# Patient Record
Sex: FEMALE | Race: BLACK OR AFRICAN AMERICAN | Employment: PART TIME | URBAN - METROPOLITAN AREA
[De-identification: names, ages, dates, MRNs, and addresses within clinical notes are randomized per-mention and may not be internally consistent; named-entity substitution may affect disease eponyms.]

---

## 2017-01-11 ENCOUNTER — TELEPHONE (OUTPATIENT)
Dept: FAMILY MEDICINE | Facility: CLINIC | Age: 57
End: 2017-01-11

## 2017-01-11 DIAGNOSIS — F41.9 ANXIETY: Primary | ICD-10-CM

## 2017-01-12 NOTE — TELEPHONE ENCOUNTER
HYDROXYZINE HCL 25MG TAB-TAKE ONE TAB BY MOUTH 3 X DAILY -D/C according to our records on 2/6/16   Last Written Prescription Date:  9/17/16 ACCORDING TO PHARMACY  Last Fill Quantity: 90,   # refills: 3  Last Office Visit with [facility], [facility] or [facility] prescribing provider: 12/14/16  Future Office visit:       Routing refill request to provider for review/approval because:  Medication is reported/historical

## 2017-01-13 RX ORDER — HYDROXYZINE HYDROCHLORIDE 25 MG/1
25 TABLET, FILM COATED ORAL 3 TIMES DAILY PRN
Qty: 120 TABLET | Refills: 11 | Status: SHIPPED | OUTPATIENT
Start: 2017-01-13 | End: 2017-11-14

## 2017-01-17 ENCOUNTER — TELEPHONE (OUTPATIENT)
Dept: FAMILY MEDICINE | Facility: CLINIC | Age: 57
End: 2017-01-17

## 2017-01-17 NOTE — TELEPHONE ENCOUNTER
Return call to patient. She has been weaning off of her Effexor XR under the advice of her psychiatrist. She is currently taking 75mg tablets. She reports the following symptoms:     -flu like symptoms  -headache  -fever yesterday with chills, unsure of temperature  -tingling sensation in arms  -irritability    Patient is currently in [place], triage RN advised patient to seek medical care today regarding her current symptoms. Patient reports that she has a call into her psychiatry office and will go to ED/UC in [place] today. RN asked patient to call back with any further questions or concerns.     Shelby Arreola RN  [facility]

## 2017-01-17 NOTE — TELEPHONE ENCOUNTER
Pt states she feels she is experiencing withdrawal symptoms from venlafaxine (EFFEXOR-ER) 225 MG TB24    Pt is in [place] and will be back in [place] tomorrow but no one will accept her insurance where she is. She would like to get some advice    Pt can be reached @ [phone] betsy

## 2017-01-18 ENCOUNTER — TELEPHONE (OUTPATIENT)
Dept: FAMILY MEDICINE | Facility: CLINIC | Age: 57
End: 2017-01-18

## 2017-01-18 NOTE — Clinical Note
[address] [address]  [phone]          January 18, 2017    Lesley Stafford  [facility] [address]    To whom it may concern,    RE: Lesley Wheat is currently a patient under my medical care.  Please excuse her work absence 1/18/2017 due to illness.  Please contact the clinic for any questions.      Sincerely,        Rafael Orosco MD

## 2017-01-18 NOTE — TELEPHONE ENCOUNTER
Reason for call: Other   Patient called regarding (reason for call): Letter excusing her from work   Additional comments: Pt states that she is experiencing some withdrawal symptoms from effexor, and does not think she will be able to make it to work today when she is back from [place]. She was requesting a letter stating that she is medically unable to work. She will be flying in from [place], so a message may need to be left as she might be on the plane when the nurse calls her back.     Phone Number Pt can be reached at: Home number on file [phone] (home)  Best Time: Any  Can we leave a detailed message on this number? YES

## 2017-01-18 NOTE — TELEPHONE ENCOUNTER
Writer reviewed    -last office visit 12/14/2016   -medication is reported as historical   -no documentation RE: patient stopping medication    Called patient and left message advising   -letter without office visit to assess symptoms is inappropriate   -should be seen if having increase symptoms of withdrawal   -please contact prescribing provider to discuss symptoms and letter request      Thank you,  Demetrio Edwards RN

## 2017-01-20 NOTE — TELEPHONE ENCOUNTER
Closing encounter; no further action needed.      Diane Parker, BENJAN, RN  [facility] [place]

## 2017-01-27 ENCOUNTER — OFFICE VISIT (OUTPATIENT)
Dept: OBGYN | Facility: CLINIC | Age: 57
End: 2017-01-27
Payer: COMMERCIAL

## 2017-01-27 VITALS
DIASTOLIC BLOOD PRESSURE: 73 MMHG | BODY MASS INDEX: 29.89 KG/M2 | TEMPERATURE: 97.4 F | HEART RATE: 96 BPM | HEIGHT: 66 IN | SYSTOLIC BLOOD PRESSURE: 116 MMHG | WEIGHT: 186 LBS

## 2017-01-27 DIAGNOSIS — N95.0 POSTMENOPAUSAL BLEEDING: Primary | ICD-10-CM

## 2017-01-27 PROCEDURE — 99214 OFFICE O/P EST MOD 30 MIN: CPT | Performed by: OBSTETRICS & GYNECOLOGY

## 2017-01-27 NOTE — PROGRESS NOTES
SUBJECTIVE:                                                    Lesley Stafford is a 56 year old female who presents to clinic today for the following health issues:      Acute Illness   Acute illness concerns: Sinus infection  Onset: 1 month ago    Fever: no     Chills/Sweats: no     Headache (location?): YES    Sinus Pressure:YES    Conjunctivitis:  Yes, very irritated    Ear Pain: YES- feel clogged but no pain    Rhinorrhea: YES- clogged and sore    Congestion: YES    Sore Throat: YES     Cough: YES     Wheeze: no     Decreased Appetite: YES    Nausea: YES    Vomiting: no     Diarrhea:  No, but stool has been different    Dysuria/Freq.: no     Fatigue/Achiness: YES    Sick/Strep Exposure: YES- workplace      Therapies Tried and outcome: None    Patient has off and on numbness in her hands for months, it is worse in her right hand. Has recently woken her out of her sleep. Has a history of carpal tunnel syndrome and a history of neuropathy being treated with gabapentin. Denies weakness.       Problem list and histories reviewed & adjusted, as indicated.  Additional history: as documented    Patient Active Problem List   Diagnosis     CARDIOVASCULAR SCREENING; LDL GOAL LESS THAN 130     Lupus (H)     Abnormal perimenopausal bleeding     Obesity, Class I, BMI 30-34.9     Other symptoms involving nervous and musculoskeletal systems(781.99)     History of claustrophobia     Cervicalgia     Pain in joint of left shoulder     Anemia due to blood loss, acute     Anemia     Depressive disorder     Anxiety     Allergy     ACP (advance care planning)     Tired     Insomnia, unspecified type     Hemoglobin C trait (H)     Liver hemangioma     Bilateral carpal tunnel syndrome     Past Surgical History   Procedure Laterality Date     Gyn surgery       Colonoscopy  01/2010     repeat 10 yrs     Esophagoscopy, gastroscopy, duodenoscopy (egd), combined Left 2/5/2016     Procedure: COMBINED ESOPHAGOSCOPY, GASTROSCOPY,  DUODENOSCOPY (EGD), BIOPSY SINGLE OR MULTIPLE;  Surgeon: Pierre Fernandez MD;  Location:  GI       Social History   Substance Use Topics     Smoking status: Former Smoker     Smokeless tobacco: Never Used     Alcohol Use: No     Family History   Problem Relation Age of Onset     Lupus Mother      DIABETES Father      Brain Tumor Father      begnign on pituitary     Depression Sister      Anemia Sister          Current Outpatient Prescriptions   Medication Sig Dispense Refill     hydrOXYzine (ATARAX) 25 MG tablet Take 1 tablet (25 mg) by mouth 3 times daily as needed for itching 120 tablet 11     traMADol (ULTRAM) 50 MG tablet Take 1-2 tablets ( mg) by mouth every 6 hours as needed 60 tablet 1     cyclobenzaprine (FLEXERIL) 10 MG tablet Take 0.5-1 tablets (5-10 mg) by mouth At Bedtime 30 tablet 1     gabapentin (NEURONTIN) 300 MG capsule Take 1 capsule (300 mg) by mouth 2 times daily 180 capsule 3     amphetamine-dextroamphetamine (ADDERALL) 10 MG tablet Take 10 mg by mouth 2 times daily       predniSONE (DELTASONE) 1 MG tablet Take 4 tablets (4 mg) by mouth daily       acyclovir (ZOVIRAX) 200 MG capsule Take 1 capsule (200 mg) by mouth 5 times daily 22 capsule 0     ferrous sulfate (IRON) 325 (65 FE) MG tablet Take 1 tablet (325 mg) by mouth daily (with breakfast) 30 tablet 5     fluticasone (FLONASE) 50 MCG/ACT nasal spray Spray 1-2 sprays into both nostrils daily 16 g 11     cetirizine (ZYRTEC) 10 MG tablet Take 1 tablet (10 mg) by mouth every evening 30 tablet 1     Calcium-Magnesium-Vitamin D (CALCIUM 500 PO)        multivitamin, therapeutic with minerals (MULTI-VITAMIN) TABS Take 1 tablet by mouth daily       venlafaxine (EFFEXOR-ER) 225 MG TB24 Take 75 mg by mouth every evening        hydroxychloroquine (PLAQUENIL) 200 MG tablet Take 200 mg by mouth daily.       Allergies   Allergen Reactions     Morphine Sulfate Itching     Sulfa Drugs Hives       ROS:  Constitutional, HEENT,  cardiovascular, pulmonary, GI, , musculoskeletal, neuro, skin, endocrine and psych systems are negative, except as otherwise noted.    OBJECTIVE:                                                    /77 mmHg  Pulse 100  Temp(Src) 97.1  F (36.2  C) (Oral)  Wt 186 lb 3.2 oz (84.46 kg)  SpO2 97%  Body mass index is 30.5 kg/(m^2).  GENERAL: healthy, alert and no distress  EYES: Eyes grossly normal to inspection, PERRL and conjunctivae and sclerae normal  HENT: normal cephalic/atraumatic, ear canals and TM's normal, nasal mucosa edematous , rhinorrhea yellow, oropharynx clear and oral mucous membranes moist  NECK: no adenopathy, no asymmetry, masses, or scars and thyroid normal to palpation  RESP: lungs clear to auscultation - no rales, rhonchi or wheezes  CV: regular rate and rhythm, normal S1 S2, no S3 or S4, no murmur, click or rub, no peripheral edema and peripheral pulses strong  MS: no gross musculoskeletal defects noted, no edema. Strength intact.   NEURO: CN2-12 intact. Moves all extremeties. Speech intact.     Diagnostic Test Results:  none      ASSESSMENT/PLAN:                                                    ##. Acute sinusitis with symptoms > 10 days  Push fluids and rest. Honey for cough, humidified air at night.   - amoxicillin (AMOXIL) 875 MG tablet; Take 1 tablet (875 mg) by mouth 2 times daily  Dispense: 20 tablet; Refill: 0  - fluticasone (FLONASE) 50 MCG/ACT spray; Spray 1-2 sprays into both nostrils daily  Dispense: 1 Bottle; Refill: 1    ##. Peripheral polyneuropathy (H)  Patient has a history of neuropathy, increasing her gabapentin from TWO TIMES PER DAY to THREE TIMES PER DAY.   - gabapentin (NEURONTIN) 300 MG capsule; Take 1 capsule (300 mg) by mouth 3 times daily  Dispense: 90 capsule; Refill: 1  I have discussed the patient's diagnosis, and my plan of treatment with the patient and/or family. Patient is aware to come back in with worsening symptoms or if no relief despite treatment  plan.  Patient voiced understanding and had no further questions.     Rani Alvarado PA-C  [facility] [place]

## 2017-01-27 NOTE — MR AVS SNAPSHOT
"              After Visit Summary   1/27/2017    Lesley Stafford    MRN: [id number]           Patient Information     Date Of Birth          1960        Visit Information        Provider Department      1/27/2017 1:45 PM Erin Gutierrez MD [facility]        Today's Diagnoses     Postmenopausal bleeding    -  1        Follow-ups after your visit        Your next 10 appointments already scheduled     Jan 30, 2017  9:40 AM   SHORT with Rani Alvarado PA-C   [facility] ([facility])    [address] [place] [id number] [phone]              Who to contact     If you have questions or need follow up information about today's clinic visit or your schedule please contact [facility] directly at [phone].  Normal or non-critical lab and imaging results will be communicated to you by MyChart, letter or phone within 4 business days after the clinic has received the results. If you do not hear from us within 7 days, please contact the clinic through MyChart or phone. If you have a critical or abnormal lab result, we will notify you by phone as soon as possible.  Submit refill requests through [employer] or call your pharmacy and they will forward the refill request to us. Please allow 3 business days for your refill to be completed.          Additional Information About Your Visit        MyChart Information     [employer] lets you send messages to your doctor, view your test results, renew your prescriptions, schedule appointments and more. To sign up, go to [email or web address] . Click on \"Log in\" on the left side of the screen, which will take you to the Welcome page. Then click on \"Sign up Now\" on the right side of the page.     You will be asked to enter the access code listed below, as well as some personal information. Please follow the directions to create your username and password.     Your access code is: " "[email or web address]  Expires: 3/28/2017  1:54 PM     Your access code will  in 90 days. If you need help or a new code, please call your [facility] or [phone].        Care EveryWhere ID     This is your Care EveryWhere ID. This could be used by other organizations to access your [facility] medical records  [id number]        Your Vitals Were     Pulse Temperature Height BMI (Body Mass Index)          96 97.4  F (36.3  C) (Oral) 5' 5.5\" (1.664 m) 30.47 kg/m2         Blood Pressure from Last 3 Encounters:   17 116/73   16 104/69   16 108/71    Weight from Last 3 Encounters:   17 186 lb (84.369 kg)   16 188 lb (85.276 kg)   16 188 lb 6.4 oz (85.458 kg)              Today, you had the following     No orders found for display       Primary Care Provider Office Phone # Fax #    Rafael Orosco [phone]       [facility] [address] [address]        Thank you!     Thank you for choosing [facility]  for your care. Our goal is always to provide you with excellent care. Hearing back from our patients is one way we can continue to improve our services. Please take a few minutes to complete the written survey that you may receive in the mail after your visit with us. Thank you!             Your Updated Medication List - Protect others around you: Learn how to safely use, store and throw away your medicines at www.disposemymeds.org.          This list is accurate as of: 17  5:15 PM.  Always use your most recent med list.                   Brand Name Dispense Instructions for use    acyclovir 200 MG capsule    ZOVIRAX    22 capsule    Take 1 capsule (200 mg) by mouth 5 times daily       amphetamine-dextroamphetamine 10 MG per tablet    ADDERALL     Take 10 mg by mouth 2 times daily       CALCIUM 500 PO          cetirizine 10 MG tablet    zyrTEC    30 tablet    Take 1 tablet (10 mg) by mouth every evening "       cyclobenzaprine 10 MG tablet    FLEXERIL    30 tablet    Take 0.5-1 tablets (5-10 mg) by mouth At Bedtime       ferrous sulfate 325 (65 FE) MG tablet    IRON    30 tablet    Take 1 tablet (325 mg) by mouth daily (with breakfast)       fluticasone 50 MCG/ACT spray    FLONASE    16 g    Spray 1-2 sprays into both nostrils daily       gabapentin 300 MG capsule    NEURONTIN    180 capsule    Take 1 capsule (300 mg) by mouth 2 times daily       hydroxychloroquine 200 MG tablet    PLAQUENIL     Take 200 mg by mouth daily.       hydrOXYzine 25 MG tablet    ATARAX    120 tablet    Take 1 tablet (25 mg) by mouth 3 times daily as needed for itching       Multi-vitamin Tabs tablet      Take 1 tablet by mouth daily       predniSONE 1 MG tablet    DELTASONE     Take 4 tablets (4 mg) by mouth daily       traMADol 50 MG tablet    ULTRAM    60 tablet    Take 1-2 tablets ( mg) by mouth every 6 hours as needed       venlafaxine 225 MG Tb24 24 hr tablet    EFFEXOR-ER     Take 75 mg by mouth every evening

## 2017-01-27 NOTE — PROGRESS NOTES
"Lesley Stafford is a 56 year old woman who presents for endometrial biopsy.  See prior GYN notes.   Ultrasound has showed an anteverted retroflexed uterus, 11 mm endometrium.      OBJECTIVE: /73 mmHg  Pulse 96  Temp(Src) 97.4  F (36.3  C) (Oral)  Ht 5' 5.5\" (1.664 m)  Wt 186 lb (84.369 kg)  BMI 30.47 kg/m2     Informed consent was obtained for endometrial biopsy.  Using standard technique and hibiclens prep of the cervix and vagina, pipelle aspiration of the endometrium was attempted.  The cervix was grasped anteriorly with a tenaculum.  The uterus sounded to the internal os, but could not be sounded further.  The procedure caused her discomfort, and we stopped at that point by her request.      ASSESSMENT: Postmenopausal bleeding.  Failed endometrial biopsy, stenotic cervix.     PLAN:  We discussed postmenopausal bleeding.  We discussed estrogen-depletion atrophy, breakthrough bleeding.  We discussed cervical problems.  We discussed endometrial hyperplasia, and endometrial cancer.  We discussed treatment of the prior problems.  We discussed further evaluation if endometrial biopsy cannot be done.  We discussed D&C, hysteroscopy as further options.  Operative risks and benefits were discussed with the patient and questions were answered.  These risks include but are not limited to anesthesia, injury to internal organs, uterine perforation, bleeding, infection, and need for further surgery.  She wishes to proceed with hysteroscopy D&C, we will schedule.  Will plan ultrasound guidance in view of uterine anatomy.          In addition to the procedure, I spent 25 minutes with the patient, with more than 50% spent in counseling/discussing the diagnosis and treatment options.      "

## 2017-01-27 NOTE — NURSING NOTE
"Chief Complaint   Patient presents with     Procedure     EMB       Initial /73 mmHg  Pulse 96  Temp(Src) 97.4  F (36.3  C) (Oral)  Ht 5' 5.5\" (1.664 m)  Wt 186 lb (84.369 kg)  BMI 30.47 kg/m2 Estimated body mass index is 30.47 kg/(m^2) as calculated from the following:    Height as of this encounter: 5' 5.5\" (1.664 m).    Weight as of this encounter: 186 lb (84.369 kg).  BP completed using cuff size: regular    No obstetric history on file.    The following HM Due: NONE      The following patient reported/Care Every where data was sent to:  P ABSTRACT QUALITY INITIATIVES [81752]       N/a      Rosi Sharp MA                 "

## 2017-01-27 NOTE — Clinical Note
Surgeon: Erin Gutierrez Assistant: yes, resident OK.  will want Radiology ultrasound present Procedure: hysteroscopy D&C, Myosure available Diagnosis: postmenopausal bleeding, failed office endometrial biopsy Length of surgery: one hour Morning admit: No Day/Time Preference:  Anesthesia: Local and IV sedation Pre-op: With Primary Latex Allergy: No Want pre op labs done: Yes Hgb

## 2017-01-30 ENCOUNTER — OFFICE VISIT (OUTPATIENT)
Dept: FAMILY MEDICINE | Facility: CLINIC | Age: 57
End: 2017-01-30
Payer: COMMERCIAL

## 2017-01-30 VITALS
SYSTOLIC BLOOD PRESSURE: 133 MMHG | WEIGHT: 186.2 LBS | HEART RATE: 100 BPM | BODY MASS INDEX: 30.5 KG/M2 | OXYGEN SATURATION: 97 % | TEMPERATURE: 97.1 F | DIASTOLIC BLOOD PRESSURE: 77 MMHG

## 2017-01-30 DIAGNOSIS — G62.9 PERIPHERAL POLYNEUROPATHY: ICD-10-CM

## 2017-01-30 DIAGNOSIS — J01.90 ACUTE SINUSITIS WITH SYMPTOMS > 10 DAYS: ICD-10-CM

## 2017-01-30 DIAGNOSIS — J01.01 ACUTE RECURRENT MAXILLARY SINUSITIS: Primary | ICD-10-CM

## 2017-01-30 PROCEDURE — 99214 OFFICE O/P EST MOD 30 MIN: CPT | Performed by: PHYSICIAN ASSISTANT

## 2017-01-30 RX ORDER — AMOXICILLIN 875 MG
875 TABLET ORAL 2 TIMES DAILY
Qty: 20 TABLET | Refills: 0 | Status: SHIPPED | OUTPATIENT
Start: 2017-01-30 | End: 2017-02-13

## 2017-01-30 RX ORDER — FLUTICASONE PROPIONATE 50 MCG
1-2 SPRAY, SUSPENSION (ML) NASAL DAILY
Qty: 1 BOTTLE | Refills: 1 | Status: SHIPPED | OUTPATIENT
Start: 2017-01-30 | End: 2017-02-13

## 2017-01-30 RX ORDER — GABAPENTIN 300 MG/1
300 CAPSULE ORAL 3 TIMES DAILY
Qty: 90 CAPSULE | Refills: 1 | Status: SHIPPED | OUTPATIENT
Start: 2017-01-30 | End: 2017-05-16

## 2017-01-30 NOTE — NURSING NOTE
"Chief Complaint   Patient presents with     Sinus Problem       Initial /77 mmHg  Pulse 100  Temp(Src) 97.1  F (36.2  C) (Oral)  Wt 186 lb 3.2 oz (84.46 kg)  SpO2 97% Estimated body mass index is 30.5 kg/(m^2) as calculated from the following:    Height as of 1/27/17: 5' 5.5\" (1.664 m).    Weight as of this encounter: 186 lb 3.2 oz (84.46 kg).  BP completed using cuff size: regular      Vu Herrera MA      "

## 2017-01-30 NOTE — MR AVS SNAPSHOT
"              After Visit Summary   2017    Lesley Stafford    MRN: [id number]           Patient Information     Date Of Birth          1960        Visit Information        Provider Department      2017 1:00 PM Rani Alvarado PA-C [facility]        Today's Diagnoses     Acute recurrent maxillary sinusitis    -  1     Acute sinusitis with symptoms > 10 days         Peripheral polyneuropathy (H)            Follow-ups after your visit        Who to contact     If you have questions or need follow up information about today's clinic visit or your schedule please contact [facility] directly at [phone].  Normal or non-critical lab and imaging results will be communicated to you by Staff Rankerhart, letter or phone within 4 business days after the clinic has received the results. If you do not hear from us within 7 days, please contact the clinic through Staff Rankerhart or phone. If you have a critical or abnormal lab result, we will notify you by phone as soon as possible.  Submit refill requests through Peppercorn or call your pharmacy and they will forward the refill request to us. Please allow 3 business days for your refill to be completed.          Additional Information About Your Visit        MyChart Information     Peppercorn lets you send messages to your doctor, view your test results, renew your prescriptions, schedule appointments and more. To sign up, go to [email or web address] . Click on \"Log in\" on the left side of the screen, which will take you to the Welcome page. Then click on \"Sign up Now\" on the right side of the page.     You will be asked to enter the access code listed below, as well as some personal information. Please follow the directions to create your username and password.     Your access code is: [email or web address]  Expires: 3/28/2017  1:54 PM     Your access code will  in 90 days. If you need help or a new code, please call your [place] clinic " or [phone].        Care EveryWhere ID     This is your Care EveryWhere ID. This could be used by other organizations to access your [place] medical records  [id number]        Your Vitals Were     Pulse Temperature Pulse Oximetry             100 97.1  F (36.2  C) (Oral) 97%          Blood Pressure from Last 3 Encounters:   01/30/17 133/77   01/27/17 116/73   12/28/16 104/69    Weight from Last 3 Encounters:   01/30/17 186 lb 3.2 oz (84.46 kg)   01/27/17 186 lb (84.369 kg)   12/28/16 188 lb (85.276 kg)              Today, you had the following     No orders found for display         Today's Medication Changes          These changes are accurate as of: 1/30/17  1:28 PM.  If you have any questions, ask your nurse or doctor.               Start taking these medicines.        Dose/Directions    amoxicillin 875 MG tablet   Commonly known as:  AMOXIL   Used for:  Acute sinusitis with symptoms > 10 days   Started by:  Rani Alvarado PA-C        Dose:  875 mg   Take 1 tablet (875 mg) by mouth 2 times daily   Quantity:  20 tablet   Refills:  0         These medicines have changed or have updated prescriptions.        Dose/Directions    * fluticasone 50 MCG/ACT spray   Commonly known as:  FLONASE   This may have changed:  Another medication with the same name was added. Make sure you understand how and when to take each.   Used for:  Seasonal allergic rhinitis   Changed by:  Jessica Gutierres PA-C        Dose:  1-2 spray   Spray 1-2 sprays into both nostrils daily   Quantity:  16 g   Refills:  11       * fluticasone 50 MCG/ACT spray   Commonly known as:  FLONASE   This may have changed:  You were already taking a medication with the same name, and this prescription was added. Make sure you understand how and when to take each.   Used for:  Acute sinusitis with symptoms > 10 days   Changed by:  Rani Alvarado PA-C        Dose:  1-2 spray   Spray 1-2 sprays into both nostrils daily   Quantity:   1 Bottle   Refills:  1       * gabapentin 300 MG capsule   Commonly known as:  NEURONTIN   This may have changed:  Another medication with the same name was added. Make sure you understand how and when to take each.   Used for:  Cervicalgia   Changed by:  Rafael Orosco MD        Dose:  300 mg   Take 1 capsule (300 mg) by mouth 2 times daily   Quantity:  180 capsule   Refills:  3       * gabapentin 300 MG capsule   Commonly known as:  NEURONTIN   This may have changed:  You were already taking a medication with the same name, and this prescription was added. Make sure you understand how and when to take each.   Used for:  Peripheral polyneuropathy (H)   Changed by:  Rani Alvarado PA-C        Dose:  300 mg   Take 1 capsule (300 mg) by mouth 3 times daily   Quantity:  90 capsule   Refills:  1       * Notice:  This list has 4 medication(s) that are the same as other medications prescribed for you. Read the directions carefully, and ask your doctor or other care provider to review them with you.         Where to get your medicines      These medications were sent to [facility] [address] [facility] - [place], [place] - [address]  [address] [address]     Phone:  [phone]    - amoxicillin 875 MG tablet  - gabapentin 300 MG capsule      Some of these will need a paper prescription and others can be bought over the counter.  Ask your nurse if you have questions.     Bring a paper prescription for each of these medications    - fluticasone 50 MCG/ACT spray             Primary Care Provider Office Phone # Fax #    Rafael Orosco [phone]       [facility] [address] [facility] [address]  [facility] [address]        Thank you!     Thank you for choosing [facility]  for your care. Our goal is always to provide you with excellent care. Hearing back from our patients is one way we can continue to improve our services. Please take a few  minutes to complete the written survey that you may receive in the mail after your visit with us. Thank you!             Your Updated Medication List - Protect others around you: Learn how to safely use, store and throw away your medicines at www.disposemymeds.org.          This list is accurate as of: 1/30/17  1:28 PM.  Always use your most recent med list.                   Brand Name Dispense Instructions for use    acyclovir 200 MG capsule    ZOVIRAX    22 capsule    Take 1 capsule (200 mg) by mouth 5 times daily       amoxicillin 875 MG tablet    AMOXIL    20 tablet    Take 1 tablet (875 mg) by mouth 2 times daily       amphetamine-dextroamphetamine 10 MG per tablet    ADDERALL     Take 10 mg by mouth 2 times daily       CALCIUM 500 PO          cetirizine 10 MG tablet    zyrTEC    30 tablet    Take 1 tablet (10 mg) by mouth every evening       cyclobenzaprine 10 MG tablet    FLEXERIL    30 tablet    Take 0.5-1 tablets (5-10 mg) by mouth At Bedtime       ferrous sulfate 325 (65 FE) MG tablet    IRON    30 tablet    Take 1 tablet (325 mg) by mouth daily (with breakfast)       * fluticasone 50 MCG/ACT spray    FLONASE    16 g    Spray 1-2 sprays into both nostrils daily       * fluticasone 50 MCG/ACT spray    FLONASE    1 Bottle    Spray 1-2 sprays into both nostrils daily       * gabapentin 300 MG capsule    NEURONTIN    180 capsule    Take 1 capsule (300 mg) by mouth 2 times daily       * gabapentin 300 MG capsule    NEURONTIN    90 capsule    Take 1 capsule (300 mg) by mouth 3 times daily       hydroxychloroquine 200 MG tablet    PLAQUENIL     Take 200 mg by mouth daily.       hydrOXYzine 25 MG tablet    ATARAX    120 tablet    Take 1 tablet (25 mg) by mouth 3 times daily as needed for itching       Multi-vitamin Tabs tablet      Take 1 tablet by mouth daily       predniSONE 1 MG tablet    DELTASONE     Take 4 tablets (4 mg) by mouth daily       traMADol 50 MG tablet    ULTRAM    60 tablet    Take 1-2 tablets  ( mg) by mouth every 6 hours as needed       venlafaxine 225 MG Tb24 24 hr tablet    EFFEXOR-ER     Take 75 mg by mouth every evening       * Notice:  This list has 4 medication(s) that are the same as other medications prescribed for you. Read the directions carefully, and ask your doctor or other care provider to review them with you.

## 2017-01-31 DIAGNOSIS — N92.4 ABNORMAL PERIMENOPAUSAL BLEEDING: Primary | ICD-10-CM

## 2017-01-31 RX ORDER — PHENAZOPYRIDINE HYDROCHLORIDE 200 MG/1
200 TABLET, FILM COATED ORAL ONCE
Status: CANCELLED | OUTPATIENT
Start: 2017-02-15

## 2017-02-08 DIAGNOSIS — Z01.818 PREOPERATIVE EXAMINATION: Primary | ICD-10-CM

## 2017-02-11 ENCOUNTER — ANESTHESIA EVENT (OUTPATIENT)
Dept: SURGERY | Facility: CLINIC | Age: 57
End: 2017-02-11
Payer: COMMERCIAL

## 2017-02-13 ENCOUNTER — OFFICE VISIT (OUTPATIENT)
Dept: FAMILY MEDICINE | Facility: CLINIC | Age: 57
End: 2017-02-13
Payer: COMMERCIAL

## 2017-02-13 ENCOUNTER — HOSPITAL ENCOUNTER (OUTPATIENT)
Facility: CLINIC | Age: 57
Setting detail: SPECIMEN
Discharge: HOME | End: 2017-02-13
Admitting: OBSTETRICS & GYNECOLOGY
Payer: COMMERCIAL

## 2017-02-13 VITALS
BODY MASS INDEX: 31.05 KG/M2 | WEIGHT: 189.5 LBS | TEMPERATURE: 97.8 F | SYSTOLIC BLOOD PRESSURE: 102 MMHG | DIASTOLIC BLOOD PRESSURE: 72 MMHG | HEART RATE: 97 BPM

## 2017-02-13 DIAGNOSIS — J31.0 CHRONIC RHINITIS: ICD-10-CM

## 2017-02-13 DIAGNOSIS — N95.0 POST-MENOPAUSAL BLEEDING: ICD-10-CM

## 2017-02-13 DIAGNOSIS — M79.10 MYALGIA: ICD-10-CM

## 2017-02-13 DIAGNOSIS — Z01.818 PREOP GENERAL PHYSICAL EXAM: Primary | ICD-10-CM

## 2017-02-13 LAB
ABO + RH BLD: NORMAL
ABO + RH BLD: NORMAL
ERYTHROCYTE [DISTWIDTH] IN BLOOD BY AUTOMATED COUNT: 13.6 % (ref 10–15)
HCT VFR BLD AUTO: 36.7 % (ref 35–47)
HGB BLD-MCNC: 13.6 G/DL (ref 11.7–15.7)
MCH RBC QN AUTO: 29.4 PG (ref 26.5–33)
MCHC RBC AUTO-ENTMCNC: 37.1 G/DL (ref 31.5–36.5)
MCV RBC AUTO: 79 FL (ref 78–100)
PLATELET # BLD AUTO: 130 10E9/L (ref 150–450)
RBC # BLD AUTO: 4.63 10E12/L (ref 3.8–5.2)
SPECIMEN EXP DATE BLD: NORMAL
WBC # BLD AUTO: 5 10E9/L (ref 4–11)

## 2017-02-13 PROCEDURE — 86850 RBC ANTIBODY SCREEN: CPT | Performed by: OBSTETRICS & GYNECOLOGY

## 2017-02-13 PROCEDURE — 86901 BLOOD TYPING SEROLOGIC RH(D): CPT | Performed by: OBSTETRICS & GYNECOLOGY

## 2017-02-13 PROCEDURE — 36415 COLL VENOUS BLD VENIPUNCTURE: CPT | Performed by: NURSE PRACTITIONER

## 2017-02-13 PROCEDURE — 99214 OFFICE O/P EST MOD 30 MIN: CPT | Performed by: NURSE PRACTITIONER

## 2017-02-13 PROCEDURE — 86900 BLOOD TYPING SEROLOGIC ABO: CPT | Performed by: OBSTETRICS & GYNECOLOGY

## 2017-02-13 PROCEDURE — 86900 BLOOD TYPING SEROLOGIC ABO: CPT | Performed by: NURSE PRACTITIONER

## 2017-02-13 PROCEDURE — 86901 BLOOD TYPING SEROLOGIC RH(D): CPT | Performed by: NURSE PRACTITIONER

## 2017-02-13 PROCEDURE — 85027 COMPLETE CBC AUTOMATED: CPT | Performed by: NURSE PRACTITIONER

## 2017-02-13 NOTE — MR AVS SNAPSHOT
After Visit Summary   2/13/2017    Lesley Stafford    MRN: [id number]           Patient Information     Date Of Birth          1960        Visit Information        Provider Department      2/13/2017 9:30 AM Rosa Weinstein APRN [facility]        Today's Diagnoses     Preop general physical exam    -  1    Chronic rhinitis        Myalgia          Care Instructions      Before Your Surgery      Call your surgeon if there is any change in your health. This includes signs of a cold or flu (such as a sore throat, runny nose, cough, rash or fever).    Do not smoke, drink alcohol or take over the counter medicine (unless your surgeon or primary care doctor tells you to) for the 24 hours before and after surgery.    If you take prescribed drugs: Follow your doctor s orders about which medicines to take and which to stop until after surgery.    Eating and drinking prior to surgery: follow the instructions from your surgeon    Take a shower or bath the night before surgery. Use the soap your surgeon gave you to gently clean your skin. If you do not have soap from your surgeon, use your regular soap. Do not shave or scrub the surgery site.  Wear clean pajamas and have clean sheets on your bed.         Follow-ups after your visit        Additional Services     OTOLARYNGOLOGY REFERRAL       Your provider has referred you to: N: [facility] [phone] [email or web address]    Please be aware that coverage of these services is subject to the terms and limitations of your health insurance plan.  Call member services at your health plan with any benefit or coverage questions.      Please bring the following with you to your appointment:    (1) Any X-Rays, CTs or MRIs which have been performed.  Contact the facility where they were done to arrange for  prior to your scheduled appointment.   (2) List of current medications  (3) This referral  "request   (4) Any documents/labs given to you for this referral                  Your next 10 appointments already scheduled     Feb 15, 2017   Procedure with Erin Gutierrez MD   [facility] (--)    [address] [place] [address] [phone]            Feb 15, 2017  7:30 AM CST   US INTRAOPERATIVE with URUS1   [facility], Ultrasound ([facility], [facility])    [facility] [facility] [address] [phone]           Please bring a list of your medicines (including vitamins, minerals and over-the-counter drugs). Also, tell your doctor about any allergies you may have. Wear comfortable clothes and leave your valuables at home.  You do not need to do anything special to prepare for your exam.  Please call the Imaging Department at your exam site with any questions.              Who to contact     If you have questions or need follow up information about today's clinic visit or your schedule please contact [facility] directly at [phone].  Normal or non-critical lab and imaging results will be communicated to you by Vanuhart, letter or phone within 4 business days after the clinic has received the results. If you do not hear from us within 7 days, please contact the clinic through Vanuhart or phone. If you have a critical or abnormal lab result, we will notify you by phone as soon as possible.  Submit refill requests through BioPharmX or call your pharmacy and they will forward the refill request to us. Please allow 3 business days for your refill to be completed.          Additional Information About Your Visit        BioPharmX Information     BioPharmX lets you send messages to your doctor, view your test results, renew your prescriptions, schedule appointments and more. To sign up, go to [email or web address] . Click on \"Log in\" on the left side of the screen, which will take you to the Welcome page. Then click on " "\"Sign up Now\" on the right side of the page.     You will be asked to enter the access code listed below, as well as some personal information. Please follow the directions to create your username and password.     Your access code is: [email or web address]  Expires: 3/28/2017  1:54 PM     Your access code will  in 90 days. If you need help or a new code, please call your [place] clinic or [phone].        Care EveryWhere ID     This is your Care EveryWhere ID. This could be used by other organizations to access your [place] medical records  [id number]        Your Vitals Were     Pulse Temperature BMI (Body Mass Index)             97 97.8  F (36.6  C) (Oral) 31.05 kg/m2          Blood Pressure from Last 3 Encounters:   17 102/72   17 133/77   17 116/73    Weight from Last 3 Encounters:   17 189 lb 8 oz (86 kg)   17 186 lb 3.2 oz (84.5 kg)   17 186 lb (84.4 kg)              We Performed the Following     ABO and Rh     CBC with platelets     OTOLARYNGOLOGY REFERRAL          Today's Medication Changes          These changes are accurate as of: 17 10:22 AM.  If you have any questions, ask your nurse or doctor.               Start taking these medicines.        Dose/Directions    tiZANidine 4 MG tablet   Commonly known as:  ZANAFLEX   Used for:  Myalgia   Started by:  Rosa Weinstein APRN CNP        Dose:  4-8 mg   Take 1-2 tablets (4-8 mg) by mouth 3 times daily as needed for muscle spasms   Quantity:  30 tablet   Refills:  1         These medicines have changed or have updated prescriptions.        Dose/Directions    fluticasone 50 MCG/ACT spray   Commonly known as:  FLONASE   This may have changed:  Another medication with the same name was removed. Continue taking this medication, and follow the directions you see here.   Used for:  Seasonal allergic rhinitis   Changed by:  Jessica Gutierres PA-C        Dose:  1-2 spray   Spray 1-2 sprays into both " nostrils daily   Quantity:  16 g   Refills:  11         Stop taking these medicines if you haven't already. Please contact your care team if you have questions.     amoxicillin 875 MG tablet   Commonly known as:  AMOXIL   Stopped by:  Rosa Weinstein APRN CNP           cyclobenzaprine 10 MG tablet   Commonly known as:  FLEXERIL   Stopped by:  Rosa Weinstein APRN CNP                Where to get your medicines      These medications were sent to [facility] [address] [facility] - [place], [place] - [address]  [address] [address]     Phone:  [phone]     tiZANidine 4 MG tablet                Primary Care Provider Office Phone # Fax #    Rafael Orosco [phone]       [facility] [address]  [facility] [address]        Thank you!     Thank you for choosing [facility]  for your care. Our goal is always to provide you with excellent care. Hearing back from our patients is one way we can continue to improve our services. Please take a few minutes to complete the written survey that you may receive in the mail after your visit with us. Thank you!             Your Updated Medication List - Protect others around you: Learn how to safely use, store and throw away your medicines at www.disposemymeds.org.          This list is accurate as of: 2/13/17 10:22 AM.  Always use your most recent med list.                   Brand Name Dispense Instructions for use    acyclovir 200 MG capsule    ZOVIRAX    22 capsule    Take 1 capsule (200 mg) by mouth 5 times daily       amphetamine-dextroamphetamine 10 MG per tablet    ADDERALL     Take 10 mg by mouth 2 times daily       CALCIUM 500 PO          cetirizine 10 MG tablet    zyrTEC    30 tablet    Take 1 tablet (10 mg) by mouth every evening       ferrous sulfate 325 (65 FE) MG tablet    IRON    30 tablet    Take 1 tablet (325 mg) by mouth daily (with breakfast)       fluticasone 50  MCG/ACT spray    FLONASE    16 g    Spray 1-2 sprays into both nostrils daily       * gabapentin 300 MG capsule    NEURONTIN    180 capsule    Take 1 capsule (300 mg) by mouth 2 times daily       * gabapentin 300 MG capsule    NEURONTIN    90 capsule    Take 1 capsule (300 mg) by mouth 3 times daily       hydroxychloroquine 200 MG tablet    PLAQUENIL     Take 200 mg by mouth daily.       hydrOXYzine 25 MG tablet    ATARAX    120 tablet    Take 1 tablet (25 mg) by mouth 3 times daily as needed for itching       Multi-vitamin Tabs tablet      Take 1 tablet by mouth daily       predniSONE 1 MG tablet    DELTASONE     Take 4 mg by mouth daily Taking 1 tab every other day       tiZANidine 4 MG tablet    ZANAFLEX    30 tablet    Take 1-2 tablets (4-8 mg) by mouth 3 times daily as needed for muscle spasms       traMADol 50 MG tablet    ULTRAM    60 tablet    Take 1-2 tablets ( mg) by mouth every 6 hours as needed       venlafaxine 225 MG Tb24 24 hr tablet    EFFEXOR-ER     Take 75 mg by mouth every evening       * Notice:  This list has 2 medication(s) that are the same as other medications prescribed for you. Read the directions carefully, and ask your doctor or other care provider to review them with you.

## 2017-02-13 NOTE — NURSING NOTE
"Chief Complaint   Patient presents with     Pre-Op Exam       Initial /72 (BP Location: Right arm, Cuff Size: Adult Large)  Pulse 97  Temp 97.8  F (36.6  C) (Oral)  Wt 189 lb 8 oz (86 kg)  BMI 31.05 kg/m2 Estimated body mass index is 31.05 kg/(m^2) as calculated from the following:    Height as of 1/27/17: 5' 5.5\" (1.664 m).    Weight as of this encounter: 189 lb 8 oz (86 kg).  Medication Reconciliation: complete     Ila Craig CMA    "

## 2017-02-13 NOTE — PROGRESS NOTES
[address] [address]  [phone]  Dept: [phone]    PRE-OP EVALUATION:  Today's date: 2017    Lesley Stafford (: 1960) presents for pre-operative evaluation assessment as requested by Dr. Gutierrez.  She requires evaluation and anesthesia risk assessment prior to undergoing surgery/procedure for treatment of abnormal vaginal bleeding.  Proposed procedure: Hysteroscopy, dilation and curettage with myosure     Date of Surgery/ Procedure: 2/15/17  Time of Surgery/ Procedure: 7:30 am  [facility]   Fax number for surgical facility: n/a  Primary Physician: Rafael Orosco  Type of Anesthesia Anticipated: to be determined    Patient has a Health Care Directive or Living Will:  YES     1. NO - Do you have a history of heart attack, stroke, stent, bypass or surgery on an artery in the head, neck, heart or legs?  2. NO - Do you ever have any pain or discomfort in your chest?  3. NO - Do you have a history of  Heart Failure?  4. YES - ARE YOUR TROUBLED BY SHORTNESS OF BREATH WHEN WALKING ON THE LEVEL, UP A SLIGHT HILL OR AT NIGHT?   5. NO - Do you currently have a cold, bronchitis or other respiratory infection?  6. NO - Do you have a cough, shortness of breath or wheezing?  7. YES - DO YOU SOMETIMES GET PAINS IN THE CALVES OF YOUR LEGS WHEN YOU WALK? -lupus  8. NO - Do you or anyone in your family have previous history of blood clots?  9. NO - Do you or does anyone in your family have a serious bleeding problem such as prolonged bleeding following surgeries or cuts?  10. YES - HAVE YOU EVER HAD PROBLEMS WITH ANEMIA OR BEEN TOLD TO TAKE IRON PILLS?   11. YES - HAVE YOU HAD ANY ABNORMAL BLOOD LOSS SUCH AS BLACK, TARRY OR BLOODY STOOLS, OR ABNORMAL VAGINAL BLEEDING?   12. YES - HAVE YOU EVER HAD A BLOOD TRANSFUSION?   13. NO - Have you or any of your relatives ever had problems with anesthesia?  14. YES - DO  YOU HAVE SLEEP APNEA, EXCESSIVE SNORING OR DAYTIME DROWSINESS? Daytime drowsiness  15. NO - Do you have any prosthetic heart valves?  16. NO - Do you have prosthetic joints?  17. NO - Is there any chance that you may be pregnant?      HPI:                                                      Brief HPI related to upcoming procedure: Had not had a period x 1 year, and then had a 10 day period in late fall. Pelvic ultrasound revealed a thickened uterine lining.     See problem list for active medical problems.  Problems all longstanding and stable, except as noted/documented.  See ROS for pertinent symptoms related to these conditions.    Ongoing sinus infection, recently treated with amoxicillin and flonase.                                                                                                .  ANEMIA - Patient has a recent history of moderate severity anemia, which has not been symptomatic. 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                                                                                                                 .    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 Provider to review and confirm.       History of claustrophobia 08/03/2015     Priority: Medium      Obesity, Class I, BMI 30-34.9 10/31/2014     Priority: Medium     Liver hemangioma 12/14/2016     4 on MRI '09       ACP (advance care planning) 03/10/2016     Advance Care Planning 3/10/2016: Receipt of ACP document:  Received: Health Care Directive which was witnessed or notarized on 2-5-16.  Document previously scanned on 2-8-16.  Validation form completed and sent to be scanned.  Code Status reflects choices in most recent ACP document.  Confirmed/documented designated decision maker(s).  Added by Kathleen Espitia RN Advance Care Planning Liaison with [employer]           Other symptoms involving nervous and musculoskeletal systems(781.99) 04/08/2015     Abnormal perimenopausal bleeding 08/24/2013     CARDIOVASCULAR SCREENING; LDL GOAL LESS THAN 130 09/24/2012     Lupus (H) 09/24/2012      Past Medical History   Diagnosis Date     Allergy      Anemia      Anxiety      Depressive disorder      Hemoglobin C trait (H) 8/29/2016     Lupus (H)      Substance abuse      Has not used for 19 years.     Past Surgical History   Procedure Laterality Date     Gyn surgery       Colonoscopy  01/2010     repeat 10 yrs     Esophagoscopy, gastroscopy, duodenoscopy (egd), combined Left 2/5/2016     Procedure: COMBINED ESOPHAGOSCOPY, GASTROSCOPY, DUODENOSCOPY (EGD), BIOPSY SINGLE OR MULTIPLE;  Surgeon: Pierre Fernandez MD;  Location:  GI     Current Outpatient Prescriptions   Medication Sig Dispense Refill     tiZANidine (ZANAFLEX) 4 MG tablet Take 1-2 tablets (4-8 mg) by mouth 3 times daily as needed for muscle spasms 30 tablet 1     gabapentin (NEURONTIN) 300 MG capsule Take 1 capsule (300 mg) by mouth 3 times daily 90 capsule 1     hydrOXYzine (ATARAX) 25 MG tablet Take 1 tablet (25 mg) by mouth 3 times daily as needed for itching 120 tablet 11     amphetamine-dextroamphetamine (ADDERALL) 10 MG tablet Take 10 mg by mouth 2 times daily       predniSONE (DELTASONE) 1 MG tablet Take 4 mg by mouth daily Taking 1  tab every other day       ferrous sulfate (IRON) 325 (65 FE) MG tablet Take 1 tablet (325 mg) by mouth daily (with breakfast) 30 tablet 5     fluticasone (FLONASE) 50 MCG/ACT nasal spray Spray 1-2 sprays into both nostrils daily 16 g 11     cetirizine (ZYRTEC) 10 MG tablet Take 1 tablet (10 mg) by mouth every evening 30 tablet 1     Calcium-Magnesium-Vitamin D (CALCIUM 500 PO)        multivitamin, therapeutic with minerals (MULTI-VITAMIN) TABS Take 1 tablet by mouth daily       venlafaxine (EFFEXOR-ER) 225 MG TB24 Take 75 mg by mouth every evening        hydroxychloroquine (PLAQUENIL) 200 MG tablet Take 200 mg by mouth daily.       traMADol (ULTRAM) 50 MG tablet Take 1-2 tablets ( mg) by mouth every 6 hours as needed (Patient not taking: Reported on 2/13/2017) 60 tablet 1     gabapentin (NEURONTIN) 300 MG capsule Take 1 capsule (300 mg) by mouth 2 times daily 180 capsule 3     acyclovir (ZOVIRAX) 200 MG capsule Take 1 capsule (200 mg) by mouth 5 times daily (Patient not taking: Reported on 2/13/2017) 22 capsule 0     OTC products: Naproxen    Allergies   Allergen Reactions     Morphine Sulfate Itching     Sulfa Drugs Hives      Latex Allergy: NO    Social History   Substance Use Topics     Smoking status: Former Smoker     Smokeless tobacco: Never Used     Alcohol use No     History   Drug Use No       REVIEW OF SYSTEMS:                                                    C: NEGATIVE for fever, chills, change in weight  I: NEGATIVE for worrisome rashes, moles or lesions  E: NEGATIVE for vision changes or irritation  E/M: NEGATIVE for ear, mouth and throat problems  R: NEGATIVE for significant cough or SOB  B: NEGATIVE for masses, tenderness or discharge  CV: NEGATIVE for chest pain, palpitations or peripheral edema  GI: NEGATIVE for nausea, abdominal pain, heartburn, or change in bowel habits  : NEGATIVE for frequency, dysuria, or hematuria  MUSCULOSKELETAL:arthralgias related to Lupus  N: NEGATIVE for  weakness, dizziness or paresthesias  E: NEGATIVE for temperature intolerance, skin/hair changes  H: NEGATIVE for bleeding problems  P: NEGATIVE for changes in mood or affect    EXAM:                                                    /72 (BP Location: Right arm, Cuff Size: Adult Large)  Pulse 97  Temp 97.8  F (36.6  C) (Oral)  Wt 189 lb 8 oz (86 kg)  BMI 31.05 kg/m2    GENERAL APPEARANCE: healthy, alert and no distress     EYES: EOMI,- PERRL     HENT: ear canals and TM's normal and nasal mucosa edematous and erythematous     NECK: no adenopathy, no asymmetry, masses, or scars and thyroid normal to palpation     RESP: lungs clear to auscultation - no rales, rhonchi or wheezes     CV: regular rates and rhythm, normal S1 S2, no S3 or S4 and no murmur, click or rub -     ABDOMEN:  soft, nontender, no HSM or masses and bowel sounds normal     MS: extremities normal- no gross deformities noted, no evidence of inflammation in joints, FROM in all extremities.     SKIN: no suspicious lesions or rashes     NEURO: Normal strength and tone, sensory exam grossly normal, mentation intact and speech normal     PSYCH: mentation appears normal. and affect normal/bright    DIAGNOSTICS:                                                      Labs Resulted Today:   Results for orders placed or performed in visit on 02/13/17   CBC with platelets   Result Value Ref Range    WBC 5.0 4.0 - 11.0 10e9/L    RBC Count 4.63 3.8 - 5.2 10e12/L    Hemoglobin 13.6 11.7 - 15.7 g/dL    Hematocrit 36.7 35.0 - 47.0 %    MCV 79 78 - 100 fl    MCH 29.4 26.5 - 33.0 pg    MCHC 37.1 (H) 31.5 - 36.5 g/dL    RDW 13.6 10.0 - 15.0 %    Platelet Count 130 (L) 150 - 450 10e9/L   ABO and Rh   Result Value Ref Range    ABO Pending     RH(D) Pending     Specimen Expires Pending        Recent Labs   Lab Test  11/02/16   1151  08/29/16   1421   02/06/16   1101  02/06/16   0748   02/05/16   0720   10/05/15   1548   HGB  13.5  13.0   < >   --   7.3*   < >  7.4*   < >    --    PLT  154  149*   < >   --   182   --   183   < >   --    INR   --    --    --   0.97   --    --   1.02   < >   --    NA  139   --    --    --   142   --   143   < >  139   POTASSIUM  3.4   --    --    --   4.0   --   3.7   < >  3.9   CR  0.80   --    --    --   0.61   --   0.70   < >  0.63   A1C  5.7   --    --    --    --    --    --    --   5.5    < > = values in this interval not displayed.        IMPRESSION:                                                    Reason for surgery/procedure: Hysteroscopy  Diagnosis/reason for consult: Post-menopausal bleeding    The proposed surgical procedure is considered LOW risk.    REVISED CARDIAC RISK INDEX  The patient has the following serious cardiovascular risks for perioperative complications such as (MI, PE, VFib and 3  AV Block):  No serious cardiac risks  INTERPRETATION: 0 risks: Class I (very low risk - 0.4% complication rate)    The patient has the following additional risks for perioperative complications:  No identified additional risks      ICD-10-CM    1. Preop general physical exam Z01.818 CBC with platelets     ABO and Rh   2. Chronic rhinitis J31.0 OTOLARYNGOLOGY REFERRAL   3. Myalgia M79.1 tiZANidine (ZANAFLEX) 4 MG tablet   4. Post-menopausal bleeding N95.0        RECOMMENDATIONS:                                                          --Patient is to take all scheduled medications on the day of surgery except Naproxan, which she will discontinue today    APPROVAL GIVEN to proceed with proposed procedure, without further diagnostic evaluation       Signed Electronically by: THOMAS Preston CNP    Copy of this evaluation report is provided to requesting physician.    [facility] Preop Guidelines

## 2017-02-13 NOTE — LETTER
[address] [address] [phone]      February 15, 2017      Lesley Stafford  [address]  [facility] [address]        Dear Ms. Link Stafford,    Your labwork looked great. Your platelets have been slightly low, but this is an ongoing issue for you and I don't think it represents anything serious. Good luck with your procedure!     Sincerely,        Rosa Weinstein CNP        Results for orders placed or performed in visit on 02/13/17   CBC with platelets   Result Value Ref Range    WBC 5.0 4.0 - 11.0 10e9/L    RBC Count 4.63 3.8 - 5.2 10e12/L    Hemoglobin 13.6 11.7 - 15.7 g/dL    Hematocrit 36.7 35.0 - 47.0 %    MCV 79 78 - 100 fl    MCH 29.4 26.5 - 33.0 pg    MCHC 37.1 (H) 31.5 - 36.5 g/dL    RDW 13.6 10.0 - 15.0 %    Platelet Count 130 (L) 150 - 450 10e9/L   ABO and Rh   Result Value Ref Range    ABO A     RH(D)  Pos     Specimen Expires 02/16/2017

## 2017-02-14 DIAGNOSIS — M25.551 CHRONIC ARTHRALGIAS OF KNEES AND HIPS: ICD-10-CM

## 2017-02-14 DIAGNOSIS — M25.562 CHRONIC ARTHRALGIAS OF KNEES AND HIPS: ICD-10-CM

## 2017-02-14 DIAGNOSIS — M25.552 CHRONIC ARTHRALGIAS OF KNEES AND HIPS: ICD-10-CM

## 2017-02-14 DIAGNOSIS — M25.561 CHRONIC ARTHRALGIAS OF KNEES AND HIPS: ICD-10-CM

## 2017-02-14 DIAGNOSIS — G89.29 CHRONIC ARTHRALGIAS OF KNEES AND HIPS: ICD-10-CM

## 2017-02-14 NOTE — TELEPHONE ENCOUNTER
Tramadol HCL 50MG Tablet      Last Written Prescription Date:  12/23/16  Last Fill Quantity: 60,   # refills: 1  Last Office Visit with [facility], [facility] or  Health prescribing provider: 2/14/17  Future Office visit:       Routing refill request to provider for review/approval because:  Drug not on the [facility], [facility] or  Health refill protocol or controlled substance

## 2017-02-14 NOTE — TELEPHONE ENCOUNTER
Routing refill request to provider for review/approval because:  Drug not on the [facility] refill protocol     Last dispensed per  on 12/23/16    Please note, per , pt. Is receiving detroamp-a,phetamin 10mg tab by Dr. Gorge [place], [place]  [facility]

## 2017-02-15 ENCOUNTER — ANESTHESIA (OUTPATIENT)
Dept: SURGERY | Facility: CLINIC | Age: 57
End: 2017-02-15
Payer: COMMERCIAL

## 2017-02-15 ENCOUNTER — HOSPITAL ENCOUNTER (OUTPATIENT)
Dept: ULTRASOUND IMAGING | Facility: CLINIC | Age: 57
End: 2017-02-15
Attending: OBSTETRICS & GYNECOLOGY | Admitting: OBSTETRICS & GYNECOLOGY
Payer: COMMERCIAL

## 2017-02-15 ENCOUNTER — SURGERY (OUTPATIENT)
Age: 57
End: 2017-02-15

## 2017-02-15 ENCOUNTER — HOSPITAL ENCOUNTER (OUTPATIENT)
Facility: CLINIC | Age: 57
Discharge: HOME | End: 2017-02-15
Attending: OBSTETRICS & GYNECOLOGY | Admitting: OBSTETRICS & GYNECOLOGY
Payer: COMMERCIAL

## 2017-02-15 VITALS
DIASTOLIC BLOOD PRESSURE: 80 MMHG | WEIGHT: 190.48 LBS | TEMPERATURE: 98.1 F | BODY MASS INDEX: 31.74 KG/M2 | HEIGHT: 65 IN | RESPIRATION RATE: 12 BRPM | OXYGEN SATURATION: 100 % | SYSTOLIC BLOOD PRESSURE: 113 MMHG

## 2017-02-15 DIAGNOSIS — G89.18 POST-OP PAIN: Primary | ICD-10-CM

## 2017-02-15 DIAGNOSIS — N92.4 ABNORMAL PERIMENOPAUSAL BLEEDING: ICD-10-CM

## 2017-02-15 LAB
ABO + RH BLD: NORMAL
ABO + RH BLD: NORMAL
BLD GP AB SCN SERPL QL: NORMAL
BLOOD BANK CMNT PATIENT-IMP: NORMAL
HCG UR QL: NEGATIVE
SPECIMEN EXP DATE BLD: NORMAL

## 2017-02-15 PROCEDURE — 88305 TISSUE EXAM BY PATHOLOGIST: CPT | Performed by: OBSTETRICS & GYNECOLOGY

## 2017-02-15 PROCEDURE — 25000128 H RX IP 250 OP 636: Performed by: NURSE ANESTHETIST, CERTIFIED REGISTERED

## 2017-02-15 PROCEDURE — 88305 TISSUE EXAM BY PATHOLOGIST: CPT | Mod: 26 | Performed by: OBSTETRICS & GYNECOLOGY

## 2017-02-15 PROCEDURE — 37000009 ZZH ANESTHESIA TECHNICAL FEE, EACH ADDTL 15 MIN: Performed by: OBSTETRICS & GYNECOLOGY

## 2017-02-15 PROCEDURE — 71000027 ZZH RECOVERY PHASE 2 EACH 15 MINS: Performed by: OBSTETRICS & GYNECOLOGY

## 2017-02-15 PROCEDURE — 27210794 ZZH OR GENERAL SUPPLY STERILE: Performed by: OBSTETRICS & GYNECOLOGY

## 2017-02-15 PROCEDURE — 36000057 ZZH SURGERY LEVEL 3 1ST 30 MIN - UMMC: Performed by: OBSTETRICS & GYNECOLOGY

## 2017-02-15 PROCEDURE — 25000125 ZZHC RX 250: Performed by: NURSE ANESTHETIST, CERTIFIED REGISTERED

## 2017-02-15 PROCEDURE — 25000125 ZZHC RX 250: Performed by: OBSTETRICS & GYNECOLOGY

## 2017-02-15 PROCEDURE — 36000059 ZZH SURGERY LEVEL 3 EA 15 ADDTL MIN UMMC: Performed by: OBSTETRICS & GYNECOLOGY

## 2017-02-15 PROCEDURE — 40000170 ZZH STATISTIC PRE-PROCEDURE ASSESSMENT II: Performed by: OBSTETRICS & GYNECOLOGY

## 2017-02-15 PROCEDURE — 58558 HYSTEROSCOPY BIOPSY: CPT | Mod: GC | Performed by: OBSTETRICS & GYNECOLOGY

## 2017-02-15 PROCEDURE — 81025 URINE PREGNANCY TEST: CPT | Performed by: ANESTHESIOLOGY

## 2017-02-15 PROCEDURE — 40000864 US INTRAOPERATIVE: Mod: TC

## 2017-02-15 PROCEDURE — 25800025 ZZH RX 258: Performed by: ANESTHESIOLOGY

## 2017-02-15 PROCEDURE — 76998 US GUIDE INTRAOP: CPT | Mod: 26 | Performed by: OBSTETRICS & GYNECOLOGY

## 2017-02-15 PROCEDURE — 37000008 ZZH ANESTHESIA TECHNICAL FEE, 1ST 30 MIN: Performed by: OBSTETRICS & GYNECOLOGY

## 2017-02-15 RX ORDER — PROPOFOL 10 MG/ML
INJECTION, EMULSION INTRAVENOUS CONTINUOUS PRN
Status: DISCONTINUED | OUTPATIENT
Start: 2017-02-15 | End: 2017-02-15

## 2017-02-15 RX ORDER — ONDANSETRON 2 MG/ML
4 INJECTION INTRAMUSCULAR; INTRAVENOUS EVERY 30 MIN PRN
Status: DISCONTINUED | OUTPATIENT
Start: 2017-02-15 | End: 2017-02-15 | Stop reason: HOSPADM

## 2017-02-15 RX ORDER — DEXAMETHASONE SODIUM PHOSPHATE 4 MG/ML
4 INJECTION, SOLUTION INTRA-ARTICULAR; INTRALESIONAL; INTRAMUSCULAR; INTRAVENOUS; SOFT TISSUE
Status: DISCONTINUED | OUTPATIENT
Start: 2017-02-15 | End: 2017-02-15 | Stop reason: HOSPADM

## 2017-02-15 RX ORDER — KETOROLAC TROMETHAMINE 30 MG/ML
INJECTION, SOLUTION INTRAMUSCULAR; INTRAVENOUS PRN
Status: DISCONTINUED | OUTPATIENT
Start: 2017-02-15 | End: 2017-02-15

## 2017-02-15 RX ORDER — FENTANYL CITRATE 50 UG/ML
INJECTION, SOLUTION INTRAMUSCULAR; INTRAVENOUS PRN
Status: DISCONTINUED | OUTPATIENT
Start: 2017-02-15 | End: 2017-02-15

## 2017-02-15 RX ORDER — DEXAMETHASONE SODIUM PHOSPHATE 4 MG/ML
INJECTION, SOLUTION INTRA-ARTICULAR; INTRALESIONAL; INTRAMUSCULAR; INTRAVENOUS; SOFT TISSUE PRN
Status: DISCONTINUED | OUTPATIENT
Start: 2017-02-15 | End: 2017-02-15

## 2017-02-15 RX ORDER — NALOXONE HYDROCHLORIDE 0.4 MG/ML
.1-.4 INJECTION, SOLUTION INTRAMUSCULAR; INTRAVENOUS; SUBCUTANEOUS
Status: DISCONTINUED | OUTPATIENT
Start: 2017-02-15 | End: 2017-02-15 | Stop reason: HOSPADM

## 2017-02-15 RX ORDER — TRAMADOL HYDROCHLORIDE 50 MG/1
50-100 TABLET ORAL EVERY 6 HOURS PRN
Qty: 60 TABLET | Refills: 1 | Status: SHIPPED | OUTPATIENT
Start: 2017-02-15 | End: 2017-04-17

## 2017-02-15 RX ORDER — LIDOCAINE 40 MG/G
CREAM TOPICAL
Status: DISCONTINUED | OUTPATIENT
Start: 2017-02-15 | End: 2017-02-15 | Stop reason: HOSPADM

## 2017-02-15 RX ORDER — ACETAMINOPHEN 325 MG/1
650 TABLET ORAL
Status: DISCONTINUED | OUTPATIENT
Start: 2017-02-15 | End: 2017-02-15 | Stop reason: HOSPADM

## 2017-02-15 RX ORDER — SODIUM CHLORIDE, SODIUM LACTATE, POTASSIUM CHLORIDE, CALCIUM CHLORIDE 600; 310; 30; 20 MG/100ML; MG/100ML; MG/100ML; MG/100ML
INJECTION, SOLUTION INTRAVENOUS CONTINUOUS
Status: DISCONTINUED | OUTPATIENT
Start: 2017-02-15 | End: 2017-02-15 | Stop reason: HOSPADM

## 2017-02-15 RX ORDER — PROPOFOL 10 MG/ML
INJECTION, EMULSION INTRAVENOUS PRN
Status: DISCONTINUED | OUTPATIENT
Start: 2017-02-15 | End: 2017-02-15

## 2017-02-15 RX ORDER — MEPERIDINE HYDROCHLORIDE 25 MG/ML
12.5 INJECTION INTRAMUSCULAR; INTRAVENOUS; SUBCUTANEOUS EVERY 5 MIN PRN
Status: DISCONTINUED | OUTPATIENT
Start: 2017-02-15 | End: 2017-02-15 | Stop reason: HOSPADM

## 2017-02-15 RX ORDER — LIDOCAINE HYDROCHLORIDE 20 MG/ML
INJECTION, SOLUTION INFILTRATION; PERINEURAL PRN
Status: DISCONTINUED | OUTPATIENT
Start: 2017-02-15 | End: 2017-02-15

## 2017-02-15 RX ORDER — ONDANSETRON 2 MG/ML
INJECTION INTRAMUSCULAR; INTRAVENOUS PRN
Status: DISCONTINUED | OUTPATIENT
Start: 2017-02-15 | End: 2017-02-15

## 2017-02-15 RX ORDER — IBUPROFEN 600 MG/1
600 TABLET, FILM COATED ORAL EVERY 6 HOURS PRN
Qty: 20 TABLET | Refills: 0 | Status: ON HOLD | OUTPATIENT
Start: 2017-02-15 | End: 2017-05-08

## 2017-02-15 RX ORDER — FENTANYL CITRATE 50 UG/ML
25-50 INJECTION, SOLUTION INTRAMUSCULAR; INTRAVENOUS
Status: DISCONTINUED | OUTPATIENT
Start: 2017-02-15 | End: 2017-02-15 | Stop reason: HOSPADM

## 2017-02-15 RX ORDER — HYDROMORPHONE HYDROCHLORIDE 1 MG/ML
.3-.5 INJECTION, SOLUTION INTRAMUSCULAR; INTRAVENOUS; SUBCUTANEOUS EVERY 5 MIN PRN
Status: DISCONTINUED | OUTPATIENT
Start: 2017-02-15 | End: 2017-02-15 | Stop reason: HOSPADM

## 2017-02-15 RX ORDER — ONDANSETRON 4 MG/1
4 TABLET, ORALLY DISINTEGRATING ORAL EVERY 30 MIN PRN
Status: DISCONTINUED | OUTPATIENT
Start: 2017-02-15 | End: 2017-02-15 | Stop reason: HOSPADM

## 2017-02-15 RX ADMIN — MIDAZOLAM HYDROCHLORIDE 1 MG: 1 INJECTION, SOLUTION INTRAMUSCULAR; INTRAVENOUS at 07:15

## 2017-02-15 RX ADMIN — ONDANSETRON 4 MG: 2 INJECTION INTRAMUSCULAR; INTRAVENOUS at 07:55

## 2017-02-15 RX ADMIN — FENTANYL CITRATE 25 MCG: 50 INJECTION, SOLUTION INTRAMUSCULAR; INTRAVENOUS at 07:25

## 2017-02-15 RX ADMIN — LIDOCAINE HYDROCHLORIDE 20 ML: 10 INJECTION, SOLUTION EPIDURAL; INFILTRATION; INTRACAUDAL; PERINEURAL at 07:45

## 2017-02-15 RX ADMIN — SODIUM CHLORIDE, POTASSIUM CHLORIDE, SODIUM LACTATE AND CALCIUM CHLORIDE: 600; 310; 30; 20 INJECTION, SOLUTION INTRAVENOUS at 07:15

## 2017-02-15 RX ADMIN — FENTANYL CITRATE 25 MCG: 50 INJECTION, SOLUTION INTRAMUSCULAR; INTRAVENOUS at 07:41

## 2017-02-15 RX ADMIN — MIDAZOLAM HYDROCHLORIDE 1 MG: 1 INJECTION, SOLUTION INTRAMUSCULAR; INTRAVENOUS at 07:19

## 2017-02-15 RX ADMIN — KETOROLAC TROMETHAMINE 30 MG: 30 INJECTION, SOLUTION INTRAMUSCULAR at 08:01

## 2017-02-15 RX ADMIN — PROPOFOL 30 MG: 10 INJECTION, EMULSION INTRAVENOUS at 07:41

## 2017-02-15 RX ADMIN — DEXAMETHASONE SODIUM PHOSPHATE 4 MG: 4 INJECTION, SOLUTION INTRAMUSCULAR; INTRAVENOUS at 07:45

## 2017-02-15 RX ADMIN — PROPOFOL 30 MG: 10 INJECTION, EMULSION INTRAVENOUS at 07:25

## 2017-02-15 RX ADMIN — PROPOFOL 150 MCG/KG/MIN: 10 INJECTION, EMULSION INTRAVENOUS at 07:25

## 2017-02-15 RX ADMIN — LIDOCAINE HYDROCHLORIDE 30 MG: 20 INJECTION, SOLUTION INFILTRATION; PERINEURAL at 07:25

## 2017-02-15 NOTE — ANESTHESIA PREPROCEDURE EVALUATION
Anesthesia Evaluation     . Pt has had prior anesthetic. Type: General    No history of anesthetic complications     ROS/MED HX    ENT/Pulmonary:  - neg pulmonary ROS     Neurologic:  - neg neurologic ROS     Cardiovascular:  - neg cardiovascular ROS       METS/Exercise Tolerance:     Hematologic:     (+) Anemia, -      Musculoskeletal:         GI/Hepatic:  - neg GI/hepatic ROS       Renal/Genitourinary:  - ROS Renal section negative       Endo:     (+) Obesity, .      Psychiatric:     (+) psychiatric history depression      Infectious Disease:  - neg infectious disease ROS       Malignancy:      - no malignancy   Other:               Physical Exam  Normal systems: cardiovascular, pulmonary and dental    Airway   Mallampati: II  TM distance: >3 FB  Neck ROM: full    Dental     Cardiovascular       Pulmonary                     Anesthesia Plan      History & Physical Review  History and physical reviewed and following examination; no interval change.    ASA Status:  2 .        Plan for MAC with Propofol induction. Maintenance will be TIVA.  Reason for MAC:  Deep or markedly invasive procedure (G8)         Postoperative Care  Postoperative pain management:  IV analgesics and Oral pain medications.      Consents  Anesthetic plan, risks, benefits and alternatives discussed with:  Patient..                          .

## 2017-02-15 NOTE — DISCHARGE INSTRUCTIONS
Same-Day Surgery   Adult Discharge Orders & Instructions     For 24 hours after surgery:  1. Get plenty of rest.  A responsible adult must stay with you for at least 24 hours after you leave the hospital.   2. Pain medication can slow your reflexes. Do not drive or use heavy equipment.  If you have weakness or tingling, don't drive or use heavy equipment until this feeling goes away.  3. Mixing alcohol and pain medication can cause dizziness and slow your breathing. It can even be fatal. Do not drink alcohol while taking pain medication.  4. Avoid strenuous or risky activities.  Ask for help when climbing stairs.   5. You may feel lightheaded.  If so, sit for a few minutes before standing.  Have someone help you get up.   6. If you have nausea (feel sick to your stomach), drink only clear liquids such as apple juice, ginger ale, broth or 7-Up.  Rest may also help.  Be sure to drink enough fluids.  Move to a regular diet as you feel able. Take pain medications with a small amount of solid food, such as toast or crackers, to avoid nausea.   7. A slight fever is normal. Call the doctor if your fever is over 100 F (37.7 C) (taken under the tongue) or lasts longer than 24 hours.  8. You may have a dry mouth, muscle aches, trouble sleeping or a sore throat.  These symptoms should go away after 24 hours.  9. Do not make important or legal decisions.   Pain Management:      1. Take pain medication (if prescribed) for pain as directed by your physician.        2. WARNING: If the pain medication you have been prescribed contains Tylenol  (acetaminophen), DO NOT take additional doses of Tylenol (acetaminophen).     Call your doctor for any of the followin.  Signs of infection (fever, growing tenderness at the surgery site, severe pain, a large amount of drainage or bleeding, foul-smelling drainage, redness, swelling).    2.  It has been over 8 to 10 hours since surgery and you are still not able to urinate (pee).    3.   Headache for over 24 hours.    4.  Numbness, tingling or weakness the day after surgery (if you had spinal anesthesia).  To contact a doctor, call _Dr. Gutierrez_______ or:      [phone] and ask for the Resident On Call for:          _OB/GYN_ (answered 24 hours a day)      Emergency Department:  [place] [facility]: [phone]  [place] [facility]: [phone]  [facility] Department: [phone]             Rev. 10/2014     Discharge Instructions: Following a Dilation   and Curettage/Dilation and Evacuation    What to expect:    Expect small to moderate amount of vaginal bleeding which should taper off in 4-5 days. It should not be heavier than your regular menstrual flow.    Do not douche, and use a pad rather than tampons.     No intercourse until bleeding has ceased.    Activity:    Rest the day of surgery. You may resume normal activity the next day.    You may bathe or shower.    Avoid heavy lifting (10-15 lbs) for one week.    Comfort:    The amount of discomfort you can expect is very unpredictable. If you have pain that cannot be controlled with non-aspirin pain relievers or with the prescription you may have received, you should notify your doctor.    Abdominal cramping (like menstrual cramps) or low back ache are common and should not be a cause for concern. You will be drowsy and weak the day of surgery and possibly the following day.    Diet:    You have no restrictions on your diet. Following surgery, drink plenty of fluids and eat a light meal.    Nausea:    The anesthesia medications you received during your surgical procedure may produce some nausea.    If you feel nauseated, stay in bed, keep your head down and try drinking fluids such as Seven-Up, tea or soup.    Notify Physician at once if you experience:    A fever over 100 degrees (a low grade fever under 100 degrees is usual after surgery).    Heavy flow and/or passing large clots.  Saturating more than 1 pad per hour for 2 or more hours.     Severe pain or cramps.  Rev. 5/12

## 2017-02-15 NOTE — OP NOTE
Operative Note   Name: Lesley Stafford  MRN:[id number]  : 1960  Date of Surgery: 02/15/2017    Pre-operative Diagnosis: 57 yo woman; post menopausal bleeding, failed endometrial biopsy   Post-operative Diagnosis: Same  Procedure(s): Hysteroscopy, dilation and curettage, morcellator / polypectomy, ultrasound guidance    Surgeon: Erin Gutierrez MD   Assistants: Toni Sandhu MD, Lucas Espitia MS4    Anesthesia: MAC with local  EBL: 2 mL   Urine Output: not recorded  Fluids: 200 crystalloid  Fluid Deficit: 200 mL, NS    Specimens: endometrial curettings with polyp  Complications: None apparent.    Findings: EUA revealed anteverted uterus.  Speculum exam revealed normal external genitalia, vaginal mucosa, and cervix.  Hysteroscopic view showed atrophic endometrium in otherwise normal cavity; small polypoid lesions in R posterior internal os region.  Ostia were normal bilaterally.  Indications: Lesley Stafford was admitted as a 57 yo woman with postmenopausal bleeding and ES 11mm, who failed clinic EMBx attempt due to cervical stenosis.  Therefore hysteroscopy was recommended.  Risks and benefits of procedure were reviewed with patient, questions were answered appropriately, and consent form was signed with patient.    Procedure: After informed consent was obtained as above, patient was taken to the OR where MAC anesthesia was administered and found to be adequate.  The patient was prepped and draped in sterile fashion in the dorsal lithotomy position, and ultrasound guidance was used throughout. Exam under anesthesia was performed with findings as above.  A medium graves speculum was placed in the vagina and the cervix was noted to be not dilated.  2 cc of 1% lidocaine anesthetic was injected at 12 O'clock and additional 18cc of the same was used at 4 and 8 O'clock to achieve paracervical block.  Anterior lip of the cervix was grasped with the single tooth tenaculum. The cervix was then dilated to 6.5  using hegar dilator.  The hysteroscope was then placed under direct visualization.  Finding were noted as described above.   Morcellator was advanced into the hysteroscope device and polyps were removed, as well circumferential sampling of the cavity under direct visualization.   Hysteroscope was removed and sharp curettage was performed.  All of this material was sent to pathology as above.      No other abnormalities were noted. All instruments were removed. Sponge and needle counts were correct x2. The patient tolerated the procedure well and awoke easily in the operating room and was taken to the recovery in stable condition.     Dr. Gutierrez was scrubbed and present for the entire procedure.    Toni Sandhu MD  02/15/2017, 8:00 AM

## 2017-02-15 NOTE — ANESTHESIA CARE TRANSFER NOTE
"Patient: Lesley Stafford    Procedure(s):  Hysteroscopy, Dilation and Curettage with Myosure, Ultrasound Guidance - Wound Class: II-Clean Contaminated    Diagnosis: Post Menopausal Bleeding, Failed Office Endometrial Biopsy   Diagnosis Additional Information: No value filed.    Anesthesia Type:   MAC     Note:  Airway :Face Mask  Patient transferred to:Phase II  Comments: VSS, pt alert and oriented, denies pain. Report given to RN all questions answered.    /80  Temp 36.5  C (97.7  F) (Oral)  Resp 18  Ht 1.651 m (5' 5\")  Wt 86.4 kg (190 lb 7.6 oz)  SpO2 100%  BMI 31.7 kg/m2        Vitals: (Last set prior to Anesthesia Care Transfer)    CRNA VITALS  2/15/2017 0735 - 2/15/2017 0812      2/15/2017             Pulse: 81    SpO2: 100 %    Resp Rate (set): 10                Electronically Signed By: THOMAS Andrew CRNA  February 15, 2017  8:12 AM  "

## 2017-02-15 NOTE — IP AVS SNAPSHOT
[facility] PREOP/PHASE II    [address] [place] [address]    Phone:  [phone]                                       After Visit Summary   2/15/2017    Lesley Stafford    MRN: [id number]           After Visit Summary Signature Page     I have received my discharge instructions, and my questions have been answered. I have discussed any challenges I see with this plan with the nurse or doctor.    ..........................................................................................................................................  Patient/Patient Representative Signature      ..........................................................................................................................................  Patient Representative Print Name and Relationship to Patient    ..................................................               ................................................  Date                                            Time    ..........................................................................................................................................  Reviewed by Signature/Title    ...................................................              ..............................................  Date                                                            Time

## 2017-02-15 NOTE — ANESTHESIA POSTPROCEDURE EVALUATION
Patient: Lesley Stafford    Procedure(s):  Hysteroscopy, Dilation and Curettage with Myosure, Ultrasound Guidance - Wound Class: II-Clean Contaminated    Diagnosis:Post Menopausal Bleeding, Failed Office Endometrial Biopsy   Diagnosis Additional Information: No value filed.    Anesthesia Type:  MAC    Note:  Anesthesia Post Evaluation    Patient location during evaluation: PACU  Patient participation: Able to fully participate in evaluation  Level of consciousness: awake and alert  Pain management: adequate  Airway patency: patent  Cardiovascular status: acceptable  Respiratory status: acceptable  Hydration status: acceptable  PONV: none     Anesthetic complications: None          Last vitals:  Vitals:    02/15/17 0538   BP: 103/80   Resp: 18   Temp: 36.5  C (97.7  F)   SpO2: 100%         Electronically Signed By: Rei Lora MD, MD  February 15, 2017  8:14 AM

## 2017-02-15 NOTE — IP AVS SNAPSHOT
MRN:[id number]                      After Visit Summary   2/15/2017    Lesley Stafford    MRN: [id number]           Thank you!     Thank you for choosing [facility] for your care. Our goal is always to provide you with excellent care. Hearing back from our patients is one way we can continue to improve our services. Please take a few minutes to complete the written survey that you may receive in the mail after you visit with us. Thank you!        Patient Information     Date Of Birth          1960        About your hospital stay     You were admitted on:  February 15, 2017 You last received care in the:  UR PREOP/PHASE II    You were discharged on:  February 15, 2017       Who to Call     For medical emergencies, please call 911.  For non-urgent questions about your medical care, please call your primary care provider or clinic, [phone]  For questions related to your surgery, please call your surgery clinic        Attending Provider     Provider Specialty    Erin Gutierrez MD OB/Gyn       Primary Care Provider Office Phone # Fax #    Rafael Orosco [phone]       [facility] [address] [place] [id number]        After Care Instructions     Discharge Instructions       Resume pre procedure diet            Discharge Instructions       Pelvic Rest. No tampons, douching or intercourse for  2  weeks.            Discharge Instructions       We will call you with pathology results within 2 weeks, and will discuss need for follow up at that time            Ice to affected area       PRN as tolerated                  Further instructions from your care team       Same-Day Surgery   Adult Discharge Orders & Instructions     For 24 hours after surgery:  1. Get plenty of rest.  A responsible adult must stay with you for at least 24 hours after you leave the hospital.   2. Pain medication can slow your reflexes. Do not drive or use heavy  equipment.  If you have weakness or tingling, don't drive or use heavy equipment until this feeling goes away.  3. Mixing alcohol and pain medication can cause dizziness and slow your breathing. It can even be fatal. Do not drink alcohol while taking pain medication.  4. Avoid strenuous or risky activities.  Ask for help when climbing stairs.   5. You may feel lightheaded.  If so, sit for a few minutes before standing.  Have someone help you get up.   6. If you have nausea (feel sick to your stomach), drink only clear liquids such as apple juice, ginger ale, broth or 7-Up.  Rest may also help.  Be sure to drink enough fluids.  Move to a regular diet as you feel able. Take pain medications with a small amount of solid food, such as toast or crackers, to avoid nausea.   7. A slight fever is normal. Call the doctor if your fever is over 100 F (37.7 C) (taken under the tongue) or lasts longer than 24 hours.  8. You may have a dry mouth, muscle aches, trouble sleeping or a sore throat.  These symptoms should go away after 24 hours.  9. Do not make important or legal decisions.   Pain Management:      1. Take pain medication (if prescribed) for pain as directed by your physician.        2. WARNING: If the pain medication you have been prescribed contains Tylenol  (acetaminophen), DO NOT take additional doses of Tylenol (acetaminophen).     Call your doctor for any of the followin.  Signs of infection (fever, growing tenderness at the surgery site, severe pain, a large amount of drainage or bleeding, foul-smelling drainage, redness, swelling).    2.  It has been over 8 to 10 hours since surgery and you are still not able to urinate (pee).    3.  Headache for over 24 hours.    4.  Numbness, tingling or weakness the day after surgery (if you had spinal anesthesia).  To contact a doctor, call _Dr. Gutierrez_______ or:      [phone] and ask for the Resident On Call for:          _OB/GYN_ (answered 24 hours a day)       Emergency Department:  [facility]: [phone]  [place] [facility] [facility]: [phone]  [facility] Emergency Department: [phone]             Rev. 10/2014     Discharge Instructions: Following a Dilation   and Curettage/Dilation and Evacuation    What to expect:    Expect small to moderate amount of vaginal bleeding which should taper off in 4-5 days. It should not be heavier than your regular menstrual flow.    Do not douche, and use a pad rather than tampons.     No intercourse until bleeding has ceased.    Activity:    Rest the day of surgery. You may resume normal activity the next day.    You may bathe or shower.    Avoid heavy lifting (10-15 lbs) for one week.    Comfort:    The amount of discomfort you can expect is very unpredictable. If you have pain that cannot be controlled with non-aspirin pain relievers or with the prescription you may have received, you should notify your doctor.    Abdominal cramping (like menstrual cramps) or low back ache are common and should not be a cause for concern. You will be drowsy and weak the day of surgery and possibly the following day.    Diet:    You have no restrictions on your diet. Following surgery, drink plenty of fluids and eat a light meal.    Nausea:    The anesthesia medications you received during your surgical procedure may produce some nausea.    If you feel nauseated, stay in bed, keep your head down and try drinking fluids such as Seven-Up, tea or soup.    Notify Physician at once if you experience:    A fever over 100 degrees (a low grade fever under 100 degrees is usual after surgery).    Heavy flow and/or passing large clots. Saturating more than 1 pad per hour for 2 or more hours.     Severe pain or cramps.  Rev. 5/12              Pending Results     No orders found from 2/13/2017 to 2/16/2017.            Admission Information     Date & Time Provider Department Dept. Phone    2/15/2017 Matt  "Erin MILLER MD UR PREOP/PHASE -[phone]      Your Vitals Were     Blood Pressure Temperature Respirations Height Weight Pulse Oximetry    115/77 97.7  F (36.5  C) (Axillary) 18 1.651 m (5' 5\") 86.4 kg (190 lb 7.6 oz) 100%    BMI (Body Mass Index)                   31.7 kg/m2           [employer] Information     [facility] lets you send messages to your doctor, view your test results, renew your prescriptions, schedule appointments and more. To sign up, go to [email or web address] . Click on \"Log in\" on the left side of the screen, which will take you to the Welcome page. Then click on \"Sign up Now\" on the right side of the page.     You will be asked to enter the access code listed below, as well as some personal information. Please follow the directions to create your username and password.     Your access code is: [email or web address]  Expires: 3/28/2017  1:54 PM     Your access code will  in 90 days. If you need help or a new code, please call your [facility] clinic or [phone].        Care EveryWhere ID     This is your Care EveryWhere ID. This could be used by other organizations to access your [facility] medical records  [id number]           Review of your medicines      START taking        Dose / Directions    ibuprofen 600 MG tablet   Commonly known as:  ADVIL/MOTRIN   Used for:  Post-op pain        Dose:  600 mg   Take 1 tablet (600 mg) by mouth every 6 hours as needed for pain (mild)   Quantity:  20 tablet   Refills:  0         CONTINUE these medicines which have NOT CHANGED        Dose / Directions    acyclovir 200 MG capsule   Commonly known as:  ZOVIRAX   Indication:  Shingles   Used for:  HSV (herpes simplex virus) infection        Dose:  200 mg   Take 1 capsule (200 mg) by mouth 5 times daily   Quantity:  22 capsule   Refills:  0       amphetamine-dextroamphetamine 10 MG per tablet   Commonly known as:  ADDERALL        Dose:  10 mg   Take 10 mg by mouth 2 times daily   Refills:  0       CALCIUM 500 PO "   Indication:  unure of dose        Refills:  0       cetirizine 10 MG tablet   Commonly known as:  zyrTEC   Used for:  Seasonal allergic rhinitis        Dose:  10 mg   Take 1 tablet (10 mg) by mouth every evening   Quantity:  30 tablet   Refills:  1       ferrous sulfate 325 (65 FE) MG tablet   Commonly known as:  IRON   Used for:  Other iron deficiency anemia        Dose:  325 mg   Take 1 tablet (325 mg) by mouth daily (with breakfast)   Quantity:  30 tablet   Refills:  5       fluticasone 50 MCG/ACT spray   Commonly known as:  FLONASE   Used for:  Seasonal allergic rhinitis        Dose:  1-2 spray   Spray 1-2 sprays into both nostrils daily   Quantity:  16 g   Refills:  11       * gabapentin 300 MG capsule   Commonly known as:  NEURONTIN   Used for:  Cervicalgia        Dose:  300 mg   Take 1 capsule (300 mg) by mouth 2 times daily   Quantity:  180 capsule   Refills:  3       * gabapentin 300 MG capsule   Commonly known as:  NEURONTIN   Used for:  Peripheral polyneuropathy (H)        Dose:  300 mg   Take 1 capsule (300 mg) by mouth 3 times daily   Quantity:  90 capsule   Refills:  1       hydroxychloroquine 200 MG tablet   Commonly known as:  PLAQUENIL        Dose:  200 mg   Take 200 mg by mouth daily.   Refills:  0       hydrOXYzine 25 MG tablet   Commonly known as:  ATARAX   Used for:  Anxiety        Dose:  25 mg   Take 1 tablet (25 mg) by mouth 3 times daily as needed for itching   Quantity:  120 tablet   Refills:  11       Multi-vitamin Tabs tablet        Dose:  1 tablet   Take 1 tablet by mouth daily   Refills:  0       predniSONE 1 MG tablet   Commonly known as:  DELTASONE        Dose:  5 mg   Take 5 mg by mouth Taking 1 tab every other day   Refills:  0       tiZANidine 4 MG tablet   Commonly known as:  ZANAFLEX   Used for:  Myalgia        Dose:  4-8 mg   Take 1-2 tablets (4-8 mg) by mouth 3 times daily as needed for muscle spasms   Quantity:  30 tablet   Refills:  1       traMADol 50 MG tablet   Commonly  known as:  ULTRAM   Used for:  Lupus (H), Chronic arthralgias of knees and hips        Dose:   mg   Take 1-2 tablets ( mg) by mouth every 6 hours as needed   Quantity:  60 tablet   Refills:  1       venlafaxine 225 MG Tb24 24 hr tablet   Commonly known as:  EFFEXOR-ER        Dose:  75 mg   Take 75 mg by mouth every evening   Refills:  0       * Notice:  This list has 2 medication(s) that are the same as other medications prescribed for you. Read the directions carefully, and ask your doctor or other care provider to review them with you.         Where to get your medicines      These medications were sent to [facility] [place], [place] - [address]  [address] [place] [address], [facility] [address]     Phone:  [phone]     ibuprofen 600 MG tablet                Protect others around you: Learn how to safely use, store and throw away your medicines at www.disposemymeds.org.             Medication List: This is a list of all your medications and when to take them. Check marks below indicate your daily home schedule. Keep this list as a reference.      Medications           Morning Afternoon Evening Bedtime As Needed    acyclovir 200 MG capsule   Commonly known as:  ZOVIRAX   Take 1 capsule (200 mg) by mouth 5 times daily                                amphetamine-dextroamphetamine 10 MG per tablet   Commonly known as:  ADDERALL   Take 10 mg by mouth 2 times daily                                CALCIUM 500 PO                                cetirizine 10 MG tablet   Commonly known as:  zyrTEC   Take 1 tablet (10 mg) by mouth every evening                                ferrous sulfate 325 (65 FE) MG tablet   Commonly known as:  IRON   Take 1 tablet (325 mg) by mouth daily (with breakfast)                                fluticasone 50 MCG/ACT spray   Commonly known as:  FLONASE   Spray 1-2 sprays into both nostrils daily                                * gabapentin 300 MG capsule    Commonly known as:  NEURONTIN   Take 1 capsule (300 mg) by mouth 2 times daily                                * gabapentin 300 MG capsule   Commonly known as:  NEURONTIN   Take 1 capsule (300 mg) by mouth 3 times daily                                hydroxychloroquine 200 MG tablet   Commonly known as:  PLAQUENIL   Take 200 mg by mouth daily.                                hydrOXYzine 25 MG tablet   Commonly known as:  ATARAX   Take 1 tablet (25 mg) by mouth 3 times daily as needed for itching                                ibuprofen 600 MG tablet   Commonly known as:  ADVIL/MOTRIN   Take 1 tablet (600 mg) by mouth every 6 hours as needed for pain (mild)                                Multi-vitamin Tabs tablet   Take 1 tablet by mouth daily                                predniSONE 1 MG tablet   Commonly known as:  DELTASONE   Take 5 mg by mouth Taking 1 tab every other day                                tiZANidine 4 MG tablet   Commonly known as:  ZANAFLEX   Take 1-2 tablets (4-8 mg) by mouth 3 times daily as needed for muscle spasms                                traMADol 50 MG tablet   Commonly known as:  ULTRAM   Take 1-2 tablets ( mg) by mouth every 6 hours as needed                                venlafaxine 225 MG Tb24 24 hr tablet   Commonly known as:  EFFEXOR-ER   Take 75 mg by mouth every evening                                * Notice:  This list has 2 medication(s) that are the same as other medications prescribed for you. Read the directions carefully, and ask your doctor or other care provider to review them with you.

## 2017-02-16 LAB — COPATH REPORT: NORMAL

## 2017-03-15 NOTE — PATIENT INSTRUCTIONS
Continue with being active  and modify activity that causes more pain   Ice then heat 20 min twice a day  Medrol dose pack with food   Hold prednione 5 mg till done with dose magda   Followed by ibuprofen 600 mg with food three times a day 5 days   Tylenol as needed  Over the counter pain creams to area  Use the flexeril ( 5 mg three times a day for 2 weeks )  , gabapentin, already prescribed previously   Can make you tired, do not drive or operate machinery on this medication   Therapy and ortho consult for further evaluation and treatment  Follow up with primary dr Rafael Orosco if symptoms persist, get your tramadol refilled by primary, i dont prescribe chronic narcotics, it needs to come from same provider everytime as it is a controlled substance  If get worse such that loose control of bladder , bowels or difficulty walking go to Urgent care/ ER   X-ray today   Continue care with your rheumatologist, hematologist, and psychiatrist And primary Dr Orosco  Consider Tdap   Mammogram recommended get the diagnostic mammogram ordered already   Depression action plan   Consider discussing cymbalta with psychiatrst   pain referral  To further evaluate and treat   Stop tizanidine

## 2017-03-16 ENCOUNTER — RADIANT APPOINTMENT (OUTPATIENT)
Dept: GENERAL RADIOLOGY | Facility: CLINIC | Age: 57
End: 2017-03-16
Attending: FAMILY MEDICINE
Payer: COMMERCIAL

## 2017-03-16 ENCOUNTER — OFFICE VISIT (OUTPATIENT)
Dept: FAMILY MEDICINE | Facility: CLINIC | Age: 57
End: 2017-03-16
Payer: COMMERCIAL

## 2017-03-16 ENCOUNTER — TELEPHONE (OUTPATIENT)
Dept: FAMILY MEDICINE | Facility: CLINIC | Age: 57
End: 2017-03-16

## 2017-03-16 VITALS
BODY MASS INDEX: 31.32 KG/M2 | SYSTOLIC BLOOD PRESSURE: 122 MMHG | HEART RATE: 89 BPM | DIASTOLIC BLOOD PRESSURE: 70 MMHG | WEIGHT: 188 LBS | TEMPERATURE: 98.3 F | HEIGHT: 65 IN | OXYGEN SATURATION: 93 %

## 2017-03-16 DIAGNOSIS — E66.811 OBESITY, CLASS I, BMI 30-34.9: ICD-10-CM

## 2017-03-16 DIAGNOSIS — M70.62 TROCHANTERIC BURSITIS OF BOTH HIPS: ICD-10-CM

## 2017-03-16 DIAGNOSIS — M25.551 HIP PAIN, BILATERAL: ICD-10-CM

## 2017-03-16 DIAGNOSIS — M54.9 BACK PAIN, UNSPECIFIED BACK LOCATION, UNSPECIFIED BACK PAIN LATERALITY, UNSPECIFIED CHRONICITY: Primary | ICD-10-CM

## 2017-03-16 DIAGNOSIS — M25.552 HIP PAIN, BILATERAL: ICD-10-CM

## 2017-03-16 DIAGNOSIS — M54.9 BACK PAIN, UNSPECIFIED BACK LOCATION, UNSPECIFIED BACK PAIN LATERALITY, UNSPECIFIED CHRONICITY: ICD-10-CM

## 2017-03-16 DIAGNOSIS — M70.61 TROCHANTERIC BURSITIS OF BOTH HIPS: ICD-10-CM

## 2017-03-16 PROCEDURE — 99214 OFFICE O/P EST MOD 30 MIN: CPT | Performed by: FAMILY MEDICINE

## 2017-03-16 PROCEDURE — 73522 X-RAY EXAM HIPS BI 3-4 VIEWS: CPT

## 2017-03-16 PROCEDURE — 72100 X-RAY EXAM L-S SPINE 2/3 VWS: CPT

## 2017-03-16 RX ORDER — METHYLPREDNISOLONE 4 MG
TABLET, DOSE PACK ORAL
Qty: 21 TABLET | Refills: 0 | Status: SHIPPED | OUTPATIENT
Start: 2017-03-16 | End: 2017-06-28

## 2017-03-16 RX ORDER — CYCLOBENZAPRINE HCL 10 MG
TABLET ORAL
Refills: 1 | COMMUNITY
Start: 2017-03-04 | End: 2017-03-16

## 2017-03-16 RX ORDER — CYCLOBENZAPRINE HCL 5 MG
5 TABLET ORAL 3 TIMES DAILY
Qty: 42 TABLET | Refills: 0 | Status: SHIPPED | OUTPATIENT
Start: 2017-03-16 | End: 2017-04-13

## 2017-03-16 NOTE — LETTER
My Depression Action Plan  Name: Lesley Stafford   Date of Birth 1960  Date: 3/16/2017    My doctor: Rafael Orosco   My clinic: [address] [address] [phone]          GREEN    ZONE   Good Control    What it looks like:     Things are going generally well. You have normal up s and down s. You may even feel depressed from time to time, but bad moods usually last less than a day.   What you need to do:  1. Continue to care for yourself (see self care plan)  2. Check your depression survival kit and update it as needed  3. Follow your physician s recommendations including any medication.  4. Do not stop taking medication unless you consult with your physician first.           YELLOW         ZONE Getting Worse    What it looks like:     Depression is starting to interfere with your life.     It may be hard to get out of bed; you may be starting to isolate yourself from others.    Symptoms of depression are starting to last most all day and this has happened for several days.     You may have suicidal thoughts but they are not constant.   What you need to do:     1. Call your care team, your response to treatment will improve if you keep your care team informed of your progress. Yellow periods are signs an adjustment may need to be made.     2. Continue your self-care, even if you have to fake it!    3. Talk to someone in your support network    4. Open up your depression survival kit           RED    ZONE Medical Alert - Get Help    What it looks like:     Depression is seriously interfering with your life.     You may experience these or other symptoms: You can t get out of bed most days, can t work or engage in other necessary activities, you have trouble taking care of basic hygiene, or basic responsibilities, thoughts of suicide or death that will not go away, self-injurious behavior.     What you need to do:  1. Call your  care team and request a same-day appointment. If they are not available (weekends or after hours) call your local crisis line, emergency room or 911.      Electronically signed by: Janna Awad, March 16, 2017    Depression Self Care Plan / Survival Kit    Self-Care for Depression  Here s the deal. Your body and mind are really not as separate as most people think.  What you do and think affects how you feel and how you feel influences what you do and think. This means if you do things that people who feel good do, it will help you feel better.  Sometimes this is all it takes.  There is also a place for medication and therapy depending on how severe your depression is, so be sure to consult with your medical provider and/ or Behavioral Health Consultant if your symptoms are worsening or not improving.     In order to better manage my stress, I will:    Exercise  Get some form of exercise, every day. This will help reduce pain and release endorphins, the  feel good  chemicals in your brain. This is almost as good as taking antidepressants!  This is not the same as joining a gym and then never going! (they count on that by the way ) It can be as simple as just going for a walk or doing some gardening, anything that will get you moving.      Hygiene   Maintain good hygiene (Get out of bed in the morning, Make your bed, Brush your teeth, Take a shower, and Get dressed like you were going to work, even if you are unemployed).  If your clothes don't fit try to get ones that do.    Diet  I will strive to eat foods that are good for me, drink plenty of water, and avoid excessive sugar, caffeine, alcohol, and other mood-altering substances.  Some foods that are helpful in depression are: complex carbohydrates, B vitamins, flaxseed, fish or fish oil, fresh fruits and vegetables.    Psychotherapy  I agree to participate in Individual Therapy (if recommended).    Medication  If prescribed medications, I agree to take them.   Missing doses can result in serious side effects.  I understand that drinking alcohol, or other illicit drug use, may cause potential side effects.  I will not stop my medication abruptly without first discussing it with my provider.    Staying Connected With Others  I will stay in touch with my friends, family members, and my primary care provider/team.    Use your imagination  Be creative.  We all have a creative side; it doesn t matter if it s oil painting, sand castles, or mud pies! This will also kick up the endorphins.    Witness Beauty  (AKA stop and smell the roses) Take a look outside, even in mid-winter. Notice colors, textures. Watch the squirrels and birds.     Service to others  Be of service to others.  There is always someone else in need.  By helping others we can  get out of ourselves  and remember the really important things.  This also provides opportunities for practicing all the other parts of the program.    Humor  Laugh and be silly!  Adjust your TV habits for less news and crime-drama and more comedy.    Control your stress  Try breathing deep, massage therapy, biofeedback, and meditation. Find time to relax each day.     My support system    Clinic Contact:  Phone number:    Contact 1:  Phone number:    Contact 2:  Phone number:    Jehovah's witness/:  Phone number:    Therapist:  Phone number:    Local crisis center:    Phone number:    Other community support:  Phone number:

## 2017-03-16 NOTE — NURSING NOTE
"Chief Complaint   Patient presents with     Back Pain     Initial /70  Pulse 89  Temp 98.3  F (36.8  C) (Oral)  Ht 5' 5\" (1.651 m)  Wt 188 lb (85.3 kg)  SpO2 93%  BMI 31.28 kg/m2 Estimated body mass index is 31.28 kg/(m^2) as calculated from the following:    Height as of this encounter: 5' 5\" (1.651 m).    Weight as of this encounter: 188 lb (85.3 kg)..  BP completed using cuff size: alvin Colon MA   "

## 2017-03-16 NOTE — MR AVS SNAPSHOT
After Visit Summary   3/16/2017    Lesley Stafford    MRN: [id number]           Patient Information     Date Of Birth          1960        Visit Information        Provider Department      3/16/2017 9:40 AM Janna Awad MD [facility] [place]        Today's Diagnoses     Back pain, unspecified back location, unspecified back pain laterality, unspecified chronicity    -  1    Obesity, Class I, BMI 30-34.9        Lupus (H)        Hip pain, bilateral        Trochanteric bursitis of both hips          Care Instructions      Continue with being active  and modify activity that causes more pain   Ice then heat 20 min twice a day  Medrol dose pack with food   Hold prednione 5 mg till done with dose magda   Followed by ibuprofen 600 mg with food three times a day 5 days   Tylenol as needed  Over the counter pain creams to area  Use the flexeril ( 5 mg three times a day for 2 weeks )  , gabapentin, already prescribed previously   Can make you tired, do not drive or operate machinery on this medication   Therapy and ortho consult for further evaluation and treatment  Follow up with primary dr Rafael Orosco if symptoms persist, get your tramadol refilled by primary, i dont prescribe chronic narcotics, it needs to come from same provider everytime as it is a controlled substance  If get worse such that loose control of bladder , bowels or difficulty walking go to Urgent care/ ER   X-ray today   Continue care with your rheumatologist, hematologist, and psychiatrist And primary Dr Orosco  Consider Tdap   Mammogram recommended get the diagnostic mammogram ordered already   Depression action plan   Consider discussing cymbalta with psychiatrst   pain referral  To further evaluate and treat   Stop tizanidine        Follow-ups after your visit        Additional Services     BO PT, HAND, AND CHIROPRACTIC REFERRAL       **This order will print in the BO Scheduling Office**    Physical Therapy, Hand Therapy  and Chiropractic Care are available through:    *[facility]  *[facility]  *[facility]    Call one number to schedule at any of the above locations: [phone].    Your provider has referred you to: Physical Therapy at [facility] or [facility]    Indication/Reason for Referral: Low Back Pain  Onset of Illness: ?  Therapy Orders: Evaluate and Treat  Special Programs: None  Special Request: None    Mode Coello      Additional Comments for the Therapist or Chiropractor:     Please be aware that coverage of these services is subject to the terms and limitations of your health insurance plan.  Call member services at your health plan with any benefit or coverage questions.      Please bring the following to your appointment:    *Your personal calendar for scheduling future appointments  *Comfortable clothing            ORTHO  REFERRAL       [facility] is referring you to the Orthopedic  Services at [facility].       The  Representative will assist you in the coordination of your Orthopedic and Musculoskeletal Care as prescribed by your physician.    The  Representative will call you within 1 business day to help schedule your appointment, or you may contact the  Representative at:    All areas ~ [phone]     Type of Referral : Spine: Lumbar  **Choose Medical Spine Specialist (unless patient was seen by a Medical Spine Specialist within the past 6 months).**  Surgical Evaluation is advised if the patient presents with one or more of the following red flags: Evidence of Spinal Tumor, Infection or Fracture, Cauda Equina Syndrome, Sudden or Progressive Weakness, Loss of Bowel or Bladder Control, or any other documented emergent neurological condition resulting from a Lumbar Spinal Condition. Medical Spine Specialist        Timeframe requested: 1 - 2 days    Coverage of these services is  subject to the terms and limitations of your health insurance plan.  Please call member services at your health plan with any benefit or coverage questions.      If X-rays, CT or MRI's have been performed, please contact the facility where they were done to arrange for , prior to your scheduled appointment.  Please bring this referral request to your appointment and present it to your specialist.            PAIN MANAGEMENT EXTERNAL REFERRAL       Your provider has referred you to: [facility]: [facility] ([facility]) - [facility] [phone]   [email or web address]  [facility] [phone]   [email or web address]---[facility] - [facility] [phone]   [email or web address]    Please be aware that coverage of these services is subject to the terms and limitations of your health insurance plan.  Call member services at your health plan with any benefit or coverage questions.      Please bring the following with you to your appointment:    (1) Any X-Rays, CTs or MRIs which have been performed.  Contact the facility where they were done to arrange for  prior to your scheduled appointment.  Any new CT, MRI or other procedures ordered by your specialist must be performed at a [facility] facility or coordinated by your clinic's referral office.    (2) List of current medications   (3) This referral request   (4) Any documents/labs given to you for this referral                  Future tests that were ordered for you today     Open Future Orders        Priority Expected Expires Ordered    XR Lumbar Spine 2/3 Views Routine 3/16/2017 3/15/2018 3/16/2017    XR Pelvis w Hip Bilateral G/E 2 Views Routine 3/16/2017 3/16/2018 3/16/2017           "  Who to contact     If you have questions or need follow up information about today's clinic visit or your schedule please contact [facility] directly at [phone].  Normal or non-critical lab and imaging results will be communicated to you by MyChart, letter or phone within 4 business days after the clinic has received the results. If you do not hear from us within 7 days, please contact the clinic through MyChart or phone. If you have a critical or abnormal lab result, we will notify you by phone as soon as possible.  Submit refill requests through [employer] or call your pharmacy and they will forward the refill request to us. Please allow 3 business days for your refill to be completed.          Additional Information About Your Visit        [facility] [employer] Information     [employer] lets you send messages to your doctor, view your test results, renew your prescriptions, schedule appointments and more. To sign up, go to [email or web address] . Click on \"Log in\" on the left side of the screen, which will take you to the Welcome page. Then click on \"Sign up Now\" on the right side of the page.     You will be asked to enter the access code listed below, as well as some personal information. Please follow the directions to create your username and password.     Your access code is: [email or web address]  Expires: 3/28/2017  2:54 PM     Your access code will  in 90 days. If you need help or a new code, please call your [facility] clinic or [phone].        Care EveryWhere ID     This is your Care EveryWhere ID. This could be used by other organizations to access your [facility] medical records  [id number]        Your Vitals Were     Pulse Temperature Height Pulse Oximetry BMI (Body Mass Index)       89 98.3  F (36.8  C) (Oral) 5' 5\" (1.651 m) 93% 31.28 kg/m2        Blood Pressure from Last 3 Encounters:   17 122/70   02/15/17 113/80   17 102/72    Weight from Last 3 Encounters:   17 188 lb " (85.3 kg)   02/15/17 190 lb 7.6 oz (86.4 kg)   02/13/17 189 lb 8 oz (86 kg)              We Performed the Following     BO PT, HAND, AND CHIROPRACTIC REFERRAL     ORTHO  REFERRAL     PAIN MANAGEMENT EXTERNAL REFERRAL          Today's Medication Changes          These changes are accurate as of: 3/16/17 10:28 AM.  If you have any questions, ask your nurse or doctor.               Start taking these medicines.        Dose/Directions    methylPREDNISolone 4 MG tablet   Commonly known as:  MEDROL DOSEPAK   Used for:  Back pain, unspecified back location, unspecified back pain laterality, unspecified chronicity, Obesity, Class I, BMI 30-34.9, Lupus (H)   Started by:  Janna Awad MD        Follow package instructions   Quantity:  21 tablet   Refills:  0         These medicines have changed or have updated prescriptions.        Dose/Directions    cyclobenzaprine 5 MG tablet   Commonly known as:  FLEXERIL   This may have changed:  See the new instructions.   Used for:  Back pain, unspecified back location, unspecified back pain laterality, unspecified chronicity   Changed by:  Janna Awad MD        Dose:  5 mg   Take 1 tablet (5 mg) by mouth 3 times daily   Quantity:  42 tablet   Refills:  0       gabapentin 300 MG capsule   Commonly known as:  NEURONTIN   This may have changed:  Another medication with the same name was removed. Continue taking this medication, and follow the directions you see here.   Used for:  Peripheral polyneuropathy (H)   Changed by:  Rani Alvarado PA-C        Dose:  300 mg   Take 1 capsule (300 mg) by mouth 3 times daily   Quantity:  90 capsule   Refills:  1       ibuprofen 600 MG tablet   Commonly known as:  ADVIL/MOTRIN   This may have changed:    - how much to take  - when to take this   Used for:  Post-op pain        Dose:  600 mg   Take 1 tablet (600 mg) by mouth every 6 hours as needed for pain (mild)   Quantity:  20 tablet   Refills:  0         Stop taking these  medicines if you haven't already. Please contact your care team if you have questions.     tiZANidine 4 MG tablet   Commonly known as:  ZANAFLEX   Stopped by:  Janna Awad MD                Where to get your medicines      These medications were sent to [facility] [id number] [facility] - [place], [place] - [address]  [address] [address]     Phone:  [phone]     cyclobenzaprine 5 MG tablet    methylPREDNISolone 4 MG tablet                Primary Care Provider Office Phone # Fax #    Rafael Orosco [phone]       [facility] [address]  [facility] [id number]        Thank you!     Thank you for choosing [facility]  for your care. Our goal is always to provide you with excellent care. Hearing back from our patients is one way we can continue to improve our services. Please take a few minutes to complete the written survey that you may receive in the mail after your visit with us. Thank you!             Your Updated Medication List - Protect others around you: Learn how to safely use, store and throw away your medicines at www.disposemymeds.org.          This list is accurate as of: 3/16/17 10:28 AM.  Always use your most recent med list.                   Brand Name Dispense Instructions for use    acyclovir 200 MG capsule    ZOVIRAX    22 capsule    Take 1 capsule (200 mg) by mouth 5 times daily       amphetamine-dextroamphetamine 10 MG per tablet    ADDERALL     Take 10 mg by mouth 2 times daily       CALCIUM 500 PO          cetirizine 10 MG tablet    zyrTEC    30 tablet    Take 1 tablet (10 mg) by mouth every evening       cyclobenzaprine 5 MG tablet    FLEXERIL    42 tablet    Take 1 tablet (5 mg) by mouth 3 times daily       ferrous sulfate 325 (65 FE) MG tablet    IRON    30 tablet    Take 1 tablet (325 mg) by mouth daily (with breakfast)       fluticasone 50 MCG/ACT spray    FLONASE    16 g    Spray 1-2 sprays into  both nostrils daily       gabapentin 300 MG capsule    NEURONTIN    90 capsule    Take 1 capsule (300 mg) by mouth 3 times daily       hydroxychloroquine 200 MG tablet    PLAQUENIL     Take 200 mg by mouth daily.       hydrOXYzine 25 MG tablet    ATARAX    120 tablet    Take 1 tablet (25 mg) by mouth 3 times daily as needed for itching       ibuprofen 600 MG tablet    ADVIL/MOTRIN    20 tablet    Take 1 tablet (600 mg) by mouth every 6 hours as needed for pain (mild)       methylPREDNISolone 4 MG tablet    MEDROL DOSEPAK    21 tablet    Follow package instructions       Multi-vitamin Tabs tablet      Take 1 tablet by mouth daily       predniSONE 1 MG tablet    DELTASONE     Take 5 mg by mouth Taking 1 tab every other day       traMADol 50 MG tablet    ULTRAM    60 tablet    Take 1-2 tablets ( mg) by mouth every 6 hours as needed       venlafaxine 225 MG Tb24 24 hr tablet    EFFEXOR-ER     Take 75 mg by mouth every evening Every other day tapering off

## 2017-03-16 NOTE — LETTER
[facility]   [address] [place]   [address]  [phone]    March 20, 2017      Lesley Stafford  [address] [facility] [address]              Dear Ms. Link Stafford,    Results within acceptable limits. Normal xray back.   Results for orders placed or performed in visit on 03/16/17   XR Lumbar Spine 2/3 Views    Narrative    XR LUMBAR SPINE 2-3 VIEWS 3/16/2017 10:53 AM    COMPARISON: None.    HISTORY: Dorsalgia      Impression    IMPRESSION: Vertebral heights and disc spaces are preserved without  evidence of fracture. No significant listhesis at any level.    TUAN GAFFNEY              Sincerely,    Janna Awad MD

## 2017-03-16 NOTE — TELEPHONE ENCOUNTER
Writer asked by Dr. Awad to contact patient regarding today's appt:  1. Dr. Awad is able to evaluate back pain   A. Will likely refer to orthopedics and physical therapy   B. Since patient gets Tramadol scripts from PCP, Dr. Awad at most would give at most 2 days worth of medication        Left message to call back and ask to speak with an available triage nurse.  BENJA HsiehN, RN

## 2017-03-16 NOTE — LETTER
[facility]   [address] [place]   [address]  [phone]    March 16, 2017      Lesley Maza Nydia  [address]  [place] [facility] [address]              Dear Ms. Link Stafford,    Results within acceptable limits. mild degeneration both hips    Results for orders placed or performed in visit on 03/16/17   XR Pelvis w 1 View Each Hip    Narrative    PELVIS AND BILATERAL HIPS ONE VIEW   3/16/2017 10:53 AM    HISTORY: Bilateral hip pain.    COMPARISON: None.    FINDINGS: No fracture or osseous lesion is seen. There is mild joint  space loss in the superior aspect of both hips with small marginal  osteophyte formation. No other abnormality is seen.      Impression    IMPRESSION: Mild degenerative changes of both hips.     HUSSAIN MOLINA MD           Sincerely,    Janna Awad MD/wesley

## 2017-03-16 NOTE — PROGRESS NOTES
SUBJECTIVE:                                                    Lesley Stafford is a 56 year old female who presents to clinic today for the following health issues:    Back Pain      Duration: Comes and goes ; recent flare up has been for 45 days    Lower back and hips not connecting and feel like she might fall.     No fall    No swelling, redness or warmth, feels more prominent, feels bigger to her like     uncomfortable even to touch points to trochanteric bursitis, and no rash         Specific cause: Patient has chronic pain     Description:  Chiropractor gives her temp relief .   Location of pain: low back both  Character of pain: dull ache and stays ; doesn't feel it as much at night ; wears her down  Pain radiation: radiates to the hip ( more stabbing like )  ; sore to touch   Usually stays upper thigh ,   Had to pop, no incontinence of bladder or bowel  prior 3 c section  New numbness or weakness in legs, not attributed to pain:  YES- constant with Lupus     Intensity: Currently 6.5/10; patient put her heated seat on in the car. It is helpful and helpful for about 1 hour after     History:   Pain interferes with job: YES; walking up and down stairs and walking around her work facility  History of back problems: degenerative disc disease   Any previous MRI or X-rays: Yes-- patient has had these done in the past  Sees a specialist for back pain:  No; but patient is up to seeing a back pain specialist   Therapies tried without relief: chiropractor    Alleviating factors:   Improved by: chiropractor  ( doesn't help and its expensive for each visit)     Precipitating factors:  Worsened by: up and down leg movement ( stairs) ; every once in awhile will have pain     Functional and Psychosocial Screen (Mode STarT Back):      Not performed today       Accompanying Signs & Symptoms:  Risk of Fracture:  Corticosteroid use  Risk of Cauda Equina:  None  Risk of Infection:  None  Risk of Cancer:  None  Risk of  Ankylosing Spondylitis:  Onset at age <35, male, AND morning back stiffness. no     Told rheumatologist about hips and joint pain and reports trying to get off prednisone    Did PT before     Taking flexeril at bedtime , helps her sleep  Also on tizanidine ( 2 pills at a day )     On gabapentin for tingling numbness hands and feet    tramadol on hand but not helping.     Sees psychiatrist and weaning off venlafaxine slowly. Taking at bedtime.     On prednisone long time but tapering down to 5 mg a day .     Sober 20 yrs from cocaine, THC, crack    Feels left side of whole body different from right     Was to get breast checked. Has diagnostic ammo and ultrasound ordered in computer to schedule yet.                      Problem list and histories reviewed & adjusted, as indicated.  Additional history: as documented    Patient Active Problem List   Diagnosis     CARDIOVASCULAR SCREENING; LDL GOAL LESS THAN 130     Lupus (H)     Abnormal perimenopausal bleeding     Obesity, Class I, BMI 30-34.9     Other symptoms involving nervous and musculoskeletal systems(781.99)     History of claustrophobia     Cervicalgia     Pain in joint of left shoulder     Anemia due to blood loss, acute     Anemia     Depressive disorder     Anxiety     Allergy     ACP (advance care planning)     Tired     Insomnia, unspecified type     Hemoglobin C trait (H)     Liver hemangioma     Bilateral carpal tunnel syndrome     Past Surgical History   Procedure Laterality Date     Gyn surgery       Colonoscopy  01/2010     repeat 10 yrs     Esophagoscopy, gastroscopy, duodenoscopy (egd), combined Left 2/5/2016     Procedure: COMBINED ESOPHAGOSCOPY, GASTROSCOPY, DUODENOSCOPY (EGD), BIOPSY SINGLE OR MULTIPLE;  Surgeon: Pierre Fernandez MD;  [facility]     Dilation and curettage, operative hysteroscopy with morcellator, combined N/A 2/15/2017     Procedure: COMBINED DILATION AND CURETTAGE, OPERATIVE HYSTEROSCOPY WITH MORCELLATOR;   Surgeon: Erin Gutierrez MD;  Location: [facility]       Social History   Substance Use Topics     Smoking status: Former Smoker     Smokeless tobacco: Never Used     Alcohol use No     Family History   Problem Relation Age of Onset     Lupus Mother      DIABETES Father      Brain Tumor Father      begnign on pituitary     Depression Sister      Anemia Sister          Current Outpatient Prescriptions   Medication Sig Dispense Refill     cyclobenzaprine (FLEXERIL) 10  MG tablet Take 1 tablet bedtime 42 tablet 0     traMADol (ULTRAM) 50 MG tablet Take 1-2 tablets ( mg) by mouth every 6 hours as needed 60 tablet 1     ibuprofen (ADVIL/MOTRIN) 600 MG tablet Take 1 tablet (600 mg) by mouth every 6 hours as needed for pain (mild) (Patient taking differently: Take 800 mg by mouth daily ) 20 tablet 0     gabapentin (NEURONTIN) 300 MG capsule Take 1 capsule (300 mg) by mouth 3 times daily 90 capsule 1     hydrOXYzine (ATARAX) 25 MG tablet Take 1 tablet (25 mg) by mouth 3 times daily as needed for itching 120 tablet 11     amphetamine-dextroamphetamine (ADDERALL) 10 MG tablet Take 10 mg by mouth 2 times daily       acyclovir (ZOVIRAX) 200 MG capsule Take 1 capsule (200 mg) by mouth 5 times daily 22 capsule 0     ferrous sulfate (IRON) 325 (65 FE) MG tablet Take 1 tablet (325 mg) by mouth daily (with breakfast) 30 tablet 5     fluticasone (FLONASE) 50 MCG/ACT nasal spray Spray 1-2 sprays into both nostrils daily 16 g 11     cetirizine (ZYRTEC) 10 MG tablet Take 1 tablet (10 mg) by mouth every evening 30 tablet 1     Calcium-Magnesium-Vitamin D (CALCIUM 500 PO)        multivitamin, therapeutic with minerals (MULTI-VITAMIN) TABS Take 1 tablet by mouth daily       venlafaxine (EFFEXOR-ER) 225 MG TB24 Take 75 mg by mouth every evening Every other day tapering off       hydroxychloroquine (PLAQUENIL) 200 MG tablet Take 200 mg by mouth daily.       [DISCONTINUED] gabapentin (NEURONTIN) 300 MG capsule Take 1 capsule (300 mg) by mouth  "2 times daily (Patient not taking: Reported on 3/16/2017) 180 capsule 3     predniSONE (DELTASONE) 1 MG tablet Take 5 mg by mouth Taking 1 tab every other day  but taking 5 mg daily       Allergies   Allergen Reactions     Morphine Sulfate Itching     Sulfa Drugs Hives     Recent Labs   Lab Test  11/02/16   1151  02/06/16   0748  02/05/16   0720   02/04/16   1016   10/05/15   1548  08/17/10   A1C  5.7   --    --    --    --    --   5.5   --    --    LDL  151*   --    --    --    --    --    --    --   153   HDL  83   --    --    --    --    --    --    --   88   TRIG  147   --    --    --    --    --    --    --   76   ALT  43  54*  60*   < >   --    < >   --    < >   --    CR  0.80  0.61  0.70   < >  0.59   < >  0.63   < >  0.7   GFRESTIMATED  74  >90  Non  GFR Calc    86   < >  >90  Non  GFR Calc     < >  >90  Non  GFR Calc     < >   --    GFRESTBLACK  89  >90   GFR Calc    >90   GFR Calc     < >  >90   GFR Calc     < >  >90   GFR Calc     < >   --    POTASSIUM  3.4  4.0  3.7   < >  4.1   < >  3.9   < >   --    TSH  0.85   --    --    --   1.55   < >   --    --   1.33    < > = values in this interval not displayed.      BP Readings from Last 3 Encounters:   03/16/17 122/70   02/15/17 113/80   02/13/17 102/72    Wt Readings from Last 3 Encounters:   03/16/17 188 lb (85.3 kg)   02/15/17 190 lb 7.6 oz (86.4 kg)   02/13/17 189 lb 8 oz (86 kg)                  Labs reviewed in EPIC    Reviewed and updated as needed this visit by clinical staff       Reviewed and updated as needed this visit by Provider         ROS:  Constitutional, HEENT, cardiovascular, pulmonary, GI, , musculoskeletal, neuro, skin, endocrine and psych systems are negative, except as otherwise noted.    OBJECTIVE:                                                    /70  Pulse 89  Temp 98.3  F (36.8  C) (Oral)  Ht 5' 5\" (1.651 m)  Wt " 188 lb (85.3 kg)  SpO2 93%  BMI 31.28 kg/m2  Body mass index is 31.28 kg/(m^2).  GENERAL: healthy, alert and no distress  EYES: Eyes grossly normal to inspection, PERRL and conjunctivae and sclerae normal  RESP: lungs clear to auscultation - no rales, rhonchi or wheezes  CV: regular rate and rhythm, normal S1 S2, no S3 or S4, no murmur, click or rub, no peripheral edema and peripheral pulses strong  ABDOMEN: soft, nontender, no hepatosplenomegaly, no masses and bowel sounds normal  MS: no gross musculoskeletal defects noted, no edema, stuff gait , no point tenderness, SLR questionable , not positive, tenderness over b/l greater trochanter  SKIN: no suspicious lesions or rashes  NEURO: Normal strength and tone, mentation intact and speech normal  PSYCH: mentation appears normal, affect normal/bright    Diagnostic Test Results:  No results found for this or any previous visit (from the past 24 hour(s)).     ASSESSMENT/PLAN:                                                      1. Back pain, unspecified back location, unspecified back pain laterality, unspecified chronicity  Former smoker, obesity, mild RACHNA,  shift worker, hx of lupus on prednisone 5 mg every other day and plaquenil managed by rheumatology, Hb C trait, iron deficiency anemia s/p transfusion in past, negative GI workup with egd & colonoscopy negative in p2016, perimenopausal bleeding s/p D & C , insomnia, fatigue, allergies, claustrophobia, anxiety, depression, on atarax & adderall under care of psyche, liver hemangioma, b/l CTS, left shoulder pain, cervicalgia, peripheral neuropathy, hx of remote substance abuse in remission, on tizanidine, ibuprofen and tramadol managed by Primary Dr Rafael Orosco. No show to pat 3/13. Here today for back pain. [place] prescription monitoring shows received tramadol by dr Orosco # 60 on 3/23/16, 5/27, 7/18, 8/10, 10/7, 11/13, 12/23, & 2/15, adderall by dr beatty # 30 on 4/25/16, 6/1, 7/9, # 12 on 1/24/17 & 3 30 on 2/10/17,  lorazepam 0.5  mg # 1 in past and gabapentin 300 mg # 60 on 9/17, 10/26, 11/25, 12/29 & 3/3.   No red flag signs, chronic cerrato. But given has lupus on chronic prednisone will do further evaluation.   Continue with being active  and modify activity that causes more pain. Ice then heat 20 min twice a day. Medrol dose pack with food . Hold prednione 5 mg till done with dose magda. Followed by ibuprofen 600 mg with food three times a day 5 days.  Tylenol as needed. Over the counter pain creams to area. Use the flexeril ( 5 mg three times a day for 2 weeks )  , gabapentin, already prescribed previously. Can make you tired, do not drive or operate machinery on this medication. Therapy and ortho consult for further evaluation and treatment. Follow up with primary dr Rafael Orosco if symptoms persist, get your tramadol refilled by primary, I don't prescribe chronic narcotics, it needs to come from same provider every time as it is a controlled substance. If get worse such that loose control of bladder , bowels or difficulty walking go to Urgent care/ ER. X-ray today. Continue care with your rheumatologist, hematologist, and psychiatrist And primary Dr Orosco. Consider Tdap. Mammogram recommended get the diagnostic mammogram ordered already. Depression action plan given. Consider discussing Cymbalta with psychiatrist. pain referral  To further evaluate and treat. Stop tizanidine  - XR Lumbar Spine 2/3 Views; Future  - methylPREDNISolone (MEDROL DOSEPAK) 4 MG tablet; Follow package instructions  Dispense: 21 tablet; Refill: 0  - BO PT, HAND, AND CHIROPRACTIC REFERRAL  - ORTHO  REFERRAL  - PAIN MANAGEMENT EXTERNAL REFERRAL  - cyclobenzaprine (FLEXERIL) 5 MG tablet; Take 1 tablet (5 mg) by mouth 3 times daily  Dispense: 42 tablet; Refill: 0    2. Obesity, Class I, BMI 30-34.9  Weight loss, stay active  - XR Lumbar Spine 2/3 Views; Future  - methylPREDNISolone (MEDROL DOSEPAK) 4 MG tablet; Follow package instructions   Dispense: 21 tablet; Refill: 0  - [facility] PT, HAND, AND CHIROPRACTIC REFERRAL  - ORTHO  REFERRAL  - PAIN MANAGEMENT EXTERNAL REFERRAL    3. Lupus (H)  Follow up rheumatology  - XR Lumbar Spine 2/3 Views; Future  - methylPREDNISolone (MEDROL DOSEPAK) 4 MG tablet; Follow package instructions  Dispense: 21 tablet; Refill: 0  - BO PT, HAND, AND CHIROPRACTIC REFERRAL  - ORTHO  REFERRAL  - PAIN MANAGEMENT EXTERNAL REFERRAL    4. Hip pain, bilateral  - XR Pelvis w Hip Bilateral G/E 2 Views; Future  - PAIN MANAGEMENT EXTERNAL REFERRAL    5. Trochanteric bursitis of both hips  May benefit form steroid shots refer to ortho   - XR Pelvis w Hip Bilateral G/E 2 Views; Future  - PAIN MANAGEMENT EXTERNAL REFERRAL    See Patient Instructions  Patient Instructions     Continue with being active  and modify activity that causes more pain   Ice then heat 20 min twice a day  Medrol dose pack with food   Hold prednione 5 mg till done with dose magda   Followed by ibuprofen 600 mg with food three times a day 5 days   Tylenol as needed  Over the counter pain creams to area  Use the flexeril ( 5 mg three times a day for 2 weeks )  , gabapentin, already prescribed previously   Can make you tired, do not drive or operate machinery on this medication   Therapy and ortho consult for further evaluation and treatment  Follow up with primary dr Rafael Orosco if symptoms persist, get your tramadol refilled by primary, i dont prescribe chronic narcotics, it needs to come from same provider everytime as it is a controlled substance  If get worse such that loose control of bladder , bowels or difficulty walking go to Urgent care/ ER   X-ray today   Continue care with your rheumatologist, hematologist, and psychiatrist And primary Dr Orosco  Consider Tdap   Mammogram recommended get the diagnostic mammogram ordered already   Depression action plan   Consider discussing cymbalta with psychiatrst   pain referral  To further evaluate and treat    Stop tizanidine      Janna Awad MD  [facility]

## 2017-03-21 ENCOUNTER — OFFICE VISIT (OUTPATIENT)
Dept: NEUROSURGERY | Facility: CLINIC | Age: 57
End: 2017-03-21
Payer: COMMERCIAL

## 2017-03-21 VITALS
WEIGHT: 189 LBS | OXYGEN SATURATION: 98 % | SYSTOLIC BLOOD PRESSURE: 107 MMHG | DIASTOLIC BLOOD PRESSURE: 71 MMHG | RESPIRATION RATE: 16 BRPM | BODY MASS INDEX: 31.49 KG/M2 | HEIGHT: 65 IN | HEART RATE: 94 BPM

## 2017-03-21 DIAGNOSIS — M51.369 LUMBAR DEGENERATIVE DISC DISEASE: Primary | ICD-10-CM

## 2017-03-21 PROCEDURE — 99214 OFFICE O/P EST MOD 30 MIN: CPT | Performed by: NURSE PRACTITIONER

## 2017-03-21 ASSESSMENT — PAIN SCALES - GENERAL: PAINLEVEL: MODERATE PAIN (5)
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
## 2017-03-21 NOTE — NURSING NOTE
"Lesley Stafford is a 56 year old female who presents for:  Chief Complaint   Patient presents with     Neurologic Problem     Bilateral hip pain, low back pain, ongoing X3 months, no known recent injury, xray 3/16/17, pain radiation and tingling bilateral legs, worse on left side        Initial Vitals:  /71 (BP Location: Right arm, Patient Position: Chair, Cuff Size: Adult Large)  Pulse 94  Resp 16  Ht 5' 5\" (1.651 m)  Wt 189 lb (85.7 kg)  SpO2 98%  BMI 31.45 kg/m2 Estimated body mass index is 31.45 kg/(m^2) as calculated from the following:    Height as of this encounter: 5' 5\" (1.651 m).    Weight as of this encounter: 189 lb (85.7 kg).. Body surface area is 1.98 meters squared. BP completed using cuff size: regular  Moderate Pain (5)    Do you feel safe in your environment?  Yes  Do you need any refills today? Yes    Nursing Comments: none        Mike Ceballos"

## 2017-03-21 NOTE — MR AVS SNAPSHOT
"              After Visit Summary   3/21/2017    Lesley Stafford    MRN: [id number]           Patient Information     Date Of Birth          1960        Visit Information        Provider Department      3/21/2017 9:40 AM Mariana Buck NP [facility]        Today's Diagnoses     Lumbar degenerative disc disease    -  1      Care Instructions    Schedule lumbar MRI   We will call with results.  Please contact the clinic if pain persists at [phone].          Follow-ups after your visit        Future tests that were ordered for you today     Open Future Orders        Priority Expected Expires Ordered    MR Lumbar Spine w/o Contrast Routine  3/21/2018 3/21/2017            Who to contact     If you have questions or need follow up information about today's clinic visit or your schedule please contact [facility] directly at [phone].  Normal or non-critical lab and imaging results will be communicated to you by Invaluablehart, letter or phone within 4 business days after the clinic has received the results. If you do not hear from us within 7 days, please contact the clinic through MyChart or phone. If you have a critical or abnormal lab result, we will notify you by phone as soon as possible.  Submit refill requests through Uskape or call your pharmacy and they will forward the refill request to us. Please allow 3 business days for your refill to be completed.          Additional Information About Your Visit        MyChart Information     Uskape lets you send messages to your doctor, view your test results, renew your prescriptions, schedule appointments and more. To sign up, go to [email or web address] . Click on \"Log in\" on the left side of the screen, which will take you to the Welcome page. Then click on \"Sign up Now\" on the right side of the page.     You will be asked to enter the access code listed below, as well as some personal information. Please follow the " "directions to create your username and password.     Your access code is: [email or web address]  Expires: 3/28/2017  2:54 PM     Your access code will  in 90 days. If you need help or a new code, please call your [facility] clinic or [phone].        Care EveryWhere ID     This is your Care EveryWhere ID. This could be used by other organizations to access your [facility] medical records  [id number]        Your Vitals Were     Pulse Respirations Height Pulse Oximetry BMI (Body Mass Index)       94 16 5' 5\" (1.651 m) 98% 31.45 kg/m2        Blood Pressure from Last 3 Encounters:   17 107/71   17 122/70   02/15/17 113/80    Weight from Last 3 Encounters:   17 189 lb (85.7 kg)   17 188 lb (85.3 kg)   02/15/17 190 lb 7.6 oz (86.4 kg)                 Today's Medication Changes          These changes are accurate as of: 3/21/17 10:10 AM.  If you have any questions, ask your nurse or doctor.               These medicines have changed or have updated prescriptions.        Dose/Directions    ibuprofen 600 MG tablet   Commonly known as:  ADVIL/MOTRIN   This may have changed:    - how much to take  - when to take this   Used for:  Post-op pain        Dose:  600 mg   Take 1 tablet (600 mg) by mouth every 6 hours as needed for pain (mild)   Quantity:  20 tablet   Refills:  0                Primary Care Provider Office Phone # Fax #    Rafael Orosco [phone]       [facility] [address] [address]        Thank you!     Thank you for choosing [facility]  for your care. Our goal is always to provide you with excellent care. Hearing back from our patients is one way we can continue to improve our services. Please take a few minutes to complete the written survey that you may receive in the mail after your visit with us. Thank you!             Your Updated Medication List - Protect others around you: Learn how to safely use, store and " throw away your medicines at www.disposemymeds.org.          This list is accurate as of: 3/21/17 10:10 AM.  Always use your most recent med list.                   Brand Name Dispense Instructions for use    acyclovir 200 MG capsule    ZOVIRAX    22 capsule    Take 1 capsule (200 mg) by mouth 5 times daily       amphetamine-dextroamphetamine 10 MG per tablet    ADDERALL     Take 10 mg by mouth 2 times daily       CALCIUM 500 PO          cetirizine 10 MG tablet    zyrTEC    30 tablet    Take 1 tablet (10 mg) by mouth every evening       cyclobenzaprine 5 MG tablet    FLEXERIL    42 tablet    Take 1 tablet (5 mg) by mouth 3 times daily       ferrous sulfate 325 (65 FE) MG tablet    IRON    30 tablet    Take 1 tablet (325 mg) by mouth daily (with breakfast)       fluticasone 50 MCG/ACT spray    FLONASE    16 g    Spray 1-2 sprays into both nostrils daily       gabapentin 300 MG capsule    NEURONTIN    90 capsule    Take 1 capsule (300 mg) by mouth 3 times daily       hydroxychloroquine 200 MG tablet    PLAQUENIL     Take 200 mg by mouth daily.       hydrOXYzine 25 MG tablet    ATARAX    120 tablet    Take 1 tablet (25 mg) by mouth 3 times daily as needed for itching       ibuprofen 600 MG tablet    ADVIL/MOTRIN    20 tablet    Take 1 tablet (600 mg) by mouth every 6 hours as needed for pain (mild)       methylPREDNISolone 4 MG tablet    MEDROL DOSEPAK    21 tablet    Follow package instructions       Multi-vitamin Tabs tablet      Take 1 tablet by mouth daily       predniSONE 1 MG tablet    DELTASONE     Take 5 mg by mouth Reported on 3/21/2017       traMADol 50 MG tablet    ULTRAM    60 tablet    Take 1-2 tablets ( mg) by mouth every 6 hours as needed       venlafaxine 225 MG Tb24 24 hr tablet    EFFEXOR-ER     Take 75 mg by mouth every evening Every other day tapering off

## 2017-03-21 NOTE — PATIENT INSTRUCTIONS
Schedule lumbar MRI   We will call with results.  Please contact the clinic if pain persists at [phone].

## 2017-03-23 LAB — PHQ9 SCORE: 13

## 2017-03-28 ENCOUNTER — TELEPHONE (OUTPATIENT)
Dept: FAMILY MEDICINE | Facility: CLINIC | Age: 57
End: 2017-03-28

## 2017-03-28 DIAGNOSIS — Z51.89 THERAPEUTIC PROCEDURE: ICD-10-CM

## 2017-03-28 DIAGNOSIS — F41.9 ANXIETY: Primary | ICD-10-CM

## 2017-03-28 RX ORDER — LORAZEPAM 1 MG/1
1 TABLET ORAL EVERY 8 HOURS PRN
Qty: 1 TABLET | Refills: 0 | Status: SHIPPED | OUTPATIENT
Start: 2017-03-28 | End: 2017-05-30

## 2017-03-28 NOTE — TELEPHONE ENCOUNTER
Pt has an appt for an MRI today and is calling to ask for a pill or two for Lorazepam to help deal with claustrophobia for the appt today at 5pm    Pt can be reached @ [phone] betsy

## 2017-03-28 NOTE — TELEPHONE ENCOUNTER
Pt states she was seen in October by Kriss Navarro and was told she needs to have some type of special mammogram/imaging done, and doesn't remember what it was for or where to make an appointment    Pt can be reached @ [phone] betsy

## 2017-03-28 NOTE — TELEPHONE ENCOUNTER
Telephone call to patient, she currently has an order in place for a mammogram, scheduling number provided to patient. She denies further questions or concerns at this time.     Shelby Arreola RN  [facility]

## 2017-03-28 NOTE — TELEPHONE ENCOUNTER
Route to provider pool    Pt has MRI today and is requesting medication appt at 5p  Medication cued    Please review and advise    Pt will p/u med at [facility] phone no. [phone], fax     Swati Ghotra RN

## 2017-04-04 ENCOUNTER — RADIANT APPOINTMENT (OUTPATIENT)
Dept: MAMMOGRAPHY | Facility: CLINIC | Age: 57
End: 2017-04-04
Attending: NURSE PRACTITIONER
Payer: COMMERCIAL

## 2017-04-04 ENCOUNTER — RADIANT APPOINTMENT (OUTPATIENT)
Dept: MRI IMAGING | Facility: CLINIC | Age: 57
End: 2017-04-04
Attending: NURSE PRACTITIONER
Payer: COMMERCIAL

## 2017-04-04 DIAGNOSIS — M51.369 LUMBAR DEGENERATIVE DISC DISEASE: ICD-10-CM

## 2017-04-04 DIAGNOSIS — N64.4 MASTODYNIA: ICD-10-CM

## 2017-04-04 PROCEDURE — G0204 DX MAMMO INCL CAD BI: HCPCS

## 2017-04-04 PROCEDURE — 72148 MRI LUMBAR SPINE W/O DYE: CPT | Performed by: RADIOLOGY

## 2017-04-05 ENCOUNTER — TELEPHONE (OUTPATIENT)
Dept: NEUROSURGERY | Facility: CLINIC | Age: 57
End: 2017-04-05

## 2017-04-05 DIAGNOSIS — M54.16 LUMBAR RADICULOPATHY: ICD-10-CM

## 2017-04-05 DIAGNOSIS — M51.369 LUMBAR DEGENERATIVE DISC DISEASE: Primary | ICD-10-CM

## 2017-04-05 NOTE — TELEPHONE ENCOUNTER
Patient returned phone call.  Reviewed MRI results.  Patient states low back is still most prominent pain, and she does have pain in the left hip region.  She states she has pain in the BLE, but she feels it is related to Lupus.  We reviewed the findings on MRI correlate with her leg pain, however, she states the leg pain is minimal compared to her low back.  She is interested in starting with PT and would like a referral.  She will contact us if she would like to proceed with injections, but is not interested at this time.

## 2017-04-05 NOTE — TELEPHONE ENCOUNTER
LM for patient to review MRI results.  If patient returns call consider PT and injections.  Patient's pain mostly low back and denies radicular pain at visit; pain pattern not consistent with L5-S1 findings on MRI.  Would consider LESI at L3-4 level.

## 2017-04-13 DIAGNOSIS — M70.62 TROCHANTERIC BURSITIS OF BOTH HIPS: ICD-10-CM

## 2017-04-13 DIAGNOSIS — M54.9 BACK PAIN, UNSPECIFIED BACK LOCATION, UNSPECIFIED BACK PAIN LATERALITY, UNSPECIFIED CHRONICITY: ICD-10-CM

## 2017-04-13 DIAGNOSIS — M70.61 TROCHANTERIC BURSITIS OF BOTH HIPS: ICD-10-CM

## 2017-04-13 DIAGNOSIS — M25.551 HIP PAIN, BILATERAL: ICD-10-CM

## 2017-04-13 DIAGNOSIS — M25.552 HIP PAIN, BILATERAL: ICD-10-CM

## 2017-04-13 RX ORDER — CYCLOBENZAPRINE HCL 5 MG
TABLET ORAL
Qty: 42 TABLET | Refills: 0 | Status: SHIPPED | OUTPATIENT
Start: 2017-04-13 | End: 2017-07-04

## 2017-04-13 NOTE — TELEPHONE ENCOUNTER
cyclobenzaprine (FLEXERIL) 5 MG tablet      Last Written Prescription Date:  3/16/17  Last Fill Quantity: 42,   # refills: 0  Last Office Visit with [facility] or [facility] prescribing provider: 3/16/17  Future Office visit:       Routing refill request to provider for review/approval because:  Drug not on the [facility] or [facility] refill protocol or controlled substance

## 2017-04-14 ENCOUNTER — THERAPY VISIT (OUTPATIENT)
Dept: PHYSICAL THERAPY | Facility: CLINIC | Age: 57
End: 2017-04-14
Payer: COMMERCIAL

## 2017-04-14 DIAGNOSIS — M54.50 CHRONIC BILATERAL LOW BACK PAIN WITHOUT SCIATICA: Primary | ICD-10-CM

## 2017-04-14 DIAGNOSIS — G89.29 CHRONIC BILATERAL LOW BACK PAIN WITHOUT SCIATICA: Primary | ICD-10-CM

## 2017-04-14 PROCEDURE — 97112 NEUROMUSCULAR REEDUCATION: CPT | Mod: GP | Performed by: PHYSICAL THERAPIST

## 2017-04-14 PROCEDURE — 97161 PT EVAL LOW COMPLEX 20 MIN: CPT | Mod: GP | Performed by: PHYSICAL THERAPIST

## 2017-04-14 PROCEDURE — 97110 THERAPEUTIC EXERCISES: CPT | Mod: GP | Performed by: PHYSICAL THERAPIST

## 2017-04-14 NOTE — LETTER
[facility]  [address]  [place] [address]  [phone]    2017    Re: Lesley Stafford   :   1960  MRN:  [id number]   REFERRING PHYSICIAN:    Mariana Buck     [facility]    Date of Initial Evaluation:  2017  Visits:  Rxs Used: 1  Reason for Referral:  Chronic bilateral low back pain without sciatica    EVALUATION SUMMARY    Subjective:    Patient is a 56 year old female presenting with rehab back hpi.   Lesley Stafford is a 56 year old female with a lumbar condition.  Condition occurred with:  Insidious onset.  Condition occurred: for unknown reasons.  This is a new condition  Pt presents to PT with complaints of bilateral low back pain.  The pain began approximately 5 months ago.  She does not recall a specific injury at that time.  She notes increased pain with sitting and prolonged standing.  She has been using tramadol and ibuprofen to manage pain.  .    Patient reports pain:  Lumbar spine right and lumbar spine left.  Radiates to:  Gluteals right and gluteals left.  Pain is described as aching and is constant and reported as 7/10.  Associated symptoms:  Loss of motion/stiffness and loss of strength. Pain is worse during the day.  Symptoms are exacerbated by bending, lifting, sitting and standing and relieved by NSAID's and analgesics.  Since onset symptoms are unchanged.        General health as reported by patient is fair.  Pertinent medical history includes:  Overweight, chemical dependency, depression, mental illness and other (Lupus).  Medical allergies: no (Sulfa, morphine).  Other surgeries include:  Other (3 C-sections).  Current medications:  Pain medication, anti-inflammatory, muscle relaxants and steroids.  Current occupation is Mental health therapist; .  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  Primary  job tasks include:  Prolonged sitting, prolonged standing, repetitive tasks and lifting.  Barriers include:  None as reported by the patient.  Red flags:  None as reported by the patient.                     Lumbar/SI Evaluation  ROM:    AROM Lumbar:   Flexion:          WNL  Ext:                    75% ERP   Side Bend:        Left:  WNL    Right:  WNL ERP left  Rotation:           Left:     Right:   Side Glide:        Left:     Right:   Lumbar Myotomes:  normal  Lumbar DTR's:  not assessed  Cord Signs:  not assessed  Lumbar Dermtomes:  normal  Spinal Segmental Conclusions: Pt demonstrated improved ROM and reported decreased pain with repeated side gliding and extension.    Assessment/Plan:    Patient is a 56 year old female with lumbar complaints.    Patient has the following significant findings with corresponding treatment plan.                Diagnosis 1:  Low back pain (R>R)  Pain -  manual therapy, self management, education, directional preference exercise and home program  Decreased ROM/flexibility - manual therapy and therapeutic exercise  Decreased joint mobility - manual therapy and therapeutic exercise  Decreased strength - therapeutic exercise and therapeutic activities  Decreased function - therapeutic activities  Therapy Evaluation Codes:   1) History comprised of:   Personal factors that impact the plan of care:      None.    Comorbidity factors that impact the plan of care are:      None.     Medications impacting care: None.  2) Examination of Body Systems comprised of:   Body structures and functions that impact the plan of care:      Lumbar spine.   Activity limitations that impact the plan of care are:      Bending, Lifting, Sitting and Standing.  3) Clinical presentation characteristics are:   Stable/Uncomplicated.  4) Decision-Making    Moderate complexity using standardized patient assessment instrument and/or measureable assessment of functional outcome.                Cumulative Therapy  Evaluation is: Low complexity.  Previous and current functional limitations:  (See Goal Flow Sheet for this information)    Short term and Long term goals: (See Goal Flow Sheet for this information)   Communication ability:  Patient appears to be able to clearly communicate and understand verbal and written communication and follow directions correctly.  Treatment Explanation - The following has been discussed with the patient:   RX ordered/plan of care  Anticipated outcomes  Possible risks and side effects  This patient would benefit from PT intervention to resume normal activities.   Rehab potential is good.    Frequency:  1 X week, once daily  Duration:  for 6 weeks  Discharge Plan:  Achieve all LTG.  Independent in home treatment program.  Reach maximal therapeutic benefit.              Thank you for your referral.    INQUIRIES  Therapist: Grzegorz Mendoza [facility]   [facility]  [address]  [place] [address]  Phone: [phone]  Fax: [phone]

## 2017-04-14 NOTE — MR AVS SNAPSHOT
"              After Visit Summary   4/14/2017    Lesley Stafford    MRN: [id number]           Patient Information     Date Of Birth          1960        Visit Information        Provider Department      4/14/2017 11:40 AM Grzegorz Mendoza PT [facility]        Today's Diagnoses     Chronic bilateral low back pain without sciatica    -  1       Follow-ups after your visit        Your next 10 appointments already scheduled     Apr 21, 2017 10:20 AM CDT   BO Spine with Grzegorz Mendoza PT   [facility] ([place]  )    [address]  [place] [id number] [phone]              Who to contact     If you have questions or need follow up information about today's clinic visit or your schedule please contact [facility] [place] directly at [phone].  Normal or non-critical lab and imaging results will be communicated to you by AirTight Networkshart, letter or phone within 4 business days after the clinic has received the results. If you do not hear from us within 7 days, please contact the clinic through AirTight Networkshart or phone. If you have a critical or abnormal lab result, we will notify you by phone as soon as possible.  Submit refill requests through Celsion or call your pharmacy and they will forward the refill request to us. Please allow 3 business days for your refill to be completed.          Additional Information About Your Visit        AirTight Networkshart Information     Celsion lets you send messages to your doctor, view your test results, renew your prescriptions, schedule appointments and more. To sign up, go to [email or web address] . Click on \"Log in\" on the left side of the screen, which will take you to the Welcome page. Then click on \"Sign up Now\" on the right side of the page.     You will be asked to enter the access code listed below, " as well as some personal information. Please follow the directions to create your username and password.     Your access code is: [email or web address]  Expires: 2017  3:10 PM     Your access code will  in 90 days. If you need help or a new code, please call your [facility] clinic or [phone].        Care EveryWhere ID     This is your Care EveryWhere ID. This could be used by other organizations to access your [facility] medical records  [id number]         Blood Pressure from Last 3 Encounters:   17 107/71   17 122/70   02/15/17 113/80    Weight from Last 3 Encounters:   17 85.7 kg (189 lb)   17 85.3 kg (188 lb)   02/15/17 86.4 kg (190 lb 7.6 oz)              We Performed the Following     HC PT EVAL, LOW COMPLEXITY     BO INITIAL EVAL REPORT     NEUROMUSCULAR RE-EDUCATION     THERAPEUTIC EXERCISES          Today's Medication Changes          These changes are accurate as of: 17  1:34 PM.  If you have any questions, ask your nurse or doctor.               These medicines have changed or have updated prescriptions.        Dose/Directions    ibuprofen 600 MG tablet   Commonly known as:  ADVIL/MOTRIN   This may have changed:    - how much to take  - when to take this   Used for:  Post-op pain        Dose:  600 mg   Take 1 tablet (600 mg) by mouth every 6 hours as needed for pain (mild)   Quantity:  20 tablet   Refills:  0                Primary Care Provider Office Phone # Fax #    Rafael Orosco [phone]       [facility] [address] [address]        Thank you!     Thank you for choosing [facility]  for your care. Our goal is always to provide you with excellent care. Hearing back from our patients is one way we can continue to improve our services. Please take a few minutes to complete the written survey that you may receive in the mail after your visit with us. Thank  you!             Your Updated Medication List - Protect others around you: Learn how to safely use, store and throw away your medicines at www.disposemymeds.org.          This list is accurate as of: 4/14/17  1:34 PM.  Always use your most recent med list.                   Brand Name Dispense Instructions for use    acyclovir 200 MG capsule    ZOVIRAX    22 capsule    Take 1 capsule (200 mg) by mouth 5 times daily       amphetamine-dextroamphetamine 10 MG per tablet    ADDERALL     Take 10 mg by mouth 2 times daily       CALCIUM 500 PO          cetirizine 10 MG tablet    zyrTEC    30 tablet    Take 1 tablet (10 mg) by mouth every evening       cyclobenzaprine 5 MG tablet    FLEXERIL    42 tablet    TAKE 1 TABLET (5 MG) BY MOUTH 3 TIMES DAILY       ferrous sulfate 325 (65 FE) MG tablet    IRON    30 tablet    Take 1 tablet (325 mg) by mouth daily (with breakfast)       fluticasone 50 MCG/ACT spray    FLONASE    16 g    Spray 1-2 sprays into both nostrils daily       gabapentin 300 MG capsule    NEURONTIN    90 capsule    Take 1 capsule (300 mg) by mouth 3 times daily       hydroxychloroquine 200 MG tablet    PLAQUENIL     Take 200 mg by mouth daily.       hydrOXYzine 25 MG tablet    ATARAX    120 tablet    Take 1 tablet (25 mg) by mouth 3 times daily as needed for itching       ibuprofen 600 MG tablet    ADVIL/MOTRIN    20 tablet    Take 1 tablet (600 mg) by mouth every 6 hours as needed for pain (mild)       LORazepam 1 MG tablet    ATIVAN    1 tablet    Take 1 tablet (1 mg) by mouth every 8 hours as needed for anxiety       methylPREDNISolone 4 MG tablet    MEDROL DOSEPAK    21 tablet    Follow package instructions       Multi-vitamin Tabs tablet      Take 1 tablet by mouth daily       predniSONE 1 MG tablet    DELTASONE     Take 5 mg by mouth Reported on 3/21/2017       traMADol 50 MG tablet    ULTRAM    60 tablet    Take 1-2 tablets ( mg) by mouth every 6 hours as needed       venlafaxine 225 MG Tb24 24 hr  tablet    EFFEXOR-ER     Take 75 mg by mouth every evening Every other day tapering off

## 2017-04-14 NOTE — PROGRESS NOTES
Subjective:    Patient is a 56 year old female presenting with rehab back hpi.   Lesley Stafford is a 56 year old female with a lumbar condition.  Condition occurred with:  Insidious onset.  Condition occurred: for unknown reasons.  This is a new condition  Pt presents to PT with complaints of bilateral low back pain.  The pain began approximately 5 months ago.  She does not recall a specific injury at that time.  She notes increased pain with sitting and prolonged standing.  She has been using tramadol and ibuprofen to manage pain.  .    Patient reports pain:  Lumbar spine right and lumbar spine left.  Radiates to:  Gluteals right and gluteals left.  Pain is described as aching and is constant and reported as 7/10.  Associated symptoms:  Loss of motion/stiffness and loss of strength. Pain is worse during the day.  Symptoms are exacerbated by bending, lifting, sitting and standing and relieved by NSAID's and analgesics.  Since onset symptoms are unchanged.        General health as reported by patient is fair.  Pertinent medical history includes:  Overweight, chemical dependency, depression, mental illness and other (Lupus).  Medical allergies: no (Sulfa, morphine).  Other surgeries include:  Other (3 C-sections).  Current medications:  Pain medication, anti-inflammatory, muscle relaxants and steroids.  Current occupation is Mental health therapist; .  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  Primary job tasks include:  Prolonged sitting, prolonged standing, repetitive tasks and lifting.    Barriers include:  None as reported by the patient.    Red flags:  None as reported by the patient.                        Objective:    System         Lumbar/SI Evaluation  ROM:    AROM Lumbar:   Flexion:          WNL  Ext:                    75% ERP   Side Bend:        Left:  WNL    Right:  WNL ERP left  Rotation:           Left:     Right:   Side Glide:        Left:     Right:           Lumbar  Myotomes:  normal            Lumbar DTR's:  not assessed      Cord Signs:  not assessed    Lumbar Dermtomes:  normal                        Spinal Segmental Conclusions: Pt demonstrated improved ROM and reported decreased pain with repeated side gliding and extension.                                                       General     ROS    Assessment/Plan:      Patient is a 56 year old female with lumbar complaints.    Patient has the following significant findings with corresponding treatment plan.                Diagnosis 1:  Low back pain (R>R)  Pain -  manual therapy, self management, education, directional preference exercise and home program  Decreased ROM/flexibility - manual therapy and therapeutic exercise  Decreased joint mobility - manual therapy and therapeutic exercise  Decreased strength - therapeutic exercise and therapeutic activities  Decreased function - therapeutic activities    Therapy Evaluation Codes:   1) History comprised of:   Personal factors that impact the plan of care:      None.    Comorbidity factors that impact the plan of care are:      None.     Medications impacting care: None.  2) Examination of Body Systems comprised of:   Body structures and functions that impact the plan of care:      Lumbar spine.   Activity limitations that impact the plan of care are:      Bending, Lifting, Sitting and Standing.  3) Clinical presentation characteristics are:   Stable/Uncomplicated.  4) Decision-Making    Moderate complexity using standardized patient assessment instrument and/or measureable assessment of functional outcome.  Cumulative Therapy Evaluation is: Low complexity.    Previous and current functional limitations:  (See Goal Flow Sheet for this information)    Short term and Long term goals: (See Goal Flow Sheet for this information)     Communication ability:  Patient appears to be able to clearly communicate and understand verbal and written communication and follow directions  correctly.  Treatment Explanation - The following has been discussed with the patient:   RX ordered/plan of care  Anticipated outcomes  Possible risks and side effects  This patient would benefit from PT intervention to resume normal activities.   Rehab potential is good.    Frequency:  1 X week, once daily  Duration:  for 6 weeks  Discharge Plan:  Achieve all LTG.  Independent in home treatment program.  Reach maximal therapeutic benefit.    Please refer to the daily flowsheet for treatment today, total treatment time and time spent performing 1:1 timed codes.

## 2017-04-17 ENCOUNTER — OFFICE VISIT (OUTPATIENT)
Dept: FAMILY MEDICINE | Facility: CLINIC | Age: 57
End: 2017-04-17
Payer: COMMERCIAL

## 2017-04-17 VITALS
DIASTOLIC BLOOD PRESSURE: 62 MMHG | WEIGHT: 188 LBS | TEMPERATURE: 97.9 F | BODY MASS INDEX: 31.28 KG/M2 | OXYGEN SATURATION: 99 % | HEART RATE: 98 BPM | SYSTOLIC BLOOD PRESSURE: 104 MMHG

## 2017-04-17 DIAGNOSIS — M25.561 CHRONIC ARTHRALGIAS OF KNEES AND HIPS: ICD-10-CM

## 2017-04-17 DIAGNOSIS — G89.29 CHRONIC ARTHRALGIAS OF KNEES AND HIPS: ICD-10-CM

## 2017-04-17 DIAGNOSIS — R05.9 COUGH: Primary | ICD-10-CM

## 2017-04-17 DIAGNOSIS — M25.551 CHRONIC ARTHRALGIAS OF KNEES AND HIPS: ICD-10-CM

## 2017-04-17 DIAGNOSIS — M25.562 CHRONIC ARTHRALGIAS OF KNEES AND HIPS: ICD-10-CM

## 2017-04-17 DIAGNOSIS — M25.552 CHRONIC ARTHRALGIAS OF KNEES AND HIPS: ICD-10-CM

## 2017-04-17 PROCEDURE — 99213 OFFICE O/P EST LOW 20 MIN: CPT | Performed by: FAMILY MEDICINE

## 2017-04-17 NOTE — NURSING NOTE
"Chief Complaint   Patient presents with     URI       Initial /62  Pulse 98  Temp 97.9  F (36.6  C) (Oral)  Wt 188 lb (85.3 kg)  SpO2 99%  BMI 31.28 kg/m2 Estimated body mass index is 31.28 kg/(m^2) as calculated from the following:    Height as of 3/21/17: 5' 5\" (1.651 m).    Weight as of this encounter: 188 lb (85.3 kg).  Medication Reconciliation: complete   Lizbeth Noble MA      "

## 2017-04-17 NOTE — PATIENT INSTRUCTIONS
"-Please let me know if you are not feeling any better in 6 weeks, and I would like to see you then if there are no improvements.  -Try to increase your rest and fluid intake until your energy starts to return to normal.  -Look up Rebecca Onofre's book on \"Living with Prednisone\" if you think that may be helpful.   "

## 2017-04-17 NOTE — TELEPHONE ENCOUNTER
Tramadol      Last Written Prescription Date:  2/15/17  Last Fill Quantity: 60,   # refills: 1  Last Office Visit with [facility] [place], IActiveP or LessThan3 prescribing provider: 4/17/17  Future Office visit:       Routing refill request to provider for review/approval because:  Drug not on the [facility], IActiveP or LessThan3 refill protocol or controlled substance        Ibuprofen      Last Written Prescription Date: 2/15/17  Last Quantity: 20, # refills: 0  Last Office Visit with [facility] [place], IActiveP or LessThan3 prescribing provider: 4/17/17       Creatinine   Date Value Ref Range Status   11/02/2016 0.80 0.52 - 1.04 mg/dL Final     Lab Results   Component Value Date    AST 34 11/02/2016     Lab Results   Component Value Date    ALT 43 11/02/2016     BP Readings from Last 3 Encounters:   04/17/17 104/62   03/21/17 107/71   03/16/17 122/70

## 2017-04-17 NOTE — MR AVS SNAPSHOT
"              After Visit Summary   4/17/2017    Lesley Stafford    MRN: [id number]           Patient Information     Date Of Birth          1960        Visit Information        Provider Department      4/17/2017 1:00 PM Rafael Orosco MD [facility]        Today's Diagnoses     Cough    -  1      Care Instructions    -Please let me know if you are not feeling any better in 6 weeks, and I would like to see you then if there are no improvements.  -Try to increase your rest and fluid intake until your energy starts to return to normal.  -Look up Rebecca Onofre's book on \"Living with Prednisone\" if you think that may be helpful.         Follow-ups after your visit        Your next 10 appointments already scheduled     Apr 21, 2017 10:20 AM CDT   BO Spine with Grzegorz Mendoza PT   [facility]    [address]  [place] [address] [phone]              Who to contact     If you have questions or need follow up information about today's clinic visit or your schedule please contact [facility] directly at [phone].  Normal or non-critical lab and imaging results will be communicated to you by MyChart, letter or phone within 4 business days after the clinic has received the results. If you do not hear from us within 7 days, please contact the clinic through MyChart or phone. If you have a critical or abnormal lab result, we will notify you by phone as soon as possible.  Submit refill requests through SirenServ or call your pharmacy and they will forward the refill request to us. Please allow 3 business days for your refill to be completed.          Additional Information About Your Visit        MyChart Information     SirenServ lets you send messages to your doctor, view your test results, renew your prescriptions, schedule appointments and more. To sign up, go to " "[email or web address] . Click on \"Log in\" on the left side of the screen, which will take you to the Welcome page. Then click on \"Sign up Now\" on the right side of the page.     You will be asked to enter the access code listed below, as well as some personal information. Please follow the directions to create your username and password.     Your access code is: [email or web address]  Expires: 2017  3:10 PM     Your access code will  in 90 days. If you need help or a new code, please call your [facility] or [phone].        Care EveryWhere ID     This is your Care EveryWhere ID. This could be used by other organizations to access your [place] medical records  [id number]        Your Vitals Were     Pulse Temperature Pulse Oximetry BMI (Body Mass Index)          98 97.9  F (36.6  C) (Oral) 99% 31.28 kg/m2         Blood Pressure from Last 3 Encounters:   17 104/62   17 107/71   17 122/70    Weight from Last 3 Encounters:   17 188 lb (85.3 kg)   17 189 lb (85.7 kg)   17 188 lb (85.3 kg)              Today, you had the following     No orders found for display         Today's Medication Changes          These changes are accurate as of: 17  1:50 PM.  If you have any questions, ask your nurse or doctor.               These medicines have changed or have updated prescriptions.        Dose/Directions    ibuprofen 600 MG tablet   Commonly known as:  ADVIL/MOTRIN   This may have changed:    - how much to take  - when to take this   Used for:  Post-op pain        Dose:  600 mg   Take 1 tablet (600 mg) by mouth every 6 hours as needed for pain (mild)   Quantity:  20 tablet   Refills:  0                Primary Care Provider Office Phone # Fax #    Rafael Orosco [phone]       [facility] [address]  [place] [address]        Thank you!     Thank you for choosing [facility] [place]  for your care. Our goal is " always to provide you with excellent care. Hearing back from our patients is one way we can continue to improve our services. Please take a few minutes to complete the written survey that you may receive in the mail after your visit with us. Thank you!             Your Updated Medication List - Protect others around you: Learn how to safely use, store and throw away your medicines at www.disposemymeds.org.          This list is accurate as of: 4/17/17  1:50 PM.  Always use your most recent med list.                   Brand Name Dispense Instructions for use    acyclovir 200 MG capsule    ZOVIRAX    22 capsule    Take 1 capsule (200 mg) by mouth 5 times daily       amphetamine-dextroamphetamine 10 MG per tablet    ADDERALL     Take 10 mg by mouth 2 times daily       CALCIUM 500 PO          cetirizine 10 MG tablet    zyrTEC    30 tablet    Take 1 tablet (10 mg) by mouth every evening       cyclobenzaprine 5 MG tablet    FLEXERIL    42 tablet    TAKE 1 TABLET (5 MG) BY MOUTH 3 TIMES DAILY       ferrous sulfate 325 (65 FE) MG tablet    IRON    30 tablet    Take 1 tablet (325 mg) by mouth daily (with breakfast)       fluticasone 50 MCG/ACT spray    FLONASE    16 g    Spray 1-2 sprays into both nostrils daily       gabapentin 300 MG capsule    NEURONTIN    90 capsule    Take 1 capsule (300 mg) by mouth 3 times daily       hydroxychloroquine 200 MG tablet    PLAQUENIL     Take 200 mg by mouth daily.       hydrOXYzine 25 MG tablet    ATARAX    120 tablet    Take 1 tablet (25 mg) by mouth 3 times daily as needed for itching       ibuprofen 600 MG tablet    ADVIL/MOTRIN    20 tablet    Take 1 tablet (600 mg) by mouth every 6 hours as needed for pain (mild)       LORazepam 1 MG tablet    ATIVAN    1 tablet    Take 1 tablet (1 mg) by mouth every 8 hours as needed for anxiety       methylPREDNISolone 4 MG tablet    MEDROL DOSEPAK    21 tablet    Follow package instructions       Multi-vitamin Tabs tablet      Take 1 tablet by  mouth daily       predniSONE 1 MG tablet    DELTASONE     Take 5 mg by mouth Reported on 3/21/2017       traMADol 50 MG tablet    ULTRAM    60 tablet    Take 1-2 tablets ( mg) by mouth every 6 hours as needed       venlafaxine 225 MG Tb24 24 hr tablet    EFFEXOR-ER     Take 75 mg by mouth every evening Every other day tapering off

## 2017-04-17 NOTE — PROGRESS NOTES
"  SUBJECTIVE:                                                    Lesley Stafford is a 56 year old female who presents to clinic today for the following health issues:    Acute Illness   Acute illness concerns: fatigue   Onset: 1 week    Fever: no    Chills/Sweats: YES and nightmares     Headache (location?): YES    Sinus Pressure:possible     Conjunctivitis:  no    Ear Pain: no    Rhinorrhea: YES    Congestion: YES    Sore Throat: no     Cough: YES    Wheeze: no     Decreased Appetite: no    Nausea: no    Vomiting: no    Diarrhea:  no    Dysuria/Freq.: no    Fatigue/Achiness: YES    Sick/Strep Exposure: no     Therapies Tried and outcome:     Patient says that she has been feeling ill for the past week and has been feeling fatigued. She says that she has had congestion, headaches and has had a phlegmy cough, which she says she feels is constant and ongoing, but worse than normal. She has been having night sweats and hot flashes, and she says that she has been having nightmares. History of lupus, and she is wondering if she may just be having a \"lupus flare up\", and she reports that she has been in the process of tapering down her prednisone. She has started taking 5 mg of prednisone every other day a few weeks ago, and she thinks that may be why her energy seems so low. Accompanying pain \"in her bones\" with aching feeling.    Problem list and histories reviewed & adjusted, as indicated.  Additional history: as documented    Patient Active Problem List   Diagnosis     CARDIOVASCULAR SCREENING; LDL GOAL LESS THAN 130     Lupus (H)     Abnormal perimenopausal bleeding     Obesity, Class I, BMI 30-34.9     Other symptoms involving nervous and musculoskeletal systems(781.99)     History of claustrophobia     Cervicalgia     Pain in joint of left shoulder     Anemia due to blood loss, acute     Anemia     Depressive disorder     Anxiety     Allergy     ACP (advance care planning)     Tired     Insomnia, unspecified " type     Hemoglobin C trait (H)     Liver hemangioma     Bilateral carpal tunnel syndrome     Chronic bilateral low back pain without sciatica     Past Surgical History:   Procedure Laterality Date     COLONOSCOPY  01/2010    repeat 10 yrs     DILATION AND CURETTAGE, OPERATIVE HYSTEROSCOPY WITH MORCELLATOR, COMBINED N/A 2/15/2017    Procedure: COMBINED DILATION AND CURETTAGE, OPERATIVE HYSTEROSCOPY WITH MORCELLATOR;  Surgeon: Erin Gutierrez MD;  Location: [facility]     ESOPHAGOSCOPY, GASTROSCOPY, DUODENOSCOPY (EGD), COMBINED Left 2/5/2016    Procedure: COMBINED ESOPHAGOSCOPY, GASTROSCOPY, DUODENOSCOPY (EGD), BIOPSY SINGLE OR MULTIPLE;  Surgeon: Pierre Fernandez MD;  Location: [facility]     GYN SURGERY         Social History   Substance Use Topics     Smoking status: Former Smoker     Smokeless tobacco: Never Used     Alcohol use No     Family History   Problem Relation Age of Onset     Lupus Mother      DIABETES Father      Brain Tumor Father      begnign on pituitary     Depression Sister      Anemia Sister          Current Outpatient Prescriptions   Medication Sig Dispense Refill     cyclobenzaprine (FLEXERIL) 5 MG tablet TAKE 1 TABLET (5 MG) BY MOUTH 3 TIMES DAILY 42 tablet 0     LORazepam (ATIVAN) 1 MG tablet Take 1 tablet (1 mg) by mouth every 8 hours as needed for anxiety 1 tablet 0     methylPREDNISolone (MEDROL DOSEPAK) 4 MG tablet Follow package instructions 21 tablet 0     traMADol (ULTRAM) 50 MG tablet Take 1-2 tablets ( mg) by mouth every 6 hours as needed 60 tablet 1     ibuprofen (ADVIL/MOTRIN) 600 MG tablet Take 1 tablet (600 mg) by mouth every 6 hours as needed for pain (mild) (Patient taking differently: Take 800 mg by mouth daily ) 20 tablet 0     gabapentin (NEURONTIN) 300 MG capsule Take 1 capsule (300 mg) by mouth 3 times daily 90 capsule 1     hydrOXYzine (ATARAX) 25 MG tablet Take 1 tablet (25 mg) by mouth 3 times daily as needed for itching 120 tablet 11      amphetamine-dextroamphetamine (ADDERALL) 10 MG tablet Take 10 mg by mouth 2 times daily       predniSONE (DELTASONE) 1 MG tablet Take 5 mg by mouth Reported on 3/21/2017       acyclovir (ZOVIRAX) 200 MG capsule Take 1 capsule (200 mg) by mouth 5 times daily 22 capsule 0     ferrous sulfate (IRON) 325 (65 FE) MG tablet Take 1 tablet (325 mg) by mouth daily (with breakfast) 30 tablet 5     fluticasone (FLONASE) 50 MCG/ACT nasal spray Spray 1-2 sprays into both nostrils daily 16 g 11     cetirizine (ZYRTEC) 10 MG tablet Take 1 tablet (10 mg) by mouth every evening 30 tablet 1     Calcium-Magnesium-Vitamin D (CALCIUM 500 PO)        multivitamin, therapeutic with minerals (MULTI-VITAMIN) TABS Take 1 tablet by mouth daily       venlafaxine (EFFEXOR-ER) 225 MG TB24 Take 75 mg by mouth every evening Every other day tapering off       hydroxychloroquine (PLAQUENIL) 200 MG tablet Take 200 mg by mouth daily.       Allergies   Allergen Reactions     Morphine Sulfate Itching     Sulfa Drugs Hives       Reviewed and updated as needed this visit by clinical staff  Tobacco  Allergies  Meds  Med Hx  Surg Hx  Fam Hx  Soc Hx      Reviewed and updated as needed this visit by Provider         ROS:  Positive for cough, headache, fatigue, aching and congestion.    Denies headache, insomnia, chest pain, shortness of breath heartburn, bowel issues, bladder issues, neck pain, back pain, hip pain, knee pain, ankle pain, or foot pain. Remainder of ROS is negative unless otherwise noted above or in HPI.    This document serves as a record of the services and decisions personally performed and made by Rafael Orosco MD. It was created on his behalf by Albino Manning, a trained medical scribe. The creation of this document is based the provider's statements to the medical scribe.  Albino Manning 1:37 PM April 17, 2017    OBJECTIVE:                                                    /62  Pulse 98  Temp 97.9  F (36.6  C)  "(Oral)  Wt 85.3 kg (188 lb)  SpO2 99%  BMI 31.28 kg/m2  Body mass index is 31.28 kg/(m^2).  GENERAL: healthy, alert and no distress  RESP: lungs clear to auscultation - no rales, rhonchi or wheezes  CV: regular rate and rhythm, normal S1 S2, no S3 or S4, no murmur, click or rub, no peripheral edema and peripheral pulses strong  MS: no gross musculoskeletal defects noted, no edema. Calves nontender.  NEURO: Normal strength and tone, mentation intact and speech normal  PSYCH: mentation appears normal, affect normal/bright    Diagnostic Test Results:  No results found for this or any previous visit (from the past 24 hour(s)).     ASSESSMENT/PLAN:                                                      (R05) Cough  (primary encounter diagnosis)  Comment: Suspect due to prednisone taper.  Plan: Will continue to monitor. Follow up in 6 weeks for recheck if no improvement.    Patient Instructions   -Please let me know if you are not feeling any better in 6 weeks, and I would like to see you then if there are no improvements.  -Try to increase your rest and fluid intake until your energy starts to return to normal.  -Look up Rebecca Onofre's book on \"Living with Prednisone\" if you think that may be helpful.     The information in this document, created by the medical scribe for me, accurately reflects the services I personally performed and the decisions made by me. I have reviewed and approved this document for accuracy prior to leaving the patient care area.   Rafael Orosco MD 1:38 PM April 17, 2017  [facility]    "

## 2017-04-18 NOTE — TELEPHONE ENCOUNTER
Dr. Orosco--    Patient requesting refills of the following medications:     traMADol (ULTRAM) 50 MG tablet    Last Rx: 02/15/2017, 60 tablets, 1 refill      ibuprofen (ADVIL/MOTRIN) 600 MG tablet    Last Rx: 02/15/2017, 20 tablets, 0 refills          Unable to refill per protocol as tramadol is not listed on protocol and ibuprofen has not been prescribed at [facility] previously.       Shelby Arreola RN  [facility]

## 2017-04-18 NOTE — TELEPHONE ENCOUNTER
Patient was in to see Dr. Orosco yesterday, called to confirm refill request for medications listed below.       Shelby Arreola RN  [facility]

## 2017-04-19 RX ORDER — IBUPROFEN 800 MG/1
TABLET, FILM COATED ORAL
Qty: 30 TABLET | Refills: 0 | Status: SHIPPED | OUTPATIENT
Start: 2017-04-19 | End: 2017-05-23

## 2017-04-19 RX ORDER — TRAMADOL HYDROCHLORIDE 50 MG/1
TABLET ORAL
Qty: 60 TABLET | Refills: 0 | Status: SHIPPED | OUTPATIENT
Start: 2017-04-19 | End: 2017-06-10

## 2017-04-19 NOTE — TELEPHONE ENCOUNTER
Spoke with patient, she is aware i have faxed script for tramadol to [facility] [address]

## 2017-04-21 ENCOUNTER — THERAPY VISIT (OUTPATIENT)
Dept: PHYSICAL THERAPY | Facility: CLINIC | Age: 57
End: 2017-04-21
Payer: COMMERCIAL

## 2017-04-21 DIAGNOSIS — G89.29 CHRONIC BILATERAL LOW BACK PAIN WITHOUT SCIATICA: ICD-10-CM

## 2017-04-21 DIAGNOSIS — M54.50 CHRONIC BILATERAL LOW BACK PAIN WITHOUT SCIATICA: ICD-10-CM

## 2017-04-21 PROCEDURE — 97110 THERAPEUTIC EXERCISES: CPT | Mod: GP | Performed by: PHYSICAL THERAPIST

## 2017-04-21 PROCEDURE — 97112 NEUROMUSCULAR REEDUCATION: CPT | Mod: GP | Performed by: PHYSICAL THERAPIST

## 2017-04-21 NOTE — MR AVS SNAPSHOT
After Visit Summary   4/21/2017    Lesley Stafford    MRN: [id number]           Patient Information     Date Of Birth          1960        Visit Information        Provider Department      4/21/2017 10:20 AM Grzegorz Mendoza, PT [facility]        Today's Diagnoses     Chronic bilateral low back pain without sciatica           Follow-ups after your visit        Your next 10 appointments already scheduled     Apr 28, 2017 11:40 AM CDT   BO Spine with Grzegorz Mendoza PT   [facility] [place] [facility] ([facility]  )    [address]  [place] [facility] [place] [address]   [phone]            May 05, 2017 11:00 AM CDT   BO Spine with Grzegorz Mendoza, PT   [facility] ([facility]  )    [address]  [place] [place] [facility] [place] [address]   [phone]              Who to contact     If you have questions or need follow up information about today's clinic visit or your schedule please contact [facility] directly at [phone].  Normal or non-critical lab and imaging results will be communicated to you by MyChart, letter or phone within 4 business days after the clinic has received the results. If you do not hear from us within 7 days, please contact the clinic through MyChart or phone. If you have a critical or abnormal lab result, we will notify you by phone as soon as possible.  Submit refill requests through [employer] or call your pharmacy and they will forward the refill request to us. Please allow 3 business days for your refill to be completed.          Additional Information About Your Visit        AdmitOne Securityhart Information     Zoom[employer]et lets you send messages to your doctor, view your test results, renew your prescriptions, schedule appointments and more. To sign up, go to  "[email or web address] . Click on \"Log in\" on the left side of the screen, which will take you to the Welcome page. Then click on \"Sign up Now\" on the right side of the page.     You will be asked to enter the access code listed below, as well as some personal information. Please follow the directions to create your username and password.     Your access code is: [email or web address]  Expires: 2017  3:10 PM     Your access code will  in 90 days. If you need help or a new code, please call your [place] clinic or [phone].        Care EveryWhere ID     This is your Care EveryWhere ID. This could be used by other organizations to access your [place] medical records  [id number]         Blood Pressure from Last 3 Encounters:   17 104/62   17 107/71   17 122/70    Weight from Last 3 Encounters:   17 85.3 kg (188 lb)   17 85.7 kg (189 lb)   17 85.3 kg (188 lb)              We Performed the Following     NEUROMUSCULAR RE-EDUCATION     THERAPEUTIC EXERCISES          Today's Medication Changes          These changes are accurate as of: 17 11:28 AM.  If you have any questions, ask your nurse or doctor.               These medicines have changed or have updated prescriptions.        Dose/Directions    * ibuprofen 600 MG tablet   Commonly known as:  ADVIL/MOTRIN   This may have changed:    - how much to take  - when to take this   Used for:  Post-op pain        Dose:  600 mg   Take 1 tablet (600 mg) by mouth every 6 hours as needed for pain (mild)   Quantity:  20 tablet   Refills:  0       * ibuprofen 800 MG tablet   Commonly known as:  ADVIL/MOTRIN   This may have changed:  Another medication with the same name was changed. Make sure you understand how and when to take each.   Used for:  Chronic arthralgias of knees and hips        TAKE ONE TABLET BY MOUTH DAILY AS NEEDED FOR MODERATE PAIN   Quantity:  30 tablet   Refills:  0       * Notice:  This list has 2 medication(s) " that are the same as other medications prescribed for you. Read the directions carefully, and ask your doctor or other care provider to review them with you.             Primary Care Provider Office Phone # Fax #    Rafael Orosco [phone]       [facility] [address] [facility] [address]  [place] [address]        Thank you!     Thank you for choosing [facility]  for your care. Our goal is always to provide you with excellent care. Hearing back from our patients is one way we can continue to improve our services. Please take a few minutes to complete the written survey that you may receive in the mail after your visit with us. Thank you!             Your Updated Medication List - Protect others around you: Learn how to safely use, store and throw away your medicines at www.disposemymeds.org.          This list is accurate as of: 4/21/17 11:28 AM.  Always use your most recent med list.                   Brand Name Dispense Instructions for use    acyclovir 200 MG capsule    ZOVIRAX    22 capsule    Take 1 capsule (200 mg) by mouth 5 times daily       amphetamine-dextroamphetamine 10 MG per tablet    ADDERALL     Take 10 mg by mouth 2 times daily       CALCIUM 500 PO          cetirizine 10 MG tablet    zyrTEC    30 tablet    Take 1 tablet (10 mg) by mouth every evening       cyclobenzaprine 5 MG tablet    FLEXERIL    42 tablet    TAKE 1 TABLET (5 MG) BY MOUTH 3 TIMES DAILY       ferrous sulfate 325 (65 FE) MG tablet    IRON    30 tablet    Take 1 tablet (325 mg) by mouth daily (with breakfast)       fluticasone 50 MCG/ACT spray    FLONASE    16 g    Spray 1-2 sprays into both nostrils daily       gabapentin 300 MG capsule    NEURONTIN    90 capsule    Take 1 capsule (300 mg) by mouth 3 times daily       hydroxychloroquine 200 MG tablet    PLAQUENIL     Take 200 mg by mouth daily.       hydrOXYzine 25 MG tablet    ATARAX    120  tablet    Take 1 tablet (25 mg) by mouth 3 times daily as needed for itching       * ibuprofen 600 MG tablet    ADVIL/MOTRIN    20 tablet    Take 1 tablet (600 mg) by mouth every 6 hours as needed for pain (mild)       * ibuprofen 800 MG tablet    ADVIL/MOTRIN    30 tablet    TAKE ONE TABLET BY MOUTH DAILY AS NEEDED FOR MODERATE PAIN       LORazepam 1 MG tablet    ATIVAN    1 tablet    Take 1 tablet (1 mg) by mouth every 8 hours as needed for anxiety       methylPREDNISolone 4 MG tablet    MEDROL DOSEPAK    21 tablet    Follow package instructions       Multi-vitamin Tabs tablet      Take 1 tablet by mouth daily       predniSONE 1 MG tablet    DELTASONE     Take 5 mg by mouth Reported on 3/21/2017       traMADol 50 MG tablet    ULTRAM    60 tablet    TAKE 1-2 TABLETS BY MOUTH EVERY 6 HOURS AS NEEDED       venlafaxine 225 MG Tb24 24 hr tablet    EFFEXOR-ER     Take 75 mg by mouth every evening Every other day tapering off       * Notice:  This list has 2 medication(s) that are the same as other medications prescribed for you. Read the directions carefully, and ask your doctor or other care provider to review them with you.

## 2017-04-21 NOTE — PROGRESS NOTES
Subjective:    HPI                    Objective:    System    Physical Exam    General     ROS    Assessment/Plan:      SUBJECTIVE  Subjective changes as noted by pt:  Pt reports that her low back pain is better than last week.  She notes that she as able to sit at her kitchen table for a longer period.  She reports that her pain is left more than right and her leg symptoms are better.     Current pain level:  Current Pain level: 4/10   Changes in function:  Yes (See Goal flowsheet attached for changes in current functional level)     Adverse reaction to treatment or activity:  None    OBJECTIVE  Changes in objective findings:  Lumbar AROM:  Flexion WNL pulling, Extension WNL 90% ERP, R side bending WNL pulling left, L side bending WNL (+R).  Pt demonstrated improved ROM and reported decreased pain following repeated extension in prone.  Instructed in standing extension as well.  Initiated core strengthening today.        ASSESSMENT  Lesley continues to require intervention to meet STG and LTG's: PT  Patient is progressing as expected.  Progress made towards STG/LTG?  Yes (See Goal flowsheet attached for updates on achievement of STG and LTG)    PLAN  Current treatment program is being advanced to more complex exercises.    PTA/ATC plan:  N/A    Please refer to the daily flowsheet for treatment today, total treatment time and time spent performing 1:1 timed codes.

## 2017-05-05 ENCOUNTER — THERAPY VISIT (OUTPATIENT)
Dept: PHYSICAL THERAPY | Facility: CLINIC | Age: 57
End: 2017-05-05
Payer: COMMERCIAL

## 2017-05-05 DIAGNOSIS — M54.50 CHRONIC BILATERAL LOW BACK PAIN WITHOUT SCIATICA: ICD-10-CM

## 2017-05-05 DIAGNOSIS — G89.29 CHRONIC BILATERAL LOW BACK PAIN WITHOUT SCIATICA: ICD-10-CM

## 2017-05-05 DIAGNOSIS — M79.10 MYALGIA: ICD-10-CM

## 2017-05-05 PROCEDURE — 97112 NEUROMUSCULAR REEDUCATION: CPT | Mod: GP | Performed by: PHYSICAL THERAPIST

## 2017-05-05 PROCEDURE — 97110 THERAPEUTIC EXERCISES: CPT | Mod: GP | Performed by: PHYSICAL THERAPIST

## 2017-05-05 NOTE — MR AVS SNAPSHOT
After Visit Summary   5/5/2017    Lesley Stafford    MRN: [id number]           Patient Information     Date Of Birth          1960        Visit Information        Provider Department      5/5/2017 11:00 AM Grzegorz Mendoza, PT [facility] [place] [facility]        Today's Diagnoses     Chronic bilateral low back pain without sciatica           Follow-ups after your visit        Your next 10 appointments already scheduled     May 19, 2017  9:00 AM CDT   [facility] Spine with Grzegorz Mendoza PT   [facility] ([facility]  )    [address]  [place] [address]   [phone]            Jun 02, 2017  9:00 AM CDT   [facility] Spine with Grzegorz Mendoza, PT   [facility] ([facility]  )    [address]  [place] [address]   [phone]              Who to contact     If you have questions or need follow up information about today's clinic visit or your schedule please contact [facility] [place] [facility]YS[facility] directly at [phone].  Normal or non-critical lab and imaging results will be communicated to you by MyChart, letter or phone within 4 business days after the clinic has received the results. If you do not hear from us within 7 days, please contact the clinic through MyChart or phone. If you have a critical or abnormal lab result, we will notify you by phone as soon as possible.  Submit refill requests through [employer] or call your pharmacy and they will forward the refill request to us. Please allow 3 business days for your refill to be completed.          Additional Information About Your Visit        [employer] Information     [employer] lets you send messages to your doctor, view your test results, renew your prescriptions, schedule appointments and more. To sign up, go to  [email or web address] . Click on \"Log in\" on the left side of the screen, which will take you to the Welcome page. Then click on \"Sign up Now\" on the right side of the page.     You will be asked to enter the access code listed below, as well as some personal information. Please follow the directions to create your username and password.     Your access code is: [email or web address]  Expires: 2017  3:10 PM     Your access code will  in 90 days. If you need help or a new code, please call your [facility] clinic or [phone].        Care EveryWhere ID     This is your Care EveryWhere ID. This could be used by other organizations to access your [facility] medical records  [id number]         Blood Pressure from Last 3 Encounters:   17 104/62   17 107/71   17 122/70    Weight from Last 3 Encounters:   17 85.3 kg (188 lb)   17 85.7 kg (189 lb)   17 85.3 kg (188 lb)              We Performed the Following     NEUROMUSCULAR RE-EDUCATION     THERAPEUTIC EXERCISES          Today's Medication Changes          These changes are accurate as of: 17  4:44 PM.  If you have any questions, ask your nurse or doctor.               Start taking these medicines.        Dose/Directions    tiZANidine 4 MG tablet   Commonly known as:  ZANAFLEX   Used for:  Myalgia   Started by:  Rafael Orosco MD        Dose:  4-8 mg   Take 1-2 tablets (4-8 mg) by mouth 3 times daily as needed for muscle spasms   Quantity:  30 tablet   Refills:  1         These medicines have changed or have updated prescriptions.        Dose/Directions    * ibuprofen 600 MG tablet   Commonly known as:  ADVIL/MOTRIN   This may have changed:    - how much to take  - when to take this   Used for:  Post-op pain        Dose:  600 mg   Take 1 tablet (600 mg) by mouth every 6 hours as needed for pain (mild)   Quantity:  20 tablet   Refills:  0       * ibuprofen 800 MG tablet   Commonly known as:  ADVIL/MOTRIN   This may have " changed:  Another medication with the same name was changed. Make sure you understand how and when to take each.   Used for:  Chronic arthralgias of knees and hips   Changed by:  Rafael Orosco MD        TAKE ONE TABLET BY MOUTH DAILY AS NEEDED FOR MODERATE PAIN   Quantity:  30 tablet   Refills:  0       * Notice:  This list has 2 medication(s) that are the same as other medications prescribed for you. Read the directions carefully, and ask your doctor or other care provider to review them with you.         Where to get your medicines      These medications were sent to [id number] - [place], [place] - [address]  [address] [address]     Phone:  [phone]     tiZANidine 4 MG tablet                Primary Care Provider Office Phone # Fax #    Rafael Orosco [phone]       [facility] [address]  [place] [facility] [address]        Thank you!     Thank you for choosing [facility]  for your care. Our goal is always to provide you with excellent care. Hearing back from our patients is one way we can continue to improve our services. Please take a few minutes to complete the written survey that you may receive in the mail after your visit with us. Thank you!             Your Updated Medication List - Protect others around you: Learn how to safely use, store and throw away your medicines at www.disposemymeds.org.          This list is accurate as of: 5/5/17  4:44 PM.  Always use your most recent med list.                   Brand Name Dispense Instructions for use    acyclovir 200 MG capsule    ZOVIRAX    22 capsule    Take 1 capsule (200 mg) by mouth 5 times daily       amphetamine-dextroamphetamine 10 MG per tablet    ADDERALL     Take 10 mg by mouth 2 times daily       CALCIUM 500 PO          cetirizine 10 MG tablet    zyrTEC    30 tablet    Take 1 tablet (10 mg) by  mouth every evening       cyclobenzaprine 5 MG tablet    FLEXERIL    42 tablet    TAKE 1 TABLET (5 MG) BY MOUTH 3 TIMES DAILY       ferrous sulfate 325 (65 FE) MG tablet    IRON    30 tablet    Take 1 tablet (325 mg) by mouth daily (with breakfast)       fluticasone 50 MCG/ACT spray    FLONASE    16 g    Spray 1-2 sprays into both nostrils daily       gabapentin 300 MG capsule    NEURONTIN    90 capsule    Take 1 capsule (300 mg) by mouth 3 times daily       hydroxychloroquine 200 MG tablet    PLAQUENIL     Take 200 mg by mouth daily.       hydrOXYzine 25 MG tablet    ATARAX    120 tablet    Take 1 tablet (25 mg) by mouth 3 times daily as needed for itching       * ibuprofen 600 MG tablet    ADVIL/MOTRIN    20 tablet    Take 1 tablet (600 mg) by mouth every 6 hours as needed for pain (mild)       * ibuprofen 800 MG tablet    ADVIL/MOTRIN    30 tablet    TAKE ONE TABLET BY MOUTH DAILY AS NEEDED FOR MODERATE PAIN       LORazepam 1 MG tablet    ATIVAN    1 tablet    Take 1 tablet (1 mg) by mouth every 8 hours as needed for anxiety       methylPREDNISolone 4 MG tablet    MEDROL DOSEPAK    21 tablet    Follow package instructions       Multi-vitamin Tabs tablet      Take 1 tablet by mouth daily       predniSONE 1 MG tablet    DELTASONE     Take 5 mg by mouth Reported on 3/21/2017       tiZANidine 4 MG tablet    ZANAFLEX    30 tablet    Take 1-2 tablets (4-8 mg) by mouth 3 times daily as needed for muscle spasms       traMADol 50 MG tablet    ULTRAM    60 tablet    TAKE 1-2 TABLETS BY MOUTH EVERY 6 HOURS AS NEEDED       venlafaxine 225 MG Tb24 24 hr tablet    EFFEXOR-ER     Take 75 mg by mouth every evening Every other day tapering off       * Notice:  This list has 2 medication(s) that are the same as other medications prescribed for you. Read the directions carefully, and ask your doctor or other care provider to review them with you.

## 2017-05-05 NOTE — PROGRESS NOTES
Subjective:    HPI                    Objective:    System    Physical Exam    General     ROS    Assessment/Plan:      SUBJECTIVE  Subjective changes as noted by pt:  Pt reports that her back pain has been increased recently.  She admits that she has been inconsistent with her HEP and attributes it to dealing with some home/work stress.  However she notes that overall she feels she has improved.     Current pain level:  Current Pain level: 5/10   Changes in function:  Yes (See Goal flowsheet attached for changes in current functional level)     Adverse reaction to treatment or activity:  None    OBJECTIVE  Changes in objective findings:  Lumbar AROM:  Flexion WNL, Extension WNL ERP, R sidebending WNL (+L), L side bending WNL.  Pt demonstrates good technique with core strengthening exercises.  Added lumbar stretches (all 4s)         ASSESSMENT  Lesley continues to require intervention to meet STG and LTG's: PT  Patient is progressing as expected.  Progress made towards STG/LTG?  Yes (See Goal flowsheet attached for updates on achievement of STG and LTG)    PLAN  Current treatment program is being advanced to more complex exercises.    PTA/ATC plan:  N/A    Please refer to the daily flowsheet for treatment today, total treatment time and time spent performing 1:1 timed codes.

## 2017-05-05 NOTE — TELEPHONE ENCOUNTER
Routing refill request to provider for review/approval because:  Drug not on the [facility] refill protocol     Refill request for zanaflex/tizanidine    Swati Ghotra RN

## 2017-05-07 ENCOUNTER — TELEPHONE (OUTPATIENT)
Dept: NURSING | Facility: CLINIC | Age: 57
End: 2017-05-07

## 2017-05-07 ENCOUNTER — APPOINTMENT (OUTPATIENT)
Dept: GENERAL RADIOLOGY | Facility: CLINIC | Age: 57
End: 2017-05-07
Attending: EMERGENCY MEDICINE
Payer: COMMERCIAL

## 2017-05-07 ENCOUNTER — HOSPITAL ENCOUNTER (OUTPATIENT)
Facility: CLINIC | Age: 57
Setting detail: OBSERVATION
Discharge: HOME | End: 2017-05-08
Attending: EMERGENCY MEDICINE | Admitting: EMERGENCY MEDICINE
Payer: COMMERCIAL

## 2017-05-07 DIAGNOSIS — E87.6 HYPOKALEMIA: ICD-10-CM

## 2017-05-07 DIAGNOSIS — M54.50 LOW BACK PAIN WITHOUT SCIATICA, UNSPECIFIED BACK PAIN LATERALITY, UNSPECIFIED CHRONICITY: ICD-10-CM

## 2017-05-07 DIAGNOSIS — R51.9 HEADACHE, UNSPECIFIED HEADACHE TYPE: ICD-10-CM

## 2017-05-07 DIAGNOSIS — M32.9 SYSTEMIC LUPUS ERYTHEMATOSUS (H): ICD-10-CM

## 2017-05-07 DIAGNOSIS — R07.9 CHEST PAIN: ICD-10-CM

## 2017-05-07 DIAGNOSIS — R07.9 CHEST PAIN, UNSPECIFIED TYPE: ICD-10-CM

## 2017-05-07 DIAGNOSIS — J32.9 SINUSITIS, UNSPECIFIED CHRONICITY, UNSPECIFIED LOCATION: ICD-10-CM

## 2017-05-07 DIAGNOSIS — G89.29 OTHER CHRONIC PAIN: ICD-10-CM

## 2017-05-07 DIAGNOSIS — D69.6 THROMBOCYTOPENIA, UNSPECIFIED (H): ICD-10-CM

## 2017-05-07 DIAGNOSIS — R13.10 DYSPHAGIA, UNSPECIFIED TYPE: ICD-10-CM

## 2017-05-07 DIAGNOSIS — G47.30 SLEEP APNEA, UNSPECIFIED TYPE: Primary | ICD-10-CM

## 2017-05-07 LAB
BASOPHILS # BLD AUTO: 0 10E9/L (ref 0–0.2)
BASOPHILS NFR BLD AUTO: 0.4 %
DIFFERENTIAL METHOD BLD: ABNORMAL
EOSINOPHIL # BLD AUTO: 0.1 10E9/L (ref 0–0.7)
EOSINOPHIL NFR BLD AUTO: 1.7 %
ERYTHROCYTE [DISTWIDTH] IN BLOOD BY AUTOMATED COUNT: 14.2 % (ref 10–15)
HCT VFR BLD AUTO: 37.6 % (ref 35–47)
HGB BLD-MCNC: 13.5 G/DL (ref 11.7–15.7)
IMM GRANULOCYTES # BLD: 0 10E9/L (ref 0–0.4)
IMM GRANULOCYTES NFR BLD: 0.2 %
LYMPHOCYTES # BLD AUTO: 1.6 10E9/L (ref 0.8–5.3)
LYMPHOCYTES NFR BLD AUTO: 33.3 %
MCH RBC QN AUTO: 28.9 PG (ref 26.5–33)
MCHC RBC AUTO-ENTMCNC: 35.9 G/DL (ref 31.5–36.5)
MCV RBC AUTO: 81 FL (ref 78–100)
MONOCYTES # BLD AUTO: 0.4 10E9/L (ref 0–1.3)
MONOCYTES NFR BLD AUTO: 7.9 %
NEUTROPHILS # BLD AUTO: 2.7 10E9/L (ref 1.6–8.3)
NEUTROPHILS NFR BLD AUTO: 56.5 %
NRBC # BLD AUTO: 0 10*3/UL
NRBC BLD AUTO-RTO: 0 /100
PLATELET # BLD AUTO: 133 10E9/L (ref 150–450)
RBC # BLD AUTO: 4.67 10E12/L (ref 3.8–5.2)
WBC # BLD AUTO: 4.8 10E9/L (ref 4–11)

## 2017-05-07 PROCEDURE — 96360 HYDRATION IV INFUSION INIT: CPT

## 2017-05-07 PROCEDURE — 80048 BASIC METABOLIC PNL TOTAL CA: CPT | Performed by: EMERGENCY MEDICINE

## 2017-05-07 PROCEDURE — 71020 XR CHEST 2 VW: CPT

## 2017-05-07 PROCEDURE — 96361 HYDRATE IV INFUSION ADD-ON: CPT

## 2017-05-07 PROCEDURE — 85379 FIBRIN DEGRADATION QUANT: CPT | Performed by: EMERGENCY MEDICINE

## 2017-05-07 PROCEDURE — 99285 EMERGENCY DEPT VISIT HI MDM: CPT | Mod: 25

## 2017-05-07 PROCEDURE — 99284 EMERGENCY DEPT VISIT MOD MDM: CPT | Mod: 25 | Performed by: EMERGENCY MEDICINE

## 2017-05-07 PROCEDURE — 93005 ELECTROCARDIOGRAM TRACING: CPT

## 2017-05-07 PROCEDURE — 93005 ELECTROCARDIOGRAM TRACING: CPT | Performed by: EMERGENCY MEDICINE

## 2017-05-07 PROCEDURE — 85025 COMPLETE CBC W/AUTO DIFF WBC: CPT | Performed by: EMERGENCY MEDICINE

## 2017-05-07 PROCEDURE — 25000128 H RX IP 250 OP 636: Performed by: EMERGENCY MEDICINE

## 2017-05-07 PROCEDURE — 96360 HYDRATION IV INFUSION INIT: CPT | Performed by: EMERGENCY MEDICINE

## 2017-05-07 PROCEDURE — 84484 ASSAY OF TROPONIN QUANT: CPT | Performed by: EMERGENCY MEDICINE

## 2017-05-07 PROCEDURE — 40000611 ZZHCL STATISTIC MORPHOLOGY W/INTERP HEMEPATH TC 85060: Performed by: NURSE PRACTITIONER

## 2017-05-07 PROCEDURE — 85045 AUTOMATED RETICULOCYTE COUNT: CPT | Performed by: EMERGENCY MEDICINE

## 2017-05-07 PROCEDURE — 93010 ELECTROCARDIOGRAM REPORT: CPT | Mod: Z6 | Performed by: EMERGENCY MEDICINE

## 2017-05-07 RX ORDER — SODIUM CHLORIDE 9 MG/ML
1000 INJECTION, SOLUTION INTRAVENOUS CONTINUOUS
Status: DISCONTINUED | OUTPATIENT
Start: 2017-05-07 | End: 2017-05-08

## 2017-05-07 RX ADMIN — SODIUM CHLORIDE 1000 ML: 9 INJECTION, SOLUTION INTRAVENOUS at 23:30

## 2017-05-07 NOTE — IP AVS SNAPSHOT
MRN:[id number]                      After Visit Summary   5/7/2017    Lesley Stafford    MRN: [id number]           Thank you!     Thank you for choosing [facility] for your care. Our goal is always to provide you with excellent care. Hearing back from our patients is one way we can continue to improve our services. Please take a few minutes to complete the written survey that you may receive in the mail after you visit with us. Thank you!        Patient Information     Date Of Birth          1960        Designated Caregiver       Most Recent Value    Caregiver    Will someone help with your care after discharge? no      About your hospital stay     You were admitted on:  May 8, 2017 You last received care in the:  Unit 6D Observation [facility] [place]    You were discharged on:  May 8, 2017        Reason for your hospital stay       Chest pain                  Who to Call     For medical emergencies, please call 911.  For non-urgent questions about your medical care, please call your primary care provider or clinic, [phone]          Attending Provider     Provider Specialty    Jerson Vásquez MD Emergency Medicine    [facility] [place]alvo [place] Emergency Medicine       Primary Care Provider Office Phone # Fax #    Rafael Orosco [phone]       [facility] [address] [id number]         When to contact your care team       Please go to your nearest emergency room If you were to have a change in the type of chest pain i.e more severe, lasting longer or radiating to your shoulder, arm, neck, jaw or back, shortness of breath or increased pain with breathing, coughing up blood, feel dizzy or lightheaded, or notice swelling in one leg.                  After Care Instructions     Activity       Your activity upon discharge: activity as tolerated            Diet       Follow this diet upon discharge: Regular diet            Discharge  Instructions       The chest pain you came into the emergency room for does not appear to be cardiac chest pain. Your heart enzymes were normal which tells us there was no damage to the heart muscle. You stress test was normal.     Your pain may be related to esophageal spasms. It is important to follow up with your primary care doctor early next week to discuss further management of your chest pain.  A swallow study was also ordered for you as you reported trouble swallowing.    Continue to drink plenty of fluids and increase your fiber in your diet as you may also be constipated.     You had reported some snoring and waking up from snoring. A sleep study referral has been ordered for you.     You also reported sinus tenderness and sinus headaches. You were started on Augmentin while you were here. Please continue this medication for the next 10 days.                  Follow-up Appointments     Adult [facility] Follow-up and recommended labs and tests       Follow up with your primary care doctor in 5-7 days for hospital follow up.     Appointments on [place] and/or [facility] (with [facility] or [facility] provider or service). Call [phone] if you haven't heard regarding these appointments within 7 days of discharge.                  Your next 10 appointments already scheduled     May 19, 2017  9:00 AM CDT   BO Spine with Grzegorz Mendoza PT   [place] [facility] ([facility]  )    [address]  [facility] [place] [address]   [phone]            Jun 02, 2017  9:00 AM CDT   BO Spine with Grzegorz Mendoza PT   [facility] ([facility]  )    [address]  [place] [address]   [phone]              Additional Services     Sleep Study Referral       Please be aware that coverage of these services is subject to the terms and limitations of your health insurance plan.   Call member services at your health plan with any benefit or coverage questions.      Please bring the following to your appointment:    >>   List of current medications   >>   This referral request   >>   Any documents/labs given to you for this referral    [facility] Sleep Clinic/Lab  Ph [phone] (Age 15 and up)            Speech Therapy Referral       *This order will print in the [facility] Central Scheduling Office*    [facility] provides Speech Therapy evaluation and treatment and many specialty services across the [facility] system.  If requesting a specialty program, please choose from the list below.    Call [phone] to schedule [facility] at all locations, with the exception of [facility], please call [phone].     Treatment: Evaluation & Treatment  Speech Treatment Diagnosis: Dysphasia  :   Special Programs: Clinical Swallow Study    Please be aware that coverage of these services is subject to the terms and limitations of your health insurance plan.  Call member services at your health plan with any benefit or coverage questions.      **Note to Provider** To refer patients to therapy outside of the location list, change the order class to External Referral in the order composer.                  Future tests that were ordered for you     XR Video Swallow w Esophagram - Order with Speech Therapy Referral                 Pending Results     Date and Time Order Name Status Description    5/8/2017 0716 EKG 12-lead, complete Preliminary     5/8/2017 0103 Blood Morphology Pathologist Review In process             Statement of Approval     Ordered          05/08/17 1106  I have reviewed and agree with all the recommendations and orders detailed in this document.  EFFECTIVE NOW     Approved and electronically signed by:  Ginny Petty APRN CNP             Admission Information     Date & Time Provider Department  "Dept. Phone    2017 Rafael Lo MD Unit 6D Observation [facility] [place] [phone]      Your Vitals Were     Blood Pressure Pulse Temperature Respirations Height Weight    96/71 90 97.8  F (36.6  C) (Oral) 18 1.651 m (5' 5\") 84.8 kg (187 lb)    Pulse Oximetry BMI (Body Mass Index)                98% 31.12 kg/m2          [facility] Information     [facility] lets you send messages to your doctor, view your test results, renew your prescriptions, schedule appointments and more. To sign up, go to [email or web address] . Click on \"Log in\" on the left side of the screen, which will take you to the Welcome page. Then click on \"Sign up Now\" on the right side of the page.     You will be asked to enter the access code listed below, as well as some personal information. Please follow the directions to create your username and password.     Your access code is: [email or web address]  Expires: 2017  3:10 PM     Your access code will  in 90 days. If you need help or a new code, please call your [facility] clinic or [phone].        Care EveryWhere ID     This is your Care EveryWhere ID. This could be used by other organizations to access your [facility] medical records  [id number]           Review of your medicines      START taking        Dose / Directions    amoxicillin-clavulanate 875-125 MG per tablet   Commonly known as:  AUGMENTIN   Indication:  Acute Sinusitis   Used for:  Sinusitis, unspecified chronicity, unspecified location        Dose:  1 tablet   Take 1 tablet by mouth 2 times daily for 10 days   Quantity:  20 tablet   Refills:  0       sodium chloride 0.65 % nasal spray   Commonly known as:  OCEAN NASAL SPRAY   Used for:  Sinusitis, unspecified chronicity, unspecified location        Dose:  1 spray   Spray 1 spray into both nostrils daily as needed for congestion   Quantity:  1 Bottle   Refills:  0         CONTINUE these medicines which may have CHANGED, or have new prescriptions. If we are uncertain " of the size of tablets/capsules you have at home, strength may be listed as something that might have changed.        Dose / Directions    ibuprofen 800 MG tablet   Commonly known as:  ADVIL/MOTRIN   This may have changed:  Another medication with the same name was removed. Continue taking this medication, and follow the directions you see here.   Used for:  Chronic arthralgias of knees and hips        TAKE ONE TABLET BY MOUTH DAILY AS NEEDED FOR MODERATE PAIN   Quantity:  30 tablet   Refills:  0         CONTINUE these medicines which have NOT CHANGED        Dose / Directions    acyclovir 200 MG capsule   Commonly known as:  ZOVIRAX   Indication:  Shingles   Used for:  HSV (herpes simplex virus) infection        Dose:  200 mg   Take 1 capsule (200 mg) by mouth 5 times daily   Quantity:  22 capsule   Refills:  0       amphetamine-dextroamphetamine 10 MG per tablet   Commonly known as:  ADDERALL        Dose:  10 mg   Take 10 mg by mouth 2 times daily   Refills:  0       CALCIUM 500 PO   Indication:  unure of dose        Refills:  0       cetirizine 10 MG tablet   Commonly known as:  zyrTEC   Used for:  Seasonal allergic rhinitis        Dose:  10 mg   Take 1 tablet (10 mg) by mouth every evening   Quantity:  30 tablet   Refills:  1       cyclobenzaprine 5 MG tablet   Commonly known as:  FLEXERIL   Used for:  Back pain, unspecified back location, unspecified back pain laterality, unspecified chronicity, Hip pain, bilateral, Trochanteric bursitis of both hips        TAKE 1 TABLET (5 MG) BY MOUTH 3 TIMES DAILY   Quantity:  42 tablet   Refills:  0       ferrous sulfate 325 (65 FE) MG tablet   Commonly known as:  IRON   Used for:  Other iron deficiency anemia        Dose:  325 mg   Take 1 tablet (325 mg) by mouth daily (with breakfast)   Quantity:  30 tablet   Refills:  5       fluticasone 50 MCG/ACT spray   Commonly known as:  FLONASE   Used for:  Seasonal allergic rhinitis        Dose:  1-2 spray   Spray 1-2 sprays into  both nostrils daily   Quantity:  16 g   Refills:  11       gabapentin 300 MG capsule   Commonly known as:  NEURONTIN   Used for:  Peripheral polyneuropathy (H)        Dose:  300 mg   Take 1 capsule (300 mg) by mouth 3 times daily   Quantity:  90 capsule   Refills:  1       hydroxychloroquine 200 MG tablet   Commonly known as:  PLAQUENIL        Dose:  200 mg   Take 200 mg by mouth daily.   Refills:  0       hydrOXYzine 25 MG tablet   Commonly known as:  ATARAX   Used for:  Anxiety        Dose:  25 mg   Take 1 tablet (25 mg) by mouth 3 times daily as needed for itching   Quantity:  120 tablet   Refills:  11       LORazepam 1 MG tablet   Commonly known as:  ATIVAN   Used for:  Therapeutic procedure        Dose:  1 mg   Take 1 tablet (1 mg) by mouth every 8 hours as needed for anxiety   Quantity:  1 tablet   Refills:  0       methylPREDNISolone 4 MG tablet   Commonly known as:  MEDROL DOSEPAK   Used for:  Back pain, unspecified back location, unspecified back pain laterality, unspecified chronicity, Obesity, Class I, BMI 30-34.9, Lupus (H), Hip pain, bilateral, Trochanteric bursitis of both hips        Follow package instructions   Quantity:  21 tablet   Refills:  0       Multi-vitamin Tabs tablet        Dose:  1 tablet   Take 1 tablet by mouth daily   Refills:  0       PREDNISONE PO   Indication:  Systemic Lupus Erythematosus        Dose:  5 mg   Take 5 mg by mouth daily   Refills:  0       tiZANidine 4 MG tablet   Commonly known as:  ZANAFLEX   Used for:  Myalgia        Dose:  4-8 mg   Take 1-2 tablets (4-8 mg) by mouth 3 times daily as needed for muscle spasms   Quantity:  30 tablet   Refills:  1       traMADol 50 MG tablet   Commonly known as:  ULTRAM   Used for:  Lupus (H), Chronic arthralgias of knees and hips        TAKE 1-2 TABLETS BY MOUTH EVERY 6 HOURS AS NEEDED   Quantity:  60 tablet   Refills:  0       venlafaxine 225 MG Tb24 24 hr tablet   Commonly known as:  EFFEXOR-ER        Dose:  75 mg   Take 75 mg by  mouth every evening Every other day tapering off   Refills:  0            Where to get your medicines      These medications were sent to [facility] [address] [facility] - [place], [place] - [address]  [address] [address]     Phone:  [phone]     amoxicillin-clavulanate 875-125 MG per tablet    sodium chloride 0.65 % nasal spray                Protect others around you: Learn how to safely use, store and throw away your medicines at www.disposemymeds.org.             Medication List: This is a list of all your medications and when to take them. Check marks below indicate your daily home schedule. Keep this list as a reference.      Medications           Morning Afternoon Evening Bedtime As Needed    acyclovir 200 MG capsule   Commonly known as:  ZOVIRAX   Take 1 capsule (200 mg) by mouth 5 times daily                                amoxicillin-clavulanate 875-125 MG per tablet   Commonly known as:  AUGMENTIN   Take 1 tablet by mouth 2 times daily for 10 days   Last time this was given:  1 tablet on 5/8/2017  9:29 AM                                amphetamine-dextroamphetamine 10 MG per tablet   Commonly known as:  ADDERALL   Take 10 mg by mouth 2 times daily                                CALCIUM 500 PO                                cetirizine 10 MG tablet   Commonly known as:  zyrTEC   Take 1 tablet (10 mg) by mouth every evening                                cyclobenzaprine 5 MG tablet   Commonly known as:  FLEXERIL   TAKE 1 TABLET (5 MG) BY MOUTH 3 TIMES DAILY                                ferrous sulfate 325 (65 FE) MG tablet   Commonly known as:  IRON   Take 1 tablet (325 mg) by mouth daily (with breakfast)                                fluticasone 50 MCG/ACT spray   Commonly known as:  FLONASE   Spray 1-2 sprays into both nostrils daily                                gabapentin 300 MG capsule   Commonly known as:  NEURONTIN   Take 1 capsule (300 mg) by mouth 3 times daily    Last time this was given:  300 mg on 5/8/2017  9:29 AM                                hydroxychloroquine 200 MG tablet   Commonly known as:  PLAQUENIL   Take 200 mg by mouth daily.   Last time this was given:  200 mg on 5/8/2017  3:29 AM                                hydrOXYzine 25 MG tablet   Commonly known as:  ATARAX   Take 1 tablet (25 mg) by mouth 3 times daily as needed for itching                                ibuprofen 800 MG tablet   Commonly known as:  ADVIL/MOTRIN   TAKE ONE TABLET BY MOUTH DAILY AS NEEDED FOR MODERATE PAIN   Last time this was given:  800 mg on 5/8/2017  3:03 AM                                LORazepam 1 MG tablet   Commonly known as:  ATIVAN   Take 1 tablet (1 mg) by mouth every 8 hours as needed for anxiety                                methylPREDNISolone 4 MG tablet   Commonly known as:  MEDROL DOSEPAK   Follow package instructions                                Multi-vitamin Tabs tablet   Take 1 tablet by mouth daily                                PREDNISONE PO   Take 5 mg by mouth daily                                sodium chloride 0.65 % nasal spray   Commonly known as:  OCEAN NASAL SPRAY   Sacul 1 spray into both nostrils daily as needed for congestion                                tiZANidine 4 MG tablet   Commonly known as:  ZANAFLEX   Take 1-2 tablets (4-8 mg) by mouth 3 times daily as needed for muscle spasms   Last time this was given:  4 mg on 5/8/2017  3:29 AM                                traMADol 50 MG tablet   Commonly known as:  ULTRAM   TAKE 1-2 TABLETS BY MOUTH EVERY 6 HOURS AS NEEDED                                venlafaxine 225 MG Tb24 24 hr tablet   Commonly known as:  EFFEXOR-ER   Take 75 mg by mouth every evening Every other day tapering off                                          More Information        *CHEST PAIN, NONCARDIAC    Based on your visit today, the exact cause of your chest pain is not certain. Your condition does not seem serious and your  pain does not appear to be coming from your heart. However, sometimes the signs of a serious problem take more time to appear. Therefore, please watch for the warning signs listed below.  HOME CARE:  1. Rest today and avoid strenuous activity.  2. Take any prescribed medicine as directed.  FOLLOW UP with your doctor in 1-3 days.   GET PROMPT MEDICAL ATTENTION if any of the following occur:    A change in the type of pain: if it feels different, becomes more severe, lasts longer, or begins to spread into your shoulder, arm, neck, jaw or back    Shortness of breath or increased pain with breathing    Cough with blood or dark colored sputum (phlegm)    Weakness, dizziness, or fainting    Fever over 101  F (38.3  C)    Swelling, pain or redness in one leg    9387-6038 [facility], [address] [place] [address], [place] [id number]. All rights reserved. This information is not intended as a substitute for professional medical care. Always follow your healthcare professional's instructions.

## 2017-05-07 NOTE — IP AVS SNAPSHOT
Unit 6D Observation [address] [address]    Phone:  [phone]    Fax:  [phone]                                       After Visit Summary   5/7/2017    Lesley Stafford    MRN: [id number]           After Visit Summary Signature Page     I have received my discharge instructions, and my questions have been answered. I have discussed any challenges I see with this plan with the nurse or doctor.    ..........................................................................................................................................  Patient/Patient Representative Signature      ..........................................................................................................................................  Patient Representative Print Name and Relationship to Patient    ..................................................               ................................................  Date                                            Time    ..........................................................................................................................................  Reviewed by Signature/Title    ...................................................              ..............................................  Date                                                            Time

## 2017-05-08 ENCOUNTER — APPOINTMENT (OUTPATIENT)
Dept: CARDIOLOGY | Facility: CLINIC | Age: 57
End: 2017-05-08
Attending: NURSE PRACTITIONER
Payer: COMMERCIAL

## 2017-05-08 VITALS
WEIGHT: 187 LBS | SYSTOLIC BLOOD PRESSURE: 96 MMHG | HEART RATE: 90 BPM | OXYGEN SATURATION: 98 % | TEMPERATURE: 97.8 F | HEIGHT: 65 IN | DIASTOLIC BLOOD PRESSURE: 71 MMHG | BODY MASS INDEX: 31.16 KG/M2 | RESPIRATION RATE: 18 BRPM

## 2017-05-08 LAB
ANION GAP SERPL CALCULATED.3IONS-SCNC: 7 MMOL/L (ref 3–14)
BUN SERPL-MCNC: 10 MG/DL (ref 7–30)
CALCIUM SERPL-MCNC: 9.2 MG/DL (ref 8.5–10.1)
CHLORIDE SERPL-SCNC: 106 MMOL/L (ref 94–109)
CO2 SERPL-SCNC: 27 MMOL/L (ref 20–32)
COPATH REPORT: NORMAL
CREAT SERPL-MCNC: 0.68 MG/DL (ref 0.52–1.04)
CRP SERPL-MCNC: <2.9 MG/L (ref 0–8)
D DIMER PPP FEU-MCNC: 0.5 UG/ML FEU (ref 0–0.5)
ERYTHROCYTE [SEDIMENTATION RATE] IN BLOOD BY WESTERGREN METHOD: 15 MM/H (ref 0–30)
GFR SERPL CREATININE-BSD FRML MDRD: 88 ML/MIN/1.7M2
GLUCOSE SERPL-MCNC: 90 MG/DL (ref 70–99)
INTERPRETATION ECG - MUSE: NORMAL
INTERPRETATION ECG - MUSE: NORMAL
MAGNESIUM SERPL-MCNC: 1.9 MG/DL (ref 1.6–2.3)
PHOSPHATE SERPL-MCNC: 3.1 MG/DL (ref 2.5–4.5)
POTASSIUM SERPL-SCNC: 3.3 MMOL/L (ref 3.4–5.3)
RETICS # AUTO: 71.3 10E9/L (ref 25–95)
RETICS/RBC NFR AUTO: 1.5 % (ref 0.5–2)
SODIUM SERPL-SCNC: 140 MMOL/L (ref 133–144)
TROPONIN I SERPL-MCNC: NORMAL UG/L (ref 0–0.04)
TROPONIN I SERPL-MCNC: NORMAL UG/L (ref 0–0.04)

## 2017-05-08 PROCEDURE — 86140 C-REACTIVE PROTEIN: CPT | Performed by: NURSE PRACTITIONER

## 2017-05-08 PROCEDURE — 36415 COLL VENOUS BLD VENIPUNCTURE: CPT | Performed by: NURSE PRACTITIONER

## 2017-05-08 PROCEDURE — 93321 DOPPLER ECHO F-UP/LMTD STD: CPT | Mod: 26 | Performed by: INTERNAL MEDICINE

## 2017-05-08 PROCEDURE — 93018 CV STRESS TEST I&R ONLY: CPT | Performed by: INTERNAL MEDICINE

## 2017-05-08 PROCEDURE — 25000125 ZZHC RX 250: Performed by: NURSE PRACTITIONER

## 2017-05-08 PROCEDURE — 83735 ASSAY OF MAGNESIUM: CPT | Performed by: NURSE PRACTITIONER

## 2017-05-08 PROCEDURE — 96374 THER/PROPH/DIAG INJ IV PUSH: CPT

## 2017-05-08 PROCEDURE — 93010 ELECTROCARDIOGRAM REPORT: CPT | Performed by: INTERNAL MEDICINE

## 2017-05-08 PROCEDURE — 84100 ASSAY OF PHOSPHORUS: CPT | Performed by: NURSE PRACTITIONER

## 2017-05-08 PROCEDURE — 25000128 H RX IP 250 OP 636: Performed by: INTERNAL MEDICINE

## 2017-05-08 PROCEDURE — 84484 ASSAY OF TROPONIN QUANT: CPT | Performed by: NURSE PRACTITIONER

## 2017-05-08 PROCEDURE — 25500064 ZZH RX 255 OP 636: Performed by: INTERNAL MEDICINE

## 2017-05-08 PROCEDURE — 25000125 ZZHC RX 250: Performed by: INTERNAL MEDICINE

## 2017-05-08 PROCEDURE — 99236 HOSP IP/OBS SAME DATE HI 85: CPT | Mod: Z6 | Performed by: NURSE PRACTITIONER

## 2017-05-08 PROCEDURE — G0378 HOSPITAL OBSERVATION PER HR: HCPCS

## 2017-05-08 PROCEDURE — 85652 RBC SED RATE AUTOMATED: CPT | Performed by: NURSE PRACTITIONER

## 2017-05-08 PROCEDURE — 40000264 ECHO DOBUTAMINE STRESS TEST WITH DEFINITY

## 2017-05-08 PROCEDURE — 25000128 H RX IP 250 OP 636: Performed by: NURSE PRACTITIONER

## 2017-05-08 PROCEDURE — 93005 ELECTROCARDIOGRAM TRACING: CPT

## 2017-05-08 PROCEDURE — 93016 CV STRESS TEST SUPVJ ONLY: CPT | Performed by: INTERNAL MEDICINE

## 2017-05-08 PROCEDURE — 93325 DOPPLER ECHO COLOR FLOW MAPG: CPT | Mod: 26 | Performed by: INTERNAL MEDICINE

## 2017-05-08 PROCEDURE — 96375 TX/PRO/DX INJ NEW DRUG ADDON: CPT

## 2017-05-08 PROCEDURE — 93350 STRESS TTE ONLY: CPT | Mod: 26 | Performed by: INTERNAL MEDICINE

## 2017-05-08 PROCEDURE — 25000132 ZZH RX MED GY IP 250 OP 250 PS 637: Performed by: NURSE PRACTITIONER

## 2017-05-08 RX ORDER — LIDOCAINE 40 MG/G
CREAM TOPICAL
Status: DISCONTINUED | OUTPATIENT
Start: 2017-05-08 | End: 2017-05-08 | Stop reason: HOSPADM

## 2017-05-08 RX ORDER — IBUPROFEN 400 MG/1
800 TABLET, FILM COATED ORAL EVERY 6 HOURS PRN
Status: DISCONTINUED | OUTPATIENT
Start: 2017-05-08 | End: 2017-05-08

## 2017-05-08 RX ORDER — HYDROXYCHLOROQUINE SULFATE 200 MG/1
200 TABLET, FILM COATED ORAL DAILY
Status: DISCONTINUED | OUTPATIENT
Start: 2017-05-08 | End: 2017-05-08 | Stop reason: HOSPADM

## 2017-05-08 RX ORDER — ECHINACEA PURPUREA EXTRACT 125 MG
1 TABLET ORAL DAILY PRN
Qty: 1 BOTTLE | Refills: 0 | Status: SHIPPED | OUTPATIENT
Start: 2017-05-08 | End: 2017-06-28

## 2017-05-08 RX ORDER — METOPROLOL TARTRATE 1 MG/ML
.5-15 INJECTION, SOLUTION INTRAVENOUS
Status: DISCONTINUED | OUTPATIENT
Start: 2017-05-08 | End: 2017-05-08 | Stop reason: HOSPADM

## 2017-05-08 RX ORDER — ASPIRIN 81 MG/1
81 TABLET ORAL DAILY
Status: DISCONTINUED | OUTPATIENT
Start: 2017-05-08 | End: 2017-05-08 | Stop reason: HOSPADM

## 2017-05-08 RX ORDER — GABAPENTIN 100 MG/1
300 CAPSULE ORAL 3 TIMES DAILY
Status: DISCONTINUED | OUTPATIENT
Start: 2017-05-08 | End: 2017-05-08 | Stop reason: HOSPADM

## 2017-05-08 RX ORDER — ALUMINA, MAGNESIA, AND SIMETHICONE 2400; 2400; 240 MG/30ML; MG/30ML; MG/30ML
15-30 SUSPENSION ORAL EVERY 4 HOURS PRN
Status: DISCONTINUED | OUTPATIENT
Start: 2017-05-08 | End: 2017-05-08 | Stop reason: HOSPADM

## 2017-05-08 RX ORDER — NALOXONE HYDROCHLORIDE 0.4 MG/ML
.1-.4 INJECTION, SOLUTION INTRAMUSCULAR; INTRAVENOUS; SUBCUTANEOUS
Status: DISCONTINUED | OUTPATIENT
Start: 2017-05-08 | End: 2017-05-08 | Stop reason: HOSPADM

## 2017-05-08 RX ORDER — ACETAMINOPHEN 650 MG/1
650 SUPPOSITORY RECTAL EVERY 4 HOURS PRN
Status: DISCONTINUED | OUTPATIENT
Start: 2017-05-08 | End: 2017-05-08 | Stop reason: HOSPADM

## 2017-05-08 RX ORDER — IBUPROFEN 400 MG/1
800 TABLET, FILM COATED ORAL EVERY 6 HOURS PRN
Status: DISCONTINUED | OUTPATIENT
Start: 2017-05-08 | End: 2017-05-08 | Stop reason: HOSPADM

## 2017-05-08 RX ORDER — SODIUM CHLORIDE 9 MG/ML
INJECTION, SOLUTION INTRAVENOUS CONTINUOUS
Status: DISCONTINUED | OUTPATIENT
Start: 2017-05-08 | End: 2017-05-08 | Stop reason: HOSPADM

## 2017-05-08 RX ORDER — ACETAMINOPHEN 325 MG/1
650 TABLET ORAL EVERY 4 HOURS PRN
Status: DISCONTINUED | OUTPATIENT
Start: 2017-05-08 | End: 2017-05-08 | Stop reason: HOSPADM

## 2017-05-08 RX ORDER — NITROGLYCERIN 0.4 MG/1
0.4 TABLET SUBLINGUAL EVERY 5 MIN PRN
Status: DISCONTINUED | OUTPATIENT
Start: 2017-05-08 | End: 2017-05-08 | Stop reason: HOSPADM

## 2017-05-08 RX ORDER — DOBUTAMINE HYDROCHLORIDE 200 MG/100ML
5-50 INJECTION INTRAVENOUS CONTINUOUS PRN
Status: COMPLETED | OUTPATIENT
Start: 2017-05-08 | End: 2017-05-08

## 2017-05-08 RX ORDER — PANTOPRAZOLE SODIUM 40 MG/1
40 TABLET, DELAYED RELEASE ORAL EVERY MORNING
Status: DISCONTINUED | OUTPATIENT
Start: 2017-05-09 | End: 2017-05-08 | Stop reason: HOSPADM

## 2017-05-08 RX ORDER — POTASSIUM CHLORIDE 750 MG/1
40 TABLET, EXTENDED RELEASE ORAL ONCE
Status: COMPLETED | OUTPATIENT
Start: 2017-05-08 | End: 2017-05-08

## 2017-05-08 RX ADMIN — DOBUTAMINE HYDROCHLORIDE 30 MCG/KG/MIN: 200 INJECTION INTRAVENOUS at 08:48

## 2017-05-08 RX ADMIN — SODIUM CHLORIDE: 9 INJECTION, SOLUTION INTRAVENOUS at 09:37

## 2017-05-08 RX ADMIN — PANTOPRAZOLE SODIUM 40 MG: 40 INJECTION, POWDER, FOR SOLUTION INTRAVENOUS at 09:28

## 2017-05-08 RX ADMIN — HYDROXYCHLOROQUINE SULFATE 200 MG: 200 TABLET, FILM COATED ENTERAL at 03:29

## 2017-05-08 RX ADMIN — GABAPENTIN 300 MG: 100 CAPSULE ORAL at 03:03

## 2017-05-08 RX ADMIN — AMOXICILLIN AND CLAVULANATE POTASSIUM 1 TABLET: 875; 125 TABLET, FILM COATED ORAL at 09:29

## 2017-05-08 RX ADMIN — NITROGLYCERIN 0.4 MG: 0.4 TABLET SUBLINGUAL at 07:20

## 2017-05-08 RX ADMIN — IBUPROFEN 800 MG: 400 TABLET ORAL at 03:03

## 2017-05-08 RX ADMIN — SODIUM CHLORIDE: 9 INJECTION, SOLUTION INTRAVENOUS at 03:03

## 2017-05-08 RX ADMIN — POTASSIUM CHLORIDE 40 MEQ: 750 TABLET, EXTENDED RELEASE ORAL at 03:03

## 2017-05-08 RX ADMIN — TIZANIDINE 4 MG: 4 TABLET ORAL at 03:29

## 2017-05-08 RX ADMIN — ASPIRIN 81 MG: 81 TABLET, COATED ORAL at 09:27

## 2017-05-08 RX ADMIN — PERFLUTREN 10 ML: 6.52 INJECTION, SUSPENSION INTRAVENOUS at 08:58

## 2017-05-08 RX ADMIN — ATROPINE SULFATE 0.2 MG: 0.4 INJECTION, SOLUTION INTRAMUSCULAR; INTRAVENOUS; SUBCUTANEOUS at 08:55

## 2017-05-08 RX ADMIN — METOROPROLOL TARTRATE 2 MG: 5 INJECTION, SOLUTION INTRAVENOUS at 08:56

## 2017-05-08 RX ADMIN — GABAPENTIN 300 MG: 100 CAPSULE ORAL at 09:29

## 2017-05-08 ASSESSMENT — ENCOUNTER SYMPTOMS
DIAPHORESIS: 0
HEARTBURN: 0
DYSURIA: 0
DIARRHEA: 0
EYE DISCHARGE: 0
ORTHOPNEA: 0
HEMATURIA: 0
HEADACHES: 1
SEIZURES: 0
BLOOD IN STOOL: 0
DIZZINESS: 1
FLANK PAIN: 0
VOMITING: 0
CHILLS: 0
FOCAL WEAKNESS: 0
TINGLING: 0
PND: 0
PALPITATIONS: 0
NAUSEA: 0
TREMORS: 0
EYE REDNESS: 0
EYE PAIN: 0
CLAUDICATION: 0
BLURRED VISION: 0
FEVER: 0
PHOTOPHOBIA: 0
ABDOMINAL PAIN: 0
MYALGIAS: 0
DOUBLE VISION: 0
LOSS OF CONSCIOUSNESS: 0
HEMOPTYSIS: 0
SENSORY CHANGE: 0
FREQUENCY: 0
CONSTIPATION: 0
WEAKNESS: 0
COUGH: 0
SPEECH CHANGE: 0

## 2017-05-08 ASSESSMENT — LIFESTYLE VARIABLES: SUBSTANCE_ABUSE: 0

## 2017-05-08 NOTE — PLAN OF CARE
Problem: Discharge Planning  Goal: Discharge Planning (Adult, OB, Behavioral, Peds)  Outpatient/Observation goals to be met before discharge home:     List all goals to be met before discharge home:   - Serial troponins and stress test complete. - Yes.   - Seen and cleared by consultant if applicable - Patient off floor to Stress test.  - Adequate pain control on oral analgesia - Yes.   - Vital signs normal or at patient baseline BP 95/68, HR 78. Will continue to monitor.  - Safe disposition plan has been identified - pending    - Nurse to notify provider when observation goals have been met and patient is ready for discharge.

## 2017-05-08 NOTE — PROGRESS NOTES
"S:patient admitted to obs for chest pain. Had episode last night relieved with nitro. Now only mild discomfort. Some pain is sharp. No leg pain or swelling. Also some recent heartburn sx noted but no gerd. Wonders if it is her lupus.   Labs reviewed with patient. To go to dobutamine echo this am.  O:BP 96/71  Pulse 90  Temp 97.8  F (36.6  C) (Oral)  Resp 18  Ht 1.651 m (5' 5\")  Wt 84.8 kg (187 lb)  SpO2 98%  BMI 31.12 kg/m2  Patient here with mother. No distress . Minimal sx. Lung cta cv rrr chest with some chest wall tenderness. Ext neg homans. Ab no tender  Results for orders placed or performed during the hospital encounter of 05/07/17   XR Chest 2 Views    Narrative    EXAM: XR CHEST 2 VW  5/7/2017 11:46 PM     HISTORY:  cp       COMPARISON:  1/29/2016    FINDINGS: PA and lateral radiographs of the chest. The mediastinal  border, cardiac silhouette, and pulmonary vasculature are within  normal limits. No focal airspace opacities. No pneumothorax. No  pleural effusions.      Impression    IMPRESSION: No acute cardiopulmonary abnormality.     I have personally reviewed the examination and initial interpretation  and I agree with the findings.    ALANIS SÁNCHEZ MD   CBC with platelets differential   Result Value Ref Range    WBC 4.8 4.0 - 11.0 10e9/L    RBC Count 4.67 3.8 - 5.2 10e12/L    Hemoglobin 13.5 11.7 - 15.7 g/dL    Hematocrit 37.6 35.0 - 47.0 %    MCV 81 78 - 100 fl    MCH 28.9 26.5 - 33.0 pg    MCHC 35.9 31.5 - 36.5 g/dL    RDW 14.2 10.0 - 15.0 %    Platelet Count 133 (L) 150 - 450 10e9/L    Diff Method Automated Method     % Neutrophils 56.5 %    % Lymphocytes 33.3 %    % Monocytes 7.9 %    % Eosinophils 1.7 %    % Basophils 0.4 %    % Immature Granulocytes 0.2 %    Nucleated RBCs 0 0 /100    Absolute Neutrophil 2.7 1.6 - 8.3 10e9/L    Absolute Lymphocytes 1.6 0.8 - 5.3 10e9/L    Absolute Monocytes 0.4 0.0 - 1.3 10e9/L    Absolute Eosinophils 0.1 0.0 - 0.7 10e9/L    Absolute Basophils 0.0 0.0 - 0.2 " 10e9/L    Abs Immature Granulocytes 0.0 0 - 0.4 10e9/L    Absolute Nucleated RBC 0.0    D dimer quantitative   Result Value Ref Range    D Dimer 0.5 0.0 - 0.50 ug/ml FEU   Basic metabolic panel   Result Value Ref Range    Sodium 140 133 - 144 mmol/L    Potassium 3.3 (L) 3.4 - 5.3 mmol/L    Chloride 106 94 - 109 mmol/L    Carbon Dioxide 27 20 - 32 mmol/L    Anion Gap 7 3 - 14 mmol/L    Glucose 90 70 - 99 mg/dL    Urea Nitrogen 10 7 - 30 mg/dL    Creatinine 0.68 0.52 - 1.04 mg/dL    GFR Estimate 88 >60 mL/min/1.7m2    GFR Estimate If Black >90   GFR Calc   >60 mL/min/1.7m2    Calcium 9.2 8.5 - 10.1 mg/dL   Troponin I   Result Value Ref Range    Troponin I ES  0.000 - 0.045 ug/L     <0.015  The 99th percentile for upper reference range is 0.045 ug/L.  Troponin values in   the range of 0.045 - 0.120 ug/L may be associated with risks of adverse   clinical events.     Blood Morphology Pathologist Review   Result Value Ref Range    Copath Report       Patient Name: CARA GIL  MR#: [id number]  Specimen #: [id number]  Collected: 5/7/2017  Received: 5/8/2017  Reported: 5/8/2017 13:49  Ordering Phy(s): TERESSA MCELROY    For improved result formatting, select 'View Enhanced Report Format'  under Linked Documents section.    TEST(S):  Blood Smear Morphology    FINAL DIAGNOSIS:  Peripheral blood smear:  -Slight thrombocytopenia with overall normal platelet morphology    I have personally reviewed all specimens and/or slides, including the  listed special stains, and used them with my medical judgment to  determine the final diagnosis.    Electronically signed out by:    Antoine Altman M.D.,[facility]    Technical testing/processing performed at [place], [place]    CLINICAL HISTORY:  From [facility] electronic medical record; 56-year-old female for evaluation  of thrombocytopenia    MICROSCOPIC DESCRIPTION:  PERIPHERAL BLOOD DATA (Date:  May 7, 2017)  Patient Value  (Reference Range >18 year old female)  4.8 WBC (4.0-11.0 x 10*9/L)  4.67 RBC (3.8-5.2 x 10*12/L)  13.5 HGB (11.7-15.7 g/dL)  81 MCV (78-100fL)  35.9 MCHC (31.5-36.5 g/dL)  14.2 RDW (10.0-15.0 %)  133 PLT (150-450 x 10*9/L)  71.3 RETIC (25-95 x 10*9/L)    PERIPHERAL BLOOD DIFFERENTIAL  (automated differential)  (Reference ranges >18 year old)    Percent  56.5 Neutrophils, segmented and bands  33.3 Lymphocytes  7.9 Monocytes  1.7 Eosinophils  0.4 Basophils  0.2 Immature granulocytes    Absolute  2.7 Neutrophils, segmented and bands (1.6 - 8.3 x 10*9/L)  1.6 Lymphocytes (0.8 - 5.3 x 10*9/L)  0.4 Monocytes (0 -1.3 x 10*9/L)  0.1 Eosinophils (0 - 0.7 x 10*9/L)  0.0 Basophils (0 - 0.2 x 10*9/L)  0.0 Immature granulocytes (0-0.4 x 10*9/L)    The red cells appear normochromic.  Poikilocytosis is minimal, but  numerous codocytes are present. Polychromasia is not increased. Rouleaux  formation is not increased.  The morphology of the platelets is normal.  Slight thrombocytopenia is present    CP T Codes:  A: 70176-BKENI    TESTING LAB LOCATION:  [facility], [address]   [id number] [phone]    COLLECTION SITE:  Client:  [facility]  Location:  [id number] (B)     Reticulocyte count   Result Value Ref Range    % Retic 1.5 0.5 - 2.0 %    Absolute Retic 71.3 25 - 95 10e9/L   Erythrocyte sedimentation rate auto   Result Value Ref Range    Sed Rate 15 0 - 30 mm/h   CRP inflammation   Result Value Ref Range    CRP Inflammation <2.9 0.0 - 8.0 mg/L   Magnesium   Result Value Ref Range    Magnesium 1.9 1.6 - 2.3 mg/dL   Phosphorus   Result Value Ref Range    Phosphorus 3.1 2.5 - 4.5 mg/dL   Troponin I   Result Value Ref Range    Troponin I ES  0.000 - 0.045 ug/L     <0.015  The 99th percentile for upper reference range is 0.045 ug/L.  Troponin values in   the range of 0.045 - 0.120 ug/L  may be associated with risks of adverse   clinical events.     EKG 12 lead   Result Value Ref Range    Interpretation ECG Click View Image link to view waveform and result    EKG 12-lead, complete   Result Value Ref Range    Interpretation ECG Click View Image link to view waveform and result    Echo  stress test with definity    Narrative    [id number]  [id number]  [id number]  [id number]^NAVI^TERESSA^MAGALY           [facility],[place]  Echocardiography Laboratory  [address] [place] [address]     Name: CARA GIL  MRN: [id number]  : 1960  Study Date: 2017 08:36 AM  Age: 56 yrs  Gender: Female  Patient Location: [facility]  Reason For Study: Chest Pain  History: Chest Pain  Ordering Physician: TERESSA MCELROY  Performed By: Ellie Franco RDCS     BSA: 1.9 m2  Height: 65 in  Weight: 180 lb  HR: 80  BP: 101/70 mmHg  _____________________________________________________________________________  __        Procedure  Stress Echo Dobutamine Adult. Contrast Definity.  _____________________________________________________________________________  __        Interpretation Summary  Normal dobutamine stress echocardiogram without evidence of inducible  ischemia. Target heart rate was achieved. Heart rate and blood pressure  response to dobutamine were normal. With stress, the left ventricular ejection  fraction increased from 55-60% to greater than 65% and the left ventricular  size decreased appropriately.  No subjective symptoms to suggest ischemia.  There was no ECG evidence of ischemia.  No significant valve disease on screening doppler evaluation. The aortic root  and visualized ascending aorta are normal.  _____________________________________________________________________________  __     Stress  Definity (NDC #76211-185-98) given intravenously.  Patient was given 10ml mixture of 1.5ml Definity and 8.5ml saline.  0 ml wasted.  Definity Expiration 3/1/18 .  Definity  Lot # [id number] .  This was a normal stress EKG with no evidence of stress-induced ischemia.  The maximum dose of dobutamine was 40mcg/kg/min.  The maximum dose of atropine was 0.2mg.  The maximum dose of metoprolol was 2mg.  The patient did not exhibit any symptoms during drug infusion.  Normal BP response to drug infusion.  The drug infusion was stopped due to target heart rate achieved.  Target Heart Rate was achieved.     Rest  The baseline ECG displays normal sinus rhythm.  Baseline ECG displays Q waves in the anterior leads.     Stress Results        Protocol:  . Stress Echo        Maximum Predicted HR:   162 bpm        Target HR: 138 bpm                 % Maximum Predicted HR: 86 %                             Stage  DurationHeart Rate  BP                                 (mm:ss)   (bpm)                         Baseline  0:00      80    101/70                           Peak    5:55     140    138/38                            Stress Duration:   5:55 mm:ss *                      Maximum Stress HR: 140 bpm *     Left Ventricle  Left ventricular systolic function is normal. Ejection Fraction = >55%.     Aortic Valve  The aortic valve is normal in structure and function.     Mitral Valve  The mitral valve is normal in structure and function.        Tricuspid Valve  The tricuspid valve is normal in structure and function.     Right Ventricle  The right ventricular systolic function is normal.     Pericardium  There is no pericardial effusion.  _____________________________________________________________________________  __                                Report approved by: Amanda Gomez 2017 09:48 AM                    _____________________________________________________________________________  __          A:Chest pain      Lupus      Gerd  P:Dobutamine echo noted.     Patient ok home with follow up MD if neg.

## 2017-05-08 NOTE — ED NOTES
Pt presents to ED with chest pain to left side. Pt states the pain started around 2200. Pt has hx of lupus. 2 nitro given en route by EMS and pt reports some relief. Pt also given 324 of asprin. EKG ordered

## 2017-05-08 NOTE — PROGRESS NOTES
Pt here for dobutamine stress test.  Test, meds and side effects reviewed with patient.  Achieved target HR at 40 mcg Dobutamine and a total of 0.2 mg IV atropine.  Gave a total of 2 mg IV Metoprolol to bring HR back to baseline.  Post monitoring complete and VSS.  Pt transferred back to  via transport.

## 2017-05-08 NOTE — PLAN OF CARE
"Problem: Discharge Planning  Goal: Discharge Planning (Adult, OB, Behavioral, Peds)  Outpatient/Observation goals to be met before discharge home:      List all goals to be met before discharge home:   - Serial troponins and stress test complete. - trop neg x2. Next to be drawn at 0700.   - Seen and cleared by consultant if applicable - pending  - Adequate pain control on oral analgesia - Denies pain at this time. Resting comfortably in between cares.   - Vital signs normal or at patient baseline /66  Pulse 90  Temp 98.2  F (36.8  C) (Axillary)  Resp 18  Ht 1.651 m (5' 5\")  Wt 84.8 kg (187 lb)  SpO2 96%  BMI 31.12 kg/m2  - Safe disposition plan has been identified - pending    - Nurse to notify provider when observation goals have been met and patient is ready for discharge.      Denies chest pain, no SOB. Will continue to monitor.          "

## 2017-05-08 NOTE — H&P
"Per ED Physician note, \" Lesley Stafford is a 56 year old female with a history of lupus, chronic pain and anxiety who presents to the emergency department today with complaints of chest pain. Patient reports developing midsternal chest pain that radiated to the left side of her chest/axilla associated with dizziness and some shortness of breath around 10 PM this evening. She reports a sharp pain initially that progressed to a cramping, pressure sensation. Patient contacted EMS and was given 324 mg Aspirin and Nitroglycerin x2 upon their arrival. She states after the nitro, the pain seemed to go deeper into her chest and then completely resolve. She denies ever having had symptoms like this in the past. Upon arrival to the ED, patient reports only mild pain currently. She otherwise reports feeling well recently. She does report some recent congestion for which she took a generic Sudafed last night. She denies recent cough or other cold symptoms. No fevers or chills. No pain or swelling in her legs. No recent surgeries or procedures. She denies a history of diabetes, hypertension or hyperlipidemia. She is a nonsmoker. She denies a known family history of heart disease. Patient is currently prescribed Plaquenil, though states she has not taken it in the past 3 days because she ran out. She is also on Tramadol for chronic pain and is currently weaning off of her Effexor.\"    In the ED, Vital Signs: Temperature 97.5; Heart rate 89; Respiratory rate 16; /82; Oxygen Saturation 96% Room Air.  Lab work: Troponin x 1 < 0.015; WBC 4.8; Hgb 13.5; Hct 37.6; Platelets 133; Sodium 140; Potassium 3.3; Creatinine 0.68; GFR >90; BUN 10; Glucose 90; D-dimer 0.5  EKG: Normal Sinus Rhythm, no ST-T wave changes.   Imaging: Chest x-ray: Clear chest.   Medications: NS IV x 1 liter; Second liter NS @ 125 cc/hour    ED Observation: Currently, intermittent chest pain rates pain 2.5 out of 10 \" deep, thud-like\"  Not on Estrogen, no " "long bone surgery requiring prolonged immobilization. No recent long car or airline travel. No hx: DVT or PE.  Sinusuitis x 4 months- abx 01-02/2017  Headache 4/10 x 2 weeks, saw Dr. Orosco recently- given Flonase, nasal saline. OTC Walphed-with relief. Denies fall or hitting head.  Intermittent dysphagia since February 2017. No choking episodes.  Former cigarette and substance abuse. Quit 20 years ago.  No personal or family history of cardiac disease, no hx: hypertension or diabetes. History elevated cholesterol not on anti lipid medication.    PCP: Dr. 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       Allergies:   Allergies   Allergen Reactions     Morphine Sulfate Itching     Sulfa Drugs Hives       Active Problems:    * No active hospital problems. *    Blood pressure 122/83, pulse 89, temperature 97.5  F (36.4  C), temperature source Oral, resp. rate 19, height 1.651 m (5' 5\"), weight 84.8 kg (187 lb), SpO2 96 %, not currently breastfeeding.    Review of Systems   Constitutional: Negative for chills, diaphoresis and fever.   HENT: Negative for ear discharge, ear pain and nosebleeds.         Frontal headache  Sinus pressure   Eyes: Negative for blurred vision, double vision, photophobia, pain, discharge and redness.   Respiratory: Negative for cough and hemoptysis.    Cardiovascular: Positive for chest pain. Negative for palpitations, orthopnea, claudication, leg swelling and PND.   Gastrointestinal: Negative for abdominal pain, blood in stool, constipation, diarrhea, heartburn, melena, nausea and vomiting.   Genitourinary: Negative for dysuria, flank pain, frequency, hematuria and urgency.   Musculoskeletal: Negative for myalgias.        Chronic low back and hip pain   Skin: Negative for itching and rash.   Neurological: " Positive for dizziness and headaches. Negative for tingling, tremors, sensory change, speech change, focal weakness, seizures, loss of consciousness and weakness.        Intermittent dysphagia   Psychiatric/Behavioral: Negative for substance abuse.       Physical Exam   Constitutional: She is oriented to person, place, and time. She appears well-developed and well-nourished. No distress.   HENT:   Head: Normocephalic and atraumatic.   Right Ear: External ear normal.   Left Ear: External ear normal.   Nose: Nose normal.   Mouth/Throat: Oropharynx is clear and moist. No oropharyngeal exudate.   Frontal and maxillary sinus tenderness   Eyes: Conjunctivae and EOM are normal. Pupils are equal, round, and reactive to light. Right eye exhibits no discharge. Left eye exhibits no discharge.   Neck: Normal range of motion. Neck supple. No JVD present. No tracheal deviation present. No thyromegaly present.   Cardiovascular: Normal rate, regular rhythm, normal heart sounds and intact distal pulses.    No murmur heard.  Pulmonary/Chest: Effort normal. No stridor. No respiratory distress. She has no wheezes. She has no rales. She exhibits no tenderness.   Lung sounds clear to auscultation   Abdominal: Soft. She exhibits no distension and no mass. There is no tenderness. There is no rebound and no guarding.   Bowel sounds active x 4 quads   Musculoskeletal: Normal range of motion. She exhibits no edema, tenderness or deformity.   Lymphadenopathy:     She has no cervical adenopathy.   Neurological: She is alert and oriented to person, place, and time. She has normal reflexes. She displays normal reflexes. No cranial nerve deficit. She exhibits normal muscle tone. Coordination normal.   No facial droop  No slurred speech  EOM intact without nystagmus  Negative truncal ataxia  Normal finger to nose to finger exam  Negative pronator drift  Negative babinski  5/5 RUE Motor Strength  5/5 LUE Motor Strength  5/5 RLE Motor Strength  5/5  LLE Motor Strength   Skin: Skin is warm and dry. No rash noted. She is not diaphoretic. No erythema. No pallor.   Psychiatric: She has a normal mood and affect. Her behavior is normal. Judgment and thought content normal.   Nursing note and vitals reviewed.      Assessment and Plan: 56 year old female with past medical history of Lupus, on Plaquenil and Prednisone, Hemoglobin C Trait, Chronic low back pain who presented to the emergency department with chest pain. Admitted to the observation unit in stable condition under ACS protocol.    ## 1. Chest Pain: Intermittent. Currently resolved.Troponin in ED < 0.015; D-dimer 0.5. Has been feeling more sob x 1 week. EKG normal sinus without ST-T wave changes. Per Dr. Lo low suspicion for PE/DVT. Wells Criteria score: 0;  PERC rule: 1. No recent long car or airline travel. Hold CT Chest PE study per Dr. Lo.  Last lipid panel 11/2016 borderline high.   Cardiac risk factors: Age, BMI, Hyperlipidemia.  - Chest pain relieved after Nitroglycerin: Need to rule out ACS  - Exercise Stress Echo in am- Patient wants to do exercise stress. Switch to Dobutamine Stress echo if needed.  - Serial Troponin every 4 hours x 2  - Magnesium, Phosphorus (add-on)  - Continuous cardiac and pulse oximetry monitoring  - EKG prn  - Nitroglycerin prn  - Lipid panel in am  - TSH in am    # 2. Thrombocytopenia: Platelets 133. Chronic ( baseline 130). Last peripheral blood smear done 02/04/2016.  - Peripheral Blood Smear (add-on)  - Recheck CBC in am  - Follow outpatient    ## 3. Lupus: Follows endocrinologist Dr. Moffett, [facility].  - PTA Plaquenil- not taken in 3 days-> has not picked up prescription from pharmacy, Gabapentin and Prednisone    ## 4. Hypokalemia: Potassium 3.3  - Kdur 40 mEq po x 1  - Recheck Potassium (BMP) in am    ## 5. Sinusitis, failed 1st antibiotic regimen: Amoxicillin 01/30/2017-02/13/2017. Frontal and Maxillary sinus tenderness. Afebrile.   OTC decongestant with  some relief.  - Hold decongestant in setting of sinusitis  - Continue saline nasal spray  - Augmentin 875 mg po BID  - ESR & CRP in am  - Follow outpatient    ## 6. Headache: Ongoing x 2 weeks. May be sinus related. Complains ongoing dysphagia x 3 months. No choking episodes. Denies extremity weakness. No focal neuro deficit. Discussed case with Dr. Lo- hold CT imaging given symptoms likely related to her sinuses.    ## 7. Chronic Low Back Pain: Stable  - Continue PTA Gabapentin, Tinatizidine, Ibuprofen    ## 8. ADHD: States Adderral dosing changed, unsure on new dose. Hold until verified- obtain IP medication pharmacy consult in am.    05/08/2017 @ 07:10: Notified patient complaining of 7/10 chest pain which woke her up from sleep. Nitroglycerin SL x 1 with resolution of chest pain. Soft BP 95/68, NS wide open, Nitroglycerin SL x 1 with resolution of chest pain.   Repeat EKG: NSR, Septal infarct, Vent rate 79, MT interval 146 ms, QTc 470. No ST-T wave changes. Improved ECG from ECG done 05/07/2017 @ 23:09  Discussed exercise stress vs Dobutamine stress echo; given ongoing chest pain, chronic low back and hip pain. Dobutamine stress test more feasible test: Patient agreed to Dobutamine stress test.      FEN: Clear liquid diet; NS  cc/hour  PIV: Peripheral IV access  DVT Prophylaxis: Encourage early ambulation, anticipate short stay  GI prophylaxis: Protonix  Code Status: Full Code  Disposition: Pending stress test, stable lab work and vital signs.    Discussed case with Dr. Teresita Segura, APRN, CNP  ED Observation Unit  [facility]  5/8/2017

## 2017-05-08 NOTE — ED PROVIDER NOTES
History     Chief Complaint   Patient presents with     Chest Pain     HPI  Lesley Stafford is a 56 year old female with a history of lupus, chronic pain and anxiety who presents to the emergency department today with complaints of chest pain. Patient reports developing midsternal chest pain that radiated to the left side of her chest/axilla associated with dizziness and some shortness of breath around 10 PM this evening. She reports a sharp pain initially that progressed to a cramping, pressure sensation. Patient contacted EMS and was given 324 mg aspirin and nitro x2 upon their arrival. She states after the nitro, the pain seemed to go deeper into her chest and then completely resolve. She denies ever having had symptoms like this in the past. Upon arrival to the ED, patient reports only mild pain currently. She otherwise reports feeling well recently. She does report some recent congestion for which she took a generic Sudafed last night. She denies recent cough or other cold symptoms. No fevers or chills. No pain or swelling in her legs. No recent surgeries or procedures. She denies a history of diabetes, hypertension or hyperlipidemia. She is a nonsmoker. She denies a known family history of heart disease. Patient is currently prescribed Plaquenil, though states she has not taken it in the past 3 days because she ran out. She is also on Tramadol for chronic pain and is currently weaning off of her Effexor.     I have reviewed the Medications, Allergies, Past Medical and Surgical History, and Social History in the [employer] system.    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       Past Surgical History:   Procedure Laterality Date     COLONOSCOPY  01/2010    repeat 10 yrs     DILATION AND CURETTAGE, OPERATIVE HYSTEROSCOPY WITH  MORCELLATOR, COMBINED N/A 2/15/2017    Procedure: COMBINED DILATION AND CURETTAGE, OPERATIVE HYSTEROSCOPY WITH MORCELLATOR;  Surgeon: Erin Gutierrez MD;  Location: [facility] OR     ESOPHAGOSCOPY, GASTROSCOPY, DUODENOSCOPY (EGD), COMBINED Left 2/5/2016    Procedure: COMBINED ESOPHAGOSCOPY, GASTROSCOPY, DUODENOSCOPY (EGD), BIOPSY SINGLE OR MULTIPLE;  Surgeon: Pierre Fernandez MD;  Location:  GI     GYN SURGERY         Family History   Problem Relation Age of Onset     Lupus Mother      DIABETES Father      Brain Tumor Father      begnign on pituitary     Depression Sister      Anemia Sister        Social History   Substance Use Topics     Smoking status: Former Smoker     Smokeless tobacco: Never Used     Alcohol use No     Current Facility-Administered Medications   Medication     0.9% sodium chloride BOLUS    Followed by     0.9% sodium chloride infusion     Current Outpatient Prescriptions   Medication     tiZANidine (ZANAFLEX) 4 MG tablet     ibuprofen (ADVIL/MOTRIN) 800 MG tablet     traMADol (ULTRAM) 50 MG tablet     cyclobenzaprine (FLEXERIL) 5 MG tablet     LORazepam (ATIVAN) 1 MG tablet     methylPREDNISolone (MEDROL DOSEPAK) 4 MG tablet     ibuprofen (ADVIL/MOTRIN) 600 MG tablet     gabapentin (NEURONTIN) 300 MG capsule     hydrOXYzine (ATARAX) 25 MG tablet     amphetamine-dextroamphetamine (ADDERALL) 10 MG tablet     predniSONE (DELTASONE) 1 MG tablet     acyclovir (ZOVIRAX) 200 MG capsule     ferrous sulfate (IRON) 325 (65 FE) MG tablet     fluticasone (FLONASE) 50 MCG/ACT nasal spray     cetirizine (ZYRTEC) 10 MG tablet     Calcium-Magnesium-Vitamin D (CALCIUM 500 PO)     multivitamin, therapeutic with minerals (MULTI-VITAMIN) TABS     venlafaxine (EFFEXOR-ER) 225 MG TB24     hydroxychloroquine (PLAQUENIL) 200 MG tablet        Allergies   Allergen Reactions     Morphine Sulfate Itching     Sulfa Drugs Hives       Review of Systems   All other systems negative except as noted in the  "HPI.      Physical Exam   BP: 103/82  Pulse: 89  Temp: 97.5  F (36.4  C)  Resp: 16  Height: 165.1 cm (5' 5\")  Weight: 84.8 kg (187 lb)  SpO2: 96 %  Physical Exam   Gen: NAD, sitting on stretcher, conversant and pleasant, non-toxic appearing  HEENT: NCAT, PERRL, EOMI, MMM  Neck: trachea midline, supple with FROM  Cardio: normal rate regular rhythm, no R/M/G, normally perfused and warm  Pulm: steady, non-labored respirations, normal WOB, CTAB, no w/r/r  Abd: soft nt/nd, no organomegaly, no CVA tenderness  Ext: normal peripheral pulses, no edema, neurovascularly intact distally.  Skin: no rashes or signs of trauma  Neuro: no focal deficits, 5/5 strength in all ext      ED Course     ED Course     Procedures   11:12 PM  The patient was seen and examined by Dr. Vásquez in Room ED07.              EKG Interpretation:      Interpreted by Jerson Vásquez  Symptoms at time of EKG: none   Rhythm: normal sinus   Rate: normal  Axis: normal  Ectopy: none  Conduction: normal  ST Segments/ T Waves: No ST-T wave changes  Q Waves: none  Comparison to prior: No old EKG available    Clinical Impression: normal EKG       Labs Ordered and Resulted from Time of ED Arrival Up to the Time of Departure from the ED   CBC WITH PLATELETS DIFFERENTIAL - Abnormal; Notable for the following:        Result Value    Platelet Count 133 (*)     All other components within normal limits   BASIC METABOLIC PANEL - Abnormal; Notable for the following:     Potassium 3.3 (*)     All other components within normal limits   D DIMER QUANTITATIVE   TROPONIN I   CARDIAC CONTINUOUS MONITORING            Assessments & Plan (with Medical Decision Making)   This is a 56-year-old female with a history of lupus who presented to the Emergency Department with chest pain. She notes that it started up being sharp but then became a  pressure-like squeezing sensation in her left side that radiated to her axilla. She notes that it had gone on for a while, and when medics had " arrived and she was given nitro the symptoms subsequently abated. She notes that she had some dizziness at that time and maybe some mild shortness of breath. She doesn t have any cardiac risk factors (no hypertension, diabetes, smoking, no family history of MIs) but given the description of her symptoms and the timing, which was only 2 hours ago, she will need serial trops and EKGs overnight to sufficiently rule out ACS. Also on the differential is PE given her history of lupus, and so we ll obtain a D-dimer to risk stratify. I have a low suspicion for pneumonia, pneumothorax, dissection, or esophageal pathology. After history and physical examination an EKG was obtained and is unremarkable without any evidence of ischemia or dysrhythmias. She had already been given aspirin by EMS, and she pain-free at this time after the nitro from EMS. Basics labs were obtained and were all unremarkable, including a normal CBC, chemistry, negative troponin and negative D-dimer. Her chest xray was clear. I reevaluated the patient, and she was still feeling okay, without chest pain, but given the description and timing of her symptoms, we ll admit her to observation for serial troponins and EKGs. The patient was admitted to Obs at this time in good condition.     I have reviewed the nursing notes.    I have reviewed the findings, diagnosis, plan and need for follow up with the patient.    New Prescriptions    No medications on file       Final diagnoses:   Chest pain   I, Sherice Claros, am serving as a trained medical scribe to document services personally performed by Jerson Vásquez MD, based on the provider's statements to me.      Jerson SHUKLA MD, was physically present and have reviewed and verified the accuracy of this note documented by Sherice Claros.       5/7/2017   [facility], EMERGENCY DEPARTMENT     Jerson Vásquez MD  05/08/17 0019

## 2017-05-08 NOTE — TELEPHONE ENCOUNTER
Call Type: Triage Call    Presenting Problem: Pt yesterday started to feel dizzy and then today  she developed left sided chest pains that are sharp and dull and she  states she has to work to breath.  She is speaking in sentences.  She states she is now having tingling in her leg.  Pt will not call  911 but will have her son drive her to ER.  Triage Note:  Guideline Title: Chest Pain  Recommended Disposition: Activate   Original Inclination: Would have called clinic  Override Disposition:  Intended Action: Go to Hospital / ED  Physician Contacted: No  Chest pain spreading to the shoulders, neck, jaw, in one or both arms, stomach or  back lasting 5 or more minutes now or within the last hour. Pain is NOT  associated with taking a deep breath or a productive cough, movement, or touch to  a localized area. ?  YES  Loss of consciousness for any period of time ? NO  New or worsening signs and symptoms that may indicate shock ? NO  Pressure, fullness, squeezing sensation or pain anywhere in the chest lasting 5 or  more minutes now or within the last hour. Pain is NOT associated with taking a  deep breath or a productive cough, movement, or touch to a localized area on the  chest. ? NO  Physician Instructions:  Care Advice: Write down provider's name. List or place the following in a  bag for transport with the patient: current prescription and/or  nonprescription medications  alternative treatments, therapies and medications  and street drugs.  Place person in a position of comfort and loosen tight clothing.  After calling , have the person chew one aspirin tablet (325 mg), or  4 baby aspirin (81mg) with a small amount of water now if conscious, not  allergic to aspirin, or if has not been told to avoid taking aspirin by  their provider.  It is important to use aspirin, not acetaminophen.  Take nitroglycerin as directed if prescribed by your provider. Men should  not take nitroglycerin within 36 hours of  taking erectile dysfunction drugs  unless directed to do so by their provider as it may cause a sudden drop in  blood pressure.  Tell providers if you are taking an erectile dysfunction drug such as  Viagra(R), Cialis(R), Levitra(R).  An adult should stay with the patient, preferably one trained in CPR. If  the person is not trained in CPR, then he or she should provide hands-only  (compression-only) CPR as recommended by the [employer].

## 2017-05-08 NOTE — PLAN OF CARE
"Problem: Discharge Planning  Goal: Discharge Planning (Adult, OB, Behavioral, Peds)  Outpatient/Observation goals to be met before discharge home:     List all goals to be met before discharge home:   - Serial troponins and stress test complete. - trop neg x1. Pending.   - Seen and cleared by consultant if applicable - pending  - Adequate pain control on oral analgesia - Denies pain at this time.  - Vital signs normal or at patient baseline /70  Pulse 84  Temp 97.8  F (36.6  C) (Oral)  Resp 18  Ht 1.651 m (5' 5\")  Wt 84.8 kg (187 lb)  SpO2 96%  BMI 31.12 kg/m2  - Safe disposition plan has been identified - pending    - Nurse to notify provider when observation goals have been met and patient is ready for discharge.     Admission checklist completed. Observation sheet given to patient. No further questions or concerns. Will continue to monitor and update MDs with changes/concerns.       "

## 2017-05-08 NOTE — PROGRESS NOTES
Discharge instructions reviewed, understood, and signed by patient. VSS, PIV removed, medications reviewed and understood, patient has all belongings. Patient stated she ordered a lunch tray, will eat lunch first then ambulate off the unit with family member.

## 2017-05-08 NOTE — DISCHARGE SUMMARY
Discharge Summary    Lesley Stafford MRN# [id number]   YOB: 1960 Age: 56 year old     Date of Admission:  5/7/2017  Date of Discharge:  5/8/2017  Admitting Physician:  Jerson Vásquez MD  Discharge Physician:  Rafael Lo MD   Discharging Service:  Emergency Department Observation Unit     Primary Provider: Rafael Orosco          Discharge Diagnosis:     * No active hospital problems. *    * No resolved hospital problems. *               Discharge Disposition:   Discharged to home           Condition on Discharge:   Discharge condition: Stable               Procedures:   Cardiology procedures perfromed:   Stress echo             Discharge Medications:     Current Discharge Medication List      START taking these medications    Details   amoxicillin-clavulanate (AUGMENTIN) 875-125 MG per tablet Take 1 tablet by mouth 2 times daily for 10 days  Qty: 20 tablet, Refills: 0    Associated Diagnoses: Sinusitis, unspecified chronicity, unspecified location      sodium chloride (OCEAN NASAL SPRAY) 0.65 % nasal spray Spray 1 spray into both nostrils daily as needed for congestion  Qty: 1 Bottle, Refills: 0    Associated Diagnoses: Sinusitis, unspecified chronicity, unspecified location         CONTINUE these medications which have NOT CHANGED    Details   PREDNISONE PO Take 5 mg by mouth daily      tiZANidine (ZANAFLEX) 4 MG tablet Take 1-2 tablets (4-8 mg) by mouth 3 times daily as needed for muscle spasms  Qty: 30 tablet, Refills: 1    Associated Diagnoses: Myalgia      ibuprofen (ADVIL/MOTRIN) 800 MG tablet TAKE ONE TABLET BY MOUTH DAILY AS NEEDED FOR MODERATE PAIN  Qty: 30 tablet, Refills: 0    Associated Diagnoses: Chronic arthralgias of knees and hips      traMADol (ULTRAM) 50 MG tablet TAKE 1-2 TABLETS BY MOUTH EVERY 6 HOURS AS NEEDED  Qty: 60 tablet, Refills: 0    Comments: Not to exceed 4 additional fills before 08/14/2017.  Associated Diagnoses: Lupus (H); Chronic arthralgias of  knees and hips      cyclobenzaprine (FLEXERIL) 5 MG tablet TAKE 1 TABLET (5 MG) BY MOUTH 3 TIMES DAILY  Qty: 42 tablet, Refills: 0    Associated Diagnoses: Back pain, unspecified back location, unspecified back pain laterality, unspecified chronicity; Hip pain, bilateral; Trochanteric bursitis of both hips      gabapentin (NEURONTIN) 300 MG capsule Take 1 capsule (300 mg) by mouth 3 times daily  Qty: 90 capsule, Refills: 1    Associated Diagnoses: Peripheral polyneuropathy (H)      hydrOXYzine (ATARAX) 25 MG tablet Take 1 tablet (25 mg) by mouth 3 times daily as needed for itching  Qty: 120 tablet, Refills: 11    Associated Diagnoses: Anxiety      amphetamine-dextroamphetamine (ADDERALL) 10 MG tablet Take 10 mg by mouth 2 times daily      ferrous sulfate (IRON) 325 (65 FE) MG tablet Take 1 tablet (325 mg) by mouth daily (with breakfast)  Qty: 30 tablet, Refills: 5    Associated Diagnoses: Other iron deficiency anemia      fluticasone (FLONASE) 50 MCG/ACT nasal spray Spray 1-2 sprays into both nostrils daily  Qty: 16 g, Refills: 11    Associated Diagnoses: Seasonal allergic rhinitis      Calcium-Magnesium-Vitamin D (CALCIUM 500 PO)       multivitamin, therapeutic with minerals (MULTI-VITAMIN) TABS Take 1 tablet by mouth daily      hydroxychloroquine (PLAQUENIL) 200 MG tablet Take 200 mg by mouth daily.      LORazepam (ATIVAN) 1 MG tablet Take 1 tablet (1 mg) by mouth every 8 hours as needed for anxiety  Qty: 1 tablet, Refills: 0    Associated Diagnoses: Therapeutic procedure      methylPREDNISolone (MEDROL DOSEPAK) 4 MG tablet Follow package instructions  Qty: 21 tablet, Refills: 0    Associated Diagnoses: Back pain, unspecified back location, unspecified back pain laterality, unspecified chronicity; Obesity, Class I, BMI 30-34.9; Lupus (H); Hip pain, bilateral; Trochanteric bursitis of both hips      acyclovir (ZOVIRAX) 200 MG capsule Take 1 capsule (200 mg) by mouth 5 times daily  Qty: 22 capsule, Refills: 0     Associated Diagnoses: HSV (herpes simplex virus) infection      cetirizine (ZYRTEC) 10 MG tablet Take 1 tablet (10 mg) by mouth every evening  Qty: 30 tablet, Refills: 1    Associated Diagnoses: Seasonal allergic rhinitis      venlafaxine (EFFEXOR-ER) 225 MG TB24 Take 75 mg by mouth every evening Every other day tapering off                   Consultations:   No consultations were requested during this admission             Brief History of Illness:   Lesley Stafford is a 56 year old female with a history of lupus, chronic pain and anxiety who presents to the emergency department today with complaints of chest pain.          Hospital Course:   56 year old female with past medical history of Lupus, on Plaquenil and Prednisone, Hemoglobin C Trait, Chronic low back pain who presented to the emergency department with chest pain.      1. Chest Pain: Intermittent. Troponin in ED < 0.015; troponin < 0.015.  D-dimer 0.5. Has been feeling more sob x 1 week. EKG normal sinus without ST-T wave changes. Cardiac risk factors: Age, BMI, Hyperlipidemia.   - Chest pain relieved after Nitroglycerin today. Dobutamine stress test negative.       2. Thrombocytopenia: Platelets 133. Chronic ( baseline 130). Last peripheral blood smear done 02/04/2016.  - Peripheral Blood Smear  Pending       3. Lupus: Follows endocrinologist Dr. Moffett, [facility].  - PTA Plaquenil- not taken in 3 days-> has not picked up prescription from pharmacy, Gabapentin and Prednisone     4. Hypokalemia: Potassium 3.3 Received Kdur 40 mEq po x 1      5. Sinusitis, failed 1st antibiotic regimen: Amoxicillin 01/30/2017-02/13/2017. Frontal and Maxillary sinus tenderness. Afebrile. Patient was initiated on Augmentin to complete a 10 day course and also saline spray. ESR 15.      6. Dysphagia :   Complains ongoing dysphagia x 3 months. No choking episodes. Denies extremity weakness. No focal neuro deficit. Patient will follow up with a swallow study  "outpatient.     7. Chronic Low Back Pain: Stable  - Continue PTA Gabapentin, Tinatizidine, Ibuprofen    8. Sleep apnea- Referral to outpatient sleep center.                  Final Day of Progress before Discharge:       Physical Exam:  Blood pressure 96/71, pulse 90, temperature 97.8  F (36.6  C), temperature source Oral, resp. rate 18, height 1.651 m (5' 5\"), weight 84.8 kg (187 lb), SpO2 98 %, not currently breastfeeding.    EXAM:  Constitutional: healthy, alert and no distress   Head: Normocephalic. No masses, lesions, tenderness or abnormalities   Neck: Neck supple. No adenopathy. Thyroid symmetric, normal size,, Carotids without bruits.   ENT: ENT exam normal, no neck nodes or sinus tenderness   Cardiovascular: RRR. No murmurs, clicks gallops or rub   Respiratory: . Good diaphragmatic excursion. Lungs clear   Gastrointestinal: Abdomen soft,. BS normal. No masses, organomegaly.   : Deferred   Musculoskeletal: extremities normal- no gross deformities noted, gait normal and normal muscle tone   Skin: no suspicious lesions or rashes   Neurologic: Gait normal. Reflexes normal and symmetric. Sensation grossly WNL.   Psychiatric: mentation appears normal and affect normal/bright   Hematologic/Lymphatic/Immunologic: normal ant/post cervical, axillary, supraclavicular and inguinal          Data:  All laboratory data reviewed             Significant Results:   None  Results for orders placed or performed during the hospital encounter of 05/07/17   XR Chest 2 Views    Narrative    EXAM: XR CHEST 2 VW  5/7/2017 11:46 PM     HISTORY:  cp       COMPARISON:  1/29/2016    FINDINGS: PA and lateral radiographs of the chest. The mediastinal  border, cardiac silhouette, and pulmonary vasculature are within  normal limits. No focal airspace opacities. No pneumothorax. No  pleural effusions.      Impression    IMPRESSION: No acute cardiopulmonary abnormality.     I have personally reviewed the examination and initial " interpretation  and I agree with the findings.    ALANIS SÁNCHEZ MD   CBC with platelets differential   Result Value Ref Range    WBC 4.8 4.0 - 11.0 10e9/L    RBC Count 4.67 3.8 - 5.2 10e12/L    Hemoglobin 13.5 11.7 - 15.7 g/dL    Hematocrit 37.6 35.0 - 47.0 %    MCV 81 78 - 100 fl    MCH 28.9 26.5 - 33.0 pg    MCHC 35.9 31.5 - 36.5 g/dL    RDW 14.2 10.0 - 15.0 %    Platelet Count 133 (L) 150 - 450 10e9/L    Diff Method Automated Method     % Neutrophils 56.5 %    % Lymphocytes 33.3 %    % Monocytes 7.9 %    % Eosinophils 1.7 %    % Basophils 0.4 %    % Immature Granulocytes 0.2 %    Nucleated RBCs 0 0 /100    Absolute Neutrophil 2.7 1.6 - 8.3 10e9/L    Absolute Lymphocytes 1.6 0.8 - 5.3 10e9/L    Absolute Monocytes 0.4 0.0 - 1.3 10e9/L    Absolute Eosinophils 0.1 0.0 - 0.7 10e9/L    Absolute Basophils 0.0 0.0 - 0.2 10e9/L    Abs Immature Granulocytes 0.0 0 - 0.4 10e9/L    Absolute Nucleated RBC 0.0    D dimer quantitative   Result Value Ref Range    D Dimer 0.5 0.0 - 0.50 ug/ml FEU   Basic metabolic panel   Result Value Ref Range    Sodium 140 133 - 144 mmol/L    Potassium 3.3 (L) 3.4 - 5.3 mmol/L    Chloride 106 94 - 109 mmol/L    Carbon Dioxide 27 20 - 32 mmol/L    Anion Gap 7 3 - 14 mmol/L    Glucose 90 70 - 99 mg/dL    Urea Nitrogen 10 7 - 30 mg/dL    Creatinine 0.68 0.52 - 1.04 mg/dL    GFR Estimate 88 >60 mL/min/1.7m2    GFR Estimate If Black >90   GFR Calc   >60 mL/min/1.7m2    Calcium 9.2 8.5 - 10.1 mg/dL   Troponin I   Result Value Ref Range    Troponin I ES  0.000 - 0.045 ug/L     <0.015  The 99th percentile for upper reference range is 0.045 ug/L.  Troponin values in   the range of 0.045 - 0.120 ug/L may be associated with risks of adverse   clinical events.     Reticulocyte count   Result Value Ref Range    % Retic 1.5 0.5 - 2.0 %    Absolute Retic 71.3 25 - 95 10e9/L   Erythrocyte sedimentation rate auto   Result Value Ref Range    Sed Rate 15 0 - 30 mm/h   CRP inflammation   Result  Value Ref Range    CRP Inflammation <2.9 0.0 - 8.0 mg/L   Magnesium   Result Value Ref Range    Magnesium 1.9 1.6 - 2.3 mg/dL   Phosphorus   Result Value Ref Range    Phosphorus 3.1 2.5 - 4.5 mg/dL   Troponin I   Result Value Ref Range    Troponin I ES  0.000 - 0.045 ug/L     <0.015  The 99th percentile for upper reference range is 0.045 ug/L.  Troponin values in   the range of 0.045 - 0.120 ug/L may be associated with risks of adverse   clinical events.     EKG 12 lead   Result Value Ref Range    Interpretation ECG Click View Image link to view waveform and result    EKG 12-lead, complete   Result Value Ref Range    Interpretation ECG Click View Image link to view waveform and result    Echo  stress test with definity    Narrative    [id number]  [id number]  [id number]  [id number]^NAVI^TERESSA^[facility],[place]  Echocardiography Laboratory  [address] [place] [id number]     Name: CARA GIL  MRN: [id number]  : 1960  Study Date: 2017 08:36 AM  Age: 56 yrs  Gender: Female  Patient Location: [facility]  Reason For Study: Chest Pain  History: Chest Pain  Ordering Physician: TERESSA MCELROY  Performed By: Ellie Franco RDCS     BSA: 1.9 m2  Height: 65 in  Weight: 180 lb  HR: 80  BP: 101/70 mmHg  _____________________________________________________________________________  __        Procedure  Stress Echo Dobutamine Adult. Contrast Definity.  _____________________________________________________________________________  __        Interpretation Summary  Normal dobutamine stress echocardiogram without evidence of inducible  ischemia. Target heart rate was achieved. Heart rate and blood pressure  response to dobutamine were normal. With stress, the left ventricular ejection  fraction increased from 55-60% to greater than 65% and the left ventricular  size decreased appropriately.  No subjective symptoms to suggest ischemia.  There was no ECG evidence of  ischemia.  No significant valve disease on screening doppler evaluation. The aortic root  and visualized ascending aorta are normal.  _____________________________________________________________________________  __     Stress  Definity (NDC #89401-151-53) given intravenously.  Patient was given 10ml mixture of 1.5ml Definity and 8.5ml saline.  0 ml wasted.  Definity Expiration 3/1/18 .  Definity Lot # [id number] .  This was a normal stress EKG with no evidence of stress-induced ischemia.  The maximum dose of dobutamine was 40mcg/kg/min.  The maximum dose of atropine was 0.2mg.  The maximum dose of metoprolol was 2mg.  The patient did not exhibit any symptoms during drug infusion.  Normal BP response to drug infusion.  The drug infusion was stopped due to target heart rate achieved.  Target Heart Rate was achieved.     Rest  The baseline ECG displays normal sinus rhythm.  Baseline ECG displays Q waves in the anterior leads.     Stress Results        Protocol:  . Stress Echo        Maximum Predicted HR:   162 bpm        Target HR: 138 bpm                 % Maximum Predicted HR: 86 %                             Stage  DurationHeart Rate  BP                                 (mm:ss)   (bpm)                         Baseline  0:00      80    101/70                           Peak    5:55     140    138/38                            Stress Duration:   5:55 mm:ss *                      Maximum Stress HR: 140 bpm *     Left Ventricle  Left ventricular systolic function is normal. Ejection Fraction = >55%.     Aortic Valve  The aortic valve is normal in structure and function.     Mitral Valve  The mitral valve is normal in structure and function.        Tricuspid Valve  The tricuspid valve is normal in structure and function.     Right Ventricle  The right ventricular systolic function is normal.     Pericardium  There is no pericardial  effusion.  _____________________________________________________________________________  __                                Report approved by: Amanda Gomez 05/08/2017 09:48 AM                    _____________________________________________________________________________  __         Recent Results (from the past 48 hour(s))   XR Chest 2 Views    Narrative    EXAM: XR CHEST 2 VW  5/7/2017 11:46 PM     HISTORY:  cp       COMPARISON:  1/29/2016    FINDINGS: PA and lateral radiographs of the chest. The mediastinal  border, cardiac silhouette, and pulmonary vasculature are within  normal limits. No focal airspace opacities. No pneumothorax. No  pleural effusions.      Impression    IMPRESSION: No acute cardiopulmonary abnormality.     I have personally reviewed the examination and initial interpretation  and I agree with the findings.    ALANIS SÁNCHEZ MD                Pending Results:   Unresulted Labs Ordered in the Past 30 Days of this Admission     Date and Time Order Name Status Description    5/8/2017 0103 Blood Morphology Pathologist Review In process                   Discharge Instructions and Follow-Up:     Discharge Procedure Orders  XR Video Swallow w Esophagram - Order with Speech Therapy Referral   Standing Status: Future  Standing Exp. Date: 05/08/18     Sleep Study Referral   Standing Status: Future  Standing Exp. Date: 05/08/18     Speech Therapy Referral   Referral Type: Therapeutic Services     Reason for your hospital stay   Order Comments: Chest pain     Adult [facility] Follow-up and recommended labs and tests   Order Comments: Follow up with your primary care doctor in 5-7 days for hospital follow up.     Appointments on [place] and/or [facility] (with [facility] provider or service). Call [phone] if you haven't heard regarding these appointments within 7 days of discharge.     Activity   Order Comments: Your activity upon discharge: activity as tolerated   Order Specific  Question Answer Comments   Is discharge order? Yes      When to contact your care team   Order Comments: Please go to your nearest emergency room If you were to have a change in the type of chest pain i.e more severe, lasting longer or radiating to your shoulder, arm, neck, jaw or back, shortness of breath or increased pain with breathing, coughing up blood, feel dizzy or lightheaded, or notice swelling in one leg.     Discharge Instructions   Order Comments: The chest pain you came into the emergency room for does not appear to be cardiac chest pain. Your heart enzymes were normal which tells us there was no damage to the heart muscle. You stress test was normal.     Your pain may be related to esophageal spasms. It is important to follow up with your primary care doctor early next week to discuss further management of your chest pain.  A swallow study was also ordered for you as you reported trouble swallowing.    Continue to drink plenty of fluids and increase your fiber in your diet as you may also be constipated.     You had reported some snoring and waking up from snoring. A sleep study referral has been ordered for you.     You also reported sinus tenderness and sinus headaches. You were started on Augmentin while you were here. Please continue this medication for the next 10 days.     Full Code     Diet   Order Comments: Follow this diet upon discharge: Regular diet   Order Specific Question Answer Comments   Is discharge order? Yes             Attestation:  Ginny Petty.

## 2017-05-09 ENCOUNTER — TELEPHONE (OUTPATIENT)
Dept: FAMILY MEDICINE | Facility: CLINIC | Age: 57
End: 2017-05-09

## 2017-05-19 ENCOUNTER — THERAPY VISIT (OUTPATIENT)
Dept: PHYSICAL THERAPY | Facility: CLINIC | Age: 57
End: 2017-05-19
Payer: COMMERCIAL

## 2017-05-19 DIAGNOSIS — M54.50 CHRONIC BILATERAL LOW BACK PAIN WITHOUT SCIATICA: ICD-10-CM

## 2017-05-19 DIAGNOSIS — G89.29 CHRONIC BILATERAL LOW BACK PAIN WITHOUT SCIATICA: ICD-10-CM

## 2017-05-19 PROCEDURE — 97112 NEUROMUSCULAR REEDUCATION: CPT | Mod: GP | Performed by: PHYSICAL THERAPIST

## 2017-05-19 PROCEDURE — 97110 THERAPEUTIC EXERCISES: CPT | Mod: GP | Performed by: PHYSICAL THERAPIST

## 2017-05-19 NOTE — MR AVS SNAPSHOT
After Visit Summary   5/19/2017    Lesley Stafford    MRN: [id number]           Patient Information     Date Of Birth          1960        Visit Information        Provider Department      5/19/2017 9:00 AM Grzegorz Mendoza, GIA [facility] Physical Therapy        Today's Diagnoses     Chronic bilateral low back pain without sciatica           Follow-ups after your visit        Your next 10 appointments already scheduled     May 24, 2017  2:30 PM CDT   [facility]    [address] [place] [address] [phone]           Bring a current list of meds and any records pertaining to this visit.  For Physicals, please bring immunization records and any forms needing to be filled out.  Please arrive 10 minutes early to complete paperwork.            May 30, 2017  4:00 PM CDT   Return Sleep Patient with THOMAS Medina [facility], Sleep Study ([facility])    [address] [place] [address] [phone]            Jun 02, 2017 11:00 AM CDT   BO Spine with Grzegorz Mendoza PT   [facility] Physical Therapy ([facility]  )    [address]  [facility] [address] [phone]              Who to contact     If you have questions or need follow up information about today's clinic visit or your schedule please contact [facility] PHYSICAL THERAPY directly at [phone].  Normal or non-critical lab and imaging results will be communicated to you by MyChart, letter or phone within 4 business days after the clinic has received the results. If you do not hear from us within 7 days, please contact the clinic through MyChart or phone. If you have a critical or abnormal lab result,  "we will notify you by phone as soon as possible.  Submit refill requests through [employer] or call your pharmacy and they will forward the refill request to us. Please allow 3 business days for your refill to be completed.          Additional Information About Your Visit        [employer] Information     [employer]earch lets you send messages to your doctor, view your test results, renew your prescriptions, schedule appointments and more. To sign up, go to [email or web address] . Click on \"Log in\" on the left side of the screen, which will take you to the Welcome page. Then click on \"Sign up Now\" on the right side of the page.     You will be asked to enter the access code listed below, as well as some personal information. Please follow the directions to create your username and password.     Your access code is: [email or web address]  Expires: 2017  3:10 PM     Your access code will  in 90 days. If you need help or a new code, please call your [place] clinic or [phone].        Care EveryWhere ID     This is your [facility] EveryWhere ID. This could be used by other organizations to access your [place] medical records  [id number]         Blood Pressure from Last 3 Encounters:   17 96/71   17 104/62   17 107/71    Weight from Last 3 Encounters:   17 84.8 kg (187 lb)   17 85.3 kg (188 lb)   17 85.7 kg (189 lb)              We Performed the Following     NEUROMUSCULAR RE-EDUCATION     THERAPEUTIC EXERCISES        Primary Care Provider Office Phone # Fax #    Rafael Orosco [phone]       [facility] [address] [address]        Thank you!     Thank you for choosing [facility] PHYSICAL THERAP[facility]  for your care. Our goal is always to provide you with excellent care. Hearing back from our patients is one way we can continue to improve our services. Please take a few minutes to complete the " written survey that you may receive in the mail after your visit with us. Thank you!             Your Updated Medication List - Protect others around you: Learn how to safely use, store and throw away your medicines at www.disposemymeds.org.          This list is accurate as of: 5/19/17  2:15 PM.  Always use your most recent med list.                   Brand Name Dispense Instructions for use    acyclovir 200 MG capsule    ZOVIRAX    22 capsule    Take 1 capsule (200 mg) by mouth 5 times daily       amphetamine-dextroamphetamine 10 MG per tablet    ADDERALL     Take 10 mg by mouth 2 times daily       CALCIUM 500 PO          cetirizine 10 MG tablet    zyrTEC    30 tablet    Take 1 tablet (10 mg) by mouth every evening       cyclobenzaprine 5 MG tablet    FLEXERIL    42 tablet    TAKE 1 TABLET (5 MG) BY MOUTH 3 TIMES DAILY       ferrous sulfate 325 (65 FE) MG tablet    IRON    30 tablet    Take 1 tablet (325 mg) by mouth daily (with breakfast)       fluticasone 50 MCG/ACT spray    FLONASE    16 g    Spray 1-2 sprays into both nostrils daily       gabapentin 300 MG capsule    NEURONTIN    90 capsule    TAKE 1 CAPSULE (300 MG) BY MOUTH 3 TIMES DAILY       hydroxychloroquine 200 MG tablet    PLAQUENIL     Take 200 mg by mouth daily.       hydrOXYzine 25 MG tablet    ATARAX    120 tablet    Take 1 tablet (25 mg) by mouth 3 times daily as needed for itching       ibuprofen 800 MG tablet    ADVIL/MOTRIN    30 tablet    TAKE ONE TABLET BY MOUTH DAILY AS NEEDED FOR MODERATE PAIN       LORazepam 1 MG tablet    ATIVAN    1 tablet    Take 1 tablet (1 mg) by mouth every 8 hours as needed for anxiety       methylPREDNISolone 4 MG tablet    MEDROL DOSEPAK    21 tablet    Follow package instructions       Multi-vitamin Tabs tablet      Take 1 tablet by mouth daily       PREDNISONE PO      Take 5 mg by mouth daily       sodium chloride 0.65 % nasal spray    OCEAN NASAL SPRAY    1 Bottle    Spray 1 spray into both nostrils daily as  needed for congestion       tiZANidine 4 MG tablet    ZANAFLEX    30 tablet    Take 1-2 tablets (4-8 mg) by mouth 3 times daily as needed for muscle spasms       traMADol 50 MG tablet    ULTRAM    60 tablet    TAKE 1-2 TABLETS BY MOUTH EVERY 6 HOURS AS NEEDED       venlafaxine 225 MG Tb24 24 hr tablet    EFFEXOR-ER     Take 75 mg by mouth every evening Every other day tapering off

## 2017-05-19 NOTE — PROGRESS NOTES
Subjective:    HPI                    Objective:    System    Physical Exam    General     ROS    Assessment/Plan:      SUBJECTIVE  Subjective changes as noted by pt:  Pt reports that she has been ill recently and had to miss an appt.  She has had some stomach issues and has not been doing her core strengthening exercises.  She has continued with her standing side glides and prone press ups.  She reports that her low back is improving overall.  He is anxious to resume her strengthening exercises as she feels better.     Current pain level:  Current Pain level: 4/10   Changes in function:  Yes (See Goal flowsheet attached for changes in current functional level)     Adverse reaction to treatment or activity:  None    OBJECTIVE  Changes in objective findings:  Lumbar AROM:  Flexion WNL, Extension WNL, R side bending WNL (+L), L side bending WNL.  Pt demonstrated improved ROM and reported decreased pain after repeated movement exercises.  Reviewed core strengthening. Pt demonstrated good technique with core strengthening but did experience some stomach upset during and was limited.        ASSESSMENT  Catrachitoni continues to require intervention to meet STG and LTG's: PT  Patient is progressing as expected.  Progress made towards STG/LTG?  Yes (See Goal flowsheet attached for updates on achievement of STG and LTG)    PLAN  Continue current treatment plan until patient demonstrates readiness to progress to higher level exercises.    PTA/ATC plan:  N/A    Please refer to the daily flowsheet for treatment today, total treatment time and time spent performing 1:1 timed codes.

## 2017-05-23 DIAGNOSIS — G89.29 CHRONIC BILATERAL LOW BACK PAIN WITHOUT SCIATICA: Primary | ICD-10-CM

## 2017-05-23 DIAGNOSIS — M25.562 CHRONIC ARTHRALGIAS OF KNEES AND HIPS: ICD-10-CM

## 2017-05-23 DIAGNOSIS — M25.551 CHRONIC ARTHRALGIAS OF KNEES AND HIPS: ICD-10-CM

## 2017-05-23 DIAGNOSIS — M25.561 CHRONIC ARTHRALGIAS OF KNEES AND HIPS: ICD-10-CM

## 2017-05-23 DIAGNOSIS — G89.29 CHRONIC ARTHRALGIAS OF KNEES AND HIPS: ICD-10-CM

## 2017-05-23 DIAGNOSIS — M54.50 CHRONIC BILATERAL LOW BACK PAIN WITHOUT SCIATICA: Primary | ICD-10-CM

## 2017-05-23 DIAGNOSIS — M25.552 CHRONIC ARTHRALGIAS OF KNEES AND HIPS: ICD-10-CM

## 2017-05-23 RX ORDER — IBUPROFEN 800 MG/1
TABLET, FILM COATED ORAL
Qty: 30 TABLET | Refills: 0 | Status: SHIPPED | OUTPATIENT
Start: 2017-05-23 | End: 2017-07-14

## 2017-05-23 NOTE — TELEPHONE ENCOUNTER
Ibuprofen 800Mg      Last Written Prescription Date: 4/19/17  Last Quantity: 30, # refills: 0  Last Office Visit with [facility] or [facility] prescribing provider: 5/15/17  Next 5 appointments (look out 90 days)     May 24, 2017  2:30 PM CDT   Office Visit with Rafael Orosco MD   [facility] ([facility])    [address] [place] [address] [phone]                   Creatinine   Date Value Ref Range Status   05/07/2017 0.68 0.52 - 1.04 mg/dL Final     Lab Results   Component Value Date    AST 34 11/02/2016     Lab Results   Component Value Date    ALT 43 11/02/2016     BP Readings from Last 3 Encounters:   05/08/17 96/71   04/17/17 104/62   03/21/17 107/71

## 2017-05-23 NOTE — TELEPHONE ENCOUNTER
Prescription approved per [facility] Refill Protocol.  Thanks! Eduarda Arellano RN

## 2017-05-30 ENCOUNTER — OFFICE VISIT (OUTPATIENT)
Dept: SLEEP MEDICINE | Facility: CLINIC | Age: 57
End: 2017-05-30
Attending: NURSE PRACTITIONER
Payer: COMMERCIAL

## 2017-05-30 VITALS
HEART RATE: 94 BPM | DIASTOLIC BLOOD PRESSURE: 76 MMHG | OXYGEN SATURATION: 99 % | RESPIRATION RATE: 20 BRPM | WEIGHT: 183 LBS | BODY MASS INDEX: 30.49 KG/M2 | HEIGHT: 65 IN | SYSTOLIC BLOOD PRESSURE: 113 MMHG

## 2017-05-30 DIAGNOSIS — G47.30 SLEEP APNEA, UNSPECIFIED TYPE: ICD-10-CM

## 2017-05-30 DIAGNOSIS — F45.8 TEETH GRINDING: ICD-10-CM

## 2017-05-30 DIAGNOSIS — G25.81 RESTLESS LEGS SYNDROME (RLS): Primary | ICD-10-CM

## 2017-05-30 DIAGNOSIS — G47.23 CIRCADIAN RHYTHM SLEEP DISORDER, IRREGULAR SLEEP WAKE TYPE: ICD-10-CM

## 2017-05-30 PROCEDURE — 40000809 ZZH STATISTIC NO DOCUMENTATION TO SUPPORT CHARGE

## 2017-05-30 PROCEDURE — 99211 OFF/OP EST MAY X REQ PHY/QHP: CPT | Mod: ZF

## 2017-05-30 NOTE — PROGRESS NOTES
1.  Obstructive sleep apnea, for which we have placed a consultation for a dental appliance.   2.  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   3.  Restless legs syndrome.  We are changing her dosing of her second dose of gabapentin.     4.  Sleep habits.  There have been some positive results with her change.  She continues to still be sleepy with an Cutler Sleepiness score of 10.  I will see her back in clinic in approximately 2 weeks with sleep diaries to determine if a sleep schedule is her major factor at this time.  We will wait to see if there is further improvement with working on obesity and correcting her mild obstructive sleep apnea.       Thank you for allowing me to participate in this kind woman's care.     Dental       Esophageal spasm  Sleep diaries  RLS: benadryl-   Plastic mouth: no grinding  Gabapentin to TID:

## 2017-05-30 NOTE — NURSING NOTE
"Chief Complaint   Patient presents with     Sleep Problem       Initial /76  Pulse 94  Resp 20  Ht 1.651 m (5' 5\")  Wt 83 kg (183 lb)  SpO2 99%  BMI 30.45 kg/m2 Estimated body mass index is 30.45 kg/(m^2) as calculated from the following:    Height as of this encounter: 1.651 m (5' 5\").    Weight as of this encounter: 83 kg (183 lb).  Medication Reconciliation: complete       Tre CMA      "

## 2017-05-30 NOTE — PATIENT INSTRUCTIONS
RLS: check ferritin:           Stop benadryl,             Call psychiatry-re: hydroxizine      Follow up in 2 months 1 month of sleep diaries        Your BMI is Body mass index is 30.45 kg/(m^2).  Weight management is a personal decision.  If you are interested in exploring weight loss strategies, the following discussion covers the approaches that may be successful. Body mass index (BMI) is one way to tell whether you are at a healthy weight, overweight, or obese. It measures your weight in relation to your height.  A BMI of 18.5 to 24.9 is in the healthy range. A person with a BMI of 25 to 29.9 is considered overweight, and someone with a BMI of 30 or greater is considered obese. More than two-thirds of American adults are considered overweight or obese.  Being overweight or obese increases the risk for further weight gain. Excess weight may lead to heart disease and diabetes.  Creating and following plans for healthy eating and physical activity may help you improve your health.  Weight control is part of healthy lifestyle and includes exercise, emotional health, and healthy eating habits. Careful eating habits lifelong are the mainstay of weight control. Though there are significant health benefits from weight loss, long-term weight loss with diet alone may be very difficult to achieve- studies show long-term success with dietary management in less than 10% of people. Attaining a healthy weight may be especially difficult to achieve in those with severe obesity. In some cases, medications, devices and surgical management might be considered.  What can you do?  If you are overweight or obese and are interested in methods for weight loss, you should discuss this with your provider.     Consider reducing daily calorie intake by 500 calories.     Keep a food journal.     Avoiding skipping meals, consider cutting portions instead.    Diet combined with exercise helps maintain muscle while optimizing fat loss.  Strength training is particularly important for building and maintaining muscle mass. Exercise helps reduce stress, increase energy, and improves fitness. Increasing exercise without diet control, however, may not burn enough calories to loose weight.       Start walking three days a week 10-20 minutes at a time    Work towards walking thirty minutes five days a week     Eventually, increase the speed of your walking for 1-2 minutes at time    In addition, we recommend that you review healthy lifestyles and methods for weight loss available through the National Institutes of Health patient information sites:  http://win.niddk.nih.gov/publications/index.htm    And look into health and wellness programs that may be available through your health insurance provider, employer, local community center, or [employer].    Weight management plan: Patient was referred to their PCP to discuss a diet and exercise plan.

## 2017-05-30 NOTE — MR AVS SNAPSHOT
After Visit Summary   5/30/2017    Lesley Stafford    MRN: [id number]           Patient Information     Date Of Birth          1960        Visit Information        Provider Department      5/30/2017 4:00 PM Tiffanie Banks APRN CNP [facility] [place], Sleep Study        Today's Diagnoses     Restless legs syndrome (RLS)    -  1    Sleep apnea, unspecified type          Care Instructions    RLS: check ferritin:           Stop benadryl,             Call psychiatry-re: hydroxizine      Follow up in 2 months 1 month of sleep diaries        Your BMI is Body mass index is 30.45 kg/(m^2).  Weight management is a personal decision.  If you are interested in exploring weight loss strategies, the following discussion covers the approaches that may be successful. Body mass index (BMI) is one way to tell whether you are at a healthy weight, overweight, or obese. It measures your weight in relation to your height.  A BMI of 18.5 to 24.9 is in the healthy range. A person with a BMI of 25 to 29.9 is considered overweight, and someone with a BMI of 30 or greater is considered obese. More than two-thirds of American adults are considered overweight or obese.  Being overweight or obese increases the risk for further weight gain. Excess weight may lead to heart disease and diabetes.  Creating and following plans for healthy eating and physical activity may help you improve your health.  Weight control is part of healthy lifestyle and includes exercise, emotional health, and healthy eating habits. Careful eating habits lifelong are the mainstay of weight control. Though there are significant health benefits from weight loss, long-term weight loss with diet alone may be very difficult to achieve- studies show long-term success with dietary management in less than 10% of people. Attaining a healthy weight may be especially difficult to achieve in those with severe obesity. In some cases, medications, devices  and surgical management might be considered.  What can you do?  If you are overweight or obese and are interested in methods for weight loss, you should discuss this with your provider.     Consider reducing daily calorie intake by 500 calories.     Keep a food journal.     Avoiding skipping meals, consider cutting portions instead.    Diet combined with exercise helps maintain muscle while optimizing fat loss. Strength training is particularly important for building and maintaining muscle mass. Exercise helps reduce stress, increase energy, and improves fitness. Increasing exercise without diet control, however, may not burn enough calories to loose weight.       Start walking three days a week 10-20 minutes at a time    Work towards walking thirty minutes five days a week     Eventually, increase the speed of your walking for 1-2 minutes at time    In addition, we recommend that you review healthy lifestyles and methods for weight loss available through the National Institutes of Health patient information sites:  http://win.niddk.nih.gov/publications/index.htm    And look into health and wellness programs that may be available through your health insurance provider, employer, local community center, or [employer].    Weight management plan: Patient was referred to their PCP to discuss a diet and exercise plan.              Follow-ups after your visit        Follow-up notes from your care team     Return in about 2 months (around 7/30/2017).      Your next 10 appointments already scheduled     Jun 02, 2017 11:00 AM CDT   [facility] Spine with Grzegorz Mendoza PT   [facility] ([facility] [place]  )    [id number]  [facility] [place] [address]   [phone]            Jul 20, 2017  9:30 AM CDT   Return Sleep Patient with THOMAS Medina [facility] [place], Sleep Study ([facility])    [address]  "[facility] [address] [phone]              Future tests that were ordered for you today     Open Future Orders        Priority Expected Expires Ordered    Ferritin Routine  2017            Who to contact     If you have questions or need follow up information about today's clinic visit or your schedule please contact [facility] directly at [phone].  Normal or non-critical lab and imaging results will be communicated to you by Flipterhart, letter or phone within 4 business days after the clinic has received the results. If you do not hear from us within 7 days, please contact the clinic through Flipterhart or phone. If you have a critical or abnormal lab result, we will notify you by phone as soon as possible.  Submit refill requests through Surround App or call your pharmacy and they will forward the refill request to us. Please allow 3 business days for your refill to be completed.          Additional Information About Your Visit        FlipterharAirwoot Information     Surround App lets you send messages to your doctor, view your test results, renew your prescriptions, schedule appointments and more. To sign up, go to [email or web address] . Click on \"Log in\" on the left side of the screen, which will take you to the Welcome page. Then click on \"Sign up Now\" on the right side of the page.     You will be asked to enter the access code listed below, as well as some personal information. Please follow the directions to create your username and password.     Your access code is: [email or web address]  Expires: 2017  3:10 PM     Your access code will  in 90 days. If you need help or a new code, please call your [place] clinic or [phone].        Care EveryWhere ID     This is your Care EveryWhere ID. This could be used by other organizations to access your [place] medical records  [id number]        Your Vitals Were     Pulse Respirations Height Pulse Oximetry BMI (Body Mass " "Index)       94 20 1.651 m (5' 5\") 99% 30.45 kg/m2        Blood Pressure from Last 3 Encounters:   05/30/17 113/76   05/08/17 96/71   04/17/17 104/62    Weight from Last 3 Encounters:   05/30/17 83 kg (183 lb)   05/07/17 84.8 kg (187 lb)   04/17/17 85.3 kg (188 lb)              We Performed the Following     Sleep Study Referral          Today's Medication Changes          These changes are accurate as of: 5/30/17  4:44 PM.  If you have any questions, ask your nurse or doctor.               Stop taking these medicines if you haven't already. Please contact your care team if you have questions.     LORazepam 1 MG tablet   Commonly known as:  ATIVAN           PREDNISONE PO           venlafaxine 225 MG Tb24 24 hr tablet   Commonly known as:  EFFEXOR-ER                    Primary Care Provider Office Phone # Fax #    Rafael Orosco [phone]       [place] [address] [address] [address]        Thank you!     Thank you for choosing [facility] [place], SLEEP STUDY  for your care. Our goal is always to provide you with excellent care. Hearing back from our patients is one way we can continue to improve our services. Please take a few minutes to complete the written survey that you may receive in the mail after your visit with us. Thank you!             Your Updated Medication List - Protect others around you: Learn how to safely use, store and throw away your medicines at www.disposemymeds.org.          This list is accurate as of: 5/30/17  4:44 PM.  Always use your most recent med list.                   Brand Name Dispense Instructions for use    acyclovir 200 MG capsule    ZOVIRAX    22 capsule    Take 1 capsule (200 mg) by mouth 5 times daily       amphetamine-dextroamphetamine 10 MG per tablet    ADDERALL     Take 10 mg by mouth 2 times daily       CALCIUM 500 PO          cetirizine 10 MG tablet    zyrTEC    30 tablet    Take 1 tablet (10 mg) by mouth every evening "       cyclobenzaprine 5 MG tablet    FLEXERIL    42 tablet    TAKE 1 TABLET (5 MG) BY MOUTH 3 TIMES DAILY       ferrous sulfate 325 (65 FE) MG tablet    IRON    30 tablet    Take 1 tablet (325 mg) by mouth daily (with breakfast)       fluticasone 50 MCG/ACT spray    FLONASE    16 g    Spray 1-2 sprays into both nostrils daily       gabapentin 300 MG capsule    NEURONTIN    90 capsule    TAKE 1 CAPSULE (300 MG) BY MOUTH 3 TIMES DAILY       hydroxychloroquine 200 MG tablet    PLAQUENIL     Take 200 mg by mouth daily.       hydrOXYzine 25 MG tablet    ATARAX    120 tablet    Take 1 tablet (25 mg) by mouth 3 times daily as needed for itching       ibuprofen 800 MG tablet    ADVIL/MOTRIN    30 tablet    TAKE ONE TABLET BY MOUTH DAILY AS NEEDED FOR MODERATE PAIN       methylPREDNISolone 4 MG tablet    MEDROL DOSEPAK    21 tablet    Follow package instructions       Multi-vitamin Tabs tablet      Take 1 tablet by mouth daily       sodium chloride 0.65 % nasal spray    OCEAN NASAL SPRAY    1 Bottle    Spray 1 spray into both nostrils daily as needed for congestion       tiZANidine 4 MG tablet    ZANAFLEX    30 tablet    Take 1-2 tablets (4-8 mg) by mouth 3 times daily as needed for muscle spasms       traMADol 50 MG tablet    ULTRAM    60 tablet    TAKE 1-2 TABLETS BY MOUTH EVERY 6 HOURS AS NEEDED

## 2017-05-31 ENCOUNTER — TELEPHONE (OUTPATIENT)
Dept: NURSING | Facility: CLINIC | Age: 57
End: 2017-05-31

## 2017-05-31 DIAGNOSIS — R13.10 DYSPHAGIA, UNSPECIFIED TYPE: Primary | ICD-10-CM

## 2017-05-31 NOTE — PROGRESS NOTES
Dental       Esophageal spasm-GI Upset with antibiotics  Sleep diaries  RLS: benadryl- for that deep itch  Plastic mouth: no grinding  Gabapentin changed to TID for

## 2017-05-31 NOTE — TELEPHONE ENCOUNTER
Pt was hospitalized from 5/7/17-5/8/17 for diagnoses of chest pain, thrombocytopenia, lupus, hypokalemia, sinusitis, dysphagia, chronic low back pain, and sleep apnea. A video swallow study was ordered, but order was not placed correctly by the IP provider and needs order corrected. Pended order. Can Dr. Orosco review and sign order please? Thanks!    Mala Dover RN  [facility]

## 2017-05-31 NOTE — PROGRESS NOTES
SLEEP MEDICINE CLINIC       DATE OF SLEEP CLINIC VISIT:  05/30/2017.       [facility]        CHIEF COMPLAINT:  Lesley Stafford returns to clinic today to evaluate difficulties with sleep schedule in the setting of occasional shift work, RLS, and further develop a plan of care to improve her daytime sleepiness and fatigue.      HISTORY OF PRESENT ILLNESS:  Ms. Link Stafford has a past medical history of a diagnosis of mild sleep apnea with an AHI of 6 without hypoxemia.  This study was done at [facility] 5 years ago.  She had an MSLT showing 5.9 minutes without sleep onset REM, or prestudy actigraphy.  She does a short day shift and then on occasion, works as a  until 11:00 p.m. and also is quite involved in the care of her children.  When last seen, I did provide her with an order to see Dr. Pennington to consider a dental appliance for mild noel and teeth grinding.  I do not think that she attended that appointment, but did check with her insurance at the time and felt costs were prohibative.  When I saw her last Benadryl had been stopped, she had reduced her caffeine intake and intermittent gabapentin was being added due to a neck injury.      She returns to clinic today without sleep diaries for further evaluation of the consistency of her sleep schedule and degree of shift work.  She recently had some difficulties with esophageal spasm taking medications that did not go well with her GI system.  Her restless legs are back.  She has added Benadryl for a deep itch bothering her, and has had some vaginal bleeding surrounding the time of her D&C earlier this year.  She is uncertain why she is itching.  With regard to a dental appliance, she had questions regarding coverage feeling that this would cost around $2000 out of pocket for her.  Her insurance has changed to [employer].  She did go to an over-the-counter dental appliance and has not had problems with grinding or clenching.   Gabapentin has been changed to t.i.d. for her neck discomfort and in general has an [place] Sleepiness Scale of 10 and complaints of fatigue.  Her largest complaint at this period of time appears to be related to her restless legs.      Allergies:    Allergies   Allergen Reactions     Morphine Sulfate Itching     Sulfa Drugs Hives       Medications:    Current Outpatient Prescriptions   Medication Sig Dispense Refill     ibuprofen (ADVIL/MOTRIN) 800 MG tablet TAKE ONE TABLET BY MOUTH DAILY AS NEEDED FOR MODERATE PAIN 30 tablet 0     gabapentin (NEURONTIN) 300 MG capsule TAKE 1 CAPSULE (300 MG) BY MOUTH 3 TIMES DAILY 90 capsule 1     sodium chloride (OCEAN NASAL SPRAY) 0.65 % nasal spray Spray 1 spray into both nostrils daily as needed for congestion 1 Bottle 0     tiZANidine (ZANAFLEX) 4 MG tablet Take 1-2 tablets (4-8 mg) by mouth 3 times daily as needed for muscle spasms 30 tablet 1     cyclobenzaprine (FLEXERIL) 5 MG tablet TAKE 1 TABLET (5 MG) BY MOUTH 3 TIMES DAILY 42 tablet 0     methylPREDNISolone (MEDROL DOSEPAK) 4 MG tablet Follow package instructions 21 tablet 0     hydrOXYzine (ATARAX) 25 MG tablet Take 1 tablet (25 mg) by mouth 3 times daily as needed for itching 120 tablet 11     amphetamine-dextroamphetamine (ADDERALL) 10 MG tablet Take 10 mg by mouth 2 times daily       acyclovir (ZOVIRAX) 200 MG capsule Take 1 capsule (200 mg) by mouth 5 times daily 22 capsule 0     ferrous sulfate (IRON) 325 (65 FE) MG tablet Take 1 tablet (325 mg) by mouth daily (with breakfast) 30 tablet 5     fluticasone (FLONASE) 50 MCG/ACT nasal spray Spray 1-2 sprays into both nostrils daily 16 g 11     Calcium-Magnesium-Vitamin D (CALCIUM 500 PO)        multivitamin, therapeutic with minerals (MULTI-VITAMIN) TABS Take 1 tablet by mouth daily       hydroxychloroquine (PLAQUENIL) 200 MG tablet Take 200 mg by mouth daily.       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 Provider to review and confirm.       History of claustrophobia 08/03/2015     Priority: Medium     Obesity, Class I, BMI 30-34.9 10/31/2014     Priority: Medium     Liver hemangioma 12/14/2016     4 on MRI '09       ACP (advance care planning) 03/10/2016     Advance Care Planning 3/10/2016: Receipt of ACP document:  Received: Health Care Directive which was witnessed or notarized on 2-5-16.  Document previously scanned on 2-8-16.  Validation form completed and sent to be scanned.  Code Status reflects choices in most recent ACP document.  Confirmed/documented designated decision maker(s).  Added by Kathleen Espitia RN Advance Care Planning Liaison with [employer]           Other symptoms involving nervous and musculoskeletal systems(497.11) 04/08/2015     Abnormal perimenopausal bleeding 08/24/2013     CARDIOVASCULAR SCREENING; LDL GOAL LESS THAN 130 09/24/2012     Lupus (H) 09/24/2012        Past Medical/Surgical History:  Past Medical History:   Diagnosis Date     Allergy      Anemia      Anxiety      Depressive disorder       "hands/feet      Hemoglobin C trait (H) 8/29/2016     Lupus (H)      Substance abuse     Has not used for 19 years.     Past Surgical History:   Procedure Laterality Date     COLONOSCOPY  01/2010    repeat 10 yrs     DILATION AND CURETTAGE, OPERATIVE HYSTEROSCOPY WITH MORCELLATOR, COMBINED N/A 2/15/2017    Procedure: COMBINED DILATION AND CURETTAGE, OPERATIVE HYSTEROSCOPY WITH MORCELLATOR;  Surgeon: Erin Gutierrez MD;  Location: [facility] OR     ESOPHAGOSCOPY, GASTROSCOPY, DUODENOSCOPY (EGD), COMBINED Left 2/5/2016    Procedure: COMBINED ESOPHAGOSCOPY, GASTROSCOPY, DUODENOSCOPY (EGD), BIOPSY SINGLE OR MULTIPLE;  Surgeon: Pierre Fernandez MD;  Location:  GI     GYN SURGERY             Physical Examination:  Vitals: /76  Pulse 94  Resp 20  Ht 1.651 m (5' 5\")  Wt 83 kg (183 lb)  SpO2 99%  BMI 30.45 kg/m2  BMI= Body mass index is 30.45 kg/(m^2).         [place] Total Score 5/30/2017   Total score - [place] 9       GENERAL APPEARANCE: healthy, alert and mild distress     ASSESSMENT AND PLAN:  It is my impression that multiple shifts with regard to her employment still persists.  She has had some relief of teeth grinding with her over-the-counter  dental appliance, but has no reports of snoring and has no known cause for this itch for which she is treating with Benadryl and also takes hydroxyzine for mental health issues.  We do have options with regard to the gabapentin, possibly adding more; however, I do have concerns that without knowing what her schedule is whether the sleepiness is being caused by gabapentin or related to an irregular sleep schedule and the following plan of care has been developed.   1.  History of irregular sleep-wake schedule.  Once again, I have asked for her sleep diary.  She has collected them at the  and will return to see me in 2 months.  If her sleep problems do resolve by that time I suggested that she cancel that appointment so that we can provide care for " someone else and she is agreeable to that.     2.  Restless legs syndrome, her largest concern for this visit.  I have asked that she contact her primary care regarding an alternative to sedating antihistamines for itching with her RLS.  I have also encouraged her to get a ferritin level and to stop taking the Benadryl.  She will need to do that with the assistance of her primary care.  I will get in to touch with her over the month with regard to the results of the ferritin level, make suggestions in that regard and see her back in clinic to make further assessment before we consider a need to go up on gabapentin at h.s.    3.  Teeth clenching and grinding versus a concern regarding untreated obstructive sleep apnea.  She will have a roommate in a short period of time who can tell her if she snores.  If she is getting adequate coverage with an over-the-counter device for teeth clenching and grinding, we can stop there, otherwise with her change in insurance, she would need to go to likely a different provider for a dental appliance, having MA.  At this point in time, this does not seem to be her major concern.      Thank you for allowing me to participate in this kind woman's care.  I will add her primary care to this note, Dr. Rafael Orosco with regard to my concerns regarding this itch which she is treating with Benadryl but likely this is the cause of her resurgence of her RLS.  Followup in two months unless her symptoms resolve.      Time spent with patient 30 minutes, of which greater than 50% was spent in counseling, education and coordination of care.         THOMAS OCHOA, CNP             D: 2017 06:07   T: 2017 08:18   MT: DA      Name:     CARA GIL   MRN:      [id number]        Account:      [id number]   :      1960           Visit Date:   2017      Document: [id number]

## 2017-06-06 ENCOUNTER — THERAPY VISIT (OUTPATIENT)
Dept: PHYSICAL THERAPY | Facility: CLINIC | Age: 57
End: 2017-06-06
Payer: COMMERCIAL

## 2017-06-06 DIAGNOSIS — G89.29 CHRONIC BILATERAL LOW BACK PAIN WITHOUT SCIATICA: ICD-10-CM

## 2017-06-06 DIAGNOSIS — M54.50 CHRONIC BILATERAL LOW BACK PAIN WITHOUT SCIATICA: ICD-10-CM

## 2017-06-06 PROCEDURE — 97110 THERAPEUTIC EXERCISES: CPT | Mod: GP | Performed by: PHYSICAL THERAPIST

## 2017-06-06 PROCEDURE — 97112 NEUROMUSCULAR REEDUCATION: CPT | Mod: GP | Performed by: PHYSICAL THERAPIST

## 2017-06-06 NOTE — MR AVS SNAPSHOT
After Visit Summary   6/6/2017    Lesley Stafford    MRN: [id number]           Patient Information     Date Of Birth          1960        Visit Information        Provider Department      6/6/2017 9:00 AM Grzegorz Mendoza PT [place] [facility]        Today's Diagnoses     Chronic bilateral low back pain without sciatica           Follow-ups after your visit        Your next 10 appointments already scheduled     Jun 27, 2017  2:30 PM CDT   XR VIDEO SPEECH EVALUATION WITH ESOPHAGRAM with SHXR5   [facility] ([facility])    [address] [address] [phone]           Please bring a list of your current medicines to your exam. (Include vitamins, minerals and over-the-counter medicines.) Leave your valuables at home.  Tell the doctor if there is a chance you could be pregnant.  Do not eat for 8 hours before the exam. Keep drinking clear liquids until 2 hours before the exam.  You may take pain medicine (with a sip of water) up to 4 hours before the exam.  Do not swallow any other medicines unless your doctor tells you to. Talk to your doctor to be sure it s safe to stop your medicines.  Please call the Imaging Department at your exam site with any questions.            Jun 27, 2017  2:30 PM CDT   Video Swallow with SH SLP OP VFSS   [facility] ([facility])    [address]  [facility] [address] [phone]            Jun 30, 2017  8:30 AM CDT   XR ESOPHAGRAM with SHXR5   [facility] ([facility])    [facility] [id number] [phone]           Please bring a list of your current medicines to your exam. (Include vitamins, minerals and over-the-counter medicines.) Leave your valuables at home.  Tell the doctor if there is a chance you could be pregnant.  Do not eat for 8 hours  "before the exam. Keep drinking clear liquids until 2 hours before the exam.  You may take pain medicine (with a sip of water) up to 4 hours before the exam.  Do not swallow any other medicines unless your doctor tells you to. Talk to your doctor to be sure it s safe to stop your medicines.  Please call the Imaging Department at your exam site with any questions.            Jul 20, 2017  9:30 AM CDT   Return Sleep Patient with THOMAS Medina CNP   [facility] [place] ([facility])    [address] [place] [address] [phone]              Who to contact     If you have questions or need follow up information about today's clinic visit or your schedule please contact [place] [facility] directly at [phone].  Normal or non-critical lab and imaging results will be communicated to you by MyChart, letter or phone within 4 business days after the clinic has received the results. If you do not hear from us within 7 days, please contact the clinic through Eye[facility] or phone. If you have a critical or abnormal lab result, we will notify you by phone as soon as possible.  Submit refill requests through [facility] or call your pharmacy and they will forward the refill request to us. Please allow 3 business days for your refill to be completed.          Additional Information About Your Visit        MyChart Information     [facility] lets you send messages to your doctor, view your test results, renew your prescriptions, schedule appointments and more. To sign up, go to [email or web address] . Click on \"Log in\" on the left side of the screen, which will take you to the Welcome page. Then click on \"Sign up Now\" on the right side of the page.     You will be asked to enter the access code listed below, as well as some personal information. Please follow the directions to create your username and password.   "   Your access code is: [email or web address]  Expires: 2017  3:10 PM     Your access code will  in 90 days. If you need help or a new code, please call your [facility] clinic or [phone].        Care EveryWhere ID     This is your Care EveryWhere ID. This could be used by other organizations to access your [facility] medical records  [id number]         Blood Pressure from Last 3 Encounters:   17 113/76   17 96/71   17 104/62    Weight from Last 3 Encounters:   17 83 kg (183 lb)   17 84.8 kg (187 lb)   17 85.3 kg (188 lb)              We Performed the Following     NEUROMUSCULAR RE-EDUCATION     THERAPEUTIC EXERCISES        Primary Care Provider Office Phone # Fax #    Rafael Orosco [phone]       [facility] [address] [address]        Thank you!     Thank you for [facility]  for your care. Our goal is always to provide you with excellent care. Hearing back from our patients is one way we can continue to improve our services. Please take a few minutes to complete the written survey that you may receive in the mail after your visit with us. Thank you!             Your Updated Medication List - Protect others around you: Learn how to safely use, store and throw away your medicines at www.disposemymeds.org.          This list is accurate as of: 17 11:59 PM.  Always use your most recent med list.                   Brand Name Dispense Instructions for use    acyclovir 200 MG capsule    ZOVIRAX    22 capsule    Take 1 capsule (200 mg) by mouth 5 times daily       amphetamine-dextroamphetamine 10 MG per tablet    ADDERALL     Take 10 mg by mouth 2 times daily       CALCIUM 500 PO          cetirizine 10 MG tablet    zyrTEC    30 tablet    Take 1 tablet (10 mg) by mouth every evening       cyclobenzaprine 5 MG tablet    FLEXERIL    42 tablet    TAKE 1  TABLET (5 MG) BY MOUTH 3 TIMES DAILY       ferrous sulfate 325 (65 FE) MG tablet    IRON    30 tablet    Take 1 tablet (325 mg) by mouth daily (with breakfast)       fluticasone 50 MCG/ACT spray    FLONASE    16 g    Spray 1-2 sprays into both nostrils daily       gabapentin 300 MG capsule    NEURONTIN    90 capsule    TAKE 1 CAPSULE (300 MG) BY MOUTH 3 TIMES DAILY       hydroxychloroquine 200 MG tablet    PLAQUENIL     Take 200 mg by mouth daily.       hydrOXYzine 25 MG tablet    ATARAX    120 tablet    Take 1 tablet (25 mg) by mouth 3 times daily as needed for itching       ibuprofen 800 MG tablet    ADVIL/MOTRIN    30 tablet    TAKE ONE TABLET BY MOUTH DAILY AS NEEDED FOR MODERATE PAIN       methylPREDNISolone 4 MG tablet    MEDROL DOSEPAK    21 tablet    Follow package instructions       Multi-vitamin Tabs tablet      Take 1 tablet by mouth daily       sodium chloride 0.65 % nasal spray    OCEAN NASAL SPRAY    1 Bottle    Spray 1 spray into both nostrils daily as needed for congestion       tiZANidine 4 MG tablet    ZANAFLEX    30 tablet    Take 1-2 tablets (4-8 mg) by mouth 3 times daily as needed for muscle spasms       traMADol 50 MG tablet    ULTRAM    60 tablet    TAKE 1-2 TABLETS BY MOUTH EVERY 6 HOURS AS NEEDED

## 2017-06-07 NOTE — PROGRESS NOTES
Subjective:    HPI                    Objective:    System    Physical Exam    General     ROS    Assessment/Plan:      PROGRESS  REPORT    Progress reporting period is from 4- to 6-7-2017.       SUBJECTIVE  Subjective changes noted by patient:  Pt reports that her low back pain is improved quite a bit.  She is able to sit longer and finds getting up from sitting is easier but she still can feel stiff if she sits too long.       Current pain level is  Current Pain level: 3/10.     Previous pain level was   Initial Pain level: 7/10.   Changes in function:  Yes (See Goal flowsheet attached for changes in current functional level)  Adverse reaction to treatment or activity: None    OBJECTIVE  Changes noted in objective findings:  Lumbar AROM:  Flexion WNL, Extension 90%, B side bending WNL.  Pt is advancing with core strengthening and tolerates this well.        ASSESSMENT/PLAN  Updated problem list and treatment plan: Diagnosis 1:  LBP    STG/LTGs have been met or progress has been made towards goals:  Yes (See Goal flow sheet completed today.)  Assessment of Progress: The patient's condition is improving.  Self Management Plans:  Patient has been instructed in a home treatment program.  Lesley continues to require the following intervention to meet STG and LTG's:  PT intervention is no longer required to meet STG/LTG.    Recommendations:  This patient is ready to be discharged from therapy and continue their home treatment program.    Please refer to the daily flowsheet for treatment today, total treatment time and time spent performing 1:1 timed codes.

## 2017-06-10 DIAGNOSIS — M25.561 CHRONIC ARTHRALGIAS OF KNEES AND HIPS: ICD-10-CM

## 2017-06-10 DIAGNOSIS — M25.562 CHRONIC ARTHRALGIAS OF KNEES AND HIPS: ICD-10-CM

## 2017-06-10 DIAGNOSIS — M25.552 CHRONIC ARTHRALGIAS OF KNEES AND HIPS: ICD-10-CM

## 2017-06-10 DIAGNOSIS — M25.551 CHRONIC ARTHRALGIAS OF KNEES AND HIPS: ICD-10-CM

## 2017-06-10 DIAGNOSIS — G89.29 CHRONIC ARTHRALGIAS OF KNEES AND HIPS: ICD-10-CM

## 2017-06-12 RX ORDER — TRAMADOL HYDROCHLORIDE 50 MG/1
TABLET ORAL
Qty: 60 TABLET | Refills: 0 | Status: SHIPPED | OUTPATIENT
Start: 2017-06-12 | End: 2018-06-15

## 2017-06-12 NOTE — TELEPHONE ENCOUNTER
Dispensed on 4/22/17 #60.    Reviewed  and no concerns.    Mala Dover RN  [facility]

## 2017-06-12 NOTE — TELEPHONE ENCOUNTER
Script was faxed to the [facility] in [place] off of [facility].

## 2017-06-27 ENCOUNTER — HOSPITAL ENCOUNTER (OUTPATIENT)
Dept: GENERAL RADIOLOGY | Facility: CLINIC | Age: 57
Discharge: HOME | End: 2017-06-27
Attending: FAMILY MEDICINE | Admitting: FAMILY MEDICINE
Payer: COMMERCIAL

## 2017-06-27 ENCOUNTER — HOSPITAL ENCOUNTER (OUTPATIENT)
Dept: SPEECH THERAPY | Facility: CLINIC | Age: 57
End: 2017-06-27
Attending: FAMILY MEDICINE
Payer: COMMERCIAL

## 2017-06-27 DIAGNOSIS — R13.10 DYSPHAGIA, UNSPECIFIED TYPE: ICD-10-CM

## 2017-06-27 PROCEDURE — 40000211 ZZHC STATISTIC SLP  DEPARTMENT VISIT: Performed by: SPEECH-LANGUAGE PATHOLOGIST

## 2017-06-27 PROCEDURE — 74230 X-RAY XM SWLNG FUNCJ C+: CPT

## 2017-06-27 PROCEDURE — 92611 MOTION FLUOROSCOPY/SWALLOW: CPT | Mod: GN | Performed by: SPEECH-LANGUAGE PATHOLOGIST

## 2017-06-27 NOTE — PROGRESS NOTES
06/27/17 1547   General Information   Type Of Visit Initial   Start Of Care Date 06/27/17   Referring Physician Ginny Petty CNP   Medical Diagnosis Dysphagia   Onset Of Illness/injury Or Date Of Surgery (Unknown; Issues reported in 5/2017.)   Patient/family Goals To decrease difficulty swallowing.  Patient agreed to increased use of precautions/strategies.   General Information Comments Patient reports difficulty swallowing approximately 3x/week.  She feels food get stuck.  She has difficulty with liquids at times.  She has a hx of GERD, osteophytes, lupus.  She was hospitalized in 5/2017 due to chest pain with no MI found.   FALL RISK SCREEN   Comments See radiology staff documentation.   VFSS Eval: Radiology   Radiologist Dr. Carranza   Views Taken left lateral   Physical Location of Procedure [facility]   VFSS Eval: Thin Liquid Texture Trial   Mode of Presentation, Thin Liquid cup   Order of Presentation 1, 2, 3, 5, 8   Preparatory Phase Poor bolus control   Oral Phase, Thin Liquid Premature pharyngeal entry   Pharyngeal Phase, Thin Liquid Delayed swallow reflex   Rosenbek's Penetration Aspiration Scale: Thin Liquid Trial Results 1 - no aspiration, contrast does not enter airway   Diagnostic Statement Mild-min decreased epiglottic inversion, min pharyngeal residue, osteophytes at level of UES with slightly wider space above UES, mild-min residue at UES with slight reflux back to pyriform sinsuses, delayed swallow reflex, bolus under the epiglottis, 2nd swallows help clear some residue   VFSS Eval: Nectar Thick Liquid Texture Trial   Order of Presentation Did not test given performance with other textures   VFSS Eval: Honey Thick Texture Trial   Order of Presentation Did not test given performance with other textures   VFSS Eval: Puree Solid Texture Trial   Mode of Presentation, Puree spoon   Order of Presentation 4   Preparatory Phase Poor bolus control   Oral Phase, Puree Premature pharyngeal entry   Pharyngeal Phase,  Puree (no delay)   Rosenbek's Penetration Aspiration Scale: Puree Food Trial Results 1 - no aspiration, contrast does not enter airway   Diagnostic Statement Mild-min decreased epiglottic inversion, min pharyngeal residue, osteophytes at level of UES with slightly wider space above UES, mild residue at UES with slight reflux back to pyriform sinsuses, 3 swallows did not clear residue at UES, alternating to thin helped to clear residue at UES   VFSS Eval: Semisolid Texture Trial   Mode of Presentation, Semisolid spoon   Order of Presentation 6   Preparatory Phase Poor bolus control   Oral Phase, Semisolid Premature pharyngeal entry   Pharyngeal Phase, Semisolid Delayed swallow reflex   Rosenbek's Penetration Aspiration Scale: Semisolid Food Trial Results 1 - no aspiration, contrast does not enter airway   Diagnostic Statement Mild-min decreased epiglottic inversion, min pharyngeal residue, osteophytes at level of UES with slightly wider space above UES, min residue at UES with slight reflux back to pyriform sinsuses   VFSS Eval: Solid Food Texture Trial   Mode of Presentation, Solid spoon   Order of Presentation 7   Preparatory Phase Poor bolus control   Oral Phase, Solid Premature pharyngeal entry   Pharyngeal Phase, Solid Delayed swallow reflex   Rosenbek's Penetration Aspiration Scale: Solid Food Trial Results 1 - no aspiration, contrast does not enter airway   Diagnostic Statement Mild-min decreased epiglottic inversion, min pharyngeal residue, osteophytes at level of UES with slightly wider space above UES, mild residue at UES with slight reflux back to pyriform sinsuses, mild oral residue, 2 swallows did not fully clear residue at UES, alternating to thin helped to clear residue at UES   Swallow Compensations   Swallow Compensations Effortful swallow;Reduce amounts;Multiple swallow;Alternate viscosity of consistencies   Results (No aspiration )   Educational Assessment   Barriers to Learning No barriers    Preferred Learning Style Listening;Pictures/video   Esophageal Phase of Swallow   Patient reports or presents with symptoms of esophageal dysphagia Yes   Esophageal sweep performed during today s vidofluoroscopic exam  No   Esophageal comments Esophagram scheduled for 6/30   Swallow Eval: Clinical Impressions   Skilled Criteria for Therapy Intervention No problems identified which require skilled intervention   Functional Assessment Scale (FAS) 5   Dysphagia Outcome Severity Scale (YESSI) Level 5 - YESSI   Treatment Diagnosis mild oral-pharyngeal dysphagia   Diet texture recommendations Regular diet;Thin liquids  (select softer moist foods, chew solids carefully, see list of precautions/strategies below)   Recommended Feeding/Eating Techniques (see below)   Rehab Potential good, to achieve stated therapy goals   Therapy Frequency (N/A)   Predicted Duration of Therapy Intervention (days/wks) (N/A)   Anticipated Discharge Disposition home   Risks and Benefits of Treatment have been explained. Yes   Patient, family and/or staff in agreement with Plan of Care Yes   Clinical Impression Comments Patient presents with mild oral-pharyngeal and UES deficits per study results.  Deficits include swallow delay, mild-min decreased epiglottic inversion, and osteophytes at level of UES with slightly wider space above UES.  Deficits resulted in bolus under the epiglottis with thin liquids, min pharyngeal residue, and mild-min residue at the UES with slight reflux back to pyriform sinsuses.  Multiple swallows and alternating textures helped to clear residue at/above the UES.  No penetration/aspiration was observed.  Recommend thin liquids and regular diet with soft moist diet textures and increased use of precautions/strategies including:  sit up at 90 degrees, small bites/sips, chew carefully, multiple swallows, alternate textures, slow rate.  Education was provided after the study, and patient verbalized understanding.  Recommend  proceeding with an esophagram as scheduled on 6/30 to fully assess esophageal function.  Recommend a repeat OP video swallow study if increased dysphagia symptoms are noted despite increased use of precautions.  Patient verbalized understanding.  No swallow Tx is indicated at this time.   Total Session Time   Total Session Time 2:40-3:10pm (30 minutes)

## 2017-06-28 ENCOUNTER — OFFICE VISIT (OUTPATIENT)
Dept: FAMILY MEDICINE | Facility: CLINIC | Age: 57
End: 2017-06-28
Payer: COMMERCIAL

## 2017-06-28 VITALS
HEIGHT: 65 IN | BODY MASS INDEX: 29.66 KG/M2 | HEART RATE: 91 BPM | DIASTOLIC BLOOD PRESSURE: 78 MMHG | WEIGHT: 178 LBS | OXYGEN SATURATION: 99 % | SYSTOLIC BLOOD PRESSURE: 118 MMHG | TEMPERATURE: 98.1 F

## 2017-06-28 DIAGNOSIS — M32.9 SYSTEMIC LUPUS ERYTHEMATOSUS, UNSPECIFIED SLE TYPE, UNSPECIFIED ORGAN INVOLVEMENT STATUS (H): ICD-10-CM

## 2017-06-28 DIAGNOSIS — F41.9 ANXIETY: ICD-10-CM

## 2017-06-28 DIAGNOSIS — F32.A DEPRESSIVE DISORDER: ICD-10-CM

## 2017-06-28 DIAGNOSIS — G89.29 CHRONIC BILATERAL LOW BACK PAIN WITHOUT SCIATICA: Primary | ICD-10-CM

## 2017-06-28 DIAGNOSIS — M54.50 CHRONIC BILATERAL LOW BACK PAIN WITHOUT SCIATICA: Primary | ICD-10-CM

## 2017-06-28 PROCEDURE — 99214 OFFICE O/P EST MOD 30 MIN: CPT | Performed by: FAMILY MEDICINE

## 2017-06-28 NOTE — PROGRESS NOTES
SUBJECTIVE:                                                    Lesley Stafford is a 56 year old female who presents to clinic today for the following health issues:    Disability Forms:  Patient says that she continues to have problems with back pain, with 2 bulging discs. She says that she has been going to physical therapy which was helping, but she hasn't been in the last 2 weeks and has started to develop worse pain again. She has forms to be completed for disability that she would like to fill out today. Patient has a history of lupus, carpal tunnel, and pain in her shoulder in addition to back pain, and says that work can be very difficult for her to complete. She has continued to struggle with depression and anxiety, feelings of tiredness, and dyslexia, which also contribute to making tasks difficult. She wonders if it might be a good idea to have a full assessment completed of her cognitive functioning.    Dysphagia:  Patient says that she has been having problems with swallowing, and has recently had an Xray esophagram to determine what might be causing her problems. She says that a diverticulum was found along with minimal stasis of the upper cervical esophagus which is possibly related to an anterior osteophyte.    Here today to get forms completed for disability .     Problem list and histories reviewed & adjusted, as indicated.  Additional history: as documented    Patient Active Problem List   Diagnosis     CARDIOVASCULAR SCREENING; LDL GOAL LESS THAN 130     Lupus (H)     Abnormal perimenopausal bleeding     Obesity, Class I, BMI 30-34.9     Nerv/musculskel sym NEC     History of claustrophobia     Cervicalgia     Pain in joint of left shoulder     Anemia due to blood loss, acute     Anemia     Depressive disorder     Anxiety     Allergy     ACP (advance care planning)     Tired     Insomnia, unspecified type     Hemoglobin C trait (H)     Liver hemangioma     Bilateral carpal tunnel syndrome      Chronic bilateral low back pain without sciatica     Past Surgical History:   Procedure Laterality Date     COLONOSCOPY  01/2010    repeat 10 yrs     DILATION AND CURETTAGE, OPERATIVE HYSTEROSCOPY WITH MORCELLATOR, COMBINED N/A 2/15/2017    Procedure: COMBINED DILATION AND CURETTAGE, OPERATIVE HYSTEROSCOPY WITH MORCELLATOR;  Surgeon: Erin Gutierrez MD;  Location: [facility]     ESOPHAGOSCOPY, GASTROSCOPY, DUODENOSCOPY (EGD), COMBINED Left 2/5/2016    Procedure: COMBINED ESOPHAGOSCOPY, GASTROSCOPY, DUODENOSCOPY (EGD), BIOPSY SINGLE OR MULTIPLE;  Surgeon: Pierre Fernandez MD;  Location:  GI     GYN SURGERY         Social History   Substance Use Topics     Smoking status: Former Smoker     Smokeless tobacco: Never Used     Alcohol use No     Family History   Problem Relation Age of Onset     Lupus Mother      DIABETES Father      Brain Tumor Father      begnign on pituitary     Depression Sister      Anemia Sister          Current Outpatient Prescriptions   Medication Sig Dispense Refill     traMADol (ULTRAM) 50 MG tablet TAKE 1-2 TABLETS BY MOUTH EVERY 6 HOURS AS NEEDED 60 tablet 0     ibuprofen (ADVIL/MOTRIN) 800 MG tablet TAKE ONE TABLET BY MOUTH DAILY AS NEEDED FOR MODERATE PAIN 30 tablet 0     gabapentin (NEURONTIN) 300 MG capsule TAKE 1 CAPSULE (300 MG) BY MOUTH 3 TIMES DAILY 90 capsule 1     sodium chloride (OCEAN NASAL SPRAY) 0.65 % nasal spray Spray 1 spray into both nostrils daily as needed for congestion 1 Bottle 0     tiZANidine (ZANAFLEX) 4 MG tablet Take 1-2 tablets (4-8 mg) by mouth 3 times daily as needed for muscle spasms 30 tablet 1     cyclobenzaprine (FLEXERIL) 5 MG tablet TAKE 1 TABLET (5 MG) BY MOUTH 3 TIMES DAILY 42 tablet 0     hydrOXYzine (ATARAX) 25 MG tablet Take 1 tablet (25 mg) by mouth 3 times daily as needed for itching 120 tablet 11     amphetamine-dextroamphetamine (ADDERALL) 10 MG tablet Take 10 mg by mouth 2 times daily       acyclovir (ZOVIRAX) 200 MG capsule Take 1  "capsule (200 mg) by mouth 5 times daily 22 capsule 0     ferrous sulfate (IRON) 325 (65 FE) MG tablet Take 1 tablet (325 mg) by mouth daily (with breakfast) 30 tablet 5     Calcium-Magnesium-Vitamin D (CALCIUM 500 PO)        multivitamin, therapeutic with minerals (MULTI-VITAMIN) TABS Take 1 tablet by mouth daily       hydroxychloroquine (PLAQUENIL) 200 MG tablet Take 200 mg by mouth daily.       cetirizine (ZYRTEC) 10 MG tablet Take 1 tablet (10 mg) by mouth every evening (Patient not taking: Reported on 6/28/2017) 30 tablet 1     Allergies   Allergen Reactions     Morphine Sulfate Itching     Sulfa Drugs Hives       Reviewed and updated as needed this visit by clinical staff       Reviewed and updated as needed this visit by Provider         ROS:  Positive for back and joint pain. Positive for dysphagia.    Denies headache, insomnia, chest pain, shortness of breath, cough, heartburn, bowel issues, bladder issues, neck pain, knee pain, ankle pain, or foot pain. Remainder of ROS is negative unless otherwise noted above or in HPI.    This document serves as a record of the services and decisions personally performed and made by Rafael Orosco MD. It was created on his behalf by Albino Manning, a trained medical scribe. The creation of this document is based the provider's statements to the medical scribe.  Albino Manning 3:25 PM June 28, 2017    OBJECTIVE:     /78  Pulse 91  Temp 98.1  F (36.7  C) (Oral)  Ht 1.651 m (5' 5\")  Wt 80.7 kg (178 lb)  SpO2 99%  BMI 29.62 kg/m2  Body mass index is 29.62 kg/(m^2).  GENERAL: healthy, alert and no distress  NEURO: Normal strength and tone, mentation intact and speech normal  PSYCH: mentation appears normal, affect normal/bright    Diagnostic Test Results:  No results found for this or any previous visit (from the past 24 hour(s)).    ASSESSMENT/PLAN:       ICD-10-CM    1. Chronic bilateral low back pain without sciatica M54.5     G89.29    2. Systemic " lupus erythematosus, unspecified SLE type, unspecified organ involvement status (H) M32.9    3. Depressive disorder F32.9    4. Anxiety F41.9        Followup as needed if problems worse.    The information in this document, created by the medical scribe for me, accurately reflects the services I personally performed and the decisions made by me. I have reviewed and approved this document for accuracy prior to leaving the patient care area.   Rafael Orosco MD 3:25 PM June 28, 2017  [facility]

## 2017-06-28 NOTE — MR AVS SNAPSHOT
After Visit Summary   6/28/2017    Lesley Stafford    MRN: [id number]           Patient Information     Date Of Birth          1960        Visit Information        Provider Department      6/28/2017 3:00 PM Rafael Orosco MD [facility]        Today's Diagnoses     Chronic bilateral low back pain without sciatica    -  1    Systemic lupus erythematosus, unspecified SLE type, unspecified organ involvement status (H)        Depressive disorder        Anxiety           Follow-ups after your visit        Your next 10 appointments already scheduled     Jun 30, 2017  8:30 AM CDT   XR ESOPHAGRAM with SHXR5   [facility] ([facility])    [address] [facility] [id number] [phone]           Please bring a list of your current medicines to your exam. (Include vitamins, minerals and over-the-counter medicines.) Leave your valuables at home.  Tell the doctor if there is a chance you could be pregnant.  Do not eat for 8 hours before the exam. Keep drinking clear liquids until 2 hours before the exam.  You may take pain medicine (with a sip of water) up to 4 hours before the exam.  Do not swallow any other medicines unless your doctor tells you to. Talk to your doctor to be sure it s safe to stop your medicines.  Please call the Imaging Department at your exam site with any questions.            Jul 20, 2017  9:30 AM CDT   Return Sleep Patient with THOMAS Medina CNP   [facility], [place], Sleep Study ([facility])    [address] [place] [id number] [phone]              Who to contact     If you have questions or need follow up information about today's clinic visit or your schedule please contact [facility] directly at [phone].  Normal or non-critical lab and imaging results will be communicated to you by MyChart, letter or phone within 4  "business days after the clinic has received the results. If you do not hear from us within 7 days, please contact the clinic through [employer] or phone. If you have a critical or abnormal lab result, we will notify you by phone as soon as possible.  Submit refill requests through [employer] or call your pharmacy and they will forward the refill request to us. Please allow 3 business days for your refill to be completed.          Additional Information About Your Visit        [employer] Information     [employer] lets you send messages to your doctor, view your test results, renew your prescriptions, schedule appointments and more. To sign up, go to [email or web address] . Click on \"Log in\" on the left side of the screen, which will take you to the Welcome page. Then click on \"Sign up Now\" on the right side of the page.     You will be asked to enter the access code listed below, as well as some personal information. Please follow the directions to create your username and password.     Your access code is: [email or web address]  Expires: 2017 11:19 PM     Your access code will  in 90 days. If you need help or a new code, please call your [facility] clinic or [phone].        Care EveryWhere ID     This is your [facility] EveryWhere ID. This could be used by other organizations to access your [facility] medical records  [id number]        Your Vitals Were     Pulse Temperature Height Pulse Oximetry BMI (Body Mass Index)       91 98.1  F (36.7  C) (Oral) 5' 5\" (1.651 m) 99% 29.62 kg/m2        Blood Pressure from Last 3 Encounters:   17 118/78   17 113/76   17 96/71    Weight from Last 3 Encounters:   17 178 lb (80.7 kg)   17 183 lb (83 kg)   17 187 lb (84.8 kg)              Today, you had the following     No orders found for display       Primary Care Provider Office Phone # Fax #    Rafael Orosco [phone]       [place] [address] [address]  [facility] [address]        Equal Access to Services     SHANI SUAREZ : Hadii aad ku hadcarrollamarilis Vences, wadeboda alcira, qaybta ferchomamohamud jamil, devi amosjaneycasie levin. So [facility] [phone].    ATENCIÓN: Si habla español, tiene a levine disposición servicios gratuitos de asistencia lingüística. Brockame al [phone].    We comply with applicable federal civil rights laws and [place] laws. We do not discriminate on the basis of race, color, national origin, age, disability sex, sexual orientation or gender identity.            Thank you!     Thank you for choosing [facility]  for your care. Our goal is always to provide you with excellent care. Hearing back from our patients is one way we can continue to improve our services. Please take a few minutes to complete the written survey that you may receive in the mail after your visit with us. Thank you!             Your Updated Medication List - Protect others around you: Learn how to safely use, store and throw away your medicines at www.disposemymeds.org.          This list is accurate as of: 6/28/17 11:59 PM.  Always use your most recent med list.                   Brand Name Dispense Instructions for use Diagnosis    acyclovir 200 MG capsule    ZOVIRAX    22 capsule    Take 1 capsule (200 mg) by mouth 5 times daily    HSV (herpes simplex virus) infection       amphetamine-dextroamphetamine 10 MG per tablet    ADDERALL     Take 10 mg by mouth 2 times daily        CALCIUM 500 PO           cetirizine 10 MG tablet    zyrTEC    30 tablet    Take 1 tablet (10 mg) by mouth every evening    Seasonal allergic rhinitis       cyclobenzaprine 5 MG tablet    FLEXERIL    42 tablet    TAKE 1 TABLET (5 MG) BY MOUTH 3 TIMES DAILY    Back pain, unspecified back location, unspecified back pain laterality, unspecified chronicity, Hip pain, bilateral, Trochanteric bursitis of both hips       ferrous sulfate 325 (65 FE) MG tablet    IRON    30  tablet    Take 1 tablet (325 mg) by mouth daily (with breakfast)    Other iron deficiency anemia       gabapentin 300 MG capsule    NEURONTIN    90 capsule    TAKE 1 CAPSULE (300 MG) BY MOUTH 3 TIMES DAILY    Peripheral polyneuropathy (H)       hydroxychloroquine 200 MG tablet    PLAQUENIL     Take 200 mg by mouth daily.        hydrOXYzine 25 MG tablet    ATARAX    120 tablet    Take 1 tablet (25 mg) by mouth 3 times daily as needed for itching    Anxiety       ibuprofen 800 MG tablet    ADVIL/MOTRIN    30 tablet    TAKE ONE TABLET BY MOUTH DAILY AS NEEDED FOR MODERATE PAIN    Chronic arthralgias of knees and hips       Multi-vitamin Tabs tablet      Take 1 tablet by mouth daily        sodium chloride 0.65 % nasal spray    OCEAN NASAL SPRAY    1 Bottle    Spray 1 spray into both nostrils daily as needed for congestion    Sinusitis, unspecified chronicity, unspecified location       tiZANidine 4 MG tablet    ZANAFLEX    30 tablet    Take 1-2 tablets (4-8 mg) by mouth 3 times daily as needed for muscle spasms    Myalgia       traMADol 50 MG tablet    ULTRAM    60 tablet    TAKE 1-2 TABLETS BY MOUTH EVERY 6 HOURS AS NEEDED    Lupus (H), Chronic arthralgias of knees and hips

## 2017-06-28 NOTE — PROGRESS NOTES
"Chief Complaint   Patient presents with     Forms       Initial /78  Pulse 91  Temp 98.1  F (36.7  C) (Oral)  Ht 5' 5\" (1.651 m)  Wt 178 lb (80.7 kg)  SpO2 99%  BMI 29.62 kg/m2 Estimated body mass index is 29.62 kg/(m^2) as calculated from the following:    Height as of this encounter: 5' 5\" (1.651 m).    Weight as of this encounter: 178 lb (80.7 kg).  Medication Reconciliation: complete     Sabi Katz MA      "

## 2017-06-29 ASSESSMENT — PATIENT HEALTH QUESTIONNAIRE - PHQ9: SUM OF ALL RESPONSES TO PHQ QUESTIONS 1-9: 14

## 2017-06-30 ENCOUNTER — HOSPITAL ENCOUNTER (OUTPATIENT)
Dept: GENERAL RADIOLOGY | Facility: CLINIC | Age: 57
Discharge: HOME | End: 2017-06-30
Attending: FAMILY MEDICINE | Admitting: FAMILY MEDICINE
Payer: COMMERCIAL

## 2017-06-30 DIAGNOSIS — R13.10 DYSPHAGIA, UNSPECIFIED TYPE: ICD-10-CM

## 2017-06-30 LAB — PHQ9 SCORE: 19

## 2017-06-30 PROCEDURE — 74220 X-RAY XM ESOPHAGUS 1CNTRST: CPT

## 2017-07-20 ENCOUNTER — OFFICE VISIT (OUTPATIENT)
Dept: SLEEP MEDICINE | Facility: CLINIC | Age: 57
End: 2017-07-20
Attending: NURSE PRACTITIONER
Payer: COMMERCIAL

## 2017-07-20 VITALS
OXYGEN SATURATION: 98 % | SYSTOLIC BLOOD PRESSURE: 120 MMHG | WEIGHT: 178 LBS | HEIGHT: 65 IN | RESPIRATION RATE: 18 BRPM | HEART RATE: 95 BPM | DIASTOLIC BLOOD PRESSURE: 75 MMHG | BODY MASS INDEX: 29.66 KG/M2

## 2017-07-20 DIAGNOSIS — G47.8 UNHEALTHY SLEEP HABIT: ICD-10-CM

## 2017-07-20 DIAGNOSIS — G47.21 CIRCADIAN RHYTHM SLEEP DISORDER, DELAYED SLEEP PHASE TYPE: ICD-10-CM

## 2017-07-20 DIAGNOSIS — G47.33 OSA (OBSTRUCTIVE SLEEP APNEA): Primary | ICD-10-CM

## 2017-07-20 DIAGNOSIS — G25.81 RESTLESS LEGS SYNDROME (RLS): ICD-10-CM

## 2017-07-20 PROCEDURE — 99211 OFF/OP EST MAY X REQ PHY/QHP: CPT | Mod: ZF

## 2017-07-20 NOTE — NURSING NOTE
"Chief Complaint   Patient presents with     RECHECK     Follow up to discuss sleep diaries       Initial /75  Pulse 95  Resp 18  Ht 1.651 m (5' 5\")  Wt 80.7 kg (178 lb)  SpO2 98%  BMI 29.62 kg/m2 Estimated body mass index is 29.62 kg/(m^2) as calculated from the following:    Height as of this encounter: 1.651 m (5' 5\").    Weight as of this encounter: 80.7 kg (178 lb).  Medication Reconciliation: complete     ROSSANA Beltran        "

## 2017-07-20 NOTE — PATIENT INSTRUCTIONS
For benadryl: zyrtec prescription, claritin  Move gabapentin: 1.5 hrs before bedtime    Referral for MAD    Same wakeup time    Follow up as needed.    Ferritin level  Your BMI is Body mass index is 29.62 kg/(m^2).  Weight management is a personal decision.  If you are interested in exploring weight loss strategies, the following discussion covers the approaches that may be successful. Body mass index (BMI) is one way to tell whether you are at a healthy weight, overweight, or obese. It measures your weight in relation to your height.  A BMI of 18.5 to 24.9 is in the healthy range. A person with a BMI of 25 to 29.9 is considered overweight, and someone with a BMI of 30 or greater is considered obese. More than two-thirds of American adults are considered overweight or obese.  Being overweight or obese increases the risk for further weight gain. Excess weight may lead to heart disease and diabetes.  Creating and following plans for healthy eating and physical activity may help you improve your health.  Weight control is part of healthy lifestyle and includes exercise, emotional health, and healthy eating habits. Careful eating habits lifelong are the mainstay of weight control. Though there are significant health benefits from weight loss, long-term weight loss with diet alone may be very difficult to achieve- studies show long-term success with dietary management in less than 10% of people. Attaining a healthy weight may be especially difficult to achieve in those with severe obesity. In some cases, medications, devices and surgical management might be considered.  What can you do?  If you are overweight or obese and are interested in methods for weight loss, you should discuss this with your provider.     Consider reducing daily calorie intake by 500 calories.     Keep a food journal.     Avoiding skipping meals, consider cutting portions instead.    Diet combined with exercise helps maintain muscle while  optimizing fat loss. Strength training is particularly important for building and maintaining muscle mass. Exercise helps reduce stress, increase energy, and improves fitness. Increasing exercise without diet control, however, may not burn enough calories to loose weight.       Start walking three days a week 10-20 minutes at a time    Work towards walking thirty minutes five days a week     Eventually, increase the speed of your walking for 1-2 minutes at time    In addition, we recommend that you review healthy lifestyles and methods for weight loss available through the National Institutes of Health patient information sites:  http://win.niddk.nih.gov/publications/index.htm    And look into health and wellness programs that may be available through your health insurance provider, employer, local community center, or [employer].    Weight management plan: Patient was referred to their PCP to discuss a diet and exercise plan.

## 2017-07-20 NOTE — MR AVS SNAPSHOT
After Visit Summary   7/20/2017    Lesley Stafford    MRN: [id number]           Patient Information     Date Of Birth          1960        Visit Information        Provider Department      7/20/2017 9:30 AM Tiffanie Banks APRN [facility], [place], Sleep Study        Today's Diagnoses     RACHNA (obstructive sleep apnea)    -  1    Restless legs syndrome (RLS)          Care Instructions    For benadryl: zyrtec prescription, claritin  Move gabapentin: 1.5 hrs before bedtime    Referral for MAD    Same wakeup time    Follow up as needed.    Ferritin level  Your BMI is Body mass index is 29.62 kg/(m^2).  Weight management is a personal decision.  If you are interested in exploring weight loss strategies, the following discussion covers the approaches that may be successful. Body mass index (BMI) is one way to tell whether you are at a healthy weight, overweight, or obese. It measures your weight in relation to your height.  A BMI of 18.5 to 24.9 is in the healthy range. A person with a BMI of 25 to 29.9 is considered overweight, and someone with a BMI of 30 or greater is considered obese. More than two-thirds of American adults are considered overweight or obese.  Being overweight or obese increases the risk for further weight gain. Excess weight may lead to heart disease and diabetes.  Creating and following plans for healthy eating and physical activity may help you improve your health.  Weight control is part of healthy lifestyle and includes exercise, emotional health, and healthy eating habits. Careful eating habits lifelong are the mainstay of weight control. Though there are significant health benefits from weight loss, long-term weight loss with diet alone may be very difficult to achieve- studies show long-term success with dietary management in less than 10% of people. Attaining a healthy weight may be especially difficult to achieve in those with severe obesity. In some cases,  medications, devices and surgical management might be considered.  What can you do?  If you are overweight or obese and are interested in methods for weight loss, you should discuss this with your provider.     Consider reducing daily calorie intake by 500 calories.     Keep a food journal.     Avoiding skipping meals, consider cutting portions instead.    Diet combined with exercise helps maintain muscle while optimizing fat loss. Strength training is particularly important for building and maintaining muscle mass. Exercise helps reduce stress, increase energy, and improves fitness. Increasing exercise without diet control, however, may not burn enough calories to loose weight.       Start walking three days a week 10-20 minutes at a time    Work towards walking thirty minutes five days a week     Eventually, increase the speed of your walking for 1-2 minutes at time    In addition, we recommend that you review healthy lifestyles and methods for weight loss available through the National Institutes of Health patient information sites:  http://win.niddk.nih.gov/publications/index.htm    And look into health and wellness programs that may be available through your health insurance provider, employer, local community center, or [employer].    Weight management plan: Patient was referred to their PCP to discuss a diet and exercise plan.              Follow-ups after your visit        Additional Services     SLEEP DENTAL REFERRAL       Dental appliance resources recommended by [facility]     Below is a list of Medicare-approved dental appliance resources recommended by [facility].  If you wish to choose your own dental sleep dentist, you may identify a provider close to you: [email or web address]    [facility]   Office 1   Madan Camacho DDS - [DME Medicare]  [address], [address]   [place] [address]  Appointments: [phone]  Fax: [phone]  Website: [employer]    [facility]   Office 2  Madan CamachoKISHANS - [DME Medicare]  [address], [address]  [place] [address]  Appointments: [phone]  Fax: [phone]  Website: [employer]    [facility]   [place] Office   Yurynaren Mcdowell DDS, MS - [DME Medicare]  [facility]   [address], [address]   [place] [address]   Appointments: [phone]   Fax: [phone]   Website: [employer]     Deonna Your Smiwolf Delarosa, DMD, MSD - [DME Medicare]  [address], [address]  [place] [address]  Appointments [phone]  Fax: [phone]  Website: [employer]    If you wish to choose your own dental sleep dentist, you may identify a provider close to you: [email or web address]      AHI: 6.0 PSG DATE: 8/8/2005 MSI RDI 12.0    Return to clinic in  As needed for Home Sleep Test to confirm adequacy of Treatment.    Other information regarding this referral: Mandibular repositioning appliance for RACHNA. If your insurance asks for a CPT code, it is .    Please be aware that coverage of these services is subject to the terms and limitations of your health insurance plan.  Call member services at your health plan with any benefit or coverage questions.      Please bring the following to your appointment:    >>   List of current medications   >>   This referral request   >>   Any documents/labs given to you for this referral                  Follow-up notes from your care team     Return if symptoms worsen or fail to improve.      Future tests that were ordered for you today     Open Future Orders        Priority Expected Expires Ordered    Ferritin Routine  9/18/2017 7/20/2017            Who to contact     If you have questions or need follow up information about today's clinic visit or your schedule please contact [facility] directly at [phone].  Normal  "or non-critical lab and imaging results will be communicated to you by MyChart, letter or phone within 4 business days after the clinic has received the results. If you do not hear from us within 7 days, please contact the clinic through [employer] or phone. If you have a critical or abnormal lab result, we will notify you by phone as soon as possible.  Submit refill requests through [employer] or call your pharmacy and they will forward the refill request to us. Please allow 3 business days for your refill to be completed.          Additional Information About Your Visit        [employer] Information     [employer] lets you send messages to your doctor, view your test results, renew your prescriptions, schedule appointments and more. To sign up, go to [email or web address] . Click on \"Log in\" on the left side of the screen, which will take you to the Welcome page. Then click on \"Sign up Now\" on the right side of the page.     You will be asked to enter the access code listed below, as well as some personal information. Please follow the directions to create your username and password.     Your access code is: [email or web address]  Expires: 2017 11:19 PM     Your access code will  in 90 days. If you need help or a new code, please call your [facility] clinic or [phone].        Care EveryWhere ID     This is your Care EveryWhere ID. This could be used by other organizations to access your [facility] medical records  [id number]        Your Vitals Were     Pulse Respirations Height Pulse Oximetry BMI (Body Mass Index)       95 18 1.651 m (5' 5\") 98% 29.62 kg/m2        Blood Pressure from Last 3 Encounters:   17 120/75   17 118/78   17 113/76    Weight from Last 3 Encounters:   17 80.7 kg (178 lb)   17 80.7 kg (178 lb)   17 83 kg (183 lb)              We Performed the Following     SLEEP DENTAL REFERRAL        Primary Care Provider Office Phone # Fax #    Rafael Orosco MD " [phone]        [facility] [place] [address]  [facility] [address]        Equal Access to Services     SHANI SUAREZ : Hadii aad ku hadcarrollamarilis Vences, wadeboda danteadaha, qamaureenta kaalmada omero, waxyanni jesusin hayaaangelia snowrula bettejaneycasie levin. So [facility] [phone].    ATENCIÓN: Si habla español, tiene a levine disposición servicios gratuitos de asistencia lingüística. Llame al [phone].    We comply with applicable federal civil rights laws and [place] laws. We do not discriminate on the basis of race, color, national origin, age, disability sex, sexual orientation or gender identity.            Thank you!     Thank you for choosing [facility], [place], SLEEP STUDY  for your care. Our goal is always to provide you with excellent care. Hearing back from our patients is one way we can continue to improve our services. Please take a few minutes to complete the written survey that you may receive in the mail after your visit with us. Thank you!             Your Updated Medication List - Protect others around you: Learn how to safely use, store and throw away your medicines at www.disposemymeds.org.          This list is accurate as of: 7/20/17 10:51 AM.  Always use your most recent med list.                   Brand Name Dispense Instructions for use Diagnosis    acyclovir 200 MG capsule    ZOVIRAX    22 capsule    Take 1 capsule (200 mg) by mouth 5 times daily    HSV (herpes simplex virus) infection       amphetamine-dextroamphetamine 10 MG per tablet    ADDERALL     Take 10 mg by mouth 2 times daily        CALCIUM 500 PO           cetirizine 10 MG tablet    zyrTEC    30 tablet    Take 1 tablet (10 mg) by mouth every evening    Seasonal allergic rhinitis       cyclobenzaprine 5 MG tablet    FLEXERIL    42 tablet    TAKE 1 TABLET BY MOUTH 3 TIMES DAILY    Back pain, unspecified back location, unspecified back pain laterality, unspecified chronicity, Hip pain, bilateral, Trochanteric bursitis of  both hips       DEEP SEA NASAL SPRAY 0.65 % nasal spray   Generic drug:  sodium chloride     1 Bottle    SPRAY 1 SPRAY INTO BOTH NOSTRILS DAILY AS NEEDED FOR CONGESTION    Sinusitis, unspecified chronicity, unspecified location       ferrous sulfate 325 (65 FE) MG tablet    IRON    30 tablet    Take 1 tablet (325 mg) by mouth daily (with breakfast)    Other iron deficiency anemia       gabapentin 300 MG capsule    NEURONTIN    90 capsule    TAKE 1 CAPSULE (300 MG) BY MOUTH 3 TIMES DAILY    Peripheral polyneuropathy (H)       hydroxychloroquine 200 MG tablet    PLAQUENIL     Take 200 mg by mouth daily.        hydrOXYzine 25 MG tablet    ATARAX    120 tablet    Take 1 tablet (25 mg) by mouth 3 times daily as needed for itching    Anxiety       ibuprofen 800 MG tablet    ADVIL/MOTRIN    30 tablet    TAKE 1 TABLET BY MOUTH EVERY DAY AS NEEDED FOR MODERATE PAIN    Chronic arthralgias of knees and hips       Multi-vitamin Tabs tablet      Take 1 tablet by mouth daily        tiZANidine 4 MG tablet    ZANAFLEX    30 tablet    Take 1-2 tablets (4-8 mg) by mouth 3 times daily as needed for muscle spasms    Myalgia       traMADol 50 MG tablet    ULTRAM    60 tablet    TAKE 1-2 TABLETS BY MOUTH EVERY 6 HOURS AS NEEDED    Lupus (H), Chronic arthralgias of knees and hips

## 2017-07-20 NOTE — PROGRESS NOTES
Lesley Stafford returns to clinic today to evaluate difficulties with sleep schedule in the setting of occasional shift work, RLS, and further develop a plan of care to improve her daytime sleepiness and fatigue.       HISTORY OF PRESENT ILLNESS:  Ms. Link Stafford has a past medical history of a diagnosis of mild sleep apnea with an AHI of 6 without hypoxemia.  This study was done at [facility] 5 years ago.  She had an MSLT showing 5.9 minutes without sleep onset REM, or prestudy actigraphy.  She does a short day shift and then on occasion, works as a  until 11:00 p.m. and also is quite involved in the care of her children.  When last seen, I did provide her with an order to see Dr. Pennington to consider a dental appliance for mild noel and teeth grinding.  I do not think that she attended that appointment, but did check with her insurance at the time and felt costs were prohibative.  When I saw her last Benadryl had been stopped, she had reduced her caffeine intake and intermittent gabapentin was being added due to a neck injury.       She returns to clinic today without sleep diaries for further evaluation of the consistency of her sleep schedule and degree of shift work.       It is my impression that multiple shifts with regard to her employment still persists.  She has had some relief of teeth grinding with her over-the-counter  dental appliance, but has no reports of snoring and has no known cause for this itch for which she is treating with Benadryl and also takes hydroxyzine for mental health issues.  We do have options with regard to the gabapentin, possibly adding more; however, I do have concerns that without knowing what her schedule is whether the sleepiness is being caused by gabapentin or related to an irregular sleep schedule and the following plan of care has been developed.   1.  History of irregular sleep-wake schedule.  Once again, I have asked for her sleep diary.  She has collected them at  the  and will return to see me in 2 months.  If her sleep problems do resolve by that time I suggested that she cancel that appointment so that we can provide care for someone else and she is agreeable to that.     2.  Restless legs syndrome, her largest concern for this visit.  I have asked that she contact her primary care regarding an alternative to sedating antihistamines for itching with her RLS.  I have also encouraged her to get a ferritin level and to stop taking the Benadryl.  She will need to do that with the assistance of her primary care.  I will get in to touch with her over the month with regard to the results of the ferritin level, make suggestions in that regard and see her back in clinic to make further assessment before we consider a need to go up on gabapentin at h.s.    3.  Teeth clenching and grinding versus a concern regarding untreated obstructive sleep apnea.  She will have a roommate in a short period of time who can tell her if she snores.  If she is getting adequate coverage with an over-the-counter device for teeth clenching and grinding, we can stop there, otherwise with her change in insurance, she would need to go to likely a different provider for a dental appliance, having MA.  At this point in time, this does not seem to be her major concern.   Chief complaint: Patient returns to clinic today to discuss multiple factors that are impacting her quantity, quality of sleep    History of present illness Ms. Link Stafford is a 56-year-old female with a past medical history of a previous study done in 2008 that showed mild obstructive sleep apnea with an AHI of 6.0. It is my understanding that she did not obtain treatment for this mild obstructive sleep apnea. She does do shift work as a , and works in multiple locations to try to make ends meet. In the past we have discussed the impact of shift work and an irregular sleep-wake schedule on daytime sleepiness as well as  fatigue. She is also comorbid with: Lupus, depression, anxiety and is treated with Adderall for difficulties with concentration. She carries a diagnosis on her problem list of insomnia, however she is a bit more of a night I'll then sometimes her schedule allows her to be.    Returns to clinic today with sleep diaries for evaluation sleep offset varies from as early as 7:30 in the morning to as late as 11:30 in the morning ×1 sleep onset appears to occur after about a half an hour of being in bed, with an onset at as early as 10:30 PM to as late as 3:30 AM. We did discuss the nonrestorative nature of of sleep when sleeping in an irregular sleep-wake pattern. Please see below the number of shifts and times of shifts that she will be working to maintain and it seems as if she has chosen a regular start time somewhere between 9:30 and 10 in the morning throughout the entire week. We did discuss the restorative natural nature of a quick nap should she have insufficient sleep the night before.    SHUBHAM reports sensations of RLS that started up somewhere around 1 or 2 in the morning a burning of feet requiring movement of her feet which likely does impact her quality of sleep. Prior to initiating sleep she will intentionally rub her sleep and cannot imagine not doing that at bedtime. She is not doing nonpharmacologic measures for treating RLS, she has decreased her use of Benadryl however one of her first complaints today lives with burning itching eyes and congested nose. She has been taking Benadryl intermittently in the last 2 weeks, and regular administration in the weeks preceding.    Timing of medications was discussed with Ms. Link Stafford, encouraging Adderall to be in the early second dose to be in the early part of the afternoon, as well as taking hydroxyzine at bedtime along with Benadryl likely leading to some onset of RLS perhaps related to sedating antihistamines in the middle of the night. With regard to the  use of a dental appliance, she did develop an over-the-counter device for her teeth grinding. She realizes that this does not help with snoring or apneas. We did review her sleep study and with her family insurance she does have list of providers that she could see to consider a dental appliance her weight is slightly down from her previous her only study in 2008    Assessment plan is my impression that Ms. Link Stafford has a delayed circadian rhythm, however is noting an improved early morning wakefulness with trying to keep her per wakeup time at a similar time of day. 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 With symptoms of RLS timing of medications as well as circadian rhythm issues, and a wish to move forward with regard to a mandibular advancement device, the following plan of care has been developed A. fib #1 snoring: Mild obstructive sleep apnea a number of years ago without hypoxemia. In the setting of TM DD secondary to likely to likely grinding or clenching and we will facilitate her obtaining a device with this referral to a group of providers that do see patients with Medicare or Medicaid. I remember to RLS once again I've reinforced the fact that sedating antihistamines can further trigger restless legs and also discussed timing of medications in particular gabapentin should be about an hour and a half before her regular bedtime this may help her initiate sleep and a quicker fashion and eliminate her symptoms at do, at about 1-2 hours after sleep onset.  Item #3 sleep habits: We did discuss the timing of some of her medications that she does take throughout the day 1 which includes her Adderall which she was taking somewhere around 7 or 80 at night and this possibly is affecting her ability to fall, or CNS sleep, or quality of sleep item #4 delayed sleep phase disorder: She is trying to arrange to regular positions, with a filling job that  "would fit this preferred sleep schedule for her and I have plotted her with her efforts  Thank you for allowing me to participate in this kind woman's care time spent with patient 40 minutes of which greater than 50% was spent in counseling and education and coordination care she will return in follow-up as needed in the future and the dictation      Rhinitis: eyes red itchy, and nasal congestion    Life: irregular    Work jobs: 10A  clients, 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                                                3-8P training for [employer] job on call 3-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  Zanaflex: daytime may make pt: fatigued    Dentist: overcounter decrease in TMD.  Out  \"too tired\"  otc MAD across room in container.  "

## 2017-07-21 ENCOUNTER — DOCUMENTATION ONLY (OUTPATIENT)
Dept: SLEEP MEDICINE | Facility: CLINIC | Age: 57
End: 2017-07-21

## 2017-07-21 NOTE — PROGRESS NOTES
Dental referral sent on Friday July 21, 2017 to [facility] ([place] [address]) to have Dental  to review and start process of referral then contact pt with scheduling.  Alisa De La Cruz,

## 2017-07-28 ENCOUNTER — OFFICE VISIT (OUTPATIENT)
Dept: FAMILY MEDICINE | Facility: CLINIC | Age: 57
End: 2017-07-28
Payer: COMMERCIAL

## 2017-07-28 VITALS
SYSTOLIC BLOOD PRESSURE: 100 MMHG | TEMPERATURE: 97 F | BODY MASS INDEX: 29.44 KG/M2 | HEART RATE: 43 BPM | DIASTOLIC BLOOD PRESSURE: 52 MMHG | WEIGHT: 176.9 LBS

## 2017-07-28 DIAGNOSIS — R53.83 TIRED: ICD-10-CM

## 2017-07-28 DIAGNOSIS — R30.0 DYSURIA: Primary | ICD-10-CM

## 2017-07-28 DIAGNOSIS — F41.9 ANXIETY: ICD-10-CM

## 2017-07-28 DIAGNOSIS — Z92.29 HISTORY OF RECEIPT OF HEPATITIS B IMMUNE GLOBULIN: ICD-10-CM

## 2017-07-28 LAB
ALBUMIN UR-MCNC: NEGATIVE MG/DL
APPEARANCE UR: CLEAR
BASOPHILS # BLD AUTO: 0 10E9/L (ref 0–0.2)
BASOPHILS NFR BLD AUTO: 0.2 %
BILIRUB UR QL STRIP: NEGATIVE
COLOR UR AUTO: YELLOW
DIFFERENTIAL METHOD BLD: ABNORMAL
EOSINOPHIL # BLD AUTO: 0.1 10E9/L (ref 0–0.7)
EOSINOPHIL NFR BLD AUTO: 2.7 %
ERYTHROCYTE [DISTWIDTH] IN BLOOD BY AUTOMATED COUNT: 13.9 % (ref 10–15)
FERRITIN SERPL-MCNC: 98 NG/ML (ref 8–252)
GLUCOSE UR STRIP-MCNC: NEGATIVE MG/DL
HCT VFR BLD AUTO: 36.4 % (ref 35–47)
HGB BLD-MCNC: 13.4 G/DL (ref 11.7–15.7)
HGB UR QL STRIP: NEGATIVE
KETONES UR STRIP-MCNC: NEGATIVE MG/DL
LEUKOCYTE ESTERASE UR QL STRIP: NEGATIVE
LYMPHOCYTES # BLD AUTO: 1.6 10E9/L (ref 0.8–5.3)
LYMPHOCYTES NFR BLD AUTO: 38.3 %
MCH RBC QN AUTO: 28.3 PG (ref 26.5–33)
MCHC RBC AUTO-ENTMCNC: 36.8 G/DL (ref 31.5–36.5)
MCV RBC AUTO: 77 FL (ref 78–100)
MONOCYTES # BLD AUTO: 0.3 10E9/L (ref 0–1.3)
MONOCYTES NFR BLD AUTO: 7.7 %
NEUTROPHILS # BLD AUTO: 2.1 10E9/L (ref 1.6–8.3)
NEUTROPHILS NFR BLD AUTO: 51.1 %
NITRATE UR QL: NEGATIVE
PH UR STRIP: 5.5 PH (ref 5–7)
PLATELET # BLD AUTO: 102 10E9/L (ref 150–450)
RBC # BLD AUTO: 4.74 10E12/L (ref 3.8–5.2)
SP GR UR STRIP: 1.01 (ref 1–1.03)
URN SPEC COLLECT METH UR: NORMAL
UROBILINOGEN UR STRIP-ACNC: 0.2 EU/DL (ref 0.2–1)
WBC # BLD AUTO: 4.1 10E9/L (ref 4–11)

## 2017-07-28 PROCEDURE — 86706 HEP B SURFACE ANTIBODY: CPT | Performed by: FAMILY MEDICINE

## 2017-07-28 PROCEDURE — 85025 COMPLETE CBC W/AUTO DIFF WBC: CPT | Performed by: FAMILY MEDICINE

## 2017-07-28 PROCEDURE — 99214 OFFICE O/P EST MOD 30 MIN: CPT | Performed by: FAMILY MEDICINE

## 2017-07-28 PROCEDURE — 36415 COLL VENOUS BLD VENIPUNCTURE: CPT | Performed by: FAMILY MEDICINE

## 2017-07-28 PROCEDURE — 81003 URINALYSIS AUTO W/O SCOPE: CPT | Performed by: FAMILY MEDICINE

## 2017-07-28 PROCEDURE — 82728 ASSAY OF FERRITIN: CPT | Performed by: FAMILY MEDICINE

## 2017-07-28 RX ORDER — FLUOXETINE 10 MG/1
10 CAPSULE ORAL DAILY
Qty: 30 CAPSULE | Refills: 1 | Status: SHIPPED | OUTPATIENT
Start: 2017-07-28 | End: 2017-09-20 | Stop reason: ALTCHOICE

## 2017-07-28 NOTE — PROGRESS NOTES
SUBJECTIVE:                                                    Lesley Stafford is a 56 year old female who presents to clinic today for the following health issues:    URINARY TRACT SYMPTOMS  Onset:  7/7/17    Description:   Painful urination (Dysuria): YES  Blood in urine (Hematuria): no   Delay in urine (Hesitency): YES    Intensity: moderate    Progression of Symptoms:  improving and constant    Accompanying Signs & Symptoms:  Fever/chills: no   Flank pain no   Nausea and vomiting: no   Any vaginal symptoms: pressure, itchy   Abdominal/Pelvic Pain: YES    History:   History of frequent UTI's: no   History of kidney stones: no   Sexually Active: no   Possibility of pregnancy: No    Precipitating factors:       Therapies Tried and outcome: Increase fluid intake and OTC advil or tylenol, help with other pain in body     Patient says that she has been having problems with dysuria and hesitancy with her urine since 7/7/17. She says that she feels a near constant pressure on her bladder, but that it has been improving. She has tried increasing her fluid intake and has been taking advil or tylenol to some relief. No history of kidney infections. Patient says that her bowel movements have been abnormal and sticky and leaky.    Fatigue:  Patient has been feeling increasingly fatigued since she started working at her new job. She says that she has been waking up earlier than normal, and has been low of energy. History of lupus. She requests labs to check her iron and hemoglobin, and other labs for her energy. Patient would also like to be checked for hepatitis B for her work.    Emotional:  Patient says that she has been feeling more emotional than normal, and would like to discuss possible medications. She says that her mood is not as stable as she would like and she wants to feel more steady. It has been increasingly easy to make her cry. Patient has been on effexor in the past, and did not like the side effects of  the medication.    Problem list and histories reviewed & adjusted, as indicated.  Additional history: as documented    Patient Active Problem List   Diagnosis     CARDIOVASCULAR SCREENING; LDL GOAL LESS THAN 130     Lupus (H)     Abnormal perimenopausal bleeding     Obesity, Class I, BMI 30-34.9     Nerv/musculskel sym NEC     History of claustrophobia     Cervicalgia     Pain in joint of left shoulder     Depressive disorder     Anxiety     Allergy     ACP (advance care planning)     Tired     Insomnia, unspecified type     Hemoglobin C trait (H)     Liver hemangioma     Bilateral carpal tunnel syndrome     Chronic bilateral low back pain without sciatica     Past Surgical History:   Procedure Laterality Date     COLONOSCOPY  01/2010    repeat 10 yrs     DILATION AND CURETTAGE, OPERATIVE HYSTEROSCOPY WITH MORCELLATOR, COMBINED N/A 2/15/2017    Procedure: COMBINED DILATION AND CURETTAGE, OPERATIVE HYSTEROSCOPY WITH MORCELLATOR;  Surgeon: Erin Gutierrez MD;  Location: [facility]     ESOPHAGOSCOPY, GASTROSCOPY, DUODENOSCOPY (EGD), COMBINED Left 2/5/2016    Procedure: COMBINED ESOPHAGOSCOPY, GASTROSCOPY, DUODENOSCOPY (EGD), BIOPSY SINGLE OR MULTIPLE;  Surgeon: Pierre Fernandez MD;  Location: [facility]     GYN SURGERY         Social History   Substance Use Topics     Smoking status: Former Smoker     Smokeless tobacco: Never Used     Alcohol use No     Family History   Problem Relation Age of Onset     Lupus Mother      DIABETES Father      Brain Tumor Father      begnign on pituitary     Depression Sister      Anemia Sister          Current Outpatient Prescriptions   Medication Sig Dispense Refill     gabapentin (NEURONTIN) 300 MG capsule TAKE 1 CAPSULE (300 MG) BY MOUTH 3 TIMES DAILY 90 capsule 1     ibuprofen (ADVIL/MOTRIN) 800 MG tablet TAKE 1 TABLET BY MOUTH EVERY DAY AS NEEDED FOR MODERATE PAIN 30 tablet 0     cyclobenzaprine (FLEXERIL) 5 MG tablet TAKE 1 TABLET BY MOUTH 3 TIMES DAILY 42 tablet 3     DEEP SEA  NASAL SPRAY 0.65 % nasal spray SPRAY 1 SPRAY INTO BOTH NOSTRILS DAILY AS NEEDED FOR CONGESTION 1 Bottle 2     traMADol (ULTRAM) 50 MG tablet TAKE 1-2 TABLETS BY MOUTH EVERY 6 HOURS AS NEEDED 60 tablet 0     tiZANidine (ZANAFLEX) 4 MG tablet Take 1-2 tablets (4-8 mg) by mouth 3 times daily as needed for muscle spasms 30 tablet 1     hydrOXYzine (ATARAX) 25 MG tablet Take 1 tablet (25 mg) by mouth 3 times daily as needed for itching 120 tablet 11     amphetamine-dextroamphetamine (ADDERALL) 10 MG tablet Take 10 mg by mouth 2 times daily       acyclovir (ZOVIRAX) 200 MG capsule Take 1 capsule (200 mg) by mouth 5 times daily 22 capsule 0     ferrous sulfate (IRON) 325 (65 FE) MG tablet Take 1 tablet (325 mg) by mouth daily (with breakfast) 30 tablet 5     cetirizine (ZYRTEC) 10 MG tablet Take 1 tablet (10 mg) by mouth every evening 30 tablet 1     Calcium-Magnesium-Vitamin D (CALCIUM 500 PO)        multivitamin, therapeutic with minerals (MULTI-VITAMIN) TABS Take 1 tablet by mouth daily       hydroxychloroquine (PLAQUENIL) 200 MG tablet Take 200 mg by mouth daily.       Allergies   Allergen Reactions     Morphine Sulfate Itching     Sulfa Drugs Hives         Reviewed and updated as needed this visit by clinical staff     Reviewed and updated as needed this visit by Provider         ROS:  Positive for dysuria and hesitancy and abnormal bowel movements. Positive for fatigue and emotionality.    Denies headache, insomnia, chest pain, shortness of breath, cough, heartburn,neck pain, back pain, hip pain, knee pain, ankle pain, or foot pain. Remainder of ROS is negative unless otherwise noted above or in HPI.    This document serves as a record of the services and decisions personally performed and made by Rafael Orosco MD. It was created on his behalf by Albino Manning, a trained medical scribe. The creation of this document is based the provider's statements to the medical scribe.  Albino Manning 4:09 PM July 28,  2017    OBJECTIVE:     /52  Pulse (!) 43  Temp 97  F (36.1  C) (Oral)  Wt 80.2 kg (176 lb 14.4 oz)  BMI 29.44 kg/m2  Body mass index is 29.44 kg/(m^2).  GENERAL: healthy, alert and no distress  MS: no CVA tenderness  SKIN: no suspicious lesions or rashes  NEURO: Normal strength and tone, mentation intact and speech normal  PSYCH: mentation appears normal, affect normal/bright    Diagnostic Test Results:  Results for orders placed or performed in visit on 07/28/17 (from the past 24 hour(s))   UA reflex to Microscopic and Culture   Result Value Ref Range    Color Urine Yellow     Appearance Urine Clear     Glucose Urine Negative NEG mg/dL    Bilirubin Urine Negative NEG    Ketones Urine Negative NEG mg/dL    Specific Gravity Urine 1.015 1.003 - 1.035    Blood Urine Negative NEG    pH Urine 5.5 5.0 - 7.0 pH    Protein Albumin Urine Negative NEG mg/dL    Urobilinogen Urine 0.2 0.2 - 1.0 EU/dL    Nitrite Urine Negative NEG    Leukocyte Esterase Urine Negative NEG    Source Midstream Urine        ASSESSMENT/PLAN:     (R30.0) Dysuria  (primary encounter diagnosis)  Comment: UA completed today, which was normal. Encouraged increasing water and cranberry juice intake and decreasing coffee intake.  Plan: UA reflex to Microscopic and Culture        As below in patient instructions.    (F41.9) Anxiety  Comment: Prescription given for fluoxetine.  Plan: FLUoxetine (PROZAC) 10 MG capsule        Start on fluoxetine once daily. Follow up in 1 month for recheck.    (Z92.29) History of receipt of hepatitis B immune globulin  Comment: Labs completed today.  Plan: Hepatitis B Surface Antibody        Follow up with results from lab.    (R53.83) Tired  Comment: Labs completed today.  Plan: Ferritin, CBC with platelets differential        Follow up with results from lab.    Patient Instructions   -Let me know in 1 month how you are doing on the prozac.  -If you get a headache, it likely means the medication is working. Let me  know if the headache is too severe, gives you abdominal pain, or feel as though you are speeding on the medication. You may also experience a decreased libido.  -Try cutting down on your coffee intake, and increase the amount of water and cranberry juice you are drinking.    The information in this document, created by the medical scribe for me, accurately reflects the services I personally performed and the decisions made by me. I have reviewed and approved this document for accuracy prior to leaving the patient care area.   Rafael Orosco MD 4:09 PM July 28, 2017  [facility]

## 2017-07-28 NOTE — PATIENT INSTRUCTIONS
-Let me know in 1 month how you are doing on the prozac.  -If you get a headache, it likely means the medication is working. Let me know if the headache is too severe, gives you abdominal pain, or feel as though you are speeding on the medication. You may also experience a decreased libido.  -Try cutting down on your coffee intake, and increase the amount of water and cranberry juice you are drinking.

## 2017-07-28 NOTE — MR AVS SNAPSHOT
"              After Visit Summary   7/28/2017    Lseley Stafford    MRN: [id number]           Patient Information     Date Of Birth          1960        Visit Information        Provider Department      7/28/2017 3:30 PM Rafael Orosco MD [facility] [place] [place]        Today's Diagnoses     Dysuria    -  1    Anxiety          Care Instructions    -Let me know in 1 month how you are doing on the prozac.  -If you get a headache, it likely means the medication is working. Let me know if the headache is too severe, gives you abdominal pain, or feel as though you are speeding on the medication. You may also experience a decreased libido.  -Try cutting down on your coffee intake, and increase the amount of water and cranberry juice you are drinking.          Follow-ups after your visit        Who to contact     If you have questions or need follow up information about today's clinic visit or your schedule please contact [facility] directly at [phone].  Normal or non-critical lab and imaging results will be communicated to you by meevlhart, letter or phone within 4 business days after the clinic has received the results. If you do not hear from us within 7 days, please contact the clinic through meevlhart or phone. If you have a critical or abnormal lab result, we will notify you by phone as soon as possible.  Submit refill requests through Almondy or call your pharmacy and they will forward the refill request to us. Please allow 3 business days for your refill to be completed.          Additional Information About Your Visit        meevlhart Information     Almondy lets you send messages to your doctor, view your test results, renew your prescriptions, schedule appointments and more. To sign up, go to [email or web address] . Click on \"Log in\" on the left side of the screen, which will take you to the Welcome page. Then click on \"Sign up Now\" on the right side of the page.     You will " be asked to enter the access code listed below, as well as some personal information. Please follow the directions to create your username and password.     Your access code is: [email or web address]  Expires: 2017 11:19 PM     Your access code will  in 90 days. If you need help or a new code, please call your [facility] or [phone].        Care EveryWhere ID     This is your Care EveryWhere ID. This could be used by other organizations to access your [facility] medical records  [id number]        Your Vitals Were     Pulse Temperature BMI (Body Mass Index)             43 97  F (36.1  C) (Oral) 29.44 kg/m2          Blood Pressure from Last 3 Encounters:   17 100/52   17 120/75   17 118/78    Weight from Last 3 Encounters:   17 176 lb 14.4 oz (80.2 kg)   17 178 lb (80.7 kg)   17 178 lb (80.7 kg)              We Performed the Following     UA reflex to Microscopic and Culture          Today's Medication Changes          These changes are accurate as of: 17  4:28 PM.  If you have any questions, ask your nurse or doctor.               Start taking these medicines.        Dose/Directions    FLUoxetine 10 MG capsule   Commonly known as:  PROzac   Used for:  Anxiety        Dose:  10 mg   Take 1 capsule (10 mg) by mouth daily   Quantity:  30 capsule   Refills:  1            Where to get your medicines      These medications were sent to [place] [id number] [place] [facility] [place], [place] - [address]  [address] [address]     Phone:  [phone]     FLUoxetine 10 MG capsule                Primary Care Provider Office Phone # Fax #    Rafael Orosco [phone]       [facility] [address] [facility] [address]  [facility] [address]        Equal Access to Services     SHANI SUAREZ AH: Walter Vences, waaxda luqadaha, qaybta kaalmada omero, devi levin. So [facility]  [phone].    ATENCIÓN: Si peter pritchard, tiene a levine disposición servicios gratuitos de asistencia lingüística. [place] sierra [phone].    We comply with applicable federal civil rights laws and [place] laws. We do not discriminate on the basis of race, color, national origin, age, disability sex, sexual orientation or gender identity.            Thank you!     Thank you for choosing [facility]  for your care. Our goal is always to provide you with excellent care. Hearing back from our patients is one way we can continue to improve our services. Please take a few minutes to complete the written survey that you may receive in the mail after your visit with us. Thank you!             Your Updated Medication List - Protect others around you: Learn how to safely use, store and throw away your medicines at www.disposemymeds.org.          This list is accurate as of: 7/28/17  4:28 PM.  Always use your most recent med list.                   Brand Name Dispense Instructions for use Diagnosis    acyclovir 200 MG capsule    ZOVIRAX    22 capsule    Take 1 capsule (200 mg) by mouth 5 times daily    HSV (herpes simplex virus) infection       amphetamine-dextroamphetamine 10 MG per tablet    ADDERALL     Take 10 mg by mouth 2 times daily        CALCIUM 500 PO           cetirizine 10 MG tablet    zyrTEC    30 tablet    Take 1 tablet (10 mg) by mouth every evening    Seasonal allergic rhinitis       cyclobenzaprine 5 MG tablet    FLEXERIL    42 tablet    TAKE 1 TABLET BY MOUTH 3 TIMES DAILY    Back pain, unspecified back location, unspecified back pain laterality, unspecified chronicity, Hip pain, bilateral, Trochanteric bursitis of both hips       DEEP SEA NASAL SPRAY 0.65 % nasal spray   Generic drug:  sodium chloride     1 Bottle    SPRAY 1 SPRAY INTO BOTH NOSTRILS DAILY AS NEEDED FOR CONGESTION    Sinusitis, unspecified chronicity, unspecified location       ferrous sulfate 325 (65 FE) MG tablet    IRON     30 tablet    Take 1 tablet (325 mg) by mouth daily (with breakfast)    Other iron deficiency anemia       FLUoxetine 10 MG capsule    PROzac    30 capsule    Take 1 capsule (10 mg) by mouth daily    Anxiety       gabapentin 300 MG capsule    NEURONTIN    90 capsule    TAKE 1 CAPSULE (300 MG) BY MOUTH 3 TIMES DAILY    Peripheral polyneuropathy (H)       hydroxychloroquine 200 MG tablet    PLAQUENIL     Take 200 mg by mouth daily.        hydrOXYzine 25 MG tablet    ATARAX    120 tablet    Take 1 tablet (25 mg) by mouth 3 times daily as needed for itching    Anxiety       ibuprofen 800 MG tablet    ADVIL/MOTRIN    30 tablet    TAKE 1 TABLET BY MOUTH EVERY DAY AS NEEDED FOR MODERATE PAIN    Chronic arthralgias of knees and hips       Multi-vitamin Tabs tablet      Take 1 tablet by mouth daily        tiZANidine 4 MG tablet    ZANAFLEX    30 tablet    Take 1-2 tablets (4-8 mg) by mouth 3 times daily as needed for muscle spasms    Myalgia       traMADol 50 MG tablet    ULTRAM    60 tablet    TAKE 1-2 TABLETS BY MOUTH EVERY 6 HOURS AS NEEDED    Lupus (H), Chronic arthralgias of knees and hips

## 2017-07-31 LAB — HBV SURFACE AB SERPL IA-ACNC: 6.61 M[IU]/ML

## 2017-08-01 NOTE — PROGRESS NOTES
Please notify patient: labs show iron studies mostly normal; only red blood cell size borderline small. Recommend high iron diet and one a day iron supplement. Contact if questions.    Thanks Rafael Orosco MD

## 2017-08-11 ENCOUNTER — TRANSFERRED RECORDS (OUTPATIENT)
Dept: HEALTH INFORMATION MANAGEMENT | Facility: CLINIC | Age: 57
End: 2017-08-11

## 2017-08-11 LAB
AST SERPL-CCNC: 29 U/L (ref 5–34)
CREATININE (EXTERNAL): 0.59 MG/DL (ref 0.5–1.3)
GFR ESTIMATED (EXTERNAL): 111.5 ML/MIN/1.73M2
HEP C HIM: NORMAL
TSH SERPL-ACNC: 1.27 MIU/L (ref 0.4–4.5)

## 2017-08-28 DIAGNOSIS — G89.29 CHRONIC ARTHRALGIAS OF KNEES AND HIPS: ICD-10-CM

## 2017-08-28 DIAGNOSIS — M25.562 CHRONIC ARTHRALGIAS OF KNEES AND HIPS: ICD-10-CM

## 2017-08-28 DIAGNOSIS — M25.561 CHRONIC ARTHRALGIAS OF KNEES AND HIPS: ICD-10-CM

## 2017-08-28 DIAGNOSIS — M25.552 CHRONIC ARTHRALGIAS OF KNEES AND HIPS: ICD-10-CM

## 2017-08-28 DIAGNOSIS — M25.551 CHRONIC ARTHRALGIAS OF KNEES AND HIPS: ICD-10-CM

## 2017-08-28 RX ORDER — IBUPROFEN 800 MG/1
TABLET, FILM COATED ORAL
Qty: 30 TABLET | Refills: 3 | Status: SHIPPED | OUTPATIENT
Start: 2017-08-28 | End: 2018-09-25

## 2017-08-28 NOTE — TELEPHONE ENCOUNTER
Dr. Orosco -- please review pt request for high-dose ibuprofen.    Thank you  BENJA MontejoN, RN  [facility]       Creatinine   Date Value Ref Range Status   05/07/2017 0.68 0.52 - 1.04 mg/dL Final   ]  BP Readings from Last 3 Encounters:   07/28/17 100/52   07/20/17 120/75   06/28/17 118/78

## 2017-09-17 ENCOUNTER — NURSE TRIAGE (OUTPATIENT)
Dept: NURSING | Facility: CLINIC | Age: 57
End: 2017-09-17

## 2017-09-17 NOTE — TELEPHONE ENCOUNTER
Clinic Action Needed:No  Reason for Call: Jose called today to say she was having a flare up her Lupus  And wondering if she could take some of her prednisone on hand.  Advised that she should only take medications as prescribed, warm transferred her to page  for her [facility], where she is followed by Dr. Meléndez.  Advised to speak to on call provider about current flare up.    Routed to: Not routed.    Tiffanie Hancock RN  [facility]

## 2017-09-20 ENCOUNTER — OFFICE VISIT (OUTPATIENT)
Dept: FAMILY MEDICINE | Facility: CLINIC | Age: 57
End: 2017-09-20
Payer: COMMERCIAL

## 2017-09-20 VITALS
SYSTOLIC BLOOD PRESSURE: 101 MMHG | TEMPERATURE: 97.7 F | HEART RATE: 96 BPM | DIASTOLIC BLOOD PRESSURE: 65 MMHG | WEIGHT: 177 LBS | BODY MASS INDEX: 29.45 KG/M2

## 2017-09-20 DIAGNOSIS — H10.13 ACUTE ALLERGIC CONJUNCTIVITIS OF BOTH EYES: ICD-10-CM

## 2017-09-20 DIAGNOSIS — M25.552 HIP PAIN, LEFT: Primary | ICD-10-CM

## 2017-09-20 PROCEDURE — 99214 OFFICE O/P EST MOD 30 MIN: CPT | Performed by: FAMILY MEDICINE

## 2017-09-20 RX ORDER — DULOXETIN HYDROCHLORIDE 20 MG/1
20 CAPSULE, DELAYED RELEASE ORAL 2 TIMES DAILY
Qty: 60 CAPSULE | Refills: 3 | Status: SHIPPED | OUTPATIENT
Start: 2017-09-20 | End: 2018-01-25

## 2017-09-20 ASSESSMENT — ANXIETY QUESTIONNAIRES
IF YOU CHECKED OFF ANY PROBLEMS ON THIS QUESTIONNAIRE, HOW DIFFICULT HAVE THESE PROBLEMS MADE IT FOR YOU TO DO YOUR WORK, TAKE CARE OF THINGS AT HOME, OR GET ALONG WITH OTHER PEOPLE: VERY DIFFICULT
3. WORRYING TOO MUCH ABOUT DIFFERENT THINGS: MORE THAN HALF THE DAYS
6. BECOMING EASILY ANNOYED OR IRRITABLE: SEVERAL DAYS
7. FEELING AFRAID AS IF SOMETHING AWFUL MIGHT HAPPEN: SEVERAL DAYS
2. NOT BEING ABLE TO STOP OR CONTROL WORRYING: MORE THAN HALF THE DAYS
1. FEELING NERVOUS, ANXIOUS, OR ON EDGE: MORE THAN HALF THE DAYS
GAD7 TOTAL SCORE: 13
5. BEING SO RESTLESS THAT IT IS HARD TO SIT STILL: MORE THAN HALF THE DAYS

## 2017-09-20 ASSESSMENT — PATIENT HEALTH QUESTIONNAIRE - PHQ9: 5. POOR APPETITE OR OVEREATING: NEARLY EVERY DAY

## 2017-09-20 NOTE — PROGRESS NOTES
SUBJECTIVE:   Lesley Stafford is a 56 year old female who presents to clinic today for the following health issues:    Concern - Balance  Onset: progressively gotten worst since April     Description:   Unbalanced, pain down her back and hips    Intensity: moderate    Progression of Symptoms:  worsening and constant    Accompanying Signs & Symptoms:  Back pain    Previous history of similar problem:       Precipitating factors:   Worsened by: by sitting down too long, and when gets up she gets pain.    Alleviating factors:  Improved by: standing helps out alittle    Therapies Tried and outcome: PT, exercise     Patient has been having problems with worsening balance for the last 5 months. She has a history of back and hip pain that is constant and worsening. Her pain is worsened when she sits for too long and when she stands up. Standing and some activity provide some relief. She has been doing physical therapy and has recently gotten a referral for water therapy, which she thinks will be good. Patient requests a walking cane, which she thinks will help with her balance and provide some relief on her hips and back. Personal history of lupus and fibromyalgia, which causes the joint pains. She has been seeing a pain doctor, and she says that her pain doctor has offered to handle her medications.    Allergies:  Patient has been having problems with her allergies lately, and says that she has been having irritated and red eyes. She has been using artificial tears to help with the dryness of her eyes.    Patient reports that her pain doctor has recommended that she start on cymbalta in place of fluoxetine, to have relief for her pain in addition to her depression and anxiety.    Problem list and histories reviewed & adjusted, as indicated.  Additional history: as documented    Patient Active Problem List   Diagnosis     CARDIOVASCULAR SCREENING; LDL GOAL LESS THAN 130     Lupus (H)     Abnormal perimenopausal  bleeding     Obesity, Class I, BMI 30-34.9     Nerv/musculskel sym NEC     History of claustrophobia     Cervicalgia     Pain in joint of left shoulder     Depressive disorder     Anxiety     Allergy     ACP (advance care planning)     Tired     Insomnia, unspecified type     Hemoglobin C trait (H)     Liver hemangioma     Bilateral carpal tunnel syndrome     Chronic bilateral low back pain without sciatica     Past Surgical History:   Procedure Laterality Date     COLONOSCOPY  01/2010    repeat 10 yrs     DILATION AND CURETTAGE, OPERATIVE HYSTEROSCOPY WITH MORCELLATOR, COMBINED N/A 2/15/2017    Procedure: COMBINED DILATION AND CURETTAGE, OPERATIVE HYSTEROSCOPY WITH MORCELLATOR;  Surgeon: Erin Gutierrez MD;  Location: [facility]     ESOPHAGOSCOPY, GASTROSCOPY, DUODENOSCOPY (EGD), COMBINED Left 2/5/2016    Procedure: COMBINED ESOPHAGOSCOPY, GASTROSCOPY, DUODENOSCOPY (EGD), BIOPSY SINGLE OR MULTIPLE;  Surgeon: Pierre Fernandez MD;  Location: [facility]     GYN SURGERY         Social History   Substance Use Topics     Smoking status: Former Smoker     Smokeless tobacco: Never Used     Alcohol use No     Family History   Problem Relation Age of Onset     Lupus Mother      DIABETES Father      Brain Tumor Father      begnign on pituitary     Depression Sister      Anemia Sister          Current Outpatient Prescriptions   Medication Sig Dispense Refill     tiZANidine (ZANAFLEX) 4 MG tablet TAKE 1-2 TABLETS (4-8 MG) BY MOUTH 3 TIMES DAILY AS NEEDED FOR MUSCLE SPASMS 90 tablet 2     ibuprofen (ADVIL/MOTRIN) 800 MG tablet TAKE 1 TABLET BY MOUTH EVERY DAY AS NEEDED FOR MODERATE PAIN 30 tablet 3     FLUoxetine (PROZAC) 10 MG capsule Take 1 capsule (10 mg) by mouth daily 30 capsule 1     gabapentin (NEURONTIN) 300 MG capsule TAKE 1 CAPSULE (300 MG) BY MOUTH 3 TIMES DAILY 90 capsule 1     cyclobenzaprine (FLEXERIL) 5 MG tablet TAKE 1 TABLET BY MOUTH 3 TIMES DAILY 42 tablet 3     DEEP SEA NASAL SPRAY 0.65 % nasal spray SPRAY 1  SPRAY INTO BOTH NOSTRILS DAILY AS NEEDED FOR CONGESTION 1 Bottle 2     traMADol (ULTRAM) 50 MG tablet TAKE 1-2 TABLETS BY MOUTH EVERY 6 HOURS AS NEEDED 60 tablet 0     hydrOXYzine (ATARAX) 25 MG tablet Take 1 tablet (25 mg) by mouth 3 times daily as needed for itching 120 tablet 11     amphetamine-dextroamphetamine (ADDERALL) 10 MG tablet Take 10 mg by mouth 2 times daily       acyclovir (ZOVIRAX) 200 MG capsule Take 1 capsule (200 mg) by mouth 5 times daily 22 capsule 0     ferrous sulfate (IRON) 325 (65 FE) MG tablet Take 1 tablet (325 mg) by mouth daily (with breakfast) 30 tablet 5     cetirizine (ZYRTEC) 10 MG tablet Take 1 tablet (10 mg) by mouth every evening 30 tablet 1     Calcium-Magnesium-Vitamin D (CALCIUM 500 PO)        multivitamin, therapeutic with minerals (MULTI-VITAMIN) TABS Take 1 tablet by mouth daily       hydroxychloroquine (PLAQUENIL) 200 MG tablet Take 200 mg by mouth daily.       Allergies   Allergen Reactions     Morphine Sulfate Itching     Sulfa Drugs Hives         Reviewed and updated as needed this visit by clinical staff     Reviewed and updated as needed this visit by Provider         ROS:  Positive for decreased balance, back and hip pain, and allergies with irritated eyes.    Denies headache, insomnia, chest pain, shortness of breath, cough, heartburn, bowel issues, bladder issues, neck pain, knee pain, ankle pain, or foot pain. Remainder of ROS is negative unless otherwise noted above or in HPI.    This document serves as a record of the services and decisions personally performed and made by Rafael Orosco MD. It was created on his behalf by Albino Manning, a trained medical scribe. The creation of this document is based the provider's statements to the medical scribe.  Albino Manning 3:27 PM September 20, 2017    OBJECTIVE:     /65  Pulse 96  Temp 97.7  F (36.5  C) (Oral)  Wt 80.3 kg (177 lb)  BMI 29.45 kg/m2  Body mass index is 29.45 kg/(m^2).  GENERAL:  healthy, alert and no distress  EYES: Eyes irritated and red, PERRL and conjunctivae and sclerae normal. EOMI.  HENT: ear canals and TM's normal  RESP: lungs clear to auscultation - no rales, rhonchi or wheezes  CV: regular rate and rhythm, normal S1 S2, no S3 or S4, no murmur, click or rub, no peripheral edema and peripheral pulses strong  NEURO: Normal strength and tone, mentation intact and speech normal. Knee reflexes normal. Good strength at dorsiflexion. Romberg normal. Heel-toe unsteady.  PSYCH: mentation appears normal, affect normal/bright    Diagnostic Test Results:  No results found for this or any previous visit (from the past 24 hour(s)).    ASSESSMENT/PLAN:     Encounter Diagnoses   Name Primary?     Hip pain, left Yes     Acute allergic conjunctivitis of both eyes        (M25.552) Hip pain, left  (primary encounter diagnosis)  Comment: Worsening. Prescription given for duloxetine, and fluoxetine was discontinued. Order placed for walking cane.  Plan: order for DME, DULoxetine (CYMBALTA) 20 MG EC         capsule        Taper off fluoxetine and start duloxetine as below in patient instructions. Follow up as needed.    Patient Instructions   -Try using an over the counter allegra or claritin to help with your allergies.  -Taper off of your prozac, by skipping the medication tomorrow then taking it on Friday, then skipping the medication over the weekend and taking it Monday, then stopping the prozac and starting on Cymbalta the next Friday.     30 minutes were spent with the patient with more than 50% of time spent in counseling and coordination of care regarding above assessment and plan.   The information in this document, created by the medical scribe for me, accurately reflects the services I personally performed and the decisions made by me. I have reviewed and approved this document for accuracy prior to leaving the patient care area.   Rafael Orosco MD 3:27 PM September 20, 2017  [facility]  [place]

## 2017-09-20 NOTE — MR AVS SNAPSHOT
"              After Visit Summary   9/20/2017    Lesley Stafford    MRN: [id number]           Patient Information     Date Of Birth          1960        Visit Information        Provider Department      9/20/2017 3:00 PM Rafael Orosco MD [facility]        Today's Diagnoses     Hip pain, left    -  1      Care Instructions    -Try using an over the counter allegra or claritin to help with your allergies.  -Taper off of your prozac, by skipping the medication tomorrow then taking it on Friday, then skipping the medication over the weekend and taking it Monday, then stopping the prozac and starting on Cymbalta the next Friday.          Follow-ups after your visit        Who to contact     If you have questions or need follow up information about today's clinic visit or your schedule please contact [facility] [place] at [phone].  Normal or non-critical lab and imaging results will be communicated to you by MyChart, letter or phone within 4 business days after the clinic has received the results. If you do not hear from us within 7 days, please contact the clinic through [employer] or phone. If you have a critical or abnormal lab result, we will notify you by phone as soon as possible.  Submit refill requests through [employer] or call your pharmacy and they will forward the refill request to us. Please allow 3 business days for your refill to be completed.          Additional Information About Your Visit        MyChart Information     [employer] lets you send messages to your doctor, view your test results, renew your prescriptions, schedule appointments and more. To sign up, go to [email or web address] . Click on \"Log in\" on the left side of the screen, which will take you to the Welcome page. Then click on \"Sign up Now\" on the right side of the page.     You will be asked to enter the access code listed below, as well as some personal information. Please follow the " directions to create your username and password.     Your access code is: [email or web address]  Expires: 2017 11:19 PM     Your access code will  in 90 days. If you need help or a new code, please call your [facility] or [phone].        Care EveryWhere ID     This is your Care EveryWhere ID. This could be used by other organizations to access your [place] medical records  [id number]        Your Vitals Were     Pulse Temperature BMI (Body Mass Index)             96 97.7  F (36.5  C) (Oral) 29.45 kg/m2          Blood Pressure from Last 3 Encounters:   17 101/65   17 100/52   17 120/75    Weight from Last 3 Encounters:   17 177 lb (80.3 kg)   17 176 lb 14.4 oz (80.2 kg)   17 178 lb (80.7 kg)              Today, you had the following     No orders found for display         Today's Medication Changes          These changes are accurate as of: 17  3:46 PM.  If you have any questions, ask your nurse or doctor.               Start taking these medicines.        Dose/Directions    DULoxetine 20 MG EC capsule   Commonly known as:  CYMBALTA   Used for:  Hip pain, left   Started by:  Rafael Orosco MD        Dose:  20 mg   Take 1 capsule (20 mg) by mouth 2 times daily   Quantity:  60 capsule   Refills:  3       order for DME   Used for:  Hip pain, left   Started by:  Rafael Orosco MD        Cane,   Quantity:  1 Device   Refills:  0         Stop taking these medicines if you haven't already. Please contact your care team if you have questions.     FLUoxetine 10 MG capsule   Commonly known as:  PROzac   Stopped by:  Rafael Orosco MD                Where to get your medicines      These medications were sent to [facility] [id number] [place] [facility] - [place], [place] - [address]  [address] [address]     Phone:  [phone]     DULoxetine 20 MG EC capsule         Some of these will need a paper prescription and others can be bought  over the counter.  Ask your nurse if you have questions.     Bring a paper prescription for each of these medications     order for DME                Primary Care Provider Office Phone # Fax #    Rafael Orosco [phone]       [address] [address]        Equal Access to Services     SHANI SUAREZ : Hadii aad ku hadasho Soomaali, waaxda luqadaha, qaybta kaalmada adeegyada, devi og haymaxn aderula amosjaneycasie ellington . So [facility] [phone].    ATENCIÓN: Si habla español, tiene a levine disposición servicios gratuitos de asistencia lingüística. [place] al [phone].    We comply with applicable federal civil rights laws and [place] laws. We do not discriminate on the basis of race, color, national origin, age, disability sex, sexual orientation or gender identity.            Thank you!     Thank you for choosing [facility]  for your care. Our goal is always to provide you with excellent care. Hearing back from our patients is one way we can continue to improve our services. Please take a few minutes to complete the written survey that you may receive in the mail after your visit with us. Thank you!             Your Updated Medication List - Protect others around you: Learn how to safely use, store and throw away your medicines at www.disposemymeds.org.          This list is accurate as of: 9/20/17  3:46 PM.  Always use your most recent med list.                   Brand Name Dispense Instructions for use Diagnosis    acyclovir 200 MG capsule    ZOVIRAX    22 capsule    Take 1 capsule (200 mg) by mouth 5 times daily    HSV (herpes simplex virus) infection       amphetamine-dextroamphetamine 10 MG per tablet    ADDERALL     Take 10 mg by mouth 2 times daily        CALCIUM 500 PO           cetirizine 10 MG tablet    zyrTEC    30 tablet    Take 1 tablet (10 mg) by mouth every evening    Seasonal allergic rhinitis       cyclobenzaprine 5 MG tablet    FLEXERIL     42 tablet    TAKE 1 TABLET BY MOUTH 3 TIMES DAILY    Back pain, unspecified back location, unspecified back pain laterality, unspecified chronicity, Hip pain, bilateral, Trochanteric bursitis of both hips       DEEP SEA NASAL SPRAY 0.65 % nasal spray   Generic drug:  sodium chloride     1 Bottle    SPRAY 1 SPRAY INTO BOTH NOSTRILS DAILY AS NEEDED FOR CONGESTION    Sinusitis, unspecified chronicity, unspecified location       DULoxetine 20 MG EC capsule    CYMBALTA    60 capsule    Take 1 capsule (20 mg) by mouth 2 times daily    Hip pain, left       ferrous sulfate 325 (65 FE) MG tablet    IRON    30 tablet    Take 1 tablet (325 mg) by mouth daily (with breakfast)    Other iron deficiency anemia       gabapentin 300 MG capsule    NEURONTIN    90 capsule    TAKE 1 CAPSULE (300 MG) BY MOUTH 3 TIMES DAILY    Peripheral polyneuropathy (H)       hydroxychloroquine 200 MG tablet    PLAQUENIL     Take 200 mg by mouth daily.        hydrOXYzine 25 MG tablet    ATARAX    120 tablet    Take 1 tablet (25 mg) by mouth 3 times daily as needed for itching    Anxiety       ibuprofen 800 MG tablet    ADVIL/MOTRIN    30 tablet    TAKE 1 TABLET BY MOUTH EVERY DAY AS NEEDED FOR MODERATE PAIN    Chronic arthralgias of knees and hips       Multi-vitamin Tabs tablet      Take 1 tablet by mouth daily        order for DME     1 Device    Cane,    Hip pain, left       tiZANidine 4 MG tablet    ZANAFLEX    90 tablet    TAKE 1-2 TABLETS (4-8 MG) BY MOUTH 3 TIMES DAILY AS NEEDED FOR MUSCLE SPASMS    Myalgia       traMADol 50 MG tablet    ULTRAM    60 tablet    TAKE 1-2 TABLETS BY MOUTH EVERY 6 HOURS AS NEEDED    Lupus (H), Chronic arthralgias of knees and hips

## 2017-09-20 NOTE — NURSING NOTE
"Chief Complaint   Patient presents with     Balance/ Vestibular       Initial /65  Pulse 96  Temp 97.7  F (36.5  C) (Oral)  Wt 177 lb (80.3 kg)  BMI 29.45 kg/m2 Estimated body mass index is 29.45 kg/(m^2) as calculated from the following:    Height as of 7/20/17: 5' 5\" (1.651 m).    Weight as of this encounter: 177 lb (80.3 kg).  Medication Reconciliation: complete     Sterling Wilkinson MA        "

## 2017-09-20 NOTE — PATIENT INSTRUCTIONS
-Try using an over the counter allegra or claritin to help with your allergies.  -Taper off of your prozac, by skipping the medication tomorrow then taking it on Friday, then skipping the medication over the weekend and taking it Monday, then stopping the prozac and starting on Cymbalta the next Friday.

## 2017-09-21 ASSESSMENT — ANXIETY QUESTIONNAIRES: GAD7 TOTAL SCORE: 13

## 2017-09-25 PROBLEM — H10.13 ACUTE ALLERGIC CONJUNCTIVITIS OF BOTH EYES: Status: ACTIVE | Noted: 2017-09-25

## 2017-09-29 ENCOUNTER — OFFICE VISIT (OUTPATIENT)
Dept: URGENT CARE | Facility: URGENT CARE | Age: 57
End: 2017-09-29
Payer: COMMERCIAL

## 2017-09-29 ENCOUNTER — TELEPHONE (OUTPATIENT)
Dept: FAMILY MEDICINE | Facility: CLINIC | Age: 57
End: 2017-09-29

## 2017-09-29 VITALS
WEIGHT: 177 LBS | DIASTOLIC BLOOD PRESSURE: 79 MMHG | TEMPERATURE: 97.7 F | HEART RATE: 86 BPM | OXYGEN SATURATION: 96 % | SYSTOLIC BLOOD PRESSURE: 109 MMHG | BODY MASS INDEX: 29.45 KG/M2

## 2017-09-29 DIAGNOSIS — Z79.899 MEDICATION MANAGEMENT: Primary | ICD-10-CM

## 2017-09-29 DIAGNOSIS — G56.03 BILATERAL CARPAL TUNNEL SYNDROME: Primary | ICD-10-CM

## 2017-09-29 PROCEDURE — 99213 OFFICE O/P EST LOW 20 MIN: CPT | Performed by: FAMILY MEDICINE

## 2017-09-29 NOTE — TELEPHONE ENCOUNTER
Tingling numb fingers both right and left has been going on for 5 days  Thinks something is pinched  Reason for call:  Symptom   Symptom or request: Tingling numb fingers both right and left     Duration (how long have symptoms been present): 5 days  Have you been treated for this before? Yes    Additional comments: Thinks a nerve is pinched can not massage it out    Phone number to reach patient:  Home number on file [phone] (home)    Best Time:  n/a    Can we leave a detailed message on this number?  YES

## 2017-09-29 NOTE — TELEPHONE ENCOUNTER
Return call to patient, she reports that she has had numbness and tingling in her hands, bilaterally for the past fives days. She denies dizziness, lightheadedness, chest pain. She reports that she is scheduled to see spine doctor on 10-6-17. She reports that she is going to go to urgent care today.     Patient reports questions related to the best time of day to take medications (gabapentin, cymbalta) She is interested in a MTM referral. Order placed per protocol.     Shelby Arreola RN  [facility]

## 2017-09-29 NOTE — NURSING NOTE
"Chief Complaint   Patient presents with     Numbness     bilateral hands       Initial /79 (BP Location: Left arm, Patient Position: Chair, Cuff Size: Adult Regular)  Pulse 86  Temp 97.7  F (36.5  C) (Oral)  Wt 177 lb (80.3 kg)  SpO2 96%  BMI 29.45 kg/m2 Estimated body mass index is 29.45 kg/(m^2) as calculated from the following:    Height as of 7/20/17: 5' 5\" (1.651 m).    Weight as of this encounter: 177 lb (80.3 kg).  Medication Reconciliation: complete     Angelic Levine CMA      "

## 2017-09-29 NOTE — MR AVS SNAPSHOT
"              After Visit Summary   2017    Lesley Stafford    MRN: [id number]           Patient Information     Date Of Birth          1960        Visit Information        Provider Department      2017 6:25 PM Josh Rosas MD [facility] [place]         Follow-ups after your visit        Who to contact     If you have questions or need follow up information about today's clinic visit or your schedule please contact [facility] directly at [phone].  Normal or non-critical lab and imaging results will be communicated to you by MyChart, letter or phone within 4 business days after the clinic has received the results. If you do not hear from us within 7 days, please contact the clinic through [employer] or phone. If you have a critical or abnormal lab result, we will notify you by phone as soon as possible.  Submit refill requests through [employer] or call your pharmacy and they will forward the refill request to us. Please allow 3 business days for your refill to be completed.          Additional Information About Your Visit        MyChart Information     [employer] lets you send messages to your doctor, view your test results, renew your prescriptions, schedule appointments and more. To sign up, go to [email or web address] . Click on \"Log in\" on the left side of the screen, which will take you to the Welcome page. Then click on \"Sign up Now\" on the right side of the page.     You will be asked to enter the access code listed below, as well as some personal information. Please follow the directions to create your username and password.     Your access code is: [email or web address]  Expires: 2017  7:08 PM     Your access code will  in 90 days. If you need help or a new code, please call your [facility] or [phone].        Care EveryWhere ID     This is your Care EveryWhere ID. This could be used by other organizations to access your [place] " medical records  [id number]        Your Vitals Were     Pulse Temperature Pulse Oximetry BMI (Body Mass Index)          86 97.7  F (36.5  C) (Oral) 96% 29.45 kg/m2         Blood Pressure from Last 3 Encounters:   09/29/17 109/79   09/20/17 101/65   07/28/17 100/52    Weight from Last 3 Encounters:   09/29/17 177 lb (80.3 kg)   09/20/17 177 lb (80.3 kg)   07/28/17 176 lb 14.4 oz (80.2 kg)              Today, you had the following     No orders found for display       Primary Care Provider Office Phone # Fax #    Rafael Orosco [phone]       [address] [address]        Equal Access to Services     [facility]: Hadii soco mayorga hadasho Soomaali, waaxda luqadaha, qaybta kaalmada adeegyada, devi ellington . So [facility] [phone].    ATENCIÓN: Si habla español, tiene a levine disposición servicios gratuitos de asistencia lingüística. [place] al [phone].    We comply with applicable federal civil rights laws and [place] laws. We do not discriminate on the basis of race, color, national origin, age, disability, sex, sexual orientation, or gender identity.            Thank you!     Thank you for choosing [facility]  for your care. Our goal is always to provide you with excellent care. Hearing back from our patients is one way we can continue to improve our services. Please take a few minutes to complete the written survey that you may receive in the mail after your visit with us. Thank you!             Your Updated Medication List - Protect others around you: Learn how to safely use, store and throw away your medicines at www.disposemymeds.org.          This list is accurate as of: 9/29/17  7:08 PM.  Always use your most recent med list.                   Brand Name Dispense Instructions for use Diagnosis    acyclovir 200 MG capsule    ZOVIRAX    22 capsule    Take 1 capsule (200 mg) by mouth 5 times daily    HSV (herpes  simplex virus) infection       amphetamine-dextroamphetamine 10 MG per tablet    ADDERALL     Take 10 mg by mouth 2 times daily        CALCIUM 500 PO           cetirizine 10 MG tablet    zyrTEC    30 tablet    Take 1 tablet (10 mg) by mouth every evening    Seasonal allergic rhinitis       cyclobenzaprine 5 MG tablet    FLEXERIL    42 tablet    TAKE 1 TABLET BY MOUTH 3 TIMES DAILY    Back pain, unspecified back location, unspecified back pain laterality, unspecified chronicity, Hip pain, bilateral, Trochanteric bursitis of both hips       DEEP SEA NASAL SPRAY 0.65 % nasal spray   Generic drug:  sodium chloride     1 Bottle    SPRAY 1 SPRAY INTO BOTH NOSTRILS DAILY AS NEEDED FOR CONGESTION    Sinusitis, unspecified chronicity, unspecified location       DULoxetine 20 MG EC capsule    CYMBALTA    60 capsule    Take 1 capsule (20 mg) by mouth 2 times daily    Hip pain, left       ferrous sulfate 325 (65 FE) MG tablet    IRON    30 tablet    Take 1 tablet (325 mg) by mouth daily (with breakfast)    Other iron deficiency anemia       gabapentin 300 MG capsule    NEURONTIN    90 capsule    TAKE 1 CAPSULE (300 MG) BY MOUTH 3 TIMES DAILY    Peripheral polyneuropathy (H)       hydroxychloroquine 200 MG tablet    PLAQUENIL     Take 200 mg by mouth daily.        hydrOXYzine 25 MG tablet    ATARAX    120 tablet    Take 1 tablet (25 mg) by mouth 3 times daily as needed for itching    Anxiety       ibuprofen 800 MG tablet    ADVIL/MOTRIN    30 tablet    TAKE 1 TABLET BY MOUTH EVERY DAY AS NEEDED FOR MODERATE PAIN    Chronic arthralgias of knees and hips       Multi-vitamin Tabs tablet      Take 1 tablet by mouth daily        order for DME     1 Device    Cane,    Hip pain, left       tiZANidine 4 MG tablet    ZANAFLEX    90 tablet    TAKE 1-2 TABLETS (4-8 MG) BY MOUTH 3 TIMES DAILY AS NEEDED FOR MUSCLE SPASMS    Myalgia       traMADol 50 MG tablet    ULTRAM    60 tablet    TAKE 1-2 TABLETS BY MOUTH EVERY 6 HOURS AS NEEDED     Lupus (H), Chronic arthralgias of knees and hips

## 2017-09-29 NOTE — PROGRESS NOTES
"    Some of this note was populated by a medical assistant.     SUBJECTIVE:   Lesley Stafford is a 56 year old female who presents to clinic today for the following health issues:    Numbness in bilateral hands      Duration: 4-5 days of \"numbness in the hands\"      Noted hx of carpal tunnel syndrome.     Description (location/character/radiation): hands    Intensity:  6.5/10    Accompanying signs and symptoms: lightheaded    History (similar episodes/previous evaluation): None    Precipitating or alleviating factors: None    Therapies tried and outcome: sleep was working, now wakes up. Stretches and shaking- no relief- heat helps alittle.       Social History   Substance Use Topics     Smoking status: Former Smoker     Smokeless tobacco: Never Used     Alcohol use No        Hx of low back and hip pain and seeing a \"spine doctor\" a couple weeks ago. Cymbalta prescribed and 3 doses as she weaned off fluoxitine over the past couple of weeks.   Repeat appt next Friday.       Problem list and histories reviewed & adjusted, as indicated.  Additional history: as documented    Patient Active Problem List   Diagnosis     CARDIOVASCULAR SCREENING; LDL GOAL LESS THAN 130     Lupus (H)     Abnormal perimenopausal bleeding     Obesity, Class I, BMI 30-34.9     Nerv/musculskel sym NEC     History of claustrophobia     Cervicalgia     Pain in joint of left shoulder     Depressive disorder     Anxiety     Allergic state     ACP (advance care planning)     Tired     Insomnia, unspecified type     Hemoglobin C trait (H)     Liver hemangioma     Bilateral carpal tunnel syndrome     Chronic bilateral low back pain without sciatica     Acute allergic conjunctivitis of both eyes     Past Surgical History:   Procedure Laterality Date     COLONOSCOPY  01/2010    repeat 10 yrs     DILATION AND CURETTAGE, OPERATIVE HYSTEROSCOPY WITH MORCELLATOR, COMBINED N/A 2/15/2017    Procedure: COMBINED DILATION AND CURETTAGE, OPERATIVE HYSTEROSCOPY " WITH MORCELLATOR;  Surgeon: Erin Gutierrez MD;  Location: [facility]     ESOPHAGOSCOPY, GASTROSCOPY, DUODENOSCOPY (EGD), COMBINED Left 2/5/2016    Procedure: COMBINED ESOPHAGOSCOPY, GASTROSCOPY, DUODENOSCOPY (EGD), BIOPSY SINGLE OR MULTIPLE;  Surgeon: Pierre Fernandez MD;  Location:  GI     GYN SURGERY         Social History   Substance Use Topics     Smoking status: Former Smoker     Smokeless tobacco: Never Used     Alcohol use No     Family History   Problem Relation Age of Onset     Lupus Mother      DIABETES Father      Brain Tumor Father      begnign on pituitary     Depression Sister      Anemia Sister          Current Outpatient Prescriptions   Medication Sig Dispense Refill     order for DME Cane, 1 Device 0     DULoxetine (CYMBALTA) 20 MG EC capsule Take 1 capsule (20 mg) by mouth 2 times daily 60 capsule 3     tiZANidine (ZANAFLEX) 4 MG tablet TAKE 1-2 TABLETS (4-8 MG) BY MOUTH 3 TIMES DAILY AS NEEDED FOR MUSCLE SPASMS 90 tablet 2     ibuprofen (ADVIL/MOTRIN) 800 MG tablet TAKE 1 TABLET BY MOUTH EVERY DAY AS NEEDED FOR MODERATE PAIN 30 tablet 3     gabapentin (NEURONTIN) 300 MG capsule TAKE 1 CAPSULE (300 MG) BY MOUTH 3 TIMES DAILY 90 capsule 1     cyclobenzaprine (FLEXERIL) 5 MG tablet TAKE 1 TABLET BY MOUTH 3 TIMES DAILY 42 tablet 3     DEEP SEA NASAL SPRAY 0.65 % nasal spray SPRAY 1 SPRAY INTO BOTH NOSTRILS DAILY AS NEEDED FOR CONGESTION 1 Bottle 2     traMADol (ULTRAM) 50 MG tablet TAKE 1-2 TABLETS BY MOUTH EVERY 6 HOURS AS NEEDED 60 tablet 0     hydrOXYzine (ATARAX) 25 MG tablet Take 1 tablet (25 mg) by mouth 3 times daily as needed for itching 120 tablet 11     amphetamine-dextroamphetamine (ADDERALL) 10 MG tablet Take 10 mg by mouth 2 times daily       acyclovir (ZOVIRAX) 200 MG capsule Take 1 capsule (200 mg) by mouth 5 times daily 22 capsule 0     ferrous sulfate (IRON) 325 (65 FE) MG tablet Take 1 tablet (325 mg) by mouth daily (with breakfast) 30 tablet 5     cetirizine (ZYRTEC) 10 MG  tablet Take 1 tablet (10 mg) by mouth every evening 30 tablet 1     Calcium-Magnesium-Vitamin D (CALCIUM 500 PO)        multivitamin, therapeutic with minerals (MULTI-VITAMIN) TABS Take 1 tablet by mouth daily       hydroxychloroquine (PLAQUENIL) 200 MG tablet Take 200 mg by mouth daily.       Allergies   Allergen Reactions     Morphine Sulfate Itching     Sulfa Drugs Hives         Reviewed and updated as needed this visit by clinical staff       Reviewed and updated as needed this visit by Provider         ROS:  Constitutional, HEENT, cardiovascular, pulmonary, gi and gu systems are negative, except as otherwise noted.      OBJECTIVE:   /79 (BP Location: Left arm, Patient Position: Chair, Cuff Size: Adult Regular)  Pulse 86  Temp 97.7  F (36.5  C) (Oral)  Wt 177 lb (80.3 kg)  SpO2 96%  BMI 29.45 kg/m2  Body mass index is 29.45 kg/(m^2).  GENERAL: healthy, alert and no distress  NECK: no adenopathy, no asymmetry, masses, or scars and thyroid normal to palpation  RESP: lungs clear to auscultation - no rales, rhonchi or wheezes  CV: regular rate and rhythm, normal S1 S2, no S3 or S4, no murmur, click or rub, no peripheral edema and peripheral pulses strong  ABDOMEN: soft, nontender, no hepatosplenomegaly, no masses and bowel sounds normal  MS: no gross musculoskeletal defects noted, no edema  phalens and tinels test seems to elicit carpal tunnel area symptoms although mildly so.       Diagnostic Test Results:  none     ASSESSMENT/PLAN:       ICD-10-CM    1. Bilateral carpal tunnel syndrome G56.03       PLAN  Consider a radicular component from the cervical spine.   Bracing at night and activity modification explained.   Patient educational/instructional material provided including reasons for follow-up   The patient indicates understanding of these issues and agrees with the plan.  Josh Rosas MD      [facility]

## 2017-10-07 ENCOUNTER — NURSE TRIAGE (OUTPATIENT)
Dept: NURSING | Facility: CLINIC | Age: 57
End: 2017-10-07

## 2017-10-07 NOTE — TELEPHONE ENCOUNTER
Patient calling to report bee or wasp sting 90 minutes ago, still having pain at site.  Additional Information    Negative: [1] Life-threatening reaction (anaphylaxis) in the past to sting AND [2] < 2 hours since sting    Negative: Attacked by swarm of bees now    Negative: Passed out (i.e., lost consciousness, collapsed and was not responding)    Negative: Wheezing, stridor, or difficulty breathing    Negative: [1] Hoarseness or cough AND [2] sudden onset following sting    Negative: [1] Tightness in the throat or chest AND [2] sudden onset following sting    Negative: [1] Swollen tongue or difficulty swallowing AND [2] sudden onset following sting    Negative: Sounds like a life-threatening emergency to the triager    Negative: Not a bee, wasp, hornet, or yellow jacket sting    Negative: [1] Widespread hives, itching or facial swelling AND [2] started within 2 hours of sting (Exception: only at site of sting)    Negative: [1] Vomiting or abdominal cramps AND [2] started within 2 hours of sting    Negative: [1] Gave epinephrine shot AND [2] no symptoms now    Negative: Sting inside the mouth    Negative: Sting on eyeball (e.g., cornea)    Negative: Patient sounds very sick or weak to the triager    Negative: More than 50 stings    Negative: [1] Fever AND [2] red area    Negative: [1] Fever AND [2] area is very tender to touch    Negative: [1] Red streak or red line AND [2] length > 2 inches (5 cm)    Negative: [1] Red or very tender (to touch) area AND [2] started over 24 hours after the sting    Negative: [1] Red or very tender (to touch) area AND [2] getting larger over 48 hours after the sting    Negative: Swelling is huge (e.g., > 4 inches or 10 cm, spreads beyond wrist or ankle)    Negative: [1] Widespread hives, itching or facial swelling AND [2] started over 2 hours after sting  (Exception: only at site of sting)    Negative: [1] Scab is present AND [2] it drains pus or increases in size AND [3] not improved  after applying  antibiotic ointment for 2 days    Normal local reaction to bee, wasp, or yellow jacket sting (all triage questions negative)    Protocols used: BEE OR YELLOW JACKET STING-ADULT-AH

## 2017-10-12 ENCOUNTER — MEDICAL CORRESPONDENCE (OUTPATIENT)
Dept: HEALTH INFORMATION MANAGEMENT | Facility: CLINIC | Age: 57
End: 2017-10-12

## 2017-10-25 ENCOUNTER — OFFICE VISIT (OUTPATIENT)
Dept: PHARMACY | Facility: CLINIC | Age: 57
End: 2017-10-25
Payer: COMMERCIAL

## 2017-10-25 DIAGNOSIS — M54.50 CHRONIC BILATERAL LOW BACK PAIN WITHOUT SCIATICA: Primary | ICD-10-CM

## 2017-10-25 DIAGNOSIS — F32.A DEPRESSIVE DISORDER: ICD-10-CM

## 2017-10-25 DIAGNOSIS — H10.13 ACUTE ALLERGIC CONJUNCTIVITIS OF BOTH EYES: ICD-10-CM

## 2017-10-25 DIAGNOSIS — M32.9 SYSTEMIC LUPUS ERYTHEMATOSUS, UNSPECIFIED SLE TYPE, UNSPECIFIED ORGAN INVOLVEMENT STATUS (H): ICD-10-CM

## 2017-10-25 DIAGNOSIS — B00.9 HSV (HERPES SIMPLEX VIRUS) INFECTION: ICD-10-CM

## 2017-10-25 DIAGNOSIS — G89.29 CHRONIC BILATERAL LOW BACK PAIN WITHOUT SCIATICA: Primary | ICD-10-CM

## 2017-10-25 DIAGNOSIS — F98.8 ATTENTION DEFICIT DISORDER, UNSPECIFIED HYPERACTIVITY PRESENCE: ICD-10-CM

## 2017-10-25 DIAGNOSIS — F41.9 ANXIETY: ICD-10-CM

## 2017-10-25 DIAGNOSIS — E63.9 NUTRITIONAL DEFICIENCY: ICD-10-CM

## 2017-10-25 PROCEDURE — 99607 MTMS BY PHARM ADDL 15 MIN: CPT | Performed by: PHARMACIST

## 2017-10-25 PROCEDURE — 99605 MTMS BY PHARM NP 15 MIN: CPT | Performed by: PHARMACIST

## 2017-10-25 NOTE — PATIENT INSTRUCTIONS
Recommendations from today's MTM visit:                                                    MTM (medication therapy management) is a service provided by a clinical pharmacist designed to help you get the most of out of your medicines.     1. Talk to your pain doctor about increasing your dose of gabapentin. Also talk to them about starting Celebrex rather than ibuprofen or naproxen.     2. Start your iron up again.     3. You can take an extra hydroxyzine when you are feeling a bit anxious.     4. I will have Claudia call you to talk about money stuff.     Next MTM visit: If you need it :)    To schedule another MTM appointment, please call the clinic directly or you may call the MTM scheduling line at [phone] or toll-free at [phone].     My Clinical Pharmacist's contact information:                                                      It was a pleasure seeing you today!  Please feel free to contact me with any questions or concerns you have.      Quynh Art, PharmD  Medication Therapy Management Pharmacist  Pager: [phone]    You may receive a survey about the MTM services you received.  I would appreciate your feedback to help me serve you better in the future. Please fill it out and return it when you can. Your comments will be anonymous.

## 2017-10-25 NOTE — MR AVS SNAPSHOT
After Visit Summary   10/25/2017    Lesley Stafford    MRN: [id number]           Patient Information     Date Of Birth          1960        Visit Information        Provider Department      10/25/2017 10:30 AM Quynh Art [facility] Primary Care MTM        Care Instructions    Recommendations from today's MTM visit:                                                    MTM (medication therapy management) is a service provided by a clinical pharmacist designed to help you get the most of out of your medicines.     1. Talk to your pain doctor about increasing your dose of gabapentin. Also talk to them about starting Celebrex rather than ibuprofen or naproxen.     2. Start your iron up again.     3. You can take an extra hydroxyzine when you are feeling a bit anxious.     4. I will have Claudia call you to talk about money stuff.     Next MTM visit: If you need it :)    To schedule another MTM appointment, please call the clinic directly or you may call the MTM scheduling line at [phone] or toll-free at [phone].     My Clinical Pharmacist's contact information:                                                      It was a pleasure seeing you today!  Please feel free to contact me with any questions or concerns you have.      Quynh Art, PharmD  Medication Therapy Management Pharmacist  Pager: [phone]    You may receive a survey about the MTM services you received.  I would appreciate your feedback to help me serve you better in the future. Please fill it out and return it when you can. Your comments will be anonymous.                    Follow-ups after your visit        Who to contact     If you have questions or need follow up information about today's clinic visit or your schedule please contact [facility] PRIMARY CARE MTM directly at [phone].  Normal or non-critical lab and imaging results will be communicated to you by  "MyChart, letter or phone within 4 business days after the clinic has received the results. If you do not hear from us within 7 days, please contact the clinic through [employer] or phone. If you have a critical or abnormal lab result, we will notify you by phone as soon as possible.  Submit refill requests through [employer] or call your pharmacy and they will forward the refill request to us. Please allow 3 business days for your refill to be completed.          Additional Information About Your Visit        [employer] Information     [employer] lets you send messages to your doctor, view your test results, renew your prescriptions, schedule appointments and more. To sign up, go to [email or web address] . Click on \"Log in\" on the left side of the screen, which will take you to the Welcome page. Then click on \"Sign up Now\" on the right side of the page.     You will be asked to enter the access code listed below, as well as some personal information. Please follow the directions to create your username and password.     Your access code is: [email or web address]  Expires: 2017  7:08 PM     Your access code will  in 90 days. If you need help or a new code, please call your [facility] clinic or [phone].        Care EveryWhere ID     This is your Care EveryWhere ID. This could be used by other organizations to access your [facility] medical records  [id number]         Blood Pressure from Last 3 Encounters:   17 109/79   17 101/65   17 100/52    Weight from Last 3 Encounters:   17 177 lb (80.3 kg)   17 177 lb (80.3 kg)   17 176 lb 14.4 oz (80.2 kg)              Today, you had the following     No orders found for display       Primary Care Provider Office Phone # Fax #    Rafael Orosco [phone]       [address] [address]        Equal Access to Services     SHANI SUAREZ AH: Hadii soco Vences, nuria eli, zheng astorga " devi jamilrula watsonaan ah. So [facility] [phone].    ATENCIÓN: Si dannyla francheska, tiene a levine disposición servicios gratuitos de asistencia lingüística. [place] al [phone].    We comply with applicable federal civil rights laws and [place] laws. We do not discriminate on the basis of race, color, national origin, age, disability, sex, sexual orientation, or gender identity.            Thank you!     Thank you for choosing [facility] PRIMARY CARE MTM  for your care. Our goal is always to provide you with excellent care. Hearing back from our patients is one way we can continue to improve our services. Please take a few minutes to complete the written survey that you may receive in the mail after your visit with us. Thank you!             Your Updated Medication List - Protect others around you: Learn how to safely use, store and throw away your medicines at www.disposemymeds.org.          This list is accurate as of: 10/25/17 11:42 AM.  Always use your most recent med list.                   Brand Name Dispense Instructions for use Diagnosis    acyclovir 200 MG capsule    ZOVIRAX    22 capsule    Take 1 capsule (200 mg) by mouth 5 times daily    HSV (herpes simplex virus) infection       amphetamine-dextroamphetamine 10 MG per tablet    ADDERALL     Take 10 mg by mouth 2 times daily        CALCIUM 500 PO           cetirizine 10 MG tablet    zyrTEC    30 tablet    Take 1 tablet (10 mg) by mouth every evening    Seasonal allergic rhinitis       cyclobenzaprine 5 MG tablet    FLEXERIL    42 tablet    TAKE 1 TABLET BY MOUTH 3 TIMES DAILY    Back pain, unspecified back location, unspecified back pain laterality, unspecified chronicity, Hip pain, bilateral, Trochanteric bursitis of both hips       DEEP SEA NASAL SPRAY 0.65 % nasal spray   Generic drug:  sodium chloride     1 Bottle    SPRAY 1 SPRAY INTO BOTH NOSTRILS DAILY AS NEEDED FOR CONGESTION    Sinusitis, unspecified  chronicity, unspecified location       DULoxetine 20 MG EC capsule    CYMBALTA    60 capsule    Take 1 capsule (20 mg) by mouth 2 times daily    Hip pain, left       ferrous sulfate 325 (65 FE) MG tablet    IRON    30 tablet    Take 1 tablet (325 mg) by mouth daily (with breakfast)    Other iron deficiency anemia       gabapentin 300 MG capsule    NEURONTIN    90 capsule    TAKE 1 CAPSULE (300 MG) BY MOUTH 3 TIMES DAILY    Peripheral polyneuropathy       hydroxychloroquine 200 MG tablet    PLAQUENIL     Take 200 mg by mouth daily.        hydrOXYzine 25 MG tablet    ATARAX    120 tablet    Take 1 tablet (25 mg) by mouth 3 times daily as needed for itching    Anxiety       ibuprofen 800 MG tablet    ADVIL/MOTRIN    30 tablet    TAKE 1 TABLET BY MOUTH EVERY DAY AS NEEDED FOR MODERATE PAIN    Chronic arthralgias of knees and hips       Multi-vitamin Tabs tablet      Take 1 tablet by mouth daily        order for DME     1 Device    Cane,    Hip pain, left       tiZANidine 4 MG tablet    ZANAFLEX    90 tablet    TAKE 1-2 TABLETS (4-8 MG) BY MOUTH 3 TIMES DAILY AS NEEDED FOR MUSCLE SPASMS    Myalgia       traMADol 50 MG tablet    ULTRAM    60 tablet    TAKE 1-2 TABLETS BY MOUTH EVERY 6 HOURS AS NEEDED    Lupus, Chronic arthralgias of knees and hips

## 2017-10-25 NOTE — PROGRESS NOTES
SUBJECTIVE/OBJECTIVE:                           Lesley Stafford is a 56 year old female coming in for an initial visit for Medication Therapy Management.  She was referred to me from PCP.     Chief Complaint: Wondering about med timing.    Allergies/ADRs: Reviewed in Epic  Tobacco: History of tobacco dependence - quit 25 years  Alcohol: history of alcohol dependence  Caffeine: 4 cups/day of coffee  Activity: None  PMH: Reviewed in Epic    Medication Adherence: no issues reported, wants to know if she should be taking her medications at a different time. Takes meds in the morning. Also having money concerns - working 2 jobs and it is taking a toll on her.     Pain: Currently taking Cymbalta 20mg BID, ibuprofen 400mg about 3 times in a 7 day period, tizanidine 4mg - 1-2.5 tablets throughout the day, gabapentin 300mg TID. Lower back pain. Also has lupus, hips have degeneration. Neck pain, headache pain. 2 bulging discs in her back. Has talked about injections with her pain management. Has pool therapy set up [facility]. MRI tonight to look why there's numbness in her hands. Fear of getting steroid shots, would like to wait with these. Also seeing Jyoti in [place]. Has been taking Cymbalta for about a month. Pain lately a 5-6, which is an improvement over the pain levels prior to starting Cymbalta. Has one Flexeril left. Does feel that she has spasms and pain that the tizanidine and Flexeril work. She isn't sure which of them work better. Hasn't had tramadol in quite a while. She is very concerned about her kidneys and the possibility that the NSAIDs could cause some renal damage.    Depression/Anxiety:  Current medications include: Duloxetine 20mg twice daily. Pt reports that depression symptoms are improved. Currently taking hydroxyzine 25mg BID-TID for both anxiety and itching. She thinks that both of these are working well, however not optimally. She still has times of increased anxiety. She denies side  effects.   PHQ-9 SCORE 11/2/2016 12/14/2016 6/28/2017   Total Score - - -   Total Score MyChart - - -   Total Score 19 12 14     HSV: Takes acyclovir when she has a breakout. Hasn't had a breakout lately. Usually is along the left side of her back, so she thinks it might be shingles. Either way, the acyclovir works well when needed, without side effects.     ADD: Currently taking Adderall 10mg BID. Thinks this is working well, without side effects.     Supplements: Currently taking calcium/Mg/vitamin D every other day, a daily MVI. Has stopped her iron. She has had some issues with her legs lately - feels like RLS per pt.     Lupus: Currently taking hydroxychloroquine 200mg daily. She is followed by rheumatology. Denies side effects.     Allergic rhinitis: Current medications include cetirizine 10mg PRN and saline nasal spray PRN. Primary symptoms are nasal congestion and post-nasal drip. Pt feels that current therapy is effective.     Current labs include:BP Readings from Last 3 Encounters:   09/29/17 109/79   09/20/17 101/65   07/28/17 100/52     Today's Vitals: There were no vitals taken for this visit.  Lab Results   Component Value Date    A1C 5.7 11/02/2016   .  Lab Results   Component Value Date    CHOL 263 11/02/2016     Lab Results   Component Value Date    TRIG 147 11/02/2016     Lab Results   Component Value Date    HDL 83 11/02/2016     Lab Results   Component Value Date     11/02/2016       Liver Function Studies -   Recent Labs   Lab Test  11/02/16   1151   PROTTOTAL  7.6   ALBUMIN  3.5   BILITOTAL  0.5   ALKPHOS  64   AST  34   ALT  43       No results found for: UCRR, MICROL, UMALCR    Last Basic Metabolic Panel:  Lab Results   Component Value Date     05/07/2017      Lab Results   Component Value Date    POTASSIUM 3.3 05/07/2017     Lab Results   Component Value Date    CHLORIDE 106 05/07/2017     Lab Results   Component Value Date    BUN 10 05/07/2017     Lab Results   Component Value  Date    CR 0.68 05/07/2017     GFR Estimate   Date Value Ref Range Status   05/07/2017 88 >60 mL/min/1.7m2 Final     Comment:     Non  GFR Calc   11/02/2016 74 >60 mL/min/1.7m2 Final     Comment:     Non  GFR Calc   02/06/2016 >90  Non  GFR Calc   >60 mL/min/1.7m2 Final     GFR Estimate If Black   Date Value Ref Range Status   05/07/2017 >90   GFR Calc   >60 mL/min/1.7m2 Final   11/02/2016 89 >60 mL/min/1.7m2 Final     Comment:      GFR Calc   02/06/2016 >90   GFR Calc   >60 mL/min/1.7m2 Final     TSH   Date Value Ref Range Status   11/02/2016 0.85 0.40 - 4.00 mU/L Final       ASSESSMENT:                             Current medications were reviewed today.     Medication Adherence: no issues identified. Will have SW call to talk to pt about potentially helping with monetary issues.     Pain: Needs improvement. Encouraged pt to continue with nonpharmacologic therapies. Discussed also increasing the dose of both her gabapentin (as renal function is at goal and she has some pain relief with gabapentin), and also increasing Cymbalta. Also discussed that infrequent use of NSAIDs is not going to significantly impact her kidneys, however she could discuss switching to Celebrex with pain management for a more specific NSAID option.     Depression/Anxiety:  Needs improvement. Should discuss increasing duloxetine with her psych/pain team, as the current dose (40mg TDD) helped with mood but not optimally. This will likely help with pain, depression and anxiety. Can also for the short term take increased dose of hydroxyzine at times when anxiety is bad.     HSV: Stable.    ADD: Stable.    Supplements: Increased Mg and iron may help with RLS symptoms. Should restart these with daily use.     Lupus: Stable.    Allergic rhinitis: Stable.    PLAN:                            1. Pt to talk to pain management about increasing dose of  gabapentin and Cymbalta, and switching from ibuprofen/naproxen to Celebrex.  2. Restart iron and take Ca/Mg/vitamin D daily.  3. Pt to take an extra hydroxyzine if she is feeling anxious - max dose of 100mg four times daily, however she seems to only need an extra 25-50mg daily.   4. CC referral placed.    I spent 60 minutes with this patient today. All changes were made via collaborative practice agreement with Rafael Orosco. A copy of the visit note was provided to the patient's primary care provider.    Will follow up if needed.    The patient was given a summary of these recommendations as an after visit summary.     Quynh Art, PharmD  Medication Therapy Management Pharmacist  Pager: [phone]

## 2017-11-02 ENCOUNTER — CARE COORDINATION (OUTPATIENT)
Dept: CARE COORDINATION | Facility: CLINIC | Age: 57
End: 2017-11-02

## 2017-11-02 NOTE — PROGRESS NOTES
Clinic Care Coordination Contact  [facility]/Voicemail    Referral Source: [facility]  Clinical Data: Care Coordinator Outreach  Outreach attempted x 1.  Left message on voicemail with call back information and requested return call.  Plan: Care Coordinator will try to reach patient again in 3-5 business days.  LUIS Jesus    Care Coordinator  [facility] Primary Care, and Women's   [phone]

## 2017-11-14 DIAGNOSIS — F41.9 ANXIETY: ICD-10-CM

## 2017-11-14 RX ORDER — HYDROXYZINE HYDROCHLORIDE 25 MG/1
25 TABLET, FILM COATED ORAL 3 TIMES DAILY PRN
Qty: 30 TABLET | Refills: 0 | Status: SHIPPED | OUTPATIENT
Start: 2017-11-14 | End: 2018-04-02

## 2017-11-14 NOTE — TELEPHONE ENCOUNTER
Medication is being filled for 1 time refill only due to:  patient unable to  hydroxyzine refill at [facility] because computer system is down. Refill sent to [facility].    BENJA ChN RN  [facility]

## 2017-11-20 ENCOUNTER — CARE COORDINATION (OUTPATIENT)
Dept: CARE COORDINATION | Facility: CLINIC | Age: 57
End: 2017-11-20

## 2017-11-20 NOTE — PROGRESS NOTES
Clinic Care Coordination Contact  [facility]/Voicemail    Referral Source: [facility]  Clinical Data: Care Coordinator Outreach  Outreach attempted x 1.  Left message on voicemail with call back information and requested return call.  Plan:  Care Coordinator will continue to follow.  LUIS Jesus    Care Coordinator  [facility], [facility], and Women's   [phone]

## 2017-12-23 DIAGNOSIS — M79.10 MYALGIA: ICD-10-CM

## 2017-12-26 NOTE — TELEPHONE ENCOUNTER
Routing to provider pool -- please review and sign/advise. Not on protocol.    Thank you  BENJA MontejoN, RN  [facility]

## 2017-12-26 NOTE — TELEPHONE ENCOUNTER
This is not a medications that we recommend that patient take on an ongoing basis    Will have her PCP review

## 2017-12-29 PROBLEM — H10.13 ACUTE ALLERGIC CONJUNCTIVITIS OF BOTH EYES: Status: RESOLVED | Noted: 2017-09-25 | Resolved: 2017-12-29

## 2017-12-29 NOTE — TELEPHONE ENCOUNTER
Huddle with Dr. Orosco    Verbal Ok for medication 90/0refill    Swati Ghotra, RN   [facility]

## 2018-01-12 PROBLEM — M54.50 CHRONIC BILATERAL LOW BACK PAIN WITHOUT SCIATICA: Status: RESOLVED | Noted: 2017-04-14 | Resolved: 2018-01-12

## 2018-01-12 PROBLEM — G89.29 CHRONIC BILATERAL LOW BACK PAIN WITHOUT SCIATICA: Status: RESOLVED | Noted: 2017-04-14 | Resolved: 2018-01-12

## 2018-01-25 DIAGNOSIS — M25.552 HIP PAIN, LEFT: ICD-10-CM

## 2018-01-25 NOTE — TELEPHONE ENCOUNTER
"Requested Prescriptions   Pending Prescriptions Disp Refills     DULoxetine (CYMBALTA) 20 MG EC capsule [Pharmacy Med Name: DULOXETINE HCL DR 20 MG CAP] 60 capsule 3    Last Written Prescription Date:  9/20/17  Last Fill Quantity: 60,  # refills: 3   Last Office Visit with [facility], P or [facility] prescribing provider:  9/20/17   Future Office Visit:      Sig: TAKE 1 CAPSULE (20 MG) BY MOUTH 2 TIMES DAILY    Serotonin-Norepinephrine Reuptake Inhibitors  Failed    1/25/2018  1:44 AM       Failed - PHQ-9 score of less than 5 in past 6 months    The PHQ-9 criteria is meant to fail. It requires a PHQ-9 score review         Passed - Blood pressure under 140/90    BP Readings from Last 3 Encounters:   09/29/17 109/79   09/20/17 101/65   07/28/17 100/52                Passed - Recent or future visit with authorizing provider's specialty    Patient had office visit in the last year or has a visit in the next 30 days with authorizing provider.  See \"Patient Info\" tab in inbasket, or \"Choose Columns\" in Meds & Orders section of the refill encounter.            Passed - Patient is age 18 or older       Passed - No active pregnancy on record       Passed - No positive pregnancy test in past 12 months       Passed - Recent (6 mo) or future visit with authorizing provider's specialty    Patient had office visit in the last 6 months or has a visit in the next 30 days with authorizing provider.  See \"Patient Info\" tab in inbasket, or \"Choose Columns\" in Meds & Orders section of the refill encounter.              "

## 2018-01-26 RX ORDER — DULOXETIN HYDROCHLORIDE 20 MG/1
CAPSULE, DELAYED RELEASE ORAL
Qty: 60 CAPSULE | Refills: 3 | Status: SHIPPED | OUTPATIENT
Start: 2018-01-26 | End: 2018-03-05

## 2018-01-26 NOTE — TELEPHONE ENCOUNTER
Prescription approved per [facility] Refill Protocol.    Shelby Arreola, BSN RN  [facility]

## 2018-01-31 ENCOUNTER — CARE COORDINATION (OUTPATIENT)
Dept: CARE COORDINATION | Facility: CLINIC | Age: 58
End: 2018-01-31

## 2018-01-31 NOTE — PROGRESS NOTES
Clinic Care Coordination Contact  [facility]/Voicemail    Referral Source: [facility]  Clinical Data: Care Coordinator Outreach  Outreach attempted x 1.  Left message on voicemail with call back information and requested return call.  Plan:  Care Coordinator will continue to follow.  LUIS Jesus    Care Coordinator  [facility] ,[facility], and Women's   [phone]

## 2018-03-01 ENCOUNTER — CARE COORDINATION (OUTPATIENT)
Dept: CARE COORDINATION | Facility: CLINIC | Age: 58
End: 2018-03-01

## 2018-03-01 NOTE — PROGRESS NOTES
Clinic Care Coordination Contact  [facility]/Voicemail    Referral Source: [facility]  Clinical Data: Care Coordinator Outreach  Outreach attempted x 1.  Left message on voicemail with call back information and requested return call.  Plan: . Care Coordinator will continue to follow.  LUIS Jesus    Care Coordinator  [facility] ,[facility] Care, and Women's   [phone]

## 2018-03-05 ENCOUNTER — OFFICE VISIT (OUTPATIENT)
Dept: FAMILY MEDICINE | Facility: CLINIC | Age: 58
End: 2018-03-05
Payer: COMMERCIAL

## 2018-03-05 VITALS
BODY MASS INDEX: 28.69 KG/M2 | OXYGEN SATURATION: 98 % | HEART RATE: 91 BPM | TEMPERATURE: 97.9 F | DIASTOLIC BLOOD PRESSURE: 66 MMHG | SYSTOLIC BLOOD PRESSURE: 105 MMHG | WEIGHT: 172.4 LBS

## 2018-03-05 DIAGNOSIS — Z00.01 ENCOUNTER FOR ROUTINE ADULT PHYSICAL EXAM WITH ABNORMAL FINDINGS: Primary | ICD-10-CM

## 2018-03-05 DIAGNOSIS — G47.10 EXCESSIVE SLEEPINESS: ICD-10-CM

## 2018-03-05 DIAGNOSIS — Z13.220 SCREENING CHOLESTEROL LEVEL: ICD-10-CM

## 2018-03-05 DIAGNOSIS — R09.81 NASAL CONGESTION: ICD-10-CM

## 2018-03-05 DIAGNOSIS — F33.1 MODERATE EPISODE OF RECURRENT MAJOR DEPRESSIVE DISORDER (H): ICD-10-CM

## 2018-03-05 DIAGNOSIS — M25.552 HIP PAIN, LEFT: ICD-10-CM

## 2018-03-05 DIAGNOSIS — L93.0 LUPUS ERYTHEMATOSUS, UNSPECIFIED FORM: ICD-10-CM

## 2018-03-05 DIAGNOSIS — R79.89 ABNORMAL CBC: ICD-10-CM

## 2018-03-05 DIAGNOSIS — Z13.1 SCREENING FOR DIABETES MELLITUS: ICD-10-CM

## 2018-03-05 DIAGNOSIS — Z11.3 ROUTINE SCREENING FOR STI (SEXUALLY TRANSMITTED INFECTION): ICD-10-CM

## 2018-03-05 LAB
ALBUMIN SERPL-MCNC: 3.9 G/DL (ref 3.4–5)
ALP SERPL-CCNC: 66 U/L (ref 40–150)
ALT SERPL W P-5'-P-CCNC: 28 U/L (ref 0–50)
ANION GAP SERPL CALCULATED.3IONS-SCNC: 3 MMOL/L (ref 3–14)
AST SERPL W P-5'-P-CCNC: 26 U/L (ref 0–45)
BILIRUB SERPL-MCNC: 0.6 MG/DL (ref 0.2–1.3)
BUN SERPL-MCNC: 8 MG/DL (ref 7–30)
CALCIUM SERPL-MCNC: 9.9 MG/DL (ref 8.5–10.1)
CHLORIDE SERPL-SCNC: 107 MMOL/L (ref 94–109)
CO2 SERPL-SCNC: 28 MMOL/L (ref 20–32)
CREAT SERPL-MCNC: 0.59 MG/DL (ref 0.52–1.04)
ERYTHROCYTE [DISTWIDTH] IN BLOOD BY AUTOMATED COUNT: 15.1 % (ref 10–15)
GFR SERPL CREATININE-BSD FRML MDRD: >90 ML/MIN/1.7M2
GLUCOSE SERPL-MCNC: 80 MG/DL (ref 70–99)
HCT VFR BLD AUTO: 37 % (ref 35–47)
HGB BLD-MCNC: 13.5 G/DL (ref 11.7–15.7)
HIV 1+2 AB+HIV1 P24 AG SERPL QL IA: NONREACTIVE
MCH RBC QN AUTO: 28.4 PG (ref 26.5–33)
MCHC RBC AUTO-ENTMCNC: 36.5 G/DL (ref 31.5–36.5)
MCV RBC AUTO: 78 FL (ref 78–100)
PLATELET # BLD AUTO: 111 10E9/L (ref 150–450)
POTASSIUM SERPL-SCNC: 4.2 MMOL/L (ref 3.4–5.3)
PROT SERPL-MCNC: 8.4 G/DL (ref 6.8–8.8)
RBC # BLD AUTO: 4.76 10E12/L (ref 3.8–5.2)
SODIUM SERPL-SCNC: 138 MMOL/L (ref 133–144)
WBC # BLD AUTO: 3.9 10E9/L (ref 4–11)

## 2018-03-05 PROCEDURE — 36415 COLL VENOUS BLD VENIPUNCTURE: CPT | Performed by: NURSE PRACTITIONER

## 2018-03-05 PROCEDURE — 87389 HIV-1 AG W/HIV-1&-2 AB AG IA: CPT | Performed by: NURSE PRACTITIONER

## 2018-03-05 PROCEDURE — 80053 COMPREHEN METABOLIC PANEL: CPT | Performed by: NURSE PRACTITIONER

## 2018-03-05 PROCEDURE — 85027 COMPLETE CBC AUTOMATED: CPT | Performed by: NURSE PRACTITIONER

## 2018-03-05 PROCEDURE — 99396 PREV VISIT EST AGE 40-64: CPT | Performed by: NURSE PRACTITIONER

## 2018-03-05 PROCEDURE — 99215 OFFICE O/P EST HI 40 MIN: CPT | Mod: 25 | Performed by: NURSE PRACTITIONER

## 2018-03-05 RX ORDER — CALCIUM CARBONATE/VITAMIN D3 600 MG-10
TABLET ORAL
COMMUNITY

## 2018-03-05 RX ORDER — DULOXETIN HYDROCHLORIDE 30 MG/1
30 CAPSULE, DELAYED RELEASE ORAL 2 TIMES DAILY
Qty: 180 CAPSULE | Refills: 1 | Status: SHIPPED | OUTPATIENT
Start: 2018-03-05 | End: 2018-12-21

## 2018-03-05 NOTE — LETTER
March 6, 2018      Lesley Stafford  [address] [place] [address]        Dear Lesley,     Thanks again for coming in, and I hope you are starting to feel better about your health! You are negative for HIV, and there were no significant changes with your kidney or liver function or with your blood count.     Take care,   Please let me know if there is anything I can do to help.     -Rosa Weinstein CNP     Resulted Orders   Comprehensive metabolic panel   Result Value Ref Range    Sodium 138 133 - 144 mmol/L    Potassium 4.2 3.4 - 5.3 mmol/L    Chloride 107 94 - 109 mmol/L    Carbon Dioxide 28 20 - 32 mmol/L    Anion Gap 3 3 - 14 mmol/L    Glucose 80 70 - 99 mg/dL    Urea Nitrogen 8 7 - 30 mg/dL    Creatinine 0.59 0.52 - 1.04 mg/dL    GFR Estimate >90 >60 mL/min/1.7m2      Comment:      Non  GFR Calc    GFR Estimate If Black >90 >60 mL/min/1.7m2      Comment:       GFR Calc    Calcium 9.9 8.5 - 10.1 mg/dL    Bilirubin Total 0.6 0.2 - 1.3 mg/dL    Albumin 3.9 3.4 - 5.0 g/dL    Protein Total 8.4 6.8 - 8.8 g/dL    Alkaline Phosphatase 66 40 - 150 U/L    ALT 28 0 - 50 U/L    AST 26 0 - 45 U/L   CBC with platelets   Result Value Ref Range    WBC 3.9 (L) 4.0 - 11.0 10e9/L    RBC Count 4.76 3.8 - 5.2 10e12/L    Hemoglobin 13.5 11.7 - 15.7 g/dL    Hematocrit 37.0 35.0 - 47.0 %    MCV 78 78 - 100 fl    MCH 28.4 26.5 - 33.0 pg    MCHC 36.5 31.5 - 36.5 g/dL    RDW 15.1 (H) 10.0 - 15.0 %    Platelet Count 111 (L) 150 - 450 10e9/L   HIV Antigen Antibody Combo   Result Value Ref Range    HIV Antigen Antibody Combo Nonreactive NR^Nonreactive          Comment:      HIV-1 p24 Ag & HIV-1/HIV-2 Ab Not Detected

## 2018-03-05 NOTE — NURSING NOTE
"Chief Complaint   Patient presents with     Physical       Initial /66 (BP Location: Left arm, Patient Position: Chair, Cuff Size: Adult Regular)  Pulse 91  Temp 97.9  F (36.6  C) (Oral)  Wt 172 lb 6.4 oz (78.2 kg)  SpO2 98%  BMI 28.69 kg/m2 Estimated body mass index is 28.69 kg/(m^2) as calculated from the following:    Height as of 7/20/17: 5' 5\" (1.651 m).    Weight as of this encounter: 172 lb 6.4 oz (78.2 kg).  Medication Reconciliation: complete     Ila Craig CMA    "

## 2018-03-05 NOTE — MR AVS SNAPSHOT
After Visit Summary   3/5/2018    Lesley Stafford    MRN: [id number]           Patient Information     Date Of Birth          1960        Visit Information        Provider Department      3/5/2018 9:30 AM Rosa Weinstein APRN [facility]        Today's Diagnoses     Excessive sleepiness    -  1    Screening cholesterol level        Nasal congestion        Hip pain, left        Screening for diabetes mellitus        Routine screening for STI (sexually transmitted infection)        Abnormal CBC          Care Instructions      Preventive Health Recommendations  Female Ages 50 - 64    Yearly exam: See your health care provider every year in order to  o Review health changes.   o Discuss preventive care.    o Review your medicines if your doctor has prescribed any.      Get a Pap test every three years (unless you have an abnormal result and your provider advises testing more often).    If you get Pap tests with HPV test, you only need to test every 5 years, unless you have an abnormal result.     You do not need a Pap test if your uterus was removed (hysterectomy) and you have not had cancer.    You should be tested each year for STDs (sexually transmitted diseases) if you're at risk.     Have a mammogram every 1 to 2 years.    Have a colonoscopy at age 50, or have a yearly FIT test (stool test). These exams screen for colon cancer.      Have a cholesterol test every 5 years, or more often if advised.    Have a diabetes test (fasting glucose) every three years. If you are at risk for diabetes, you should have this test more often.     If you are at risk for osteoporosis (brittle bone disease), think about having a bone density scan (DEXA).    Shots: Get a flu shot each year. Get a tetanus shot every 10 years.    Nutrition:     Eat at least 5 servings of fruits and vegetables each day.    Eat whole-grain bread, whole-wheat pasta and brown rice instead of white grains and  rice.    Talk to your provider about Calcium and Vitamin D.     Lifestyle    Exercise at least 150 minutes a week (30 minutes a day, 5 days a week). This will help you control your weight and prevent disease.    Limit alcohol to one drink per day.    No smoking.     Wear sunscreen to prevent skin cancer.     See your dentist every six months for an exam and cleaning.    See your eye doctor every 1 to 2 years.            Follow-ups after your visit        Additional Services     OTOLARYNGOLOGY REFERRAL       Your provider has referred you to: [facility]: [facility] [phone] [email or web address]    Please be aware that coverage of these services is subject to the terms and limitations of your health insurance plan.  Call member services at your health plan with any benefit or coverage questions.      Please bring the following with you to your appointment:    (1) Any X-Rays, CTs or MRIs which have been performed.  Contact the facility where they were done to arrange for  prior to your scheduled appointment.   (2) List of current medications  (3) This referral request   (4) Any documents/labs given to you for this referral            SLEEP EVALUATION & MANAGEMENT REFERRAL - [facility]  [phone] (Age 2 and up)       Please be aware that coverage of these services is subject to the terms and limitations of your health insurance plan.  Call member services at your health plan with any benefit or coverage questions.      Please bring the following to your appointment:    >>   List of current medications   >>   This referral request   >>   Any documents/labs given to you for this referral                      Future tests that were ordered for you today     Open Future Orders        Priority Expected Expires Ordered    SLEEP EVALUATION & MANAGEMENT REFERRAL - [facility] / [facility]   "[phone] (Age 2 and up) Routine  3/5/2019 3/5/2018            Who to contact     If you have questions or need follow up information about today's clinic visit or your schedule please contact [facility] directly at [phone].  Normal or non-critical lab and imaging results will be communicated to you by MyChart, letter or phone within 4 business days after the clinic has received the results. If you do not hear from us within 7 days, please contact the clinic through MyChart or phone. If you have a critical or abnormal lab result, we will notify you by phone as soon as possible.  Submit refill requests through [email or web address] or call your pharmacy and they will forward the refill request to us. Please allow 3 business days for your refill to be completed.          Additional Information About Your Visit        MyChart Information     [email or web address] lets you send messages to your doctor, view your test results, renew your prescriptions, schedule appointments and more. To sign up, go to [email or web address] . Click on \"Log in\" on the left side of the screen, which will take you to the Welcome page. Then click on \"Sign up Now\" on the right side of the page.     You will be asked to enter the access code listed below, as well as some personal information. Please follow the directions to create your username and password.     Your access code is: [email or web address]  Expires: 6/3/2018 10:20 AM     Your access code will  in 90 days. If you need help or a new code, please call your [facility] clinic or [phone].        Care EveryWhere ID     This is your Care EveryWhere ID. This could be used by other organizations to access your [facility] medical records  [id number]        Your Vitals Were     Pulse Temperature Pulse Oximetry BMI (Body Mass Index)          91 97.9  F (36.6  C) (Oral) 98% 28.69 kg/m2         Blood Pressure from Last 3 Encounters:   18 105/66   17 109/79   17 101/65    Weight " from Last 3 Encounters:   03/05/18 172 lb 6.4 oz (78.2 kg)   09/29/17 177 lb (80.3 kg)   09/20/17 177 lb (80.3 kg)              We Performed the Following     CBC with platelets     Comprehensive metabolic panel     HIV Antigen Antibody Combo     Lipid Profile     OTOLARYNGOLOGY REFERRAL          Today's Medication Changes          These changes are accurate as of 3/5/18 10:20 AM.  If you have any questions, ask your nurse or doctor.               These medicines have changed or have updated prescriptions.        Dose/Directions    DULoxetine 30 MG EC capsule   Commonly known as:  CYMBALTA   This may have changed:  See the new instructions.   Used for:  Hip pain, left   Changed by:  Rosa Weinstein APRN CNP        Dose:  30 mg   Take 1 capsule (30 mg) by mouth 2 times daily   Quantity:  180 capsule   Refills:  1       gabapentin 300 MG capsule   Commonly known as:  NEURONTIN   This may have changed:  See the new instructions.   Used for:  Peripheral polyneuropathy        TAKE 1 CAPSULE (300 MG) BY MOUTH 3 TIMES DAILY   Quantity:  90 capsule   Refills:  1            Where to get your medicines      These medications were sent to [facility] [address] [place] [facility] [place], [place] - [address]  [address] [address]     Phone:  [phone]     DULoxetine 30 MG EC capsule                Primary Care Provider Office Phone # Fax #    Rafael Orosco [phone]       [address]  [facility] [address]        Equal Access to Services     MANUEL [facility] Hospital[facility]NA : Hadii soco mayorga hadcarrollo Soadelita, waaxda luqadaha, qaybta kaalmada adeegyada, devi levin. So [facility] [phone].    ATENCIÓN: Si habla español, tiene a levine disposición servicios gratuitos de asistencia lingüística. Llame al [phone].    We comply with applicable federal civil rights laws and [place] laws. We do not discriminate on the basis of race, color, national origin, age,  disability, sex, sexual orientation, or gender identity.            Thank you!     Thank you for choosing [facility]  for your care. Our goal is always to provide you with excellent care. Hearing back from our patients is one way we can continue to improve our services. Please take a few minutes to complete the written survey that you may receive in the mail after your visit with us. Thank you!             Your Updated Medication List - Protect others around you: Learn how to safely use, store and throw away your medicines at www.disposemymeds.org.          This list is accurate as of 3/5/18 10:20 AM.  Always use your most recent med list.                   Brand Name Dispense Instructions for use Diagnosis    acyclovir 200 MG capsule    ZOVIRAX    22 capsule    Take 1 capsule (200 mg) by mouth 5 times daily    HSV (herpes simplex virus) infection       amphetamine-dextroamphetamine 10 MG per tablet    ADDERALL     Take 5 mg by mouth 2 times daily        CALCIUM 500 PO           cetirizine 10 MG tablet    zyrTEC    30 tablet    Take 1 tablet (10 mg) by mouth every evening    Seasonal allergic rhinitis       cyclobenzaprine 5 MG tablet    FLEXERIL    42 tablet    TAKE 1 TABLET BY MOUTH 3 TIMES DAILY    Back pain, unspecified back location, unspecified back pain laterality, unspecified chronicity, Hip pain, bilateral, Trochanteric bursitis of both hips       DEEP SEA NASAL SPRAY 0.65 % nasal spray   Generic drug:  sodium chloride     1 Bottle    SPRAY 1 SPRAY INTO BOTH NOSTRILS DAILY AS NEEDED FOR CONGESTION    Sinusitis, unspecified chronicity, unspecified location       DULoxetine 30 MG EC capsule    CYMBALTA    180 capsule    Take 1 capsule (30 mg) by mouth 2 times daily    Hip pain, left       ferrous sulfate 325 (65 FE) MG tablet    IRON    30 tablet    Take 1 tablet (325 mg) by mouth daily (with breakfast)    Other iron deficiency anemia       gabapentin 300 MG capsule    NEURONTIN    90 capsule     TAKE 1 CAPSULE (300 MG) BY MOUTH 3 TIMES DAILY    Peripheral polyneuropathy       hydroxychloroquine 200 MG tablet    PLAQUENIL    90 tablet    TAKE 1 TABLET BY MOUTH DAILY.    Systemic lupus erythematosus, unspecified SLE type, unspecified organ involvement status (H)       hydrOXYzine 25 MG tablet    ATARAX    30 tablet    Take 1 tablet (25 mg) by mouth 3 times daily as needed for itching    Anxiety       ibuprofen 800 MG tablet    ADVIL/MOTRIN    30 tablet    TAKE 1 TABLET BY MOUTH EVERY DAY AS NEEDED FOR MODERATE PAIN    Chronic arthralgias of knees and hips       melatonin 5 MG tablet      Take 5 mg by mouth nightly as needed for sleep        Multi-vitamin Tabs tablet      Take 1 tablet by mouth daily        omega 3 1200 MG Caps           order for DME     1 Device    Cane,    Hip pain, left       tiZANidine 4 MG tablet    ZANAFLEX    90 tablet    TAKE 1-2 TABLETS (4-8 MG) BY MOUTH 3 TIMES DAILY AS NEEDED FOR MUSCLE SPASMS    Myalgia       traMADol 50 MG tablet    ULTRAM    60 tablet    TAKE 1-2 TABLETS BY MOUTH EVERY 6 HOURS AS NEEDED    Lupus, Chronic arthralgias of knees and hips

## 2018-03-05 NOTE — PROGRESS NOTES
SUBJECTIVE:   CC: Lesley Stafford is an 57 year old woman who presents for preventive health visit.     Healthy Habits:    Do you get at least three servings of calcium containing foods daily (dairy, green leafy vegetables, etc.)? no, taking calcium and/or vitamin D supplement: yes - 1x per week     Amount of exercise or daily activities, outside of work: 0 day(s) per week    Problems taking medications regularly No- sometimes misses evening dose     Medication side effects: No    Have you had an eye exam in the past two years? no    Do you see a dentist twice per year? yes    Do you have sleep apnea, excessive snoring or daytime drowsiness?yes all- would like another sleep study       Ongoing nasal congestion- takes flonase and a daily allergy medication but always feels congested. Saw ENT once, but did not have any evaluation of sinuses performed. No sinus pain, no fever or chills.  Ongoing severe pain all over body. Has been declined by disability for the third time and has been frustrated and sad. Recently got her insurance back after a 3 month laps, so she is glad to be re-establishing with her orthopedic, psychiatrist, counselor and rheumatologist. Feels aching all over, makes her irritable and get tired easily. 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 No new accidents or injuries, just ongoing chronic pain in several joints.     Today's PHQ-2 Score:   PHQ-2 ( 1999 Pfizer) 3/5/2018 3/5/2018   Q1: Little interest or pleasure in doing things 2 2   Q2: Feeling down, depressed or hopeless 2 2   PHQ-2 Score 4 4       Abuse: Current or Past(Physical, Sexual or Emotional)- No  Do you feel safe in your environment - Yes    Social History   Substance Use Topics     Smoking status: Former Smoker     Smokeless tobacco: Never Used     Alcohol use No     If you drink alcohol do you typically have >3 drinks per day or >7 drinks per week? No                     Reviewed  orders with patient.  Reviewed health maintenance and updated orders accordingly - Yes  Labs reviewed in [facility]    Patient over age 50, mutual decision to screen reflected in health maintenance.    Pertinent mammograms are reviewed under the imaging tab.  History of abnormal Pap smear: NO - age 30-65 PAP every 5 years with negative HPV co-testing recommended    Reviewed and updated as needed this visit by clinical staff  Tobacco  Meds  Med Hx  Surg Hx  Fam Hx  Soc Hx        Reviewed and updated as needed this visit by Provider            ROS:  C: NEGATIVE for fever, chills, change in weight  I: NEGATIVE for worrisome rashes, moles or lesions  E: NEGATIVE for vision changes or irritation  R: NEGATIVE for significant cough or SOB  B: NEGATIVE for masses, tenderness or discharge  CV: NEGATIVE for chest pain, palpitations or peripheral edema  GI: NEGATIVE for nausea, abdominal pain, heartburn, or change in bowel habits  : NEGATIVE for unusual urinary or vaginal symptoms. No vaginal bleeding.  N: NEGATIVE for weakness, dizziness or paresthesias  P: NEGATIVE for changes in mood or affect     OBJECTIVE:   /66 (BP Location: Left arm, Patient Position: Chair, Cuff Size: Adult Regular)  Pulse 91  Temp 97.9  F (36.6  C) (Oral)  Wt 172 lb 6.4 oz (78.2 kg)  SpO2 98%  BMI 28.69 kg/m2  EXAM:  GENERAL: healthy, alert and no distress  HENT: normal cephalic/atraumatic, ear canals and TM's normal, nasal mucosa edematous , oropharynx clear and oral mucous membranes moist  NECK: no adenopathy, no asymmetry, masses, or scars and thyroid normal to palpation  RESP: lungs clear to auscultation - no rales, rhonchi or wheezes  BREAST: normal without masses, tenderness or nipple discharge and no palpable axillary masses or adenopathy  CV: regular rate and rhythm, normal S1 S2, no S3 or S4, no murmur, click or rub, no peripheral edema and peripheral pulses strong  ABDOMEN: soft, nontender, no hepatosplenomegaly, no masses and  "bowel sounds normal  MS: no gross musculoskeletal defects noted, no edema  SKIN: no suspicious lesions or rashes  NEURO: Normal strength and tone, mentation intact and speech normal  PSYCH: mentation appears normal, affect normal/bright    ASSESSMENT/PLAN:       ICD-10-CM    1. Encounter for routine adult physical exam with abnormal findings Z00.01    2. Excessive sleepiness G47.10 SLEEP EVALUATION & MANAGEMENT REFERRAL - ADULT -[facility] / [facility]  [phone] (Age 2 and up)     Comprehensive metabolic panel   3. Screening cholesterol level Z13.220 CANCELED: Lipid Profile   4. Nasal congestion R09.81 OTOLARYNGOLOGY REFERRAL   5. Hip pain, left M25.552 DULoxetine (CYMBALTA) 30 MG EC capsule   6. Screening for diabetes mellitus Z13.1    7. Routine screening for STI (sexually transmitted infection) Z11.3 HIV Antigen Antibody Combo   8. Abnormal CBC R79.89 CBC with platelets   9. Lupus erythematosus, unspecified form L93.0    10. Moderate episode of recurrent major depressive disorder (H) F33.1      -Try higher dose of cymbalta for chronic pain related to lupus; advised follow-up with specialists. Consider increasing gabapentin or switching to lyrica in the future.  - refer to ENT for chronic congestion issues  - screening labwork for diabetes, STI, and kidney and liver function    COUNSELING:   Reviewed preventive health counseling, as reflected in patient instructions       Regular exercise       Healthy diet/nutrition         reports that she has quit smoking. She has never used smokeless tobacco.    Estimated body mass index is 28.69 kg/(m^2) as calculated from the following:    Height as of 7/20/17: 5' 5\" (1.651 m).    Weight as of this encounter: 172 lb 6.4 oz (78.2 kg).   Weight management plan: Discussed healthy diet and exercise guidelines and patient will follow up in 3 months in clinic to re-evaluate.    Counseling Resources:  ATP IV Guidelines  Pooled Cohorts " Equation Calculator  Breast Cancer Risk Calculator  FRAX Risk Assessment  ICSI Preventive Guidelines  Dietary Guidelines for Americans, 2010  USDA's MyPlate  ASA Prophylaxis  Lung CA Screening    THOMAS Preston CNP  [facility]

## 2018-03-06 ASSESSMENT — PATIENT HEALTH QUESTIONNAIRE - PHQ9: SUM OF ALL RESPONSES TO PHQ QUESTIONS 1-9: 18

## 2018-03-30 ENCOUNTER — OFFICE VISIT (OUTPATIENT)
Dept: SLEEP MEDICINE | Facility: CLINIC | Age: 58
End: 2018-03-30
Payer: COMMERCIAL

## 2018-03-30 VITALS
SYSTOLIC BLOOD PRESSURE: 102 MMHG | RESPIRATION RATE: 16 BRPM | DIASTOLIC BLOOD PRESSURE: 67 MMHG | OXYGEN SATURATION: 100 % | WEIGHT: 172 LBS | HEIGHT: 65 IN | BODY MASS INDEX: 28.66 KG/M2 | HEART RATE: 89 BPM

## 2018-03-30 DIAGNOSIS — G47.33 OSA (OBSTRUCTIVE SLEEP APNEA): Primary | ICD-10-CM

## 2018-03-30 DIAGNOSIS — G47.10 EXCESSIVE SLEEPINESS: ICD-10-CM

## 2018-03-30 PROCEDURE — 99213 OFFICE O/P EST LOW 20 MIN: CPT | Performed by: INTERNAL MEDICINE

## 2018-03-30 NOTE — NURSING NOTE
"Chief Complaint   Patient presents with     Follow Up For     Per patient would like a new order for a PSG       Initial /67  Pulse 89  Resp 16  Ht 1.651 m (5' 5\")  Wt 78 kg (172 lb)  SpO2 100%  BMI 28.62 kg/m2 Estimated body mass index is 28.62 kg/(m^2) as calculated from the following:    Height as of this encounter: 1.651 m (5' 5\").    Weight as of this encounter: 78 kg (172 lb).  Medication Reconciliation: complete   Eduarda Rogers [facility] ~[place]      "

## 2018-03-30 NOTE — NURSING NOTE
Patient requested to call back on Monday and schedule as she could not wait. Scheduling number an sleep study packet given to the patient.     Eduarda Rogers [facility] ~[place]

## 2018-03-30 NOTE — PROGRESS NOTES
SLEEP MEDICINE CLINIC NOTE   [facility] SLEEP DISORDER CENTER  Lesley Stafford 57 year old female  : 1960  MRN: [id number]      PRIMARY CARE PROVIDER: Rafael Orosco    REFERRING PROVIDER: THOMAS Preston CNP  [address]  [place] [address]     DATE OF SERVICE:  2018      REASON FOR VISIT:  Follow up of excessive daytime sleepiness, obstructive sleep apnea and restless leg syndrome.      HISTORY OF PRESENT ILLNESS:  The patient is a very pleasant 57-year-old female with a complicated past medical history including obesity, SLE hemoglobin C trait, depression and anxiety who comes in today for a followup.  Her last clinic visit was in 2017 with Tiffanie Richards.  At that time, she was being treated for restless leg syndrome as well as for irregular sleep-wake schedule resulting in excessive daytime sleepiness.  She also has known mild obstructive sleep apnea diagnosed in  at [facility].  She was recommended to use a mandibular advancement device, but she was not able to afford it.      She reports her current routine as going to bed around 10:30 p.m.  It takes her anywhere from 15-30 minutes to fall asleep.  Occasionally, it takes her up to 45 minutes.  She does not think that falling asleep is a burden to her.  She reports waking up twice through the night to use the bathroom and it is easy for her to come back to fall asleep again.  She finally wakes up somewhere between 9:00 and 10:00 a.m.  She sets up an alarm for 6:45 a.m. and hits snooze multiple times.  She feels nonrefreshed upon awakening.  She has significant sleep inertia for which she takes gabapentin 3 times a day.  She also takes melatonin at night.  She takes Adderall twice a day for anxiety and attention deficit disorder.  She reports being fatigued through the day.  Her [place] Sleepiness Score is 13/24.  The patient works every day of the week including the weekends.  On  the weekdays, she goes to work between 10 a.m. and 3:00 p.m. while on the weekends she works from 9:00 a.m. to 3:00 p.m.  She works as a mental health therapist.  The patient reports significant pain in all her major joints.      The patient describes typical features of restless leg syndrome including motor restlessness, particularly lower extremities at the time of sleep onset, which happens 3-4 times per week, thus restlessness improves with movement.  It does interfere with her ability to fall asleep.      ASSESSMENT AND PLAN:  The patient has a complicated past medical history.  Her major sleep concerns include excessive daytime sleepiness despite getting adequate sleep at night, frequent snoring and past reports of witnessed apneas as well as untreated restless leg syndrome.   1.  Sleep-disordered breathing.  The patient has known mild obstructive sleep apnea diagnosed 13 years ago.  Given her symptoms of snoring and snort arousals as well as witnessed apneas, it is likely that her sleep apnea has increased in severity.  We suggested reevaluation of obstructive sleep apnea severity at this time with a repeat polysomnogram.  The patient is agreeable to that.  We will order an overnight polysomnography with a split-night protocol for evaluation and treatment considerations for obstructive sleep apnea.   2.  Restless leg syndrome.  The patient has typical features of restless leg syndrome, which in the past have been well treated.  More recently, the symptoms have worsened.  The patient is currently taking 300 mg of gabapentin at bedtime.  She was advised to take 300-600 mg of gabapentin approximately 2 hours before initiating sleep.  I anticipate improvement in her restless leg syndrome features with this.   3.  Excessive daytime sleepiness.  This is likely multifactorial with possibly untreated sleep apnea as well as restless leg syndrome; however, the patient also appears to be a prolonged sleeper.  The  patient is already on stimulant medications.  I do not plan on performing any other evaluation for excessive daytime sleepiness except for as described above.  I anticipate improvement in excessive daytime sleepiness with improved treatment of restless leg syndrome as well as treatment for obstructive sleep apnea, if found.      The patient was advised not to drive or operate heavy machinery if drowsy or sleepy.         I spent a total of 25 minutes face to face with Lesley Colorado during today's office visit. Over 50% of this time was spent counseling the patient and/or coordinating care regarding their sleep disorder.     Noel Marx MD   of Medicine  Pulmonary, Critical Care and Sleep Medicine  [facility]  Pager: [phone]          D: 2018   T: 2018   MT: WANDA      Name:     ADRIAN COLORADO KAROLINAPANCHO   MRN:      [id number]        Account:      [id number]   :      1960           Visit Date:   2018      Document: [id number]

## 2018-03-30 NOTE — PATIENT INSTRUCTIONS
Take gabapentin at 7 PM at night.    We will do a sleep study to look for sleep apnea.      Your BMI is Body mass index is 28.62 kg/(m^2).  Weight management is a personal decision.  If you are interested in exploring weight loss strategies, the following discussion covers the approaches that may be successful. Body mass index (BMI) is one way to tell whether you are at a healthy weight, overweight, or obese. It measures your weight in relation to your height.  A BMI of 18.5 to 24.9 is in the healthy range. A person with a BMI of 25 to 29.9 is considered overweight, and someone with a BMI of 30 or greater is considered obese. More than two-thirds of American adults are considered overweight or obese.  Being overweight or obese increases the risk for further weight gain. Excess weight may lead to heart disease and diabetes.  Creating and following plans for healthy eating and physical activity may help you improve your health.  Weight control is part of healthy lifestyle and includes exercise, emotional health, and healthy eating habits. Careful eating habits lifelong are the mainstay of weight control. Though there are significant health benefits from weight loss, long-term weight loss with diet alone may be very difficult to achieve- studies show long-term success with dietary management in less than 10% of people. Attaining a healthy weight may be especially difficult to achieve in those with severe obesity. In some cases, medications, devices and surgical management might be considered.  What can you do?  If you are overweight or obese and are interested in methods for weight loss, you should discuss this with your provider.     Consider reducing daily calorie intake by 500 calories.     Keep a food journal.     Avoiding skipping meals, consider cutting portions instead.    Diet combined with exercise helps maintain muscle while optimizing fat loss. Strength training is particularly important for building and  maintaining muscle mass. Exercise helps reduce stress, increase energy, and improves fitness. Increasing exercise without diet control, however, may not burn enough calories to loose weight.       Start walking three days a week 10-20 minutes at a time    Work towards walking thirty minutes five days a week     Eventually, increase the speed of your walking for 1-2 minutes at time    In addition, we recommend that you review healthy lifestyles and methods for weight loss available through the National Institutes of Health patient information sites:  http://win.niddk.nih.gov/publications/index.htm    And look into health and wellness programs that may be available through your health insurance provider, employer, local community center, or [employer].    Weight management plan: Patient was referred to their PCP to discuss a diet and exercise plan.

## 2018-03-30 NOTE — MR AVS SNAPSHOT
After Visit Summary   3/30/2018    Lesley Stafford    MRN: [id number]           Patient Information     Date Of Birth          1960        Visit Information        Provider Department      3/30/2018 3:00 PM Noel Marx MD [facility], [place], Sleep Study        Today's Diagnoses     RACHNA (obstructive sleep apnea)    -  1    Excessive sleepiness          Care Instructions    Take gabapentin at 7 PM at night.    We will do a sleep study to look for sleep apnea.      Your BMI is Body mass index is 28.62 kg/(m^2).  Weight management is a personal decision.  If you are interested in exploring weight loss strategies, the following discussion covers the approaches that may be successful. Body mass index (BMI) is one way to tell whether you are at a healthy weight, overweight, or obese. It measures your weight in relation to your height.  A BMI of 18.5 to 24.9 is in the healthy range. A person with a BMI of 25 to 29.9 is considered overweight, and someone with a BMI of 30 or greater is considered obese. More than two-thirds of American adults are considered overweight or obese.  Being overweight or obese increases the risk for further weight gain. Excess weight may lead to heart disease and diabetes.  Creating and following plans for healthy eating and physical activity may help you improve your health.  Weight control is part of healthy lifestyle and includes exercise, emotional health, and healthy eating habits. Careful eating habits lifelong are the mainstay of weight control. Though there are significant health benefits from weight loss, long-term weight loss with diet alone may be very difficult to achieve- studies show long-term success with dietary management in less than 10% of people. Attaining a healthy weight may be especially difficult to achieve in those with severe obesity. In some cases, medications, devices and surgical management might be considered.  What can you do?  If you are  overweight or obese and are interested in methods for weight loss, you should discuss this with your provider.     Consider reducing daily calorie intake by 500 calories.     Keep a food journal.     Avoiding skipping meals, consider cutting portions instead.    Diet combined with exercise helps maintain muscle while optimizing fat loss. Strength training is particularly important for building and maintaining muscle mass. Exercise helps reduce stress, increase energy, and improves fitness. Increasing exercise without diet control, however, may not burn enough calories to loose weight.       Start walking three days a week 10-20 minutes at a time    Work towards walking thirty minutes five days a week     Eventually, increase the speed of your walking for 1-2 minutes at time    In addition, we recommend that you review healthy lifestyles and methods for weight loss available through the National Institutes of Health patient information sites:  http://win.niddk.nih.gov/publications/index.htm    And look into health and wellness programs that may be available through your health insurance provider, employer, local community center, or [employer].    Weight management plan: Patient was referred to their PCP to discuss a diet and exercise plan.              Follow-ups after your visit        Follow-up notes from your care team     Return in about 4 weeks (around 4/27/2018).      Future tests that were ordered for you today     Open Future Orders        Priority Expected Expires Ordered    Comprehensive Sleep Study Routine  9/26/2018 3/30/2018            Who to contact     If you have questions or need follow up information about today's clinic visit or your schedule please contact [facility] directly at [phone].  Normal or non-critical lab and imaging results will be communicated to you by MyChart, letter or phone within 4 business days after the clinic has received the results. If you do not hear  "from us within 7 days, please contact the clinic through [employer] or phone. If you have a critical or abnormal lab result, we will notify you by phone as soon as possible.  Submit refill requests through [employer] or call your pharmacy and they will forward the refill request to us. Please allow 3 business days for your refill to be completed.          Additional Information About Your Visit        [employer] Information     [employer] lets you send messages to your doctor, view your test results, renew your prescriptions, schedule appointments and more. To sign up, go to [email or web address] . Click on \"Log in\" on the left side of the screen, which will take you to the Welcome page. Then click on \"Sign up Now\" on the right side of the page.     You will be asked to enter the access code listed below, as well as some personal information. Please follow the directions to create your username and password.     Your access code is: [email or web address]  Expires: 6/3/2018 11:20 AM     Your access code will  in 90 days. If you need help or a new code, please call your [facility] clinic or [phone].        Care EveryWhere ID     This is your Care EveryWhere ID. This could be used by other organizations to access your [facility] medical records  [id number]        Your Vitals Were     Pulse Respirations Height Pulse Oximetry BMI (Body Mass Index)       89 16 1.651 m (5' 5\") 100% 28.62 kg/m2        Blood Pressure from Last 3 Encounters:   18 102/67   18 105/66   17 109/79    Weight from Last 3 Encounters:   18 78 kg (172 lb)   18 78.2 kg (172 lb 6.4 oz)   17 80.3 kg (177 lb)              We Performed the Following     SLEEP EVALUATION & MANAGEMENT REFERRAL - ADULT -[facility] [place] / [facility]  [phone] (Age 2 and up)          Today's Medication Changes          These changes are accurate as of 3/30/18  4:47 PM.  If you have any questions, ask your nurse or " doctor.               These medicines have changed or have updated prescriptions.        Dose/Directions    gabapentin 300 MG capsule   Commonly known as:  NEURONTIN   This may have changed:  See the new instructions.   Used for:  Peripheral polyneuropathy        TAKE 1 CAPSULE (300 MG) BY MOUTH 3 TIMES DAILY   Quantity:  90 capsule   Refills:  1                Primary Care Provider Office Phone # Fax #    Rafael Orosco [phone]       [address] [facility] [address]  [facility] [address]        Equal Access to Services     SHANI SUAREZ : Hadii aad ku hadasho Soomaali, waaxda luqadaha, qaybta kaalmada adeegyada, waxay idiin hayaan adeeg kharacasie ellington . So [facility] [phone].    ATENCIÓN: Si habla español, tiene a levine disposición servicios gratuitos de asistencia lingüística. [facility] [phone].    We comply with applicable federal civil rights laws and [place] laws. We do not discriminate on the basis of race, color, national origin, age, disability, sex, sexual orientation, or gender identity.            Thank you!     Thank you for choosing [facility] [place], SLEEP STUDY  for your care. Our goal is always to provide you with excellent care. Hearing back from our patients is one way we can continue to improve our services. Please take a few minutes to complete the written survey that you may receive in the mail after your visit with us. Thank you!             Your Updated Medication List - Protect others around you: Learn how to safely use, store and throw away your medicines at www.disposemymeds.org.          This list is accurate as of 3/30/18  4:47 PM.  Always use your most recent med list.                   Brand Name Dispense Instructions for use Diagnosis    acyclovir 200 MG capsule    ZOVIRAX    22 capsule    Take 1 capsule (200 mg) by mouth 5 times daily    HSV (herpes simplex virus) infection       amphetamine-dextroamphetamine 10 MG per tablet    ADDERALL     Take 5 mg by mouth 2  times daily        CALCIUM 500 PO           cetirizine 10 MG tablet    zyrTEC    30 tablet    Take 1 tablet (10 mg) by mouth every evening    Seasonal allergic rhinitis       cyclobenzaprine 5 MG tablet    FLEXERIL    42 tablet    TAKE 1 TABLET BY MOUTH 3 TIMES DAILY    Back pain, unspecified back location, unspecified back pain laterality, unspecified chronicity, Hip pain, bilateral, Trochanteric bursitis of both hips       DEEP SEA NASAL SPRAY 0.65 % nasal spray   Generic drug:  sodium chloride     1 Bottle    SPRAY 1 SPRAY INTO BOTH NOSTRILS DAILY AS NEEDED FOR CONGESTION    Sinusitis, unspecified chronicity, unspecified location       DULoxetine 30 MG EC capsule    CYMBALTA    180 capsule    Take 1 capsule (30 mg) by mouth 2 times daily    Hip pain, left       ferrous sulfate 325 (65 FE) MG tablet    IRON    30 tablet    Take 1 tablet (325 mg) by mouth daily (with breakfast)    Other iron deficiency anemia       gabapentin 300 MG capsule    NEURONTIN    90 capsule    TAKE 1 CAPSULE (300 MG) BY MOUTH 3 TIMES DAILY    Peripheral polyneuropathy       hydroxychloroquine 200 MG tablet    PLAQUENIL    90 tablet    TAKE 1 TABLET BY MOUTH DAILY.    Systemic lupus erythematosus, unspecified SLE type, unspecified organ involvement status (H)       hydrOXYzine 25 MG tablet    ATARAX    30 tablet    Take 1 tablet (25 mg) by mouth 3 times daily as needed for itching    Anxiety       ibuprofen 800 MG tablet    ADVIL/MOTRIN    30 tablet    TAKE 1 TABLET BY MOUTH EVERY DAY AS NEEDED FOR MODERATE PAIN    Chronic arthralgias of knees and hips       melatonin 5 MG tablet      Take 5 mg by mouth nightly as needed for sleep        Multi-vitamin Tabs tablet      Take 1 tablet by mouth daily        omega 3 1200 MG Caps           order for DME     1 Device    Cane,    Hip pain, left       tiZANidine 4 MG tablet    ZANAFLEX    90 tablet    TAKE 1-2 TABLETS (4-8 MG) BY MOUTH 3 TIMES DAILY AS NEEDED FOR MUSCLE SPASMS    Myalgia        traMADol 50 MG tablet    ULTRAM    60 tablet    TAKE 1-2 TABLETS BY MOUTH EVERY 6 HOURS AS NEEDED    Lupus, Chronic arthralgias of knees and hips

## 2018-04-02 DIAGNOSIS — F41.9 ANXIETY: ICD-10-CM

## 2018-04-02 RX ORDER — HYDROXYZINE HYDROCHLORIDE 25 MG/1
TABLET, FILM COATED ORAL
Qty: 90 TABLET | Refills: 0 | Status: SHIPPED | OUTPATIENT
Start: 2018-04-02 | End: 2018-04-30

## 2018-04-02 NOTE — TELEPHONE ENCOUNTER
Prescription approved per [facility] Refill Protocol.    Shebly Arreola, BSN RN  [facility]

## 2018-04-25 ENCOUNTER — TRANSFERRED RECORDS (OUTPATIENT)
Dept: HEALTH INFORMATION MANAGEMENT | Facility: CLINIC | Age: 58
End: 2018-04-25

## 2018-04-30 DIAGNOSIS — F41.9 ANXIETY: ICD-10-CM

## 2018-04-30 NOTE — TELEPHONE ENCOUNTER
"Requested Prescriptions   Pending Prescriptions Disp Refills     hydrOXYzine (ATARAX) 25 MG tablet [Pharmacy Med Name: HYDROXYZINE HCL 25 MG TABLET]    Last Written Prescription Date:  4-2-18  Last Fill Quantity: 90,  # refills: 0   Last office visit: 3/5/2018 with prescribing provider:  3-5-18   Future Office Visit:     90 tablet 0     Sig: TAKE 1 TABLET (25 MG) BY MOUTH 3 TIMES PER DAY    Antihistamines Protocol Passed    4/30/2018 11:58 AM       Passed - Recent (12 mo) or future (30 days) visit within the authorizing provider's specialty    Patient had office visit in the last 12 months or has a visit in the next 30 days with authorizing provider or within the authorizing provider's specialty.  See \"Patient Info\" tab in inbasket, or \"Choose Columns\" in Meds & Orders section of the refill encounter.           Passed - Patient is age 3 or older    Apply age and/or weight-based dosing for peds patients age 3 and older.    Forward request to provider for patients under the age of 3.          "

## 2018-05-01 ENCOUNTER — THERAPY VISIT (OUTPATIENT)
Dept: SLEEP MEDICINE | Facility: CLINIC | Age: 58
End: 2018-05-01
Payer: COMMERCIAL

## 2018-05-01 ENCOUNTER — MEDICAL CORRESPONDENCE (OUTPATIENT)
Dept: HEALTH INFORMATION MANAGEMENT | Facility: CLINIC | Age: 58
End: 2018-05-01

## 2018-05-01 DIAGNOSIS — G47.33 OSA (OBSTRUCTIVE SLEEP APNEA): ICD-10-CM

## 2018-05-01 DIAGNOSIS — G47.10 EXCESSIVE SLEEPINESS: ICD-10-CM

## 2018-05-01 PROCEDURE — 95810 POLYSOM 6/> YRS 4/> PARAM: CPT | Performed by: INTERNAL MEDICINE

## 2018-05-01 RX ORDER — HYDROXYZINE HYDROCHLORIDE 25 MG/1
TABLET, FILM COATED ORAL
Qty: 90 TABLET | Refills: 0 | Status: SHIPPED | OUTPATIENT
Start: 2018-05-01 | End: 2018-06-26

## 2018-05-01 NOTE — TELEPHONE ENCOUNTER
Dr. Orosco,    Patient is requesting a refill for hydroxyzine (Atarax) 25 mg tablet.    Routing to provider because patient is taking for anxiety, not as antihistamine for allergies.    Please review/sign or advise.    Desire Rodas RN  [facility]

## 2018-05-01 NOTE — MR AVS SNAPSHOT
After Visit Summary   5/1/2018    Lesley Stafford    MRN: [id number]           Patient Information     Date Of Birth          1960        Visit Information        Provider Department      5/1/2018 8:00 PM [facility]EP [facility]UDY [facility]        Today's Diagnoses     Excessive sleepiness        RACHNA (obstructive sleep apnea)          Care Instructions    [place] [facility]    1. Your sleep study will be reviewed by a sleep physician within the next few days.     2. Please follow up in the sleep clinic as scheduled, or, make an appointment with your sleep provider to be seen within two weeks to discuss the results of the sleep study.    3. If you have any questions or problems with your treatment plan, please contact your sleep clinic provider at [phone] to further manage your condition.    4. Please review your attached medication list, and, at your follow-up appointment advise your sleep clinic provider about any changes.    5. Go to [email or web address] for more information about your sleep problems.    IVONNE Escobedo  May 2, 2018              Follow-ups after your visit        Your next 10 appointments already scheduled     May 18, 2018  9:00 AM CDT   Return Sleep Patient with Isamar Mixon   [facility], Sleep Study ([facility])    [address] [place] [id number] [phone]              Who to contact     If you have questions or need follow up information about today's clinic visit or your schedule please contact [facility], [facility], SLEEP STUDY directly at [phone].  Normal or non-critical lab and imaging results will be communicated to you by MyChart, letter or phone within 4 business days after the clinic has received the results. If you do not hear from us within 7 days, please contact the clinic through MyChart or phone. If you have a critical or abnormal lab result, we  "will notify you by phone as soon as possible.  Submit refill requests through [employer] or call your pharmacy and they will forward the refill request to us. Please allow 3 business days for your refill to be completed.          Additional Information About Your Visit        [employer] Information     [employer] lets you send messages to your doctor, view your test results, renew your prescriptions, schedule appointments and more. To sign up, go to [email or web address] . Click on \"Log in\" on the left side of the screen, which will take you to the Welcome page. Then click on \"Sign up Now\" on the right side of the page.     You will be asked to enter the access code listed below, as well as some personal information. Please follow the directions to create your username and password.     Your access code is: [email or web address]  Expires: 6/3/2018 11:20 AM     Your access code will  in 90 days. If you need help or a new code, please call your [place] clinic or [phone].        Care EveryWhere ID     This is your Care EveryWhere ID. This could be used by other organizations to access your [place] medical records  [id number]         Blood Pressure from Last 3 Encounters:   18 102/67   18 105/66   17 109/79    Weight from Last 3 Encounters:   18 78 kg (172 lb)   18 78.2 kg (172 lb 6.4 oz)   17 80.3 kg (177 lb)              We Performed the Following     Comprehensive Sleep Study          Today's Medication Changes          These changes are accurate as of 18 11:59 PM.  If you have any questions, ask your nurse or doctor.               These medicines have changed or have updated prescriptions.        Dose/Directions    gabapentin 300 MG capsule   Commonly known as:  NEURONTIN   This may have changed:  See the new instructions.   Used for:  Peripheral polyneuropathy        TAKE 1 CAPSULE (300 MG) BY MOUTH 3 TIMES DAILY   Quantity:  90 capsule   Refills:  1                Primary " Care Provider Office Phone # Fax #    Rafael Orosco [phone]       [address]  [facility] [address]        Equal Access to Services     SHANI SUAREZ : Hadrosmery soco mayorga rumao Soalliali, waaxda luqadaha, qaybta kaalmada adeegyada, devi carrillo laConstancecheco levin. So [facility] [phone].    ATENCIÓN: Si habla español, tiene a levine disposición servicios gratuitos de asistencia lingüística. Brockame al [phone].    We comply with applicable federal civil rights laws and [place] laws. We do not discriminate on the basis of race, color, national origin, age, disability, sex, sexual orientation, or gender identity.            Thank you!     Thank you for choosing [facility], [place], SLEEP STUDY  for your care. Our goal is always to provide you with excellent care. Hearing back from our patients is one way we can continue to improve our services. Please take a few minutes to complete the written survey that you may receive in the mail after your visit with us. Thank you!             Your Updated Medication List - Protect others around you: Learn how to safely use, store and throw away your medicines at www.disposemymeds.org.          This list is accurate as of 5/1/18 11:59 PM.  Always use your most recent med list.                   Brand Name Dispense Instructions for use Diagnosis    acyclovir 200 MG capsule    ZOVIRAX    22 capsule    Take 1 capsule (200 mg) by mouth 5 times daily    HSV (herpes simplex virus) infection       amphetamine-dextroamphetamine 10 MG per tablet    ADDERALL     Take 5 mg by mouth 2 times daily        CALCIUM 500 PO           cetirizine 10 MG tablet    zyrTEC    30 tablet    Take 1 tablet (10 mg) by mouth every evening    Seasonal allergic rhinitis       cyclobenzaprine 5 MG tablet    FLEXERIL    42 tablet    TAKE 1 TABLET BY MOUTH 3 TIMES DAILY    Back pain, unspecified back location, unspecified back pain laterality, unspecified chronicity, Hip  pain, bilateral, Trochanteric bursitis of both hips       DEEP SEA NASAL SPRAY 0.65 % nasal spray   Generic drug:  sodium chloride     1 Bottle    SPRAY 1 SPRAY INTO BOTH NOSTRILS DAILY AS NEEDED FOR CONGESTION    Sinusitis, unspecified chronicity, unspecified location       DULoxetine 30 MG EC capsule    CYMBALTA    180 capsule    Take 1 capsule (30 mg) by mouth 2 times daily    Hip pain, left       ferrous sulfate 325 (65 Fe) MG tablet    IRON    30 tablet    Take 1 tablet (325 mg) by mouth daily (with breakfast)    Other iron deficiency anemia       gabapentin 300 MG capsule    NEURONTIN    90 capsule    TAKE 1 CAPSULE (300 MG) BY MOUTH 3 TIMES DAILY    Peripheral polyneuropathy       hydroxychloroquine 200 MG tablet    PLAQUENIL    90 tablet    TAKE 1 TABLET BY MOUTH DAILY.    Systemic lupus erythematosus, unspecified SLE type, unspecified organ involvement status (H)       hydrOXYzine 25 MG tablet    ATARAX    90 tablet    TAKE 1 TABLET (25 MG) BY MOUTH 3 TIMES PER DAY    Anxiety       ibuprofen 800 MG tablet    ADVIL/MOTRIN    30 tablet    TAKE 1 TABLET BY MOUTH EVERY DAY AS NEEDED FOR MODERATE PAIN    Chronic arthralgias of knees and hips       melatonin 5 MG tablet      Take 5 mg by mouth nightly as needed for sleep        Multi-vitamin Tabs tablet      Take 1 tablet by mouth daily        omega 3 1200 MG Caps           order for DME     1 Device    Cane,    Hip pain, left       tiZANidine 4 MG tablet    ZANAFLEX    90 tablet    TAKE 1-2 TABLETS (4-8 MG) BY MOUTH 3 TIMES DAILY AS NEEDED FOR MUSCLE SPASMS    Myalgia       traMADol 50 MG tablet    ULTRAM    60 tablet    TAKE 1-2 TABLETS BY MOUTH EVERY 6 HOURS AS NEEDED    Lupus, Chronic arthralgias of knees and hips

## 2018-05-02 NOTE — PATIENT INSTRUCTIONS
[place] [facility]    1. Your sleep study will be reviewed by a sleep physician within the next few days.     2. Please follow up in the sleep clinic as scheduled, or, make an appointment with your sleep provider to be seen within two weeks to discuss the results of the sleep study.    3. If you have any questions or problems with your treatment plan, please contact your sleep clinic provider at [phone] to further manage your condition.    4. Please review your attached medication list, and, at your follow-up appointment advise your sleep clinic provider about any changes.    5. Go to [email or web address] for more information about your sleep problems.    IVONNE Escobedo  May 2, 2018

## 2018-05-03 LAB — SLPCOMP: NORMAL

## 2018-05-03 NOTE — PROCEDURES
" SLEEP STUDY INTERPRETATION  DIAGNOSTIC POLYSOMNOGRAPHY REPORT      Patient: CARA GIL  YOB: 1960  Study Date: 5/1/2018  MRN: [id number]  Referring Provider: THOMAS Ho- CNP  Ordering Provider: Noel Marx MD    Indications for Polysomnography: The patient is a 57 y old Female who is 5' 5\" and weighs 172.0 lbs. Her BMI is 28.7, Olivia sleepiness scale 13.0 and neck circumference is 38.0 cm. Relevant medical history includes SLE, depression. A diagnostic polysomnogram was performed to evaluate for sleep apnea.    Polysomnogram Data: A full night polysomnogram recorded the standard physiologic parameters including EEG, EOG, EMG, ECG, nasal and oral airflow. Respiratory parameters of chest and abdominal movements were recorded with respiratory inductance plethysmography. Oxygen saturation was recorded by pulse oximetry. Hypopnea scoring rule used: 1B 4%.    Sleep Architecture: Normal sleep architecture with all sleep stages noted.   The total recording time of the polysomnogram was 486.4 minutes. The total sleep time was 462.0 minutes. Sleep latency was decreased at 0.5 minutes with the use of a sleep aid (Melatonin 5 mg at bedtime). REM latency was 157.5 minutes. Arousal index was normal at 10.9 arousals per hour. Sleep efficiency was increased at 95.0%. Wake after sleep onset was 23.5 minutes. The patient spent 8.4% of total sleep time in Stage N1, 57.6% in Stage N2, 19.7% in Stage N3, and 14.3% in REM. Time in REM supine was 17.5 minutes.    Respiration: No sleep related breathing disorders were noted.     Events ? The polysomnogram revealed a presence of 3 obstructive, 4 central, and 0 mixed apneas resulting in an apnea index of 0.9 events per hour. There were 23 obstructive hypopneas and 0 central hypopneas resulting in an obstructive hypopnea index of 3.0 and central hypopnea index of 0 events per hour. The combined apnea/hypopnea index was 3.9 events per hour (central " apnea/hypopnea index was 0.5 events per hour). The REM AHI was 5.5 events per hour. The supine AHI was 4.6 events per hour. The RERA index was 0.4 events per hour.  The RDI was 4.3 events per hour.    Snoring - was reported as mild and intermittent.    Respiratory rate and pattern - was notable for normal respiratory rate and pattern.    Sustained Sleep Associated Hypoventilation - Transcutaneous carbon dioxide monitoring was not used, however significant hypoventilation was not suggested by oximetry.    Sleep Associated Hypoxemia - (Greater than 5 minutes O2 sat at or below 88%) was not present. Baseline oxygen saturation was 94.8%. Lowest oxygen saturation was 80.7%. Time spent less than or equal to 88% was 0.5 minutes. Time spent less than or equal to 89% was 1.1 minutes.    Movement Activity: No movement abnormalities noted.     Periodic Limb Activity - There were 17 PLMs during the entire study. The PLM index was 2.2 movements per hour. The PLM Arousal Index was 0.1 per hour.    REM EMG Activity - Excessive transient/sustained muscle activity was not present.    Nocturnal Behavior - Abnormal sleep related behaviors were not noted during/arising out of NREM / REM sleep.    Bruxism - None apparent.    Cardiac Summary: No cardiac abnormalities noted.   The average pulse rate was 80.8 bpm. The minimum pulse rate was 68.9 bpm while the maximum pulse rate was 97.5 bpm.  Arrhythmias were not noted.    Assessment:     No obstructive sleep apnea noted.    Sleep architecture was normal with all sleep stages seen. Sleep efficiency was increased.     No movement or cardiac abnormalities were noted.       Recommendations:    Advice regarding the risks of drowsy driving.    Suggest optimizing sleep schedule and avoiding sleep deprivation.    Pharmacologic therapy should be used for management of restless legs syndrome only if present and clinically indicated and not based on the presence of periodic limb movements  alone.    Diagnostic Codes:   Unspecified Sleep Disturbance G47.9    Electronically Signed By: Noel Marx MD (5/2/18)           Range(%) Time in range (min)   0.0 - 89.0 1.1   0.0 - 88.0 0.5         Stage Min(mm Hg) Max(mm Hg)   Wake - -   NREM(1+2+3) - -   REM - -       Range(mmHg) Time in range (min)   55.0 - 100.0 -   Excluded data <20.0 & >65.0 487.0

## 2018-05-21 ENCOUNTER — TELEPHONE (OUTPATIENT)
Dept: FAMILY MEDICINE | Facility: CLINIC | Age: 58
End: 2018-05-21

## 2018-05-21 ENCOUNTER — OFFICE VISIT (OUTPATIENT)
Dept: FAMILY MEDICINE | Facility: CLINIC | Age: 58
End: 2018-05-21
Payer: COMMERCIAL

## 2018-05-21 VITALS
BODY MASS INDEX: 29.45 KG/M2 | TEMPERATURE: 98 F | HEART RATE: 92 BPM | SYSTOLIC BLOOD PRESSURE: 110 MMHG | WEIGHT: 177 LBS | RESPIRATION RATE: 18 BRPM | OXYGEN SATURATION: 98 % | DIASTOLIC BLOOD PRESSURE: 66 MMHG

## 2018-05-21 DIAGNOSIS — T78.40XD ALLERGIC STATE, SUBSEQUENT ENCOUNTER: Primary | ICD-10-CM

## 2018-05-21 DIAGNOSIS — J00 ACUTE NASOPHARYNGITIS: ICD-10-CM

## 2018-05-21 DIAGNOSIS — R42 DIZZINESS: ICD-10-CM

## 2018-05-21 DIAGNOSIS — J30.1 CHRONIC ALLERGIC RHINITIS DUE TO POLLEN, UNSPECIFIED SEASONALITY: Primary | ICD-10-CM

## 2018-05-21 PROCEDURE — 99214 OFFICE O/P EST MOD 30 MIN: CPT | Performed by: NURSE PRACTITIONER

## 2018-05-21 RX ORDER — MOMETASONE FUROATE MONOHYDRATE 50 UG/1
2 SPRAY, METERED NASAL DAILY
Qty: 1 BOX | Refills: 11 | Status: SHIPPED | OUTPATIENT
Start: 2018-05-21 | End: 2020-01-02

## 2018-05-21 RX ORDER — FEXOFENADINE HCL 180 MG/1
180 TABLET ORAL DAILY
Qty: 30 TABLET | Refills: 1 | Status: SHIPPED | OUTPATIENT
Start: 2018-05-21 | End: 2020-01-02

## 2018-05-21 NOTE — MR AVS SNAPSHOT
After Visit Summary   5/21/2018    Lesley Stafford    MRN: [id number]           Patient Information     Date Of Birth          1960        Visit Information        Provider Department      5/21/2018 2:00 PM Mary Schmidt APRN [facility]        Today's Diagnoses     Chronic allergic rhinitis due to pollen, unspecified seasonality    -  1    Dizziness        Acute nasopharyngitis          Care Instructions    We will have you try a different antihistamine, Allegra     Please start your expectorant Guiatuss syrup    Please start nasonex 1 spray 2x daily, followed by saline nasal spray at least after your nasonex     Practice insufflation technique     Try your pressure points we talked about      Allergic Rhinitis  Allergic rhinitis is an allergic reaction that affects the nose, and often the eyes. It s often known as nasal allergies. Nasal allergies are often due to things in the environment that are breathed in. Depending what you are sensitive to, nasal allergies may occur only during certain seasons. Or they may occur year round. Common indoor allergens include house dust mites, mold, cockroaches, and pet dander. Outdoor allergens include pollen from trees, grasses, and weeds.   Symptoms include a drippy, stuffy, and itchy nose. They also include sneezing and red and itchy eyes. You may feel tired more often. Severe allergies may also affect your breathing and trigger a condition called asthma.   Tests can be done to see what allergens are affecting you. You may be referred to an allergy specialist for testing and further evaluation.  Home care  Your healthcare provider may prescribe medicines to help relieve allergy symptoms. These may include oral medicines, nasal sprays, or eye drops.  Ask your provider for advice on how to avoid substances that you are allergic to. Below are a few tips for each type of allergen.  Pet dander:    Do not have  pets with fur and feathers.    If you can't avoid having a pet, keep it out of your bedroom and off upholstered furniture.  Pollen:    When pollen counts are high, keep windows of your car and home closed. If possible, use an air conditioner instead.    Wear a filter mask when mowing or doing yard work.  House dust mites:    Wash bedding every week in warm water and detergent and dry on a hot setting.    Cover the mattress, box spring, and pillows with allergy covers.     If possible, sleep in a room with no carpet, curtains, or upholstered furniture.  Cockroaches:    Store food in sealed containers.    Remove garbage from the home promptly.    Fix water leaks  Mold:    Keep humidity low by using a dehumidifier or air conditioner. Keep the dehumidifier and air conditioner clean and free of mold.    Clean moldy areas with bleach and water.  In general:    Vacuum once or twice a week. If possible, use a vacuum with a high-efficiency particulate air (HEPA) filter.    Do not smoke. Avoid cigarette smoke. Cigarette smoke is an irritant that can make symptoms worse.  Follow-up care  Follow up as advised by the healthcare provider or our staff. If you were referred to an allergy specialist, make this appointment promptly.  When to seek medical advice  Call your healthcare provider right away if the following occur:    Coughing or wheezing    Fever of 100.4 F (38 C) or higher, or as directed by your healthcare provider    Raised red bumps (hives)    Continuing symptoms, new symptoms, or worsening symptoms  Call 911 if you have:    Trouble breathing    Severe swelling of the face or severe itching of the eyes or mouth  Date Last Reviewed: 3/1/2017    [id number] [employer]. [address] [place] [address], [place] [address]. All rights reserved. This information is not intended as a substitute for professional medical care. Always follow your healthcare professional's instructions.        Understanding Nasal Anatomy:  "Inside View  A lot happens under the surface of the nose. The bone and cartilage under the skin give the nose most of its size and shape. Other structures inside and behind the nose help you breathe. Learning the anatomy of the nose can help you better understand how the nose works.           Bone supports the upper third (bridge) of the nose. The upper cartilage supports the side of the nose. The lower cartilage adds support, width, and height. It helps shape the nostrils and the tip of the nose. Skin also helps shape the nose.     The nasal cavity is a hollow space behind the nose that air flows through. The septum is a thin \"wall\" made of cartilage and bone. It divides the inside of the nose into two chambers. The mucous membrane is thin tissue that lines the nose, sinuses, and throat. It warms and moistens the air you breathe in. It also makes the sticky mucus that helps clean the air of dust and other small particles. The turbinates on each side of the nose are curved, bony ridges lined with mucous membrane. They warm and moisten the air you breathe in. The sinuses are hollow, air-filled chambers in the bone around your nose. Mucus from the sinuses drains into the nasal cavity.  Date Last Reviewed: 11/1/2016 2000-2017 [employer]. [address] [place] [address]. All rights reserved. This information is not intended as a substitute for professional medical care. Always follow your healthcare professional's instructions.                Follow-ups after your visit        Your next 10 appointments already scheduled     May 23, 2018  9:30 AM CDT   Office Visit with Rafael Orosco MD   [facility] ([facility])    [address] [place] [id number] [phone]           Bring a current list of meds and any records pertaining to this visit. For Physicals, please bring immunization records and any forms needing to be filled out. " "Please arrive 10 minutes early to complete paperwork.            Constantine 15, 2018  3:30 PM CDT   Return Sleep Patient with Isamar Mixon   [facility] [place], Sleep Study ([facility])    [address] [place] [id number] [phone]              Who to contact     If you have questions or need follow up information about today's clinic visit or your schedule please contact [facility] directly at [phone].  Normal or non-critical lab and imaging results will be communicated to you by SED Webhart, letter or phone within 4 business days after the clinic has received the results. If you do not hear from us within 7 days, please contact the clinic through SED Webhart or phone. If you have a critical or abnormal lab result, we will notify you by phone as soon as possible.  Submit refill requests through [facility] [employer] [facility] or call your pharmacy and they will forward the refill request to us. Please allow 3 business days for your refill to be completed.          Additional Information About Your Visit        [facility] [employer] [facility] Information     [facility] [employer] [facility] lets you send messages to your doctor, view your test results, renew your prescriptions, schedule appointments and more. To sign up, go to [email or web address] . Click on \"Log in\" on the left side of the screen, which will take you to the Welcome page. Then click on \"Sign up Now\" on the right side of the page.     You will be asked to enter the access code listed below, as well as some personal information. Please follow the directions to create your username and password.     Your access code is: [email or web address]  Expires: 6/3/2018 11:20 AM     Your access code will  in 90 days. If you need help or a new code, please call your [place] clinic or [phone].        Care EveryWhere ID     This is your Care EveryWhere ID. This could be used by other organizations to access your [place] medical records  [id number]      "   Your Vitals Were     Pulse Temperature Respirations Pulse Oximetry BMI (Body Mass Index)       92 98  F (36.7  C) (Oral) 18 98% 29.45 kg/m2        Blood Pressure from Last 3 Encounters:   05/21/18 110/66   03/30/18 102/67   03/05/18 105/66    Weight from Last 3 Encounters:   05/21/18 177 lb (80.3 kg)   03/30/18 172 lb (78 kg)   03/05/18 172 lb 6.4 oz (78.2 kg)              Today, you had the following     No orders found for display         Today's Medication Changes          These changes are accurate as of 5/21/18  3:23 PM.  If you have any questions, ask your nurse or doctor.               Start taking these medicines.        Dose/Directions    fexofenadine 180 MG tablet   Commonly known as:  ALLEGRA   Used for:  Chronic allergic rhinitis due to pollen, unspecified seasonality   Started by:  Mary Schmidt APRN CNP        Dose:  180 mg   Take 1 tablet (180 mg) by mouth daily   Quantity:  30 tablet   Refills:  1       guaiFENesin 100 MG/5ML Syrp   Commonly known as:  GUIATUSS   Used for:  Acute nasopharyngitis   Started by:  Mary Schmidt APRN CNP        Dose:  10-20 mL   Take 10-20 mLs by mouth every 4 hours as needed for cough   Quantity:  560 mL   Refills:  1       mometasone 50 MCG/ACT spray   Commonly known as:  NASONEX   Used for:  Chronic allergic rhinitis due to pollen, unspecified seasonality   Started by:  Mary Schmidt APRN CNP        Dose:  2 spray   Spray 2 sprays into both nostrils daily   Quantity:  1 Box   Refills:  11         These medicines have changed or have updated prescriptions.        Dose/Directions    gabapentin 300 MG capsule   Commonly known as:  NEURONTIN   This may have changed:  See the new instructions.   Used for:  Peripheral polyneuropathy        TAKE 1 CAPSULE (300 MG) BY MOUTH 3 TIMES DAILY   Quantity:  90 capsule   Refills:  1            Where to get your medicines      These medications were sent to [place] [address] [place] [facility]  [place] - [address] [facility]  [address] [facility] [address]     Phone:  [phone]     fexofenadine 180 MG tablet    guaiFENesin 100 MG/5ML Syrp    mometasone 50 MCG/ACT spray                Primary Care Provider Office Phone # Fax #    Rafael Orosco [phone]       [address]  [facility] [address]        Equal Access to Services     SHANI SUAREZ : Hadii aad ku hadasho Soomaali, waaxda luqadaha, qaybta kaalmada adeegyada, waxay idiin hayaan adeeg kharash la'maxn . So [facility] [phone].    ATENCIÓN: Si habla español, tiene a levine disposición servicios gratuitos de asistencia lingüística. [facility] [phone].    We comply with applicable federal civil rights laws and [place] laws. We do not discriminate on the basis of race, color, national origin, age, disability, sex, sexual orientation, or gender identity.            Thank you!     Thank you for choosing [facility]  for your care. Our goal is always to provide you with excellent care. Hearing back from our patients is one way we can continue to improve our services. Please take a few minutes to complete the written survey that you may receive in the mail after your visit with us. Thank you!             Your Updated Medication List - Protect others around you: Learn how to safely use, store and throw away your medicines at www.disposemymeds.org.          This list is accurate as of 5/21/18  3:23 PM.  Always use your most recent med list.                   Brand Name Dispense Instructions for use Diagnosis    acyclovir 200 MG capsule    ZOVIRAX    22 capsule    Take 1 capsule (200 mg) by mouth 5 times daily    HSV (herpes simplex virus) infection       amphetamine-dextroamphetamine 10 MG per tablet    ADDERALL     Take 5 mg by mouth 2 times daily        CALCIUM 500 PO           cetirizine 10 MG tablet    zyrTEC    30 tablet    Take 1 tablet (10 mg) by mouth every evening     Seasonal allergic rhinitis       cyclobenzaprine 5 MG tablet    FLEXERIL    42 tablet    TAKE 1 TABLET BY MOUTH 3 TIMES DAILY    Back pain, unspecified back location, unspecified back pain laterality, unspecified chronicity, Hip pain, bilateral, Trochanteric bursitis of both hips       DEEP SEA NASAL SPRAY 0.65 % nasal spray   Generic drug:  sodium chloride     1 Bottle    SPRAY 1 SPRAY INTO BOTH NOSTRILS DAILY AS NEEDED FOR CONGESTION    Sinusitis, unspecified chronicity, unspecified location       DULoxetine 30 MG EC capsule    CYMBALTA    180 capsule    Take 1 capsule (30 mg) by mouth 2 times daily    Hip pain, left       ferrous sulfate 325 (65 Fe) MG tablet    IRON    30 tablet    Take 1 tablet (325 mg) by mouth daily (with breakfast)    Other iron deficiency anemia       fexofenadine 180 MG tablet    ALLEGRA    30 tablet    Take 1 tablet (180 mg) by mouth daily    Chronic allergic rhinitis due to pollen, unspecified seasonality       gabapentin 300 MG capsule    NEURONTIN    90 capsule    TAKE 1 CAPSULE (300 MG) BY MOUTH 3 TIMES DAILY    Peripheral polyneuropathy       guaiFENesin 100 MG/5ML Syrp    GUIATUSS    560 mL    Take 10-20 mLs by mouth every 4 hours as needed for cough    Acute nasopharyngitis       hydroxychloroquine 200 MG tablet    PLAQUENIL    90 tablet    TAKE 1 TABLET BY MOUTH DAILY.    Systemic lupus erythematosus, unspecified SLE type, unspecified organ involvement status (H)       hydrOXYzine 25 MG tablet    ATARAX    90 tablet    TAKE 1 TABLET (25 MG) BY MOUTH 3 TIMES PER DAY    Anxiety       ibuprofen 800 MG tablet    ADVIL/MOTRIN    30 tablet    TAKE 1 TABLET BY MOUTH EVERY DAY AS NEEDED FOR MODERATE PAIN    Chronic arthralgias of knees and hips       melatonin 5 MG tablet      Take 5 mg by mouth nightly as needed for sleep        mometasone 50 MCG/ACT spray    NASONEX    1 Box    Spray 2 sprays into both nostrils daily    Chronic allergic rhinitis due to pollen, unspecified seasonality        Multi-vitamin Tabs tablet      Take 1 tablet by mouth daily        omega 3 1200 MG Caps           order for DME     1 Device    Cane,    Hip pain, left       tiZANidine 4 MG tablet    ZANAFLEX    90 tablet    TAKE 1-2 TABLETS (4-8 MG) BY MOUTH 3 TIMES DAILY AS NEEDED FOR MUSCLE SPASMS    Myalgia       traMADol 50 MG tablet    ULTRAM    60 tablet    TAKE 1-2 TABLETS BY MOUTH EVERY 6 HOURS AS NEEDED    Lupus, Chronic arthralgias of knees and hips

## 2018-05-21 NOTE — TELEPHONE ENCOUNTER
Mometasone and Fexofenadine are not covered under patient's insurance.     Covered alternatives are: Fluticasone and Cetirizine (Per OV note, patient has tried these both in the past without relief).    Routing to PA team. Please initiate PA.    Thank you,   Shelby Arreola RN  [facility]

## 2018-05-21 NOTE — PROGRESS NOTES
SUBJECTIVE:   Lesley Stafford is a 57 year old female who presents to clinic today for the following health issues:    Pt is here as she believes she is having a lupus flare up. It was suggested by her Rheumatoid MD that she be seen today for her sinus and ear problems    RESPIRATORY SYMPTOMS      Duration: on and off    Description  ear pain bilateral, fullness, decreased hearing     Severity: moderate    Accompanying signs and symptoms: dizziness    History (predisposing factors):  Increase cerumen    Precipitating or alleviating factors: mold in her house    Therapies tried and outcome:  rest and fluids antihistamine nasal spray/wash - with some relief, flonase in the past with some relief     ALLERGIES      Duration: chronic, not seasonal     Description:   Nasal congestion: YES, HA's  Sneezing: YES  Red, itchy eyes: YES  Cough- nonproductive     Accompanying signs and symptoms: a lot of stress,     History (similar episodes/allergy testing): chronic     Precipitating or alleviating factors: None    Therapies tried and outcome: Tried zyrtec without much relief, did a saline wash with good relief, started loratadine about 12 days ago with no relief, flonase in the past, saw ENT about one month ago and it was recommended she schedule get a CT scan but hasn't had time to schedule     Dizziness      Duration: 14 days now, started with ear fullness and allergy sx     Description   Feeling faint:  no   Feeling like the surroundings are moving: YES, occ  Loss of consciousness or falls: no     Intensity:  mild    Accompanying signs and symptoms:   Nausea/vomitting: no   Palpitations: no   Weakness in arms or legs: YES- with Lupus  Vision or speech changes: YES- vision, floating things, chronic can't get words out when really dizzy  Ringing in ears (Tinnitus): no   Hearing loss related to dizziness: YES- chronic but worse now   Other (fevers/chills/sweating/dyspnea): no     History (similar episodes/head  trauma/previous evaluation/recent bleeding): Increased stress, father in the hospital and going to TCU     Precipitating or alleviating factors (new meds/chemicals): None  Worse with activity/head movement: no     Therapies tried and outcome: None      Problem list and histories reviewed & adjusted, as indicated.  Additional history: as documented    Current Outpatient Prescriptions   Medication Sig Dispense Refill     acyclovir (ZOVIRAX) 200 MG capsule Take 1 capsule (200 mg) by mouth 5 times daily 22 capsule 0     amphetamine-dextroamphetamine (ADDERALL) 10 MG tablet Take 5 mg by mouth 2 times daily        Calcium-Magnesium-Vitamin D (CALCIUM 500 PO)        cetirizine (ZYRTEC) 10 MG tablet Take 1 tablet (10 mg) by mouth every evening 30 tablet 1     cyclobenzaprine (FLEXERIL) 5 MG tablet TAKE 1 TABLET BY MOUTH 3 TIMES DAILY 42 tablet 3     DEEP SEA NASAL SPRAY 0.65 % nasal spray SPRAY 1 SPRAY INTO BOTH NOSTRILS DAILY AS NEEDED FOR CONGESTION 1 Bottle 2     DULoxetine (CYMBALTA) 30 MG EC capsule Take 1 capsule (30 mg) by mouth 2 times daily 180 capsule 1     ferrous sulfate (IRON) 325 (65 FE) MG tablet Take 1 tablet (325 mg) by mouth daily (with breakfast) 30 tablet 5     gabapentin (NEURONTIN) 300 MG capsule TAKE 1 CAPSULE (300 MG) BY MOUTH 3 TIMES DAILY (Patient taking differently: TAKE 2 CAPSULE (300 MG) BY MOUTH 3 TIMES DAILY) 90 capsule 1     hydroxychloroquine (PLAQUENIL) 200 MG tablet TAKE 1 TABLET BY MOUTH DAILY. 90 tablet 2     hydrOXYzine (ATARAX) 25 MG tablet TAKE 1 TABLET (25 MG) BY MOUTH 3 TIMES PER DAY 90 tablet 0     ibuprofen (ADVIL/MOTRIN) 800 MG tablet TAKE 1 TABLET BY MOUTH EVERY DAY AS NEEDED FOR MODERATE PAIN 30 tablet 3     melatonin 5 MG tablet Take 5 mg by mouth nightly as needed for sleep       multivitamin, therapeutic with minerals (MULTI-VITAMIN) TABS Take 1 tablet by mouth daily       omega 3 1200 MG CAPS        order for DME Cane, 1 Device 0     tiZANidine (ZANAFLEX) 4 MG tablet TAKE  1-2 TABLETS (4-8 MG) BY MOUTH 3 TIMES DAILY AS NEEDED FOR MUSCLE SPASMS 90 tablet 0     traMADol (ULTRAM) 50 MG tablet TAKE 1-2 TABLETS BY MOUTH EVERY 6 HOURS AS NEEDED 60 tablet 0     BP Readings from Last 3 Encounters:   05/21/18 110/66   03/30/18 102/67   03/05/18 105/66    Wt Readings from Last 3 Encounters:   05/21/18 177 lb (80.3 kg)   03/30/18 172 lb (78 kg)   03/05/18 172 lb 6.4 oz (78.2 kg)           Labs reviewed in EPIC    Reviewed and updated as needed this visit by clinical staff       Reviewed and updated as needed this visit by Provider         ROS:  Constitutional, HEENT, cardiovascular, pulmonary, gi and gu systems are negative, except as otherwise noted.    OBJECTIVE:     /66  Pulse 92  Temp 98  F (36.7  C) (Oral)  Resp 18  Wt 177 lb (80.3 kg)  SpO2 98%  BMI 29.45 kg/m2  Body mass index is 29.45 kg/(m^2).  GENERAL: healthy, alert and mild distress  EYES: Eyes grossly normal to inspection, PERRL and conjunctivae and sclerae normal  HENT: normal cephalic/atraumatic, ear canals and TM's normal- slight retraction,  nose and mouth without ulcers or lesions, nasal mucosa edematous and erythremic almost occluding nasal passages, oropharynx clear, oral mucous membranes moist  NECK: no adenopathy, no asymmetry, masses, or scars and thyroid normal to palpation  RESP: lungs clear to auscultation - no rales, rhonchi or wheezes  CV: regular rate and rhythm, normal S1 S2, no S3 or S4, no murmur, click or rub, no peripheral edema  MS: no gross musculoskeletal defects noted, no edema, abnormal stiff gait walks with a cane, moves slowly  PSYCH: mentation appears normal, affect normal/bright    Diagnostic Test Results:  Results for orders placed or performed in visit on 05/01/18   Comprehensive Sleep Study   Result Value Ref Range    [facility] Comprehensive Sleep      Narrative    Noel Marx MD     5/3/2018 10:58 AM   SLEEP STUDY INTERPRETATION  DIAGNOSTIC POLYSOMNOGRAPHY REPORT      Patient: ADRIAN COLORADO  "CARA  YOB: 1960  Study Date: 5/1/2018  MRN: [id number]  Referring Provider: THOMAS Ho- CNP  Ordering Provider: Noel Marx MD    Indications for Polysomnography: The patient is a 57 y old Female   who is 5' 5\" and weighs 172.0 lbs. Her BMI is 28.7, [place]   sleepiness scale 13.0 and neck circumference is 38.0 cm. Relevant   medical history includes SLE, depression. A diagnostic   polysomnogram was performed to evaluate for sleep apnea.    Polysomnogram Data: A full night polysomnogram recorded the   standard physiologic parameters including EEG, EOG, EMG, ECG,   nasal and oral airflow. Respiratory parameters of chest and   abdominal movements were recorded with respiratory inductance   plethysmography. Oxygen saturation was recorded by pulse   oximetry. Hypopnea scoring rule used: 1B 4%.    Sleep Architecture: Normal sleep architecture with all sleep   stages noted.   The total recording time of the polysomnogram was 486.4 minutes.   The total sleep time was 462.0 minutes. Sleep latency was   decreased at 0.5 minutes with the use of a sleep aid (Melatonin 5   mg at bedtime). REM latency was 157.5 minutes. Arousal index was   normal at 10.9 arousals per hour. Sleep efficiency was increased   at 95.0%. Wake after sleep onset was 23.5 minutes. The patient   spent 8.4% of total sleep time in Stage N1, 57.6% in Stage N2,   19.7% in Stage N3, and 14.3% in REM. Time in REM supine was 17.5   minutes.    Respiration: No sleep related breathing disorders were noted.   ? Events ? The polysomnogram revealed a presence of 3   obstructive, 4 central, and 0 mixed apneas resulting in an apnea   index of 0.9 events per hour. There were 23 obstructive hypopneas   and 0 central hypopneas resulting in an obstructive hypopnea   index of 3.0 and central hypopnea index of 0 events per hour. The   combined apnea/hypopnea index was 3.9 events per hour (central   apnea/hypopnea index was 0.5 events per hour). The " REM AHI was   5.5 events per hour. The supine AHI was 4.6 events per hour. The   RERA index was 0.4 events per hour.  The RDI was 4.3 events per   hour.  ? Snoring - was reported as mild and intermittent.  ? Respiratory rate and pattern - was notable for normal   respiratory rate and pattern.  ? Sustained Sleep Associated Hypoventilation - Transcutaneous   carbon dioxide monitoring was not used, however significant   hypoventilation was not suggested by oximetry.  ? Sleep Associated Hypoxemia - (Greater than 5 minutes O2 sat at   or below 88%) was not present. Baseline oxygen saturation was   94.8%. Lowest oxygen saturation was 80.7%. Time spent less than   or equal to 88% was 0.5 minutes. Time spent less than or equal to   89% was 1.1 minutes.    Movement Activity: No movement abnormalities noted.   ? Periodic Limb Activity - There were 17 PLMs during the entire   study. The PLM index was 2.2 movements per hour. The PLM Arousal   Index was 0.1 per hour.  ? REM EMG Activity - Excessive transient/sustained muscle   activity was not present.  ? Nocturnal Behavior - Abnormal sleep related behaviors were not   noted during/arising out of NREM / REM sleep.  ? Bruxism - None apparent.    Cardiac Summary: No cardiac abnormalities noted.   The average pulse rate was 80.8 bpm. The minimum pulse rate was   68.9 bpm while the maximum pulse rate was 97.5 bpm.  Arrhythmias   were not noted.    Assessment:   ? No obstructive sleep apnea noted.  ? Sleep architecture was normal with all sleep stages seen. Sleep   efficiency was increased.   ? No movement or cardiac abnormalities were noted.       Recommendations:  ? Advice regarding the risks of drowsy driving.  ? Suggest optimizing sleep schedule and avoiding sleep   deprivation.  ? Pharmacologic therapy should be used for management of restless   legs syndrome only if present and clinically indicated and not   based on the presence of periodic limb movements alone.    Diagnostic  Codes:   Unspecified Sleep Disturbance G47.9    Electronically Signed By: Noel Marx MD (5/2/18)           Range(%) Time in range (min)   0.0 - 89.0 1.1   0.0 - 88.0 0.5         Stage Min(mm Hg) Max(mm Hg)   Wake - -   NREM(1+2+3) - -   REM - -       Range(mmHg) Time in range (min)   55.0 - 100.0 -   Excluded data <20.0 & >65.0 487.0            ASSESSMENT/PLAN:     (J30.1) Chronic allergic rhinitis due to pollen, unspecified seasonality  (primary encounter diagnosis)  Comment: swollen turbinates  Plan: mometasone (NASONEX) 50 MCG/ACT spray,         fexofenadine (ALLEGRA) 180 MG tablet        Will try a different steroid spray and antihistamine, discussed using her saline nasal spray     (R42) Dizziness  Comment: started with allergy sx. Serous otitis?    Plan: sx relief discussed, insufflation technique taught     (J00) Acute nasopharyngitis  Comment: chronic but increased  Plan: guaiFENesin (GUIATUSS) 100 MG/5ML SYRP        Sx relief discussed will start an expectorant 400mg 3x daily, along with a antihistamine and steroid nasal spray,  follow up if no improvement       Patient Instructions     We will have you try a different antihistamine, Allegra     Please start your expectorant Guiatuss syrup    Please start nasonex 1 spray 2x daily, followed by saline nasal spray at least after your nasonex     Practice insufflation technique     Try your pressure points we talked about     Please see an allergist     You could try some natural products containing Quecertin and stinging isma     Allergic Rhinitis  Allergic rhinitis is an allergic reaction that affects the nose, and often the eyes. It s often known as nasal allergies. Nasal allergies are often due to things in the environment that are breathed in. Depending what you are sensitive to, nasal allergies may occur only during certain seasons. Or they may occur year round. Common indoor allergens include house dust mites, mold, cockroaches, and pet dander.  Outdoor allergens include pollen from trees, grasses, and weeds.   Symptoms include a drippy, stuffy, and itchy nose. They also include sneezing and red and itchy eyes. You may feel tired more often. Severe allergies may also affect your breathing and trigger a condition called asthma.   Tests can be done to see what allergens are affecting you. You may be referred to an allergy specialist for testing and further evaluation.  Home care  Your healthcare provider may prescribe medicines to help relieve allergy symptoms. These may include oral medicines, nasal sprays, or eye drops.  Ask your provider for advice on how to avoid substances that you are allergic to. Below are a few tips for each type of allergen.  Pet dander:    Do not have pets with fur and feathers.    If you can't avoid having a pet, keep it out of your bedroom and off upholstered furniture.  Pollen:    When pollen counts are high, keep windows of your car and home closed. If possible, use an air conditioner instead.    Wear a filter mask when mowing or doing yard work.  House dust mites:    Wash bedding every week in warm water and detergent and dry on a hot setting.    Cover the mattress, box spring, and pillows with allergy covers.     If possible, sleep in a room with no carpet, curtains, or upholstered furniture.  Cockroaches:    Store food in sealed containers.    Remove garbage from the home promptly.    Fix water leaks  Mold:    Keep humidity low by using a dehumidifier or air conditioner. Keep the dehumidifier and air conditioner clean and free of mold.    Clean moldy areas with bleach and water.  In general:    Vacuum once or twice a week. If possible, use a vacuum with a high-efficiency particulate air (HEPA) filter.    Do not smoke. Avoid cigarette smoke. Cigarette smoke is an irritant that can make symptoms worse.  Follow-up care  Follow up as advised by the healthcare provider or our staff. If you were referred to an allergy specialist,  "make this appointment promptly.  When to seek medical advice  Call your healthcare provider right away if the following occur:    Coughing or wheezing    Fever of 100.4 F (38 C) or higher, or as directed by your healthcare provider    Raised red bumps (hives)    Continuing symptoms, new symptoms, or worsening symptoms  Call 911 if you have:    Trouble breathing    Severe swelling of the face or severe itching of the eyes or mouth  Date Last Reviewed: 3/1/2017    7601-3924 [employer]. [address] [place] [address]. All rights reserved. This information is not intended as a substitute for professional medical care. Always follow your healthcare professional's instructions.        Understanding Nasal Anatomy: Inside View  A lot happens under the surface of the nose. The bone and cartilage under the skin give the nose most of its size and shape. Other structures inside and behind the nose help you breathe. Learning the anatomy of the nose can help you better understand how the nose works.           Bone supports the upper third (bridge) of the nose. The upper cartilage supports the side of the nose. The lower cartilage adds support, width, and height. It helps shape the nostrils and the tip of the nose. Skin also helps shape the nose.     The nasal cavity is a hollow space behind the nose that air flows through. The septum is a thin \"wall\" made of cartilage and bone. It divides the inside of the nose into two chambers. The mucous membrane is thin tissue that lines the nose, sinuses, and throat. It warms and moistens the air you breathe in. It also makes the sticky mucus that helps clean the air of dust and other small particles. The turbinates on each side of the nose are curved, bony ridges lined with mucous membrane. They warm and moisten the air you breathe in. The sinuses are hollow, air-filled chambers in the bone around your nose. Mucus from the sinuses drains into the nasal " cavity.  Date Last Reviewed: 11/1/2016 2000-2017 The [employer]. [address] [place] [address], [place] [address]. All rights reserved. This information is not intended as a substitute for professional medical care. Always follow your healthcare professional's instructions.            Mary Schmidt NP, APRN [facility] PRIMARY [facility]

## 2018-05-21 NOTE — TELEPHONE ENCOUNTER
Received fax from [facility]:    Fexofenadine tablets and Mometasone spray are not covered under patient's formulary.        Requesting PA or alternative.  No suggestions given on fax.    Pharmacy phone number is: [phone]

## 2018-05-21 NOTE — PATIENT INSTRUCTIONS
We will have you try a different antihistamine, Allegra     Please start your expectorant Guiatuss syrup    Please start nasonex 1 spray 2x daily, followed by saline nasal spray at least after your nasonex     Practice insufflation technique     Try your pressure points we talked about     Please see an allergist     You could try some natural products containing Quecertin and stinging isma     Allergic Rhinitis  Allergic rhinitis is an allergic reaction that affects the nose, and often the eyes. It s often known as nasal allergies. Nasal allergies are often due to things in the environment that are breathed in. Depending what you are sensitive to, nasal allergies may occur only during certain seasons. Or they may occur year round. Common indoor allergens include house dust mites, mold, cockroaches, and pet dander. Outdoor allergens include pollen from trees, grasses, and weeds.   Symptoms include a drippy, stuffy, and itchy nose. They also include sneezing and red and itchy eyes. You may feel tired more often. Severe allergies may also affect your breathing and trigger a condition called asthma.   Tests can be done to see what allergens are affecting you. You may be referred to an allergy specialist for testing and further evaluation.  Home care  Your healthcare provider may prescribe medicines to help relieve allergy symptoms. These may include oral medicines, nasal sprays, or eye drops.  Ask your provider for advice on how to avoid substances that you are allergic to. Below are a few tips for each type of allergen.  Pet dander:    Do not have pets with fur and feathers.    If you can't avoid having a pet, keep it out of your bedroom and off upholstered furniture.  Pollen:    When pollen counts are high, keep windows of your car and home closed. If possible, use an air conditioner instead.    Wear a filter mask when mowing or doing yard work.  House dust mites:    Wash bedding every week in warm water and  detergent and dry on a hot setting.    Cover the mattress, box spring, and pillows with allergy covers.     If possible, sleep in a room with no carpet, curtains, or upholstered furniture.  Cockroaches:    Store food in sealed containers.    Remove garbage from the home promptly.    Fix water leaks  Mold:    Keep humidity low by using a dehumidifier or air conditioner. Keep the dehumidifier and air conditioner clean and free of mold.    Clean moldy areas with bleach and water.  In general:    Vacuum once or twice a week. If possible, use a vacuum with a high-efficiency particulate air (HEPA) filter.    Do not smoke. Avoid cigarette smoke. Cigarette smoke is an irritant that can make symptoms worse.  Follow-up care  Follow up as advised by the healthcare provider or our staff. If you were referred to an allergy specialist, make this appointment promptly.  When to seek medical advice  Call your healthcare provider right away if the following occur:    Coughing or wheezing    Fever of 100.4 F (38 C) or higher, or as directed by your healthcare provider    Raised red bumps (hives)    Continuing symptoms, new symptoms, or worsening symptoms  Call 911 if you have:    Trouble breathing    Severe swelling of the face or severe itching of the eyes or mouth  Date Last Reviewed: 3/1/2017    0102-8332 [employer]. [address] [place], [place] [address]. All rights reserved. This information is not intended as a substitute for professional medical care. Always follow your healthcare professional's instructions.        Understanding Nasal Anatomy: Inside View  A lot happens under the surface of the nose. The bone and cartilage under the skin give the nose most of its size and shape. Other structures inside and behind the nose help you breathe. Learning the anatomy of the nose can help you better understand how the nose works.           Bone supports the upper third (bridge) of the nose. The upper cartilage  "supports the side of the nose. The lower cartilage adds support, width, and height. It helps shape the nostrils and the tip of the nose. Skin also helps shape the nose.     The nasal cavity is a hollow space behind the nose that air flows through. The septum is a thin \"wall\" made of cartilage and bone. It divides the inside of the nose into two chambers. The mucous membrane is thin tissue that lines the nose, sinuses, and throat. It warms and moistens the air you breathe in. It also makes the sticky mucus that helps clean the air of dust and other small particles. The turbinates on each side of the nose are curved, bony ridges lined with mucous membrane. They warm and moisten the air you breathe in. The sinuses are hollow, air-filled chambers in the bone around your nose. Mucus from the sinuses drains into the nasal cavity.  Date Last Reviewed: 11/1/2016 2000-2017 [employer]. [address] [place] [address], [place] [address]. All rights reserved. This information is not intended as a substitute for professional medical care. Always follow your healthcare professional's instructions.        "

## 2018-05-22 ENCOUNTER — TELEPHONE (OUTPATIENT)
Dept: FAMILY MEDICINE | Facility: CLINIC | Age: 58
End: 2018-05-22

## 2018-05-22 NOTE — TELEPHONE ENCOUNTER
Received additional questions from  "[employer]", answered, manually faxed back to [phone]

## 2018-05-22 NOTE — TELEPHONE ENCOUNTER
Mometasone and Fexofenadine are not covered under patient's insurance.      Covered alternatives are: Fluticasone and Cetirizine (Per OV note, patient has tried these both in the past without relief).     Routing to PA team. Please initiate PA.     Thank you,   Shelby Arreola RN  [facility]

## 2018-05-22 NOTE — TELEPHONE ENCOUNTER
Central Prior Authorization Team   Phone: [phone]      PA Initiation    Medication: Mometasone spray-Initiated  Insurance Company: [employer] - Phone [phone] Fax [phone]  Pharmacy Filling the Rx: [id number] - [place], [place] - [address] [facility]  Filling Pharmacy Phone: [phone]  Filling Pharmacy Fax:    Start Date: 5/22/2018

## 2018-05-22 NOTE — TELEPHONE ENCOUNTER
"Received fax: PA DENIED    Denied because \"[facility] formulary covers only a limited number of generic OTC products.  The drug requested is not included in the member's benefit.  A specific list of generic OTC drugs covered under the benefit is available on our website or by requesting a copy from [facility] customer service.\"    To appeal request should include:  -name  -  -Address  -member number ([id number])  -phone number  -reasons for appeal  -any info you want [facility] to review, like medical records or a provider's letter    Fax appeal to: [phone] or mail to: [facility] Attn: Member appeals and grievances [address] [facility] [address] or call: [phone] or [phone]  "

## 2018-05-22 NOTE — TELEPHONE ENCOUNTER
Central Prior Authorization Team   Phone: [phone]      PA Initiation    Medication: ALLEGRA 180 MG tablet-Initiated  Insurance Company: [employer] - Phone [phone] Fax [phone]  Pharmacy Filling the Rx: [id number] - [place], [place] - [address]  Filling Pharmacy Phone: [phone]  Filling Pharmacy Fax:    Start Date: 5/22/2018

## 2018-05-23 RX ORDER — TRIAMCINOLONE ACETONIDE 55 UG/1
2 SPRAY, METERED NASAL DAILY
Qty: 1 BOTTLE | Refills: 3 | Status: SHIPPED | OUTPATIENT
Start: 2018-05-23 | End: 2020-01-02

## 2018-05-23 NOTE — TELEPHONE ENCOUNTER
Spoke with patient discussed options of buying OTC and paying out-of-pocket or that an alternative medication listed in notes in loratadine (Claritin) if she wanted to. Patient stated that she will purchase OTC, and notify us if in the future she wants to try to re-appeal.    Desire Rodas RN  [facility]

## 2018-05-23 NOTE — TELEPHONE ENCOUNTER
Dr. Ana Luisa Valdes is not covered, PA denied    Pt needs alternative med, she has had flonase in the past, this did not work for her. It didn't give her any relief, she felt no change at all using it    Alternatives are Nasacort or flunisolide    Pt says it is ok to try either one    Pharm cued    Swati Ghotra RN   [facility]

## 2018-05-23 NOTE — TELEPHONE ENCOUNTER
Will await fax    Swati Ghotra RN   [facility]

## 2018-05-25 DIAGNOSIS — T78.40XD ALLERGIC STATE, SUBSEQUENT ENCOUNTER: ICD-10-CM

## 2018-05-25 RX ORDER — TRIAMCINOLONE ACETONIDE 55 UG/1
2 SPRAY, METERED NASAL DAILY
Qty: 1 BOTTLE | Refills: 3 | Status: CANCELLED | OUTPATIENT
Start: 2018-05-25

## 2018-05-25 NOTE — TELEPHONE ENCOUNTER
Requested Prescriptions   Pending Prescriptions Disp Refills     triamcinolone (NASACORT) 55 MCG/ACT Inhaler      Last Written Prescription Date:  05/23/2018  Last Fill Quantity: 1,  # refills: 3   Last office visit: 5/21/2018 with prescribing provider:  09/20/17   Future Office Visit:   1 Bottle 3     Sig: Spray 2 sprays into both nostrils daily    There is no refill protocol information for this order

## 2018-05-25 NOTE — TELEPHONE ENCOUNTER
Per pharmacy nasocort not covered,  the flunisolide is covered, verbal from Dr. Orosco with read back for med given with 3 refills    Swati Ghotra RN   [facility]

## 2018-06-07 ENCOUNTER — TRANSFERRED RECORDS (OUTPATIENT)
Dept: HEALTH INFORMATION MANAGEMENT | Facility: CLINIC | Age: 58
End: 2018-06-07

## 2018-06-15 ENCOUNTER — OFFICE VISIT (OUTPATIENT)
Dept: SLEEP MEDICINE | Facility: CLINIC | Age: 58
End: 2018-06-15
Payer: COMMERCIAL

## 2018-06-15 VITALS
SYSTOLIC BLOOD PRESSURE: 112 MMHG | DIASTOLIC BLOOD PRESSURE: 70 MMHG | WEIGHT: 173 LBS | BODY MASS INDEX: 28.82 KG/M2 | OXYGEN SATURATION: 98 % | HEIGHT: 65 IN | HEART RATE: 99 BPM | RESPIRATION RATE: 18 BRPM

## 2018-06-15 DIAGNOSIS — G47.33 OSA (OBSTRUCTIVE SLEEP APNEA): Primary | ICD-10-CM

## 2018-06-15 PROCEDURE — 99213 OFFICE O/P EST LOW 20 MIN: CPT

## 2018-06-15 NOTE — PROGRESS NOTES
Sleep Follow-Up Visit:    Date on this visit: 6/15/2018    Lesley Stafford is a 57 yr old female with a PMH of complicated past medical history including obesity, SLE hemoglobin C trait, depression and anxiety  comes in today for follow-up.     PSG was performed on 5/1/18 with an AHI of 3.9/hr. The REM AHI was 5.5 events per hour. The supine AHI was 4.6 events per hour. The RERA index was 0.4 events per hour.  The RDI was 4.3 events per hour.    Excessive daytime sleepiness:  ESS: 11. Excessively sleepy at nights at around 8-10 pm and falling asleep while wanting to finish job related or house hold duties while seated. Just finished ramadan, improving fatigue during the day since not fasting anymore. More recently she does not complain of any trouble going to sleep at 10 pm or 11 pm. She admits it is actually a struggle to stay up until that time as she is significantly sleepy from 8:30 pm-9 pm. She believes she is asleep till her wake up time of 7 am, getting a consolidated sleep of around 8 hours per night for most nights of the week. There are a few nights she woken up due to her chronic pain in hips/back as well as RLS symptoms at around 1 or 2 am. Takes her adderall in the morning at 8:30 am as well as 4 pm in the evening which she is prescribed for her ADHD. She believes it helps her with her concentration/ focus but not much with her sleepiness. She does get sleepy at around 2 pm but is usually at her desk. Drinks around 8 oz of coffee per day, last at 2 pm. 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     Medications affecting daytime functioning: other than the adderall for ADHD, she takes flexeril 2-3 times a day, for the past 2 months for chronic pain, gabapentin initially prescribed for RLS but now taking it for chronic pain 2-3 times a day. cymbalta in the morning.     She is very surprised that she  "is does not have any RACHNA which she was hoping to treat for improvement in her sleepiness.     Past medical/surgical history, family history, social history, medications and allergies were reviewed.      Problem List:  Patient Active Problem List    Diagnosis Date Noted     Liver hemangioma 12/14/2016     Priority: Medium     4 on MRI '09       Bilateral carpal tunnel syndrome 12/14/2016     Priority: Medium     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     Priority: Medium     Insomnia, unspecified type 08/19/2016     Priority: Medium     Tired 04/25/2016     Priority: Medium     ACP (advance care planning) 03/10/2016     Priority: Medium     Advance Care Planning 3/10/2016: Receipt of ACP document:  Received: Health Care Directive which was witnessed or notarized on 2-5-16.  Document previously scanned on 2-8-16.  Validation form completed and sent to be scanned.  Code Status reflects choices in most recent ACP document.  Confirmed/documented designated decision maker(s).  Added by Kathleen Espitia RN Advance Care Planning Liaison with [facility]           Depressive disorder 02/19/2016     Priority: Medium     Anxiety 02/19/2016     Priority: Medium     Allergic state 02/19/2016     Priority: Medium     History of claustrophobia 08/03/2015     Priority: Medium     Obesity, Class I, BMI 30-34.9 10/31/2014     Priority: Medium     Abnormal perimenopausal bleeding 08/24/2013     Priority: Medium     CARDIOVASCULAR SCREENING; LDL GOAL LESS THAN 130 09/24/2012     Priority: Medium     Lupus 09/24/2012     Priority: Medium        Physical Exam:  /70  Pulse 99  Resp 18  Ht 1.651 m (5' 5\")  Wt 78.5 kg (173 lb)  SpO2 98%  BMI 28.79 kg/m2    General: No apparent distress, appropriately groomed  Head: Normocephalic, atraumatic  Eyes: no icterus  Mouth: op pink and moist  Neck: Supple  Cardiac: Regular rate and rhythm  Chest: Symmetric air movement, lungs clear to auscultation bilaterally  Musculoskeletal: no edema " noted  Skin: Warm, dry, intact  Psych: Mood pleasant, affect congruent    Impression/Plan:    Excessive daytime sleepiness  Restless Leg Syndrome  Chronic pain  ADHD    Pt does not have any sleep disordered breathing. Her excessive daytime sleepiness seems likely due to multiple medications which could be contributing to her symptoms. Encouraged pt to discuss with her psychiatrist as well as pain clinic to help decrease the dosing of the flexeril and gabapentin or modify the dosing.   Advised her to get 1-2 hours of extra sleep if possible as she feels better on weekends when she sleeps in longer.     If late evening sleepiness continues to be a problem she could consider using bright light therapy at around 5 or 6 pm, in order to delay her sleep onset by 1-2 hrs. Pt does not want to try this as she feels like her depression/anxiety get worse when sleeps for briefer periods of time. She prefers to sleep earlier and try getting 1-2 hrs of extra sleep instead.     Patient was strongly advised to avoid driving, operating any heavy machinery or other hazardous situations while drowsy or sleepy.  Patient was counseled on the importance of driving while alert, to pull over if drowsy, or nap before getting into the vehicle if sleepy.      Follow up as needed  ?  Seen and examined with Dr. Marx  ?  Isamar Mixon  Sleep Medicine Fellow      As the attending physician I was present with Dr Mixon during this clinic visit. I personally reviewed the key aspects of the history and physical exam, reviewed the pathophysiology, diagnosis and treatment options with the patient and I agree with the above documentation. This note reflects our mutual assessment and plan.     Noel Marx MD   of Medicine  Pulmonary, Critical Care and Sleep Medicine  [facility]  Pager: [phone]

## 2018-06-15 NOTE — MR AVS SNAPSHOT
"              After Visit Summary   6/15/2018    Lesley Stafford    MRN: [id number]           Patient Information     Date Of Birth          1960        Visit Information        Provider Department      6/15/2018 3:30 PM Is[facility]ixon [facility]         Follow-ups after your visit        Who to contact     If you have questions or need follow up information about today's clinic visit or your schedule please [facility] directly at [phone].  Normal or non-critical lab and imaging results will be communicated to you by Minushart, letter or phone within 4 business days after the clinic has received the results. If you do not hear from us within 7 days, please contact the clinic through Minushart or phone. If you have a critical or abnormal lab result, we will notify you by phone as soon as possible.  Submit refill requests through [employer] or call your pharmacy and they will forward the refill request to us. Please allow 3 business days for your refill to be completed.          Additional Information About Your Visit        Minushart Information     [employer] lets you send messages to your doctor, view your test results, renew your prescriptions, schedule appointments and more. To sign up, go to [email or web address] . Click on \"Log in\" on the left side of the screen, which will take you to the Welcome page. Then click on \"Sign up Now\" on the right side of the page.     You will be asked to enter the access code listed below, as well as some personal information. Please follow the directions to create your username and password.     Your access code is: [email or web address]  Expires: 2018  3:01 PM     Your access code will  in 90 days. If you need help or a new code, please call your [place] clinic or [phone].        Care EveryWhere ID     This is your Care EveryWhere ID. This could be used by other organizations to access your [place] medical " "records  [id number]        Your Vitals Were     Pulse Respirations Height Pulse Oximetry BMI (Body Mass Index)       99 18 1.651 m (5' 5\") 98% 28.79 kg/m2        Blood Pressure from Last 3 Encounters:   06/15/18 112/70   05/21/18 110/66   03/30/18 102/67    Weight from Last 3 Encounters:   06/15/18 78.5 kg (173 lb)   05/21/18 80.3 kg (177 lb)   03/30/18 78 kg (172 lb)              Today, you had the following     No orders found for display         Today's Medication Changes          These changes are accurate as of 6/15/18  4:20 PM.  If you have any questions, ask your nurse or doctor.               These medicines have changed or have updated prescriptions.        Dose/Directions    gabapentin 300 MG capsule   Commonly known as:  NEURONTIN   This may have changed:  See the new instructions.   Used for:  Peripheral polyneuropathy        TAKE 1 CAPSULE (300 MG) BY MOUTH 3 TIMES DAILY   Quantity:  90 capsule   Refills:  1                Primary Care Provider Office Phone # Fax #    Rafael Orosco [phone]       [address]  [facility] [address]        Equal Access to Services     SHANI SUAREZ AH: Hadii soco hednrixo Soadelita, waaxda luqadaha, qaybta kaalmada adeegyada, devi levin. So [facility] [phone].    ATENCIÓN: Si habla español, tiene a levine disposición servicios gratuitos de asistencia lingüística. [place] al [phone].    We comply with applicable federal civil rights laws and [place] laws. We do not discriminate on the basis of race, color, national origin, age, disability, sex, sexual orientation, or gender identity.            Thank you!     Thank you for choosing [facility]  for your care. Our goal is always to provide you with excellent care. Hearing back from our patients is one way we can continue to improve our services. Please take a few minutes to complete the written survey that you may receive in the mail " after your visit with us. Thank you!             Your Updated Medication List - Protect others around you: Learn how to safely use, store and throw away your medicines at www.disposemymeds.org.          This list is accurate as of 6/15/18  4:20 PM.  Always use your most recent med list.                   Brand Name Dispense Instructions for use Diagnosis    acyclovir 200 MG capsule    ZOVIRAX    22 capsule    Take 1 capsule (200 mg) by mouth 5 times daily    HSV (herpes simplex virus) infection       amphetamine-dextroamphetamine 10 MG per tablet    ADDERALL     Take 5 mg by mouth 2 times daily        CALCIUM 500 PO           cyclobenzaprine 5 MG tablet    FLEXERIL    42 tablet    TAKE 1 TABLET BY MOUTH 3 TIMES DAILY    Back pain, unspecified back location, unspecified back pain laterality, unspecified chronicity, Hip pain, bilateral, Trochanteric bursitis of both hips       DEEP SEA NASAL SPRAY 0.65 % nasal spray   Generic drug:  sodium chloride     1 Bottle    SPRAY 1 SPRAY INTO BOTH NOSTRILS DAILY AS NEEDED FOR CONGESTION    Sinusitis, unspecified chronicity, unspecified location       DULoxetine 30 MG EC capsule    CYMBALTA    180 capsule    Take 1 capsule (30 mg) by mouth 2 times daily    Hip pain, left       ferrous sulfate 325 (65 Fe) MG tablet    IRON    30 tablet    Take 1 tablet (325 mg) by mouth daily (with breakfast)    Other iron deficiency anemia       fexofenadine 180 MG tablet    ALLEGRA    30 tablet    Take 1 tablet (180 mg) by mouth daily    Chronic allergic rhinitis due to pollen, unspecified seasonality       gabapentin 300 MG capsule    NEURONTIN    90 capsule    TAKE 1 CAPSULE (300 MG) BY MOUTH 3 TIMES DAILY    Peripheral polyneuropathy       guaiFENesin 100 MG/5ML Syrp    GUIATUSS    560 mL    Take 10-20 mLs by mouth every 4 hours as needed for cough    Acute nasopharyngitis       hydroxychloroquine 200 MG tablet    PLAQUENIL    90 tablet    TAKE 1 TABLET BY MOUTH DAILY.    Systemic lupus  erythematosus, unspecified SLE type, unspecified organ involvement status (H)       hydrOXYzine 25 MG tablet    ATARAX    90 tablet    TAKE 1 TABLET (25 MG) BY MOUTH 3 TIMES PER DAY    Anxiety       ibuprofen 800 MG tablet    ADVIL/MOTRIN    30 tablet    TAKE 1 TABLET BY MOUTH EVERY DAY AS NEEDED FOR MODERATE PAIN    Chronic arthralgias of knees and hips       melatonin 5 MG tablet      Take 5 mg by mouth nightly as needed for sleep        mometasone 50 MCG/ACT spray    NASONEX    1 Box    Spray 2 sprays into both nostrils daily    Chronic allergic rhinitis due to pollen, unspecified seasonality       Multi-vitamin Tabs tablet      Take 1 tablet by mouth daily        omega 3 1200 MG Caps           order for DME     1 Device    Cane,    Hip pain, left       triamcinolone 55 MCG/ACT Inhaler    NASACORT    1 Bottle    Spray 2 sprays into both nostrils daily    Allergic state, subsequent encounter

## 2018-06-18 ENCOUNTER — PATIENT OUTREACH (OUTPATIENT)
Dept: CARE COORDINATION | Facility: CLINIC | Age: 58
End: 2018-06-18

## 2018-06-18 NOTE — PROGRESS NOTES
Clinic Care Coordination Contact  RUST/Voicemail    Referral Source: PCP  Clinical Data: Care Coordinator Outreach  Outreach attempted x 1.  Left message on voicemail with call back information and requested return call.    Care Coordinator will try to reach patient again in 3-5 business days.  LUIS Jesus    Care Coordinator  [facility]   [phone]

## 2018-06-28 ENCOUNTER — TRANSFERRED RECORDS (OUTPATIENT)
Dept: HEALTH INFORMATION MANAGEMENT | Facility: CLINIC | Age: 58
End: 2018-06-28

## 2018-06-28 LAB
AST SERPL-CCNC: 33 U/L (ref 5–34)
CREATININE (EXTERNAL): 0.54 MG/DL (ref 0.5–1.3)
GFR ESTIMATED (EXTERNAL): 121.8 ML/MIN/1.73M2

## 2018-06-29 ENCOUNTER — OFFICE VISIT (OUTPATIENT)
Dept: FAMILY MEDICINE | Facility: CLINIC | Age: 58
End: 2018-06-29
Payer: COMMERCIAL

## 2018-06-29 VITALS — WEIGHT: 172 LBS | TEMPERATURE: 98.2 F | BODY MASS INDEX: 28.62 KG/M2 | HEART RATE: 95 BPM

## 2018-06-29 DIAGNOSIS — M32.9 SYSTEMIC LUPUS ERYTHEMATOSUS, UNSPECIFIED SLE TYPE, UNSPECIFIED ORGAN INVOLVEMENT STATUS (H): ICD-10-CM

## 2018-06-29 DIAGNOSIS — D69.6 THROMBOCYTOPENIA (H): ICD-10-CM

## 2018-06-29 DIAGNOSIS — F31.64 BIPOLAR DISORDER, CURRENT EPISODE MIXED, SEVERE, WITH PSYCHOTIC FEATURES (H): ICD-10-CM

## 2018-06-29 DIAGNOSIS — F41.1 GAD (GENERALIZED ANXIETY DISORDER): Primary | ICD-10-CM

## 2018-06-29 PROCEDURE — 99214 OFFICE O/P EST MOD 30 MIN: CPT | Performed by: NURSE PRACTITIONER

## 2018-06-29 ASSESSMENT — ANXIETY QUESTIONNAIRES
1. FEELING NERVOUS, ANXIOUS, OR ON EDGE: MORE THAN HALF THE DAYS
7. FEELING AFRAID AS IF SOMETHING AWFUL MIGHT HAPPEN: MORE THAN HALF THE DAYS
5. BEING SO RESTLESS THAT IT IS HARD TO SIT STILL: MORE THAN HALF THE DAYS
6. BECOMING EASILY ANNOYED OR IRRITABLE: NEARLY EVERY DAY
3. WORRYING TOO MUCH ABOUT DIFFERENT THINGS: NEARLY EVERY DAY
2. NOT BEING ABLE TO STOP OR CONTROL WORRYING: NEARLY EVERY DAY
GAD7 TOTAL SCORE: 17
IF YOU CHECKED OFF ANY PROBLEMS ON THIS QUESTIONNAIRE, HOW DIFFICULT HAVE THESE PROBLEMS MADE IT FOR YOU TO DO YOUR WORK, TAKE CARE OF THINGS AT HOME, OR GET ALONG WITH OTHER PEOPLE: VERY DIFFICULT

## 2018-06-29 ASSESSMENT — PATIENT HEALTH QUESTIONNAIRE - PHQ9: 5. POOR APPETITE OR OVEREATING: MORE THAN HALF THE DAYS

## 2018-06-29 NOTE — PATIENT INSTRUCTIONS
We will plan on checking [facility] on Tuesday to see which Bipolar medications and anxiety medication work best for you. We can send the results to DR Gorge Dan. [phone]    Please check with Dr. Meléndez about your blood counts and all your symptoms you've been having, and thrombocytopenia vs pancytopenia. We are requesting the records and lab results as well.

## 2018-06-29 NOTE — MR AVS SNAPSHOT
After Visit Summary   6/29/2018    Lesley Stafford    MRN: [id number]           Patient Information     Date Of Birth          1960        Visit Information        Provider Department      6/29/2018 2:40 PM Eveline Espitia APRN CNP [facility]        Today's Diagnoses     ASHLEY (generalized anxiety disorder)    -  1    Bipolar disorder, current episode mixed, severe, with psychotic features (H)        Systemic lupus erythematosus, unspecified SLE type, unspecified organ involvement status (H)        Thrombocytopenia (H)          Care Instructions    We will plan on checking [facility] on Tuesday to see which Bipolar medications and anxiety medication work best for you. We can send the results to DR Gorge Dan. [phone]    Please check with Dr. Meléndez about your blood counts and all your symptoms you've been having, and thrombocytopenia vs pancytopenia. We are requesting the records and lab results as well.                   Follow-ups after your visit        Who to contact     If you have questions or need follow up information about today's clinic visit or your schedule please contact [facility] directly at [phone].  Normal or non-critical lab and imaging results will be communicated to you by AKThart, letter or phone within 4 business days after the clinic has received the results. If you do not hear from us within 7 days, please contact the clinic through AKThart or phone. If you have a critical or abnormal lab result, we will notify you by phone as soon as possible.  Submit refill requests through [employer] or call your pharmacy and they will forward the refill request to us. Please allow 3 business days for your refill to be completed.          Additional Information About Your Visit        MyChart Information     [employer] lets you send messages to your doctor, view your test results, renew your prescriptions, schedule appointments and more. To  "sign up, go to [email or web address] . Click on \"Log in\" on the left side of the screen, which will take you to the Welcome page. Then click on \"Sign up Now\" on the right side of the page.     You will be asked to enter the access code listed below, as well as some personal information. Please follow the directions to create your username and password.     Your access code is: [email or web address]  Expires: 2018  3:01 PM     Your access code will  in 90 days. If you need help or a new code, please call your [place] clinic or [phone].        Care EveryWhere ID     This is your Care EveryWhere ID. This could be used by other organizations to access your [place] medical records  [id number]        Your Vitals Were     Pulse Temperature BMI (Body Mass Index)             95 98.2  F (36.8  C) (Oral) 28.62 kg/m2          Blood Pressure from Last 3 Encounters:   06/15/18 112/70   18 110/66   18 102/67    Weight from Last 3 Encounters:   18 172 lb (78 kg)   06/15/18 173 lb (78.5 kg)   18 177 lb (80.3 kg)              Today, you had the following     No orders found for display         Today's Medication Changes          These changes are accurate as of 18  3:55 PM.  If you have any questions, ask your nurse or doctor.               These medicines have changed or have updated prescriptions.        Dose/Directions    gabapentin 300 MG capsule   Commonly known as:  NEURONTIN   This may have changed:  See the new instructions.   Used for:  Peripheral polyneuropathy        TAKE 1 CAPSULE (300 MG) BY MOUTH 3 TIMES DAILY   Quantity:  90 capsule   Refills:  1                Primary Care Provider Office Phone # Fax #    Rafael Orosco [phone]       [address] [facility] [address]  [facility] [address]        Equal Access to Services     SHANI SUAREZ : Walter Vences, wakiersten eli, zheng jamil, devi levin. " So [facility] [phone].    ATENCIÓN: Si peter pritchard, tiene a levine disposición servicios gratuitos de asistencia lingüística. [place] [phone].    We comply with applicable federal civil rights laws and [place] laws. We do not discriminate on the basis of race, color, national origin, age, disability, sex, sexual orientation, or gender identity.            Thank you!     Thank you for choosing [facility]  for your care. Our goal is always to provide you with excellent care. Hearing back from our patients is one way we can continue to improve our services. Please take a few minutes to complete the written survey that you may receive in the mail after your visit with us. Thank you!             Your Updated Medication List - Protect others around you: Learn how to safely use, store and throw away your medicines at www.disposemymeds.org.          This list is accurate as of 6/29/18  3:55 PM.  Always use your most recent med list.                   Brand Name Dispense Instructions for use Diagnosis    acyclovir 200 MG capsule    ZOVIRAX    22 capsule    Take 1 capsule (200 mg) by mouth 5 times daily    HSV (herpes simplex virus) infection       amphetamine-dextroamphetamine 10 MG per tablet    ADDERALL     Take 10 mg by mouth 2 times daily        CALCIUM 500 PO           cyclobenzaprine 5 MG tablet    FLEXERIL    42 tablet    TAKE 1 TABLET BY MOUTH 3 TIMES DAILY    Back pain, unspecified back location, unspecified back pain laterality, unspecified chronicity, Hip pain, bilateral, Trochanteric bursitis of both hips       DEEP SEA NASAL SPRAY 0.65 % nasal spray   Generic drug:  sodium chloride     1 Bottle    SPRAY 1 SPRAY INTO BOTH NOSTRILS DAILY AS NEEDED FOR CONGESTION    Sinusitis, unspecified chronicity, unspecified location       DULoxetine 30 MG EC capsule    CYMBALTA    180 capsule    Take 1 capsule (30 mg) by mouth 2 times daily    Hip pain, left       ferrous sulfate 325 (65 Fe) MG tablet     IRON    30 tablet    Take 1 tablet (325 mg) by mouth daily (with breakfast)    Other iron deficiency anemia       fexofenadine 180 MG tablet    ALLEGRA    30 tablet    Take 1 tablet (180 mg) by mouth daily    Chronic allergic rhinitis due to pollen, unspecified seasonality       gabapentin 300 MG capsule    NEURONTIN    90 capsule    TAKE 1 CAPSULE (300 MG) BY MOUTH 3 TIMES DAILY    Peripheral polyneuropathy       guaiFENesin 100 MG/5ML Syrp    GUIATUSS    560 mL    Take 10-20 mLs by mouth every 4 hours as needed for cough    Acute nasopharyngitis       hydroxychloroquine 200 MG tablet    PLAQUENIL    90 tablet    TAKE 1 TABLET BY MOUTH DAILY.    Systemic lupus erythematosus, unspecified SLE type, unspecified organ involvement status (H)       hydrOXYzine 25 MG tablet    ATARAX    90 tablet    TAKE 1 TABLET (25 MG) BY MOUTH 3 TIMES PER DAY    Anxiety       ibuprofen 800 MG tablet    ADVIL/MOTRIN    30 tablet    TAKE 1 TABLET BY MOUTH EVERY DAY AS NEEDED FOR MODERATE PAIN    Chronic arthralgias of knees and hips       melatonin 5 MG tablet      Take 5 mg by mouth nightly as needed for sleep        mometasone 50 MCG/ACT spray    NASONEX    1 Box    Spray 2 sprays into both nostrils daily    Chronic allergic rhinitis due to pollen, unspecified seasonality       Multi-vitamin Tabs tablet      Take 1 tablet by mouth daily        omega 3 1200 MG Caps           order for DME     1 Device    Cane,    Hip pain, left       triamcinolone 55 MCG/ACT Inhaler    NASACORT    1 Bottle    Spray 2 sprays into both nostrils daily    Allergic state, subsequent encounter

## 2018-06-29 NOTE — PROGRESS NOTES
"  SUBJECTIVE:   Lesley Stafford is a 57 year old female who presents to clinic today for the following health issues:    Depression and Anxiety Follow-Up    Status since last visit: Worsened, \"because they think I have Bipolar and that is what I have been experiencing\"     Other associated symptoms:\"I Felt suicidal 3 weeks ago\"     Complicating factors:     Significant life event: Yes- Lost two loved in last 7-8 days      Current substance abuse: None    PHQ-9 SCORE 6/29/2018   Total Score -   Total Score MyChart -   Total Score 20     ASHLEY-7 SCORE 8/29/2016 9/20/2017 6/29/2018   Total Score - - -   Total Score 15 13 17       PHQ-9  English  PHQ-9   Any Language  ASHLEY-7  Suicide Assessment Five-step Evaluation and Treatment (SAFE-T)    Amount of exercise or physical activity: None    Problems taking medications regularly: No    Medication side effects: Not sure questioning the Cymbalta side effect      Diet: vegetarian/vegan, and eats fish    Bipolar traits, this year more up and down  Manic last about 3 months ago bought bunch of stuff, impulsive shopping when depressed too  Therapist sees regularly at [facility] in [place] [phone]   every 3 weeks but she was on vacation when pt was suicidal, has an appointment coming up soon  reports yesterday was also a hard day for her but no plan , mild SI more like fantasizing now at times      Dr Gorge Mack manages her Psych meds and 2 weeks ago saw him. He said, \"You are on enough meds!\" and didn't make any changes. [facility] wants to have done but doesn't know where to go  Does hear and see things that are not there, sometimes can be scary looking or sounding   Doesn't want to switch to [facility] or new Psych provider even though Dr. Dan is old she is not sure she is getting the best care     Also fatigue, lupus, on chart review noted thrombocytopenia vs pancytopenia mild, she says nobody has ever discussed the findings of blood work with her. She had a " "smear and other blood work done, was off prednisone since October but has been on plaquenil for long time-maybe years. endocrinologist Dr. Moffett, [facility] used to manage her but she felt he wasn't very good and insurance changed  Mary Meléndez in [place] Yesterday and was told \"the lupus was doing good\" no discussion regarding blood work and she does not have any records for me to view--she is not sure if they have our resutls done in [place]    Reviewed records and results we do have, no new symptoms         Problem list and histories reviewed & adjusted, as indicated.  Additional history: as documented    Patient Active Problem List   Diagnosis     CARDIOVASCULAR SCREENING; LDL GOAL LESS THAN 130     Lupus     Abnormal perimenopausal bleeding     Obesity, Class I, BMI 30-34.9     History of claustrophobia     Depressive disorder     Anxiety     Allergic state     ACP (advance care planning)     Tired     Insomnia, unspecified type     Hemoglobin C trait (H)     Liver hemangioma     Bilateral carpal tunnel syndrome     Past Surgical History:   Procedure Laterality Date     COLONOSCOPY  01/2010    repeat 10 yrs     DILATION AND CURETTAGE, OPERATIVE HYSTEROSCOPY WITH MORCELLATOR, COMBINED N/A 2/15/2017    Procedure: COMBINED DILATION AND CURETTAGE, OPERATIVE HYSTEROSCOPY WITH MORCELLATOR;  Surgeon: Erin Gutierrez MD;  Location: [facility]     ESOPHAGOSCOPY, GASTROSCOPY, DUODENOSCOPY (EGD), COMBINED Left 2/5/2016    Procedure: COMBINED ESOPHAGOSCOPY, GASTROSCOPY, DUODENOSCOPY (EGD), BIOPSY SINGLE OR MULTIPLE;  Surgeon: Pierre Fernandez MD;  Location:  GI     GYN SURGERY         Social History   Substance Use Topics     Smoking status: Former Smoker     Smokeless tobacco: Never Used     Alcohol use No     Family History   Problem Relation Age of Onset     Lupus Mother      Asthma Mother      Diabetes Father      Brain Tumor Father      begnign on pituitary     Depression Sister      Anemia Sister      "     Current Outpatient Prescriptions   Medication Sig Dispense Refill     acyclovir (ZOVIRAX) 200 MG capsule Take 1 capsule (200 mg) by mouth 5 times daily 22 capsule 0     amphetamine-dextroamphetamine (ADDERALL) 10 MG tablet Take 10 mg by mouth 2 times daily        Calcium-Magnesium-Vitamin D (CALCIUM 500 PO)        cyclobenzaprine (FLEXERIL) 5 MG tablet TAKE 1 TABLET BY MOUTH 3 TIMES DAILY 42 tablet 3     DEEP SEA NASAL SPRAY 0.65 % nasal spray SPRAY 1 SPRAY INTO BOTH NOSTRILS DAILY AS NEEDED FOR CONGESTION 1 Bottle 2     DULoxetine (CYMBALTA) 30 MG EC capsule Take 1 capsule (30 mg) by mouth 2 times daily (Patient taking differently: Take 60 mg by mouth daily ) 180 capsule 1     ferrous sulfate (IRON) 325 (65 FE) MG tablet Take 1 tablet (325 mg) by mouth daily (with breakfast) (Patient taking differently: Take 325 mg by mouth once a week ) 30 tablet 5     fexofenadine (ALLEGRA) 180 MG tablet Take 1 tablet (180 mg) by mouth daily 30 tablet 1     gabapentin (NEURONTIN) 300 MG capsule TAKE 1 CAPSULE (300 MG) BY MOUTH 3 TIMES DAILY (Patient taking differently: TAKE 2 CAPSULE (300 MG) BY MOUTH 3 TIMES DAILY) 90 capsule 1     guaiFENesin (GUIATUSS) 100 MG/5ML SYRP Take 10-20 mLs by mouth every 4 hours as needed for cough 560 mL 1     hydrOXYzine (ATARAX) 25 MG tablet TAKE 1 TABLET (25 MG) BY MOUTH 3 TIMES PER DAY 90 tablet 2     ibuprofen (ADVIL/MOTRIN) 800 MG tablet TAKE 1 TABLET BY MOUTH EVERY DAY AS NEEDED FOR MODERATE PAIN 30 tablet 3     mometasone (NASONEX) 50 MCG/ACT spray Spray 2 sprays into both nostrils daily 1 Box 11     multivitamin, therapeutic with minerals (MULTI-VITAMIN) TABS Take 1 tablet by mouth daily       omega 3 1200 MG CAPS        order for DME Cane, 1 Device 0     triamcinolone (NASACORT) 55 MCG/ACT Inhaler Spray 2 sprays into both nostrils daily 1 Bottle 3     Dextromethorphan-Guaifenesin  MG TB12 Take 1 tablet by mouth 2 times daily 28 tablet 0     hydroxychloroquine (PLAQUENIL) 200 MG  tablet TAKE 1 TABLET BY MOUTH DAILY 90 tablet 2     Allergies   Allergen Reactions     Morphine Sulfate Itching     Sulfa Drugs Hives     Recent Labs   Lab Test  03/05/18   1030  05/07/17   2305  11/02/16   1151  02/06/16   0748   02/04/16   1016   10/05/15   1548  08/17/10   A1C   --    --   5.7   --    --    --    --   5.5   --    --    LDL   --    --   151*   --    --    --    --    --    --   153   HDL   --    --   83   --    --    --    --    --    --   88   TRIG   --    --   147   --    --    --    --    --    --   76   ALT  28   --   43  54*   < >   --    < >   --    < >   --    CR  0.59  0.68  0.80  0.61   < >  0.59   < >  0.63   < >  0.7   GFRESTIMATED  >90  88  74  >90  Non  GFR Calc     < >  >90  Non  GFR Calc     < >  >90  Non  GFR Calc     < >   --    GFRESTBLACK  >90  >90  African American GFR Calc    89  >90   GFR Calc     < >  >90   GFR Calc     < >  >90   GFR Calc     < >   --    POTASSIUM  4.2  3.3*  3.4  4.0   < >  4.1   < >  3.9   < >   --    TSH   --    --   0.85   --    --   1.55   < >   --    --   1.33    < > = values in this interval not displayed.      BP Readings from Last 3 Encounters:   07/16/18 118/76   07/03/18 100/70   06/15/18 112/70    Wt Readings from Last 3 Encounters:   07/16/18 173 lb 11.2 oz (78.8 kg)   07/03/18 171 lb (77.6 kg)   06/29/18 172 lb (78 kg)                  Labs reviewed in [facility]    Reviewed and updated as needed this visit by clinical staff       Reviewed and updated as needed this visit by Provider         ROS:  Constitutional, HEENT, cardiovascular, pulmonary, GI, , musculoskeletal, neuro, skin, endocrine and psych systems are negative, except as otherwise noted.    OBJECTIVE:     Pulse 95  Temp 98.2  F (36.8  C) (Oral)  Wt 172 lb (78 kg)  BMI 28.62 kg/m2  Body mass index is 28.62 kg/(m^2).  GENERAL: healthy, alert and no distress  EYES: Eyes grossly normal to  inspection, PERRL and conjunctivae and sclerae normal  RESP: lungs clear to auscultation - no rales, rhonchi or wheezes  CV: regular rate and rhythm, normal S1 S2, no S3 or S4, no murmur, click or rub, no peripheral edema and peripheral pulses strong  ABDOMEN: soft, nontender, no hepatosplenomegaly, no masses and bowel sounds normal  MS: no gross musculoskeletal defects noted, no edema  SKIN: no suspicious lesions or rashes  NEURO: Normal strength and tone, mentation intact and speech normal  PSYCH: mentation appears normal, tangential at times, affect normal/bright, judgement and insight intact and appearance well groomed    Diagnostic Test Results:  Results for orders placed or performed in visit on 06/07/18   PHQ-9 DEPRESSION SCREENING ORDER   Result Value Ref Range    PHQ9 SCORE 19     Narrative    [facility]  PSYCHOLOGY CLINIC NOTE(S) - 6/7/18   PHQ-9 DEPRESSION SCREENING ORDER   Result Value Ref Range    PHQ9 SCORE 13     Narrative    [facility] PSYCHOLOGY CLINIC NOTE(S) - 6/7/18       ASSESSMENT/PLAN:         ICD-10-CM    1. ASHLEY (generalized anxiety disorder) F41.1    2. Bipolar disorder, current episode mixed, severe, with psychotic features (H) F31.64    3. Systemic lupus erythematosus, unspecified SLE type, unspecified organ involvement status (H) M32.9    4. Thrombocytopenia (H) D69.6    today is my first time meeting pt, complex psych history she is followed by Psych and just wanted to check in, does want to stay with psych but is looking for further testing for meds, will look into our capability to see if she can have this done here or in [facility] I know they can do it for sure, she wishes to pursue.   Continue other meds per Pscy, follow up and counseling   Also see below-reviewed blood work and recommend she follow up with specialist as appears might be some Heme side effecs perhaps, pt verbalized understanding and will be sure records are sent to us from that recent visit with lab  work.     Patient Instructions   We will plan on checking [facility] on Tuesday to see which Bipolar medications and anxiety medication work best for you. We can send the results to DR Gorge Dan. [phone]    Please check with Dr. Meléndez about your blood counts and all your symptoms you've been having, and thrombocytopenia vs pancytopenia. We are requesting the records and lab results as well.               THOMAS Juarez [facility]

## 2018-06-30 ASSESSMENT — ANXIETY QUESTIONNAIRES: GAD7 TOTAL SCORE: 17

## 2018-06-30 ASSESSMENT — PATIENT HEALTH QUESTIONNAIRE - PHQ9: SUM OF ALL RESPONSES TO PHQ QUESTIONS 1-9: 20

## 2018-07-01 DIAGNOSIS — M32.9 SYSTEMIC LUPUS ERYTHEMATOSUS, UNSPECIFIED SLE TYPE, UNSPECIFIED ORGAN INVOLVEMENT STATUS (H): ICD-10-CM

## 2018-07-02 NOTE — TELEPHONE ENCOUNTER
hydroxychloroquine (PLAQUENIL) 200 MG tablet      Last Written Prescription Date:  12/29/2017  Last Fill Quantity: 90,   # refills: 2  Last Office Visit: 06/27/2018  Future Office visit:    Next 5 appointments (look out 90 days)     Jul 03, 2018  3:40 PM CDT   Office Visit with THOMAS Juarez CNP   [facility] ([facility])    [address] [place] [id number] [phone]                   Routing refill request to provider for review/approval because:  Drug not on the [facility], [facility] or [facility] refill protocol or controlled substance

## 2018-07-03 ENCOUNTER — OFFICE VISIT (OUTPATIENT)
Dept: FAMILY MEDICINE | Facility: CLINIC | Age: 58
End: 2018-07-03
Payer: COMMERCIAL

## 2018-07-03 VITALS
OXYGEN SATURATION: 97 % | DIASTOLIC BLOOD PRESSURE: 70 MMHG | TEMPERATURE: 98.2 F | RESPIRATION RATE: 18 BRPM | SYSTOLIC BLOOD PRESSURE: 100 MMHG | WEIGHT: 171 LBS | HEART RATE: 90 BPM | BODY MASS INDEX: 28.46 KG/M2

## 2018-07-03 DIAGNOSIS — F31.63 BIPOLAR DISORDER, CURRENT EPISODE MIXED, SEVERE, WITHOUT PSYCHOTIC FEATURES (H): Primary | ICD-10-CM

## 2018-07-03 PROCEDURE — 99213 OFFICE O/P EST LOW 20 MIN: CPT | Performed by: NURSE PRACTITIONER

## 2018-07-03 RX ORDER — HYDROXYCHLOROQUINE SULFATE 200 MG/1
TABLET, FILM COATED ORAL
Qty: 90 TABLET | Refills: 2 | Status: SHIPPED | OUTPATIENT
Start: 2018-07-03 | End: 2021-07-29

## 2018-07-03 NOTE — TELEPHONE ENCOUNTER
Dr. Orosco,    Please review/sign or advise for refill of hydroxychloroquine (Plaquenil) 200 mg tablet.     LOV 06/29/18    Desire Rodas RN  [facility]

## 2018-07-03 NOTE — PROGRESS NOTES
"  SUBJECTIVE:   Lesley Stafford is a 57 year old female who presents to clinic today for the following health issues:      Anxiety Follow-Up    Status since last visit: Same     Other associated symptoms:None    Complicating factors:   Significant life event: Yes- Death of a relative and friend    Current substance abuse: None  Depression symptoms: Yes      Amount of exercise or physical activity: \"I need to start exercise at least walking I think it will help with my mood\"    Problems taking medications regularly: No    Medication side effects: \"I think the Cymbalta helps with my pain but I am not sure about how it is helping with mood\"    Diet: regular (no restrictions)    Follow up from last visit, wants [facility] testing done   History of bipolar followed by psych     Problem list and histories reviewed & adjusted, as indicated.  Additional history: as documented    Patient Active Problem List   Diagnosis     CARDIOVASCULAR SCREENING; LDL GOAL LESS THAN 130     Lupus     Abnormal perimenopausal bleeding     Obesity, Class I, BMI 30-34.9     History of claustrophobia     Depressive disorder     Anxiety     Allergic state     ACP (advance care planning)     Tired     Insomnia, unspecified type     Hemoglobin C trait (H)     Liver hemangioma     Bilateral carpal tunnel syndrome     Past Surgical History:   Procedure Laterality Date     COLONOSCOPY  01/2010    repeat 10 yrs     DILATION AND CURETTAGE, OPERATIVE HYSTEROSCOPY WITH MORCELLATOR, COMBINED N/A 2/15/2017    Procedure: COMBINED DILATION AND CURETTAGE, OPERATIVE HYSTEROSCOPY WITH MORCELLATOR;  Surgeon: Erin Gutierrez MD;  Location: [facility]     ESOPHAGOSCOPY, GASTROSCOPY, DUODENOSCOPY (EGD), COMBINED Left 2/5/2016    Procedure: COMBINED ESOPHAGOSCOPY, GASTROSCOPY, DUODENOSCOPY (EGD), BIOPSY SINGLE OR MULTIPLE;  Surgeon: Pierre Fernandez MD;  Location:  GI     GYN SURGERY         Social History   Substance Use Topics     Smoking status: Former " Smoker     Smokeless tobacco: Never Used     Alcohol use No     Family History   Problem Relation Age of Onset     Lupus Mother      Asthma Mother      Diabetes Father      Brain Tumor Father      begnign on pituitary     Depression Sister      Anemia Sister          Current Outpatient Prescriptions   Medication Sig Dispense Refill     acyclovir (ZOVIRAX) 200 MG capsule Take 1 capsule (200 mg) by mouth 5 times daily 22 capsule 0     amphetamine-dextroamphetamine (ADDERALL) 10 MG tablet Take 10 mg by mouth 2 times daily        Calcium-Magnesium-Vitamin D (CALCIUM 500 PO)        cyclobenzaprine (FLEXERIL) 5 MG tablet TAKE 1 TABLET BY MOUTH 3 TIMES DAILY 42 tablet 3     DEEP SEA NASAL SPRAY 0.65 % nasal spray SPRAY 1 SPRAY INTO BOTH NOSTRILS DAILY AS NEEDED FOR CONGESTION 1 Bottle 2     Dextromethorphan-Guaifenesin  MG TB12 Take 1 tablet by mouth 2 times daily 28 tablet 0     DULoxetine (CYMBALTA) 30 MG EC capsule Take 1 capsule (30 mg) by mouth 2 times daily (Patient taking differently: Take 60 mg by mouth daily ) 180 capsule 1     ferrous sulfate (IRON) 325 (65 FE) MG tablet Take 1 tablet (325 mg) by mouth daily (with breakfast) (Patient taking differently: Take 325 mg by mouth once a week ) 30 tablet 5     fexofenadine (ALLEGRA) 180 MG tablet Take 1 tablet (180 mg) by mouth daily 30 tablet 1     gabapentin (NEURONTIN) 300 MG capsule TAKE 1 CAPSULE (300 MG) BY MOUTH 3 TIMES DAILY (Patient taking differently: TAKE 2 CAPSULE (300 MG) BY MOUTH 3 TIMES DAILY) 90 capsule 1     guaiFENesin (GUIATUSS) 100 MG/5ML SYRP Take 10-20 mLs by mouth every 4 hours as needed for cough 560 mL 1     hydroxychloroquine (PLAQUENIL) 200 MG tablet TAKE 1 TABLET BY MOUTH DAILY 90 tablet 2     hydrOXYzine (ATARAX) 25 MG tablet TAKE 1 TABLET (25 MG) BY MOUTH 3 TIMES PER DAY 90 tablet 2     ibuprofen (ADVIL/MOTRIN) 800 MG tablet TAKE 1 TABLET BY MOUTH EVERY DAY AS NEEDED FOR MODERATE PAIN 30 tablet 3     mometasone (NASONEX) 50 MCG/ACT  spray Spray 2 sprays into both nostrils daily 1 Box 11     multivitamin, therapeutic with minerals (MULTI-VITAMIN) TABS Take 1 tablet by mouth daily       omega 3 1200 MG CAPS        order for DME Cane, 1 Device 0     triamcinolone (NASACORT) 55 MCG/ACT Inhaler Spray 2 sprays into both nostrils daily 1 Bottle 3     Allergies   Allergen Reactions     Morphine Sulfate Itching     Sulfa Drugs Hives       Reviewed and updated as needed this visit by clinical staff       Reviewed and updated as needed this visit by Provider         ROS:  Constitutional, HEENT, cardiovascular, pulmonary, GI, , musculoskeletal, neuro, skin, endocrine and psych systems are negative, except as otherwise noted.    OBJECTIVE:     /70  Pulse 90  Temp 98.2  F (36.8  C) (Oral)  Resp 18  Wt 171 lb (77.6 kg)  SpO2 97%  BMI 28.46 kg/m2  Body mass index is 28.46 kg/(m^2).  GENERAL: healthy, alert and no distress  EYES: Eyes grossly normal to inspection, PERRL and conjunctivae and sclerae normal  RESP: Respiratory rate regular and breathing easily   CV: No abnormal color and extremities warm   ABDOMEN: soft, nontender, no hepatosplenomegaly, no masses and bowel sounds normal  NEURO: Normal strength and tone, mentation intact and speech normal  PSYCH: mentation appears normal, affect normal/bright    Diagnostic Test Results:  Future orders by [facility] done     ASSESSMENT/PLAN:         ICD-10-CM    1. Bipolar disorder, current episode mixed, severe, without psychotic features (H) F31.63    reviewed [facility] testing and purpose, how results can be used. Discussed with Snehal JENKINS who will place the order for [facility] for pt today since we are not established to do this yet here, discussed with her and pt sent down to [facility] to collect the specimen with RN there. Will send results to her Psych provider at [facility]-  DR Gorge Dan. [phone]  Continue current meds until then, follow up with psych or Primary Care Provider regarding  results    After visit, see RN note-pt wants to check with her insurance first, will have this done with [facility] RN if covered, she will call us   Follow up for annual or earlier as needed    THOMAS Juarez CNP  [facility]

## 2018-07-03 NOTE — MR AVS SNAPSHOT
"              After Visit Summary   7/3/2018    Lesley Stafford    MRN: [id number]           Patient Information     Date Of Birth          1960        Visit Information        Provider Department      7/3/2018 3:40 PM Eveline Espitia APRN CNP [facility]        Today's Diagnoses     Bipolar disorder, current episode mixed, severe, without psychotic features (H)    -  1       Follow-ups after your visit        Who to contact     If you have questions or need follow up information about today's clinic visit or your schedule please contact [facility] directly at [phone].  Normal or non-critical lab and imaging results will be communicated to you by ColoWraphart, letter or phone within 4 business days after the clinic has received the results. If you do not hear from us within 7 days, please contact the clinic through ColoWraphart or phone. If you have a critical or abnormal lab result, we will notify you by phone as soon as possible.  Submit refill requests through BlueWare or call your pharmacy and they will forward the refill request to us. Please allow 3 business days for your refill to be completed.          Additional Information About Your Visit        MyChart Information     BlueWare lets you send messages to your doctor, view your test results, renew your prescriptions, schedule appointments and more. To sign up, go to [email or web address] . Click on \"Log in\" on the left side of the screen, which will take you to the Welcome page. Then click on \"Sign up Now\" on the right side of the page.     You will be asked to enter the access code listed below, as well as some personal information. Please follow the directions to create your username and password.     Your access code is: [email or web address]  Expires: 2018  3:01 PM     Your access code will  in 90 days. If you need help or a new code, please call your [facility] or [phone].        Care EveryWhere ID  "    This is your Care EveryWhere ID. This could be used by other organizations to access your [facility] medical records  [id number]        Your Vitals Were     Pulse Temperature Respirations Pulse Oximetry BMI (Body Mass Index)       90 98.2  F (36.8  C) (Oral) 18 97% 28.46 kg/m2        Blood Pressure from Last 3 Encounters:   07/16/18 118/76   07/03/18 100/70   06/15/18 112/70    Weight from Last 3 Encounters:   07/16/18 173 lb 11.2 oz (78.8 kg)   07/03/18 171 lb (77.6 kg)   06/29/18 172 lb (78 kg)              Today, you had the following     No orders found for display         Today's Medication Changes          These changes are accurate as of 7/3/18 11:59 PM.  If you have any questions, ask your nurse or doctor.               These medicines have changed or have updated prescriptions.        Dose/Directions    gabapentin 300 MG capsule   Commonly known as:  NEURONTIN   This may have changed:  See the new instructions.   Used for:  Peripheral polyneuropathy        TAKE 1 CAPSULE (300 MG) BY MOUTH 3 TIMES DAILY   Quantity:  90 capsule   Refills:  1                Primary Care Provider Office Phone # Fax #    Rafael Orosco [phone]       [address] [address]        Equal Access to Services     SHANI SUAREZ AH: Hadii soco hendrixo Soadelita, waaxda luqadaha, qaybta kaalmada adeegjovani, devi levin. So [facility] [phone].    ATENCIÓN: Si habla español, tiene a levine disposición servicios gratuitos de asistencia lingüística. Ll[facility] [phone].    We comply with applicable federal civil rights laws and [place] laws. We do not discriminate on the basis of race, color, national origin, age, disability, sex, sexual orientation, or gender identity.            Thank you!     Thank you for choosing [facility]  for your care. Our goal is always to provide you with excellent care. Hearing back from our patients is one way we  can continue to improve our services. Please take a few minutes to complete the written survey that you may receive in the mail after your visit with us. Thank you!             Your Updated Medication List - Protect others around you: Learn how to safely use, store and throw away your medicines at www.disposemymeds.org.          This list is accurate as of 7/3/18 11:59 PM.  Always use your most recent med list.                   Brand Name Dispense Instructions for use Diagnosis    acyclovir 200 MG capsule    ZOVIRAX    22 capsule    Take 1 capsule (200 mg) by mouth 5 times daily    HSV (herpes simplex virus) infection       amphetamine-dextroamphetamine 10 MG per tablet    ADDERALL     Take 10 mg by mouth 2 times daily        CALCIUM 500 PO           cyclobenzaprine 5 MG tablet    FLEXERIL    42 tablet    TAKE 1 TABLET BY MOUTH 3 TIMES DAILY    Back pain, unspecified back location, unspecified back pain laterality, unspecified chronicity, Hip pain, bilateral, Trochanteric bursitis of both hips       DEEP SEA NASAL SPRAY 0.65 % nasal spray   Generic drug:  sodium chloride     1 Bottle    SPRAY 1 SPRAY INTO BOTH NOSTRILS DAILY AS NEEDED FOR CONGESTION    Sinusitis, unspecified chronicity, unspecified location       DULoxetine 30 MG EC capsule    CYMBALTA    180 capsule    Take 1 capsule (30 mg) by mouth 2 times daily    Hip pain, left       ferrous sulfate 325 (65 Fe) MG tablet    IRON    30 tablet    Take 1 tablet (325 mg) by mouth daily (with breakfast)    Other iron deficiency anemia       fexofenadine 180 MG tablet    ALLEGRA    30 tablet    Take 1 tablet (180 mg) by mouth daily    Chronic allergic rhinitis due to pollen, unspecified seasonality       gabapentin 300 MG capsule    NEURONTIN    90 capsule    TAKE 1 CAPSULE (300 MG) BY MOUTH 3 TIMES DAILY    Peripheral polyneuropathy       guaiFENesin 100 MG/5ML Syrp    GUIATUSS    560 mL    Take 10-20 mLs by mouth every 4 hours as needed for cough    Acute  nasopharyngitis       hydroxychloroquine 200 MG tablet    PLAQUENIL    90 tablet    TAKE 1 TABLET BY MOUTH DAILY    Systemic lupus erythematosus, unspecified SLE type, unspecified organ involvement status (H)       hydrOXYzine 25 MG tablet    ATARAX    90 tablet    TAKE 1 TABLET (25 MG) BY MOUTH 3 TIMES PER DAY    Anxiety       ibuprofen 800 MG tablet    ADVIL/MOTRIN    30 tablet    TAKE 1 TABLET BY MOUTH EVERY DAY AS NEEDED FOR MODERATE PAIN    Chronic arthralgias of knees and hips       mometasone 50 MCG/ACT spray    NASONEX    1 Box    Spray 2 sprays into both nostrils daily    Chronic allergic rhinitis due to pollen, unspecified seasonality       Multi-vitamin Tabs tablet      Take 1 tablet by mouth daily        omega 3 1200 MG Caps           order for DME     1 Device    Cane,    Hip pain, left       triamcinolone 55 MCG/ACT Inhaler    NASACORT    1 Bottle    Spray 2 sprays into both nostrils daily    Allergic state, subsequent encounter

## 2018-07-05 ENCOUNTER — PATIENT OUTREACH (OUTPATIENT)
Dept: CARE COORDINATION | Facility: CLINIC | Age: 58
End: 2018-07-05

## 2018-07-05 NOTE — PROGRESS NOTES
Clinic Care Coordination Contact  [facility]/Voicemail       Clinical Data: Care Coordinator Outreach  Outreach attempted x 1.  Left message on voicemail with call back information and requested return call.  Plan: Care Coordinator will continue to follow.  LUIS Jesus    Care Coordinator  [facility] ,[facility]   [phone]

## 2018-07-16 ENCOUNTER — OFFICE VISIT (OUTPATIENT)
Dept: FAMILY MEDICINE | Facility: CLINIC | Age: 58
End: 2018-07-16
Payer: COMMERCIAL

## 2018-07-16 VITALS
OXYGEN SATURATION: 99 % | TEMPERATURE: 98.3 F | SYSTOLIC BLOOD PRESSURE: 118 MMHG | BODY MASS INDEX: 28.94 KG/M2 | HEART RATE: 91 BPM | HEIGHT: 65 IN | RESPIRATION RATE: 18 BRPM | WEIGHT: 173.7 LBS | DIASTOLIC BLOOD PRESSURE: 76 MMHG

## 2018-07-16 DIAGNOSIS — N89.8 VAGINAL ODOR: ICD-10-CM

## 2018-07-16 DIAGNOSIS — J01.90 ACUTE SINUSITIS WITH SYMPTOMS > 10 DAYS: Primary | ICD-10-CM

## 2018-07-16 DIAGNOSIS — Z23 NEED FOR PROPHYLACTIC VACCINATION WITH TETANUS-DIPHTHERIA (TD): ICD-10-CM

## 2018-07-16 DIAGNOSIS — R30.0 DYSURIA: ICD-10-CM

## 2018-07-16 LAB
ALBUMIN UR-MCNC: NEGATIVE MG/DL
APPEARANCE UR: CLEAR
BILIRUB UR QL STRIP: NEGATIVE
COLOR UR AUTO: YELLOW
GLUCOSE UR STRIP-MCNC: NEGATIVE MG/DL
HGB UR QL STRIP: NEGATIVE
KETONES UR STRIP-MCNC: NEGATIVE MG/DL
LEUKOCYTE ESTERASE UR QL STRIP: NEGATIVE
NITRATE UR QL: NEGATIVE
PH UR STRIP: 6.5 PH (ref 5–7)
SOURCE: NORMAL
SP GR UR STRIP: 1.02 (ref 1–1.03)
SPECIMEN SOURCE: NORMAL
UROBILINOGEN UR STRIP-ACNC: 1 EU/DL (ref 0.2–1)
WET PREP SPEC: NORMAL

## 2018-07-16 PROCEDURE — 81003 URINALYSIS AUTO W/O SCOPE: CPT | Performed by: PHYSICIAN ASSISTANT

## 2018-07-16 PROCEDURE — 87086 URINE CULTURE/COLONY COUNT: CPT | Performed by: PHYSICIAN ASSISTANT

## 2018-07-16 PROCEDURE — 87210 SMEAR WET MOUNT SALINE/INK: CPT | Performed by: PHYSICIAN ASSISTANT

## 2018-07-16 PROCEDURE — 99214 OFFICE O/P EST MOD 30 MIN: CPT | Performed by: PHYSICIAN ASSISTANT

## 2018-07-16 RX ORDER — GUAIFENESIN AND DEXTROMETHORPHAN HYDROBROMIDE 1200; 60 MG/1; MG/1
1 TABLET, EXTENDED RELEASE ORAL 2 TIMES DAILY
Qty: 28 TABLET | Refills: 0 | Status: SHIPPED | OUTPATIENT
Start: 2018-07-16 | End: 2018-09-25

## 2018-07-16 ASSESSMENT — PAIN SCALES - GENERAL: PAINLEVEL: MODERATE PAIN (5)

## 2018-07-16 NOTE — LETTER
July 18, 2018      Lesley Stafford  [address] [place] [address]        Dear Lesley Stafford    Urine culture was negative-no bladder infection.      Enclosed is a copy of the results.  It was a pleasure to see you at your last appointment.    If you have any questions or concerns, please call myself or my nurse at [phone].      Sincerely,      ANNETTE Marcus/SANDIE Washington MA      Results for orders placed or performed in visit on 07/16/18   *UA reflex to Microscopic and Culture ([facility] (except [place] and [place])   Result Value Ref Range    Color Urine Yellow     Appearance Urine Clear     Glucose Urine Negative NEG^Negative mg/dL    Bilirubin Urine Negative NEG^Negative    Ketones Urine Negative NEG^Negative mg/dL    Specific Gravity Urine 1.020 1.003 - 1.035    Blood Urine Negative NEG^Negative    pH Urine 6.5 5.0 - 7.0 pH    Protein Albumin Urine Negative NEG^Negative mg/dL    Urobilinogen Urine 1.0 0.2 - 1.0 EU/dL    Nitrite Urine Negative NEG^Negative    Leukocyte Esterase Urine Negative NEG^Negative    Source Midstream Urine    Wet prep   Result Value Ref Range    Specimen Description Vagina     Wet Prep No Trichomonas seen     Wet Prep No clue cells seen     Wet Prep No yeast seen    Urine Culture Aerobic Bacterial   Result Value Ref Range    Specimen Description Midstream Urine     Culture Micro       <10,000 colonies/mL  mixed urogenital livan  Susceptibility testing not routinely done

## 2018-07-16 NOTE — PROGRESS NOTES
SUBJECTIVE:   Lesley Stafford is a 57 year old female who presents to clinic today for the following health issues:    URINARY TRACT SYMPTOMS  Onset: 4-5 day ago had one bout of tingling with urination, resolved now.     Description:   Painful urination (Dysuria): YES  Blood in urine (Hematuria): no   Delay in urine (Hesitency): no     Intensity: moderate    Progression of Symptoms:  constant    Accompanying Signs & Symptoms:  Fever/chills: no   Flank pain YES  Nausea and vomiting: YES- Nausea   Any vaginal symptoms: vaginal odor and vaginal itching  Abdominal/Pelvic Pain: YES    History:   History of frequent UTI's: YES- used to but not anymore  History of kidney stones: no   Sexually Active: no   Possibility of pregnancy: No    Precipitating factors:   Poop isn't normal and lupus, possible mold in bath room     Therapies Tried and outcome: cranberry     Concern - ear pain  Onset: 1 week     Description:   Left ear, sharp, itching, feels like something is in there. Sinus pain, and postnasal drainage on the left     Intensity: mild    Progression of Symptoms:  same    Therapies Tried and outcome: None         Problem list and histories reviewed & adjusted, as indicated.  Additional history: as documented    Patient Active Problem List   Diagnosis     CARDIOVASCULAR SCREENING; LDL GOAL LESS THAN 130     Lupus     Abnormal perimenopausal bleeding     Obesity, Class I, BMI 30-34.9     History of claustrophobia     Depressive disorder     Anxiety     Allergic state     ACP (advance care planning)     Tired     Insomnia, unspecified type     Hemoglobin C trait (H)     Liver hemangioma     Bilateral carpal tunnel syndrome     Past Surgical History:   Procedure Laterality Date     COLONOSCOPY  01/2010    repeat 10 yrs     DILATION AND CURETTAGE, OPERATIVE HYSTEROSCOPY WITH MORCELLATOR, COMBINED N/A 2/15/2017    Procedure: COMBINED DILATION AND CURETTAGE, OPERATIVE HYSTEROSCOPY WITH MORCELLATOR;  Surgeon: Erin Gutierrez  MD ANGELA;  Location: [facility]     ESOPHAGOSCOPY, GASTROSCOPY, DUODENOSCOPY (EGD), COMBINED Left 2/5/2016    Procedure: COMBINED ESOPHAGOSCOPY, GASTROSCOPY, DUODENOSCOPY (EGD), BIOPSY SINGLE OR MULTIPLE;  Surgeon: Pierre Fernandez MD;  Location:  GI     GYN SURGERY         Social History   Substance Use Topics     Smoking status: Former Smoker     Smokeless tobacco: Never Used     Alcohol use No     Family History   Problem Relation Age of Onset     Lupus Mother      Asthma Mother      Diabetes Father      Brain Tumor Father      begnign on pituitary     Depression Sister      Anemia Sister          Current Outpatient Prescriptions   Medication Sig Dispense Refill     acyclovir (ZOVIRAX) 200 MG capsule Take 1 capsule (200 mg) by mouth 5 times daily 22 capsule 0     amoxicillin-clavulanate (AUGMENTIN) 875-125 MG per tablet Take 1 tablet by mouth 2 times daily for 10 days 20 tablet 0     amphetamine-dextroamphetamine (ADDERALL) 10 MG tablet Take 10 mg by mouth 2 times daily        Calcium-Magnesium-Vitamin D (CALCIUM 500 PO)        cyclobenzaprine (FLEXERIL) 5 MG tablet TAKE 1 TABLET BY MOUTH 3 TIMES DAILY 42 tablet 3     DEEP SEA NASAL SPRAY 0.65 % nasal spray SPRAY 1 SPRAY INTO BOTH NOSTRILS DAILY AS NEEDED FOR CONGESTION 1 Bottle 2     Dextromethorphan-Guaifenesin  MG TB12 Take 1 tablet by mouth 2 times daily 28 tablet 0     DULoxetine (CYMBALTA) 30 MG EC capsule Take 1 capsule (30 mg) by mouth 2 times daily (Patient taking differently: Take 60 mg by mouth daily ) 180 capsule 1     ferrous sulfate (IRON) 325 (65 FE) MG tablet Take 1 tablet (325 mg) by mouth daily (with breakfast) (Patient taking differently: Take 325 mg by mouth once a week ) 30 tablet 5     fexofenadine (ALLEGRA) 180 MG tablet Take 1 tablet (180 mg) by mouth daily 30 tablet 1     gabapentin (NEURONTIN) 300 MG capsule TAKE 1 CAPSULE (300 MG) BY MOUTH 3 TIMES DAILY (Patient taking differently: TAKE 2 CAPSULE (300 MG) BY MOUTH 3 TIMES  "DAILY) 90 capsule 1     guaiFENesin (GUIATUSS) 100 MG/5ML SYRP Take 10-20 mLs by mouth every 4 hours as needed for cough 560 mL 1     hydroxychloroquine (PLAQUENIL) 200 MG tablet TAKE 1 TABLET BY MOUTH DAILY 90 tablet 2     hydrOXYzine (ATARAX) 25 MG tablet TAKE 1 TABLET (25 MG) BY MOUTH 3 TIMES PER DAY 90 tablet 2     ibuprofen (ADVIL/MOTRIN) 800 MG tablet TAKE 1 TABLET BY MOUTH EVERY DAY AS NEEDED FOR MODERATE PAIN 30 tablet 3     mometasone (NASONEX) 50 MCG/ACT spray Spray 2 sprays into both nostrils daily 1 Box 11     multivitamin, therapeutic with minerals (MULTI-VITAMIN) TABS Take 1 tablet by mouth daily       omega 3 1200 MG CAPS        order for DME Cane, 1 Device 0     triamcinolone (NASACORT) 55 MCG/ACT Inhaler Spray 2 sprays into both nostrils daily 1 Bottle 3     Allergies   Allergen Reactions     Morphine Sulfate Itching     Sulfa Drugs Hives       Reviewed and updated as needed this visit by clinical staff  Tobacco  Allergies  Meds  Med Hx  Surg Hx  Fam Hx  Soc Hx      Reviewed and updated as needed this visit by Provider         ROS:  Constitutional, HEENT, cardiovascular, pulmonary, GI, , musculoskeletal, neuro, skin, endocrine and psych systems are negative, except as otherwise noted.    OBJECTIVE:     /76 (BP Location: Right arm, Patient Position: Sitting, Cuff Size: Adult Regular)  Pulse 91  Temp 98.3  F (36.8  C) (Oral)  Resp 18  Ht 5' 5\" (1.651 m)  Wt 173 lb 11.2 oz (78.8 kg)  SpO2 99%  BMI 28.91 kg/m2  Body mass index is 28.91 kg/(m^2).  GENERAL: healthy, alert and no distress  EYES: Eyes grossly normal to inspection, PERRL and conjunctivae and sclerae normal  HENT: normal cephalic/atraumatic, ear canals and TM's normal, nose and mouth without ulcers or lesions, nasal mucosa edematous , oropharynx clear, oral mucous membranes moist and sinuses: maxillary tenderness on left  NECK: no adenopathy, no asymmetry, masses, or scars and thyroid normal to palpation  RESP: lungs " clear to auscultation - no rales, rhonchi or wheezes  CV: regular rate and rhythm, normal S1 S2, no S3 or S4, no murmur, click or rub, no peripheral edema and peripheral pulses strong  ABDOMEN: soft, nontender, no hepatosplenomegaly, no masses and bowel sounds normal  MS: no gross musculoskeletal defects noted, no edema    Diagnostic Test Results:  Results for orders placed or performed in visit on 07/16/18 (from the past 24 hour(s))   *UA reflex to Microscopic and Culture ([facility] (except [place] and [place])   Result Value Ref Range    Color Urine Yellow     Appearance Urine Clear     Glucose Urine Negative NEG^Negative mg/dL    Bilirubin Urine Negative NEG^Negative    Ketones Urine Negative NEG^Negative mg/dL    Specific Gravity Urine 1.020 1.003 - 1.035    Blood Urine Negative NEG^Negative    pH Urine 6.5 5.0 - 7.0 pH    Protein Albumin Urine Negative NEG^Negative mg/dL    Urobilinogen Urine 1.0 0.2 - 1.0 EU/dL    Nitrite Urine Negative NEG^Negative    Leukocyte Esterase Urine Negative NEG^Negative    Source Midstream Urine    Wet prep   Result Value Ref Range    Specimen Description Vagina     Wet Prep No Trichomonas seen     Wet Prep No clue cells seen     Wet Prep No yeast seen        ASSESSMENT/PLAN:       ICD-10-CM    1. Acute sinusitis with symptoms > 10 days J01.90 amoxicillin-clavulanate (AUGMENTIN) 875-125 MG per tablet     Dextromethorphan-Guaifenesin  MG TB12   2. Dysuria R30.0 *UA reflex to Microscopic and Culture ([facility] (except [place] and [place])     Urine Culture Aerobic Bacterial   3. Vaginal odor N89.8 Wet prep   4. Need for prophylactic vaccination with tetanus-diphtheria (TD) Z23      1.Augmentin 875 mg, 1 tablet twice a day for 10 days  Increase fluids  Nasal wash  Mucinex DM 1 tablet twice a day for 10-14 days  Follow up if not better after the antibiotic course.   2. Normal urinalysis   3. Wet prep negative.   4. Patient will get Tdap  when she is healthy.     Lesley Jean PA-C  [facility]

## 2018-07-16 NOTE — MR AVS SNAPSHOT
After Visit Summary   7/16/2018    Lesley Stafford    MRN: [id number]           Patient Information     Date Of Birth          1960        Visit Information        Provider Department      7/16/2018 11:00 AM Lesley Jean PA-C [facility]        Today's Diagnoses     Dysuria    -  1    Vaginal odor        Need for prophylactic vaccination with tetanus-diphtheria (TD)        Acute sinusitis with symptoms > 10 days          Care Instructions    Augmentin 875 mg, 1 tablet twice a day for 10 days  Increase fluids  Nasal wash  Mucinex DM 1 tablet twice a day for 10-14 days  Follow up if not better after the antibiotic course.     Understanding Sinus Problems    You don t often think about your sinuses until there s a problem. One day you realize you can t smell dinner cooking. Or you find you often have headaches or problems breathing through your nose.  Symptoms of sinus problems  Sinus problems can cause uncomfortable symptoms. Your nose may run constantly. You might have trouble sleeping at night. You may even lose your sense of smell. Other symptoms can include:    Nasal congestion    Fullness in ears    Green, yellow, or bloody drainage from the nose    Trouble tasting food    Frequent headaches    Facial pain    Cough  When sinuses are blocked  If something blocks the passages in the nose or sinuses, mucus can t drain. Mucus-filled sinuses often become infected.    Colds cause the lining of the nose and sinuses to swell and make extra mucus. A buildup of mucus can lead to a more serious infection.    Allergies irritate turbinates and other tissues. This causes swelling, which can cause a blockage. Over time, this irritation can also lead to sacs of swollen tissue (polyps).    Polyps may form in both the sinuses and nose. Polyps can grow large enough to clog nasal passages and block drainage.    A crooked (deviated) septum may block nasal passages. This is  "often the result of an injury.  Date Last Reviewed: 2016-2017 [employer]. [address] [place] [address], [place] [address]. All rights reserved. This information is not intended as a substitute for professional medical care. Always follow your healthcare professional's instructions.                Follow-ups after your visit        Who to contact     If you have questions or need follow up information about today's clinic visit or your schedule please contact [facility] directly at [phone].  Normal or non-critical lab and imaging results will be communicated to you by [employer], letter or phone within 4 business days after the clinic has received the results. If you do not hear from us within 7 days, please contact the clinic through [employer] or phone. If you have a critical or abnormal lab result, we will notify you by phone as soon as possible.  Submit refill requests through [employer] or call your pharmacy and they will forward the refill request to us. Please allow 3 business days for your refill to be completed.          Additional Information About Your Visit        V[employer]idharAnago Information     [employer] lets you send messages to your doctor, view your test results, renew your prescriptions, schedule appointments and more. To sign up, go to [email or web address] . Click on \"Log in\" on the left side of the screen, which will take you to the Welcome page. Then click on \"Sign up Now\" on the right side of the page.     You will be asked to enter the access code listed below, as well as some personal information. Please follow the directions to create your username and password.     Your access code is: [email or web address]  Expires: 2018  3:01 PM     Your access code will  in 90 days. If you need help or a new code, please call your [facility] or [phone].        Care EveryWhere ID     This is your Care EveryWhere ID. This could be used by other organizations to " "access your [facility] medical records  [id number]        Your Vitals Were     Pulse Temperature Respirations Height Pulse Oximetry BMI (Body Mass Index)    91 98.3  F (36.8  C) (Oral) 18 5' 5\" (1.651 m) 99% 28.91 kg/m2       Blood Pressure from Last 3 Encounters:   07/16/18 118/76   07/03/18 100/70   06/15/18 112/70    Weight from Last 3 Encounters:   07/16/18 173 lb 11.2 oz (78.8 kg)   07/03/18 171 lb (77.6 kg)   06/29/18 172 lb (78 kg)              We Performed the Following     *UA reflex to Microscopic and Culture ([place] [facility] (except [place] and [place])     Urine Culture Aerobic Bacterial     Wet prep          Today's Medication Changes          These changes are accurate as of 7/16/18 12:07 PM.  If you have any questions, ask your nurse or doctor.               Start taking these medicines.        Dose/Directions    amoxicillin-clavulanate 875-125 MG per tablet   Commonly known as:  AUGMENTIN   Used for:  Acute sinusitis with symptoms > 10 days   Started by:  Lesley Jean PA-C        Dose:  1 tablet   Take 1 tablet by mouth 2 times daily for 10 days   Quantity:  20 tablet   Refills:  0       Dextromethorphan-Guaifenesin  MG Tb12   Used for:  Acute sinusitis with symptoms > 10 days   Started by:  Lesley Jean PA-C        Dose:  1 tablet   Take 1 tablet by mouth 2 times daily   Quantity:  28 tablet   Refills:  0         These medicines have changed or have updated prescriptions.        Dose/Directions    DULoxetine 30 MG EC capsule   Commonly known as:  CYMBALTA   This may have changed:    - how much to take  - when to take this   Used for:  Hip pain, left        Dose:  30 mg   Take 1 capsule (30 mg) by mouth 2 times daily   Quantity:  180 capsule   Refills:  1       ferrous sulfate 325 (65 Fe) MG tablet   Commonly known as:  IRON   This may have changed:  when to take this   Used for:  Other iron deficiency anemia        Dose:  325 mg   Take 1 " tablet (325 mg) by mouth daily (with breakfast)   Quantity:  30 tablet   Refills:  5       gabapentin 300 MG capsule   Commonly known as:  NEURONTIN   This may have changed:  See the new instructions.   Used for:  Peripheral polyneuropathy        TAKE 1 CAPSULE (300 MG) BY MOUTH 3 TIMES DAILY   Quantity:  90 capsule   Refills:  1            Where to get your medicines      These medications were sent to [facility] [id number] [facility] - [place], [place] - [address]  [address] [address]     Phone:  [phone]     amoxicillin-clavulanate 875-125 MG per tablet    Dextromethorphan-Guaifenesin  MG Tb12                Primary Care Provider Office Phone # Fax #    Rafael Orosco [phone]       [address]  [facility] [address]        Equal Access to Services     SHANI SUAREZ : Hadii soco mayorga hadasho Soomaali, waaxda luqadaha, qaybta kaalmada aderulayada, devi ellington . [facility] [phone].    ATENCIÓN: Si habla español, tiene a levine disposición servicios gratuitos de asistencia lingüística. [place] al [phone].    We comply with applicable federal civil rights laws and [place] laws. We do not discriminate on the basis of race, color, national origin, age, disability, sex, sexual orientation, or gender identity.            Thank you!     Thank you for choosing [facility]  for your care. Our goal is always to provide you with excellent care. Hearing back from our patients is one way we can continue to improve our services. Please take a few minutes to complete the written survey that you may receive in the mail after your visit with us. Thank you!             Your Updated Medication List - Protect others around you: Learn how to safely use, store and throw away your medicines at www.disposemymeds.org.          This list is accurate as of 7/16/18 12:07 PM.  Always use your most recent med list.                    Brand Name Dispense Instructions for use Diagnosis    acyclovir 200 MG capsule    ZOVIRAX    22 capsule    Take 1 capsule (200 mg) by mouth 5 times daily    HSV (herpes simplex virus) infection       amoxicillin-clavulanate 875-125 MG per tablet    AUGMENTIN    20 tablet    Take 1 tablet by mouth 2 times daily for 10 days    Acute sinusitis with symptoms > 10 days       amphetamine-dextroamphetamine 10 MG per tablet    ADDERALL     Take 10 mg by mouth 2 times daily        CALCIUM 500 PO           cyclobenzaprine 5 MG tablet    FLEXERIL    42 tablet    TAKE 1 TABLET BY MOUTH 3 TIMES DAILY    Back pain, unspecified back location, unspecified back pain laterality, unspecified chronicity, Hip pain, bilateral, Trochanteric bursitis of both hips       DEEP SEA NASAL SPRAY 0.65 % nasal spray   Generic drug:  sodium chloride     1 Bottle    SPRAY 1 SPRAY INTO BOTH NOSTRILS DAILY AS NEEDED FOR CONGESTION    Sinusitis, unspecified chronicity, unspecified location       Dextromethorphan-Guaifenesin  MG Tb12     28 tablet    Take 1 tablet by mouth 2 times daily    Acute sinusitis with symptoms > 10 days       DULoxetine 30 MG EC capsule    CYMBALTA    180 capsule    Take 1 capsule (30 mg) by mouth 2 times daily    Hip pain, left       ferrous sulfate 325 (65 Fe) MG tablet    IRON    30 tablet    Take 1 tablet (325 mg) by mouth daily (with breakfast)    Other iron deficiency anemia       fexofenadine 180 MG tablet    ALLEGRA    30 tablet    Take 1 tablet (180 mg) by mouth daily    Chronic allergic rhinitis due to pollen, unspecified seasonality       gabapentin 300 MG capsule    NEURONTIN    90 capsule    TAKE 1 CAPSULE (300 MG) BY MOUTH 3 TIMES DAILY    Peripheral polyneuropathy       guaiFENesin 100 MG/5ML Syrp    GUIATUSS    560 mL    Take 10-20 mLs by mouth every 4 hours as needed for cough    Acute nasopharyngitis       hydroxychloroquine 200 MG tablet    PLAQUENIL    90 tablet    TAKE 1 TABLET BY MOUTH  DAILY    Systemic lupus erythematosus, unspecified SLE type, unspecified organ involvement status (H)       hydrOXYzine 25 MG tablet    ATARAX    90 tablet    TAKE 1 TABLET (25 MG) BY MOUTH 3 TIMES PER DAY    Anxiety       ibuprofen 800 MG tablet    ADVIL/MOTRIN    30 tablet    TAKE 1 TABLET BY MOUTH EVERY DAY AS NEEDED FOR MODERATE PAIN    Chronic arthralgias of knees and hips       mometasone 50 MCG/ACT spray    NASONEX    1 Box    Spray 2 sprays into both nostrils daily    Chronic allergic rhinitis due to pollen, unspecified seasonality       Multi-vitamin Tabs tablet      Take 1 tablet by mouth daily        omega 3 1200 MG Caps           order for DME     1 Device    Cane,    Hip pain, left       triamcinolone 55 MCG/ACT Inhaler    NASACORT    1 Bottle    Spray 2 sprays into both nostrils daily    Allergic state, subsequent encounter

## 2018-07-16 NOTE — PATIENT INSTRUCTIONS
Augmentin 875 mg, 1 tablet twice a day for 10 days  Increase fluids  Nasal wash  Mucinex DM 1 tablet twice a day for 10-14 days  Follow up if not better after the antibiotic course.     Understanding Sinus Problems    You don t often think about your sinuses until there s a problem. One day you realize you can t smell dinner cooking. Or you find you often have headaches or problems breathing through your nose.  Symptoms of sinus problems  Sinus problems can cause uncomfortable symptoms. Your nose may run constantly. You might have trouble sleeping at night. You may even lose your sense of smell. Other symptoms can include:    Nasal congestion    Fullness in ears    Green, yellow, or bloody drainage from the nose    Trouble tasting food    Frequent headaches    Facial pain    Cough  When sinuses are blocked  If something blocks the passages in the nose or sinuses, mucus can t drain. Mucus-filled sinuses often become infected.    Colds cause the lining of the nose and sinuses to swell and make extra mucus. A buildup of mucus can lead to a more serious infection.    Allergies irritate turbinates and other tissues. This causes swelling, which can cause a blockage. Over time, this irritation can also lead to sacs of swollen tissue (polyps).    Polyps may form in both the sinuses and nose. Polyps can grow large enough to clog nasal passages and block drainage.    A crooked (deviated) septum may block nasal passages. This is often the result of an injury.  Date Last Reviewed: 11/1/2016 2000-2017 [employer]. [address] [place] [address], [place] [address]. All rights reserved. This information is not intended as a substitute for professional medical care. Always follow your healthcare professional's instructions.

## 2018-07-17 LAB
BACTERIA SPEC CULT: NORMAL
SPECIMEN SOURCE: NORMAL

## 2018-08-10 ENCOUNTER — PATIENT OUTREACH (OUTPATIENT)
Dept: CARE COORDINATION | Facility: CLINIC | Age: 58
End: 2018-08-10

## 2018-08-10 NOTE — PROGRESS NOTES
Clinic Care Coordination Contact  [facility]/Voicemail       Clinical Data: Care Coordinator Outreach  Outreach attempted x 1.  Left message on voicemail with call back information and requested return call.  Plan: Care Coordinator will do no further outreaches at this time.  LUIS Jesus    Care Coordinator  [facility] ,[facility]   [phone]

## 2018-09-24 ENCOUNTER — TELEPHONE (OUTPATIENT)
Dept: FAMILY MEDICINE | Facility: CLINIC | Age: 58
End: 2018-09-24

## 2018-09-24 NOTE — TELEPHONE ENCOUNTER
Dr. Awad asked writer to contact patient regarding today's appt.    Per Dr. Awad:  1. She does not prescribe chronic pain medication/controlled substances but is happy to see patient for the acute issues    Writer called patient and reviewed message per Dr. Awad.    Patient verbalized understanding and in agreement with plan.    Patient stated she sees specialist for her pain medication.  Writer encouraged patient to follow up with specialist.    Patient stated she will be in for this morning's appt with Dr. Awad.    BENJA HortonN, RN

## 2018-09-25 ENCOUNTER — OFFICE VISIT (OUTPATIENT)
Dept: FAMILY MEDICINE | Facility: CLINIC | Age: 58
End: 2018-09-25
Payer: COMMERCIAL

## 2018-09-25 VITALS
WEIGHT: 174.75 LBS | TEMPERATURE: 95.9 F | BODY MASS INDEX: 29.08 KG/M2 | RESPIRATION RATE: 16 BRPM | HEART RATE: 94 BPM | OXYGEN SATURATION: 98 % | SYSTOLIC BLOOD PRESSURE: 114 MMHG | DIASTOLIC BLOOD PRESSURE: 73 MMHG

## 2018-09-25 DIAGNOSIS — G56.03 BILATERAL CARPAL TUNNEL SYNDROME: ICD-10-CM

## 2018-09-25 DIAGNOSIS — Z11.3 SCREEN FOR STD (SEXUALLY TRANSMITTED DISEASE): ICD-10-CM

## 2018-09-25 DIAGNOSIS — N89.8 VAGINAL ITCHING: Primary | ICD-10-CM

## 2018-09-25 DIAGNOSIS — R20.0 NUMBNESS AND TINGLING OF HAND: ICD-10-CM

## 2018-09-25 DIAGNOSIS — R20.2 NUMBNESS AND TINGLING OF HAND: ICD-10-CM

## 2018-09-25 DIAGNOSIS — R41.3 MEMORY DIFFICULTIES: ICD-10-CM

## 2018-09-25 LAB
ALBUMIN UR-MCNC: NEGATIVE MG/DL
APPEARANCE UR: CLEAR
BILIRUB UR QL STRIP: NEGATIVE
COLOR UR AUTO: YELLOW
GLUCOSE UR STRIP-MCNC: NEGATIVE MG/DL
HGB UR QL STRIP: NEGATIVE
KETONES UR STRIP-MCNC: NEGATIVE MG/DL
LEUKOCYTE ESTERASE UR QL STRIP: NEGATIVE
NITRATE UR QL: NEGATIVE
PH UR STRIP: 6.5 PH (ref 5–7)
SOURCE: NORMAL
SP GR UR STRIP: 1.01 (ref 1–1.03)
SPECIMEN SOURCE: NORMAL
UROBILINOGEN UR STRIP-ACNC: 0.2 EU/DL (ref 0.2–1)
WET PREP SPEC: NORMAL

## 2018-09-25 PROCEDURE — 99214 OFFICE O/P EST MOD 30 MIN: CPT | Performed by: FAMILY MEDICINE

## 2018-09-25 PROCEDURE — 87491 CHLMYD TRACH DNA AMP PROBE: CPT | Performed by: FAMILY MEDICINE

## 2018-09-25 PROCEDURE — 87591 N.GONORRHOEAE DNA AMP PROB: CPT | Performed by: FAMILY MEDICINE

## 2018-09-25 PROCEDURE — 86780 TREPONEMA PALLIDUM: CPT | Performed by: FAMILY MEDICINE

## 2018-09-25 PROCEDURE — 81003 URINALYSIS AUTO W/O SCOPE: CPT | Performed by: FAMILY MEDICINE

## 2018-09-25 PROCEDURE — 86803 HEPATITIS C AB TEST: CPT | Performed by: FAMILY MEDICINE

## 2018-09-25 PROCEDURE — 86706 HEP B SURFACE ANTIBODY: CPT | Performed by: FAMILY MEDICINE

## 2018-09-25 PROCEDURE — 36415 COLL VENOUS BLD VENIPUNCTURE: CPT | Performed by: FAMILY MEDICINE

## 2018-09-25 PROCEDURE — 87340 HEPATITIS B SURFACE AG IA: CPT | Performed by: FAMILY MEDICINE

## 2018-09-25 PROCEDURE — 87389 HIV-1 AG W/HIV-1&-2 AB AG IA: CPT | Performed by: FAMILY MEDICINE

## 2018-09-25 PROCEDURE — 87210 SMEAR WET MOUNT SALINE/INK: CPT | Performed by: FAMILY MEDICINE

## 2018-09-25 NOTE — PROGRESS NOTES
SUBJECTIVE:   Lesley Stafford is a 57 year old female who presents to clinic today for the following health issues:      Vaginal Symptoms  Onset: 6 days    Description:  Vaginal Discharge: clear   Itching (Pruritis): YES  Burning sensation:  YES  Odor: YES    Accompanying Signs & Symptoms:  Pain with Urination: YES- once or twice 2/10  Abdominal Pain: no   Fever: no     History:   Sexually active: YES  New Partner: YES  Possibility of Pregnancy:  No    Precipitating factors:   Recent Antibiotic Use: no     Alleviating factors:  Therapies Tried and outcome: aleve for pain  Sharp pains in pelvic area    Also complains about fatigue     Would also like to talk about tingling sensation down both arms, more on left arm. Numbness. Tingling that radiates to finger tips. Off and on past 3yrs without improvements.Thinks from her necks, had whole bunch of tests 1 year ago, happening again, numbness and tingling down arms MRI in 2015 of cervical spine showed Multilevel degenerative changes Developmentally small central spinal canal. Moderate-sized central disc herniation/extrusion at C4-C5 is leading to moderate central spinal stenosis and moderate flattening of the cervical cord. Foraminal stenosis is seen at multiple levels due to loss of disc space height and uncinate spurs. Mike has many mri's since then. Is under care of PCP dr suarez, Rheumatologist and a pain doctor at a spine clinic who prescribe her chronic pain meds,but didn't make apt's yet to see them about these concerns. She also sees a psychiatrist for her mental health.    - Declines the FLU VACCINE     Hot flashes, chills, sees [facility] for rheumatology, some headache and dizziness, no double or blurry vision, no facial pain, ears feels plugged, yawns to open ears, uses nasal steroid spray, unknown which kind has two different brands prescribed within days of each other by two different providers in may this year and bottle she brought in looks very  old and the label is not readable. told had a deviated septum, hx of chronic sore throat from post nasal drainage, no runny nose, hx of nasal congestion & chronic trouble smelling, affects taste,  No trouble hearing or swallowing, mild dry cough, no chest pain, trouble breathing or palpitations, 2 days ago had right chest pain, gas, didn't last long, went away on its own, does not recall occurring after lifting. No abdominal pain, heart burn, reflux, nausea or vomiting or diarrhea or constipation, feels though cant get all her poop out, that it is sticky, no blood in stools or black stools, no weight loss or night sweats. No hematuria, frequency, urgency, hesitancy, incontinence. No leg swelling or joint pain. No rash.    Sees pain doctor Is pine    Hx of blood transfusion three years on iron no loner anemic.    Lost cane, plans to ask PCP for new one.    Forgetful reports memory sometimes an issue , worried about dementia.     Former smoker, hx of obesity, hx of lupus, Hb C trait, allergies, anxiety, depression, insomnia, claustrophobia, chronic fatigue, on adderall, atarax, liver hemangioma, prior abnormal perimenopausal bleeding & D & C, hx of substance abuse, b/L carpal tunnel syndrome, chronic pain on flexeril, Cymbalta, gabapentin, motrin, nasonex, prior EGD, Colonoscopy, allergic to sulfa and morphine. [place]  shows receiving gabapentin 300 mg # 180 last given 9/4 by Ella MURILLO, and adderall 10 mg # 60 last given 9/11 by Gorge beatty, not seen PCP dr Orosco in a while. No showed and cancelled and rescheduled several times. New to me.     Problem list and histories reviewed & adjusted, as indicated.  Additional history: as documented    Patient Active Problem List   Diagnosis     Lupus     Abnormal perimenopausal bleeding     Obesity, Class I, BMI 30-34.9     History of claustrophobia     Depressive disorder     Anxiety     Allergic state     ACP (advance care planning)     Tired     Insomnia, unspecified type      Hemoglobin C trait (H)     Liver hemangioma     Bilateral carpal tunnel syndrome     Past Surgical History:   Procedure Laterality Date     COLONOSCOPY  01/2010    repeat 10 yrs     DILATION AND CURETTAGE, OPERATIVE HYSTEROSCOPY WITH MORCELLATOR, COMBINED N/A 2/15/2017    Procedure: COMBINED DILATION AND CURETTAGE, OPERATIVE HYSTEROSCOPY WITH MORCELLATOR;  Surgeon: Erin Gutierrez MD;  Location: [facility]     ESOPHAGOSCOPY, GASTROSCOPY, DUODENOSCOPY (EGD), COMBINED Left 2/5/2016    Procedure: COMBINED ESOPHAGOSCOPY, GASTROSCOPY, DUODENOSCOPY (EGD), BIOPSY SINGLE OR MULTIPLE;  Surgeon: Pierre Fernandez MD;  Location:  GI     GYN SURGERY         Social History   Substance Use Topics     Smoking status: Former Smoker     Smokeless tobacco: Never Used     Alcohol use No     Family History   Problem Relation Age of Onset     Lupus Mother      Asthma Mother      Diabetes Father      Brain Tumor Father      begnign on pituitary     Depression Sister      Anemia Sister          Current Outpatient Prescriptions   Medication Sig Dispense Refill     amphetamine-dextroamphetamine (ADDERALL) 10 MG tablet Take 10 mg by mouth 2 times daily        Calcium-Magnesium-Vitamin D (CALCIUM 500 PO)        cyclobenzaprine (FLEXERIL) 5 MG tablet TAKE 1 TABLET BY MOUTH 3 TIMES DAILY 42 tablet 3     DEEP SEA NASAL SPRAY 0.65 % nasal spray SPRAY 1 SPRAY INTO BOTH NOSTRILS DAILY AS NEEDED FOR CONGESTION 1 Bottle 2     DULoxetine (CYMBALTA) 30 MG EC capsule Take 1 capsule (30 mg) by mouth 2 times daily (Patient taking differently: Take 60 mg by mouth daily ) 180 capsule 1     ferrous sulfate (IRON) 325 (65 FE) MG tablet Take 1 tablet (325 mg) by mouth daily (with breakfast) (Patient taking differently: Take 325 mg by mouth once a week ) 30 tablet 5     fexofenadine (ALLEGRA) 180 MG tablet Take 1 tablet (180 mg) by mouth daily 30 tablet 1     gabapentin (NEURONTIN) 300 MG capsule TAKE 1 CAPSULE (300 MG) BY MOUTH 3 TIMES DAILY  (Patient taking differently: TAKE 2 CAPSULE (300 MG) BY MOUTH 3 TIMES DAILY) 90 capsule 1     hydroxychloroquine (PLAQUENIL) 200 MG tablet TAKE 1 TABLET BY MOUTH DAILY 90 tablet 2     hydrOXYzine (ATARAX) 25 MG tablet TAKE 1 TABLET (25 MG) BY MOUTH 3 TIMES PER DAY 90 tablet 2     mometasone (NASONEX) 50 MCG/ACT spray Spray 2 sprays into both nostrils daily 1 Box 11     multivitamin, therapeutic with minerals (MULTI-VITAMIN) TABS Take 1 tablet by mouth daily       omega 3 1200 MG CAPS        order for DME Cane, 1 Device 0     triamcinolone (NASACORT) 55 MCG/ACT Inhaler Spray 2 sprays into both nostrils daily 1 Bottle 3     Allergies   Allergen Reactions     Morphine Sulfate Itching     Sulfa Drugs Hives     Recent Labs   Lab Test  03/05/18   1030  05/07/17   2305  11/02/16   1151  02/06/16   0748   02/04/16   1016   10/05/15   1548  08/17/10   A1C   --    --   5.7   --    --    --    --   5.5   --    --    LDL   --    --   151*   --    --    --    --    --    --   153   HDL   --    --   83   --    --    --    --    --    --   88   TRIG   --    --   147   --    --    --    --    --    --   76   ALT  28   --   43  54*   < >   --    < >   --    < >   --    CR  0.59  0.68  0.80  0.61   < >  0.59   < >  0.63   < >  0.7   GFRESTIMATED  >90  88  74  >90  Non  GFR Calc     < >  >90  Non  GFR Calc     < >  >90  Non  GFR Calc     < >   --    GFRESTBLACK  >90  >90  African American GFR Calc    89  >90   GFR Calc     < >  >90   GFR Calc     < >  >90   GFR Calc     < >   --    POTASSIUM  4.2  3.3*  3.4  4.0   < >  4.1   < >  3.9   < >   --    TSH   --    --   0.85   --    --   1.55   < >   --    --   1.33    < > = values in this interval not displayed.      BP Readings from Last 3 Encounters:   09/25/18 114/73   07/16/18 118/76   07/03/18 100/70    Wt Readings from Last 3 Encounters:   09/25/18 174 lb 12 oz (79.3 kg)   07/16/18 173 lb  11.2 oz (78.8 kg)   07/03/18 171 lb (77.6 kg)                  Labs reviewed in EPIC    Reviewed and updated as needed this visit by clinical staff  Tobacco  Allergies  Med Hx  Surg Hx  Fam Hx  Soc Hx      Reviewed and updated as needed this visit by Provider         ROS:  Constitutional, HEENT, cardiovascular, pulmonary, GI, , musculoskeletal, neuro, skin, endocrine and psych systems are negative, except as otherwise noted.    OBJECTIVE:     /73 (BP Location: Left arm, Patient Position: Chair, Cuff Size: Adult Regular)  Pulse 94  Temp 95.9  F (35.5  C) (Tympanic)  Resp 16  Wt 174 lb 12 oz (79.3 kg)  SpO2 98%  BMI 29.08 kg/m2  Body mass index is 29.08 kg/(m^2).  GENERAL: healthy, alert and no distress  EYES: Eyes grossly normal to inspection, PERRL and conjunctivae and sclerae normal  HENT: ear canals and TM's normal, nose and mouth without ulcers or lesions  NECK: no adenopathy, no asymmetry, masses, or scars and thyroid normal to palpation  RESP: lungs clear to auscultation - no rales, rhonchi or wheezes  CV: regular rate and rhythm, normal S1 S2, no S3 or S4, no murmur, click or rub, no peripheral edema and peripheral pulses strong  ABDOMEN: soft, non tender, no hepatosplenomegaly, no masses and bowel sounds normal  MS: no deformity or ed reagan noted. Moves stiffly and needs assistance to get up on bed and off it.   SKIN: no suspicious lesions or rashes  NEURO: Normal strength and tone, mentation intact and speech normal  PSYCH: mentation appears normal, affect normal/bright    Diagnostic Test Results:  No results found for this or any previous visit (from the past 24 hour(s)).    ASSESSMENT/PLAN:     1. Vaginal itching  Wet prep and UA negative. GCn egative. Rest of Std testing pending. Consider flu or Tdap given has lupus and on plaquenil. Limited time so unable to address every thing in detail.. Advised to return to PCP to see Dr Orosco about memory concerns. See ipsine about neck and back pain  and tingling. Referral to physical therapy made. Continue care with pain and rheumatology. Mentioned had one episode of chest pain 2 days prior lasted few minutes with no associated symptoms on the right. Currently non . Advised to go to the ER if recurs.  - NEISSERIA GONORRHOEA PCR  - CHLAMYDIA TRACHOMATIS PCR  - Wet prep  - UA reflex to Microscopic and Culture  - [facility] PT, HAND, AND CHIROPRACTIC REFERRAL; Future    2. Numbness and tingling of hand  Chronic, on Cymbalta, gabapentin has a pain, spine and rheumatology doctor she will get back to . Has hx of carpal tunnel too. Referred to PT in the meanwhile.  - [facility] PT, HAND, AND CHIROPRACTIC REFERRAL; Future    3. Bilateral carpal tunnel syndrome  - [facility] PT, HAND, AND CHIROPRACTIC REFERRAL; Future    4. Screen for STD (sexually transmitted disease)  - Hepatitis B Surface Antibody  - Hepatitis B surface antigen  - Hepatitis C Screen Reflex to HCV RNA Quant and Genotype  - HIV Antigen Antibody Combo  - Treponema Abs w Reflex to RPR and Titer    5. Memory difficulties  Communicated well. No obvious deficit noted at visit but she is new to me and time ws limited and couldn't address his in detail. Advised she return to her primary to do a work up if indicated    See Patient Instructions  Patient Instructions   Labs so far no infection  Std testing today  Consider flu or tdap   See dr suarez about memory concern  See judson about neck and back pain and tingling  Referral to physical therapy made  Continue care with pain and rheumatology         Janna Awad MD  [facility]

## 2018-09-25 NOTE — MR AVS SNAPSHOT
After Visit Summary   9/25/2018    Lesley Stafford    MRN: [id number]           Patient Information     Date Of Birth          1960        Visit Information        Provider Department      9/25/2018 2:20 PM Janna Awad MD [facility]        Today's Diagnoses     Vaginal itching    -  1    Numbness and tingling of hand        Bilateral carpal tunnel syndrome        Screen for STD (sexually transmitted disease)        Memory difficulties          Care Instructions    Labs so far no infection  Std testing today  Consider flu or tdap   See dr suarez about memory concern  See judson about neck and back pain and tingling  Referral to physical therapy made  Continue care with pain and rheumatology             Follow-ups after your visit        Additional Services     [facility] PT, HAND, AND CHIROPRACTIC REFERRAL       Physical Therapy, Hand Therapy and Chiropractic Care are available through:  *[facility]  *Hand Therapy (Occupational Therapy or Physical Therapy)  *[facility]    Call one number to schedule at any of the above locations: [phone].    Physical therapy, Hand therapy and/or Chiropractic care has been recommended by your physician as an excellent treatment option to reduce pain and help people return to normal activities, including sports.  Therapy and/or chiropractic care services are a great complement or alternative to expensive and invasive surgery, injections, or long-term use of prescription medications. The primary goal is to identify the underlying problem and provide you the tools to manage your condition on your own.     Please be aware that coverage of these services is subject to the terms and limitations of your health insurance plan.  Call member services at your health plan with any benefit or coverage questions.      Please bring the following to your appointment:  *Your personal calendar for scheduling future  appointments  *Comfortable clothing                  Follow-up notes from your care team     Return in about 2 weeks (around 10/9/2018) for as below.      Your next 10 appointments already scheduled     Sep 27, 2018  3:00 PM CDT   Office Visit with THOMAS Preston CNP   [facility] ([facility])    [address] [place] [id number] [phone]           Bring a current list of meds and any records pertaining to this visit. For Physicals, please bring immunization records and any forms needing to be filled out. Please arrive 10 minutes early to complete paperwork.              Future tests that were ordered for you today     Open Future Orders        Priority Expected Expires Ordered    [facility] PT, HAND, AND CHIROPRACTIC REFERRAL Routine  9/25/2019 9/25/2018            Who to contact     If you have questions or need follow up information about today's clinic visit or your schedule please contact [facility] directly at [phone].  Normal or non-critical lab and imaging results will be communicated to you by MyChart, letter or phone within 4 business days after the clinic has received the results. If you do not hear from us within 7 days, please contact the clinic through [employer] or phone. If you have a critical or abnormal lab result, we will notify you by phone as soon as possible.  Submit refill requests through [employer] or call your pharmacy and they will forward the refill request to us. Please allow 3 business days for your refill to be completed.          Additional Information About Your Visit        Care EveryWhere ID     This is your Care EveryWhere ID. This could be used by other organizations to access your [place] medical records  [id number]        Your Vitals Were     Pulse Temperature Respirations Pulse Oximetry BMI (Body Mass Index)       94 95.9  F (35.5  C) (Tympanic) 16 98% 29.08 kg/m2        Blood Pressure from Last  3 Encounters:   09/25/18 114/73   07/16/18 118/76   07/03/18 100/70    Weight from Last 3 Encounters:   09/25/18 174 lb 12 oz (79.3 kg)   07/16/18 173 lb 11.2 oz (78.8 kg)   07/03/18 171 lb (77.6 kg)              We Performed the Following     CHLAMYDIA TRACHOMATIS PCR     Hepatitis B Surface Antibody     Hepatitis B surface antigen     Hepatitis C Screen Reflex to HCV RNA Quant and Genotype     HIV Antigen Antibody Combo     NEISSERIA GONORRHOEA PCR     Treponema Abs w Reflex to RPR and Titer     UA reflex to Microscopic and Culture     Wet prep          Today's Medication Changes          These changes are accurate as of 9/25/18  3:28 PM.  If you have any questions, ask your nurse or doctor.               These medicines have changed or have updated prescriptions.        Dose/Directions    DULoxetine 30 MG EC capsule   Commonly known as:  CYMBALTA   This may have changed:    - how much to take  - when to take this   Used for:  Hip pain, left        Dose:  30 mg   Take 1 capsule (30 mg) by mouth 2 times daily   Quantity:  180 capsule   Refills:  1       ferrous sulfate 325 (65 Fe) MG tablet   Commonly known as:  IRON   This may have changed:  when to take this   Used for:  Other iron deficiency anemia        Dose:  325 mg   Take 1 tablet (325 mg) by mouth daily (with breakfast)   Quantity:  30 tablet   Refills:  5       gabapentin 300 MG capsule   Commonly known as:  NEURONTIN   This may have changed:  See the new instructions.   Used for:  Peripheral polyneuropathy        TAKE 1 CAPSULE (300 MG) BY MOUTH 3 TIMES DAILY   Quantity:  90 capsule   Refills:  1                Primary Care Provider Office Phone # Fax #    Rafael Orosco [phone]       [address] [place] [id number]        Equal Access to Services     SHANI SUAREZ : nuria Cortez qaybta kaalmada adeegyada, waxay idiin hayaan adeeg kharash la'aan ah. So [facility]  [phone].    ATENCIÓN: Si peter pritchard, tiene a levine disposición servicios gratuitos de asistencia lingüística. [place] sierra [phone].    We comply with applicable federal civil rights laws and [place] laws. We do not discriminate on the basis of race, color, national origin, age, disability, sex, sexual orientation, or gender identity.            Thank you!     Thank you for choosing [facility]  for your care. Our goal is always to provide you with excellent care. Hearing back from our patients is one way we can continue to improve our services. Please take a few minutes to complete the written survey that you may receive in the mail after your visit with us. Thank you!             Your Updated Medication List - Protect others around you: Learn how to safely use, store and throw away your medicines at www.disposemymeds.org.          This list is accurate as of 9/25/18  3:28 PM.  Always use your most recent med list.                   Brand Name Dispense Instructions for use Diagnosis    amphetamine-dextroamphetamine 10 MG per tablet    ADDERALL     Take 10 mg by mouth 2 times daily        CALCIUM 500 PO           cyclobenzaprine 5 MG tablet    FLEXERIL    42 tablet    TAKE 1 TABLET BY MOUTH 3 TIMES DAILY    Back pain, unspecified back location, unspecified back pain laterality, unspecified chronicity, Hip pain, bilateral, Trochanteric bursitis of both hips       DEEP SEA NASAL SPRAY 0.65 % nasal spray   Generic drug:  sodium chloride     1 Bottle    SPRAY 1 SPRAY INTO BOTH NOSTRILS DAILY AS NEEDED FOR CONGESTION    Sinusitis, unspecified chronicity, unspecified location       DULoxetine 30 MG EC capsule    CYMBALTA    180 capsule    Take 1 capsule (30 mg) by mouth 2 times daily    Hip pain, left       ferrous sulfate 325 (65 Fe) MG tablet    IRON    30 tablet    Take 1 tablet (325 mg) by mouth daily (with breakfast)    Other iron deficiency anemia       fexofenadine 180 MG tablet    ALLEGRA     30 tablet    Take 1 tablet (180 mg) by mouth daily    Chronic allergic rhinitis due to pollen, unspecified seasonality       gabapentin 300 MG capsule    NEURONTIN    90 capsule    TAKE 1 CAPSULE (300 MG) BY MOUTH 3 TIMES DAILY    Peripheral polyneuropathy       hydroxychloroquine 200 MG tablet    PLAQUENIL    90 tablet    TAKE 1 TABLET BY MOUTH DAILY    Systemic lupus erythematosus, unspecified SLE type, unspecified organ involvement status (H)       hydrOXYzine 25 MG tablet    ATARAX    90 tablet    TAKE 1 TABLET (25 MG) BY MOUTH 3 TIMES PER DAY    Anxiety       mometasone 50 MCG/ACT spray    NASONEX    1 Box    Spray 2 sprays into both nostrils daily    Chronic allergic rhinitis due to pollen, unspecified seasonality       Multi-vitamin Tabs tablet      Take 1 tablet by mouth daily        omega 3 1200 MG Caps           order for DME     1 Device    Cane,    Hip pain, left       triamcinolone 55 MCG/ACT inhaler    NASACORT    1 Bottle    Spray 2 sprays into both nostrils daily    Allergic state, subsequent encounter

## 2018-09-25 NOTE — LETTER
September 27, 2018      Lesley Stafford  [address]  [place] [address]        Dear Ms.Omar Stafford,    We are writing to inform you of your test results.    Results within acceptable limits.    - Hepatitis C antibody screen test shows no signs of a previous hepatitis C infection.  - Chlamydia and gonnohrea tests are normal.  - Negative for HIV   - Negative to Hepatitis B infection  - not immune to Hepatitis B so consider vaccination against hepatitis B with your primary.    Resulted Orders   NEISSERIA GONORRHOEA PCR   Result Value Ref Range    Specimen Descrip Vagina     N Gonorrhea PCR Negative NEG^Negative      Comment:      Negative for N. gonorrhoeae rRNA by transcription mediated amplification.  A negative result by transcription mediated amplification does not preclude   the presence of N. gonorrhoeae infection because results are dependent on   proper and adequate collection, absence of inhibitors, and sufficient rRNA to   be detected.     CHLAMYDIA TRACHOMATIS PCR   Result Value Ref Range    Specimen Description Vagina     Chlamydia Trachomatis PCR Negative NEG^Negative      Comment:      Negative for C. trachomatis rRNA by transcription mediated amplification.  A negative result by transcription mediated amplification does not preclude   the presence of C. trachomatis infection because results are dependent on   proper and adequate collection, absence of inhibitors, and sufficient rRNA to   be detected.     Wet prep   Result Value Ref Range    Specimen Description Vagina     Wet Prep No Trichomonas seen     Wet Prep No clue cells seen     Wet Prep No yeast seen    UA reflex to Microscopic and Culture   Result Value Ref Range    Color Urine Yellow     Appearance Urine Clear     Glucose Urine Negative NEG^Negative mg/dL    Bilirubin Urine Negative NEG^Negative    Ketones Urine Negative NEG^Negative mg/dL    Specific Gravity Urine 1.015 1.003 - 1.035    Blood Urine Negative NEG^Negative     pH Urine 6.5 5.0 - 7.0 pH    Protein Albumin Urine Negative NEG^Negative mg/dL    Urobilinogen Urine 0.2 0.2 - 1.0 EU/dL    Nitrite Urine Negative NEG^Negative    Leukocyte Esterase Urine Negative NEG^Negative    Source Midstream Urine    Hepatitis B Surface Antibody   Result Value Ref Range    Hepatitis B Surface Antibody 4.82 <8.00 m[IU]/mL      Comment:      Nonreactive, No antibody detected when the value is less than 8.00 m[IU]/mL.   Hepatitis B surface antigen   Result Value Ref Range    Hep B Surface Agn Nonreactive NR^Nonreactive   Hepatitis C Screen Reflex to HCV RNA Quant and Genotype   Result Value Ref Range    Hepatitis C Antibody Nonreactive NR^Nonreactive      Comment:      Assay performance characteristics have not been established for newborns,   infants, and children     HIV Antigen Antibody Combo   Result Value Ref Range    HIV Antigen Antibody Combo Nonreactive NR^Nonreactive          Comment:      HIV-1 p24 Ag & HIV-1/HIV-2 Ab Not Detected     If you have any questions or concerns, please call the clinic at the number listed above.     Sincerely,    Janna Awad MD/nr

## 2018-09-26 LAB
C TRACH DNA SPEC QL NAA+PROBE: NEGATIVE
HBV SURFACE AB SERPL IA-ACNC: 4.82 M[IU]/ML
HBV SURFACE AG SERPL QL IA: NONREACTIVE
HCV AB SERPL QL IA: NONREACTIVE
HIV 1+2 AB+HIV1 P24 AG SERPL QL IA: NONREACTIVE
N GONORRHOEA DNA SPEC QL NAA+PROBE: NEGATIVE
SPECIMEN SOURCE: NORMAL
SPECIMEN SOURCE: NORMAL

## 2018-09-26 ASSESSMENT — PATIENT HEALTH QUESTIONNAIRE - PHQ9: SUM OF ALL RESPONSES TO PHQ QUESTIONS 1-9: 12

## 2018-09-26 NOTE — PROGRESS NOTES
Results within acceptable limits.    - Hepatitis C antibody screen test shows no signs of a previous hepatitis C infection.  - Chlamydia and gonnohrea tests are normal.  - Negative for HIV   - Negative to Hepatitis B infection  - not immune to Hepatitis B so consider vaccination against hepatitis B with your primary.

## 2018-09-28 LAB — T PALLIDUM AB SER QL: NONREACTIVE

## 2018-10-07 DIAGNOSIS — J30.1 SEASONAL ALLERGIC RHINITIS DUE TO POLLEN: Primary | ICD-10-CM

## 2018-10-08 ENCOUNTER — OFFICE VISIT (OUTPATIENT)
Dept: FAMILY MEDICINE | Facility: CLINIC | Age: 58
End: 2018-10-08
Payer: COMMERCIAL

## 2018-10-08 ENCOUNTER — TELEPHONE (OUTPATIENT)
Dept: FAMILY MEDICINE | Facility: CLINIC | Age: 58
End: 2018-10-08

## 2018-10-08 VITALS
DIASTOLIC BLOOD PRESSURE: 67 MMHG | WEIGHT: 175.2 LBS | BODY MASS INDEX: 29.15 KG/M2 | HEART RATE: 89 BPM | SYSTOLIC BLOOD PRESSURE: 106 MMHG | OXYGEN SATURATION: 97 % | TEMPERATURE: 98.6 F

## 2018-10-08 DIAGNOSIS — R41.3 MEMORY CHANGES: ICD-10-CM

## 2018-10-08 DIAGNOSIS — F40.240 CLAUSTROPHOBIA: Primary | ICD-10-CM

## 2018-10-08 DIAGNOSIS — Z23 NEED FOR PROPHYLACTIC VACCINATION WITH TETANUS-DIPHTHERIA (TD): Primary | ICD-10-CM

## 2018-10-08 DIAGNOSIS — R51.9 NONINTRACTABLE HEADACHE, UNSPECIFIED CHRONICITY PATTERN, UNSPECIFIED HEADACHE TYPE: ICD-10-CM

## 2018-10-08 DIAGNOSIS — F32.1 MODERATE MAJOR DEPRESSION (H): ICD-10-CM

## 2018-10-08 LAB
ALBUMIN SERPL-MCNC: 3.9 G/DL (ref 3.4–5)
ALP SERPL-CCNC: 59 U/L (ref 40–150)
ALT SERPL W P-5'-P-CCNC: 44 U/L (ref 0–50)
ANION GAP SERPL CALCULATED.3IONS-SCNC: 7 MMOL/L (ref 3–14)
AST SERPL W P-5'-P-CCNC: 40 U/L (ref 0–45)
BILIRUB SERPL-MCNC: 0.6 MG/DL (ref 0.2–1.3)
BUN SERPL-MCNC: 10 MG/DL (ref 7–30)
CALCIUM SERPL-MCNC: 9.2 MG/DL (ref 8.5–10.1)
CHLORIDE SERPL-SCNC: 107 MMOL/L (ref 94–109)
CO2 SERPL-SCNC: 26 MMOL/L (ref 20–32)
CREAT SERPL-MCNC: 0.57 MG/DL (ref 0.52–1.04)
GFR SERPL CREATININE-BSD FRML MDRD: >90 ML/MIN/1.7M2
GLUCOSE SERPL-MCNC: 88 MG/DL (ref 70–99)
IRON SATN MFR SERPL: 26 % (ref 15–46)
IRON SERPL-MCNC: 79 UG/DL (ref 35–180)
POTASSIUM SERPL-SCNC: 4.2 MMOL/L (ref 3.4–5.3)
PROT SERPL-MCNC: 8.1 G/DL (ref 6.8–8.8)
SODIUM SERPL-SCNC: 140 MMOL/L (ref 133–144)
TIBC SERPL-MCNC: 308 UG/DL (ref 240–430)
TSH SERPL DL<=0.005 MIU/L-ACNC: 0.97 MU/L (ref 0.4–4)
VIT B12 SERPL-MCNC: 736 PG/ML (ref 193–986)

## 2018-10-08 PROCEDURE — 83540 ASSAY OF IRON: CPT | Performed by: NURSE PRACTITIONER

## 2018-10-08 PROCEDURE — 80053 COMPREHEN METABOLIC PANEL: CPT | Performed by: NURSE PRACTITIONER

## 2018-10-08 PROCEDURE — 99214 OFFICE O/P EST MOD 30 MIN: CPT | Performed by: NURSE PRACTITIONER

## 2018-10-08 PROCEDURE — 84443 ASSAY THYROID STIM HORMONE: CPT | Performed by: NURSE PRACTITIONER

## 2018-10-08 PROCEDURE — 82607 VITAMIN B-12: CPT | Performed by: NURSE PRACTITIONER

## 2018-10-08 PROCEDURE — 83550 IRON BINDING TEST: CPT | Performed by: NURSE PRACTITIONER

## 2018-10-08 PROCEDURE — 36415 COLL VENOUS BLD VENIPUNCTURE: CPT | Performed by: NURSE PRACTITIONER

## 2018-10-08 PROCEDURE — 85027 COMPLETE CBC AUTOMATED: CPT | Performed by: NURSE PRACTITIONER

## 2018-10-08 NOTE — PROGRESS NOTES
SUBJECTIVE:   Lesley Stafford is a 57 year old female who presents to clinic today for the following health issues:    Concern - Memory Issues   Onset: 3 months, has been noticeable    Description:   Forgetting things, trouble with sense of direction, headaches, hard time remembering what day it is     Intensity: moderate    Progression of Symptoms:  Unknown     Accompanying Signs & Symptoms:  Vision issues- but eye exam was normal    Previous history of similar problem:   Always had trouble with sense of direction    Has a hard time remembering appointments, even when she writes them down in multiple places. Has missed several appointments with her specialists, and here, and with her clients, which has led to some issues at work. Recently forgot the directions to her specialist's appointment, a place she has been several times. She has been trying to leave early to go places she needs to go because she gets turned around so easily. She has map quest, but occasionally still gets lost and has trouble following the directions. She recently had her eyes examined and was told her vision was normal, but that she wasn't correctly interpreting the eye test. She has been experiencing a headache in both of her temples, a burning pain that nothing seems to make better or worse.  Hasn't noticed any weakness. Has noticed some occasional mild  dizziness.   No recent changes in diet- she is a vegetarian.  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 She has a family history of dementia in her father and mother's side.      Problem list and histories reviewed & adjusted, as indicated.  Additional history: as documented    Patient Active Problem List   Diagnosis     Lupus     Abnormal perimenopausal bleeding     Obesity, Class I, BMI 30-34.9     History of claustrophobia     Depressive disorder     Anxiety     Allergic state     ACP (advance care planning)     Tired     Insomnia,  unspecified type     Hemoglobin C trait (H)     Liver hemangioma     Bilateral carpal tunnel syndrome     Moderate major depression (H)     Past Surgical History:   Procedure Laterality Date     COLONOSCOPY  01/2010    repeat 10 yrs     DILATION AND CURETTAGE, OPERATIVE HYSTEROSCOPY WITH MORCELLATOR, COMBINED N/A 2/15/2017    Procedure: COMBINED DILATION AND CURETTAGE, OPERATIVE HYSTEROSCOPY WITH MORCELLATOR;  Surgeon: Erin Gutierrez MD;  Location: [facility]     ESOPHAGOSCOPY, GASTROSCOPY, DUODENOSCOPY (EGD), COMBINED Left 2/5/2016    Procedure: COMBINED ESOPHAGOSCOPY, GASTROSCOPY, DUODENOSCOPY (EGD), BIOPSY SINGLE OR MULTIPLE;  Surgeon: Pierre Fernandez MD;  Location: [facility]     GYN SURGERY         Social History   Substance Use Topics     Smoking status: Former Smoker     Smokeless tobacco: Never Used     Alcohol use No     Family History   Problem Relation Age of Onset     Lupus Mother      Asthma Mother      Diabetes Father      Brain Tumor Father      begnign on pituitary     Depression Sister      Anemia Sister            Reviewed and updated as needed this visit by clinical staff       Reviewed and updated as needed this visit by Provider         ROS:  Constitutional, HEENT, cardiovascular, pulmonary, gi and gu systems are negative, except as otherwise noted.    OBJECTIVE:     /67 (BP Location: Left arm, Patient Position: Sitting, Cuff Size: Adult Regular)  Pulse 89  Temp 98.6  F (37  C) (Oral)  Wt 175 lb 3.2 oz (79.5 kg)  SpO2 97%  BMI 29.15 kg/m2  Body mass index is 29.15 kg/(m^2).   GENERAL: healthy, alert and no distress  EYES: Eyes grossly normal to inspection, PERRL and conjunctivae and sclerae normal  HENT: ear canals and TM's normal, nose and mouth without ulcers or lesions  NECK: no adenopathy, no asymmetry, masses, or scars and thyroid normal to palpation  RESP: lungs clear to auscultation - no rales, rhonchi or wheezes  CV: regular rate and rhythm, normal S1 S2, no S3 or S4, no  murmur, click or rub, no peripheral edema and peripheral pulses strong  ABDOMEN: soft, nontender, no hepatosplenomegaly, no masses and bowel sounds normal  MS: no gross musculoskeletal defects noted, no edema  NEURO: Normal strength and tone, sensory exam grossly normal, mentation intact and cranial nerves 2-12 intact  PSYCH: mentation appears normal, affect normal/bright    Diagnostic Test Results:  Results for orders placed or performed in visit on 10/08/18 (from the past 24 hour(s))   CBC with platelets   Result Value Ref Range    WBC 4.3 4.0 - 11.0 10e9/L    RBC Count 4.39 3.8 - 5.2 10e12/L    Hemoglobin 12.5 11.7 - 15.7 g/dL    Hematocrit 34.1 (L) 35.0 - 47.0 %    MCV 78 78 - 100 fl    MCH 28.5 26.5 - 33.0 pg    MCHC PENDING 31.5 - 36.5 g/dL    RDW 14.5 10.0 - 15.0 %    Platelet Count 115 (L) 150 - 450 10e9/L       ASSESSMENT/PLAN:     Problem List Items Addressed This Visit     Moderate major depression (H)    Relevant Orders    NEUROPSYCHOLOGY REFERRAL      Other Visit Diagnoses     Need for prophylactic vaccination with tetanus-diphtheria (Td)    -  Primary    Memory changes        Relevant Orders    Vitamin B12 (Completed)    Iron and iron binding capacity (Completed)    CBC with platelets (Completed)    Comprehensive metabolic panel (Completed)    NEUROPSYCHOLOGY REFERRAL    TSH with free T4 reflex (Completed)    Nonintractable headache, unspecified chronicity pattern, unspecified headache type        Relevant Orders    MR Brain w/o & w Contrast    NEUROPSYCHOLOGY REFERRAL         THOMAS Preston [facility]  Please note greater than 50% of this 30 minute appointment were spent in face to face counseling with the patient of the issues described above in the history of present illness and in the plan, including placing referrals and lab orders

## 2018-10-08 NOTE — MR AVS SNAPSHOT
After Visit Summary   10/8/2018    Lesley Stafford    MRN: [id number]           Patient Information     Date Of Birth          1960        Visit Information        Provider Department      10/8/2018 9:00 AM Rosa Weinstein APRN [facility]        Today's Diagnoses     Need for prophylactic vaccination with tetanus-diphtheria (Td)    -  1    Memory changes        Moderate major depression (H)        Nonintractable headache, unspecified chronicity pattern, unspecified headache type           Follow-ups after your visit        Additional Services     NEUROPSYCHOLOGY REFERRAL       Your provider has referred you to:    [facility]: Adult Neuropsychology Clinic [facility] [phone] Preferred Provider: No preferred provider   [email or web address]  [facility]: Neurology Clinic [facility] [phone]   [email or web address]  Neurocognitive testing  [facility]: [facility] [place] [facility] [place] [phone]   [email or web address]    All scheduling is subject to the client's specific insurance plan & benefits, provider/location availability, and provider clinical specialities.  Please arrive 15 minutes early for your first appointment and bring your completed paperwork.    Please be aware that coverage of these services is subject to the terms and limitations of your health insurance plan.  Call member services at your health plan with any benefit or coverage questions.    Please bring the following to your appointment:  >>   Any x-rays, CTs or MRIs which have been performed.  Contact the facility where they were done to arrange for  prior to your scheduled appointment.  Any new CT, MRI or other procedures ordered by your specialist must be performed at a [facility] or coordinated by your clinic's referral office.    >>   List of current medications   >>   This referral  request   >>   Any documents/labs given to you for this referral                  Future tests that were ordered for you today     Open Future Orders        Priority Expected Expires Ordered    MR Brain w/o & w Contrast STAT  10/8/2019 10/8/2018            Who to contact     If you have questions or need follow up information about today's clinic visit or your schedule please contact [facility] directly at [phone].  Normal or non-critical lab and imaging results will be communicated to you by MyChart, letter or phone within 4 business days after the clinic has received the results. If you do not hear from us within 7 days, please contact the clinic through [employer] or phone. If you have a critical or abnormal lab result, we will notify you by phone as soon as possible.  Submit refill requests through [employer] or call your pharmacy and they will forward the refill request to us. Please allow 3 business days for your refill to be completed.          Additional Information About Your Visit        Care EveryWhere ID     This is your Care EveryWhere ID. This could be used by other organizations to access your [place] medical records  [id number]        Your Vitals Were     Pulse Temperature Pulse Oximetry BMI (Body Mass Index)          89 98.6  F (37  C) (Oral) 97% 29.15 kg/m2         Blood Pressure from Last 3 Encounters:   10/08/18 106/67   09/25/18 114/73   07/16/18 118/76    Weight from Last 3 Encounters:   10/08/18 175 lb 3.2 oz (79.5 kg)   09/25/18 174 lb 12 oz (79.3 kg)   07/16/18 173 lb 11.2 oz (78.8 kg)              We Performed the Following     CBC with platelets     Comprehensive metabolic panel     Iron and iron binding capacity     NEUROPSYCHOLOGY REFERRAL     TSH with free T4 reflex     Vitamin B12          Today's Medication Changes          These changes are accurate as of 10/8/18  9:55 AM.  If you have any questions, ask your nurse or doctor.               These medicines have  changed or have updated prescriptions.        Dose/Directions    DULoxetine 30 MG EC capsule   Commonly known as:  CYMBALTA   This may have changed:    - how much to take  - when to take this   Used for:  Hip pain, left        Dose:  30 mg   Take 1 capsule (30 mg) by mouth 2 times daily   Quantity:  180 capsule   Refills:  1       ferrous sulfate 325 (65 Fe) MG tablet   Commonly known as:  IRON   This may have changed:  when to take this   Used for:  Other iron deficiency anemia        Dose:  325 mg   Take 1 tablet (325 mg) by mouth daily (with breakfast)   Quantity:  30 tablet   Refills:  5       gabapentin 300 MG capsule   Commonly known as:  NEURONTIN   This may have changed:  See the new instructions.   Used for:  Peripheral polyneuropathy        TAKE 1 CAPSULE (300 MG) BY MOUTH 3 TIMES DAILY   Quantity:  90 capsule   Refills:  1                Primary Care Provider Office Phone # Fax #    Rafael Orosco [phone]       [address] [address]        Equal Access to Services     [facility] : Hadii soco mayorga hadasho Soomaali, waaxda luqadaha, qaybta kaalmada adeegyamohamud, devi ellington . So [facility] [phone].    ATENCIÓN: Si habla español, tiene a levine disposición servicios gratuitos de asistencia lingüística. Llame al [phone].    We comply with applicable federal civil rights laws and [place] laws. We do not discriminate on the basis of race, color, national origin, age, disability, sex, sexual orientation, or gender identity.            Thank you!     Thank you for choosing [facility]  for your care. Our goal is always to provide you with excellent care. Hearing back from our patients is one way we can continue to improve our services. Please take a few minutes to complete the written survey that you may receive in the mail after your visit with us. Thank you!             Your Updated Medication List - Protect others  around you: Learn how to safely use, store and throw away your medicines at www.disposemymeds.org.          This list is accurate as of 10/8/18  9:55 AM.  Always use your most recent med list.                   Brand Name Dispense Instructions for use Diagnosis    amphetamine-dextroamphetamine 10 MG per tablet    ADDERALL     Take 10 mg by mouth 2 times daily        CALCIUM 500 PO           cyclobenzaprine 5 MG tablet    FLEXERIL    42 tablet    TAKE 1 TABLET BY MOUTH 3 TIMES DAILY    Back pain, unspecified back location, unspecified back pain laterality, unspecified chronicity, Hip pain, bilateral, Trochanteric bursitis of both hips       DEEP SEA NASAL SPRAY 0.65 % nasal spray   Generic drug:  sodium chloride     1 Bottle    SPRAY 1 SPRAY INTO BOTH NOSTRILS DAILY AS NEEDED FOR CONGESTION    Sinusitis, unspecified chronicity, unspecified location       DULoxetine 30 MG EC capsule    CYMBALTA    180 capsule    Take 1 capsule (30 mg) by mouth 2 times daily    Hip pain, left       ferrous sulfate 325 (65 Fe) MG tablet    IRON    30 tablet    Take 1 tablet (325 mg) by mouth daily (with breakfast)    Other iron deficiency anemia       fexofenadine 180 MG tablet    ALLEGRA    30 tablet    Take 1 tablet (180 mg) by mouth daily    Chronic allergic rhinitis due to pollen, unspecified seasonality       gabapentin 300 MG capsule    NEURONTIN    90 capsule    TAKE 1 CAPSULE (300 MG) BY MOUTH 3 TIMES DAILY    Peripheral polyneuropathy       hydroxychloroquine 200 MG tablet    PLAQUENIL    90 tablet    TAKE 1 TABLET BY MOUTH DAILY    Systemic lupus erythematosus, unspecified SLE type, unspecified organ involvement status (H)       hydrOXYzine 25 MG tablet    ATARAX    90 tablet    TAKE 1 TABLET (25 MG) BY MOUTH 3 TIMES PER DAY    Anxiety       mometasone 50 MCG/ACT spray    NASONEX    1 Box    Spray 2 sprays into both nostrils daily    Chronic allergic rhinitis due to pollen, unspecified seasonality       Multi-vitamin Tabs  tablet      Take 1 tablet by mouth daily        omega 3 1200 MG Caps           order for DME     1 Device    Cane,    Hip pain, left       triamcinolone 55 MCG/ACT inhaler    NASACORT    1 Bottle    Spray 2 sprays into both nostrils daily    Allergic state, subsequent encounter

## 2018-10-08 NOTE — TELEPHONE ENCOUNTER
FLUNISOLIDE 0.025% SPRAY        Last Written Prescription Date:    Last Fill Quantity: ,   # refills:   Last Office Visit: 10/08/2018  Future Office visit:       Routing refill request to provider for review/approval because:  Drug not on the [facility], [facility] or [facility] refill protocol or controlled substance

## 2018-10-08 NOTE — TELEPHONE ENCOUNTER
Reason for call:  Other   Patient called regarding (reason for call): call back  Additional comments: The patient is having an MRI on 10/17 and since it will be a closed MRI she is wondering if Rosa Pine would get her something to help with her anxiety and claustrophobia that she can take before the procedure. She would like a call back to discuss this.    Phone number to reach patient:  Cell number on file:    Telephone Information:   Mobile [phone]       Best Time: Any    Can we leave a detailed message on this number?  YES

## 2018-10-09 LAB
ERYTHROCYTE [DISTWIDTH] IN BLOOD BY AUTOMATED COUNT: 14.5 % (ref 10–15)
HCT VFR BLD AUTO: 34.1 % (ref 35–47)
HGB BLD-MCNC: 12.5 G/DL (ref 11.7–15.7)
MCH RBC QN AUTO: 28.5 PG (ref 26.5–33)
MCHC RBC AUTO-ENTMCNC: 37 G/DL (ref 31.5–36.5)
MCV RBC AUTO: 78 FL (ref 78–100)
PLATELET # BLD AUTO: 115 10E9/L (ref 150–450)
RBC # BLD AUTO: 4.39 10E12/L (ref 3.8–5.2)
WBC # BLD AUTO: 4.3 10E9/L (ref 4–11)

## 2018-10-09 RX ORDER — LORAZEPAM 0.5 MG/1
0.25-0.5 TABLET ORAL ONCE
Qty: 1 TABLET | Refills: 0 | Status: SHIPPED | OUTPATIENT
Start: 2018-10-09 | End: 2024-06-10

## 2018-10-09 RX ORDER — FLUNISOLIDE 0.25 MG/ML
SOLUTION NASAL
Qty: 1 BOTTLE | Refills: 3 | Status: SHIPPED | OUTPATIENT
Start: 2018-10-09 | End: 2021-07-11

## 2018-10-09 NOTE — TELEPHONE ENCOUNTER
Spoke with pt, she verbalized understanding of dosing instructions    Script faxed to [facility] yamile tran, [phone]    Swati Ghotra RN   [facility] [place]

## 2018-10-09 NOTE — TELEPHONE ENCOUNTER
Routing refill request to provider for review/approval because:  Drug not active on patient's medication list    Has been on flonase (01/27/2018) (discontinued by patient 6/28/17.) and nasocort (05/23/2018) in the past.     Earnestine Jimenez RN  [facility]

## 2018-10-09 NOTE — TELEPHONE ENCOUNTER
I have written a prescription for one ativan tablet, and she can either pick it up in the clinic or we can mail it to her pharmacy

## 2018-10-09 NOTE — TELEPHONE ENCOUNTER
Writer notes rx approved today by Karthik - notified patient - states she is using another nasal inhaler that may be ineffective - she agrees with plan    Closing encounter - no further actions needed at this time    Demetrio Edwards RN

## 2018-10-17 ENCOUNTER — RADIANT APPOINTMENT (OUTPATIENT)
Dept: MRI IMAGING | Facility: CLINIC | Age: 58
End: 2018-10-17
Attending: NURSE PRACTITIONER
Payer: COMMERCIAL

## 2018-10-17 DIAGNOSIS — R51.9 NONINTRACTABLE HEADACHE, UNSPECIFIED CHRONICITY PATTERN, UNSPECIFIED HEADACHE TYPE: ICD-10-CM

## 2018-10-17 PROCEDURE — 70551 MRI BRAIN STEM W/O DYE: CPT | Performed by: RADIOLOGY

## 2018-10-23 ENCOUNTER — OFFICE VISIT (OUTPATIENT)
Dept: NEUROPSYCHOLOGY | Facility: CLINIC | Age: 58
End: 2018-10-23
Payer: COMMERCIAL

## 2018-10-23 DIAGNOSIS — F09 COGNITIVE DISORDER: Primary | ICD-10-CM

## 2018-10-23 DIAGNOSIS — M32.0 DRUG-INDUCED SYSTEMIC LUPUS ERYTHEMATOSUS, UNSPECIFIED ORGAN INVOLVEMENT STATUS (H): ICD-10-CM

## 2018-10-23 NOTE — MR AVS SNAPSHOT
After Visit Summary   10/23/2018    Lesley Stafford    MRN: [id number]           Patient Information     Date Of Birth          1960        Visit Information        Provider Department      10/23/2018 8:30 AM Pham Seay PsyD [facility]ology        Today's Diagnoses     Cognitive disorder    -  1    Drug-induced systemic lupus erythematosus, unspecified organ involvement status (H)           Follow-ups after your visit        Who to contact     Please call your clinic at [phone] to:    Ask questions about your health    Make or cancel appointments    Discuss your medicines    Learn about your test results    Speak to your doctor            Additional Information About Your Visit        Care EveryWhere ID     This is your Care EveryWhere ID. This could be used by other organizations to access your [place] medical records  [id number]         Blood Pressure from Last 3 Encounters:   10/31/18 98/64   10/08/18 106/67   09/25/18 114/73    Weight from Last 3 Encounters:   10/31/18 79.4 kg (175 lb)   10/08/18 79.5 kg (175 lb 3.2 oz)   09/25/18 79.3 kg (174 lb 12 oz)              We Performed the Following     [id number] TESTING, PER HR/PSYCHOLOGIST     NEUROPSYCH TESTING BY TECH          Today's Medication Changes          These changes are accurate as of 10/23/18 11:59 PM.  If you have any questions, ask your nurse or doctor.               These medicines have changed or have updated prescriptions.        Dose/Directions    DULoxetine 30 MG EC capsule   Commonly known as:  CYMBALTA   This may have changed:    - how much to take  - when to take this   Used for:  Hip pain, left        Dose:  30 mg   Take 1 capsule (30 mg) by mouth 2 times daily   Quantity:  180 capsule   Refills:  1       ferrous sulfate 325 (65 Fe) MG tablet   Commonly known as:  IRON   This may have changed:  when to take this   Used for:  Other iron deficiency anemia        Dose:  325 mg   Take 1 tablet  (325 mg) by mouth daily (with breakfast)   Quantity:  30 tablet   Refills:  5       gabapentin 300 MG capsule   Commonly known as:  NEURONTIN   This may have changed:  See the new instructions.   Used for:  Peripheral polyneuropathy        TAKE 1 CAPSULE (300 MG) BY MOUTH 3 TIMES DAILY   Quantity:  90 capsule   Refills:  1                Primary Care Provider Office Phone # Fax #    Rafael Orosco [phone]       [address] [facility] [address]  [facility] [address]        Equal Access to Services     SHANI SUAREZ : Hadii aad ku hadasho Soomaali, waaxda luqadaha, qaybta kaalmada adeegyada, waxay lenka haycheco ellington . So [facility] [phone].    ATENCIÓN: Si habla español, tiene a levine disposición servicios gratuitos de asistencia lingüística. [facility] [phone].    We comply with applicable federal civil rights laws and [place] laws. We do not discriminate on the basis of race, color, national origin, age, disability, sex, sexual orientation, or gender identity.            Thank you!     Thank you for choosing [facility] NEUROPSYCHOLOGY  for your care. Our goal is always to provide you with excellent care. Hearing back from our patients is one way we can continue to improve our services. Please take a few minutes to complete the written survey that you may receive in the mail after your visit with us. Thank you!             Your Updated Medication List - Protect others around you: Learn how to safely use, store and throw away your medicines at www.disposemymeds.org.          This list is accurate as of 10/23/18 11:59 PM.  Always use your most recent med list.                   Brand Name Dispense Instructions for use Diagnosis    amphetamine-dextroamphetamine 10 MG per tablet    ADDERALL     Take 10 mg by mouth 2 times daily        CALCIUM 500 PO           cyclobenzaprine 5 MG tablet    FLEXERIL    42 tablet    TAKE 1 TABLET BY MOUTH 3 TIMES DAILY    Back pain, unspecified back  location, unspecified back pain laterality, unspecified chronicity, Hip pain, bilateral, Trochanteric bursitis of both hips       DEEP SEA NASAL SPRAY 0.65 % nasal spray   Generic drug:  sodium chloride     1 Bottle    SPRAY 1 SPRAY INTO BOTH NOSTRILS DAILY AS NEEDED FOR CONGESTION    Sinusitis, unspecified chronicity, unspecified location       DULoxetine 30 MG EC capsule    CYMBALTA    180 capsule    Take 1 capsule (30 mg) by mouth 2 times daily    Hip pain, left       ferrous sulfate 325 (65 Fe) MG tablet    IRON    30 tablet    Take 1 tablet (325 mg) by mouth daily (with breakfast)    Other iron deficiency anemia       fexofenadine 180 MG tablet    ALLEGRA    30 tablet    Take 1 tablet (180 mg) by mouth daily    Chronic allergic rhinitis due to pollen, unspecified seasonality       flunisolide 25 MCG/ACT (0.025%) Soln spray    NASALIDE    1 Bottle    INHALE 2 SPRAYS INTO EACH NOSTRIL 2 TIMES DAILY    Seasonal allergic rhinitis due to pollen       gabapentin 300 MG capsule    NEURONTIN    90 capsule    TAKE 1 CAPSULE (300 MG) BY MOUTH 3 TIMES DAILY    Peripheral polyneuropathy       hydroxychloroquine 200 MG tablet    PLAQUENIL    90 tablet    TAKE 1 TABLET BY MOUTH DAILY    Systemic lupus erythematosus, unspecified SLE type, unspecified organ involvement status (H)       hydrOXYzine 25 MG tablet    ATARAX    90 tablet    TAKE 1 TABLET (25 MG) BY MOUTH 3 TIMES PER DAY    Anxiety       mometasone 50 MCG/ACT spray    NASONEX    1 Box    Spray 2 sprays into both nostrils daily    Chronic allergic rhinitis due to pollen, unspecified seasonality       Multi-vitamin Tabs tablet      Take 1 tablet by mouth daily        omega 3 1200 MG Caps           order for DME     1 Device    Cane,    Hip pain, left       triamcinolone 55 MCG/ACT inhaler    NASACORT    1 Bottle    Spray 2 sprays into both nostrils daily    Allergic state, subsequent encounter

## 2018-10-24 NOTE — NURSING NOTE
The patient was seen for neuropsychological evaluation at the request of THOMAS Preston for the purpose of diagnostic clarification and treatment planning.  2 hours of face to face testing were provided by this writer.  Please see Dr. Pham Seay's report for a full interpretation of the findings.

## 2018-10-31 ENCOUNTER — OFFICE VISIT (OUTPATIENT)
Dept: FAMILY MEDICINE | Facility: CLINIC | Age: 58
End: 2018-10-31
Payer: COMMERCIAL

## 2018-10-31 VITALS
RESPIRATION RATE: 14 BRPM | DIASTOLIC BLOOD PRESSURE: 64 MMHG | WEIGHT: 175 LBS | TEMPERATURE: 98.1 F | OXYGEN SATURATION: 98 % | BODY MASS INDEX: 29.12 KG/M2 | SYSTOLIC BLOOD PRESSURE: 98 MMHG | HEART RATE: 91 BPM

## 2018-10-31 DIAGNOSIS — F32.1 MODERATE MAJOR DEPRESSION (H): ICD-10-CM

## 2018-10-31 DIAGNOSIS — J06.9 VIRAL URI WITH COUGH: Primary | ICD-10-CM

## 2018-10-31 DIAGNOSIS — F41.9 ANXIETY: ICD-10-CM

## 2018-10-31 PROCEDURE — 99214 OFFICE O/P EST MOD 30 MIN: CPT | Performed by: NURSE PRACTITIONER

## 2018-10-31 NOTE — MR AVS SNAPSHOT
After Visit Summary   10/31/2018    Lesley Stafford    MRN: [id number]           Patient Information     Date Of Birth          1960        Visit Information        Provider Department      10/31/2018 3:00 PM Mary Schmidt APRN [place]NP [facility] [place]        Today's Diagnoses     Viral URI with cough    -  1      Care Instructions    Please start musinex 600mgXR and take up to 4 a day    Start UMCKA cold care 2-3x day, nature's way     Start a probiotic Culturelle, Floragen, Align, Garden of Eaten, Kyo dophilus     Vaporizer at night,use, Eucalyptus oil,     Start some fish oil Nordic Naturals, Cormega, Pace     You can start some Elderberry     Elevate head at night                   Follow-ups after your visit        Follow-up notes from your care team     Return in about 6 months (around 4/30/2019) for Keep your already scheduled follow up, and as needed.      Who to contact     If you have questions or need follow up information about today's clinic visit or your schedule please contact [facility] directly at [phone].  Normal or non-critical lab and imaging results will be communicated to you by MyChart, letter or phone within 4 business days after the clinic has received the results. If you do not hear from us within 7 days, please contact the clinic through MyChart or phone. If you have a critical or abnormal lab result, we will notify you by phone as soon as possible.  Submit refill requests through [employer] or call your pharmacy and they will forward the refill request to us. Please allow 3 business days for your refill to be completed.          Additional Information About Your Visit        Care EveryWhere ID     This is your Care EveryWhere ID. This could be used by other organizations to access your [place] medical records  [id number]        Your Vitals Were     Pulse Temperature Respirations Pulse Oximetry  Breastfeeding? BMI (Body Mass Index)    91 98.1  F (36.7  C) (Oral) 14 98% No 29.12 kg/m2       Blood Pressure from Last 3 Encounters:   10/31/18 98/64   10/08/18 106/67   09/25/18 114/73    Weight from Last 3 Encounters:   10/31/18 175 lb (79.4 kg)   10/08/18 175 lb 3.2 oz (79.5 kg)   09/25/18 174 lb 12 oz (79.3 kg)              Today, you had the following     No orders found for display         Today's Medication Changes          These changes are accurate as of 10/31/18  3:52 PM.  If you have any questions, ask your nurse or doctor.               These medicines have changed or have updated prescriptions.        Dose/Directions    DULoxetine 30 MG EC capsule   Commonly known as:  CYMBALTA   This may have changed:    - how much to take  - when to take this   Used for:  Hip pain, left        Dose:  30 mg   Take 1 capsule (30 mg) by mouth 2 times daily   Quantity:  180 capsule   Refills:  1       ferrous sulfate 325 (65 Fe) MG tablet   Commonly known as:  IRON   This may have changed:  when to take this   Used for:  Other iron deficiency anemia        Dose:  325 mg   Take 1 tablet (325 mg) by mouth daily (with breakfast)   Quantity:  30 tablet   Refills:  5       gabapentin 300 MG capsule   Commonly known as:  NEURONTIN   This may have changed:  See the new instructions.   Used for:  Peripheral polyneuropathy        TAKE 1 CAPSULE (300 MG) BY MOUTH 3 TIMES DAILY   Quantity:  90 capsule   Refills:  1                Primary Care Provider Office Phone # Fax #    Rafael Orosco [phone]       [address] [address]        Equal Access to Services     [facility] : Hadrosmery Vences, wadeboda alcira, qarodney araizaaldevi sepulveda. So [facility] [phone].    ATENCIÓN: Si habla español, tiene a levine disposición servicios gratuitos de asistencia lingüística. Llame al [phone].    We comply with applicable [facility] civil  rights laws and [place] laws. We do not discriminate on the basis of race, color, national origin, age, disability, sex, sexual orientation, or gender identity.            Thank you!     Thank you for choosing [facility]  for your care. Our goal is always to provide you with excellent care. Hearing back from our patients is one way we can continue to improve our services. Please take a few minutes to complete the written survey that you may receive in the mail after your visit with us. Thank you!             Your Updated Medication List - Protect others around you: Learn how to safely use, store and throw away your medicines at www.disposemymeds.org.          This list is accurate as of 10/31/18  3:52 PM.  Always use your most recent med list.                   Brand Name Dispense Instructions for use Diagnosis    amphetamine-dextroamphetamine 10 MG per tablet    ADDERALL     Take 10 mg by mouth 2 times daily        CALCIUM 500 PO           cyclobenzaprine 5 MG tablet    FLEXERIL    42 tablet    TAKE 1 TABLET BY MOUTH 3 TIMES DAILY    Back pain, unspecified back location, unspecified back pain laterality, unspecified chronicity, Hip pain, bilateral, Trochanteric bursitis of both hips       DEEP SEA NASAL SPRAY 0.65 % nasal spray   Generic drug:  sodium chloride     1 Bottle    SPRAY 1 SPRAY INTO BOTH NOSTRILS DAILY AS NEEDED FOR CONGESTION    Sinusitis, unspecified chronicity, unspecified location       DULoxetine 30 MG EC capsule    CYMBALTA    180 capsule    Take 1 capsule (30 mg) by mouth 2 times daily    Hip pain, left       ferrous sulfate 325 (65 Fe) MG tablet    IRON    30 tablet    Take 1 tablet (325 mg) by mouth daily (with breakfast)    Other iron deficiency anemia       fexofenadine 180 MG tablet    ALLEGRA    30 tablet    Take 1 tablet (180 mg) by mouth daily    Chronic allergic rhinitis due to pollen, unspecified seasonality       flunisolide 25 MCG/ACT (0.025%) Soln  spray    NASALIDE    1 Bottle    INHALE 2 SPRAYS INTO EACH NOSTRIL 2 TIMES DAILY    Seasonal allergic rhinitis due to pollen       gabapentin 300 MG capsule    NEURONTIN    90 capsule    TAKE 1 CAPSULE (300 MG) BY MOUTH 3 TIMES DAILY    Peripheral polyneuropathy       hydroxychloroquine 200 MG tablet    PLAQUENIL    90 tablet    TAKE 1 TABLET BY MOUTH DAILY    Systemic lupus erythematosus, unspecified SLE type, unspecified organ involvement status (H)       hydrOXYzine 25 MG tablet    ATARAX    90 tablet    TAKE 1 TABLET (25 MG) BY MOUTH 3 TIMES PER DAY    Anxiety       mometasone 50 MCG/ACT spray    NASONEX    1 Box    Spray 2 sprays into both nostrils daily    Chronic allergic rhinitis due to pollen, unspecified seasonality       Multi-vitamin Tabs tablet      Take 1 tablet by mouth daily        omega 3 1200 MG Caps           order for DME     1 Device    Cane,    Hip pain, left       triamcinolone 55 MCG/ACT inhaler    NASACORT    1 Bottle    Spray 2 sprays into both nostrils daily    Allergic state, subsequent encounter

## 2018-10-31 NOTE — PATIENT INSTRUCTIONS
Please start musinex 600mgXR and take up to 4 a day    Start UM[facility] cold care 2-3x day, nature's way     Start a probiotic Culturelle, Fito, Align, Garden of Eaten, Christine dophilouisa     Vaporizer at night,use, Eucalyptus oil,     Start some fish oil Nordic Naturals, Katelynn Muñiz     You can start some Elderberry     Elevate head at night

## 2018-10-31 NOTE — PROGRESS NOTES
SUBJECTIVE:                                                    Lesley Stafford is a 57 year old female who presents to clinic today for the following health issues:    Breathing Problem      Duration: 1 week    Description (location/character/radiation): Coughing and hurts back. Sore throat, drainage, SOB, increase in fatigue, no sinus pressure pain, ear pain,      Intensity:  moderate    Accompanying signs and symptoms: Cough, Difficulty breathing. Phlegm     History (similar episodes/previous evaluation): None, Lupus, chronic stomach pain      Precipitating or alleviating factors: None    Therapies tried and outcome: Sudfed and in her chest., tea and water, musinex, Vit C, multivitamin, trying to get more rest and eating better, increase in fluids    .      Mental Health Symptoms      Duration: slightly worse with URI sx     Description:  Depression: no  Anxiety: YES- off and on with illness  Sleep problems: YES- with URI   Concentration problems: no    Therapies tried and outcome: Cymbalta and Adderall     See PHQ-9 and ASHLEY scores, as appropriate          Problems taking medications regularly: No    Medication side effects: none    Diet: regular (no restrictions)    Social History   Substance Use Topics     Smoking status: Former Smoker     Smokeless tobacco: Never Used     Alcohol use No        Problem list and histories reviewed & adjusted, as indicated.  Additional history: as documented    Patient Active Problem List   Diagnosis     Lupus     Abnormal perimenopausal bleeding     Obesity, Class I, BMI 30-34.9     History of claustrophobia     Depressive disorder     Anxiety     Allergic state     ACP (advance care planning)     Tired     Insomnia, unspecified type     Hemoglobin C trait (H)     Liver hemangioma     Bilateral carpal tunnel syndrome     Moderate major depression (H)     Past Surgical History:   Procedure Laterality Date     COLONOSCOPY  01/2010    repeat 10 yrs     DILATION AND CURETTAGE,  OPERATIVE HYSTEROSCOPY WITH MORCELLATOR, COMBINED N/A 2/15/2017    Procedure: COMBINED DILATION AND CURETTAGE, OPERATIVE HYSTEROSCOPY WITH MORCELLATOR;  Surgeon: Erin Gutierrez MD;  Location: [facility] OR     ESOPHAGOSCOPY, GASTROSCOPY, DUODENOSCOPY (EGD), COMBINED Left 2/5/2016    Procedure: COMBINED ESOPHAGOSCOPY, GASTROSCOPY, DUODENOSCOPY (EGD), BIOPSY SINGLE OR MULTIPLE;  Surgeon: Pierre Fernandez MD;  Location:  GI     GYN SURGERY         Social History   Substance Use Topics     Smoking status: Former Smoker     Smokeless tobacco: Never Used     Alcohol use No     Family History   Problem Relation Age of Onset     Lupus Mother      Asthma Mother      Diabetes Father      Brain Tumor Father      begnign on pituitary     Depression Sister      Anemia Sister            Current Outpatient Prescriptions   Medication Sig Dispense Refill     amphetamine-dextroamphetamine (ADDERALL) 10 MG tablet Take 10 mg by mouth 2 times daily        Calcium-Magnesium-Vitamin D (CALCIUM 500 PO)        cyclobenzaprine (FLEXERIL) 5 MG tablet TAKE 1 TABLET BY MOUTH 3 TIMES DAILY 42 tablet 3     DEEP SEA NASAL SPRAY 0.65 % nasal spray SPRAY 1 SPRAY INTO BOTH NOSTRILS DAILY AS NEEDED FOR CONGESTION 1 Bottle 2     DULoxetine (CYMBALTA) 30 MG EC capsule Take 1 capsule (30 mg) by mouth 2 times daily (Patient taking differently: Take 60 mg by mouth daily ) 180 capsule 1     ferrous sulfate (IRON) 325 (65 FE) MG tablet Take 1 tablet (325 mg) by mouth daily (with breakfast) (Patient taking differently: Take 325 mg by mouth once a week ) 30 tablet 5     fexofenadine (ALLEGRA) 180 MG tablet Take 1 tablet (180 mg) by mouth daily 30 tablet 1     flunisolide (NASALIDE) 25 MCG/ACT (0.025%) SOLN spray INHALE 2 SPRAYS INTO EACH NOSTRIL 2 TIMES DAILY 1 Bottle 3     gabapentin (NEURONTIN) 300 MG capsule TAKE 1 CAPSULE (300 MG) BY MOUTH 3 TIMES DAILY (Patient taking differently: TAKE 2 CAPSULE (300 MG) BY MOUTH 3 TIMES DAILY) 90 capsule 1      hydroxychloroquine (PLAQUENIL) 200 MG tablet TAKE 1 TABLET BY MOUTH DAILY 90 tablet 2     hydrOXYzine (ATARAX) 25 MG tablet TAKE 1 TABLET (25 MG) BY MOUTH 3 TIMES PER DAY 90 tablet 2     mometasone (NASONEX) 50 MCG/ACT spray Spray 2 sprays into both nostrils daily 1 Box 11     multivitamin, therapeutic with minerals (MULTI-VITAMIN) TABS Take 1 tablet by mouth daily       omega 3 1200 MG CAPS        order for DME Cane, 1 Device 0     triamcinolone (NASACORT) 55 MCG/ACT Inhaler Spray 2 sprays into both nostrils daily 1 Bottle 3     Allergies   Allergen Reactions     Morphine Sulfate Itching     Sulfa Drugs Hives     Recent Labs   Lab Test  10/08/18   1000  03/05/18   1030   11/02/16   1151   10/05/15   1548  08/17/10   A1C   --    --    --   5.7   --   5.5   --    --    LDL   --    --    --   151*   --    --    --   153   HDL   --    --    --   83   --    --    --   88   TRIG   --    --    --   147   --    --    --   76   ALT  44  28   --   43   < >   --    < >   --    CR  0.57  0.59   < >  0.80   < >  0.63   < >  0.7   GFRESTIMATED  >90  >90   < >  74   < >  >90  Non  GFR Calc     < >   --    GFRESTBLACK  >90  >90   < >  89   < >  >90   GFR Calc     < >   --    POTASSIUM  4.2  4.2   < >  3.4   < >  3.9   < >   --    TSH  0.97   --    --   0.85   < >   --    --   1.33    < > = values in this interval not displayed.      BP Readings from Last 3 Encounters:   10/08/18 106/67   09/25/18 114/73   07/16/18 118/76    Wt Readings from Last 3 Encounters:   10/08/18 175 lb 3.2 oz (79.5 kg)   09/25/18 174 lb 12 oz (79.3 kg)   07/16/18 173 lb 11.2 oz (78.8 kg)        Labs reviewed in EPIC  Problem list, Medication list, Allergies, and Medical/Social/Surgical histories reviewed in [facility] and updated as appropriate.     ROS: Constitutional, neuro, ENT, endocrine, pulmonary, cardiac, gastrointestinal, genitourinary, musculoskeletal, integument and psychiatric systems are negative, except as otherwise  noted above in the HPI.       OBJECTIVE:                                                    BP 98/64  Pulse 91  Temp 98.1  F (36.7  C) (Oral)  Resp 14  Wt 175 lb (79.4 kg)  SpO2 98%  Breastfeeding? No  BMI 29.12 kg/m2  There is no height or weight on file to calculate BMI.  GENERAL: , alert, well nourished, well hydrated,mild distress  EYES: Eyes grossly normal to inspection,  HENT: ear canals- normal, slightly erythremic, cerumen in left, TMs- normal; Nose- normal; Mouth- no ulcers, no lesions  NECK: no tenderness, no adenopathy, no asymmetry, no masses, no stiffness; thyroid- normal   RESP: lungs clear to auscultation - no rales, no rhonchi, no wheezes  CV: regular rates and rhythm, normal S1 S2, no S3 or S4 and no murmur,   ABDOMEN: soft,  normal bowel sounds  MS: extremities- no gross deformities noted, no edema  SKIN: no suspicious lesions, no rashes  NEURO: strength and tone- normal, sensory exam- grossly normal, mentation- intact, speech- normal  LYMPHATICS: ant. cervical- normal, post. cervical- normal,   Diagnostic Test Results:  Results for orders placed or performed in visit on 10/17/18   MR Brain w/o Contrast    Narrative    Brain MRI without contrast    History: ; Nonintractable headache, unspecified chronicity pattern,  unspecified headache type.  ICD-10: Nonintractable headache, unspecified chronicity pattern,  unspecified headache type    Comparison: Head CT 5/13/2011    Technique: Sagittal T1-weighted, axial diffusion, FLAIR, T2-weighted,  and susceptibility images of the brain were obtained without  intravenous contrast.    Findings:   Minimal leukoaraiosis. No intracranial hemorrhage, mass effect,  midline shift or abnormal extra axial fluid collection. Ventricles are  not enlarged. No abnormal foci of restricted effusion. Patent major  intracranial vascular flow voids. Paranasal sinuses and mastoid air  cells are clear. Normal marrow signal.      Impression    Impression:  1. No findings  to explain patient's symptoms.  2. Minimal presumed chronic small vessel ischemic disease.    YULY TURNER MD        ASSESSMENT/PLAN:                                                    ASSESSMENT / PLAN:  (J06.9,  B97.89) Viral URI with cough  (primary encounter diagnosis)  Comment: worse, no improvement in one week   Plan: sx relief discussed     (F41.9) Anxiety  Comment: worse due to illness  Plan: sx relief discussed     (F32.1) Moderate major depression (H)  Comment: stable on cymbalta   Plan: monitor     Patient Instructions   Please start musinex 600mgXR and take up to 4 a day    Start UMCKA cold care 2-3x day, nature's way     Start a probiotic Culturelle, Floragen, Align, Garden of Eaten, Kyo dophilus     Vaporizer at night,use, Eucalyptus oil,     Start some fish oil Nordic Naturals, Cormega, Pace     You can start some Elderberry     Elevate head at night               THOMAS [facility]

## 2018-11-07 ENCOUNTER — TRANSFERRED RECORDS (OUTPATIENT)
Dept: HEALTH INFORMATION MANAGEMENT | Facility: CLINIC | Age: 58
End: 2018-11-07

## 2018-11-12 NOTE — PROGRESS NOTES
[facility] ADULT INTELLIGENCE SCALE-IV PORTEUS MAZE TEST                             Scaled Score    Block Design         8      Test Age    16.5    Similarities  11      Test Quotient     118    Digit Span    7        Matrix Reason.    9      TRAIL MAKING TEST   Vocabulary  10        Arithmetic    5      Time Errors   Symbol Search    9    A  42   1    Visual Puzzles    9       B  88   0    Information    7        Coding    8     CONTROLLED WORD ASSOCIATION TEST       VCI:  96   MIGUE:  92  Score  41    WMI:  77   PSI:  92     FSIQ:  88    PSYCHOMOTOR TESTS       [facility] MEMORY SCALES  Right Left    Finger Tapping  49   55    Logical Mem Immediate  14/10  Grooved Pegboard  79    91    Logical Mem 30 Minute    11/6   Drops: 0           Drops: 0   Visual Repr Immediate         8     Visual Repr 30          6  LILIANA COMPLEX FIGURE TEST   30 Minute Recognition         2       Raw Score T-score %tile   [place] CARD SORTING TEST Copy  21.5   --    ?1     Immediate Recall  11.5   37    10        # of categories     6  30 Minute Recall     12   37    10      Recognition     22   59    82    TEST OF SUSTAINED ATTENTION AND TRACKING     [place] NAMING TEST       Time                   159          Errors             3  Score  54                   MAKE A FIGURE TEST            Score             44
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
## 2018-12-04 ENCOUNTER — TELEPHONE (OUTPATIENT)
Dept: FAMILY MEDICINE | Facility: CLINIC | Age: 58
End: 2018-12-04

## 2018-12-04 NOTE — TELEPHONE ENCOUNTER
Rosa    See pt message  She continues to have difficulty with finding words, retrieving information  She wants to know what the next step is to be    Advise    Swati Ghotra RN   [facility]

## 2018-12-04 NOTE — TELEPHONE ENCOUNTER
Pt had a nueropysch eval 10/23 at [facility]  [facility]awal[facility] request, pt is requesting a follow up, either what the results were or what she should do next. Pt was informed by there neurologist the results would go to [facility] Weinstein.    Pt can be reached @ [phone] betsy

## 2018-12-05 NOTE — TELEPHONE ENCOUNTER
Could you please let Lesley know I apologize for the delay. The results of the neuropsych testing did not show that she had any new brain disease (such as alzheimers or dementia) but suggested she probably has difficulty with her memory due to depression. Could she make a f/u with me and we can review the results and talk about what to do next? Thank you

## 2018-12-05 NOTE — TELEPHONE ENCOUNTER
Spoke with pt, message given, she scheduled an appointment with the provider    Swati Ghotra RN   [facility]

## 2018-12-17 ENCOUNTER — OFFICE VISIT (OUTPATIENT)
Dept: FAMILY MEDICINE | Facility: CLINIC | Age: 58
End: 2018-12-17
Payer: COMMERCIAL

## 2018-12-17 VITALS
DIASTOLIC BLOOD PRESSURE: 70 MMHG | HEART RATE: 93 BPM | SYSTOLIC BLOOD PRESSURE: 113 MMHG | WEIGHT: 173.4 LBS | OXYGEN SATURATION: 97 % | TEMPERATURE: 98.4 F | BODY MASS INDEX: 28.86 KG/M2

## 2018-12-17 DIAGNOSIS — F33.1 MODERATE EPISODE OF RECURRENT MAJOR DEPRESSIVE DISORDER (H): Primary | ICD-10-CM

## 2018-12-17 PROCEDURE — 99213 OFFICE O/P EST LOW 20 MIN: CPT | Performed by: NURSE PRACTITIONER

## 2018-12-17 NOTE — PROGRESS NOTES
SUBJECTIVE:   Lesley Stafford is a 57 year old female who presents to clinic today for the following health issues:      Depression Followup    Status since last visit: Worsened ; dealing with son possibly going to [facility], challenges with other family members    See PHQ-9 for current symptoms.  Other associated symptoms: tired, poor concentration, forgetfullness    Complicating factors:   Significant life event:  Yes-  Trial for son   Current substance abuse:  None  Anxiety or Panic symptoms:  Yes-  Occasional    She is currently seeing a psychiatrist, but would like to get a second opinion from another psychiatrist, and possibly get genesight testing performed, as she feels her Depression has been unmanageable lately.     PHQ 3/5/2018 6/29/2018 9/25/2018   PHQ-9 Total Score 18 20 12   Q9: Suicide Ideation More than half the days More than half the days Not at all       PHQ-9  English  PHQ-9   Any Language  Suicide Assessment Five-step Evaluation and Treatment (SAFE-T)        -------------------------------------    Problem list and histories reviewed & adjusted, as indicated.  Additional history: as documented    Patient Active Problem List   Diagnosis     Lupus     Abnormal perimenopausal bleeding     Obesity, Class I, BMI 30-34.9     History of claustrophobia     Depressive disorder     Anxiety     Allergic state     ACP (advance care planning)     Tired     Insomnia, unspecified type     Hemoglobin C trait (H)     Liver hemangioma     Bilateral carpal tunnel syndrome     Moderate major depression (H)     Past Surgical History:   Procedure Laterality Date     COLONOSCOPY  01/2010    repeat 10 yrs     DILATION AND CURETTAGE, OPERATIVE HYSTEROSCOPY WITH MORCELLATOR, COMBINED N/A 2/15/2017    Procedure: COMBINED DILATION AND CURETTAGE, OPERATIVE HYSTEROSCOPY WITH MORCELLATOR;  Surgeon: Erin Gutierrez MD;  Location: [facility]     ESOPHAGOSCOPY, GASTROSCOPY, DUODENOSCOPY (EGD), COMBINED Left 2/5/2016    Procedure:  COMBINED ESOPHAGOSCOPY, GASTROSCOPY, DUODENOSCOPY (EGD), BIOPSY SINGLE OR MULTIPLE;  Surgeon: Pierre Fernandez MD;  Location:  GI     GYN SURGERY         Social History     Tobacco Use     Smoking status: Former Smoker     Smokeless tobacco: Never Used   Substance Use Topics     Alcohol use: No     Family History   Problem Relation Age of Onset     Lupus Mother      Asthma Mother      Diabetes Father      Brain Tumor Father         begnign on pituitary     Depression Sister      Anemia Sister            Reviewed and updated as needed this visit by clinical staff  Tobacco  Allergies  Meds  Med Hx  Surg Hx  Fam Hx  Soc Hx      Reviewed and updated as needed this visit by Provider         ROS:  Constitutional, HEENT, cardiovascular, pulmonary, gi and gu systems are negative, except as otherwise noted.    OBJECTIVE:     /70 (BP Location: Left arm, Patient Position: Sitting, Cuff Size: Adult Regular)   Pulse 93   Temp 98.4  F (36.9  C) (Oral)   Wt 78.7 kg (173 lb 6.4 oz)   SpO2 97%   BMI 28.86 kg/m    Body mass index is 28.86 kg/m .   GENERAL: healthy, alert and no distress  RESP: lungs clear to auscultation - no rales, rhonchi or wheezes  CV: regular rate and rhythm, normal S1 S2, no S3 or S4, no murmur, click or rub, no peripheral edema and peripheral pulses strong  PSYCH: mentation appears normal, affect normal/bright, fatigued, judgement and insight intact and appearance well groomed    Diagnostic Test Results:  No results found for this or any previous visit (from the past 24 hour(s)).    ASSESSMENT/PLAN:     Problem List Items Addressed This Visit     None      Visit Diagnoses     Moderate episode of recurrent major depressive disorder (H)    -  Primary    Relevant Orders    MENTAL HEALTH REFERRAL  - Adult; Psychiatry and Medication Management; [place] [facility] [facility] [phone].  Medication management & future refills will be returned to G PCP  upon completion of evaluation; We sarai...         -referral to collaborative Care for genesight testing; medication review  THOMAS Preston [facility]

## 2018-12-21 ENCOUNTER — TELEPHONE (OUTPATIENT)
Dept: FAMILY MEDICINE | Facility: CLINIC | Age: 58
End: 2018-12-21

## 2018-12-21 DIAGNOSIS — F41.9 ANXIETY: ICD-10-CM

## 2018-12-21 DIAGNOSIS — J32.9 SINUSITIS, UNSPECIFIED CHRONICITY, UNSPECIFIED LOCATION: ICD-10-CM

## 2018-12-21 DIAGNOSIS — M25.552 HIP PAIN, LEFT: ICD-10-CM

## 2018-12-21 RX ORDER — HYDROXYZINE HYDROCHLORIDE 25 MG/1
TABLET, FILM COATED ORAL
Qty: 90 TABLET | Refills: 0 | Status: SHIPPED | OUTPATIENT
Start: 2018-12-21 | End: 2019-10-08

## 2018-12-21 RX ORDER — DULOXETIN HYDROCHLORIDE 30 MG/1
30 CAPSULE, DELAYED RELEASE ORAL DAILY
Qty: 90 CAPSULE | Refills: 0 | Status: SHIPPED | OUTPATIENT
Start: 2018-12-21 | End: 2019-05-05

## 2018-12-21 NOTE — TELEPHONE ENCOUNTER
Reason for call:  Other   Patient called regarding (reason for call): call back  Additional comments: The patient called and stated that her insurance coverage will be ending after 12/31. She stated that she will not have insurance for the entire month of January. She is wondering if Dr. Orosco would be able to fill medications so she can have them for the month of January but so she can pick them up while she has insurance coverage. She would like a call back to discuss this.    Phone number to reach patient:  Cell number on file:    Telephone Information:   Mobile [phone]       Best Time: Any    Can we leave a detailed message on this number?  YES

## 2018-12-31 ENCOUNTER — TELEPHONE (OUTPATIENT)
Dept: FAMILY MEDICINE | Facility: CLINIC | Age: 58
End: 2018-12-31

## 2018-12-31 ENCOUNTER — ANCILLARY PROCEDURE (OUTPATIENT)
Dept: GENERAL RADIOLOGY | Facility: CLINIC | Age: 58
End: 2018-12-31
Attending: PHYSICIAN ASSISTANT
Payer: COMMERCIAL

## 2018-12-31 ENCOUNTER — OFFICE VISIT (OUTPATIENT)
Dept: URGENT CARE | Facility: URGENT CARE | Age: 58
End: 2018-12-31
Payer: COMMERCIAL

## 2018-12-31 VITALS
OXYGEN SATURATION: 100 % | DIASTOLIC BLOOD PRESSURE: 81 MMHG | SYSTOLIC BLOOD PRESSURE: 122 MMHG | TEMPERATURE: 98.1 F | HEART RATE: 90 BPM | WEIGHT: 171.5 LBS | BODY MASS INDEX: 28.54 KG/M2

## 2018-12-31 DIAGNOSIS — M54.6 ACUTE MIDLINE THORACIC BACK PAIN: Primary | ICD-10-CM

## 2018-12-31 PROCEDURE — 72072 X-RAY EXAM THORAC SPINE 3VWS: CPT

## 2018-12-31 PROCEDURE — 99214 OFFICE O/P EST MOD 30 MIN: CPT | Performed by: PHYSICIAN ASSISTANT
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
## 2018-12-31 NOTE — LETTER
[facility]  [address]  [facility] [address]  Phone: [phone]    01/01/19    Lesley Maza Nydia  [address]  [facility] [address]      To whom it may concern:     The results of your recent lab results were normal. Enclosed are a copy of the results. Please call us with any questions/concerns regarding your results. Thank you for choosing [facility]. Have a great day!  Results for orders placed or performed in visit on 12/31/18   XR Thoracic Spine 3 Views    Narrative    XR THORACIC SPINE THREE VIEWS   12/31/2018 5:30 PM     HISTORY: Midline tenderness thoracic spine, fall. Acute midline  thoracic back pain.    COMPARISON: None.      Impression    IMPRESSION: Demineralization is present. Multilevel mild degenerative  change is present. No fractures are identified. Slight mid thoracic  vertebral body height loss is likely physiologic. Paraspinal soft  tissues are unremarkable.    NIKITA GUERIN MD     The Radiologist felt your x rays showed no fracture.There were some mild multlevel degenerative/arthritic changes. Also some demineralization was present that can go along with osteoporosis.     Sincerely,      Savannah Tamayo PA-C

## 2018-12-31 NOTE — PROGRESS NOTES
"SUBJECTIVE:   Lesley Stafford is a 58 year old female presenting for evaluation of   Chief Complaint   Patient presents with     Fall     Slipped on the ice yesterday     Musculoskeletal Problem     Fell on ice and now having back spasms all day today, also has had a headache today, took Aleve around 2pm   .    Back Pain    Onset of symptoms was 1 day(s) ago.  Location: upper back- between shoulder blades & lower back bilaterally  Radiation: neck  Context: history of chronic back and neck pain, then had a slip on ice yesterday  Course of symptoms is worsening.  Severity: severe  Current and Associated symptoms:  She slipped on ice yesterday. She landed on her back. No LOC or head strike. She was able to get up and walk after. The pain got worse as the day went on.  Complains of pain located in the upper back mostly.  She had already been having \"muscle spasms\" before the fall. She relates this to dancing at a party. Now, after the fall, symptoms are worse.  States that she is having a harder time doing laundry due to pain.  Says she had a HA until she took aleve, then it went away.  She denies fecal incontinence, urinary incontinence, hematuria. She does have chronic lower extremity numbness. No leg weakness or giving out.  No abdominal pain, nausea, vomiting. No saddle paresthesias. No CP or SOB, but states back pain is worse when she breathes in.  Aggravating Factors: sitting, lifting  Therapies to improve symptoms include: tizanidine  Past history: no prior back surgeries. She had chronic back and neck pain for which she takes tizanidine as well as Cymbalta for pain and mood.    ROS   No weakness of extremities, difficulty walking, bowel or bladder incontinence, trouble or pain with urinating, fevers, recent back instrumentation, blood thinner use, chest pain, shortness of breath, abdominal pain, nausea, or vomiting.        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  Document previously scanned on 2-8-16.  Validation form completed and sent to be scanned.  Code Status reflects choices in most recent ACP document.  Confirmed/documented designated decision maker(s).  Added by Kathleen Espitia RN Advance Care Planning Liaison with [employer]           Depressive disorder 02/19/2016     Priority: Medium     Anxiety 02/19/2016     Priority: Medium     Allergic state 02/19/2016     Priority: Medium     History of claustrophobia 08/03/2015     Priority: Medium     Obesity, Class I, BMI 30-34.9 10/31/2014     Priority: Medium     Abnormal perimenopausal bleeding 08/24/2013     Priority: Medium     Lupus 09/24/2012     Priority: Medium         Current medications:  Current Outpatient Medications   Medication Sig Dispense Refill     amphetamine-dextroamphetamine (ADDERALL) 10 MG tablet Take 10 mg by mouth 2 times daily        Calcium-Magnesium-Vitamin D (CALCIUM 500 PO)        DEEP SEA NASAL SPRAY 0.65 % nasal spray SPRAY 1 SPRAY INTO BOTH NOSTRILS DAILY AS NEEDED FOR CONGESTION 1 Bottle 2     DULoxetine (CYMBALTA) 30 MG capsule Take 1 capsule (30 mg) by mouth daily 90 capsule 0     ferrous sulfate (IRON) 325 (65 FE) MG tablet  Take 1 tablet (325 mg) by mouth daily (with breakfast) (Patient taking differently: Take 325 mg by mouth once a week ) 30 tablet 5     flunisolide (NASALIDE) 25 MCG/ACT (0.025%) SOLN spray INHALE 2 SPRAYS INTO EACH NOSTRIL 2 TIMES DAILY 1 Bottle 3     gabapentin (NEURONTIN) 300 MG capsule TAKE 1 CAPSULE (300 MG) BY MOUTH 3 TIMES DAILY (Patient taking differently: TAKE 2 CAPSULE (300 MG) BY MOUTH 3 TIMES DAILY) 90 capsule 1     hydroxychloroquine (PLAQUENIL) 200 MG tablet TAKE 1 TABLET BY MOUTH DAILY 90 tablet 2     hydrOXYzine (ATARAX) 25 MG tablet TAKE 1 TABLET (25 MG) BY MOUTH 3 TIMES PER DAY 90 tablet 0     mometasone (NASONEX) 50 MCG/ACT spray Spray 2 sprays into both nostrils daily 1 Box 11     multivitamin, therapeutic with minerals (MULTI-VITAMIN) TABS Take 1 tablet by mouth daily       omega 3 1200 MG CAPS        order for DME Cane, 1 Device 0     tiZANidine (ZANAFLEX) 4 MG tablet TAKE 1-2 TABLETS BY MOUTH AS NEEDED THREE TIMES A DAY  3     cyclobenzaprine (FLEXERIL) 5 MG tablet TAKE 1 TABLET BY MOUTH 3 TIMES DAILY (Patient not taking: Reported on 12/17/2018) 42 tablet 3     fexofenadine (ALLEGRA) 180 MG tablet Take 1 tablet (180 mg) by mouth daily (Patient not taking: Reported on 12/31/2018) 30 tablet 1     triamcinolone (NASACORT) 55 MCG/ACT Inhaler Spray 2 sprays into both nostrils daily (Patient not taking: Reported on 12/31/2018) 1 Bottle 3       Surgical History:  Past Surgical History:   Procedure Laterality Date     COLONOSCOPY  01/2010    repeat 10 yrs     DILATION AND CURETTAGE, OPERATIVE HYSTEROSCOPY WITH MORCELLATOR, COMBINED N/A 2/15/2017    Procedure: COMBINED DILATION AND CURETTAGE, OPERATIVE HYSTEROSCOPY WITH MORCELLATOR;  Surgeon: Erin Gutierrez MD;  Location:  OR     ESOPHAGOSCOPY, GASTROSCOPY, DUODENOSCOPY (EGD), COMBINED Left 2/5/2016    Procedure: COMBINED ESOPHAGOSCOPY, GASTROSCOPY, DUODENOSCOPY (EGD), BIOPSY SINGLE OR MULTIPLE;  Surgeon: Pierre Fernandez MD;  Location:   GI     GYN SURGERY         Family history:  Family History   Problem Relation Age of Onset     Lupus Mother      Asthma Mother      Diabetes Father      Brain Tumor Father         begnign on pituitary     Depression Sister      Anemia Sister          Social History:  Social History     Tobacco Use     Smoking status: Former Smoker     Smokeless tobacco: Never Used   Substance Use Topics     Alcohol use: No           OBJECTIVE  /81 (BP Location: Left arm, Patient Position: Chair, Cuff Size: Adult Regular)   Pulse 90   Temp 98.1  F (36.7  C) (Oral)   Wt 77.8 kg (171 lb 8 oz)   LMP 08/12/2013   SpO2 100%   Breastfeeding? No   BMI 28.54 kg/m      Physical Exam    General Appearance:  Alert, cooperative, no distress, appears stated age. Afebrile. Appears comfortable sitting in chair. Rises from seated position without difficulty. Very talkative.  Integument: Warm, dry, no rashes or lesions. No ecchymosis of back.  HEENT: Atraumatic, normocephalic. Face nontraumatic. Conjunctiva clear, Lids normal.  Respiratory: No distress. Lungs clear to ausculation bilaterally. No crackles, wheezes, rhonchi or stridor.  Cardiovascular: Regular rate and rhythm, no murmur, rub or gallop. No obvious chest wall deformities.  Abdomen: soft, nontender, non-distended. No masses.  Musculoskeletal: Back: there is mild tenderness around C7, but no crepitus. There is more significant tenderness of midline mid-thoracic spinal region. No lumbar spinal tenderness. There is muscular spasm of bilateral lumbar paraspinal musculature, L>R. Strength testing: hip flexion, extension 5/5 bilaterally, knee flexion/extension 5/5 bilaterally, ankle plantar/dorsiflexion 5/5 bilaterally.  Straight leg raise positive bilaterally- patient reports increased back pain with elevation of either leg above 45 degrees. Gait: observed. Walks with cane. Mildly antalgic and hunched over.  Neurologic: Alert and orientated appropriately. No focal deficits.  Sensation intact in distal LEs. DTRs: patellar 2+ bilaterally  Psych: Normal mood and affect.        Labs:  Results for orders placed or performed in visit on 12/31/18 (from the past 24 hour(s))   XR Thoracic Spine 3 Views    Narrative    XR THORACIC SPINE THREE VIEWS   12/31/2018 5:30 PM     HISTORY: Midline tenderness thoracic spine, fall. Acute midline  thoracic back pain.    COMPARISON: None.      Impression    IMPRESSION: Demineralization is present. Multilevel mild degenerative  change is present. No fractures are identified. Slight mid thoracic  vertebral body height loss is likely physiologic. Paraspinal soft  tissues are unremarkable.    NIKITA GUERIN MD       Thoracic spine xray was done. My findings are: no compression fractures seen. Degenerative changes of disc spaces noted. No gross mal-alignment.        ASSESSMENT/PLAN:      ICD-10-CM    1. Acute midline thoracic back pain M54.6 XR Thoracic Spine 3 Views     CANCELED: XR Thoracic Spine G/E 4 Views        Medical Decision Making:    Patient with chronic neck and back pain presents with flare of back pain after a mechanical fall on ice yesterday.   Back pain is musculoskeletal in nature and is clearly reproducible upon exam. On thorough ROS, she denies any symptoms that would suggest alternative etiology of pain including acute abdomen, kidney stone, pyelonephritis, etc.  She does not have any symptoms of cauda equina syndrome today and no weakness on thorough strength testing.  She did have mild tenderness at C7 but no crepitus and I think this is more likely due to contusion rather than to fracture. I do not think Cspine films are needed today.  I did order thoracic films as she had more serious midline tenderness in the thoracic region. Mild concern that this could be representative of compression fracture. However, xrays were negative for fracture today. Did not chronic degenerative changes of the spine.  Lumbar spinal exam was benign and did not  feel xrays were indicated today.    Overall, reassuring exam and imaging. Suspect increased pain is due to contusion possibly with increased muscular spasm due to fall.  Recommend she continue with her current tizanidine regimen and gabapentin as prescribed by her pain doctor. Recommend she use heating pad and warm baths for comfort. Recommend f/up with PCP if no improvement in 1 week, sooner if worsening.    At the end of the encounter, I discussed all available results with patient. Discussed diagnosis and treatment plan.   Return precautions provided to patient and printed below in patient instructions.  Patient understood and agreed to plan. Patient was appropriate for discharge.        Patient Instructions   Thoracic spine xrays showed no vertebral fractures today.  Increased pain is most likely due to bruising from the fall and associated muscle spasm from the fall.  Recommend you use heating pad, warm baths for comfort. Continue with your typical regimen for tizanidine and gabapentin as prescribed by your pain doctor.  Follow up with your doctor if no improvement in pain in 1 week, sooner if worsening or new symptoms.            Savannah Tamayo PA-C  12/31/18 8:26 PM
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
## 2019-03-19 ENCOUNTER — TELEPHONE (OUTPATIENT)
Dept: FAMILY MEDICINE | Facility: CLINIC | Age: 59
End: 2019-03-19

## 2019-03-19 NOTE — TELEPHONE ENCOUNTER
Panel Management Review      Patient has the following on her problem list:     Depression / Dysthymia review    Measure:  Needs PHQ-9 score of 4 or less during index window.  Administer PHQ-9 and if score is 5 or more, send encounter to provider for next steps.    5 - 7 month window range: 03/19/2019    PHQ-9 SCORE 3/5/2018 6/29/2018 9/25/2018   PHQ-9 Total Score - - -   PHQ-9 Total Score MyChart - - -   PHQ-9 Total Score 18 20 12       If PHQ-9 recheck is 5 or more, route to provider for next steps.    Patient is due for:  PHQ9      Composite cancer screening  Chart review shows that this patient is due/due soon for the following Mammogram  Summary:    Patient is due/failing the following:   MAMMOGRAM and PHQ9    Action needed:   Patient needs office visit for depression follow up and mammogram.    Type of outreach:    Sent letter.    Questions for provider review:    None                                                                                                                                    Vu Herrera MA     Chart routed to none.

## 2019-03-19 NOTE — LETTER
March 19, 2019      Lesley Stafford  [address]  [place] [address]        Dear Lesley,    In order to ensure we are providing the best quality care, we have reviewed your chart and see that you are due for:    1. Depression follow up  2. Mammogram     Please call the clinic at your earliest convenience to schedule an appointment.  If you have completed these please contact our office via phone or [facility] to update our records.  We would like to know the date (approximately month and year), the result, and ideally where the procedure was performed.    Thank you for trusting us with your health care.      Sincerely,    Care Team for Rosa Weinstein CNP

## 2019-05-05 DIAGNOSIS — M25.552 HIP PAIN, LEFT: ICD-10-CM

## 2019-05-06 RX ORDER — DULOXETIN HYDROCHLORIDE 30 MG/1
CAPSULE, DELAYED RELEASE ORAL
Qty: 90 CAPSULE | Refills: 0 | Status: SHIPPED | OUTPATIENT
Start: 2019-05-06 | End: 2019-09-12

## 2019-05-06 NOTE — TELEPHONE ENCOUNTER
Called patient. Notified that she is due for f/u appt. She scheduled OV with provider for 05/13/19.    Prescription approved per [facility] Refill Protocol.    Desire Rodas RN  [facility]

## 2019-05-06 NOTE — TELEPHONE ENCOUNTER
"Requested Prescriptions   Pending Prescriptions Disp Refills     DULoxetine (CYMBALTA) 30 MG capsule [Pharmacy Med Name: DULOXETINE HCL DR 30 MG CAP] 90 capsule 0     Sig: TAKE 1 CAPSULE BY MOUTH EVERY DAY  Last Written Prescription Date:  12/21/2018  Last Fill Quantity: 90,  # refills: 0   Last office visit: 12/17/2018 with prescribing provider:  12/17/2018   Future Office Visit:         Serotonin-Norepinephrine Reuptake Inhibitors  Passed - 5/5/2019  5:09 PM        Passed - Blood pressure under 140/90 in past 12 months     BP Readings from Last 3 Encounters:   12/31/18 122/81   12/17/18 113/70   10/31/18 98/64                 Passed - Recent (12 mo) or future (30 days) visit within the authorizing provider's specialty     Patient had office visit in the last 12 months or has a visit in the next 30 days with authorizing provider or within the authorizing provider's specialty.  See \"Patient Info\" tab in inbasket, or \"Choose Columns\" in Meds & Orders section of the refill encounter.              Passed - Medication is active on med list        Passed - Patient is age 18 or older        Passed - No active pregnancy on record        Passed - No positive pregnancy test in past 12 months        "

## 2019-05-21 ENCOUNTER — OFFICE VISIT (OUTPATIENT)
Dept: FAMILY MEDICINE | Facility: CLINIC | Age: 59
End: 2019-05-21
Payer: COMMERCIAL

## 2019-05-21 VITALS
DIASTOLIC BLOOD PRESSURE: 66 MMHG | RESPIRATION RATE: 14 BRPM | HEART RATE: 74 BPM | HEIGHT: 64 IN | SYSTOLIC BLOOD PRESSURE: 98 MMHG | OXYGEN SATURATION: 99 % | BODY MASS INDEX: 29.5 KG/M2 | WEIGHT: 172.8 LBS | TEMPERATURE: 96.8 F

## 2019-05-21 DIAGNOSIS — F33.9 EPISODE OF RECURRENT MAJOR DEPRESSIVE DISORDER, UNSPECIFIED DEPRESSION EPISODE SEVERITY (H): Primary | ICD-10-CM

## 2019-05-21 DIAGNOSIS — R30.0 DYSURIA: ICD-10-CM

## 2019-05-21 LAB
ALBUMIN SERPL-MCNC: 3.9 G/DL (ref 3.4–5)
ALBUMIN UR-MCNC: NEGATIVE MG/DL
ALP SERPL-CCNC: 67 U/L (ref 40–150)
ALT SERPL W P-5'-P-CCNC: 36 U/L (ref 0–50)
ANION GAP SERPL CALCULATED.3IONS-SCNC: 7 MMOL/L (ref 3–14)
APPEARANCE UR: CLEAR
AST SERPL W P-5'-P-CCNC: 32 U/L (ref 0–45)
BILIRUB SERPL-MCNC: 0.4 MG/DL (ref 0.2–1.3)
BILIRUB UR QL STRIP: NEGATIVE
BUN SERPL-MCNC: 10 MG/DL (ref 7–30)
CALCIUM SERPL-MCNC: 9.6 MG/DL (ref 8.5–10.1)
CHLORIDE SERPL-SCNC: 109 MMOL/L (ref 94–109)
CO2 SERPL-SCNC: 24 MMOL/L (ref 20–32)
COLOR UR AUTO: YELLOW
CREAT SERPL-MCNC: 0.63 MG/DL (ref 0.52–1.04)
ERYTHROCYTE [DISTWIDTH] IN BLOOD BY AUTOMATED COUNT: 14 % (ref 10–15)
GFR SERPL CREATININE-BSD FRML MDRD: >90 ML/MIN/{1.73_M2}
GLUCOSE SERPL-MCNC: 95 MG/DL (ref 70–99)
GLUCOSE UR STRIP-MCNC: NEGATIVE MG/DL
HCT VFR BLD AUTO: 34.7 % (ref 35–47)
HGB BLD-MCNC: 12.6 G/DL (ref 11.7–15.7)
HGB UR QL STRIP: NEGATIVE
KETONES UR STRIP-MCNC: NEGATIVE MG/DL
LEUKOCYTE ESTERASE UR QL STRIP: NEGATIVE
MCH RBC QN AUTO: 28.5 PG (ref 26.5–33)
MCHC RBC AUTO-ENTMCNC: 36.3 G/DL (ref 31.5–36.5)
MCV RBC AUTO: 79 FL (ref 78–100)
NITRATE UR QL: NEGATIVE
PH UR STRIP: 6 PH (ref 5–7)
PLATELET # BLD AUTO: 124 10E9/L (ref 150–450)
POTASSIUM SERPL-SCNC: 4.4 MMOL/L (ref 3.4–5.3)
PROT SERPL-MCNC: 8.3 G/DL (ref 6.8–8.8)
RBC # BLD AUTO: 4.42 10E12/L (ref 3.8–5.2)
SODIUM SERPL-SCNC: 140 MMOL/L (ref 133–144)
SOURCE: NORMAL
SP GR UR STRIP: 1.02 (ref 1–1.03)
TSH SERPL DL<=0.005 MIU/L-ACNC: 1.2 MU/L (ref 0.4–4)
UROBILINOGEN UR STRIP-ACNC: 0.2 EU/DL (ref 0.2–1)
WBC # BLD AUTO: 4 10E9/L (ref 4–11)

## 2019-05-21 PROCEDURE — 85027 COMPLETE CBC AUTOMATED: CPT | Performed by: NURSE PRACTITIONER

## 2019-05-21 PROCEDURE — 36415 COLL VENOUS BLD VENIPUNCTURE: CPT | Performed by: NURSE PRACTITIONER

## 2019-05-21 PROCEDURE — 81003 URINALYSIS AUTO W/O SCOPE: CPT | Performed by: NURSE PRACTITIONER

## 2019-05-21 PROCEDURE — 80053 COMPREHEN METABOLIC PANEL: CPT | Performed by: NURSE PRACTITIONER

## 2019-05-21 PROCEDURE — 99214 OFFICE O/P EST MOD 30 MIN: CPT | Performed by: NURSE PRACTITIONER

## 2019-05-21 PROCEDURE — 84443 ASSAY THYROID STIM HORMONE: CPT | Performed by: NURSE PRACTITIONER

## 2019-05-21 ASSESSMENT — ANXIETY QUESTIONNAIRES
GAD7 TOTAL SCORE: 11
7. FEELING AFRAID AS IF SOMETHING AWFUL MIGHT HAPPEN: SEVERAL DAYS
2. NOT BEING ABLE TO STOP OR CONTROL WORRYING: MORE THAN HALF THE DAYS
5. BEING SO RESTLESS THAT IT IS HARD TO SIT STILL: SEVERAL DAYS
1. FEELING NERVOUS, ANXIOUS, OR ON EDGE: MORE THAN HALF THE DAYS
3. WORRYING TOO MUCH ABOUT DIFFERENT THINGS: MORE THAN HALF THE DAYS
6. BECOMING EASILY ANNOYED OR IRRITABLE: SEVERAL DAYS

## 2019-05-21 ASSESSMENT — PATIENT HEALTH QUESTIONNAIRE - PHQ9
5. POOR APPETITE OR OVEREATING: MORE THAN HALF THE DAYS
SUM OF ALL RESPONSES TO PHQ QUESTIONS 1-9: 11

## 2019-05-21 ASSESSMENT — MIFFLIN-ST. JEOR: SCORE: 1344.69
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
## 2019-05-22 ASSESSMENT — ANXIETY QUESTIONNAIRES: GAD7 TOTAL SCORE: 11

## 2019-07-18 ENCOUNTER — TELEPHONE (OUTPATIENT)
Dept: FAMILY MEDICINE | Facility: CLINIC | Age: 59
End: 2019-07-18

## 2019-07-18 DIAGNOSIS — Z86.0100 HISTORY OF COLONIC POLYPS: Primary | ICD-10-CM

## 2019-07-18 NOTE — TELEPHONE ENCOUNTER
"Dr Orosco,    Ordered started for colonoscopy.  Can you fill out and sign?    Pt was called. She has had no rectal bleeding but states her stomach kind of bothers her and her \"stools aren't what I would like them to be\" . Hx of anemia per her report with transfusion in the past. Takes aleve twice weekly.     (Patient has given permission to leave detailed message on voice mail if no answer on phone.)        Eloise De Jesus RN, BSN       "

## 2019-07-18 NOTE — TELEPHONE ENCOUNTER
Reason for call:  Order   Order or referral being requested: colonoscopy  Reason for request: screening  Date needed: as soon as possible  Has the patient been seen by the PCP for this problem? Not Applicable    Additional comments: n/a    Phone number to reach patient:  Home number on file [phone] (home)    Best Time:  n/a    Can we leave a detailed message on this number?  YES

## 2019-07-19 NOTE — TELEPHONE ENCOUNTER
Pt notified. She will call her insurance to double check . Advised to call [phone] to [facility]  If insurance coverage needs her to go elsewhere she will call back here for new referral    Eloise De Jesus RN, BSN

## 2019-07-22 ENCOUNTER — TELEPHONE (OUTPATIENT)
Dept: FAMILY MEDICINE | Facility: CLINIC | Age: 59
End: 2019-07-22

## 2019-07-22 DIAGNOSIS — N64.4 BREAST PAIN, LEFT: Primary | ICD-10-CM

## 2019-07-22 NOTE — TELEPHONE ENCOUNTER
"Pt had appt today for routine mammogram but they wouldn't do it and she told them she has pain sometimes in left breast.       It aches, sometimes throbbing. This comes and goes. She has hx of shingles on left side and she doesn't know if related to that. Off and on for months. No redness or difference in appearance. But underneath her breast where bra goes there is \"this mole thing that gets inflammed, sometimes red there \" This present for years. She has several other moles on her but this one seems to be getting a little bigger under the bra line. The pain she has is inside the breast. The mole things is different.    Her left breast has always felt polanco than her right.     Can you order diagnostic mammogram for left breast? (she did have diagnostic mammo in 2017)    (Patient has given permission to leave detailed message on voice mail if no answer on phone.)      Eloise De Jesus, RN, BSN           "

## 2019-07-22 NOTE — TELEPHONE ENCOUNTER
Reason for call:  Order   Order or referral being requested: referral  Reason for request: pain in breast left side   Date needed: as soon as possible  Has the patient been seen by the PCP for this problem? YES    Additional comments: patient would like referral to see someone about diagnostic mammo    Phone number to reach patient:  Home number on file [phone] (home)    Best Time:  any    Can we leave a detailed message on this number?  YES

## 2019-07-23 NOTE — TELEPHONE ENCOUNTER
Read and agree with plan; order signed, please notify, thanks Rafael   Recommend followup for mole after mammogram.

## 2019-07-24 ENCOUNTER — TRANSFERRED RECORDS (OUTPATIENT)
Dept: HEALTH INFORMATION MANAGEMENT | Facility: CLINIC | Age: 59
End: 2019-07-24

## 2019-08-06 ENCOUNTER — TELEPHONE (OUTPATIENT)
Dept: GASTROENTEROLOGY | Facility: OUTPATIENT CENTER | Age: 59
End: 2019-08-06

## 2019-08-28 ENCOUNTER — TRANSFERRED RECORDS (OUTPATIENT)
Dept: HEALTH INFORMATION MANAGEMENT | Facility: CLINIC | Age: 59
End: 2019-08-28

## 2019-08-29 ENCOUNTER — OFFICE VISIT (OUTPATIENT)
Dept: URGENT CARE | Facility: URGENT CARE | Age: 59
End: 2019-08-29
Payer: COMMERCIAL

## 2019-08-29 ENCOUNTER — NURSE TRIAGE (OUTPATIENT)
Dept: NURSING | Facility: CLINIC | Age: 59
End: 2019-08-29

## 2019-08-29 VITALS
OXYGEN SATURATION: 98 % | SYSTOLIC BLOOD PRESSURE: 135 MMHG | WEIGHT: 175.1 LBS | HEART RATE: 83 BPM | BODY MASS INDEX: 29.71 KG/M2 | DIASTOLIC BLOOD PRESSURE: 79 MMHG | TEMPERATURE: 98.2 F

## 2019-08-29 DIAGNOSIS — J06.9 VIRAL URI: Primary | ICD-10-CM

## 2019-08-29 PROCEDURE — 99213 OFFICE O/P EST LOW 20 MIN: CPT | Performed by: FAMILY MEDICINE

## 2019-08-29 ASSESSMENT — ENCOUNTER SYMPTOMS
EYE ITCHING: 1
PHOTOPHOBIA: 0
WEAKNESS: 0
NAUSEA: 0
CHILLS: 1
SHORTNESS OF BREATH: 0
FATIGUE: 1
FEVER: 0
VOICE CHANGE: 1
DYSURIA: 0
VOMITING: 0
DIZZINESS: 0
EYE REDNESS: 1
NUMBNESS: 0
TREMORS: 0
SORE THROAT: 0
HEADACHES: 0
SINUS PRESSURE: 1
COUGH: 0
RHINORRHEA: 0
DIARRHEA: 0
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
## 2019-08-29 NOTE — TELEPHONE ENCOUNTER
Lesley thinks she may have pink eye and strep throat. She wants to be seen today. I transferred her to scheduling to make an appointment.  She'll go to urgent care if unable to get an appointment.  Jeanie ELLIOTT RN [facility]

## 2019-09-04 ENCOUNTER — ANCILLARY PROCEDURE (OUTPATIENT)
Dept: MAMMOGRAPHY | Facility: CLINIC | Age: 59
End: 2019-09-04
Attending: FAMILY MEDICINE
Payer: COMMERCIAL

## 2019-09-04 DIAGNOSIS — N64.4 BREAST PAIN, LEFT: ICD-10-CM

## 2019-09-10 ENCOUNTER — TELEPHONE (OUTPATIENT)
Dept: GASTROENTEROLOGY | Facility: OUTPATIENT CENTER | Age: 59
End: 2019-09-10

## 2019-09-11 ENCOUNTER — TELEPHONE (OUTPATIENT)
Dept: GASTROENTEROLOGY | Facility: OUTPATIENT CENTER | Age: 59
End: 2019-09-11

## 2019-09-11 DIAGNOSIS — Z86.0100 HISTORY OF COLONIC POLYPS: Primary | ICD-10-CM

## 2019-09-11 NOTE — TELEPHONE ENCOUNTER
Patient taking any blood thinners ? No     Heart disease ? Denies     Lung disease ? Denies       Sleep apnea ? No Cpap    Diabetic ? Denies     Kidney disease ? Denies     Electronic implanted medical devices ? Denies     Are you taking any narcotic pain  medication ?    No     PTSD ? N/a     Prep instructions reviewed with patient ? Patient declined review.  policy, MAC sedation plan reviewed. Advised patient to have someone stay with her post exam    Pharm[facility] : [facility]    Indication for procedure :History of colonic polyps [Z86.010    Referring provider :ODALIS CORONEL      Arrival Time : 7:30 AM     : Mother

## 2019-09-12 DIAGNOSIS — M25.552 HIP PAIN, LEFT: ICD-10-CM

## 2019-09-13 RX ORDER — DULOXETIN HYDROCHLORIDE 30 MG/1
CAPSULE, DELAYED RELEASE ORAL
Qty: 30 CAPSULE | Refills: 0 | Status: SHIPPED | OUTPATIENT
Start: 2019-09-13 | End: 2019-10-21

## 2019-09-13 NOTE — TELEPHONE ENCOUNTER
Medication is being filled for 1 time refill only due to:  Patient needs to be seen because needs to be seen for evaluation of MH status. Millicent Crane RN September 13, 2019 8:55 AM

## 2019-10-08 DIAGNOSIS — F41.9 ANXIETY: ICD-10-CM

## 2019-10-08 RX ORDER — HYDROXYZINE HYDROCHLORIDE 25 MG/1
TABLET, FILM COATED ORAL
Qty: 90 TABLET | Refills: 3 | Status: SHIPPED | OUTPATIENT
Start: 2019-10-08 | End: 2020-04-13

## 2019-10-08 NOTE — TELEPHONE ENCOUNTER
"Requested Prescriptions   Pending Prescriptions Disp Refills     hydrOXYzine (ATARAX) 25 MG tablet 90 tablet 0     Sig: TAKE 1 TABLET (25 MG) BY MOUTH 3 TIMES PER DAY  Last Written Prescription Date:  12/21/2018  Last Fill Quantity: 90,  # refills: 0   Last office visit: 5/21/2019 with prescribing provider:  05/21/2019   Future Office Visit:         Antihistamines Protocol Passed - 10/8/2019  7:51 AM        Passed - Recent (12 mo) or future (30 days) visit within the authorizing provider's specialty     Patient has had an office visit with the authorizing provider or a provider within the authorizing providers department within the previous 12 mos or has a future within next 30 days. See \"Patient Info\" tab in inbasket, or \"Choose Columns\" in Meds & Orders section of the refill encounter.              Passed - Patient is age 3 or older     Apply age and/or weight-based dosing for peds patients age 3 and older.    Forward request to provider for patients under the age of 3.          Passed - Medication is active on med list        "

## 2019-10-08 NOTE — TELEPHONE ENCOUNTER
Dr. Orosco,  Routing refill request to provider for review/approval because:  A break in medication - last Rx sent 12/21/2019 #90/0    Please advise    Thank You!  Earnestine Jimenez, RN  Triage Nurse

## 2019-10-23 DIAGNOSIS — M25.552 HIP PAIN, LEFT: ICD-10-CM

## 2019-10-23 RX ORDER — DULOXETIN HYDROCHLORIDE 30 MG/1
CAPSULE, DELAYED RELEASE ORAL
Qty: 30 CAPSULE | Refills: 0 | OUTPATIENT
Start: 2019-10-23

## 2019-10-23 NOTE — TELEPHONE ENCOUNTER
"Requested Prescriptions   Pending Prescriptions Disp Refills     DULoxetine (CYMBALTA) 30 MG capsule [Pharmacy Med Name: DULOXETINE HCL DR 30 MG CAP] 30 capsule 0     Sig: TAKE 1 CAPSULE BY MOUTH EVERY DAY  Last Written Prescription Date:  10/21/2019  Last Fill Quantity: 90,  # refills: 0   Last office visit: 5/21/2019 with prescribing provider:  05/21/2019   Future Office Visit:         Serotonin-Norepinephrine Reuptake Inhibitors  Passed - 10/23/2019  9:21 AM        Passed - Blood pressure under 140/90 in past 12 months     BP Readings from Last 3 Encounters:   08/29/19 135/79   05/21/19 98/66   12/31/18 122/81                 Passed - Recent (12 mo) or future (30 days) visit within the authorizing provider's specialty     Patient has had an office visit with the authorizing provider or a provider within the authorizing providers department within the previous 12 mos or has a future within next 30 days. See \"Patient Info\" tab in inbasket, or \"Choose Columns\" in Meds & Orders section of the refill encounter.              Passed - Medication is active on med list        Passed - Patient is age 18 or older        Passed - No active pregnancy on record        Passed - No positive pregnancy test in past 12 months        "

## 2019-11-01 DIAGNOSIS — G62.9 PERIPHERAL POLYNEUROPATHY: ICD-10-CM

## 2019-11-01 NOTE — TELEPHONE ENCOUNTER
Controlled Substance Refill Request for GABAPENTIN 300 MG CAPSULE  Problem List Complete:  No     PROVIDER TO CONSIDER COMPLETION OF PROBLEM LIST AND OVERVIEW/CONTROLLED SUBSTANCE AGREEMENT    Last Written Prescription Date:  10/02/2017  Last Fill Quantity: 90,   # refills: 1    THE MOST RECENT OFFICE VISIT MUST BE WITHIN THE PAST 3 MONTHS. AT LEAST ONE FACE TO FACE VISIT MUST OCCUR EVERY 6 MONTHS. ADDITIONAL VISITS CAN BE VIRTUAL.  (THIS STATEMENT SHOULD BE DELETED.)    Last Office Visit with [facility] primary care provider: 05/21/2019    Future Office visit:     Controlled substance agreement:   Encounter-Level CSA:    There are no encounter-level csa.     Patient-Level CSA:    There are no patient-level csa.         Last Urine Drug Screen: No results found for: CDAUT, No results found for: COMDAT, No results found for: THC13, PCP13, COC13, MAMP13, OPI13, AMP13, BZO13, TCA13, MTD13, BAR13, OXY13, PPX13, BUP13     Processing:  Fax Rx to [facility]     [email or web address]       checked in past 3 months?  No, route to RN

## 2019-11-02 ENCOUNTER — NURSE TRIAGE (OUTPATIENT)
Dept: NURSING | Facility: CLINIC | Age: 59
End: 2019-11-02

## 2019-11-02 ENCOUNTER — OFFICE VISIT (OUTPATIENT)
Dept: URGENT CARE | Facility: URGENT CARE | Age: 59
End: 2019-11-02
Payer: COMMERCIAL

## 2019-11-02 VITALS
OXYGEN SATURATION: 99 % | TEMPERATURE: 98.1 F | WEIGHT: 174.8 LBS | HEART RATE: 89 BPM | SYSTOLIC BLOOD PRESSURE: 98 MMHG | RESPIRATION RATE: 16 BRPM | BODY MASS INDEX: 29.66 KG/M2 | DIASTOLIC BLOOD PRESSURE: 63 MMHG

## 2019-11-02 DIAGNOSIS — G89.29 OTHER CHRONIC PAIN: Primary | ICD-10-CM

## 2019-11-02 PROCEDURE — 99213 OFFICE O/P EST LOW 20 MIN: CPT | Performed by: PHYSICIAN ASSISTANT

## 2019-11-02 RX ORDER — GABAPENTIN 300 MG/1
300 CAPSULE ORAL 3 TIMES DAILY
Qty: 20 CAPSULE | Refills: 0 | Status: ON HOLD | OUTPATIENT
Start: 2019-11-02 | End: 2019-12-24

## 2019-11-02 RX ORDER — CLONIDINE HYDROCHLORIDE 0.1 MG/1
TABLET ORAL
Refills: 3 | COMMUNITY
Start: 2019-10-11 | End: 2020-06-30

## 2019-11-02 ASSESSMENT — ENCOUNTER SYMPTOMS
CONSTITUTIONAL NEGATIVE: 1
ENDOCRINE NEGATIVE: 1
CARDIOVASCULAR NEGATIVE: 1
RESPIRATORY NEGATIVE: 1
GASTROINTESTINAL NEGATIVE: 1
ARTHRALGIAS: 1
NEUROLOGICAL NEGATIVE: 1
EYES NEGATIVE: 1

## 2019-11-02 NOTE — PROGRESS NOTES
SUBJECTIVE:   Lesley Stafford is a 58 year old female presenting with a chief complaint of   Chief Complaint   Patient presents with     Systemic Lupus Erythematous     concerns     Refill Request     for gabapentin to get it through to Tues.        She is an established patient of [facility].  Patient presents for medication refill on neurontin 300 TID.  Patient had been trying to get a refill.  Some calls had been rerouted and patient was unable to be seen for RF.  She was told to come here.  She is making an appointment for next week.  Patient denies any new pain.  She has 2 pills left.  Review of Systems   Constitutional: Negative.    HENT: Negative.    Eyes: Negative.    Respiratory: Negative.    Cardiovascular: Negative.    Gastrointestinal: Negative.    Endocrine: Negative.    Genitourinary: Negative.    Musculoskeletal: Positive for arthralgias.   Neurological: Negative.    All other systems reviewed and are negative.      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     Family History   Problem Relation Age of Onset     Lupus Mother      Asthma Mother      Diabetes Father      Brain Tumor Father         begnign on pituitary     Depression Sister      Anemia Sister      Current Outpatient Medications   Medication Sig Dispense Refill     amphetamine-dextroamphetamine (ADDERALL) 10 MG tablet Take 10 mg by mouth 2 times daily        Calcium-Magnesium-Vitamin D (CALCIUM 500 PO)        cloNIDine (CATAPRES) 0.1 MG tablet TAKE 2 TABLETS BY MOUTH DAILY AT BEDTIME  3     DULoxetine (CYMBALTA) 30 MG capsule TAKE 1 CAPSULE BY MOUTH EVERY DAY 90 capsule 0     fexofenadine (ALLEGRA) 180 MG tablet Take 1 tablet (180 mg) by mouth daily 30 tablet 1     gabapentin (NEURONTIN) 300 MG capsule Take 1 capsule (300 mg) by mouth 3 times daily 20  capsule 0     gabapentin (NEURONTIN) 300 MG capsule TAKE 1 CAPSULE (300 MG) BY MOUTH 3 TIMES DAILY (Patient taking differently: TAKE 2 CAPSULE (300 MG) BY MOUTH 3 TIMES DAILY) 90 capsule 1     hydroxychloroquine (PLAQUENIL) 200 MG tablet TAKE 1 TABLET BY MOUTH DAILY 90 tablet 2     hydrOXYzine (ATARAX) 25 MG tablet TAKE 1 TABLET (25 MG) BY MOUTH 3 TIMES PER DAY 90 tablet 3     mometasone (NASONEX) 50 MCG/ACT spray Spray 2 sprays into both nostrils daily 1 Box 11     multivitamin, therapeutic with minerals (MULTI-VITAMIN) TABS Take 1 tablet by mouth daily       omega 3 1200 MG CAPS        tiZANidine (ZANAFLEX) 4 MG tablet TAKE 1-2 TABLETS BY MOUTH AS NEEDED THREE TIMES A DAY  3     cyclobenzaprine (FLEXERIL) 5 MG tablet TAKE 1 TABLET BY MOUTH 3 TIMES DAILY (Patient not taking: Reported on 12/17/2018) 42 tablet 3     DEEP SEA NASAL SPRAY 0.65 % nasal spray SPRAY 1 SPRAY INTO BOTH NOSTRILS DAILY AS NEEDED FOR CONGESTION 1 Bottle 2     ferrous sulfate (IRON) 325 (65 FE) MG tablet Take 1 tablet (325 mg) by mouth daily (with breakfast) (Patient not taking: Reported on 8/29/2019) 30 tablet 5     flunisolide (NASALIDE) 25 MCG/ACT (0.025%) SOLN spray INHALE 2 SPRAYS INTO EACH NOSTRIL 2 TIMES DAILY (Patient not taking: Reported on 11/2/2019) 1 Bottle 3     order for DME Cane, 1 Device 0     triamcinolone (NASACORT) 55 MCG/ACT Inhaler Spray 2 sprays into both nostrils daily (Patient not taking: Reported on 12/31/2018) 1 Bottle 3     Social History     Tobacco Use     Smoking status: Former Smoker     Smokeless tobacco: Never Used   Substance Use Topics     Alcohol use: No       OBJECTIVE  BP 98/63 (BP Location: Left arm, Patient Position: Sitting, Cuff Size: Adult Large)   Pulse 89   Temp 98.1  F (36.7  C) (Oral)   Resp 16   Wt 79.3 kg (174 lb 12.8 oz)   LMP 08/12/2013   SpO2 99%   BMI 29.66 kg/m      Physical Exam  Vitals signs and nursing note reviewed.   Constitutional:       Appearance: Normal appearance. She is  normal weight.   Eyes:      Extraocular Movements: Extraocular movements intact.      Conjunctiva/sclera: Conjunctivae normal.   Neck:      Musculoskeletal: Normal range of motion and neck supple.   Cardiovascular:      Rate and Rhythm: Normal rate and regular rhythm.   Pulmonary:      Effort: Pulmonary effort is normal.      Breath sounds: Normal breath sounds.   Skin:     Capillary Refill: Capillary refill takes less than 2 seconds.   Neurological:      Mental Status: She is alert.   Psychiatric:         Mood and Affect: Mood normal.         Behavior: Behavior normal.         Labs:  No results found for this or any previous visit (from the past 24 hour(s)).    X-Ray was not done.    ASSESSMENT:      ICD-10-CM    1. Other chronic pain G89.29 gabapentin (NEURONTIN) 300 MG capsule        Medical Decision Making:    Differential Diagnosis:  Lupus, chronic pain    Serious Comorbid Conditions:  Adult:  None    PLAN:    RF on neurontin #20 pills 300 MG    Followup:    If not improving or if condition worsens, follow up with your Primary Care Provider    Patient Instructions     Patient Education     Chronic Pain  Pain serves an important role. It lets you know something is wrong that needs your attention. When the body heals, pain normally goes away.  When pain lasts longer than 6 months, it is called  chronic  pain. This is pain that is present even after the body has healed. Chronic pain can cause mood problems and get in the way of your relationships and your daily life.  A number of conditions can cause chronic pain. Some of the more common include:    Previous surgery    An old injury    Infection    Diseases such as diabetes    Nerve damage    Back injury    Arthritis    Migraine or other headaches    Fibromyalgia    Cancer  Depression and stress can make chronic pain symptoms worse. In some cases, a cause for the pain can't be found.   Treatment  Treatment can greatly reduce pain. In many cases, pain can become  less severe, occur less often, and interfere less with your daily life. Chronic pain is often treated with a combination of medicines, therapies, and lifestyle changes. You will work closely with your healthcare provider to find a treatment plan that works best for you.    Ask your healthcare provider for a referral to a pain management specialty center. These can provide the most recent and proven pain management strategies, along with emotional support and comprehensive services.    Several different types of medicines may be prescribed for chronic pain. Work with your healthcare provider to develop a medicine plan that helps manage your pain.    Physical therapy can help reduce certain types of chronic pain.    Occupational therapy teaches you how to do routine tasks of daily living in ways that lessen your discomfort.    Counseling can help you cope better with stress and pain.    Other therapies such as meditation, yoga, biofeedback, massage, and acupuncture can also help manage chronic pain.    Changing certain habits can help reduce chronic pain. They include:  ? Eating healthy  ? Developing an exercise routine  ? Getting enough sleep   ? Stopping smoking and limiting alcohol use  ? Losing excess weight  Follow-up care  Follow up with your healthcare provider, or as advised. Let your healthcare provider know if your current treatment plan is working or if changes are needed.  Resources  For more information, contact:    American Headache and Migraine Association, ahma.[employer]clicks.net or 133-008-0154    American Chronic Pain Association, theacpa.org or 808-506-8331  Date Last Reviewed: 8/1/2017 2000-2018 [employer]. [address] [place] [address], [place] [address]. All rights reserved. This information is not intended as a substitute for professional medical care. Always follow your healthcare professional's instructions.

## 2019-11-02 NOTE — PATIENT INSTRUCTIONS
Patient Education     Chronic Pain  Pain serves an important role. It lets you know something is wrong that needs your attention. When the body heals, pain normally goes away.  When pain lasts longer than 6 months, it is called  chronic  pain. This is pain that is present even after the body has healed. Chronic pain can cause mood problems and get in the way of your relationships and your daily life.  A number of conditions can cause chronic pain. Some of the more common include:    Previous surgery    An old injury    Infection    Diseases such as diabetes    Nerve damage    Back injury    Arthritis    Migraine or other headaches    Fibromyalgia    Cancer  Depression and stress can make chronic pain symptoms worse. In some cases, a cause for the pain can't be found.   Treatment  Treatment can greatly reduce pain. In many cases, pain can become less severe, occur less often, and interfere less with your daily life. Chronic pain is often treated with a combination of medicines, therapies, and lifestyle changes. You will work closely with your healthcare provider to find a treatment plan that works best for you.    Ask your healthcare provider for a referral to a pain management specialty center. These can provide the most recent and proven pain management strategies, along with emotional support and comprehensive services.    Several different types of medicines may be prescribed for chronic pain. Work with your healthcare provider to develop a medicine plan that helps manage your pain.    Physical therapy can help reduce certain types of chronic pain.    Occupational therapy teaches you how to do routine tasks of daily living in ways that lessen your discomfort.    Counseling can help you cope better with stress and pain.    Other therapies such as meditation, yoga, biofeedback, massage, and acupuncture can also help manage chronic pain.    Changing certain habits can help reduce chronic pain. They  include:  ? Eating healthy  ? Developing an exercise routine  ? Getting enough sleep   ? Stopping smoking and limiting alcohol use  ? Losing excess weight  Follow-up care  Follow up with your healthcare provider, or as advised. Let your healthcare provider know if your current treatment plan is working or if changes are needed.  Resources  For more information, contact:    American Headache and Migraine Association, [facility].memberKleo.[employer] or 213-684-8543    American Chronic Pain Association, theacpa.org or 454-979-0929  Date Last Reviewed: 8/1/2017 2000-2018 [employer]. [address] [place] [address]. All rights reserved. This information is not intended as a substitute for professional medical care. Always follow your healthcare professional's instructions.

## 2019-11-02 NOTE — TELEPHONE ENCOUNTER
Lesley informs that she is running out of her gabapentin, supply is only good for today. She takes 900 mg three times a day, takes it due to pain secondary to lupus. She is seen in [facility], and claims that clinic has not sent out a prescription for refill. She reports having back pain 6/10, and numbness on her legs. She reports lightheadedness. Per protocol, advised to be seen in an urgent care. Care advice reviewed. Patient verbalizes understanding. Advised to call back with further questions/concerns. She plans to head to [facility].    Ambar Paiz RN/[place] [facility] [facility]      Reason for Disposition    Numbness in a leg or foot (i.e., loss of sensation)    Additional Information    Negative: Passed out (i.e., lost consciousness, collapsed and was not responding)    Negative: Shock suspected (e.g., cold/pale/clammy skin, too weak to stand, low BP, rapid pulse)    Negative: Sounds like a life-threatening emergency to the triager    Negative: [1] SEVERE back pain (e.g., excruciating) AND [2] sudden onset AND [3] age > 60    Negative: [1] Unable to urinate (or only a few drops) > 4 hours AND     [2] bladder feels very full (e.g., palpable bladder or strong urge to urinate)    Negative: [1] Urinary or bowel incontinence (i.e., loss of bladder or bowel control) AND [2] new onset    Negative: Numbness in groin or rectal area (i.e., loss of sensation)    Negative: [1] SEVERE abdominal pain AND [2] present > 1 hour    Negative: [1] Abdominal pain AND [2] age > 60    Negative: Weakness of a leg or foot (e.g., unable to bear weight, dragging foot)    Negative: Unable to walk    Negative: Patient sounds very sick or weak to the triager    Negative: [1] SEVERE back pain (e.g., excruciating, unable to do any normal activities) AND [2] not improved 2 hours after pain medicine    Negative: [1] Pain radiates into the thigh or further down the leg AND [2] both legs    Negative: [1] Fever > 100.0 F (37.8 C) AND [2]  flank pain (i.e., in side, below ribs and above hip)    Negative: [1] Pain or burning with urination AND [2] flank pain (i.e., in side, below ribs and above hip)    Protocols used: BACK PAIN-A-AH

## 2019-11-04 RX ORDER — GABAPENTIN 300 MG/1
CAPSULE ORAL
Qty: 270 CAPSULE | Refills: 3 | Status: SHIPPED | OUTPATIENT
Start: 2019-11-04 | End: 2020-12-23

## 2019-11-04 NOTE — TELEPHONE ENCOUNTER
Dr. Orosco,  Routing refill request to provider for review/approval because:  Drug not on the [facility] refill protocol     : last dispensed 08/25/2019 #270/30 day supply prescribed by Mariela Molina.    Patient does have prescription on file from UC visit     Thank You!  Earnestine Jimenez, RN  Triage Nurse

## 2019-11-13 ENCOUNTER — OFFICE VISIT (OUTPATIENT)
Dept: FAMILY MEDICINE | Facility: CLINIC | Age: 59
End: 2019-11-13
Payer: COMMERCIAL

## 2019-11-13 VITALS
WEIGHT: 175.2 LBS | OXYGEN SATURATION: 98 % | BODY MASS INDEX: 29.73 KG/M2 | TEMPERATURE: 97.9 F | HEART RATE: 80 BPM | SYSTOLIC BLOOD PRESSURE: 120 MMHG | DIASTOLIC BLOOD PRESSURE: 70 MMHG

## 2019-11-13 DIAGNOSIS — G89.29 CHRONIC LOW BACK PAIN, UNSPECIFIED BACK PAIN LATERALITY, UNSPECIFIED WHETHER SCIATICA PRESENT: ICD-10-CM

## 2019-11-13 DIAGNOSIS — F41.9 ANXIETY: ICD-10-CM

## 2019-11-13 DIAGNOSIS — M54.50 CHRONIC LOW BACK PAIN, UNSPECIFIED BACK PAIN LATERALITY, UNSPECIFIED WHETHER SCIATICA PRESENT: ICD-10-CM

## 2019-11-13 DIAGNOSIS — F32.1 MODERATE MAJOR DEPRESSION (H): Primary | ICD-10-CM

## 2019-11-13 DIAGNOSIS — F10.21 HISTORY OF ALCOHOLISM (H): ICD-10-CM

## 2019-11-13 PROCEDURE — 99214 OFFICE O/P EST MOD 30 MIN: CPT | Performed by: NURSE PRACTITIONER

## 2019-11-13 RX ORDER — PREDNISONE 10 MG/1
30 TABLET ORAL DAILY
Qty: 21 TABLET | Refills: 0 | Status: ON HOLD | OUTPATIENT
Start: 2019-11-13 | End: 2019-12-24

## 2019-11-13 NOTE — PROGRESS NOTES
Subjective     Lesley Stafford is a 58 year old female who presents to clinic today for the following health issues:    HPI   Joint Pain- hip and back     Onset: episode Oct 21     Description:   Location: low back, bilateral hips  Character: Dull ache and pain    Intensity: severe    Progression of Symptoms: intermittent    Accompanying Signs & Symptoms:  Other symptoms: none    History:   Previous similar pain: YES      Precipitating factors:   Trauma or overuse: no     Alleviating factors:  Improved by: nothing and chiropractor    Therapies Tried and outcome: gabapentin; has been more manageable while she is consistently taking medication. Had a severe episode of pain when she ran out of medication due to confusion about who was the originating prescriber.    Depression Followup    How are you doing with your depression since your last visit? Worsened     Are you having other symptoms that might be associated with depression? Yes:  pain    Have you had a significant life event?  Grief or Loss and Health Concerns     Are you feeling anxious or having panic attacks?   Yes:  intermittent    Do you have any concerns with your use of alcohol or other drugs? No;has maintained sobriety  She has been very sad and anxious due to changes in her family- son moved away unexpectedly. She has been seeing psychiatry; would like to establish with a new trauma-based therapist.      Social History     Tobacco Use     Smoking status: Former Smoker     Smokeless tobacco: Never Used   Substance Use Topics     Alcohol use: No     Drug use: No     PHQ 6/29/2018 9/25/2018 5/21/2019   PHQ-9 Total Score 20 12 11   Q9: Thoughts of better off dead/self-harm past 2 weeks More than half the days Not at all Several days     ASHLEY-7 SCORE 9/20/2017 6/29/2018 5/21/2019   Total Score - - -   Total Score 13 17 11     Last PHQ-9 5/21/2019   1.  Little interest or pleasure in doing things 1   2.  Feeling down, depressed, or hopeless 2   3.   Trouble falling or staying asleep, or sleeping too much 1   4.  Feeling tired or having little energy 1   5.  Poor appetite or overeating 1   6.  Feeling bad about yourself 2   7.  Trouble concentrating 1   8.  Moving slowly or restless 1   Q9: Thoughts of better off dead/self-harm past 2 weeks 1   PHQ-9 Total Score 11   Difficulty at work, home, or with people Somewhat difficult         Suicide Assessment Five-step Evaluation and Treatment (SAFE-T)          -------------------------------------    Patient Active Problem List   Diagnosis     Lupus (H)     Abnormal perimenopausal bleeding     Obesity, Class I, BMI 30-34.9     History of claustrophobia     Depressive disorder     Anxiety     Allergic state     ACP (advance care planning)     Tired     Insomnia, unspecified type     Hemoglobin C trait (H)     Liver hemangioma     Bilateral carpal tunnel syndrome     Moderate major depression (H)     Past Surgical History:   Procedure Laterality Date     COLONOSCOPY  01/2010    repeat 10 yrs     DILATION AND CURETTAGE, OPERATIVE HYSTEROSCOPY WITH MORCELLATOR, COMBINED N/A 2/15/2017    Procedure: COMBINED DILATION AND CURETTAGE, OPERATIVE HYSTEROSCOPY WITH MORCELLATOR;  Surgeon: Erin Gutierrez MD;  Location: [facility]     ESOPHAGOSCOPY, GASTROSCOPY, DUODENOSCOPY (EGD), COMBINED Left 2/5/2016    Procedure: COMBINED ESOPHAGOSCOPY, GASTROSCOPY, DUODENOSCOPY (EGD), BIOPSY SINGLE OR MULTIPLE;  Surgeon: Pierre Fernandez MD;  Location:  GI     GYN SURGERY         Social History     Tobacco Use     Smoking status: Former Smoker     Smokeless tobacco: Never Used   Substance Use Topics     Alcohol use: No     Family History   Problem Relation Age of Onset     Lupus Mother      Asthma Mother      Diabetes Father      Brain Tumor Father         begnign on pituitary     Depression Sister      Anemia Sister            Reviewed and updated as needed this visit by Provider         Review of Systems   ROS COMP: Constitutional,  HEENT, cardiovascular, pulmonary, gi and gu systems are negative, except as otherwise noted.      Objective    /70   Pulse 80   Temp 97.9  F (36.6  C) (Oral)   Wt 79.5 kg (175 lb 3.2 oz)   LMP 08/12/2013   SpO2 98%   BMI 29.73 kg/m    Body mass index is 29.73 kg/m .  Physical Exam   GENERAL: healthy, alert and no distress  NECK: no adenopathy, no asymmetry, masses, or scars and thyroid normal to palpation  RESP: lungs clear to auscultation - no rales, rhonchi or wheezes  CV: regular rate and rhythm, normal S1 S2, no S3 or S4, no murmur, click or rub, no peripheral edema and peripheral pulses strong  MS: no gross musculoskeletal defects noted, no edema  PSYCH: mentation appears normal, affect normal/bright, anxious and speech pressured    Diagnostic Test Results:  Labs reviewed in Epic  No results found for this or any previous visit (from the past 24 hour(s)).        Assessment & Plan   Problem List Items Addressed This Visit     Moderate major depression (H) - Primary    Relevant Orders    MENTAL HEALTH REFERRAL  - Adult; Outpatient Treatment; Individual/Couples/Family/Group Therapy/Health Psychology; [facility]: [facility] [phone]; We will contact you to schedule the appointment or please call with any questions    Anxiety    Relevant Orders    MENTAL HEALTH REFERRAL  - Adult; Outpatient Treatment; Individual/Couples/Family/Group Therapy/Health Psychology; [facility]: [facility] [phone]; We will contact you to schedule the appointment or please call with any questions      Other Visit Diagnoses     Chronic low back pain, unspecified back pain laterality, unspecified whether sciatica present        Relevant Medications    predniSONE (DELTASONE) 10 MG tablet    Other Relevant Orders    PHYSICAL THERAPY REFERRAL    History of alcoholism (H)                 BMI:   Estimated body mass index is 29.73 kg/m  as calculated from the following:    Height as of 5/21/19: 1.635 m  "(5' 4.37\").    Weight as of this encounter: 79.5 kg (175 lb 3.2 oz).           See Patient Instructions  No follow-ups on file.    THOMAS Preston [facility]    "

## 2019-12-23 ENCOUNTER — APPOINTMENT (OUTPATIENT)
Dept: MRI IMAGING | Facility: CLINIC | Age: 59
DRG: 066 | End: 2019-12-23
Attending: EMERGENCY MEDICINE
Payer: COMMERCIAL

## 2019-12-23 ENCOUNTER — APPOINTMENT (OUTPATIENT)
Dept: CT IMAGING | Facility: CLINIC | Age: 59
DRG: 066 | End: 2019-12-23
Attending: EMERGENCY MEDICINE
Payer: COMMERCIAL

## 2019-12-23 ENCOUNTER — OFFICE VISIT (OUTPATIENT)
Dept: URGENT CARE | Facility: URGENT CARE | Age: 59
End: 2019-12-23
Payer: COMMERCIAL

## 2019-12-23 ENCOUNTER — NURSE TRIAGE (OUTPATIENT)
Dept: FAMILY MEDICINE | Facility: CLINIC | Age: 59
End: 2019-12-23

## 2019-12-23 ENCOUNTER — HOSPITAL ENCOUNTER (INPATIENT)
Facility: CLINIC | Age: 59
LOS: 1 days | Discharge: HOME | DRG: 066 | End: 2019-12-24
Attending: EMERGENCY MEDICINE | Admitting: PSYCHIATRY & NEUROLOGY
Payer: COMMERCIAL

## 2019-12-23 VITALS
TEMPERATURE: 98.5 F | OXYGEN SATURATION: 98 % | DIASTOLIC BLOOD PRESSURE: 74 MMHG | HEART RATE: 92 BPM | RESPIRATION RATE: 16 BRPM | SYSTOLIC BLOOD PRESSURE: 112 MMHG | BODY MASS INDEX: 29.83 KG/M2 | WEIGHT: 175.8 LBS

## 2019-12-23 DIAGNOSIS — R51.9 HEADACHE DISORDER: ICD-10-CM

## 2019-12-23 DIAGNOSIS — R29.898 WEAKNESS OF LEFT ARM: Primary | ICD-10-CM

## 2019-12-23 DIAGNOSIS — I63.9 CEREBROVASCULAR ACCIDENT (CVA), UNSPECIFIED MECHANISM (H): ICD-10-CM

## 2019-12-23 DIAGNOSIS — R29.898 WEAKNESS OF LEFT LEG: ICD-10-CM

## 2019-12-23 LAB
ALBUMIN UR-MCNC: NEGATIVE MG/DL
ANION GAP SERPL CALCULATED.3IONS-SCNC: 5 MMOL/L (ref 3–14)
APPEARANCE UR: CLEAR
APTT PPP: 30 SEC (ref 22–37)
B-HCG FREE SERPL-ACNC: 0.9 [IU]/L (ref 0.86–1.14)
BACTERIA #/AREA URNS HPF: ABNORMAL /HPF
BASOPHILS # BLD AUTO: 0 10E9/L (ref 0–0.2)
BASOPHILS NFR BLD AUTO: 0.5 %
BILIRUB UR QL STRIP: NEGATIVE
BUN SERPL-MCNC: 9 MG/DL (ref 7–30)
CALCIUM SERPL-MCNC: 9.6 MG/DL (ref 8.5–10.1)
CHLORIDE SERPL-SCNC: 107 MMOL/L (ref 94–109)
CO2 SERPL-SCNC: 27 MMOL/L (ref 20–32)
COLOR UR AUTO: YELLOW
CREAT BLD-MCNC: 0.7 MG/DL (ref 0.52–1.04)
CREAT SERPL-MCNC: 0.63 MG/DL (ref 0.52–1.04)
DIFFERENTIAL METHOD BLD: NORMAL
EOSINOPHIL # BLD AUTO: 0.1 10E9/L (ref 0–0.7)
EOSINOPHIL NFR BLD AUTO: 1.8 %
ERYTHROCYTE [DISTWIDTH] IN BLOOD BY AUTOMATED COUNT: 14.2 % (ref 10–15)
GFR SERPL CREATININE-BSD FRML MDRD: 85 ML/MIN/{1.73_M2}
GFR SERPL CREATININE-BSD FRML MDRD: >90 ML/MIN/{1.73_M2}
GLUCOSE BLDC GLUCOMTR-MCNC: 77 MG/DL (ref 70–99)
GLUCOSE SERPL-MCNC: 78 MG/DL (ref 70–99)
GLUCOSE UR STRIP-MCNC: NEGATIVE MG/DL
HCT VFR BLD AUTO: 38.7 % (ref 35–47)
HGB BLD-MCNC: 13.7 G/DL (ref 11.7–15.7)
HGB UR QL STRIP: NEGATIVE
IMM GRANULOCYTES # BLD: 0 10E9/L (ref 0–0.4)
IMM GRANULOCYTES NFR BLD: 0.4 %
INR PPP: 0.95 (ref 0.86–1.14)
KETONES UR STRIP-MCNC: NEGATIVE MG/DL
LEUKOCYTE ESTERASE UR QL STRIP: NEGATIVE
LYMPHOCYTES # BLD AUTO: 1.6 10E9/L (ref 0.8–5.3)
LYMPHOCYTES NFR BLD AUTO: 29.1 %
MCH RBC QN AUTO: 28.2 PG (ref 26.5–33)
MCHC RBC AUTO-ENTMCNC: 35.4 G/DL (ref 31.5–36.5)
MCV RBC AUTO: 80 FL (ref 78–100)
MONOCYTES # BLD AUTO: 0.5 10E9/L (ref 0–1.3)
MONOCYTES NFR BLD AUTO: 8 %
MUCOUS THREADS #/AREA URNS LPF: PRESENT /LPF
NEUTROPHILS # BLD AUTO: 3.4 10E9/L (ref 1.6–8.3)
NEUTROPHILS NFR BLD AUTO: 60.2 %
NITRATE UR QL: NEGATIVE
NRBC # BLD AUTO: 0 10*3/UL
NRBC BLD AUTO-RTO: 0 /100
PH UR STRIP: 6 PH (ref 5–7)
PLATELET # BLD AUTO: 151 10E9/L (ref 150–450)
POTASSIUM SERPL-SCNC: 3.9 MMOL/L (ref 3.4–5.3)
RBC # BLD AUTO: 4.86 10E12/L (ref 3.8–5.2)
RBC #/AREA URNS AUTO: 1 /HPF (ref 0–2)
SODIUM SERPL-SCNC: 138 MMOL/L (ref 133–144)
SOURCE: ABNORMAL
SP GR UR STRIP: 1.03 (ref 1–1.03)
SQUAMOUS #/AREA URNS AUTO: 1 /HPF (ref 0–1)
TROPONIN I SERPL-MCNC: <0.015 UG/L (ref 0–0.04)
UROBILINOGEN UR STRIP-MCNC: NORMAL MG/DL (ref 0–2)
WBC # BLD AUTO: 5.6 10E9/L (ref 4–11)
WBC #/AREA URNS AUTO: 1 /HPF (ref 0–5)

## 2019-12-23 PROCEDURE — 85610 PROTHROMBIN TIME: CPT | Performed by: EMERGENCY MEDICINE

## 2019-12-23 PROCEDURE — 70450 CT HEAD/BRAIN W/O DYE: CPT

## 2019-12-23 PROCEDURE — 99284 EMERGENCY DEPT VISIT MOD MDM: CPT | Mod: 25 | Performed by: EMERGENCY MEDICINE

## 2019-12-23 PROCEDURE — 25000132 ZZH RX MED GY IP 250 OP 250 PS 637: Performed by: EMERGENCY MEDICINE

## 2019-12-23 PROCEDURE — 25800030 ZZH RX IP 258 OP 636: Performed by: EMERGENCY MEDICINE

## 2019-12-23 PROCEDURE — 81001 URINALYSIS AUTO W/SCOPE: CPT | Performed by: EMERGENCY MEDICINE

## 2019-12-23 PROCEDURE — 99285 EMERGENCY DEPT VISIT HI MDM: CPT | Mod: 25 | Performed by: EMERGENCY MEDICINE

## 2019-12-23 PROCEDURE — 80048 BASIC METABOLIC PNL TOTAL CA: CPT | Performed by: EMERGENCY MEDICINE

## 2019-12-23 PROCEDURE — 85025 COMPLETE CBC W/AUTO DIFF WBC: CPT | Performed by: EMERGENCY MEDICINE

## 2019-12-23 PROCEDURE — 85610 PROTHROMBIN TIME: CPT

## 2019-12-23 PROCEDURE — 93005 ELECTROCARDIOGRAM TRACING: CPT | Performed by: EMERGENCY MEDICINE

## 2019-12-23 PROCEDURE — 93005 ELECTROCARDIOGRAM TRACING: CPT

## 2019-12-23 PROCEDURE — 96374 THER/PROPH/DIAG INJ IV PUSH: CPT | Mod: 59 | Performed by: EMERGENCY MEDICINE

## 2019-12-23 PROCEDURE — 00000146 ZZHCL STATISTIC GLUCOSE BY METER IP

## 2019-12-23 PROCEDURE — 25000128 H RX IP 250 OP 636: Performed by: EMERGENCY MEDICINE

## 2019-12-23 PROCEDURE — 99215 OFFICE O/P EST HI 40 MIN: CPT | Performed by: FAMILY MEDICINE

## 2019-12-23 PROCEDURE — 93010 ELECTROCARDIOGRAM REPORT: CPT | Performed by: INTERNAL MEDICINE

## 2019-12-23 PROCEDURE — 85730 THROMBOPLASTIN TIME PARTIAL: CPT | Performed by: EMERGENCY MEDICINE

## 2019-12-23 PROCEDURE — 96361 HYDRATE IV INFUSION ADD-ON: CPT | Performed by: EMERGENCY MEDICINE

## 2019-12-23 PROCEDURE — 87086 URINE CULTURE/COLONY COUNT: CPT | Performed by: EMERGENCY MEDICINE

## 2019-12-23 PROCEDURE — 70498 CT ANGIOGRAPHY NECK: CPT

## 2019-12-23 PROCEDURE — 93010 ELECTROCARDIOGRAM REPORT: CPT | Mod: Z6 | Performed by: EMERGENCY MEDICINE

## 2019-12-23 PROCEDURE — 12000001 ZZH R&B MED SURG/OB UMMC

## 2019-12-23 PROCEDURE — 70551 MRI BRAIN STEM W/O DYE: CPT

## 2019-12-23 PROCEDURE — 82565 ASSAY OF CREATININE: CPT

## 2019-12-23 PROCEDURE — 84484 ASSAY OF TROPONIN QUANT: CPT | Performed by: EMERGENCY MEDICINE

## 2019-12-23 PROCEDURE — 25000132 ZZH RX MED GY IP 250 OP 250 PS 637: Performed by: INTERNAL MEDICINE

## 2019-12-23 PROCEDURE — 83036 HEMOGLOBIN GLYCOSYLATED A1C: CPT | Performed by: EMERGENCY MEDICINE

## 2019-12-23 RX ORDER — FLUTICASONE PROPIONATE 50 MCG
2 SPRAY, SUSPENSION (ML) NASAL DAILY
Status: DISCONTINUED | OUTPATIENT
Start: 2019-12-24 | End: 2019-12-24 | Stop reason: HOSPADM

## 2019-12-23 RX ORDER — ASPIRIN 325 MG
325 TABLET ORAL ONCE
Status: COMPLETED | OUTPATIENT
Start: 2019-12-23 | End: 2019-12-23

## 2019-12-23 RX ORDER — DULOXETIN HYDROCHLORIDE 30 MG/1
30 CAPSULE, DELAYED RELEASE ORAL DAILY
Status: DISCONTINUED | OUTPATIENT
Start: 2019-12-24 | End: 2019-12-24 | Stop reason: HOSPADM

## 2019-12-23 RX ORDER — ASPIRIN 81 MG/1
81 TABLET, CHEWABLE ORAL DAILY
Status: DISCONTINUED | OUTPATIENT
Start: 2019-12-23 | End: 2019-12-24 | Stop reason: HOSPADM

## 2019-12-23 RX ORDER — ACETAMINOPHEN 325 MG/1
975 TABLET ORAL ONCE
Status: COMPLETED | OUTPATIENT
Start: 2019-12-23 | End: 2019-12-23

## 2019-12-23 RX ORDER — CLOPIDOGREL BISULFATE 75 MG/1
75 TABLET ORAL ONCE
Status: COMPLETED | OUTPATIENT
Start: 2019-12-23 | End: 2019-12-23

## 2019-12-23 RX ORDER — CLOPIDOGREL BISULFATE 75 MG/1
300 TABLET ORAL ONCE
Status: DISCONTINUED | OUTPATIENT
Start: 2019-12-23 | End: 2019-12-24 | Stop reason: HOSPADM

## 2019-12-23 RX ORDER — ONDANSETRON 2 MG/ML
4 INJECTION INTRAMUSCULAR; INTRAVENOUS EVERY 6 HOURS PRN
Status: DISCONTINUED | OUTPATIENT
Start: 2019-12-23 | End: 2019-12-24 | Stop reason: HOSPADM

## 2019-12-23 RX ORDER — FEXOFENADINE HCL 180 MG/1
180 TABLET ORAL DAILY PRN
Status: DISCONTINUED | OUTPATIENT
Start: 2019-12-23 | End: 2019-12-24 | Stop reason: HOSPADM

## 2019-12-23 RX ORDER — MULTIPLE VITAMINS W/ MINERALS TAB 9MG-400MCG
1 TAB ORAL DAILY
Status: DISCONTINUED | OUTPATIENT
Start: 2019-12-24 | End: 2019-12-24 | Stop reason: HOSPADM

## 2019-12-23 RX ORDER — AMOXICILLIN 250 MG
2 CAPSULE ORAL 2 TIMES DAILY PRN
Status: DISCONTINUED | OUTPATIENT
Start: 2019-12-23 | End: 2019-12-24 | Stop reason: HOSPADM

## 2019-12-23 RX ORDER — LABETALOL 20 MG/4 ML (5 MG/ML) INTRAVENOUS SYRINGE
10-20 EVERY 10 MIN PRN
Status: DISCONTINUED | OUTPATIENT
Start: 2019-12-23 | End: 2019-12-24 | Stop reason: HOSPADM

## 2019-12-23 RX ORDER — GABAPENTIN 300 MG/1
300 CAPSULE ORAL 3 TIMES DAILY
Status: DISCONTINUED | OUTPATIENT
Start: 2019-12-24 | End: 2019-12-24 | Stop reason: HOSPADM

## 2019-12-23 RX ORDER — HYDROXYZINE HYDROCHLORIDE 25 MG/1
25 TABLET, FILM COATED ORAL 3 TIMES DAILY PRN
Status: DISCONTINUED | OUTPATIENT
Start: 2019-12-23 | End: 2019-12-24 | Stop reason: HOSPADM

## 2019-12-23 RX ORDER — FERROUS SULFATE 325(65) MG
325 TABLET ORAL
Status: DISCONTINUED | OUTPATIENT
Start: 2019-12-24 | End: 2019-12-23

## 2019-12-23 RX ORDER — ONDANSETRON 4 MG/1
4 TABLET, ORALLY DISINTEGRATING ORAL EVERY 6 HOURS PRN
Status: DISCONTINUED | OUTPATIENT
Start: 2019-12-23 | End: 2019-12-24 | Stop reason: HOSPADM

## 2019-12-23 RX ORDER — LORAZEPAM 2 MG/ML
1 INJECTION INTRAMUSCULAR ONCE
Status: COMPLETED | OUTPATIENT
Start: 2019-12-23 | End: 2019-12-23

## 2019-12-23 RX ORDER — CLOPIDOGREL BISULFATE 75 MG/1
75 TABLET ORAL DAILY
Status: DISCONTINUED | OUTPATIENT
Start: 2019-12-24 | End: 2019-12-24 | Stop reason: HOSPADM

## 2019-12-23 RX ORDER — AMOXICILLIN 250 MG
1 CAPSULE ORAL 2 TIMES DAILY PRN
Status: DISCONTINUED | OUTPATIENT
Start: 2019-12-23 | End: 2019-12-24 | Stop reason: HOSPADM

## 2019-12-23 RX ORDER — ACETAMINOPHEN 325 MG/1
650 TABLET ORAL EVERY 4 HOURS PRN
Status: DISCONTINUED | OUTPATIENT
Start: 2019-12-23 | End: 2019-12-24 | Stop reason: HOSPADM

## 2019-12-23 RX ORDER — CYCLOBENZAPRINE HCL 5 MG
5 TABLET ORAL 3 TIMES DAILY
Status: DISCONTINUED | OUTPATIENT
Start: 2019-12-24 | End: 2019-12-23

## 2019-12-23 RX ORDER — ATORVASTATIN CALCIUM 40 MG/1
40 TABLET, FILM COATED ORAL ONCE
Status: COMPLETED | OUTPATIENT
Start: 2019-12-23 | End: 2019-12-23

## 2019-12-23 RX ORDER — IOPAMIDOL 755 MG/ML
75 INJECTION, SOLUTION INTRAVASCULAR ONCE
Status: COMPLETED | OUTPATIENT
Start: 2019-12-23 | End: 2019-12-23

## 2019-12-23 RX ORDER — HYDROXYCHLOROQUINE SULFATE 200 MG/1
200 TABLET, FILM COATED ORAL DAILY
Status: DISCONTINUED | OUTPATIENT
Start: 2019-12-24 | End: 2019-12-24 | Stop reason: HOSPADM

## 2019-12-23 RX ADMIN — ACETAMINOPHEN 975 MG: 325 TABLET, FILM COATED ORAL at 23:55

## 2019-12-23 RX ADMIN — LORAZEPAM 1 MG: 2 INJECTION INTRAMUSCULAR; INTRAVENOUS at 18:46

## 2019-12-23 RX ADMIN — ASPIRIN 325 MG ORAL TABLET 325 MG: 325 PILL ORAL at 20:33

## 2019-12-23 RX ADMIN — IOPAMIDOL 75 ML: 755 INJECTION, SOLUTION INTRAVENOUS at 15:31

## 2019-12-23 RX ADMIN — SODIUM CHLORIDE 500 ML: 9 INJECTION, SOLUTION INTRAVENOUS at 16:24

## 2019-12-23 RX ADMIN — CLOPIDOGREL BISULFATE 75 MG: 75 TABLET ORAL at 20:33

## 2019-12-23 RX ADMIN — ATORVASTATIN CALCIUM 40 MG: 40 TABLET, FILM COATED ORAL at 20:33

## 2019-12-23 ASSESSMENT — VISUAL ACUITY
OU: BASELINE
OU: NORMAL ACUITY
OU: BASELINE

## 2019-12-23 ASSESSMENT — ACTIVITIES OF DAILY LIVING (ADL)
FALL_HISTORY_WITHIN_LAST_SIX_MONTHS: NO
DRESS: 0-->INDEPENDENT
RETIRED_EATING: 0-->INDEPENDENT
RETIRED_COMMUNICATION: 0-->UNDERSTANDS/COMMUNICATES WITHOUT DIFFICULTY
AMBULATION: 0-->INDEPENDENT
TOILETING: 0-->INDEPENDENT
BATHING: 0-->INDEPENDENT
TRANSFERRING: 0-->INDEPENDENT
COGNITION: 0 - NO COGNITION ISSUES REPORTED
SWALLOWING: 0-->SWALLOWS FOODS/LIQUIDS WITHOUT DIFFICULTY

## 2019-12-23 ASSESSMENT — ENCOUNTER SYMPTOMS
HEADACHES: 1
WEAKNESS: 1
SPEECH DIFFICULTY: 0
NUMBNESS: 1

## 2019-12-23 ASSESSMENT — PAIN SCALES - GENERAL: PAINLEVEL: MODERATE PAIN (5)

## 2019-12-23 NOTE — CONSULTS
[facility], [place]    Stroke Consult Note    Reason for Consult: LUE numbness    Chief Complaint: Numbness      HPI  Rayni L Link Stafford is a 59 year old female with PMH of SLE, depression and anxiety, and headaches who presented to urgent care with new onset of L numbness, tingling that started at 11 AM. Transferred to the [facility] ED due to concern for possible stroke. Pt has been having headaches for the past couple of days with light sensitivity. Additionally, she has been having confusion eg. Forgetting to go to work. She developed a L sided headache that was worse compared to her prior headaches, then developed LUE > LLE numbness, and tingling. She reports a history of shingles and chronic sided weakness. She has been having increased stress at work and is tearful in the ED.     TPA Treatment   Not given due to outside the time window, stroke mimic: complicated migraine.    Endovascular Treatment  Not initiated due to absence of proximal vessel occlusion    Impression  L sided numbness, tingling, and weakness  59yoF with h/o SLE, depression and anxiety, and headaches who presented with recent headaches and confusion followed by new onset LUE>LLE numbness and tingling. NIH score 1 for sensory changes. Non-con CT head negative for hemorrhage and CTA head and neck negative for LVO, stenosis and dissection. Recommend MRI to rule out stroke. Though suspect she may have complicated migraine (light sensitivity, worsened headache, and sensation changes). Additionally, pt noted that she was having increased stressors at work which may be contributing. MRI negative for stroke     Patient Follow-up    - in 2 weeks with local neurologist    Thank you for this consult. No further stroke evaluation is recommended, so we will sign off. Please contact us with any additional questions.    Andrae Palomares MD  PGY2  [phone]  ______________________________________________________    Past Medical  History   Past Medical History:   Diagnosis Date     Allergy      Anemia      Anxiety      Depressive disorder      hands/feet      Hemoglobin C trait (H) 8/29/2016     Lupus (H)      Substance abuse (H)     Has not used for 19 years.     Past Surgical History   Past Surgical History:   Procedure Laterality Date     COLONOSCOPY  01/2010    repeat 10 yrs     DILATION AND CURETTAGE, OPERATIVE HYSTEROSCOPY WITH MORCELLATOR, COMBINED N/A 2/15/2017    Procedure: COMBINED DILATION AND CURETTAGE, OPERATIVE HYSTEROSCOPY WITH MORCELLATOR;  Surgeon: Erin Gutierrez MD;  [facility]     ESOPHAGOSCOPY, GASTROSCOPY, DUODENOSCOPY (EGD), COMBINED Left 2/5/2016    Procedure: COMBINED ESOPHAGOSCOPY, GASTROSCOPY, DUODENOSCOPY (EGD), BIOPSY SINGLE OR MULTIPLE;  Surgeon: Pierre Fernandez MD;  Location:  GI     GYN SURGERY       Medications   Home Meds  Prior to Admission medications    Medication Sig Start Date End Date Taking? Authorizing Provider   amphetamine-dextroamphetamine (ADDERALL) 10 MG tablet Take 10 mg by mouth 2 times daily     Reported, Patient   Calcium-Magnesium-Vitamin D (CALCIUM 500 PO)     Reported, Patient   cloNIDine (CATAPRES) 0.1 MG tablet TAKE 2 TABLETS BY MOUTH DAILY AT BEDTIME 10/11/19   Reported, Patient   cyclobenzaprine (FLEXERIL) 5 MG tablet TAKE 1 TABLET BY MOUTH 3 TIMES DAILY  Patient not taking: Reported on 12/17/2018 7/5/17   Rafael Orosco MD   DEEP SEA NASAL SPRAY 0.65 % nasal spray SPRAY 1 SPRAY INTO BOTH NOSTRILS DAILY AS NEEDED FOR CONGESTION 6/30/17   Rafael Orosco MD   DULoxetine (CYMBALTA) 30 MG capsule TAKE 1 CAPSULE BY MOUTH EVERY DAY 10/21/19   Rafael Orosco MD   ferrous sulfate (IRON) 325 (65 FE) MG tablet Take 1 tablet (325 mg) by mouth daily (with breakfast) 2/6/16   iWlson Rod PA-C   fexofenadine (ALLEGRA) 180 MG tablet Take 1 tablet (180 mg) by mouth daily  Patient not taking: Reported on 12/23/2019 5/21/18   Mary Schmidt  "THOMAS Bah CNP   flunisolide (NASALIDE) 25 MCG/ACT (0.025%) SOLN spray INHALE 2 SPRAYS INTO EACH NOSTRIL 2 TIMES DAILY 10/9/18   Brandyn Angeles MD   gabapentin (NEURONTIN) 300 MG capsule TAKE 2 CAPSULE (300 MG) BY MOUTH 3 TIMES DAILY 11/4/19   Rafael Orosco MD   gabapentin (NEURONTIN) 300 MG capsule Take 1 capsule (300 mg) by mouth 3 times daily 11/2/19   Daniella Long PA-C   hydroxychloroquine (PLAQUENIL) 200 MG tablet TAKE 1 TABLET BY MOUTH DAILY 7/3/18   Brandyn Angeles MD   hydrOXYzine (ATARAX) 25 MG tablet TAKE 1 TABLET (25 MG) BY MOUTH 3 TIMES PER DAY 10/8/19   Rafael Orosco MD   mometasone (NASONEX) 50 MCG/ACT spray Spray 2 sprays into both nostrils daily  Patient not taking: Reported on 12/23/2019 5/21/18   Mary Schmidt APRN CNP   multivitamin, therapeutic with minerals (MULTI-VITAMIN) TABS Take 1 tablet by mouth daily    Reported, Patient   omega 3 1200 MG CAPS     Reported, Patient   order for DME [place], 9/20/17   Rafael Orosco MD   polyethylene glycol (GOLYTELY/NULYTELY) 236 g suspension Take 4,000 mLs (4 L) by mouth once for 1 dose Refer to \"Getting Ready for a Colonoscopy\" instruction handout 9/11/19 9/11/19  Gaetano Alcazar MD   predniSONE (DELTASONE) 10 MG tablet Take 3 tablets (30 mg) by mouth daily for 7 days 11/13/19 11/20/19  Rosa Weinstein APRN CNP   tiZANidine (ZANAFLEX) 4 MG tablet TAKE 1-2 TABLETS BY MOUTH AS NEEDED THREE TIMES A DAY 12/11/18   Reported, Patient   triamcinolone (NASACORT) 55 MCG/ACT Inhaler Spray 2 sprays into both nostrils daily  Patient not taking: Reported on 12/31/2018 5/23/18   Rafael Orosco MD       Scheduled Meds    sodium chloride 0.9%  500 mL Intravenous Once     Allergies   Allergies   Allergen Reactions     Morphine Sulfate Itching     Sulfa Drugs Hives     Family History   Family History   Problem Relation Age of Onset     Lupus Mother      Asthma Mother      Diabetes Father      " Brain Tumor Father         begnign on pituitary     Depression Sister      Anemia Sister      Social History   Social History     Tobacco Use     Smoking status: Former Smoker     Smokeless tobacco: Never Used   Substance Use Topics     Alcohol use: No     Drug use: No       Review of Systems   The 10 point Review of Systems is negative other than noted in the HPI       PHYSICAL EXAMINATION  Temp:  [98.5  F (36.9  C)-98.8  F (37.1  C)] 98.8  F (37.1  C)  Pulse:  [85-92] 85  Resp:  [16] 16  BP: (112-131)/(74-94) 131/94  SpO2:  [98 %-100 %] 100 %     General:  patient laying in bed, tearful    HEENT:  normocephalic/atraumatic, no oral lesions, no epistaxis   Cardio:  RRR  Pulmonary:  no respiratory distress  Abdomen:  soft, non-tender, non-distended  Extremities:  no edema  Skin:  intact, warm/dry, no jaundice     Neurologic  Mental Status:  alert, oriented x 3, follows commands, speech clear and fluent, naming and repetition normal  Cranial Nerves:  visual fields intact, PERRL, EOMI with normal smooth pursuit, facial sensation intact and symmetric, facial movements symmetric, hearing not formally tested but intact to conversation, palate elevation symmetric and uvula midline, no dysarthria, tongue protrusion midline  Motor:  normal muscle tone and bulk, no abnormal movements, able to move all limbs spontaneously, strength 5/5 throughout upper and lower extremities, no pronator drift  Reflexes:  no clonus  Sensory:  light touch sensation intact and symmetric throughout upper and lower extremities, no extinction on double simultaneous stimulation   Coordination:  normal finger-to-nose and heel-to-shin bilaterally without dysmetria  Station/Gait:  deferred      Dysphagia Screen  Symptoms unlikely to be due to acute stroke    Stroke Scales    NIHSS  Interval baseline (12/23/19 1606)   Interval Comments     1a. Level of Consciousness 0-->Alert, keenly responsive   1b. LOC Questions 0-->Answers both questions correctly    1c. LOC Commands 0-->Performs both tasks correctly   2.   Best Gaze 0-->Normal   3.   Visual 0-->No visual loss   4.   Facial Palsy 0-->Normal symmetrical movements   5a. Motor Arm, Left 0-->No drift, limb holds 90 (or 45) degrees for full 10 secs   5b. Motor Arm, Right 0-->No drift, limb holds 90 (or 45) degrees for full 10 secs   6a. Motor Leg, Left 0-->No drift, leg holds 30 degree position for full 5 secs   6b. Motor Leg, right 0-->No drift, leg holds 30 degree position for full 5 secs   7.   Limb Ataxia 0-->Absent   8.   Sensory 1-->Mild-to-moderate sensory loss, patient feels pinprick is less sharp or is dull on the affected side, or there is a loss of superficial pain with pinprick, but patient is aware of being touched   9.   Best Language 0-->No aphasia, normal   10. Dysarthria 0-->Normal   11. Extinction and Inattention  0-->No abnormality   Total 1 (12/23/19 1606)       Imaging  I personally reviewed all imaging; relevant findings per HPI.     Lab Results Data   CBC  Recent Labs   Lab 12/23/19  1517   WBC 5.6   RBC 4.86   HGB 13.7   HCT 38.7        Basic Metabolic Panel    Recent Labs   Lab 12/23/19  1517      POTASSIUM 3.9   CHLORIDE 107   CO2 27   BUN 9   CR 0.63   GLC 78   AKILA 9.6     Liver Panel  Recent Labs   Lab Test 05/21/19  0929 10/08/18  1000 03/05/18  1030   PROTTOTAL 8.3 8.1 8.4   ALBUMIN 3.9 3.9 3.9   BILITOTAL 0.4 0.6 0.6   ALKPHOS 67 59 66   AST 32 40 26   ALT 36 44 28     INR  Recent Labs   Lab Test 12/23/19  1517 02/06/16  1101 02/05/16  0720   INR 0.95 0.97 1.02      Lipid Profile  Recent Labs   Lab Test 11/02/16  1151   CHOL 263*   HDL 83   *   TRIG 147     A1C  Recent Labs   Lab Test 11/02/16  1151 10/05/15  1548   A1C 5.7 5.5     Troponin I  Recent Labs   Lab 12/23/19  1517   TROPI <0.015          Stroke Code / Stroke Consult Data Data   Stroke Code Data  (for stroke code without tele)  Stroke code activated 12/23/19   1514   First stroke provider response  12/23/19   1517   Last known normal 12/23/19   1100   Time of discovery   (or onset of symptoms) 12/23/19   1100   Head CT read by me 12/23/19   1530   Was stroke code de-escalated? Yes 12/23/19 1557  symptoms not likely caused by stroke

## 2019-12-23 NOTE — TELEPHONE ENCOUNTER
[12/23/2019 12:14 PM] Diana Deluca:   I have ADRIAN MIROSLAVA CARA MILLER [[id number]] on the line. her left side is weak and she is having memory loss.    Left arm tingle and numb, confusion, sick last 7-8 days. URI/Congested, having trouble sleeping. Stopped adderall. Has massage on Saturday. Denies swelling and injury.   Writer could not find appropriate triage protocol.  Made appointment to be seen in clinic tomorrow morning.   Future Office visit:    Next 5 appointments (look out 90 days)    Dec 24, 2019  8:45 AM CST  SHORT with Brandyn Angeles MD  [facility] ([facility] [address] [place] [id number] [phone]           Joann Mullins RN on 12/23/2019 at 12:26 PM

## 2019-12-23 NOTE — PROGRESS NOTES
SUBJECTIVE:  Chief Complaint   Patient presents with     Musculoskeletal Problem     left arm numbness and tingling start today      Headache     x 2-3 days-been sick for 7-8 days with the cold      Rayni ANGELA Link Stafford is a 59 year old female who presents with a chief complaint of left arm and leg weakness since 10:30 am today ( 4 hours).  Symptoms began 4 hour(s) ago, are mild and improving  Context:-  She noticed she had difficulty holding a wash cloth in her left hand and has had some weakness and increased limp in the left leg  Injury:No.   No pain in the extremities,    Has had cold symptoms for 1 week with nasal congestion-  Headache 2-3 days   Today noticed some confusion  Has history of lupus-  Has had chronic decreased strength on left side of her body, but today the weakness is more pronounced    Past Medical History:   Diagnosis Date     Allergy      Anemia      Anxiety      Depressive disorder      hands/feet      Hemoglobin C trait (H) 8/29/2016     Lupus (H)      Substance abuse (H)     Has not used for 19 years.     Patient Active Problem List   Diagnosis     Lupus (H)     Abnormal perimenopausal bleeding     Obesity, Class I, BMI 30-34.9     History of claustrophobia     Depressive disorder     Anxiety     Allergic state     ACP (advance care planning)     Tired     Insomnia, unspecified type     Hemoglobin C trait (H)     Liver hemangioma     Bilateral carpal tunnel syndrome     Moderate major depression (H)       ALLERGIES:  Morphine sulfate and Sulfa drugs    MEDs  cloNIDine (CATAPRES) 0.1 MG tablet, TAKE 2 TABLETS BY MOUTH DAILY AT BEDTIME  DEEP SEA NASAL SPRAY 0.65 % nasal spray, SPRAY 1 SPRAY INTO BOTH NOSTRILS DAILY AS NEEDED FOR CONGESTION  DULoxetine (CYMBALTA) 30 MG capsule, TAKE 1 CAPSULE BY MOUTH EVERY DAY  ferrous sulfate (IRON) 325 (65 FE) MG tablet, Take 1 tablet (325 mg) by mouth daily (with breakfast)  flunisolide (NASALIDE) 25 MCG/ACT (0.025%) SOLN spray, INHALE 2 SPRAYS INTO EACH  "NOSTRIL 2 TIMES DAILY  gabapentin (NEURONTIN) 300 MG capsule, TAKE 2 CAPSULE (300 MG) BY MOUTH 3 TIMES DAILY  hydroxychloroquine (PLAQUENIL) 200 MG tablet, TAKE 1 TABLET BY MOUTH DAILY  hydrOXYzine (ATARAX) 25 MG tablet, TAKE 1 TABLET (25 MG) BY MOUTH 3 TIMES PER DAY  multivitamin, therapeutic with minerals (MULTI-VITAMIN) TABS, Take 1 tablet by mouth daily  omega 3 1200 MG CAPS,   tiZANidine (ZANAFLEX) 4 MG tablet, TAKE 1-2 TABLETS BY MOUTH AS NEEDED THREE TIMES A DAY  amphetamine-dextroamphetamine (ADDERALL) 10 MG tablet, Take 10 mg by mouth 2 times daily   Calcium-Magnesium-Vitamin D (CALCIUM 500 PO),   cyclobenzaprine (FLEXERIL) 5 MG tablet, TAKE 1 TABLET BY MOUTH 3 TIMES DAILY (Patient not taking: Reported on 2018)  fexofenadine (ALLEGRA) 180 MG tablet, Take 1 tablet (180 mg) by mouth daily (Patient not taking: Reported on 2019)  gabapentin (NEURONTIN) 300 MG capsule, Take 1 capsule (300 mg) by mouth 3 times daily  mometasone (NASONEX) 50 MCG/ACT spray, Spray 2 sprays into both nostrils daily (Patient not taking: Reported on 2019)  order for DME, [place],  [] polyethylene glycol (GOLYTELY/NULYTELY) 236 g suspension, Take 4,000 mLs (4 L) by mouth once for 1 dose Refer to \"Getting Ready for a Colonoscopy\" instruction handout  [] predniSONE (DELTASONE) 10 MG tablet, Take 3 tablets (30 mg) by mouth daily for 7 days  triamcinolone (NASACORT) 55 MCG/ACT Inhaler, Spray 2 sprays into both nostrils daily (Patient not taking: Reported on 2018)    No current facility-administered medications on file prior to visit.       Social History     Tobacco Use     Smoking status: Former Smoker     Smokeless tobacco: Never Used   Substance Use Topics     Alcohol use: No       Family History   Problem Relation Age of Onset     Lupus Mother      Asthma Mother      Diabetes Father      Brain Tumor Father         begnign on pituitary     Depression Sister      Anemia Sister        Review Of " Systems    Constitutional:  Negative for fevers, chills,    Skin: negative for rash or lesions  Eyes: negative for eye pain, visual changes  Ears/Nose/Throat: negative for earache  , sinus trouble,  sore throat  Respiratory: No shortness of breath, dyspnea on exertion  Has had some cough  Cardiovascular: negative for, palpitations, tachycardia or chest pain  Gastrointestinal: negative for vomiting, abdominal pain or diarrhea  Genitourinary: negative for dysuria or hematuria  Back: negative for pain with back movement,  CVA pain  Musculoskeletal: negative for generalized joint pain, joint swelling or joint stiffness  Neurologic:  Weakness left arm and leg  Endocrine: negative for thyroid disorder or diabetes      EXAM:   /74 (BP Location: Left arm, Patient Position: Sitting, Cuff Size: Adult Regular)   Pulse 92   Temp 98.5  F (36.9  C) (Oral)   Resp 16   Wt 79.7 kg (175 lb 12.8 oz)   LMP 08/12/2013   SpO2 98%   BMI 29.83 kg/m    M/S Exam:left arm-  Slightly weaker  on the left side- able to raise arm above the head -  Describes tingling with palpation of left arm.   Left leg-  Mild limp with ambulation  No pain  GENERAL APPEARANCE: alert, mild distress and cooperative  EYES- PERRLA, EOMI  HEENT-  TMs pearly bilateral, no erythema mouth/ nose  NECK: supple, non-tender to palpation, FROM   CHEST: clear to auscultation  CV: regular rate and rhythm  EXTREMITIES: peripheral pulses normal  MS: no gross deformities noted, no evidence of inflammation in joints, FROM in all extremities.- though left side weakness  SKIN: no suspicious lesions or rashes  NEURO:  mentation intact and speech normal           ASSESSMENT:  Weakness of left arm  Weakness of left leg  Headache disorder     With her mild, but noticeable left arm/ leg weakness with headache in the past 4 hours she needs prompt ER evaluation for possible stroke-  She wants to go to the [facility]-  Told to go to [employer]-  She declined ambulance  transport-  Her sister is with her and will  her directly    Called [facility]-  They are activating the stroke protocol

## 2019-12-23 NOTE — ED TRIAGE NOTES
Patient presents from urgent care with c/o left arm numbness/tingling since 1100. Patient states she could not hold a washcloth with left arm. Stroke code called upon rooming patient.

## 2019-12-23 NOTE — PROGRESS NOTES
Arrival to Stroke Code: 1515    Stroke Team escalate/de-escalate interventions:     ED/Bedside Nurse providing handoff: Sabi GUSTAFSON    Time left for CT: 1525  Time Returned to ED/Unit: 1535    ED/Bedside Nurse given handoff to & Time: 1537 Sabi GUSTAFSON

## 2019-12-24 ENCOUNTER — APPOINTMENT (OUTPATIENT)
Dept: CARDIOLOGY | Facility: CLINIC | Age: 59
DRG: 066 | End: 2019-12-24
Attending: INTERNAL MEDICINE
Payer: COMMERCIAL

## 2019-12-24 ENCOUNTER — APPOINTMENT (OUTPATIENT)
Dept: OCCUPATIONAL THERAPY | Facility: CLINIC | Age: 59
DRG: 066 | End: 2019-12-24
Attending: INTERNAL MEDICINE
Payer: COMMERCIAL

## 2019-12-24 ENCOUNTER — APPOINTMENT (OUTPATIENT)
Dept: CARDIOLOGY | Facility: CLINIC | Age: 59
DRG: 066 | End: 2019-12-24
Payer: COMMERCIAL

## 2019-12-24 ENCOUNTER — TELEPHONE (OUTPATIENT)
Dept: FAMILY MEDICINE | Facility: CLINIC | Age: 59
End: 2019-12-24

## 2019-12-24 VITALS
HEART RATE: 86 BPM | OXYGEN SATURATION: 98 % | WEIGHT: 174.8 LBS | BODY MASS INDEX: 29.66 KG/M2 | TEMPERATURE: 98.4 F | DIASTOLIC BLOOD PRESSURE: 83 MMHG | SYSTOLIC BLOOD PRESSURE: 116 MMHG | RESPIRATION RATE: 16 BRPM

## 2019-12-24 LAB
ALBUMIN SERPL-MCNC: 3.4 G/DL (ref 3.4–5)
ALP SERPL-CCNC: 67 U/L (ref 40–150)
ALT SERPL W P-5'-P-CCNC: 30 U/L (ref 0–50)
ANION GAP SERPL CALCULATED.3IONS-SCNC: 6 MMOL/L (ref 3–14)
AST SERPL W P-5'-P-CCNC: 26 U/L (ref 0–45)
BASOPHILS # BLD AUTO: 0 10E9/L (ref 0–0.2)
BASOPHILS NFR BLD AUTO: 0.7 %
BILIRUB SERPL-MCNC: 0.8 MG/DL (ref 0.2–1.3)
BUN SERPL-MCNC: 7 MG/DL (ref 7–30)
C3 SERPL-MCNC: 120 MG/DL (ref 81–157)
C4 SERPL-MCNC: 24 MG/DL (ref 13–39)
CALCIUM SERPL-MCNC: 9 MG/DL (ref 8.5–10.1)
CARDIOLIPIN ANTIBODY IGG: <1.6 GPL-U/ML (ref 0–19.9)
CARDIOLIPIN ANTIBODY IGM: <0.2 MPL-U/ML (ref 0–19.9)
CHLORIDE SERPL-SCNC: 110 MMOL/L (ref 94–109)
CHOLEST SERPL-MCNC: 243 MG/DL
CO2 SERPL-SCNC: 26 MMOL/L (ref 20–32)
CREAT SERPL-MCNC: 0.55 MG/DL (ref 0.52–1.04)
CRP SERPL-MCNC: <2.9 MG/L (ref 0–8)
DIFFERENTIAL METHOD BLD: ABNORMAL
DIFFERENTIAL METHOD BLD: NORMAL
EOSINOPHIL # BLD AUTO: 0.1 10E9/L (ref 0–0.7)
EOSINOPHIL NFR BLD AUTO: 3.1 %
ERYTHROCYTE [DISTWIDTH] IN BLOOD BY AUTOMATED COUNT: 14.3 % (ref 10–15)
ERYTHROCYTE [DISTWIDTH] IN BLOOD BY AUTOMATED COUNT: NORMAL % (ref 10–15)
ERYTHROCYTE [SEDIMENTATION RATE] IN BLOOD BY WESTERGREN METHOD: 19 MM/H (ref 0–30)
GFR SERPL CREATININE-BSD FRML MDRD: >90 ML/MIN/{1.73_M2}
GLUCOSE BLDC GLUCOMTR-MCNC: 89 MG/DL (ref 70–99)
GLUCOSE BLDC GLUCOMTR-MCNC: 91 MG/DL (ref 70–99)
GLUCOSE BLDC GLUCOMTR-MCNC: 97 MG/DL (ref 70–99)
GLUCOSE SERPL-MCNC: 89 MG/DL (ref 70–99)
HBA1C MFR BLD: 5.5 % (ref 0–5.6)
HCT VFR BLD AUTO: 38.2 % (ref 35–47)
HCT VFR BLD AUTO: NORMAL % (ref 35–47)
HDLC SERPL-MCNC: 76 MG/DL
HGB BLD-MCNC: 13.5 G/DL (ref 11.7–15.7)
HGB BLD-MCNC: NORMAL G/DL (ref 11.7–15.7)
IMM GRANULOCYTES # BLD: 0 10E9/L (ref 0–0.4)
IMM GRANULOCYTES NFR BLD: 0.2 %
INTERPRETATION ECG - MUSE: NORMAL
INTERPRETATION ECG - MUSE: NORMAL
LDLC SERPL CALC-MCNC: 152 MG/DL
LYMPHOCYTES # BLD AUTO: 1.6 10E9/L (ref 0.8–5.3)
LYMPHOCYTES NFR BLD AUTO: 36.5 %
MCH RBC QN AUTO: 28 PG (ref 26.5–33)
MCH RBC QN AUTO: NORMAL PG (ref 26.5–33)
MCHC RBC AUTO-ENTMCNC: 35.3 G/DL (ref 31.5–36.5)
MCHC RBC AUTO-ENTMCNC: NORMAL G/DL (ref 31.5–36.5)
MCV RBC AUTO: 79 FL (ref 78–100)
MCV RBC AUTO: NORMAL FL (ref 78–100)
MONOCYTES # BLD AUTO: 0.3 10E9/L (ref 0–1.3)
MONOCYTES NFR BLD AUTO: 6.2 %
NEUTROPHILS # BLD AUTO: 2.4 10E9/L (ref 1.6–8.3)
NEUTROPHILS NFR BLD AUTO: 53.3 %
NONHDLC SERPL-MCNC: 168 MG/DL
NRBC # BLD AUTO: 0 10*3/UL
NRBC BLD AUTO-RTO: 0 /100
PLATELET # BLD AUTO: 145 10E9/L (ref 150–450)
PLATELET # BLD AUTO: NORMAL 10E9/L (ref 150–450)
POTASSIUM SERPL-SCNC: 3.9 MMOL/L (ref 3.4–5.3)
PROT SERPL-MCNC: 7.2 G/DL (ref 6.8–8.8)
RBC # BLD AUTO: 4.83 10E12/L (ref 3.8–5.2)
RBC # BLD AUTO: NORMAL 10E12/L (ref 3.8–5.2)
SODIUM SERPL-SCNC: 142 MMOL/L (ref 133–144)
TRIGL SERPL-MCNC: 76 MG/DL
WBC # BLD AUTO: 4.5 10E9/L (ref 4–11)
WBC # BLD AUTO: NORMAL 10E9/L (ref 4–11)

## 2019-12-24 PROCEDURE — 36415 COLL VENOUS BLD VENIPUNCTURE: CPT | Performed by: STUDENT IN AN ORGANIZED HEALTH CARE EDUCATION/TRAINING PROGRAM

## 2019-12-24 PROCEDURE — 97165 OT EVAL LOW COMPLEX 30 MIN: CPT | Mod: GO

## 2019-12-24 PROCEDURE — 0296T ZIO PATCH HOLTER ADULT PEDIATRIC GREATER THAN 48 HRS: CPT

## 2019-12-24 PROCEDURE — 86039 ANTINUCLEAR ANTIBODIES (ANA): CPT | Performed by: STUDENT IN AN ORGANIZED HEALTH CARE EDUCATION/TRAINING PROGRAM

## 2019-12-24 PROCEDURE — 86140 C-REACTIVE PROTEIN: CPT | Performed by: STUDENT IN AN ORGANIZED HEALTH CARE EDUCATION/TRAINING PROGRAM

## 2019-12-24 PROCEDURE — 0298T ZIO PATCH HOLTER ADULT PEDIATRIC GREATER THAN 48 HRS: CPT | Performed by: INTERNAL MEDICINE

## 2019-12-24 PROCEDURE — 86146 BETA-2 GLYCOPROTEIN ANTIBODY: CPT | Performed by: STUDENT IN AN ORGANIZED HEALTH CARE EDUCATION/TRAINING PROGRAM

## 2019-12-24 PROCEDURE — 86225 DNA ANTIBODY NATIVE: CPT | Performed by: STUDENT IN AN ORGANIZED HEALTH CARE EDUCATION/TRAINING PROGRAM

## 2019-12-24 PROCEDURE — 85652 RBC SED RATE AUTOMATED: CPT | Performed by: STUDENT IN AN ORGANIZED HEALTH CARE EDUCATION/TRAINING PROGRAM

## 2019-12-24 PROCEDURE — 00000146 ZZHCL STATISTIC GLUCOSE BY METER IP

## 2019-12-24 PROCEDURE — 36415 COLL VENOUS BLD VENIPUNCTURE: CPT | Performed by: PSYCHIATRY & NEUROLOGY

## 2019-12-24 PROCEDURE — 80053 COMPREHEN METABOLIC PANEL: CPT | Performed by: PSYCHIATRY & NEUROLOGY

## 2019-12-24 PROCEDURE — 00000167 ZZHCL STATISTIC INR NC: Performed by: STUDENT IN AN ORGANIZED HEALTH CARE EDUCATION/TRAINING PROGRAM

## 2019-12-24 PROCEDURE — 80061 LIPID PANEL: CPT | Performed by: PSYCHIATRY & NEUROLOGY

## 2019-12-24 PROCEDURE — 85730 THROMBOPLASTIN TIME PARTIAL: CPT | Performed by: STUDENT IN AN ORGANIZED HEALTH CARE EDUCATION/TRAINING PROGRAM

## 2019-12-24 PROCEDURE — 00000401 ZZHCL STATISTIC THROMBIN TIME NC: Performed by: STUDENT IN AN ORGANIZED HEALTH CARE EDUCATION/TRAINING PROGRAM

## 2019-12-24 PROCEDURE — 85025 COMPLETE CBC W/AUTO DIFF WBC: CPT | Performed by: PSYCHIATRY & NEUROLOGY

## 2019-12-24 PROCEDURE — 86160 COMPLEMENT ANTIGEN: CPT | Performed by: STUDENT IN AN ORGANIZED HEALTH CARE EDUCATION/TRAINING PROGRAM

## 2019-12-24 PROCEDURE — 97535 SELF CARE MNGMENT TRAINING: CPT | Mod: GO

## 2019-12-24 PROCEDURE — 93306 TTE W/DOPPLER COMPLETE: CPT

## 2019-12-24 PROCEDURE — 86038 ANTINUCLEAR ANTIBODIES: CPT | Performed by: STUDENT IN AN ORGANIZED HEALTH CARE EDUCATION/TRAINING PROGRAM

## 2019-12-24 PROCEDURE — 25000132 ZZH RX MED GY IP 250 OP 250 PS 637: Performed by: INTERNAL MEDICINE

## 2019-12-24 PROCEDURE — 85613 RUSSELL VIPER VENOM DILUTED: CPT | Performed by: STUDENT IN AN ORGANIZED HEALTH CARE EDUCATION/TRAINING PROGRAM

## 2019-12-24 PROCEDURE — 93306 TTE W/DOPPLER COMPLETE: CPT | Mod: 26 | Performed by: INTERNAL MEDICINE

## 2019-12-24 PROCEDURE — 86147 CARDIOLIPIN ANTIBODY EA IG: CPT | Performed by: STUDENT IN AN ORGANIZED HEALTH CARE EDUCATION/TRAINING PROGRAM

## 2019-12-24 PROCEDURE — 85025 COMPLETE CBC W/AUTO DIFF WBC: CPT | Performed by: STUDENT IN AN ORGANIZED HEALTH CARE EDUCATION/TRAINING PROGRAM

## 2019-12-24 PROCEDURE — 40000895 ZZH STATISTIC SLP IP EVAL DEFER

## 2019-12-24 PROCEDURE — 97530 THERAPEUTIC ACTIVITIES: CPT | Mod: GO

## 2019-12-24 RX ORDER — ASPIRIN 325 MG
325 TABLET ORAL DAILY
Qty: 30 TABLET | Refills: 0 | Status: SHIPPED | OUTPATIENT
Start: 2020-01-15 | End: 2020-01-20

## 2019-12-24 RX ORDER — ATORVASTATIN CALCIUM 80 MG/1
80 TABLET, FILM COATED ORAL EVERY EVENING
Status: DISCONTINUED | OUTPATIENT
Start: 2019-12-24 | End: 2019-12-24 | Stop reason: HOSPADM

## 2019-12-24 RX ORDER — ATORVASTATIN CALCIUM 80 MG/1
80 TABLET, FILM COATED ORAL EVERY EVENING
Qty: 30 TABLET | Refills: 0 | Status: SHIPPED | OUTPATIENT
Start: 2019-12-24 | End: 2020-03-02

## 2019-12-24 RX ORDER — ASPIRIN 81 MG/1
81 TABLET, CHEWABLE ORAL DAILY
Qty: 21 TABLET | Refills: 0 | Status: SHIPPED | OUTPATIENT
Start: 2019-12-25 | End: 2020-01-20

## 2019-12-24 RX ORDER — ACYCLOVIR 200 MG/1
10 CAPSULE ORAL ONCE
Status: DISCONTINUED | OUTPATIENT
Start: 2019-12-24 | End: 2019-12-24 | Stop reason: HOSPADM

## 2019-12-24 RX ORDER — CLOPIDOGREL BISULFATE 75 MG/1
75 TABLET ORAL DAILY
Qty: 21 TABLET | Refills: 0 | Status: SHIPPED | OUTPATIENT
Start: 2019-12-25 | End: 2020-02-04

## 2019-12-24 RX ADMIN — HYDROXYCHLOROQUINE SULFATE 200 MG: 200 TABLET, FILM COATED ORAL at 08:07

## 2019-12-24 RX ADMIN — DULOXETINE HYDROCHLORIDE 30 MG: 30 CAPSULE, DELAYED RELEASE ORAL at 08:07

## 2019-12-24 RX ADMIN — GABAPENTIN 300 MG: 300 CAPSULE ORAL at 08:07

## 2019-12-24 RX ADMIN — MULTIPLE VITAMINS W/ MINERALS TAB 1 TABLET: TAB at 08:07

## 2019-12-24 RX ADMIN — ASPIRIN 81 MG CHEWABLE TABLET 81 MG: 81 TABLET CHEWABLE at 08:07

## 2019-12-24 RX ADMIN — CLOPIDOGREL BISULFATE 75 MG: 75 TABLET ORAL at 08:07

## 2019-12-24 RX ADMIN — FLUTICASONE PROPIONATE 2 SPRAY: 50 SPRAY, METERED NASAL at 08:12

## 2019-12-24 ASSESSMENT — VISUAL ACUITY
OU: NORMAL ACUITY
OU: NORMAL ACUITY

## 2019-12-24 ASSESSMENT — ACTIVITIES OF DAILY LIVING (ADL)
PREVIOUS_RESPONSIBILITIES: MEAL PREP;HOUSEKEEPING;LAUNDRY;SHOPPING;MEDICATION MANAGEMENT;FINANCES;DRIVING;WORK
ADLS_ACUITY_SCORE: 11

## 2019-12-24 NOTE — PROGRESS NOTES
12/24/19 0900   Quick Adds   Type of Visit Initial Occupational Therapy Evaluation   Living Environment   Lives With alone   Living Arrangements house   Home Accessibility stairs to enter home;stairs within home   Number of Stairs, Main Entrance 1   Number of Stairs, Within Home, Primary 6  (6 upstairs and 6 downstairs)   Transportation Anticipated car, drives self   Living Environment Comment Pt lives in a house by herself. Pt has a walk in shower in the bathroom   Self-Care   Usual Activity Tolerance good   Current Activity Tolerance good   Regular Exercise No   Equipment Currently Used at Home cane, straight   Activity/Exercise/Self-Care Comment Pt was previously independent with ADL completion. Pt has a cane at home that she uses when her back is hurting, however not all the time   Functional Level   Ambulation 0-->independent   Transferring 0-->independent   Toileting 0-->independent   Bathing 0-->independent   Dressing 0-->independent   Eating 0-->independent   Communication 0-->understands/communicates without difficulty   Cognition 0 - no cognition issues reported   Fall history within last six months no   Which of the above functional risks had a recent onset or change? cognition       Present no   Language english   General Information   Onset of Illness/Injury or Date of Surgery - Date 12/23/19   Referring Physician Johnny Bates MD   Patient/Family Goals Statement To return home   Additional Occupational Profile Info/Pertinent History of Current Problem Lesley Stafford is a 59 year old female with PMH of SLE, depression and anxiety, and headaches who presented to urgent care with new onset of L numbness, tingling that started at 11 AM. Transferred to the [facility] ED due to concern for possible stroke. Pt has been having headaches for the past couple of days with light sensitivity. Additionally, she has been having confusion eg. Forgetting to go to work. She developed a L  sided headache that was worse compared to her prior headaches, then developed LUE > LLE numbness, and tingling. She reports a history of shingles and chronic sided weakness. She has been having increased stress at work and is tearful in the ED.    Precautions/Limitations no known precautions/limitations   Weight-Bearing Status - LUE full weight-bearing   Weight-Bearing Status - RUE full weight-bearing   Weight-Bearing Status - LLE full weight-bearing   Weight-Bearing Status - RLE full weight-bearing   General Observations Pt is pleasant and agreeable to therapy   General Info Comments Activity: up with assist   Cognitive Status Examination   Orientation orientation to person, place and time   Level of Consciousness alert   Follows Commands (Cognition) WNL   Memory intact   Attention No deficits were identified   Organization/Problem Solving No deficits were identified   Executive Function No deficits were identified   Cognitive Comment Pt is alert, oriented and generally appropriate in conversation   Visual Perception   Visual Perception No deficits were identified   Sensory Examination   Sensory Comments Pt has LUE numbness and tingling   Integumentary/Edema   Integumentary/Edema no deficits were identifed   Range of Motion (ROM)   ROM Quick Adds No deficits were identified   ROM Comment BUE ROM WFL   Strength   Manual Muscle Testing Quick Adds No deficits were identified   Strength Comments BUE grossly 5/5   Hand Strength   Hand Strength Comments Bilateral  strength WNL   Mobility   Bed Mobility Bed mobility skill: Supine to sit;Bed mobility skill: Sit to supine   Bed Mobility Skill: Sit to Supine   Level of Candler: Sit/Supine independent   Bed Mobility Skill: Supine to Sit   Level of [place]: Supine/Sit independent   Instrumental Activities of Daily Living (IADL)   Previous Responsibilities meal prep;housekeeping;laundry;shopping;medication management;finances;driving;work   IADL Comments Pt was  "previously independent with IADL completion. Pt has assist if needed at discharge   Activities of Daily Living Analysis   Impairments Contributing to Impaired Activities of Daily Living balance impaired;cognition impaired;strength decreased   General Therapy Interventions   Planned Therapy Interventions ADL retraining;IADL retraining;cognition;strengthening;home program guidelines;progressive activity/exercise   Clinical Impression   Criteria for Skilled Therapeutic Interventions Met yes, treatment indicated   OT Diagnosis decreased activity tolerance and independence with IADLs   Influenced by the following impairments weakness, deconditioning, balance deficits, cognitive deficits   Assessment of Occupational Performance 5 or more Performance Deficits   Identified Performance Deficits g/h, toileting, bathing, dressing, functional mobility and cognition   Clinical Decision Making (Complexity) Low complexity   Therapy Frequency 5x/week   Predicted Duration of Therapy Intervention (days/wks) 1 week   Anticipated Equipment Needs at Discharge other (see comments)  (TBD)   Anticipated Discharge Disposition Home with Outpatient Therapy   Risks and Benefits of Treatment have been explained. Yes   Patient, Family & other staff in agreement with plan of care Yes   [facility] TM \"6 Clicks\"   2016, Trustees of [facility], under license to [email or web address].  All rights reserved.   6 Clicks Short Forms Daily Activity Inpatient Short Form   [facility] AM-PAC  \"6 Clicks\" Daily Activity Inpatient Short Form   1. Putting on and taking off regular lower body clothing? 4 - None   2. Bathing (including washing, rinsing, drying)? 3 - A Little   3. Toileting, which includes using toilet, bedpan or urinal? 4 - None   4. Putting on and taking off regular upper body clothing? 4 - None   5. Taking care of personal grooming such as brushing teeth? 4 - None   6. Eating meals? 4 - None   Daily Activity Raw Score (Score " out of 24.Lower scores equate to lower levels of function) 23   Total Evaluation Time   Total Evaluation Time (Minutes) 5

## 2019-12-24 NOTE — DISCHARGE SUMMARY
General acute hospital, [place]    Neurology Stroke Discharge Summary    Date of Admission: 12/23/2019  Date of Discharge: 12/24/2019    Disposition: Discharged to home  Primary Care Physician: Rafael Orosco      Admission Diagnosis:   Right frontal parietal and R bryson ischemic stroke due to undetermined etiology    Discharge Diagnosis:   Right frontal parietal and R bryson ischemic stroke, likely due to ESUS    Problem Leading to Hospitalization (from HPI):   Lesley Stafford is a 59yoF with PMH of connective tissue disease, depression and anxiety, and headaches who presented to urgent care with new onset LUE>LLE numbness and tingling with worsened LUE  that started earlier that morning. She had been having headaches and confusion for 2-3 days prior. On the day of admission, she had a worsening headache with light sensitivity.    Please see H&P dated 12/23/2019 for further details about presentation.    Brief Hospital Course:   Follow up in one week with PCP. PCP to follow up on antiphospholipid antibody in setting of new strokes.     Patient presented with LUE>LLE numbness and tingling.      Found to have R frontal parietal and R bryson ischemic stroke.      IV tPA was not administered due to outside of time window.      Work-up as stated below under Pertinent Investigations.    Etiology is thought to be ESUS.      Rehab evaluation: OT, PT and SLP.   PT and OT recommend continuing outpatient PT/OT.    Smoking Cessation: already quit smoking    BP Long-term Goal: 140/90 or less    Antithrombotic/Anticoagulant Agent: Aspirin 81mg and Plavix 75mg daily for 21 days, followed by aspirin 325mg daily      Zio patch placed prior to discharge.     Lipid panel obtain with , HDL 76.  Statins: Started on atorvastatin 80mg       Hgb A1C Goal: < 7.0    Complications: None.     Other problems addressed during this hospitalization:  Connective tissue disease/SLE  Due to concerns for SLE flare.  Sent labs. On discharge, ESR and CRP were WNL. Discussed with patient that there is low suspicion for CTD causing her strokes. Will have patient follow-up with PCP and in rheumatology clinic.  -Follow-up SLE labs with PCP and in rheum clinic    PERTINENT INVESTIGATIONS    Labs  Lipid Panel:   Recent Labs   Lab Test 12/24/19  0648   CHOL 243*   HDL 76   *   TRIG 76     A1C:   Lab Results   Component Value Date    A1C 5.5 12/23/2019     INR:   Recent Labs   Lab 12/23/19  1517   INR 0.95      Coag Panel / Hypercoag Workup: Not indicated  Pending test results: Lupus and SLE labs    Echo 12/23/19: LV and RV function normal, LVEF 55-60%. Difficult bubble study assessment.   Imaging:  CT head without contrast 12/23/19: No acute hemorrhage.  CTA head and neck 12/223/19: No aneurysm or stenosis in major vessels.  MR brain 12/23/19: R frontal parietal and R bryson ischemic stroke    Endovascular procedure: None     Cardiac Monitoring: Patient had > 24 hrs of cardiac monitor while in hospital.    Findings: No atrial fibrillation was found.    Sleep Apnea Screen:   Questions/Answers      1. Prior to your stroke, have you been told that you snore? No.    2. Prior to your stroke, have you been told that you struggle to breath while you are sleeping? No.    3. Prior to your stroke, do you feel tired and sleepy even after getting a normal night of sleep? No.    Sleep Apnea Screen Findings: Patient has 0-1 symptoms of sleep apnea.  Further sleep study is not recommended at this time.    PHQ-9 Depression Screen Score: Pt with known history of depression and anxiety. Follows with outpatient psychiatrist.    Education discussed with: patient on blood pressure management, cholesterol management, medical management, diet modification and follow-up recommendations/plan.    During daily rounds, the plan of care was discussed and developed with patient.  Plan of care includes: DAPT for 3 weeks followed by daily aspirin 325mg. Follow-up  with PCP, rheumatologist, and in neurology clinic..    PHYSICAL EXAMINATION  Vital Signs:  B/P: 106/85, T: 98.5, P: 90, R: 14    General:  sitting upright in hospital bed, no acute distress     HEENT:  normocephalic/atraumatic, no oral lesions, no epistaxis   Cardio:  RRR  Pulmonary:  no respiratory distress  Abdomen:  soft, non-tender, non-distended  Extremities:  no edema  Skin:  intact, warm/dry, no jaundice     Neurologic  Mental Status:  fully alert, attentive and oriented, follows commands, speech clear and fluent  Cranial Nerves:  visual fields intact, EOMI with normal smooth pursuit, facial movements symmetric, hearing not formally tested but intact to conversation, palate elevation symmetric and uvula midline, no dysarthria  Motor:  no abnormal movements, normal tone throughout, no pronator drift, able to move all limbs spontaneously, strength 5/5 throughout upper and lower extremities  Reflexes:  no clonus  Sensory:  intact/symmetric to light touch and pin prick throughout upper and lower extremities  Coordination:  FNF and HS intact without dysmetria  Station/Gait:  deferred    National Institutes of Health Stroke Scale (on day of discharge)  NIHSS Total Score: 1    Modified Caroline Scale (on day of discharge): 1-No significant disability despite symptoms; able to carry out all usual duties and activities    Medications    Discharge Medication List as of 12/24/2019 12:15 PM      START taking these medications    Details   aspirin (ASA) 325 MG tablet Take 1 tablet (325 mg) by mouth daily Start taking on 1/15/20, after finishing taking aspirin 81mg for 21 days., Disp-30 tablet, R-0, Local Print      aspirin (ASA) 81 MG chewable tablet Take 1 tablet (81 mg) by mouth daily for 21 days, Disp-21 tablet, R-0, Local Print      atorvastatin (LIPITOR) 80 MG tablet Take 1 tablet (80 mg) by mouth every evening, Disp-30 tablet, R-0, Local Print      clopidogrel (PLAVIX) 75 MG tablet 1 tablet (75 mg) by Oral or NG Tube  route daily for 21 days, Disp-21 tablet, R-0, Local Print         CONTINUE these medications which have NOT CHANGED    Details   amphetamine-dextroamphetamine (ADDERALL) 10 MG tablet Take 10 mg by mouth 2 times daily , Historical      Calcium-Magnesium-Vitamin D (CALCIUM 500 PO) Historical      cloNIDine (CATAPRES) 0.1 MG tablet TAKE 2 TABLETS BY MOUTH DAILY AT BEDTIME, R-3, Historical      cyclobenzaprine (FLEXERIL) 5 MG tablet TAKE 1 TABLET BY MOUTH 3 TIMES DAILY, Disp-42 tablet, R-3, E-Prescribe      DEEP SEA NASAL SPRAY 0.65 % nasal spray SPRAY 1 SPRAY INTO BOTH NOSTRILS DAILY AS NEEDED FOR CONGESTION, Disp-1 Bottle, R-2, E-Prescribe      DULoxetine (CYMBALTA) 30 MG capsule TAKE 1 CAPSULE BY MOUTH EVERY DAY, Disp-90 capsule, R-0, E-Prescribe      ferrous sulfate (IRON) 325 (65 FE) MG tablet Take 1 tablet (325 mg) by mouth daily (with breakfast), Disp-30 tablet, R-5, Fax      fexofenadine (ALLEGRA) 180 MG tablet Take 1 tablet (180 mg) by mouth daily, Disp-30 tablet, R-1, E-Prescribe      flunisolide (NASALIDE) 25 MCG/ACT (0.025%) SOLN spray INHALE 2 SPRAYS INTO EACH NOSTRIL 2 TIMES DAILY, Disp-1 Bottle, R-3, E-Prescribe      gabapentin (NEURONTIN) 300 MG capsule TAKE 2 CAPSULE (300 MG) BY MOUTH 3 TIMES DAILY, Disp-270 capsule, R-3, E-Prescribe      hydroxychloroquine (PLAQUENIL) 200 MG tablet TAKE 1 TABLET BY MOUTH DAILY, Disp-90 tablet, R-2, E-Prescribe      hydrOXYzine (ATARAX) 25 MG tablet TAKE 1 TABLET (25 MG) BY MOUTH 3 TIMES PER DAY, Disp-90 tablet, R-3, E-Prescribe      mometasone (NASONEX) 50 MCG/ACT spray Spray 2 sprays into both nostrils daily, Disp-1 Box, R-11, E-Prescribe      multivitamin, therapeutic with minerals (MULTI-VITAMIN) TABS Take 1 tablet by mouth daily, Historical      omega 3 1200 MG CAPS Historical      order for DME Cane,Disp-1 Device, R-0, Local Print      tiZANidine (ZANAFLEX) 4 MG tablet TAKE 1-2 TABLETS BY MOUTH AS NEEDED THREE TIMES A DAY, R-3, Historical      triamcinolone  (NASACORT) 55 MCG/ACT Inhaler Spray 2 sprays into both nostrils daily, Disp-1 Bottle, R-3, E-Prescribe         STOP taking these medications       polyethylene glycol (GOLYTELY/NULYTELY) 236 g suspension Comments:   Reason for Stopping:         predniSONE (DELTASONE) 10 MG tablet Comments:   Reason for Stopping:               Additional recommendations and follow up:       Reason for your hospital stay    Left sided numbness and tingling, stroke     Adult [facility] Follow-up and recommended labs and tests    Follow up with primary care provider, Rafael Orosco, within 7 days for hospital follow- up.  Follow-up autoimmune/SLE labs.     Appointments on [facility]/or [facility] (with [facility] or [facility] provider or service). Call [phone] if you haven't heard regarding these appointments within 7 days of discharge.     Follow Up and recommended labs and tests    Follow up with Dr. Meléndez, your rheumatologist , at [facility], within 4-6 weeks  for hospital follow- up. No follow up labs or test are needed.     Follow Up ([facility])    Follow up in neurology clinic , at (location with clinic name or city) [facility] in [place], within 6-8 weeks  for hospital follow- up. No follow up labs or test are needed.    Appointments on [facility]/or [facility] (with [facility] or [facility] provider or service). Call [phone] if you haven't heard regarding these appointments within 7 days of discharge.     Activity    Your activity upon discharge: activity as tolerated     Full Code     Diet    Follow this diet upon discharge: Orders Placed This Encounter      Advance Diet as Tolerated: Regular Diet Adult       Patient was seen and discussed with the Attending, Dr. Gardiner.    Mara Ewing MD   Neurology PGY-1  12/24/2019 8:34 AM

## 2019-12-24 NOTE — ED PROVIDER NOTES
[facility] EMERGENCY DEPARTMENT ([facility])  12/23/19 ED 4    History     Chief Complaint   Patient presents with     Numbness     The history is provided by the patient and medical records.     Lesley Stafford is a 59 year old female with a past history of lupus who presented to the emergency department complaining of left arm numbness.  This began at 11 AM, approximately 4 hours prior to presentation.  She reports that her arm felt heavy and numb and that she had a difficult time holding a washrag with the hand.  Her symptoms have improved but she still reports some tingling of the left arm.  She reports that this is never happened before.  She has been having headaches she states.  She denies trauma, nausea or vomiting, difficulty speaking or swallowing, change in vision or double vision.      This part of the document was transcribed by Chuy Knight Scribe.      I have reviewed the Medications, Allergies, Past Medical and Surgical History, and Social History in the [employer] system.  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       PAST SURGICAL HISTORY:   Past Surgical History:   Procedure Laterality Date     COLONOSCOPY  01/2010    repeat 10 yrs     DILATION AND CURETTAGE, OPERATIVE HYSTEROSCOPY WITH MORCELLATOR, COMBINED N/A 2/15/2017    Procedure: COMBINED DILATION AND CURETTAGE, OPERATIVE HYSTEROSCOPY WITH MORCELLATOR;  Surgeon: Erin Gutierrez MD;  Location:  OR     ESOPHAGOSCOPY, GASTROSCOPY, DUODENOSCOPY (EGD), COMBINED Left 2/5/2016    Procedure: COMBINED ESOPHAGOSCOPY, GASTROSCOPY, DUODENOSCOPY (EGD), BIOPSY SINGLE OR MULTIPLE;  Surgeon: Pierre Fernandez MD;  Location:  GI     GYN SURGERY         FAMILY HISTORY:   Family History   Problem Relation Age of Onset      Lupus Mother      Asthma Mother      Diabetes Father      Brain Tumor Father         begnign on pituitary     Depression Sister      Anemia Sister        SOCIAL HISTORY:   Social History     Tobacco Use     Smoking status: Former Smoker     Smokeless tobacco: Never Used   Substance Use Topics     Alcohol use: No       Discharge Medication List as of 12/24/2019 12:15 PM      START taking these medications    Details   aspirin (ASA) 325 MG tablet Take 1 tablet (325 mg) by mouth daily Start taking on 1/15/20, after finishing taking aspirin 81mg for 21 days., Disp-30 tablet, R-0, Local Print      aspirin (ASA) 81 MG chewable tablet Take 1 tablet (81 mg) by mouth daily for 21 days, Disp-21 tablet, R-0, Local Print      atorvastatin (LIPITOR) 80 MG tablet Take 1 tablet (80 mg) by mouth every evening, Disp-30 tablet, R-0, Local Print      clopidogrel (PLAVIX) 75 MG tablet 1 tablet (75 mg) by Oral or NG Tube route daily for 21 days, Disp-21 tablet, R-0, Local Print         CONTINUE these medications which have NOT CHANGED    Details   amphetamine-dextroamphetamine (ADDERALL) 10 MG tablet Take 10 mg by mouth 2 times daily , Historical      Calcium-Magnesium-Vitamin D (CALCIUM 500 PO) Historical      cloNIDine (CATAPRES) 0.1 MG tablet TAKE 2 TABLETS BY MOUTH DAILY AT BEDTIME, R-3, Historical      cyclobenzaprine (FLEXERIL) 5 MG tablet TAKE 1 TABLET BY MOUTH 3 TIMES DAILY, Disp-42 tablet, R-3, E-Prescribe      DEEP SEA NASAL SPRAY 0.65 % nasal spray SPRAY 1 SPRAY INTO BOTH NOSTRILS DAILY AS NEEDED FOR CONGESTION, Disp-1 Bottle, R-2, E-Prescribe      DULoxetine (CYMBALTA) 30 MG capsule TAKE 1 CAPSULE BY MOUTH EVERY DAY, Disp-90 capsule, R-0, E-Prescribe      ferrous sulfate (IRON) 325 (65 FE) MG tablet Take 1 tablet (325 mg) by mouth daily (with breakfast), Disp-30 tablet, R-5, Fax      fexofenadine (ALLEGRA) 180 MG tablet Take 1 tablet (180 mg) by mouth daily, Disp-30 tablet, R-1, E-Prescribe      flunisolide (NASALIDE) 25  MCG/ACT (0.025%) SOLN spray INHALE 2 SPRAYS INTO EACH NOSTRIL 2 TIMES DAILY, Disp-1 Bottle, R-3, E-Prescribe      gabapentin (NEURONTIN) 300 MG capsule TAKE 2 CAPSULE (300 MG) BY MOUTH 3 TIMES DAILY, Disp-270 capsule, R-3, E-Prescribe      hydroxychloroquine (PLAQUENIL) 200 MG tablet TAKE 1 TABLET BY MOUTH DAILY, Disp-90 tablet, R-2, E-Prescribe      hydrOXYzine (ATARAX) 25 MG tablet TAKE 1 TABLET (25 MG) BY MOUTH 3 TIMES PER DAY, Disp-90 tablet, R-3, E-Prescribe      mometasone (NASONEX) 50 MCG/ACT spray Spray 2 sprays into both nostrils daily, Disp-1 Box, R-11, E-Prescribe      multivitamin, therapeutic with minerals (MULTI-VITAMIN) TABS Take 1 tablet by mouth daily, Historical      omega 3 1200 MG CAPS Historical      order for DME Cane,Disp-1 Device, R-0, Local Print      tiZANidine (ZANAFLEX) 4 MG tablet TAKE 1-2 TABLETS BY MOUTH AS NEEDED THREE TIMES A DAY, R-3, Historical      triamcinolone (NASACORT) 55 MCG/ACT Inhaler Spray 2 sprays into both nostrils daily, Disp-1 Bottle, R-3, E-Prescribe         STOP taking these medications       polyethylene glycol (GOLYTELY/NULYTELY) 236 g suspension Comments:   Reason for Stopping:         predniSONE (DELTASONE) 10 MG tablet Comments:   Reason for Stopping:                  Allergies   Allergen Reactions     Morphine Sulfate Itching     Sulfa Drugs Hives      Review of Systems   Eyes: Negative for visual disturbance.   Neurological: Positive for weakness, numbness and headaches. Negative for speech difficulty.       Physical Exam   BP: (!) 131/94  Pulse: 85  Heart Rate: 79  Temp: 98.8  F (37.1  C)  Resp: 16  Weight: 80.4 kg (177 lb 3.2 oz)(scale)  SpO2: 100 %      Physical Exam  Vitals signs and nursing note reviewed.   Constitutional:       General: She is not in acute distress.     Appearance: She is well-developed. She is not ill-appearing, toxic-appearing or diaphoretic.   HENT:      Head: Normocephalic and atraumatic.      Mouth/Throat:      Lips: Pink.       Mouth: Mucous membranes are moist.      Pharynx: Oropharynx is clear. No oropharyngeal exudate.   Eyes:      General: Lids are normal. No visual field deficit or scleral icterus.     Extraocular Movements: Extraocular movements intact.      Right eye: No nystagmus.      Left eye: No nystagmus.      Conjunctiva/sclera: Conjunctivae normal.      Pupils: Pupils are equal, round, and reactive to light.   Neck:      Musculoskeletal: Normal range of motion and neck supple. No erythema or neck rigidity.      Thyroid: No thyromegaly.      Vascular: No JVD.      Trachea: No tracheal deviation.   Cardiovascular:      Rate and Rhythm: Normal rate and regular rhythm.      Pulses: Normal pulses.      Heart sounds: Normal heart sounds. No murmur. No friction rub. No gallop.    Pulmonary:      Effort: Pulmonary effort is normal. No respiratory distress.      Breath sounds: Normal breath sounds.   Abdominal:      General: Bowel sounds are normal. There is no distension.      Palpations: Abdomen is soft. There is no mass.      Tenderness: There is no abdominal tenderness. There is no guarding or rebound.   Musculoskeletal: Normal range of motion.         General: No tenderness.      Right lower leg: No edema.      Left lower leg: No edema.   Lymphadenopathy:      Cervical: No cervical adenopathy.   Skin:     General: Skin is warm and dry.      Capillary Refill: Capillary refill takes less than 2 seconds.      Coloration: Skin is not pale.      Findings: No erythema or rash.   Neurological:      Mental Status: She is alert and oriented to person, place, and time.      Cranial Nerves: No cranial nerve deficit, dysarthria or facial asymmetry.      Sensory: Sensory deficit ( Patient reports diminished sensation in left arm compared to right.) present.      Motor: Motor function is intact.      Coordination: Coordination normal. Finger-Nose-Finger Test normal.   Psychiatric:         Mood and Affect: Mood and affect normal.          Speech: Speech normal.         Behavior: Behavior normal.         ED Course        Procedures             EKG Interpretation:      Interpreted by Grady Medrano MD    Symptoms at time of EKG: stroke code   Rhythm: normal sinus   Rate: 82  Ectopy: none  Conduction: normal  ST Segments/ T Waves: No acute ischemic changes  Q Waves: septal  Comparison to prior: Unchanged    Clinical Impression: no acute changes         CTA Head Neck with Contrast   Final Result   Impression:     1. Head CTA demonstrates no aneurysm or stenosis of the major   intracranial arteries.    2. Neck CTA demonstrates no stenosis of the major cervical arteries.             I have personally reviewed the examination and initial interpretation   and I agree with the findings.      TYRONE SUTTON MD      CT Head w/o Contrast   Final Result   Impression: No acute intracranial pathology.      I have personally reviewed the examination and initial interpretation   and I agree with the findings.      TYREE COOL MD      Zio Patch Holter Application Adult/ Peds    (Results Pending)            Labs Ordered and Resulted from Time of ED Arrival Up to the Time of Departure from the ED   ROUTINE UA WITH MICROSCOPIC - Abnormal; Notable for the following components:       Result Value    Bacteria Urine Few (*)     Mucous Urine Present (*)     All other components within normal limits   GLUCOSE MONITOR NURSING POCT   CBC WITH PLATELETS DIFFERENTIAL   BASIC METABOLIC PANEL   PARTIAL THROMBOPLASTIN TIME   INR   TROPONIN I   CREATININE POCT   GLUCOSE BY METER   PULSE OXIMETRY NURSING   NOTIFY   ISTAT CREATININE NURSING POCT   ISTAT INR POCT     Assessments & Plan (with Medical Decision Making)   This patient had presented to the emergency department complaining of left arm numbness.  That she was within 4-1/2 hours here with these complaints and her status of past history of lupus we did activate a Stroke Code.  Neurology evaluated the patient at  bedside and CT and CT angios were ordered.  These demonstrated no acute intracranial pathology or large vessel pathology.  Urinalysis is negative.  No leukocytosis or significant anemia noted on CBC.  Normal renal function and no electrolyte abnormalities on a metabolic panel.  Twelve-lead EKG demonstrated no acute ischemic changes and no evidence of arrhythmia.  Troponin is negative further decreasing suspicion for myocardial ischemia.  Neurology did recommend limited stroke MRI to ER rule out cerebral pathology and if this is negative patient will be discharged with suspicion of a complex migraine causing her symptoms.  Disposition will be pending MRI results.  Patient was signed out to Dr. Ambrocio with plan to follow-up on MRI results.    This part of the document was transcribed by Chuy Knight Scribe.      I have reviewed the nursing notes.    I have reviewed the findings, diagnosis, plan and need for follow up with the patient.        Final diagnoses:   Cerebrovascular accident (CVA), unspecified mechanism (H)       12/23/2019   [facility], [place], EMERGENCY DEPARTMENT     Grady Medrano MD  12/28/19 [id number]

## 2019-12-24 NOTE — PLAN OF CARE
OT 6A  Discharge Planner OT   Patient plan for discharge: Home  Current status: Eval complete, treatment indicated. Independent supine<>EOB and sit<>stand. Pt completed ~400ft of functional mobility with SBA/independent. Mod I ambulating up and down 3 stairs x2 reps. Administered the MOCA 8.1 and pt scored 27/30, with 26/30 as normal, indicating pt scored within the normal range  Barriers to return to prior living situation: acute medical needs  Recommendations for discharge: Home with resumption of OP PT  Rationale for recommendations: Pt is near baseline for ADL completion and functional mobility. Would recommend resumption of OP PT to continue to address high level balance deficits.       Entered by: Vesna Meléndez 12/24/2019 10:46 AM     Occupational Therapy Discharge Summary    Reason for therapy discharge:    Discharged to home with outpatient therapy.    Progress towards therapy goal(s). [facility] for goal details.  Goals partially met.  Barriers to achieving goals:   discharge from facility.    Therapy recommendation(s):    Continued therapy is recommended.  Rationale/Recommendations:  recommend continued OP PT to further address balance deficits.

## 2019-12-24 NOTE — ED NOTES
Patient received in signout from Dr. Medrano.  Please see his note for additional information.  Patient presented for left upper extremity numbness.  Code stroke was called.  Initially de-escalated.  Signout, plan is to follow-up with MRI brain and discussed with neurology.  Initial MRI read as normal.  However, I was contacted by the neurology resident who stated they are concerned for a small right-sided stroke.  They recommend admission to 6A.  Also recommend starting patient on aspirin Plavix and atorvastatin which I have initiated the emergency department.  Repeat neuro exam unchanged.     Иван Ambrocio,   12/23/19 2011

## 2019-12-24 NOTE — PLAN OF CARE
Status: Pt. presented to urgent care with new onset of L. N/T transferred to [facility] for stroke work up.  Vitals: VSS on RA. On CCM.   Neuros: A&Ox4. L. facial numbness, mild photophobia, LUE N/T, LLE N/T to thigh. At 0400 neuro assessment all N/T resolved.   IV: R. PIV SL.  Diet: Regular   Bowel status: No BM this shift  : Voiding spontaneously without difficulty  Pain: C/o HA pain managed with one time dose of tylenol  Activity: SBA  Plan: Continue to monitor and follow plan of care.

## 2019-12-24 NOTE — PROGRESS NOTES
Social Work was consulted to assess needs of patient following stroke. Chart was reviewed and discussed with interdisciplinary team. Social work needs are not identified at this time and RNCC will continue to follow. Please re-consult social work if additional needs are identified.  Per Dr. Ewing, pt will be discharged to home today with a Zio Patch.  Per Dr. Ewing, pt does not have strength related issues.    LUIS Hilton  Social Work, 6A  Phone:  [phone]  Pager:  [phone]  12/24/2019

## 2019-12-24 NOTE — PLAN OF CARE
VSS. Zio patch was placed. Neuros intact, denies numbness or tingling. Good po intake. Voiding spontaneously, has BM yesterday. Up indep in room. Had ECHO. Worked with OT. Had stroke PLC. Discharge instructions gone over and questions answered at that time. Prescriptions were faxed to [facility] pharmacy. Family here at this time to bring pt home. Declined need for hospital transportation.

## 2019-12-24 NOTE — PLAN OF CARE
Discharge Planner SLP   Patient plan for discharge: none stated   Current status: SLP: Bedside swallow eval orders received. Per chart review and discussion with RN, pt passed nursing swallow screen and is tolerating a regular diet without difficulty. Pts speech/language skills are at baseline function. Will defer IP consult and complete ST orders.   Barriers to return to prior living situation: none per ST  Recommendations for discharge: defer to medical team   Rationale for recommendations: suspect pt is at baseline swallowing and speech/language skills       Entered by: Daniela Sol 12/24/2019 9:03 AM

## 2019-12-24 NOTE — LETTER
December 24, 2019      Lesley Stafford  [address]  [place] [address]        Dear Lesley    In order to ensure we are providing the best quality care, we have reviewed your chart and see that you are due for:    1. Pap smear  2.Colonoscopy      Please call the clinic at your earliest convenience to schedule an appointment.  If you have completed these please contact our office via phone or [facility] to update our records.  We would like to know the date (approximately month and year), the result, and ideally where the procedure was performed.    Thank you for trusting us with your health care.      Sincerely,    Care Team for Rafael Orosco MD

## 2019-12-24 NOTE — PLAN OF CARE
Status: Pt admitted to unit from ED today around 2215. Pt came to ED because of HA and new N/T. Found to have had an ischemic stroke.  Vitals: VSS on RA.  Neuros: A&O x4. N/T BLE and left side of face. Photophobic HA. Mild blurred vision.  IV: PIV SL.  Resp/trach: LS clear. Denies SOB.  Diet: Currently NPO. Passed swallow study, awaiting order change.   Bowel status: BS+. Last BM 12/23/19.  : Voiding spontaneously.  Skin: Intact.  Pain: HA.  Activity: SBA, bed alarm on.   Social: Daughter and mother at bedside during shift.  Plan: Continue to monitor and follow plan of care.

## 2019-12-24 NOTE — PROGRESS NOTES
Care Coordinator Progress Note    Admission Date/Time:  12/23/2019  Attending MD:  Neeru Gardiner MD    Data  Received Care Coordinator consult for stroke--discharge planning. Chart reviewed, discussed with interdisciplinary team. In 6A Discharge Rounds Dr Ewing reported pt had a small stroke. Plan for Ziopatch at discharge. Therapies pending. May be able to discharge later today.     Later this morning OT recommending discharge home with resumption of outpatient PT. Pt scored 27/30 on MOCA 8.1 test; 26/30 is normal.      Patient was admitted for: Cerebrovascular accident (CVA), unspecified mechanism (H).  Left upper extremity numbness, tingling & weakness.     Concerns with insurance coverage for discharge needs: None. Pt's insurance is [employer] with [employer].    Current Living Situation: Patient lives alone.  Support System: TBD  Services Involved: TBD  Transportation at Discharge: TBD  Transportation to Medical Appointments:   - TBD  Barriers to Discharge: None    Coordination of Care and Referrals  Chart reviewed. Discussed with interdisciplinary team.      Assessment  Pt s/p stroke was independent prior to admission. Discharge home anticipated.      Plan  Anticipated Discharge Date:  When medically stable and stroke work-up complete.   Anticipated Discharge Plan:  Today?         Carola Bucio RN Care Coordinator  Unit 6A, [facility]

## 2019-12-24 NOTE — CONSULTS
12/24/19 [address], [place]       Stroke Education Note     The following information has been reviewed with the patient:     1. Warning signs of stroke     2. Calling 911 if having warning signs of stroke     3. All modifiable risk factors: hypertension, CAD, atrial fib, diabetes, hypercholesterolemia, smoking, substance abuse, diet, physical inactivity, obesity, sleep apnea.     4. Patient's risk factors for stroke which include: physical inactivity, diet, sleep apnea     5. Follow-up plan for after discharge     6. Discharge medications which include: ASA, PLAVIX, STATIN     In addition, the PLC Stroke Class Handout has been given to the patient.     Learner's response to risk factors / lifestyle modification education: Desire to change Ability to change Reasons to change Need to change Committment to change Activation      Barb Dumont RN, RN

## 2019-12-24 NOTE — PLAN OF CARE
"6A Defer PT Consult  Discharge Planner PT   PT did not see pt, OT initiated for therapies.   Patient plan for discharge: home with continued OP PT for back pain  Current status: PT orders acknowledged and appreciated. Per discussion with OT, pt is mobilizing IND, steady on feet. Has a SEC that she uses on \"bad days.\" Pt does not demonstrate IP PT needs. PT to complete orders.     Barriers to return to prior living situation: medical status  Recommendations for discharge: home with continued OP PT for back pain  Rationale for recommendations: see above and defer to OT's note for further details.    Thank you for your referral.         Entered by: Gayle Voss 12/24/2019 11:32 AM       "

## 2019-12-25 ENCOUNTER — PATIENT OUTREACH (OUTPATIENT)
Dept: CARE COORDINATION | Facility: CLINIC | Age: 59
End: 2019-12-25

## 2019-12-25 LAB
BACTERIA SPEC CULT: NORMAL
Lab: NORMAL
SPECIMEN SOURCE: NORMAL

## 2019-12-26 ENCOUNTER — OFFICE VISIT (OUTPATIENT)
Dept: FAMILY MEDICINE | Facility: CLINIC | Age: 59
End: 2019-12-26
Payer: COMMERCIAL

## 2019-12-26 ENCOUNTER — HOSPITAL ENCOUNTER (OUTPATIENT)
Dept: CT IMAGING | Facility: CLINIC | Age: 59
Discharge: HOME | End: 2019-12-26
Attending: FAMILY MEDICINE | Admitting: FAMILY MEDICINE
Payer: COMMERCIAL

## 2019-12-26 VITALS
BODY MASS INDEX: 30.2 KG/M2 | HEART RATE: 91 BPM | SYSTOLIC BLOOD PRESSURE: 100 MMHG | OXYGEN SATURATION: 99 % | DIASTOLIC BLOOD PRESSURE: 60 MMHG | WEIGHT: 178 LBS | TEMPERATURE: 98.7 F

## 2019-12-26 DIAGNOSIS — R06.02 SHORTNESS OF BREATH: ICD-10-CM

## 2019-12-26 DIAGNOSIS — R07.81 PLEURITIC CHEST PAIN: ICD-10-CM

## 2019-12-26 DIAGNOSIS — R04.2 BLOOD IN SPUTUM: ICD-10-CM

## 2019-12-26 DIAGNOSIS — J40 BRONCHITIS: Primary | ICD-10-CM

## 2019-12-26 DIAGNOSIS — R07.89 CHEST TIGHTNESS: ICD-10-CM

## 2019-12-26 LAB
ANA PAT SER IF-IMP: ABNORMAL
ANA SER QL IF: POSITIVE
ANA TITR SER IF: ABNORMAL {TITER}
LA PPP-IMP: NEGATIVE

## 2019-12-26 PROCEDURE — 71275 CT ANGIOGRAPHY CHEST: CPT

## 2019-12-26 PROCEDURE — 40000556 ZZH STATISTIC PERIPHERAL IV START W US GUIDANCE

## 2019-12-26 PROCEDURE — 25000128 H RX IP 250 OP 636: Performed by: FAMILY MEDICINE

## 2019-12-26 PROCEDURE — 25000125 ZZHC RX 250: Performed by: FAMILY MEDICINE

## 2019-12-26 PROCEDURE — 99214 OFFICE O/P EST MOD 30 MIN: CPT | Performed by: FAMILY MEDICINE

## 2019-12-26 RX ORDER — GUAIFENESIN 600 MG/1
1200 TABLET, EXTENDED RELEASE ORAL 2 TIMES DAILY
Qty: 30 TABLET | Refills: 0 | Status: SHIPPED | OUTPATIENT
Start: 2019-12-26 | End: 2020-06-30

## 2019-12-26 RX ORDER — ALBUTEROL SULFATE 90 UG/1
2 AEROSOL, METERED RESPIRATORY (INHALATION) EVERY 4 HOURS PRN
Qty: 1 INHALER | Refills: 0 | Status: SHIPPED | OUTPATIENT
Start: 2019-12-26 | End: 2021-09-13

## 2019-12-26 RX ORDER — IOPAMIDOL 755 MG/ML
100 INJECTION, SOLUTION INTRAVASCULAR ONCE
Status: COMPLETED | OUTPATIENT
Start: 2019-12-26 | End: 2019-12-26

## 2019-12-26 RX ADMIN — SODIUM CHLORIDE 84 ML: 9 INJECTION, SOLUTION INTRAVENOUS at 13:23

## 2019-12-26 RX ADMIN — IOPAMIDOL 64 ML: 755 INJECTION, SOLUTION INTRAVENOUS at 13:22

## 2019-12-26 RX ADMIN — LIDOCAINE HYDROCHLORIDE 0.2 ML: 20 INJECTION, SOLUTION INFILTRATION; PERINEURAL at 13:39

## 2019-12-26 NOTE — PATIENT INSTRUCTIONS
Thank you for coming in today!      Here is a brief summary of the plan we discussed:    1.  Let's get a CT of your chest to rule out a blood clot.  I think this is unlikely however given recent stroke without clear underlying cause I think it is important to rule out.    2.  For the cough and phlegm, let's try Mucinex.  I have sent to the pharmacy.  Take Tylenol for pain, an occasional ibuprofen is OK.  Try heating back to sore areas on back as well.    3.  Drink plenty of fluids (goal is for urine to be light yellow to clear).    4.  Schedule with Dr Orosco or Rosa next week (within 7 days of discharge)    Call clinic with questions or concerns.    I look forward to seeing you back in clinic!

## 2019-12-26 NOTE — PROGRESS NOTES
SUBJECTIVE:                                                    Lesley Stafford is a 59 year old female who presents to clinic today for the following health issues:    RESPIRATORY SYMPTOMS      Duration: About 1 week    Description  Cough started in past week - worsening, phlegm is darker yellow now, thinks there was some blood in phlegm today after coughing very hard and then lost her voice.  Upper back pain - constant but worse with coughing and deep breathing.  Headache prior to stroke - resolved.  No fevers.  Eating and drinking but has had poor appetite.  Peeing normally (dark this morning but lighter since).  No nausea or vomiting.  No diarrhea.  Ears feel plugged but no pain.  Nasal congestion but no sinus pain/pressure.        Severity: moderate    Accompanying signs and symptoms: See above    History (predisposing factors):  Has had similar symptoms in past, no history of asthma    Precipitating or alleviating factors: None    Therapies tried and outcome:  none    Was hospitalized overnight on 12/23 with right frontal parietal and right bryson ischemic stoke (noted on MRI) of unknown etiology.  Echo with normal EF.  Negative CTA.  No afib on tele noted, patient discharged with Zio (has on now).  Taking aspirin, plavix, and atorvastatin as prescribed.  Notes she will be following-up with neurology in 6-8 weeks.      Felt like she was going to have a lupus flare prior to onset of these symptoms.  Negative inflammatory markers in hospital so deemed unlikely.      Cough is productive.  Back pain even at rest.  Hoarse voice started.        Problems taking medications regularly: No    Medication side effects: none    Social History     Tobacco Use     Smoking status: Former Smoker     Smokeless tobacco: Never Used   Substance Use Topics     Alcohol use: No        Problem list and histories reviewed & adjusted, as indicated.  Additional history: as documented    Patient Active Problem List   Diagnosis     Lupus  (H)     Abnormal perimenopausal bleeding     Obesity, Class I, BMI 30-34.9     History of claustrophobia     Depressive disorder     Anxiety     Allergic state     ACP (advance care planning)     Tired     Insomnia, unspecified type     Hemoglobin C trait (H)     Liver hemangioma     Moderate major depression (H)     Stroke (H)     Past Surgical History:   Procedure Laterality Date     COLONOSCOPY  01/2010    repeat 10 yrs     DILATION AND CURETTAGE, OPERATIVE HYSTEROSCOPY WITH MORCELLATOR, COMBINED N/A 2/15/2017    Procedure: COMBINED DILATION AND CURETTAGE, OPERATIVE HYSTEROSCOPY WITH MORCELLATOR;  Surgeon: Erin Gutierrez MD;  [facility]     ESOPHAGOSCOPY, GASTROSCOPY, DUODENOSCOPY (EGD), COMBINED Left 2/5/2016    Procedure: COMBINED ESOPHAGOSCOPY, GASTROSCOPY, DUODENOSCOPY (EGD), BIOPSY SINGLE OR MULTIPLE;  Surgeon: Pierre Fernandez MD;  Location:  GI     GYN SURGERY         Social History     Tobacco Use     Smoking status: Former Smoker     Smokeless tobacco: Never Used   Substance Use Topics     Alcohol use: No     Family History   Problem Relation Age of Onset     Lupus Mother      Asthma Mother      Diabetes Father      Brain Tumor Father         begnign on pituitary     Depression Sister      Anemia Sister            Current Outpatient Medications   Medication Sig Dispense Refill     albuterol (PROAIR HFA/PROVENTIL HFA/VENTOLIN HFA) 108 (90 Base) MCG/ACT inhaler Inhale 2 puffs into the lungs every 4 hours as needed for shortness of breath / dyspnea, wheezing or other (coughing) 1 Inhaler 0     [START ON 1/15/2020] aspirin (ASA) 325 MG tablet Take 1 tablet (325 mg) by mouth daily Start taking on 1/15/20, after finishing taking aspirin 81mg for 21 days. 30 tablet 0     aspirin (ASA) 81 MG chewable tablet Take 1 tablet (81 mg) by mouth daily for 21 days 21 tablet 0     atorvastatin (LIPITOR) 80 MG tablet Take 1 tablet (80 mg) by mouth every evening 30 tablet 0     cloNIDine (CATAPRES) 0.1 MG  tablet TAKE 2 TABLETS BY MOUTH DAILY AT BEDTIME  3     clopidogrel (PLAVIX) 75 MG tablet 1 tablet (75 mg) by Oral or NG Tube route daily for 21 days 21 tablet 0     DEEP SEA NASAL SPRAY 0.65 % nasal spray SPRAY 1 SPRAY INTO BOTH NOSTRILS DAILY AS NEEDED FOR CONGESTION 1 Bottle 2     DULoxetine (CYMBALTA) 30 MG capsule TAKE 1 CAPSULE BY MOUTH EVERY DAY 90 capsule 0     flunisolide (NASALIDE) 25 MCG/ACT (0.025%) SOLN spray INHALE 2 SPRAYS INTO EACH NOSTRIL 2 TIMES DAILY 1 Bottle 3     gabapentin (NEURONTIN) 300 MG capsule TAKE 2 CAPSULE (300 MG) BY MOUTH 3 TIMES DAILY 270 capsule 3     guaiFENesin (MUCINEX) 600 MG 12 hr tablet Take 2 tablets (1,200 mg) by mouth 2 times daily 30 tablet 0     hydroxychloroquine (PLAQUENIL) 200 MG tablet TAKE 1 TABLET BY MOUTH DAILY 90 tablet 2     hydrOXYzine (ATARAX) 25 MG tablet TAKE 1 TABLET (25 MG) BY MOUTH 3 TIMES PER DAY 90 tablet 3     multivitamin, therapeutic with minerals (MULTI-VITAMIN) TABS Take 1 tablet by mouth daily       omega 3 1200 MG CAPS        order for DME Cane, 1 Device 0     tiZANidine (ZANAFLEX) 4 MG tablet TAKE 1-2 TABLETS BY MOUTH AS NEEDED THREE TIMES A DAY  3     amphetamine-dextroamphetamine (ADDERALL) 10 MG tablet Take 10 mg by mouth 2 times daily        Calcium-Magnesium-Vitamin D (CALCIUM 500 PO)        cyclobenzaprine (FLEXERIL) 5 MG tablet TAKE 1 TABLET BY MOUTH 3 TIMES DAILY (Patient not taking: Reported on 12/17/2018) 42 tablet 3     ferrous sulfate (IRON) 325 (65 FE) MG tablet Take 1 tablet (325 mg) by mouth daily (with breakfast) (Patient not taking: Reported on 12/26/2019) 30 tablet 5     fexofenadine (ALLEGRA) 180 MG tablet Take 1 tablet (180 mg) by mouth daily (Patient not taking: Reported on 12/23/2019) 30 tablet 1     mometasone (NASONEX) 50 MCG/ACT spray Spray 2 sprays into both nostrils daily (Patient not taking: Reported on 12/23/2019) 1 Box 11     triamcinolone (NASACORT) 55 MCG/ACT Inhaler Spray 2 sprays into both nostrils daily (Patient  not taking: Reported on 12/31/2018) 1 Bottle 3     Allergies   Allergen Reactions     Morphine Sulfate Itching     Sulfa Drugs Hives     Recent Labs   Lab Test 12/24/19  0648 12/23/19  1519 12/23/19  1517 05/21/19  0929 10/08/18  1000  11/02/16  1151  10/05/15  1548   A1C  --   --  5.5  --   --   --  5.7  --  5.5   *  --   --   --   --   --  151*  --   --    HDL 76  --   --   --   --   --  83  --   --    TRIG 76  --   --   --   --   --  147  --   --    ALT 30  --   --  36 44   < > 43   < >  --    CR 0.55  --  0.63 0.63 0.57   < > 0.80   < > 0.63   GFRESTIMATED >90 85 >90 >90 >90   < > 74   < > >90  Non  GFR Calc     GFRESTBLACK >90 >90 >90 >90 >90   < > 89   < > >90   GFR Calc     POTASSIUM 3.9  --  3.9 4.4 4.2   < > 3.4   < > 3.9   TSH  --   --   --  1.20 0.97  --  0.85   < >  --     < > = values in this interval not displayed.      BP Readings from Last 3 Encounters:   12/26/19 100/60   12/24/19 116/83   12/23/19 112/74    Wt Readings from Last 3 Encounters:   12/26/19 80.7 kg (178 lb)   12/23/19 79.3 kg (174 lb 12.8 oz)   12/23/19 79.7 kg (175 lb 12.8 oz)          Labs reviewed in EPIC  Problem list, Medication list, Allergies, and Medical/Social/Surgical histories reviewed in [facility] and updated as appropriate.    ROS: Constitutional, ENT, pulmonary, cardiac, gastrointestinal, genitourinary, musculoskeletal, integument and psychiatric systems are negative, except as otherwise noted above in the HPI.    OBJECTIVE:                                                    /60   Pulse 91   Temp 98.7  F (37.1  C) (Oral)   Wt 80.7 kg (178 lb)   LMP 08/12/2013   SpO2 99%   BMI 30.20 kg/m    Body mass index is 30.2 kg/m .    GENERAL: healthy, alert and no distress  EYES: Eyes grossly normal to inspection, conjunctivae and sclerae normal  HENT: ear canals normal, mild TM injection but no bulging or effusion and normal light reflex, nasal turbinates erythematous and edematous with  clear discharge, posterior pharyngeal erythema, no tonsillar swelling or exuduates  NECK: no adenopathy, no asymmetry, or masses  RESP: lungs clear to auscultation - no rales, rhonchi or wheezes; good air movement throughout lung fields, no respiratory distress, normal O2  CV: regular rate and rhythm, no murmur noted, Zio patch in place  MS: no gross musculoskeletal defects noted, no edema  SKIN: warm and dry, no suspicious lesions or rashes  NEURO: Mentation intact and speech normal, normal gait     ASSESSMENT/PLAN:                                                      1. Bronchitis  Most likely her symptom constellation is consistent with viral bronchitis.  Reassuring in that she appear well and has normal vitals.  Treat with mucinex and albuterol for symptom relief.  Expect cough may take several weeks to resolve.    - guaiFENesin (MUCINEX) 600 MG 12 hr tablet; Take 2 tablets (1,200 mg) by mouth 2 times daily  Dispense: 30 tablet; Refill: 0  - albuterol (PROAIR HFA/PROVENTIL HFA/VENTOLIN HFA) 108 (90 Base) MCG/ACT inhaler; Inhale 2 puffs into the lungs every 4 hours as needed for shortness of breath / dyspnea, wheezing or other (coughing)  Dispense: 1 Inhaler; Refill: 0    2. Pleuritic chest pain  3. Blood in sputum  4. Chest tightness  5. Shortness of breath  As above, bronchitis is most likely underlying diagnosis however, given she was recently hospitalized with ischemic stroke of unknown etiology (hypercoaguable work-up is still pending) and underlying lupus I do think reasonable to rule out PE in setting of pleuritic chest pain and blood in sputum as well as some mild SOB.  D-dimer likely positive given recent stroke so not helpful in this clinical scenario.  Normal renal function noted in hospital.  Suspect blood in sputum related to irritation of throat/pharynx.  Reassured that vitals are stable (no tachycardia or hypoxia).   - CT Chest Pulmonary Embolism w Contrast; Future    She was instructed to  follow-up with PCP for hospital follow-up within 7 days of discharge.  Call if concerns or questions.  Follow-up with neurology in 6-8 weeks as planned.  She expressed agreement and understanding with plan as above.      Carey Cameron, [facility] PRIMARY CARE

## 2019-12-27 LAB
B2 GLYCOPROT1 IGG SERPL IA-ACNC: 0.9 U/ML
B2 GLYCOPROT1 IGM SERPL IA-ACNC: <2.9 U/ML
DSDNA AB SER-ACNC: 4 IU/ML

## 2019-12-31 ENCOUNTER — TELEPHONE (OUTPATIENT)
Dept: FAMILY MEDICINE | Facility: CLINIC | Age: 59
End: 2019-12-31

## 2019-12-31 NOTE — TELEPHONE ENCOUNTER
Called patient back.Patient has TIA/stroke on 12/23/19 in ED. Was diagnosed with bronchitis on 12/26/19. Taking ASA, Plavix, and Lipitor as directed. No fevers. Zio patch present. Patient doesn't feel well enough to go back to work. Would like to address this at appointment. Joann Mullins RN on 12/31/2019 at 4:30 PM

## 2019-12-31 NOTE — TELEPHONE ENCOUNTER
Pt called back and is requesting to speak with a nurse regarding the stroke and questions she is having before her appointment on 1/2

## 2020-01-02 ENCOUNTER — OFFICE VISIT (OUTPATIENT)
Dept: FAMILY MEDICINE | Facility: CLINIC | Age: 60
End: 2020-01-02
Payer: COMMERCIAL

## 2020-01-02 VITALS
TEMPERATURE: 97.5 F | OXYGEN SATURATION: 96 % | DIASTOLIC BLOOD PRESSURE: 70 MMHG | HEIGHT: 64 IN | SYSTOLIC BLOOD PRESSURE: 99 MMHG | HEART RATE: 61 BPM | BODY MASS INDEX: 30.56 KG/M2 | WEIGHT: 179 LBS

## 2020-01-02 DIAGNOSIS — I69.398 CVA, OLD, ALTERATIONS OF SENSATIONS: Primary | ICD-10-CM

## 2020-01-02 DIAGNOSIS — R20.9 CVA, OLD, ALTERATIONS OF SENSATIONS: Primary | ICD-10-CM

## 2020-01-02 DIAGNOSIS — M79.622 PAIN OF LEFT UPPER ARM: ICD-10-CM

## 2020-01-02 DIAGNOSIS — R82.90 ABNORMAL URINE ODOR: ICD-10-CM

## 2020-01-02 DIAGNOSIS — F43.0 ACUTE REACTION TO STRESS: ICD-10-CM

## 2020-01-02 DIAGNOSIS — M79.605 PAIN OF LEFT LOWER EXTREMITY: ICD-10-CM

## 2020-01-02 LAB
ALBUMIN UR-MCNC: NEGATIVE MG/DL
APPEARANCE UR: CLEAR
BILIRUB UR QL STRIP: NEGATIVE
COLOR UR AUTO: YELLOW
GLUCOSE UR STRIP-MCNC: NEGATIVE MG/DL
HGB UR QL STRIP: NEGATIVE
KETONES UR STRIP-MCNC: NEGATIVE MG/DL
LEUKOCYTE ESTERASE UR QL STRIP: NEGATIVE
NITRATE UR QL: NEGATIVE
PH UR STRIP: 6 PH (ref 5–7)
SOURCE: NORMAL
SP GR UR STRIP: <=1.005 (ref 1–1.03)
UROBILINOGEN UR STRIP-ACNC: 0.2 EU/DL (ref 0.2–1)

## 2020-01-02 PROCEDURE — 99214 OFFICE O/P EST MOD 30 MIN: CPT | Performed by: PHYSICIAN ASSISTANT

## 2020-01-02 PROCEDURE — 81003 URINALYSIS AUTO W/O SCOPE: CPT | Performed by: PHYSICIAN ASSISTANT

## 2020-01-02 ASSESSMENT — MIFFLIN-ST. JEOR: SCORE: 1361.94

## 2020-01-02 NOTE — LETTER
January 2, 2020      Lesley Stafford  [address] [place] [address]        To Whom It May Concern:    Lesley Stafford  was seen on 1/2/20.  Please excuse her  until 1/17/20 due to a recent stroke she suffered on 12/23/20.        Sincerely,        Jessica Gutierres PA-C

## 2020-01-02 NOTE — PROGRESS NOTES
"Lesley Stafford is a 59 year old female who presents to clinic today for the following health issues:    HPI   Concern - Stroke Follow up  Onset:     Description:   Stroke     Intensity: mild    Progression of Symptoms:  improving and intermittent    Accompanying Signs & Symptoms:  Numbness,tingling     Previous history of similar problem:   no    Precipitating factors:   Worsened by: stress     Alleviating factors:  Improved by: resting, increased fluids     Therapies Tried and outcome: meds given seem to be working, per last note pt would like to discuss when appropriate to return to work states she is not ready    -Patient expresses feeling overwhelmed with \"life in general\" since her CVA on 12/23, has been doing breathing exercises, plans on seeing a therapist soon  -She was advised to follow up with neurology in February  -She recently left her job following her stroke, had been working as a cognitive therapist, was planning on going back to work today but does not feel like she is ready  -Her daughter states that when patient has tried to drive since her stroke, it has caused pain- travels down her arms and causes paresthesia in her fingers, recently one of her legs became numb  -She has not seen PT or any kind of rehab therapy  -Reports having urine with a strong odor  -Patient states that she has been dealing with bronchitis    Problem list and histories reviewed & adjusted, as indicated.  Additional history: as documented    ROS:  CONSTITUTIONAL: NEGATIVE for fever, chills, change in weight  INTEGUMENTARY/SKIN: NEGATIVE for worrisome rashes, moles or lesions  EYES: NEGATIVE for vision changes or irritation  ENT/MOUTH: NEGATIVE for ear, mouth and throat problems  RESP: NEGATIVE for significant cough or SOB  BREAST: NEGATIVE for masses, tenderness or discharge  CV: NEGATIVE for chest pain, palpitations or peripheral edema  GI: NEGATIVE for nausea, abdominal pain, heartburn, or change in bowel habits  : " NEGATIVE for frequency, dysuria, or hematuria  MUSCULOSKELETAL: NEGATIVE for significant arthralgias or myalgia, see HPI above  NEURO: NEGATIVE for dizziness, POSITIVE numbness, paresthesias  ENDOCRINE: NEGATIVE for temperature intolerance, skin/hair changes  HEME: NEGATIVE for bleeding problems  PSYCHIATRIC: POSITIVE for changes in mood or affect    This document serves as a record of the services and decisions personally performed and made by Jessica Gutierres PA-C. It was created on her behalf by Naveed Hightower, trained medical scribe. The creation of this document is based on the provider's statements to the medical scribe.  Naveed Hightower 11:20 AM January 2, 2020    Patient Active Problem List   Diagnosis     Lupus (H)     Abnormal perimenopausal bleeding     Obesity, Class I, BMI 30-34.9     History of claustrophobia     Depressive disorder     Anxiety     Allergic state     ACP (advance care planning)     Tired     Insomnia, unspecified type     Hemoglobin C trait (H)     Liver hemangioma     Moderate major depression (H)     Stroke (H)     Past Surgical History:   Procedure Laterality Date     COLONOSCOPY  01/2010    repeat 10 yrs     DILATION AND CURETTAGE, OPERATIVE HYSTEROSCOPY WITH MORCELLATOR, COMBINED N/A 2/15/2017    Procedure: COMBINED DILATION AND CURETTAGE, OPERATIVE HYSTEROSCOPY WITH MORCELLATOR;  Surgeon: Erin Gutierrez MD;  [facility]     ESOPHAGOSCOPY, GASTROSCOPY, DUODENOSCOPY (EGD), COMBINED Left 2/5/2016    Procedure: COMBINED ESOPHAGOSCOPY, GASTROSCOPY, DUODENOSCOPY (EGD), BIOPSY SINGLE OR MULTIPLE;  Surgeon: Pierre Fernandez MD;  [facility]     GYN SURGERY         Social History     Tobacco Use     Smoking status: Former Smoker     Smokeless tobacco: Never Used   Substance Use Topics     Alcohol use: No     Family History   Problem Relation Age of Onset     Lupus Mother      Asthma Mother      Diabetes Father      Brain Tumor Father         begnign on pituitary      Depression Sister      Anemia Sister            Labs reviewed in EPIC  Patient Active Problem List   Diagnosis     Lupus (H)     Abnormal perimenopausal bleeding     Obesity, Class I, BMI 30-34.9     History of claustrophobia     Depressive disorder     Anxiety     Allergic state     ACP (advance care planning)     Tired     Insomnia, unspecified type     Hemoglobin C trait (H)     Liver hemangioma     Moderate major depression (H)     Stroke (H)     Past Surgical History:   Procedure Laterality Date     COLONOSCOPY  01/2010    repeat 10 yrs     DILATION AND CURETTAGE, OPERATIVE HYSTEROSCOPY WITH MORCELLATOR, COMBINED N/A 2/15/2017    Procedure: COMBINED DILATION AND CURETTAGE, OPERATIVE HYSTEROSCOPY WITH MORCELLATOR;  Surgeon: Erin Gutierrez MD;  [facility]     ESOPHAGOSCOPY, GASTROSCOPY, DUODENOSCOPY (EGD), COMBINED Left 2/5/2016    Procedure: COMBINED ESOPHAGOSCOPY, GASTROSCOPY, DUODENOSCOPY (EGD), BIOPSY SINGLE OR MULTIPLE;  Surgeon: Pierre Fernandez MD;  [facility]     GYN SURGERY         Social History     Tobacco Use     Smoking status: Former Smoker     Smokeless tobacco: Never Used   Substance Use Topics     Alcohol use: No     Family History   Problem Relation Age of Onset     Lupus Mother      Asthma Mother      Diabetes Father      Brain Tumor Father         begnign on pituitary     Depression Sister      Anemia Sister          Current Outpatient Medications   Medication Sig Dispense Refill     albuterol (PROAIR HFA/PROVENTIL HFA/VENTOLIN HFA) 108 (90 Base) MCG/ACT inhaler Inhale 2 puffs into the lungs every 4 hours as needed for shortness of breath / dyspnea, wheezing or other (coughing) 1 Inhaler 0     atorvastatin (LIPITOR) 80 MG tablet Take 1 tablet (80 mg) by mouth every evening 30 tablet 0     cloNIDine (CATAPRES) 0.1 MG tablet TAKE 2 TABLETS BY MOUTH DAILY AT BEDTIME  3     clopidogrel (PLAVIX) 75 MG tablet 1 tablet (75 mg) by Oral or NG Tube route daily for 21 days 21 tablet  "0     DEEP SEA NASAL SPRAY 0.65 % nasal spray SPRAY 1 SPRAY INTO BOTH NOSTRILS DAILY AS NEEDED FOR CONGESTION 1 Bottle 2     DULoxetine (CYMBALTA) 30 MG capsule TAKE 1 CAPSULE BY MOUTH EVERY DAY 90 capsule 0     flunisolide (NASALIDE) 25 MCG/ACT (0.025%) SOLN spray INHALE 2 SPRAYS INTO EACH NOSTRIL 2 TIMES DAILY 1 Bottle 3     gabapentin (NEURONTIN) 300 MG capsule TAKE 2 CAPSULE (300 MG) BY MOUTH 3 TIMES DAILY 270 capsule 3     hydroxychloroquine (PLAQUENIL) 200 MG tablet TAKE 1 TABLET BY MOUTH DAILY 90 tablet 2     hydrOXYzine (ATARAX) 25 MG tablet TAKE 1 TABLET (25 MG) BY MOUTH 3 TIMES PER DAY 90 tablet 3     multivitamin, therapeutic with minerals (MULTI-VITAMIN) TABS Take 1 tablet by mouth daily       omega 3 1200 MG CAPS        order for DME Cane, 1 Device 0     tiZANidine (ZANAFLEX) 4 MG tablet TAKE 1-2 TABLETS BY MOUTH AS NEEDED THREE TIMES A DAY  3     aspirin (ASA) 325 MG tablet Take 1 tablet (325 mg) by mouth daily Start taking on 1/15/20, after finishing taking aspirin 81mg for 21 days. (Patient not taking: Reported on 1/2/2020) 30 tablet 0     guaiFENesin (MUCINEX) 600 MG 12 hr tablet Take 2 tablets (1,200 mg) by mouth 2 times daily (Patient not taking: Reported on 1/2/2020) 30 tablet 0       OBJECTIVE:                                                    BP 99/70   Pulse 61   Temp 97.5  F (36.4  C) (Oral)   Ht 1.626 m (5' 4\")   Wt 81.2 kg (179 lb)   LMP 08/12/2013   SpO2 96%   BMI 30.73 kg/m   Body mass index is 30.73 kg/m .   GENERAL:: healthy, alert and no distress  RESP: lungs clear to auscultation - no rales, no rhonchi, no wheezes  CV: regular rates and rhythm, normal S1 S2, no S3 or S4 and no murmur, no click or rub -  MS: extremities- no gross deformities noted, no edema, left lower extremity strength at 5-/5  SKIN: no suspicious lesions, no rashes  NEURO: strength and tone- normal, sensory exam- grossly normal, mentation- intact, speech- normal, reflexes- symmetric, decreased  strength " "on the left side  PSYCH: Alert and oriented times 3; speech- coherent , normal rate and volume; able to articulate logical thoughts, able to abstract reason, no tangential thoughts, no hallucinations or delusions, affect- normal    No results found for this or any previous visit (from the past 24 hour(s)).       ASSESSMENT/PLAN:                                                        ICD-10-CM    1. CVA, old, alterations of sensations I69.398 OCCUPATIONAL THERAPY REFERRAL    R20.9    2. Lupus (H) M32.9 OCCUPATIONAL THERAPY REFERRAL   3. Pain of left upper arm M79.622 OCCUPATIONAL THERAPY REFERRAL   4. Pain of left lower extremity M79.605 OCCUPATIONAL THERAPY REFERRAL   5. Acute reaction to stress F43.0    6. Abnormal urine odor R82.90 *UA reflex to Microscopic     Residual upper and lower extremity pain from stroke with benefit from occupational health. She is currently experiencing significant pain that inhibits driving, as well as increased anxiety due to the stress of her situation. Therefore she will need to take some time off of work, will start with 2 weeks and have her follow up with occupational health, reassess in 2 weeks.    Patient Instructions   Follow up 2 weeks    Return to clinic for any new or worsening symptoms or go to ER Urgent care in off hours        Estimated body mass index is 30.73 kg/m  as calculated from the following:    Height as of this encounter: 1.626 m (5' 4\").    Weight as of this encounter: 81.2 kg (179 lb).     The information in this document, created by the medical scribe for me, accurately reflects the services I personally performed and the decisions made by me. I have reviewed and approved this document for accuracy prior to leaving the patient care area.  January 2, 2020 11:21 AM    Jessica Gutierres  [facility]    "

## 2020-01-02 NOTE — PATIENT INSTRUCTIONS
Follow up 2 weeks    Return to clinic for any new or worsening symptoms or go to ER Urgent care in off hours

## 2020-01-02 NOTE — LETTER
January 3, 2020      Lesley Stafford  [address]  [place] [address]        Dear Ms.Omar Stafford,    We are writing to inform you of your test results.    Your test results fall within the expected range(s) or remain unchanged from previous results.  Please continue with current treatment plan.    Resulted Orders   *UA reflex to Microscopic   Result Value Ref Range    Color Urine Yellow     Appearance Urine Clear     Glucose Urine Negative NEG^Negative mg/dL    Bilirubin Urine Negative NEG^Negative    Ketones Urine Negative NEG^Negative mg/dL    Specific Gravity Urine <=1.005 1.003 - 1.035    Blood Urine Negative NEG^Negative    pH Urine 6.0 5.0 - 7.0 pH    Protein Albumin Urine Negative NEG^Negative mg/dL    Urobilinogen Urine 0.2 0.2 - 1.0 EU/dL    Nitrite Urine Negative NEG^Negative    Leukocyte Esterase Urine Negative NEG^Negative    Source Midstream Urine        If you have any questions or concerns, please call the clinic at the number listed above.       Sincerely,        Jessica Gutierres PA-C

## 2020-01-03 ENCOUNTER — PATIENT OUTREACH (OUTPATIENT)
Dept: CARE COORDINATION | Facility: CLINIC | Age: 60
End: 2020-01-03

## 2020-01-03 ENCOUNTER — TELEPHONE (OUTPATIENT)
Dept: FAMILY MEDICINE | Facility: CLINIC | Age: 60
End: 2020-01-03

## 2020-01-03 DIAGNOSIS — I63.9 CEREBROVASCULAR ACCIDENT (CVA), UNSPECIFIED MECHANISM (H): Primary | ICD-10-CM

## 2020-01-03 NOTE — LETTER
[facility] CARE COORDINATION  [address]  [place] [address]  January 3, 2020    Lesley Stafford  [address]  [place] [address]      Dear Lesley,    I am a clinic care coordinator who works with Rafael Orosco MD at [facility]. I wanted to thank you for spending the time to talk with me.  I wanted to introduce myself and provide you with my contact information so that you can call me with questions or concerns about your health care. Below is a description of clinic care coordination and how I can further assist you.     The clinic care coordinator is a registered nurse and/or  who understand the health care system. The goal of clinic care coordination is to help you manage your health and improve access to the [facility] system in the most efficient manner. The registered nurse can assist you in meeting your health care goals by providing education, coordinating services, and strengthening the communication among your providers. The  can assist you with financial, behavioral, psychosocial, chemical dependency, counseling, and/or psychiatric resources.    Please feel free to contact me at [phone], with any questions or concerns. We at [facility] are focused on providing you with the highest-quality healthcare experience possible and that all starts with you.     Sincerely,     Henny White, KEITH    Enclosed: I have enclosed a copy of the Complex Care Plan. This has helpful information and goals that we have talked about. Please keep this in an easy to access place to use as needed.

## 2020-01-03 NOTE — TELEPHONE ENCOUNTER
Dr Orosco    Pt is feeling overwhelmed and is needing support, her dx of stroke is affecting her life in more ways than she thought it would and it is hard for her   She is home bound, depending on family and friends for rides but they will be heading back to work on Monday  She is worried about the bills for her care that are coming in, her not being able to work at this time    She is having trouble with driving and is needing help to set up rides/transportation to medical appointments    Care coordination referral cued    Ok to leave a detailed message    Swati Ghotra RN   [facility]

## 2020-01-03 NOTE — LETTER
[facility]  Complex Care Plan  About Me:    Patient Name:  Lesley Stafford    YOB: 1960  Age:         59 year old   John MRN:    [id number] Telephone Information:  Home Phone [phone]   Mobile [phone]       Address:  [address]  [facility] [address] Email address:  [email or web address]      Emergency Contact(s)    Name Relationship Lgl Grd Work Phone Home Phone Mobile Phone   1. FLORIDA HUGO* Daughter  none [phone]   2. ELIAS OVERTON* Mother  none NONE [phone]   3. ADRIAN STAFFORD, S* Son  none [phone]           Primary language:  English     needed? No   [employer] [facility]:  [phone] op. 1  Other communication barriers: None  Preferred Method of Communication:  Mail  Current living arrangement: I live in a private home  Mobility Status/ Medical Equipment:      Health Maintenance  Health Maintenance Reviewed: Due/Overdue   Health Maintenance Due   Topic Date Due     URINE DRUG SCREEN  1960     DTAP/TDAP/TD IMMUNIZATION (1 - Tdap) 12/22/1969     PNEUMOCOCCAL IMMUNIZATION 19-64 HIGHEST RISK (1 of 3 - PCV13) 12/22/1977     ZOSTER IMMUNIZATION (1 of 2) 12/22/2008     PREVENTIVE CARE VISIT  03/05/2019     INFLUENZA VACCINE (1) 09/01/2019     HPV  12/08/2019     PAP  12/08/2019     COLONOSCOPY  01/20/2020         My Access Plan  Medical Emergency 911   Primary Clinic Line [facility] [phone]   24 Hour Appointment Line [phone] or  [phone]) (toll-free)   24 Hour Nurse Line [phone] (toll-free)   Preferred Urgent Care [facility], [phone]   Preferred [facility]  [phone]   Preferred Pharmacy [id number] - [place], [place] - [address]     Behavioral Health Crisis Line The National Suicide Prevention Lifeline at  1-887.420.8080 or 911             My Care Team Members  Patient Care Team       Relationship Specialty Notifications Start End    Rafael Orosco MD PCP - General Family Practice  5/13/11     Phone: [phone] Fax: [phone]         [address] [facility] [address]    Rosa Weinstein APRN CNP Assigned PCP   10/14/18     Phone: [phone]          [address] [facility] [address]    Henny White BSW Lead Care Coordinator Primary Care - CC Admissions 1/3/20     Phone: [phone]                 My Care Plans  Self Management and Treatment Plan  Goals and (Comments)  Goals        General    Financial Wellbeing (pt-stated)     Notes - Note created  1/3/2020  3:39 PM by Henny White BSW    Goal Statement: I would like to set up a payment plan with [employer]   Date Goal set: 1/3/2020  Date to Achieve By: 2/20/2020  Patient expressed understanding of goal: yes   Action steps to achieve this goal:   1. I will contact [employer] billing.             Transportation (pt-stated)     Notes - Note created  1/3/2020  3:33 PM by Henny White BSW    Goal Statement: I am in need of transportation resources   Date Goal set: 1/3/20  Date to Achieve By: 2/20/20  Patient expressed understanding of goal: yes  Action steps to achieve this goal:  1. I will contact my insurance to see if I qualify for rides.   2. I will talk talk with friends and family about rides.                    Action Plans on File:            Depression            Current Medications and Allergies:  See printed Medication Report.    Care Coordination Start Date: 1/3/2020   Frequency of Care Coordination: monthly   Form Last Updated: 01/03/2020

## 2020-01-03 NOTE — PROGRESS NOTES
Clinic Care Coordination Contact    Follow Up Progress Note      Assessment: Pt shared that she is looking for assistance in many areas. Pt stated she had a stroke and is now in need of transportation and help around the house. Pt stated that she has not been working and therefore is unable to afford much. She stated the medical bills continue to come in and she is unsure what to do.     Goals addressed this encounter:   Goals Addressed                 This Visit's Progress       Patient Stated      Financial Wellbeing (pt-stated)        Goal Statement: I would like to set up a payment plan with [employer]   Date Goal set: 1/3/2020  Date to Achieve By: 2/20/2020  Patient expressed understanding of goal: yes   Action steps to achieve this goal:   1. I will contact [facility].               Transportation (pt-stated)        Goal Statement: I am in need of transportation resources   Date Goal set: 1/3/20  Date to Achieve By: 2/20/20  Patient expressed understanding of goal: yes  Action steps to achieve this goal:  1. I will contact my insurance to see if I qualify for rides.   2. I will talk talk with friends and family about rides.                    Intervention/Education provided during outreach: SW suggested pt contact [facility] to see if they can help her switch plans since she has had a change in her income. SW provided Pt with the number to [facility]. SW provided pt with the number to [employer] billing and she stated she would call them next week and work on getting a payment plan set up.      Outreach Frequency: monthly    Plan:   Pt will contact [facility] to get insurance plan changed.   PT will call [facility] to get set up on a billing plan.   Care Coordinator will follow up in 1 week.     LUIS Jalloh  Clinic Care Coordinator   [facility]   [phone]

## 2020-01-03 NOTE — TELEPHONE ENCOUNTER
Patient is on the line stated she would like to speak with someone regarding a stroke she had. Patient stated she is having severe chest pain. Patient is really emotional crying about trying to get her life back on track because of her medical history patient is not able to work or have money.    Patient is very emotional about all her medical history

## 2020-01-06 ENCOUNTER — PATIENT OUTREACH (OUTPATIENT)
Dept: CARE COORDINATION | Facility: CLINIC | Age: 60
End: 2020-01-06

## 2020-01-06 ENCOUNTER — PATIENT OUTREACH (OUTPATIENT)
Dept: NEUROLOGY | Facility: CLINIC | Age: 60
End: 2020-01-06

## 2020-01-06 NOTE — PROGRESS NOTES
Clinic Care Coordination Contact    Follow Up Progress Note      Assessment: Pt called stating that she had just talked with [employer] and they entered in her new income level. They will be sending her something in the mail if she is qualified for a different insurance plan. Pt stated she needs help navigating how to do FMLA or leave from work.      Intervention/Education provided during outreach: SW suggested that Pt contact HR and talk with her further about getting set up with FMLA or if she qualifies for unemployment. PT stated she will call them after she has her OT appointment on 1/8/2020.   Pt is hoping to get answers as she is feeling overwhelmed with her finances.           Plan:   [facility].   Pt will contact HR at her place of employment.   Care Coordinator will follow up with Pt in 2 weeks.     LUIS Jalloh  Clinic Care Coordinator   [facility]   [phone]

## 2020-01-06 NOTE — PROGRESS NOTES
[facility]: Post-Discharge Note  SITUATION                                                      Admission:    Admission Date: 12/23/19   Reason for Admission: Right frontal parietal and R bryson ischemic stroke due to undetermined etiology  Discharge:   Discharge Date: 12/24/19  Discharge Diagnosis: Right frontal parietal and R bryson ischemic stroke, likely due to ESUS  Discharge Service: Stroke Neurology     PLAN                                                      Outpatient Plan:    * PT/OT Referral  * Aspirin 81mg and Plavix 75mg daily for 21 days, followed by aspirin 325mg daily   * Started on atorvastatin 80mg   * Zio patch placed  * Follow-up with primary care provider within 7 days  * Follow-up with Rheumatology in 4-6 weeks  * Follow-up with Neurology in 6-8 weeks.    Call routed to scheduling to contact patient to arrange Neuro follow-up.     LVMM for patient to return call. Eloise Galeana RN 1/6/2020 10:03 AM   LVMM for patient to return call. Eloise Galeana RN 1/10/2020 2:44 PM         Future Appointments   Date Time Provider Department Center   1/8/2020 10:00 AM Jonn Betancourt OT [facility]            Eloise Galeana RN

## 2020-01-08 ENCOUNTER — DOCUMENTATION ONLY (OUTPATIENT)
Dept: CARE COORDINATION | Facility: CLINIC | Age: 60
End: 2020-01-08

## 2020-01-08 ENCOUNTER — TELEPHONE (OUTPATIENT)
Dept: FAMILY MEDICINE | Facility: CLINIC | Age: 60
End: 2020-01-08

## 2020-01-08 ENCOUNTER — HOSPITAL ENCOUNTER (OUTPATIENT)
Dept: OCCUPATIONAL THERAPY | Facility: CLINIC | Age: 60
Setting detail: THERAPIES SERIES
End: 2020-01-08
Attending: PHYSICIAN ASSISTANT
Payer: COMMERCIAL

## 2020-01-08 ENCOUNTER — PRE VISIT (OUTPATIENT)
Dept: NEUROLOGY | Facility: CLINIC | Age: 60
End: 2020-01-08

## 2020-01-08 DIAGNOSIS — I63.9 CEREBROVASCULAR ACCIDENT (CVA), UNSPECIFIED MECHANISM (H): Primary | ICD-10-CM

## 2020-01-08 DIAGNOSIS — M79.622 PAIN OF LEFT UPPER ARM: ICD-10-CM

## 2020-01-08 DIAGNOSIS — M79.605 PAIN OF LEFT LOWER EXTREMITY: ICD-10-CM

## 2020-01-08 DIAGNOSIS — I63.9 CEREBROVASCULAR ACCIDENT (CVA), UNSPECIFIED MECHANISM (H): ICD-10-CM

## 2020-01-08 PROCEDURE — 97166 OT EVAL MOD COMPLEX 45 MIN: CPT | Mod: GO | Performed by: OCCUPATIONAL THERAPIST

## 2020-01-08 PROCEDURE — 97110 THERAPEUTIC EXERCISES: CPT | Mod: GO | Performed by: OCCUPATIONAL THERAPIST

## 2020-01-08 ASSESSMENT — ACTIVITIES OF DAILY LIVING (ADL): IADL_QUICK_ADDS: COMMUNITY MOBILITY

## 2020-01-08 NOTE — PROGRESS NOTES
01/08/20 0900   Quick Adds   Type of Visit Initial Outpatient Occupational Therapy Evaluation   General Information   Start Of Care Date 01/08/20   Referring Physician Jessica Gutierres PA-C   Orders Evaluate and treat as indicated   Orders Date 01/02/20   Medical Diagnosis Cerebrovascular accident (CVA), unspecified mechanism , Lupus, pain of Left upper arm, pain of LLE   Onset of Illness/Injury or Date of Surgery 12/23/20   Surgical/Medical History Reviewed Yes   Additional Occupational Profile Info/Pertinent History of Current Problem Lesley Stafford is a 59 year old female with PMH of SLE, depression and anxiety, hip DJD, chronic back pain, and headaches who presented to urgent care with new onset of L numbness and tingling, following a few days of headaches with light sensitivity, confusion (such as forgetting to go to work). Transferred to the [facility] ED due to concern for possible stroke. She reported a history of shingles and chronic sided weakness. She has been having increased stress at work.   Comments/Observations Pt has been wearing a Gravity R&Do heart monitor that was DC'd yesterday.    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  Her work stress at [employer] has been increasing. She also states she was exposed to smoke from cigarettes and drugs at this location, and she is very concerned about the toll this has been taking on her health.    Current Living Environment House   Number of Stairs to Enter Home 1   Number of Stairs Within Home 6   Primary Bathroom Location/Comments walk in shower   Home/Community Accessibility Comments Pt feels she is accessing her home with no risk for falls.   "However, she has 3 flights of stairs to get to her basement, and feels it takes all day to do laundry, due to fatigue.    Prior Level Comments Pt was previously independent with ADL completion. Pt has a cane at home that she uses when her back is hurting, however not all the time.  She presented today without AD, and was able to stoop to the floor to  purse, but upon standing said she felt \"off balance.\"    Patient/family Goals Statement Pt's main residual complaints have been weakness, numbness/tingling, and pain in LUE, as well as \"feeling off balance,\" difficulty putting her thoughts together (feels she has to do a lot of thinking to come up with the right words, conversation is much more effortful). She just started going for walks this week, and is motivated to increase her activity level but also anxious about pushing herself too much.  She expresses a lot of anxiety about returning to work, and was tearful during her session as she described feeling like she is not the same person she used to be.   Pain   Pain comments pain at end range (145 degrees) of shoulder flexion   Fall Risk Screen   Fall screen completed by OT   Have you fallen 2 or more times in the past year? No   Have you fallen and had an injury in the past year? No   Is patient a fall risk? Yes   Fall screen comments Pt states feeling off balance, and did appear unsteady after picking up purse from floor. She states she is very cautious.  Recommend PT evaluation.   Cognitive Status Examination   Orientation Orientation to person, place and time   Level of Consciousness Alert   Follows Commands and Answers Questions 100% of the time   Personal Safety and Judgment Intact   Memory Impaired   Memory Comments Pt recalled 3/5 words after an 8 min delay, and was able to produce the additional 2 words with categorical prompting.   Attention Sustained attention impaired   Executive Function Working memory impaired, decreased storage of information " "for performing tasks;Planning ability impaired;Self awareness/monitoring impaired   Cognitive Comment Pt was somewhat tangential in her conversation, and occasionally stated \"I forgot where I was going with that.\"  She was quick to point out that this is not her norm, and she has had much difficulty putting her thoughts together since her stroke.  She scored 26/30 on the [place] (version 7.2), with deficits observed in clock construction (she thought the hour hand was longer than minute hand, but then attributed it to her baseline dyslexia), repeating a sentence verbatim, and short term memory.   She did state 15 words in a minute starting with a particular letter, which is above the norm for this skill. She was not observed to have any overt word finding difficulties during evaluation, but states this has been happening and is very frustrating.   Visual Perception   Visual Perception No deficits were identified   Sensation   Sensation Comments numbness/tingling throughout LUE (hands, forearm)   Range of Motion (ROM)   ROM Comments L shoulder flexion limited to 145 degrees RUE was normal, at approx 180 degrees).    Hand Strength   Left Hand  (pounds) 45 pounds  (1 SD Below the mean)   Right Hand  (pounds) 55 pounds  (WNL)   Coordination   Upper Extremity Coordination Left UE impaired   Left Hand, Nine Hole Peg Test (seconds) 28  (>2 standard deviations below the mean)   Right Hand, Nine Hole Peg Test (seconds) 21   Functional Limitations Decreased speed;Fine motor ADL performance impaired;Impaired ability to perform bilateral tasks   Coordination Comments Pt completed Dynavision testing (test of visual scanning and UE reaction time), scoring 41 hits per minute.  This is considered below the cutoff for safe drivers (min of 52 hits/minute).    Balance   Balance Comments pt reports feeling off balance, recommend further assessment with PT   Functional Mobility   Ambulation walks without AD   Upper Body Dressing "   Upper Body Dressing Comments pt is independent in dressing, but has difficulty with fasteners   Lower Body Dressing   Lower Body Dressing Comments pt is independent in dressing, but has difficulty with fasteners   Instrumental Activities of Daily Living Assessment   IADL Quick Adds Community Mobility   Community Mobility Pt states that she was not told that she couldn't drive.  However, she tried driving, when she felt an increase in tingling/pressure in her LUE, so she needed to pull over.  She is currently very anxious about trying driving again.    Planned Therapy Interventions   Planned Therapy Interventions ADL training;IADL training;Cognitive skills;Neuromuscular re-education;Self care/Home management;Strengthening;Therapeutic activities   Adult OT Eval Goals   OT Eval Goals (Adult) 1;2;3;4;5    OT Goal 1   Goal Identifier L shoulder ROM   Goal Description Pt will demonstrate full ROM on L shoulder, with no increase in pain, for increased reaching overhead and resumption of PLOF in household and work tasks.   Target Date 03/08/20    OT Goal 2   Goal Identifier L  strength   Goal Description Pt will demonstrate a 8 lb increase in L  strength, for increaed independence in ADLs and IADLs.    Target Date 03/08/20    OT Goal 3   Goal Identifier Dynavision    Goal Description Pt will demonstrate a Dynavision score of 52 or higher, as a measure of readiness for driving.    Target Date 03/08/20   OT Goal 4   Goal Identifier Memory   Goal Description Pt will demonstrate understanding of 3 or more compensatory strategies to accommodate for decreased memory.    Target Date 03/08/20   Clinical Impression   Criteria for Skilled Therapeutic Interventions Met Yes, treatment indicated   OT Diagnosis impaired ADLs and IADLs   Influenced by the following impairments pain, decreased sensation, weakness, impaired memory and cognition   Assessment of Occupational Performance 5 or more Performance Deficits   Identified  Performance Deficits household management, bill paying, work, driving, typing   Clinical Decision Making (Complexity) Moderate complexity   Therapy Frequency weekly   Predicted Duration of Therapy Intervention (days/wks) 8 weeks   Risks and Benefits of Treatment have been explained. Yes   Patient, Family & other staff in agreement with plan of care Yes   Clinical Impression Comments Anticipate 6-8 weeks of OT to achieve goals.  Also recommending OP PT for further balance assessment and education on HEP, as well as a Health Psychology consult to help with anxiety and depression surrounding recent stroke diagnosis.   Pt may benefit from OP SLP; however, her communication is comprehensible and she stated she would like to start with PT and see if she feels speech is improved after a week or 2.     Pt is working with Social work to determine a plan for return to work, and to get some type of medical transportation. Do not recommend driving at this time due to decreased reaction time on the Dynavision assessment.    Education Assessment   Barriers To Learning Emotional  (recommend Psychology consult)   Total Evaluation Time   OT Eval, Moderate Complexity Minutes (72907) 45

## 2020-01-08 NOTE — LETTER
[address] [address] [phone]          January 8, 2020    RE: Lesley Stafford  [facility] [address]           To Whom It May Concern:     Lesley Stafford was seen on 1/2/2020.  Please excuse her until 02/10/2020 due to a recent stroke she suffered on 12/23/19. Also she should not be exposed to any second hand smoke when she does return to work.      Sincerely,           ANNETTE West

## 2020-01-08 NOTE — TELEPHONE ENCOUNTER
Alysa    See pt message    Letter started for your review/revision and signature    Swaticody Ghotra RN   [facility]

## 2020-01-08 NOTE — TELEPHONE ENCOUNTER
FUTURE VISIT INFORMATION      FUTURE VISIT INFORMATION:    Date: 3/2/2020    Time: 10AM    Location: [facility]  REFERRAL INFORMATION:    Referring provider:  Dr. Gardiner     Referring providers clinic:  [facility]     Reason for visit/diagnosis  Stroke     RECORDS REQUESTED FROM:       Clinic name Comments Records Status Imaging Status   [facility]  Care Everywhere N/A

## 2020-01-08 NOTE — TELEPHONE ENCOUNTER
Patient called and is hoping for a letter regarding extending return to work after stroke or need letter to say that she can stay out of second hand smoke.    Need return to work limitations to include staying away from second hand smoke.     Patient does not qualify for any employee benefits.     The OT people are stating that due to some therapy delays patient is not recommended to return to work until 02/10/2020.    Patient says OT says that she saw OT 01/08/2020    Please call patient at [phone] okay to leave detailed message    Please fax letter to [phone] Oleg BROWN

## 2020-01-09 NOTE — TELEPHONE ENCOUNTER
----- Message from Lina Clayton RN sent at 1/9/2020  9:31 AM CST -----  Regarding: FW: touching base after OT eval  This is a FP patient. Will forward to them.   ----- Message -----  From: Henny White BSW  Sent: 1/9/2020   9:24 AM CST  To: Rd Triage Pod B  Subject: FW: touching base after OT eval                  Hey guys,     We briefly talked about placing an order yesterday. After thinking about it, this is out of my scope of practice. Can you please put one in for a mental health referral and a PT referral.     Thank you  Henny White [facility]  Clinic Care Coordinator   [facility]   [phone]  ----- Message -----  From: Jonn Betancourt OT  Sent: 1/8/2020  12:36 PM CST  To: KEITH Rendon  Subject: RE: touching base after OT eval                  The referral for Jd would need to be placed by an MD.  Thanks!  Jonn  ----- Message -----  From: Henny White BSW  Sent: 1/8/2020  11:19 AM CST  To: Jonn Betancourt OT  Subject: RE: touching base after OT eval                  Ion Lunsford,     Thanks for the update on Lesley.     Are you able to put in a referral for Jd or would you like the PCP to do that?   I will message the PCP and ask for a PT referral to be entered.     I will continue to check in with her and help her with as many resources as I can and talk through some of the anxiety.     Thanks so much.     Henny White [facility]  Clinic Care Coordinator   [facility]   [phone]  ----- Message -----  From: Jonn Betancourt OT  Sent: 1/8/2020  11:02 AM CST  To: KEITH Rendon  Subject: touching base after OT eval                      Ion Inman, I just worked with Lesley for OT.  She scored in an overall normal range for cognition; but is quite anxious in dealing with all of this new medical stuff.  I was thinking that a health psychology referral might be helpful.  We often work with Jd Lea here in [place], and Lesley said she would be interested if orders  were placed.      Also, she's had issues of feeling off balance, and I think she should be evaluated by a PT in our clinic.  Could you get orders for this from her PCP?  If not I can message the doctor from the hospital who placed the OT orders.    She may benefit from SLP as well, due to concerns with word finding, but we are going to hold off on that--not wanting to add on too many things at once.    I recommended no driving currently.  Her reaction time was slowed.  She said she's been working with you to hopefully find medical transportation.    She said she plans to call you after her appointment today.  I'd recommend no working until at least mid-late February but also explained that the determination needs to come from her MD.  I plan to see her for 3-6 weekly visits.      I just wanted to send you this info in case she calls you right away.  I'll hopefully get my full note in the chart later today!   Let me know if you have any questions.  Shabana Lunsford, SAGER/L  [facility]

## 2020-01-09 NOTE — TELEPHONE ENCOUNTER
Routing to Provider.     referrals cued for psych and PT. Joann Mullins, RN on 1/9/2020 at 9:33 AM

## 2020-01-10 NOTE — TELEPHONE ENCOUNTER
Letter printed and placed on Alysa's desk for review and signature.    Sanjana Fraire RN   [facility]

## 2020-01-13 ENCOUNTER — HOSPITAL ENCOUNTER (OUTPATIENT)
Dept: PHYSICAL THERAPY | Facility: CLINIC | Age: 60
Setting detail: THERAPIES SERIES
End: 2020-01-13
Attending: PHYSICIAN ASSISTANT
Payer: COMMERCIAL

## 2020-01-13 ENCOUNTER — PATIENT OUTREACH (OUTPATIENT)
Dept: CARE COORDINATION | Facility: CLINIC | Age: 60
End: 2020-01-13

## 2020-01-13 ENCOUNTER — HOSPITAL ENCOUNTER (OUTPATIENT)
Dept: OCCUPATIONAL THERAPY | Facility: CLINIC | Age: 60
Setting detail: THERAPIES SERIES
End: 2020-01-13
Attending: PHYSICIAN ASSISTANT
Payer: COMMERCIAL

## 2020-01-13 DIAGNOSIS — I63.9 CEREBROVASCULAR ACCIDENT (CVA), UNSPECIFIED MECHANISM (H): Primary | ICD-10-CM

## 2020-01-13 DIAGNOSIS — F32.1 MODERATE MAJOR DEPRESSION (H): ICD-10-CM

## 2020-01-13 PROCEDURE — 97161 PT EVAL LOW COMPLEX 20 MIN: CPT | Mod: GP | Performed by: PHYSICAL THERAPIST

## 2020-01-13 PROCEDURE — 97110 THERAPEUTIC EXERCISES: CPT | Mod: GO | Performed by: OCCUPATIONAL THERAPIST

## 2020-01-13 NOTE — TELEPHONE ENCOUNTER
Letter mailed to patient home per message sent from Jonn Roe, Antoine Suazo             I see in the chart that you faxed a letter to this patient's employer.  She is here today and wondering if you could also send a the letter to her in the mail.       Let me know if you have any questions!   Thanks   Jonn Betancourt, OTR/L   [facility]

## 2020-01-13 NOTE — PROGRESS NOTES
01/13/20 1200   Signing Clinician's Name / Credentials   Signing clinician's name / credentials Millicent Lea DPT NCS   Functional Gait Assessment (TOY Mcgill, CARLINE Recinos, et al. (2004))   1. GAIT LEVEL SURFACE 3   2. CHANGE IN GAIT SPEED 3   3. GAIT WITH HORIZONTAL HEAD TURNS 2  (feels a little off)   4. GAIT WITH VERTICAL HEAD TURNS 3  (doesnt feel automatic to her, has to concentrate)   5. GAIT AND PIVOT TURN 3   6. STEP OVER OBSTACLE 3   7. GAIT WITH NARROW BASE OF SUPPORT 0   8. GAIT WITH EYES CLOSED 3   9. AMBULATING BACKWARDS 3   10. STEPS 3  (did 4 steps, prefers the railing or the wall)   Total Functional Gait Assessment Score   TOTAL SCORE: (MAXIMUM SCORE 30) 26   Functional Gait Assessment (FGA): The FGA assesses postural stability during various walking tasks.   Gait assistive device used: None    Patient Score: 26/30  Scores of <22/30 have been correlated with predicting falls in community-dwelling older adults according to Artem & Adrian 2010.   Scores of <18/30 have been correlated with increased risk for falls in patients with Parkinsons Disease according to Dimitry Ewing Zhou et al 2014.  Minimal Detectable Change for patients with acute/chronic stroke = 4.2 according to Doris & Hillschel 2009  Minimal Detectable Change for patients with vestibular disorder = 8 according to Artem & Adrian 2010    Assessment (rationale for performing, application to patient s function & care plan): high level mobility, falls risk  Minutes billed as physical performance test: part of evaluation    Millicent Lea DPT, MPT, NCS

## 2020-01-13 NOTE — PROGRESS NOTES
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   Surgical/Medical history reviewed Yes  (LUPUS, anxiety/depression, headaches, ADHD per pt)   Pertinent history of current problem (include personal factors and/or comorbidities that impact the POC) bronchitis is much better, back is not hurting anymore like it was. Feels like it is gone. Using inhalor still. Fatigue is biggest issue. I just cannot get enough rest. I have to lay down. I have to go to sleep. Taking 1-2 naps a day. Is not driving much so not out of house much.    Prior level of function comment IND with household chores, errands etc. Association does yardwork.. No regular exercise program.   Previous/Current Treatment Occupational Therapy;Other   Other treatment I liked working in the [employer] at [email or web address] for therapy but the chlorine she doesnt like   Current Community Support Family/friend caregiver  (mom brought her here today)   Patient role/Employment history Employed  (2 part time jobs-counseling)   Living environment House/[facility]  (son sleeps there, dtr lives very close by, [facility])   Home/Community Accessibility Comments 6 steps between each level so at least 15 steps down to lower level for laundry   Fall Risk Screen   Fall screen completed by PT   Have you fallen 2 or more times in the past year? No   Have you fallen and had an injury in the past year? No   Is patient a fall risk? No   Abuse Screen (yes response referral indicated)   Feels Unsafe at Home or Work/School no   Feels Threatened by Someone no   Does Anyone Try to Keep You From Having Contact with Others or Doing Things Outside Your Home? no   Physical Signs of Abuse Present no   Pain   Patient currently in pain  Yes   Pain location left hip> right hip, 70% of time has pain, Degenerative per pt   Pain rating 4  (left side only, seated)   Pain description comment was having PT at [facility] for her hip and low back. Was doing acupuncture and massage   Additional pain locations? Pain location 2   Pain location 2 left arm   Pain comments see OT notes   Vitals Signs   Heart Rate 76   Blood Pressure 99/70  (left arm seated after exercise)   Vital Signs Comments feels lightheaded all time. The low BP is not new for her, she reports its always like this.    Posture   Posture Normal   Gait   Gait Comments 25FTW at fast speed 5.21 seconds and 12 steps. arms really swing.   Gait Special Tests 25 Foot Timed Walk   Seconds 6.66  (6.28)   Steps 13 Steps  (13)   Comments normal MAGDA, symmetrical steps, ??slightly antalgic,  arms swing   Gait Special Tests Functional Gait Assessment Score out of 30   Score out of 30 26   Balance   Balance Comments standign head motion horizontal with no problem (5 reps)   Balance Special Tests Modified CTSIB Conditions   Condition 1, seconds 30 Seconds   Condition 2, seconds 30 Seconds   Condition 4, seconds 30 Seconds  (airex foam)   Condition 5, seconds 5 Seconds   Modified CTSIB Comments second time with EC on airex foam 30 seconds with some sway   Balance Special Tests Sit to Stand Reps in 30 Seconds   Reps in 30 seconds 9   Height 18   Comments hurts low back, good control   Sensory Examination   Sensory Perception other (describe)   Sensory Perception Comments per pt she uses the word neuropathy to describe her feet.    Clinical Impression   Criteria for Skilled Therapeutic Interventions Met yes, treatment indicated   PT Diagnosis high level balance problem s/p CVA   Influenced by the following impairments lightheadeness (?/low bp?), low back pain, hip pain bilaterally, sensation in feet??, high level dynamic postural control, fatigue and decreased activity tolerance. Anxiety is also a factor  "  Functional limitations due to impairments affects MRADLS, household/community mobility and recreational activities   Clinical Presentation Stable/Uncomplicated   Clinical Presentation Rationale medical history, low BP today, impairments, FGA test, sit to stand test, gait speed for short distance and clinical judgement   Clinical Decision Making (Complexity) Low complexity   Therapy Frequency other (see comments)  (every 1-2 weeks)   Predicted Duration of Therapy Intervention (days/wks) 2 months   Risk & Benefits of therapy have been explained Yes   Patient, Family & other staff in agreement with plan of care Yes   Clinical Impression Comments My impression is that she is not moving as automatically now. She has to concentrate on her balance. Plus she is having lightheadedness and low BP. She will need 5 or less visits in PT to get her back to her usual mobility. Fatigue will take much longer to recover to her baseline.    Goal 1   Goal Identifier bending over   Goal Description Marta will be able to bend over for ADLs without feeling \"off balance\" or increased lightheadedness   Target Date 03/13/20   Goal 2   Goal Identifier 6MWT to 10MWT   Goal Description Marta will be able to complete a 6 to 10MWT with gait speed at 1.0 m/s or better for community mobility   Target Date 03/13/20   Goal 3   Goal Identifier gait with horizontal head motion   Goal Description Marta will be able to walk with horizotnal and vertical head motion with no change in gait qulaity or speed for community mobility   Target Date 03/13/20   Total Evaluation Time   PT Eval, Low Complexity Minutes (85378) 30   Millicent Lea DPT, MPT, NCS  "

## 2020-01-13 NOTE — PROGRESS NOTES
Clinic Care Coordination Contact  [facility]/Voicemail       Clinical Data: Care Coordinator Outreach  Outreach attempted x 1.  Left message on patient's voicemail with call back information and requested return call.  Plan: Care Coordinator will try to reach patient again in 1 week.

## 2020-01-17 ENCOUNTER — TELEPHONE (OUTPATIENT)
Dept: FAMILY MEDICINE | Facility: CLINIC | Age: 60
End: 2020-01-17

## 2020-01-17 NOTE — TELEPHONE ENCOUNTER
Reason for call:  Symptom     Symptom or request: numbness and tingling in left arm     Duration (how long have symptoms been present): Since her stroke in 12/23/19    Have you been treated for this before? Yes    Additional comments: Pt wants to inform Dr. Orosco that since pt have stroke on 12/23/19 she have been having difficulty driving. When she try to drive she will have tingling and numbness in her whole left arm and go down to her hand.  Sometime even go down to her left leg. Pt have been going to PT and OT in [place] to help her.    Phone number to reach patient:  Home number on file [phone] (home)    Best Time:  anytime    Can we leave a detailed message on this number?  YES

## 2020-01-17 NOTE — TELEPHONE ENCOUNTER
This has not changed since her stroke    Driving is not going well for her, she is attending therapy    She is wondering if she should think about signing up for metro mobility    Advised she talk with the therapy they can help with the documentation    She is getting help with some shopping but it is exhausting for her even when she is driven, advised using a motorized shopping cart available at most grocery storesor for some items use the Internet and order on line when able, she will have her daughter help her look into that    Swati Ghotra RN   [facility]

## 2020-01-20 ENCOUNTER — TELEPHONE (OUTPATIENT)
Dept: FAMILY MEDICINE | Facility: CLINIC | Age: 60
End: 2020-01-20

## 2020-01-20 ENCOUNTER — PATIENT OUTREACH (OUTPATIENT)
Dept: CARE COORDINATION | Facility: CLINIC | Age: 60
End: 2020-01-20

## 2020-01-20 DIAGNOSIS — M25.552 HIP PAIN, LEFT: ICD-10-CM

## 2020-01-20 RX ORDER — DULOXETIN HYDROCHLORIDE 30 MG/1
CAPSULE, DELAYED RELEASE ORAL
Qty: 90 CAPSULE | Refills: 1 | Status: SHIPPED | OUTPATIENT
Start: 2020-01-20 | End: 2020-08-25

## 2020-01-20 NOTE — TELEPHONE ENCOUNTER
Called patient. Patient stated that HA's are back, bloody noses, and bit tongue twice. Pain in spine, feels like it is bruised. Neuro appointment coming up in March.   Took ibuprofen at home for HA. Declines triage call for further home care advise and declines same day appointment. Just wants note on file for record. Joann Mullins RN on 1/20/2020 at 1:12 PM

## 2020-01-20 NOTE — PROGRESS NOTES
Clinic Care Coordination Contact    Follow Up Progress Note      Assessment: SW spoke with pt who reported she's waiting to find out if she will be approved for [place] or not. SW [place], pt still says inactive at this time. Pt aware to look for a letter in the mail notifying her if she's approved. SW discussed that when pt is approved to let [facility] know, CCC can assist with referrals for services in the home, pt understanding.     Pt reported she's gotten her medical bills sorted out at this point but she's concerned about bills overall, [facility] had recommended pt connect with the [facility] for assistance, pt reported she plans to go in person tomorrow to apply--pt aware of what supporting documents to bring.    Pt reported that if she gets approved for MA she'd be interested in [facility] and in-home therapy, pt will keep CCC updated on the outcome of her insurance application.     Goals addressed this encounter:   Goals       Financial Wellbeing (pt-stated)      Goal Statement: I would like to set up a payment plan with [employer] Goal set: 1/3/2020  Date to Achieve By: 2/20/2020  Patient expressed understanding of goal: yes   Action steps to achieve this goal:   1. I will contact [facility] billing.               Transportation (pt-stated)      Goal Statement: I am in need of transportation resources   Date Goal set: 1/3/20  Date to Achieve By: 2/20/20  Patient expressed understanding of goal: yes  Action steps to achieve this goal:  1. I will contact my insurance to see if I qualify for rides.   2. I will talk talk with friends and family about rides.                    Intervention/Education provided during outreach: Check-in on status of insurance.     Outreach Frequency: monthly    Plan: Pt will go to the [facility] in person to apply for emergency assistance, pt will wait to find out if she's approved for MA, pt will update CCC when she finds out what insurance she gets.    Care Coordinator will follow up in 1  month.

## 2020-01-20 NOTE — TELEPHONE ENCOUNTER
Reason for call:  Other   Patient called regarding (reason for call): call back  Additional comments: Patient called stated her headaches has returned. Patient also stated she has bloody nose and biting her tongue while sleeping. Patient stated she has a neurology appointment on 3/2/20. Patient would like a call back regarding this  Phone number to reach patient:  Cell number on file:    Telephone Information:   Mobile [phone]       Best Time:  N/A    Can we leave a detailed message on this number?  YES

## 2020-01-20 NOTE — TELEPHONE ENCOUNTER
"Requested Prescriptions   Pending Prescriptions Disp Refills     DULoxetine (CYMBALTA) 30 MG capsule [Pharmacy Med Name: DULOXETINE HCL DR 30 MG CAP] 90 capsule 0     Sig: TAKE 1 CAPSULE BY MOUTH EVERY DAY   Last Written Prescription Date:  1021/2019  Last Fill Quantity: 90,  # refills: 0   Last office visit: 1/2/2020 with prescribing provider   Future Office Visit:        Serotonin-Norepinephrine Reuptake Inhibitors  Passed - 1/20/2020  5:03 PM        Passed - Blood pressure under 140/90 in past 12 months     BP Readings from Last 3 Encounters:   01/02/20 99/70   12/26/19 100/60   12/24/19 116/83                 Passed - Recent (12 mo) or future (30 days) visit within the authorizing provider's specialty     Patient has had an office visit with the authorizing provider or a provider within the authorizing providers department within the previous 12 mos or has a future within next 30 days. See \"Patient Info\" tab in inbasket, or \"Choose Columns\" in Meds & Orders section of the refill encounter.              Passed - Medication is active on med list        Passed - Patient is age 18 or older        Passed - No active pregnancy on record        Passed - No positive pregnancy test in past 12 months        Prescription approved per [facility] Refill Protocol.  Joann Mullins RN on 1/20/2020 at 5:11 PM    "

## 2020-01-28 ENCOUNTER — TELEPHONE (OUTPATIENT)
Dept: FAMILY MEDICINE | Facility: CLINIC | Age: 60
End: 2020-01-28

## 2020-01-28 NOTE — TELEPHONE ENCOUNTER
Reason for call:  Symptom     Symptom or request: Nosebleeds, headache,unexplained bruises on left arm    Duration (how long have symptoms been present): since this morning for bloody nose    Have you been treated for this before? No    Additional comments: n/a    Phone number to reach patient:  Home number on file [phone] (home)    Best Time:  anytime    Can we leave a detailed message on this number?  YES

## 2020-01-28 NOTE — TELEPHONE ENCOUNTER
Spoke with patient. She has been blowing her nose off and on all day and it has been bloody all day when blowing her nose. She has lightheadedness, bruising on left arm and headache all day. Patient also states she has been really tired today and has been wanting to nap. Writer advised patient to go into urgent care today. Patient verbalized understanding.    Sanjana Fraire RN   [facility]

## 2020-01-29 ENCOUNTER — HOSPITAL ENCOUNTER (OUTPATIENT)
Dept: OCCUPATIONAL THERAPY | Facility: CLINIC | Age: 60
Setting detail: THERAPIES SERIES
End: 2020-01-29
Attending: PHYSICIAN ASSISTANT
Payer: COMMERCIAL

## 2020-01-29 ENCOUNTER — HOSPITAL ENCOUNTER (OUTPATIENT)
Dept: PHYSICAL THERAPY | Facility: CLINIC | Age: 60
Setting detail: THERAPIES SERIES
End: 2020-01-29
Attending: PHYSICIAN ASSISTANT
Payer: COMMERCIAL

## 2020-01-29 PROCEDURE — 97112 NEUROMUSCULAR REEDUCATION: CPT | Mod: GO | Performed by: OCCUPATIONAL THERAPIST

## 2020-01-29 PROCEDURE — 97110 THERAPEUTIC EXERCISES: CPT | Mod: GP | Performed by: PHYSICAL THERAPIST

## 2020-01-29 PROCEDURE — 97112 NEUROMUSCULAR REEDUCATION: CPT | Mod: GP | Performed by: PHYSICAL THERAPIST

## 2020-01-29 NOTE — IP AVS SNAPSHOT
MRN:[id number]                      After Visit Summary   1/29/2020    Lesley Stafford    MRN: [id number]           Visit Information        Provider Department      1/29/2020  8:45 AM Cori Lea, PT [facility]        Your next 10 appointments already scheduled    Jan 29, 2020  9:45 AM CST  Neuro Treatment with Jonn [facility]ourt OT  [facility] [address] [facility] [address] [phone]      Mar 02, 2020 10:00 AM CST  (Arrive by 9:45 AM)  New Patient Visit with Deven Salgado [facility] ([facility]) [address]  [address] [facility] [address] [phone]           Further instructions from your care team       Need to call and make a follow up appt with DR Orosco about the stroke and about your BP medication at night.    [facility] for walking daily    Millicent Lea DPT, MPT, NCS    Care EveryWhere ID    This is your Care EveryWhere ID. This could be used by other organizations to access your [place] medical records  [id number]       Equal Access to Services    MANUEL SUAREZ : Hadii soco mayorga hadashamarilis Soadelita, waaxda luqadaha, qaybta kaalmada omero, devi levin. So [facility] [phone].    ATENCIÓN: Si habla español, tiene a levine disposición servicios gratuitos de asistencia lingüística. [place] al [phone].    We comply with applicable federal and state civil rights laws, including the [place] Human Rights Act. We do not discriminate on the basis of race, color, creed, Mosque, national origin, marital status, age, disability, sex, sexual orientation, or gender identity.

## 2020-01-29 NOTE — DISCHARGE INSTRUCTIONS
Need to call and make a follow up appt with DR Orosco about the stroke and about your BP medication at night.    [facility] for walking daily    Millicent Lea DPT, MPT, NCS

## 2020-02-04 ENCOUNTER — PATIENT OUTREACH (OUTPATIENT)
Dept: CARE COORDINATION | Facility: CLINIC | Age: 60
End: 2020-02-04

## 2020-02-04 ENCOUNTER — OFFICE VISIT (OUTPATIENT)
Dept: FAMILY MEDICINE | Facility: CLINIC | Age: 60
End: 2020-02-04
Payer: COMMERCIAL

## 2020-02-04 VITALS
RESPIRATION RATE: 18 BRPM | OXYGEN SATURATION: 97 % | WEIGHT: 176.6 LBS | TEMPERATURE: 98.7 F | SYSTOLIC BLOOD PRESSURE: 104 MMHG | DIASTOLIC BLOOD PRESSURE: 66 MMHG | BODY MASS INDEX: 29.42 KG/M2 | HEART RATE: 86 BPM | HEIGHT: 65 IN

## 2020-02-04 DIAGNOSIS — Z86.73 HISTORY OF STROKE: ICD-10-CM

## 2020-02-04 DIAGNOSIS — F32.1 MODERATE MAJOR DEPRESSION (H): Primary | ICD-10-CM

## 2020-02-04 DIAGNOSIS — Z59.9 FINANCIAL DIFFICULTIES: ICD-10-CM

## 2020-02-04 DIAGNOSIS — J20.9 ACUTE BRONCHITIS, UNSPECIFIED ORGANISM: ICD-10-CM

## 2020-02-04 PROCEDURE — 99214 OFFICE O/P EST MOD 30 MIN: CPT | Performed by: NURSE PRACTITIONER

## 2020-02-04 SDOH — ECONOMIC STABILITY - INCOME SECURITY: PROBLEM RELATED TO HOUSING AND ECONOMIC CIRCUMSTANCES, UNSPECIFIED: Z59.9

## 2020-02-04 ASSESSMENT — MIFFLIN-ST. JEOR: SCORE: 1359.43

## 2020-02-04 NOTE — PROGRESS NOTES
Social Work Telephone Message Note  [facility]     Patient Name:  Lesley Stafford  /Age:  1960 (59 year old)    Referral Source: OT - therapy  Reason for Referral:  Transportation/metromobility     contacted Patient via telephone on 2020.  Lesley was unable to talk at time of call.  Provided writer contact information and requested for her to call at her convenience.     Also, left message for SOL Jalloh at PCP clinic for support in completion of metro mobility application.  Patient has recently seen her PCP.    Sw will continue to follow as assist as needed.        TONI Bowser, [facility]    [facility]  [phone]  [email or web address]

## 2020-02-04 NOTE — PROGRESS NOTES
"Subjective     Lesley MILLER Link Stafford is a 59 year old female who presents to clinic today for the following health issues:    HPI   Cerebrovascular Follow-up      Patient history: ischemic cerebrovascular incident    Residual symptoms: None, Confusion, Difficulty speaking, Tingling in the arm,left lower and arm, upper and Weakness in the arm on the left all are improving but did not resolve completely, working with therapies that help     Worsened or new symptoms since last visit: No    Daily aspirin use: Yes    Hypertension controlled: not applicable      How many servings of fruits and vegetables do you eat daily?  2-3    On average, how many sweetened beverages do you drink each day (Examples: soda, juice, sweet tea, etc.  Do NOT count diet or artificially sweetened beverages)?   1    How many days per week do you exercise enough to make your heart beat faster? 3 or less    How many minutes a day do you exercise enough to make your heart beat faster? 30 - 60    How many days per week do you miss taking your medication? 0    Has Neurology visit scheduled for March 6-8 week follow up, wondering if they can even do anything for her if she should spend her money on that     was last seen by Alysa Gutierres 1/2/20 and was told to Return to Clinic in 2 weeks, appears pt has been attending therapies since then 1-8-2020    Stressful thinking about going back because people do smoke in the building and stress level is high dealing with the clientele . Works in [employer] and group [email or web address]  Wants to find healthier work environment.   Applying somewhere else, [employer], says doesn't actually need a letter for work, dtr helping with this, will be a large pay cut from what she was doing but needs a job that is low stress for now  Still cannot drive, reaction time not right yet    Tomorrow PT appointment, is doing the exercises is painful but helping, doing them at home at \"least half as much as she is " "supposed to\". Depression and \"sometimes feels like F it!\"     Cannot afford counseling but really wants to do it, has medical bills that go to collection  Can't even afford payment plans    Spends a lot of time trying to figure things out, doesn't want to burden family, has mom who she can talk to and dtr, mom    cymbalta is too expensive, copay $60  Her Psychiatrist weaned off effexor due to side effects, \"has tried all the baby ones like zoloft that didn't help at all\"   cymbalta was started to help more with nerve pain, also on gabapentin  [facility] Primary Care Provider did the Cymbalta because she cannot afford to go back to her Psych at [facility], she did like her Psychiatrist    Floaters in eye and plaquenil, Lupus so always chilled, eye exam utd   Hasn't been able to afford seeing her Lupus specialist     Followed by LSW/ CC in [place] and said Henny recommended different medical insurance that is more affordable     dtr found a site to apply for help with medical bills    ER last Tues thought she was having another stroke  When driving it's worse also chest pain, nothing new and was thought to be anxiety, no issues currently and now not driving again.     ED/UC Followup:    Facility:  [facility]  Date of visit: 1/28/20  Reason for visit: Headache and nosebleed  Current Status: resolved    Reviewed note on Care Everywhere  CT of head was normal, EKG and lab work normal other than MCV and platelet slightly low     Reports was also diagnosed with Bronchitis, using inhalers that help a lot 2 now once daily       PROBLEMS TO ADD ON...  Depression as above  PHQ-9 SCORE 6/29/2018 9/25/2018 5/21/2019   PHQ-9 Total Score - - -   PHQ-9 Total Score MyChart - - -   PHQ-9 Total Score 20 12 11     ASHLEY-7 SCORE 9/20/2017 6/29/2018 5/21/2019   Total Score - - -   Total Score 13 17 11            Patient Active Problem List   Diagnosis     Lupus (H)     Abnormal perimenopausal bleeding     Obesity, Class I, BMI 30-34.9     " History of claustrophobia     Depressive disorder     Anxiety     Allergic state     ACP (advance care planning)     Tired     Insomnia, unspecified type     Hemoglobin C trait (H)     Liver hemangioma     Moderate major depression (H)     Stroke (H)     Past Surgical History:   Procedure Laterality Date     COLONOSCOPY  01/2010    repeat 10 yrs     DILATION AND CURETTAGE, OPERATIVE HYSTEROSCOPY WITH MORCELLATOR, COMBINED N/A 2/15/2017    Procedure: COMBINED DILATION AND CURETTAGE, OPERATIVE HYSTEROSCOPY WITH MORCELLATOR;  Surgeon: Erin Gutierrez MD;  Location: [facility] OR     ESOPHAGOSCOPY, GASTROSCOPY, DUODENOSCOPY (EGD), COMBINED Left 2/5/2016    Procedure: COMBINED ESOPHAGOSCOPY, GASTROSCOPY, DUODENOSCOPY (EGD), BIOPSY SINGLE OR MULTIPLE;  Surgeon: Pierre Fernandez MD;  Location:  GI     GYN SURGERY         Social History     Tobacco Use     Smoking status: Former Smoker     Smokeless tobacco: Never Used     Tobacco comment: quit 27 years ago   Substance Use Topics     Alcohol use: No     Family History   Problem Relation Age of Onset     Lupus Mother      Asthma Mother      Diabetes Father      Brain Tumor Father         begnign on pituitary     Depression Sister      Anemia Sister          Current Outpatient Medications   Medication Sig Dispense Refill     albuterol (PROAIR HFA/PROVENTIL HFA/VENTOLIN HFA) 108 (90 Base) MCG/ACT inhaler Inhale 2 puffs into the lungs every 4 hours as needed for shortness of breath / dyspnea, wheezing or other (coughing) 1 Inhaler 0     aspirin (ASA) 325 MG EC tablet Take 325 mg by mouth daily       cloNIDine (CATAPRES) 0.1 MG tablet TAKE 2 TABLETS BY MOUTH DAILY AT BEDTIME  3     DEEP SEA NASAL SPRAY 0.65 % nasal spray SPRAY 1 SPRAY INTO BOTH NOSTRILS DAILY AS NEEDED FOR CONGESTION 1 Bottle 2     DULoxetine (CYMBALTA) 30 MG capsule TAKE 1 CAPSULE BY MOUTH EVERY DAY 90 capsule 1     flunisolide (NASALIDE) 25 MCG/ACT (0.025%) SOLN spray INHALE 2 SPRAYS INTO EACH NOSTRIL 2  TIMES DAILY 1 Bottle 3     gabapentin (NEURONTIN) 300 MG capsule TAKE 2 CAPSULE (300 MG) BY MOUTH 3 TIMES DAILY 270 capsule 3     hydroxychloroquine (PLAQUENIL) 200 MG tablet TAKE 1 TABLET BY MOUTH DAILY 90 tablet 2     hydrOXYzine (ATARAX) 25 MG tablet TAKE 1 TABLET (25 MG) BY MOUTH 3 TIMES PER DAY 90 tablet 3     multivitamin, therapeutic with minerals (MULTI-VITAMIN) TABS Take 1 tablet by mouth daily       omega 3 1200 MG CAPS        order for DME Cane, 1 Device 0     tiZANidine (ZANAFLEX) 4 MG tablet TAKE 1-2 TABLETS BY MOUTH AS NEEDED THREE TIMES A DAY  3     atorvastatin (LIPITOR) 80 MG tablet Take 1 tablet (80 mg) by mouth every evening (Patient not taking: Reported on 2/4/2020) 30 tablet 0     guaiFENesin (MUCINEX) 600 MG 12 hr tablet Take 2 tablets (1,200 mg) by mouth 2 times daily (Patient not taking: Reported on 2/4/2020) 30 tablet 0     Allergies   Allergen Reactions     Morphine Sulfate Itching     Sulfa Drugs Hives     Recent Labs   Lab Test 12/24/19  0648 12/23/19  1519 12/23/19  1517 05/21/19  0929 10/08/18  1000  11/02/16  1151  10/05/15  1548   A1C  --   --  5.5  --   --   --  5.7  --  5.5   *  --   --   --   --   --  151*  --   --    HDL 76  --   --   --   --   --  83  --   --    TRIG 76  --   --   --   --   --  147  --   --    ALT 30  --   --  36 44   < > 43   < >  --    CR 0.55  --  0.63 0.63 0.57   < > 0.80   < > 0.63   GFRESTIMATED >90 85 >90 >90 >90   < > 74   < > >90  Non  GFR Calc     GFRESTBLACK >90 >90 >90 >90 >90   < > 89   < > >90   GFR Calc     POTASSIUM 3.9  --  3.9 4.4 4.2   < > 3.4   < > 3.9   TSH  --   --   --  1.20 0.97  --  0.85   < >  --     < > = values in this interval not displayed.      BP Readings from Last 3 Encounters:   02/04/20 104/66   01/02/20 99/70   12/26/19 100/60    Wt Readings from Last 3 Encounters:   02/04/20 80.1 kg (176 lb 9.6 oz)   01/02/20 81.2 kg (179 lb)   12/26/19 80.7 kg (178 lb)                    Reviewed and  "updated as needed this visit by Provider         Review of Systems   ROS COMP: Constitutional, HEENT, cardiovascular, pulmonary, GI, , musculoskeletal, neuro, skin, endocrine and psych systems are negative, except as otherwise noted.      Objective    /66   Pulse 86   Temp 98.7  F (37.1  C) (Oral)   Resp 18   Ht 1.639 m (5' 4.53\")   Wt 80.1 kg (176 lb 9.6 oz)   LMP 08/12/2013   SpO2 97%   BMI 29.82 kg/m    Body mass index is 29.82 kg/m .  Physical Exam   GENERAL: healthy, alert and no distress  EYES: Eyes grossly normal to inspection, PERRL and conjunctivae and sclerae normal  HENT: ear canals and TM's normal, nose and mouth without ulcers or lesions  NECK: no adenopathy, no asymmetry, masses, or scars and thyroid normal to palpation  RESP: lungs clear to auscultation - no rales, rhonchi or wheezes  CV: regular rate and rhythm, normal S1 S2, no S3 or S4, no murmur, click or rub, no peripheral edema and peripheral pulses strong  ABDOMEN: soft, nontender, no hepatosplenomegaly, no masses and bowel sounds normal  MS: no gross musculoskeletal defects noted, no edema  SKIN: no suspicious lesions or rashes  NEURO: weakness of left grasp slightly 4/5, sensory exam grossly normal, mentation intact, cranial nerves 2-12 intact, DTR's normal and symmetric , Romberg normal and rapid alternating movements normal  PSYCH: mentation appears normal, tangential, tearful, slighty anxious, judgement and insight intact and appearance well groomed    Diagnostic Test Results:  Labs reviewed in Epic  No results found for this or any previous visit (from the past 24 hour(s)).        Assessment & Plan       ICD-10-CM    1. Moderate major depression (H) F32.1    2. History of stroke Z86.73    3. Financial difficulties Z59.8    4. Lupus (H) M32.9    5. Acute bronchitis, unspecified organism J20.9    pt here for stroke and ER follow up, discussed importance of Neuro follow up after a stroke, and strongly encouraged her to keep " her appointment   Needs to see her Psychiatridt and her Lupus MD but cannot afford to    Pt crying today and reports high socioeconomic stress and doesn't want to ask for help from her elderly sick mom or burden others  Admits she knows her depression is out of control but spends all her time figuring out finances and feels overwhelmed, cannot afford counseling  Note sent to LSW to help pt with resources and coordinate care, get her the mental health care she needs    Continues to do well with sobriety, getting new job because stress level too high in her field     Recommended pt to get soonest avail Psych appointment if cannot return to her other Psych, needs complete med review we did not dive into today due to long conversation regarding her stressors today. If she cannot get into Psych soon, Return to Clinic her within the next couple weeks to see Primary Care Provider or me and she agrees.      See patient instructions     Return in about 4 weeks (around 3/3/2020) for mental health follow up visit.    THOMAS Juarez CNP  [facility]

## 2020-02-05 ENCOUNTER — HOSPITAL ENCOUNTER (OUTPATIENT)
Dept: PHYSICAL THERAPY | Facility: CLINIC | Age: 60
Setting detail: THERAPIES SERIES
End: 2020-02-05
Attending: PHYSICIAN ASSISTANT
Payer: COMMERCIAL

## 2020-02-05 PROBLEM — Z86.73 HISTORY OF STROKE: Status: ACTIVE | Noted: 2020-02-05

## 2020-02-05 PROBLEM — Z59.9 FINANCIAL DIFFICULTIES: Status: ACTIVE | Noted: 2020-02-05

## 2020-02-05 PROCEDURE — 97110 THERAPEUTIC EXERCISES: CPT | Mod: GP | Performed by: PHYSICAL THERAPIST

## 2020-02-06 ENCOUNTER — PATIENT OUTREACH (OUTPATIENT)
Dept: CARE COORDINATION | Facility: CLINIC | Age: 60
End: 2020-02-06

## 2020-02-06 NOTE — PROGRESS NOTES
Clinic Care Coordination Contact    Follow Up Progress Note:   SW received a call from [facility] at [place]. She stated that patient was looking to get set up with [employer] because she wasn't sure when her medical assistance was going to go through so she is able to get medical rides. SW will talk with pt to see if this is something she wants to do.      Assessment: PT stated she continues to struggle paying her bills, driving, getting to appointments, getting out of her depressed mood and looking for jobs. Pt stated there are just so many things going on in her life and it continues to be hard to navigate everything.     Goals addressed this encounter:   Goals Addressed                 This Visit's Progress       Patient Stated      Transportation (pt-stated)   10%     Goal Statement: I am in need of transportation resources   Date Goal set: 1/3/20  Date to Achieve By: 2/20/20  Patient expressed understanding of goal: yes  Action steps to achieve this goal:  1. I will contact my insurance to see if I qualify for rides.   2. I will talk talk with friends and family about rides.                    Intervention/Education provided during outreach: SW talked with patient and stated she would check her MA status on [employer]. It appeared that it was not active. SW suggested that pt call [facility] and figure out if there is an issue or if they are missing something and understand why it is not active. PT reported she was able to do this. Once the insurance is figure out she will then continue to work on other goals of her medical finance.           Plan:   Pt will contact [facility] for an update on insurance status.   PT will call SW when she hears about her insurance or if she needs assistance.   Care Coordinator will follow up with pt in 3 weeks.     [facility]  Clinic Care Coordinator   [facility]   [phone]

## 2020-02-12 ENCOUNTER — HOSPITAL ENCOUNTER (OUTPATIENT)
Dept: OCCUPATIONAL THERAPY | Facility: CLINIC | Age: 60
Setting detail: THERAPIES SERIES
End: 2020-02-12
Attending: PHYSICIAN ASSISTANT
Payer: COMMERCIAL

## 2020-02-12 PROCEDURE — 97112 NEUROMUSCULAR REEDUCATION: CPT | Mod: GO | Performed by: OCCUPATIONAL THERAPIST

## 2020-02-19 ENCOUNTER — HOSPITAL ENCOUNTER (OUTPATIENT)
Dept: PHYSICAL THERAPY | Facility: CLINIC | Age: 60
Setting detail: THERAPIES SERIES
End: 2020-02-19
Attending: PHYSICIAN ASSISTANT
Payer: COMMERCIAL

## 2020-02-19 PROCEDURE — 97112 NEUROMUSCULAR REEDUCATION: CPT | Mod: GP | Performed by: PHYSICAL THERAPIST

## 2020-02-27 ENCOUNTER — PATIENT OUTREACH (OUTPATIENT)
Dept: CARE COORDINATION | Facility: CLINIC | Age: 60
End: 2020-02-27

## 2020-02-27 NOTE — PROGRESS NOTES
Clinic Care Coordination Contact    Situation: Patient chart reviewed by care coordinator.    Background: Pt is working on getting insurance, unsure if she will get MA or not. Pt had reported on 01/20/2020 if she gets MA she would be interested in [facility].    Pt has been working with [facility] on transportation resources and medical bills. On 02/06/2020 Lead CC received a call about the pt pursuing [employer].     Assessment: Pt needing transportation to get to her appointments, still waiting on hearing back on the status of her medical insurance.    Plan/Recommendations: CHW call and discuss scheduling an appt with the SW for assist with completing a [employer] application if the pt is needing assist with completing the first section. If pt has active MA at this point, discuss pt utilizing MA transportation for appts and setting up an appt with the SW to review [facility] and complete a referral.

## 2020-02-27 NOTE — PATIENT INSTRUCTIONS
"  Patient Education     Major Depression  What is depression?   Depression is a serious mood disorder that affects your whole body including your mood and thoughts. It touches every part of your life. It s important to know that depression is not a weakness or character flaw. It s a chemical imbalance in your brain that needs to be treated.  If you have one episode of depression, you are at risk of having more throughout life.  If you don t get treatment, depression can happen more often and be more serious.   What causes depression?   Depression is caused by an imbalance of brain chemicals. Other factors also play a role. It also tends to run in families. Depression can be triggered by life events or certain illnesses. It can also develop without a clear trigger.  What are the symptoms of depression?   While each person may experience symptoms differently, these are the most common symptoms of depression:    Lasting sad, anxious, or  empty  mood    Loss of interest in almost all activities    Appetite and weight changes    Changes in sleep patterns, such as inability to sleep or sleeping too much    Slowing of physical activity, speech, and thinking OR agitation, increased restlessness, and irritability    Decreased energy, feeling tired or \"slowed down\" almost every day    Ongoing feelings of worthlessness or feelings of undue guilt    Trouble concentrating or making decisions    Repeating thoughts of death or suicide, wishing to die, or attempting suicide (Note: This needs emergency treatment )  If you have 5 or more of these symptoms for at least 2 weeks, you may be diagnosed with depression. These symptoms would be a noticeable change from what s  normal  for you  The symptoms of depression may look like other mental health conditions. Always see a healthcare provider for a diagnosis.  How is depression diagnosed?   Depression can happen along with other medical conditions. These include heart disease, or " cancer, as well as other mental health conditions. Early diagnosis and treatment is key to recovery.  A diagnosis is made after a careful mental health exam and medical history done. This is usually done by a mental health professional.  How is depression treated?   Treatment for depression may include one or a combination of the following:    Medicine. Antidepressants work by affecting the brain chemicals. Know that it takes 4 to 8 weeks for these medicines to have a full effect. Keep taking the medicine, even if it doesn t seem to be working at first. Never stop taking your medicine without first talking to your healthcare provider. Some people have to switch medicines or add medicines to get results. Work closely with your healthcare provider to find treatment that works for you.    Therapy. This is most often cognitive behavioral or interpersonal therapy. It focuses on changing the distorted views you have of yourself and your situation. It also works to improve relationships, and identify and manage stressors in your life.    Electroconvulsive therapy (ECT). This treatment may be used to treat severe, life-threatening depression that has not responded to medicines. A mild electrical current is passed through the brain. This triggers a brief seizure. For unknown reasons, the seizures help restore the normal balance of chemicals in the brain and ease symptoms.  With treatment, you should start to feel better within a few weeks. Without treatment, symptoms can last for weeks, months, or even years. Continued treatment may help to prevent depression from appearing again.  Depression can make you feel exhausted, worthless, helpless, and hopeless. It s important to realize that these negative views are part of the depression and don't reflect reality. Negative thinking fades as treatment starts to take effect. Meanwhile, consider the following:    Get help. Some research shows that if depression is treated as soon as  "possible, long-term problems are decreased. If you think you may be depressed, see a healthcare provider as soon as possible.    Set realistic goals in light of the depression and don t take on too much.    Break large tasks into small ones. Set priorities, and do what you can as you can.    Try to be with other people and confide in someone. It s usually better than being alone and secretive.    Do things that make you feel better. Going to a movie, gardening, or taking part in Faith, social, or other activities may help. Doing something nice for someone else can also help you feel better.    Get regular exercise, studies show exercise can improve mood.    Expect your mood to get better slowly, not right away. Feeling better takes time.    Eat healthy, well-balanced meals.    Stay away from alcohol and drugs. These can make depression worse.    It's best to delay important decisions until the depression has lifted. Before deciding to make a big change --change jobs, get  or  -- discuss it with others who know you well and have a more objective view of your situation.    Remember: People don t \"snap out of\" a depression. But they can feel a little better day-by-day.    Try to be patient and focus on the positives. This may help replace the negative thinking that is part of the depression. The negative thoughts will fade as your depression responds to treatment.    Let your family and friends help you  When should I call my healthcare provider?  If you have 5 or more of these symptoms for at least 2 weeks, call your healthcare provider:     Lasting sad, anxious, or  empty  mood    Loss of interest in almost all activities    Appetite and weight changes    Changes in sleep patterns, such as inability to sleep or sleeping too much    Slowing of physical activity, speech, and thinking OR agitation, increased restlessness, and irritability    Decreased energy, feeling tired or \"slowed down\" almost " every day    Ongoing feelings of worthlessness or feelings of undue guilt    Trouble concentrating or making decisions    Repeating thoughts of death or suicide, wishing to die, or attempting suicide (Note: This needs emergency treatment )    If you have thoughts of harming yourself, tell someone right away. Call 911 or go to a hospital emergency room. Ask a friend or family member to stay with you. Don't stay alone. You can also call the toll-free 24-hour hotline of the National Suicide Prevention Lifeline at 800-273-talk (431.631.5426); TTY: 488-281-0NJS (0388) and talk to a trained counselor.  Key points about depression    Depression is a serious mood disorder that affects your whole body including your mood and thoughts.    It s caused by a chemical imbalance in the brain. Some types of depression seem to run in families.    Depression causes ongoing, extreme feelings of sadness, helplessness, hopeless, and irritability. These feelings are usually a noticeable change from what s  normal  for you, and they last for more than 2 weeks.    Depression may be diagnosed after a careful psychiatric exam and medical history done by a mental health professional.    Depression is most often treated with medicine or therapy, or a combination of both.    Next steps  Tips to help you get the most from a visit to your healthcare provider:    Know the reason for your visit and what you want to happen.    Before your visit, write down questions you want answered.    Bring someone with you to help you ask questions and remember what your provider tells you.    At the visit, write down the name of a new diagnosis, and any new medicines, treatments, or tests. Also write down any new instructions your provider gives you.    Know why a new medicine or treatment is prescribed, and how it will help you. Also know what the side effects are.    Ask if your condition can be treated in other ways.    Know why a test or procedure is  recommended and what the results could mean.    Know what to expect if you do not take the medicine or have the test or procedure.    If you have a follow-up appointment, write down the date, time, and purpose for that visit.    Know how you can contact your provider if you have questions.      0319-6108 [employer]. [address] [place] [address], [place] [address]. All rights reserved. This information is not intended as a substitute for professional medical care. Always follow your healthcare professional's instructions.

## 2020-03-02 ENCOUNTER — PATIENT OUTREACH (OUTPATIENT)
Dept: CARE COORDINATION | Facility: CLINIC | Age: 60
End: 2020-03-02

## 2020-03-02 ENCOUNTER — OFFICE VISIT (OUTPATIENT)
Dept: NEUROLOGY | Facility: CLINIC | Age: 60
End: 2020-03-02

## 2020-03-02 VITALS
DIASTOLIC BLOOD PRESSURE: 71 MMHG | WEIGHT: 176 LBS | BODY MASS INDEX: 29.72 KG/M2 | HEART RATE: 90 BPM | SYSTOLIC BLOOD PRESSURE: 111 MMHG | OXYGEN SATURATION: 96 %

## 2020-03-02 DIAGNOSIS — Z86.73 HISTORY OF STROKE: Primary | ICD-10-CM

## 2020-03-02 RX ORDER — ATORVASTATIN CALCIUM 80 MG/1
80 TABLET, FILM COATED ORAL DAILY
Qty: 90 TABLET | Refills: 0 | Status: SHIPPED | OUTPATIENT
Start: 2020-03-02 | End: 2020-05-29

## 2020-03-02 ASSESSMENT — PAIN SCALES - GENERAL: PAINLEVEL: MODERATE PAIN (4)

## 2020-03-02 NOTE — LETTER
3/2/2020       RE: Lesley Stafford  [address]  [place] [address]     Dear Colleague,    Thank you for referring your patient, Lesley Stafford, to the [facility] NEUROLOGY at [facility]. Please see a copy of my visit note below.    [facility] Neurology Follow Up Visit    Lesley Stafford MRN# [id number]   Age: 59 year old YOB: 1960     Brief history of symptoms: The patient was initially seen in neurologic consultation/admission on 12/23/2019 through 12/24/2019 for evaluation of LUE/LLE weakness with R-frontal parietal and R-pontine ischemic strokes due to undetermined etiology. Please see the comprehensive neurologic consultation note from that date in the Epic records for details.  During admission, she had NIHSS of 1, no IV tPA was not administered, she was started on atorvastatin 80mg every day, started on DAP for 21 days followed by ASA alone, placed on ziopatch, and had normal TTE.  She has pertinent history of SLE and CRP was not elevated during admission.  She was to follow up with rheumatology for SLE, PCP for Lipids and HTN management, and Neurology for further w/up regarding stroke prevention and other testing (anti-phospholipid lobo).      Interval history: she reports some neuropathy in her feet.  She is working at [employer] and plans to retire at age 60.  She is still taking plaquenil for her lupus, and is on duloxetine for pain and for mood stabilization.  She is worried about costs for doctors visits, and understand that it would be helpful to psychologist the but cost are too high at this time.  She also reports having shingles/or herpes on her left hip/buttocks, but this has been on/off for years.  This usually comes on with stress.  No visual changes relating to her stroke, however she still sees floaters.     She reports neuropathy in both feet, and is on duloxetine for this at this time.  She reports pain in the center of  her feet with some tingling.  She notices it while sitting and it is worsened when she stands up (noticed while working at [employer]). There is some swelling with her left foot alone, but no redness or circumferential numbness with discoloration.      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      Past Surgical History:     Past Surgical History:   Procedure Laterality Date     COLONOSCOPY  01/2010    repeat 10 yrs     DILATION AND CURETTAGE, OPERATIVE HYSTEROSCOPY WITH MORCELLATOR, COMBINED N/A 2/15/2017    Procedure: COMBINED DILATION AND CURETTAGE, OPERATIVE HYSTEROSCOPY WITH MORCELLATOR;  Surgeon: Erin Gutierrez MD;  Location:  OR     ESOPHAGOSCOPY, GASTROSCOPY, DUODENOSCOPY (EGD), COMBINED Left 2/5/2016    Procedure: COMBINED ESOPHAGOSCOPY, GASTROSCOPY, DUODENOSCOPY (EGD), BIOPSY SINGLE OR MULTIPLE;  Surgeon: Pierre Fernandez MD;  Location:  GI     GYN SURGERY        Social History:   Prior to her CVA, pt was working 2 jobs:  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  Tobacco Use     Smoking status: Former Smoker     Smokeless tobacco: Never Used     Tobacco comment: quit 27 years ago   Substance Use Topics     Alcohol use: No     Drug use: No      Family History:     Family History   Problem Relation Age of Onset     Lupus Mother      Asthma Mother      Diabetes Father      Brain Tumor Father         begnign on pituitary     Depression Sister      Anemia Sister          Medications:     Current Outpatient Medications   Medication Sig     albuterol (PROAIR HFA/PROVENTIL HFA/VENTOLIN HFA) 108 (90 Base) MCG/ACT inhaler Inhale 2 puffs into the lungs every 4 hours as needed for shortness of breath / dyspnea, wheezing or other (coughing)     aspirin (ASA) 325 MG EC tablet Take 325 mg by mouth daily     atorvastatin (LIPITOR) 80 MG tablet Take 1 tablet (80 mg) by mouth every evening     cloNIDine (CATAPRES) 0.1 MG tablet TAKE 2 TABLETS BY MOUTH DAILY AT BEDTIME     DEEP SEA NASAL SPRAY 0.65 % nasal spray SPRAY 1 SPRAY INTO BOTH NOSTRILS DAILY AS NEEDED FOR CONGESTION     DULoxetine (CYMBALTA) 30 MG capsule TAKE 1 CAPSULE BY MOUTH EVERY DAY     flunisolide (NASALIDE) 25 MCG/ACT (0.025%) SOLN spray INHALE 2 SPRAYS INTO EACH NOSTRIL 2 TIMES DAILY     gabapentin (NEURONTIN) 300 MG capsule TAKE 2 CAPSULE (300 MG) BY MOUTH 3 TIMES DAILY     guaiFENesin (MUCINEX) 600 MG 12 hr tablet Take 2 tablets (1,200 mg) by mouth 2 times daily (Patient not taking: Reported on 2/4/2020)     hydroxychloroquine (PLAQUENIL) 200 MG tablet TAKE 1 TABLET BY MOUTH DAILY     hydrOXYzine (ATARAX) 25 MG tablet TAKE 1 TABLET (25 MG) BY MOUTH 3 TIMES PER DAY     multivitamin, therapeutic with minerals (MULTI-VITAMIN) TABS Take 1 tablet by mouth daily     omega 3 1200 MG CAPS      order for DME Cane,     tiZANidine (ZANAFLEX) 4 MG tablet TAKE 1-2 TABLETS BY MOUTH AS NEEDED THREE TIMES A DAY      Allergies:     Allergies   Allergen Reactions     Morphine Sulfate Itching     Sulfa Drugs Hives        Review of Systems:   A comprehensive 10 point review of systems (constitutional, ENT, cardiac, peripheral vascular, lymphatic, respiratory, GI, , Musculoskeletal, skin, Neurological) was performed and found to be negative except as described in this note.      Physical Exam:   Vitals: /71   Pulse 90   Wt 79.8 kg (176 lb)   LMP 08/12/2013   SpO2 96%   BMI 29.72 kg/m      General: Seated comfortably in no acute  distress.  HEENT: Neck supple with normal range of motion. No paracervical muscle tenderness or tightness.  Optic discs sharp and vasculature normal on funduscopic exam.   Heart: Regular rate  Lungs: breathing comfortably  GI: Non tender  Extremities: no edema  Skin: No rashes  Neurologic:     Mental Status: Fully alert, attentive and oriented. Speech clear and fluent, no paraphasic errors.     Cranial Nerves: Visual fields intact. PERRL. EOMI with normal smooth pursuit. Facial sensation intact/symmetric. Facial movements symmetric. Hearing not formally tested but intact to conversation. Palate elevation symmetric, uvula midline. No dysarthria. Shoulder shrug strong bilaterally. Tongue protrusion midline.     Motor: No tremors or other abnormal movements observed. Muscle tone normal throughout. No pronator drift. Normal/symmetric rapid finger tapping. Strength 5/5 throughout upper and lower extremities.     Deep Tendon Reflexes: 2+/symmetric throughout upper and lower extremities. No clonus. Toes downgoing bilaterally (no radicular signs on exam).     Sensory: Intact/symmetric to light touch, pinprick, temperature, vibration and proprioception throughout upper and lower extremities (no loss of sensation in feet b/l, no radicular signs of sensory disturbance). Negative Romberg.      Coordination: Finger-nose-finger and heel-shin intact without dysmetria. Rapid alternating movements intact/symmetric with normal speed and rhythm.     Gait: Normal, steady casual gait. Able to walk on toes, heels and tandem without difficulty.    Pertinent Investigations since last visit:     MRI brain: 12/23/2019:  Punctate infarct in the right frontoparietal junction. Preliminary interpretation was of no acute infarct, but the inpatient  team is aware of the small infarct. Another possible additional infarct is seen in the right ventral bryson on the axial images only, not verified on the sagittal images    CTA head and neck:  12/23/2019  1. Head CTA demonstrates no aneurysm or stenosis of the major intracranial arteries.   2. Neck CTA demonstrates no stenosis of the major cervical arteries.           Assessment and Plan:   Assessment:  - R-frontal parietal and R-pontine ischemic strokes due to undetermined etiology    The patient completed 21 DAP therapy and continues with ASA alone.  She had not tested positive for cardiolipin antibodies after discharge.  At this point, her stroke would be classified as cryptogenic, but given her lupus history she may be prone to small vessel infarctions.  She was unable to continue statin therapy and thought she was to stop this therapy 21 days after discharge.   I discussed with her that this was not the case and she should resume therapy.  We discussed that depression and anxiety can occur post stroke and that she should prioritize mental health at this time to avoid worsened depression leading to decreased nutrition, seep, and exercise.  I informed her that I will place a referral to both  for how to handle finances for this visit as well as referral for psychological assessment.  She has no findings on exam to indicate a large fiber neuropathy or multiple region radiculopathy, and her neuropathic symptoms are likely small fiber neuropathic in nature and best helped with gabapentin.       Plan:  - Continue with statin therapy: atorvastatin 80 mg QD  - Continue with aspirin 81 mg QD  -  will contact you about finances  - Psychology referral for post-stroke depression and anxiety  - Continue with gabapentin 300 mg BID for dysesthesias of the feet    Follow up in Neurology clinic in one year or earlier as needed should new concerns arise.    THOMAS Salgado D.O.   of Neurology    Greater than 50% of the total time (60 min) in this patient encounter was spent on counseling and/or coordination of care. We reviewed diagnostic results, impressions, and  discussed other possible tests if symptoms do not improve. We discussed the implications of the diagnosis, as well as risks and benefits of management options. We reviewed treatment instructions and our scheduled follow-up as specified in the discharge plan. We also discussed the importance of compliance with the chosen course of treatment. The patient is in agreement with this plan and has no further questions.

## 2020-03-02 NOTE — NURSING NOTE
Chief Complaint   Patient presents with     Consult     P NEW - STROKE ED F/U      Lillian Rivera, EMT

## 2020-03-02 NOTE — PROGRESS NOTES
Chart review today, no pt contact. Per [facility] SW's direction, added next CHW outreach for 3/6/20.

## 2020-03-02 NOTE — PROGRESS NOTES
[facility] Neurology Follow Up Visit    Lesley Stafford MRN# [id number]   Age: 59 year old YOB: 1960     Brief history of symptoms: The patient was initially seen in neurologic consultation/admission on 12/23/2019 through 12/24/2019 for evaluation of LUE/LLE weakness with R-frontal parietal and R-pontine ischemic strokes due to undetermined etiology. Please see the comprehensive neurologic consultation note from that date in the Epic records for details.  During admission, she had NIHSS of 1, no IV tPA was not administered, she was started on atorvastatin 80mg every day, started on DAP for 21 days followed by ASA alone, placed on ziopatch, and had normal TTE.  She has pertinent history of SLE and CRP was not elevated during admission.  She was to follow up with rheumatology for SLE, PCP for Lipids and HTN management, and Neurology for further w/up regarding stroke prevention and other testing (anti-phospholipid lobo).      Interval history: she reports some neuropathy in her feet.  She is working at [employer] and plans to retire at age 60.  She is still taking plaquenil for her lupus, and is on duloxetine for pain and for mood stabilization.  She is worried about costs for doctors visits, and understand that it would be helpful to psychologist the but cost are too high at this time.  She also reports having shingles/or herpes on her left hip/buttocks, but this has been on/off for years.  This usually comes on with stress.  No visual changes relating to her stroke, however she still sees floaters.     She reports neuropathy in both feet, and is on duloxetine for this at this time.  She reports pain in the center of her feet with some tingling.  She notices it while sitting and it is worsened when she stands up (noticed while working at [employer]). There is some swelling with her left foot alone, but no redness or circumferential numbness with discoloration.      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      Past Surgical History:     Past Surgical History:   Procedure Laterality Date     COLONOSCOPY  01/2010    repeat 10 yrs     DILATION AND CURETTAGE, OPERATIVE HYSTEROSCOPY WITH MORCELLATOR, COMBINED N/A 2/15/2017    Procedure: COMBINED DILATION AND CURETTAGE, OPERATIVE HYSTEROSCOPY WITH MORCELLATOR;  Surgeon: Erin Gutierrez MD;  Location: [facility] OR     ESOPHAGOSCOPY, GASTROSCOPY, DUODENOSCOPY (EGD), COMBINED Left 2/5/2016    Procedure: COMBINED ESOPHAGOSCOPY, GASTROSCOPY, DUODENOSCOPY (EGD), BIOPSY SINGLE OR MULTIPLE;  Surgeon: Pierre Fernandez MD;  Location:  GI     GYN SURGERY        Social History:   Prior to her CVA, pt was working 2 jobs:  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  Tobacco Use     Smoking status: Former Smoker     Smokeless tobacco: Never Used     Tobacco comment: quit 27 years ago   Substance Use Topics     Alcohol use: No     Drug use: No      Family History:     Family History   Problem Relation Age of Onset     Lupus Mother      Asthma Mother      Diabetes Father      Brain Tumor Father         begnign on pituitary     Depression Sister      Anemia Sister         Medications:     Current Outpatient Medications   Medication Sig     albuterol (PROAIR HFA/PROVENTIL HFA/VENTOLIN HFA) 108 (90 Base) MCG/ACT inhaler Inhale 2 puffs into the lungs every 4 hours as needed for shortness of breath / dyspnea, wheezing or other (coughing)     aspirin (ASA) 325 MG EC  tablet Take 325 mg by mouth daily     atorvastatin (LIPITOR) 80 MG tablet Take 1 tablet (80 mg) by mouth every evening     cloNIDine (CATAPRES) 0.1 MG tablet TAKE 2 TABLETS BY MOUTH DAILY AT BEDTIME     DEEP SEA NASAL SPRAY 0.65 % nasal spray SPRAY 1 SPRAY INTO BOTH NOSTRILS DAILY AS NEEDED FOR CONGESTION     DULoxetine (CYMBALTA) 30 MG capsule TAKE 1 CAPSULE BY MOUTH EVERY DAY     flunisolide (NASALIDE) 25 MCG/ACT (0.025%) SOLN spray INHALE 2 SPRAYS INTO EACH NOSTRIL 2 TIMES DAILY     gabapentin (NEURONTIN) 300 MG capsule TAKE 2 CAPSULE (300 MG) BY MOUTH 3 TIMES DAILY     guaiFENesin (MUCINEX) 600 MG 12 hr tablet Take 2 tablets (1,200 mg) by mouth 2 times daily (Patient not taking: Reported on 2/4/2020)     hydroxychloroquine (PLAQUENIL) 200 MG tablet TAKE 1 TABLET BY MOUTH DAILY     hydrOXYzine (ATARAX) 25 MG tablet TAKE 1 TABLET (25 MG) BY MOUTH 3 TIMES PER DAY     multivitamin, therapeutic with minerals (MULTI-VITAMIN) TABS Take 1 tablet by mouth daily     omega 3 1200 MG CAPS      order for DME Cane,     tiZANidine (ZANAFLEX) 4 MG tablet TAKE 1-2 TABLETS BY MOUTH AS NEEDED THREE TIMES A DAY      Allergies:     Allergies   Allergen Reactions     Morphine Sulfate Itching     Sulfa Drugs Hives        Review of Systems:   A comprehensive 10 point review of systems (constitutional, ENT, cardiac, peripheral vascular, lymphatic, respiratory, GI, , Musculoskeletal, skin, Neurological) was performed and found to be negative except as described in this note.      Physical Exam:   Vitals: /71   Pulse 90   Wt 79.8 kg (176 lb)   LMP 08/12/2013   SpO2 96%   BMI 29.72 kg/m     General: Seated comfortably in no acute distress.  HEENT: Neck supple with normal range of motion. No paracervical muscle tenderness or tightness.  Optic discs sharp and vasculature normal on funduscopic exam.   Heart: Regular rate  Lungs: breathing comfortably  GI: Non tender  Extremities: no edema  Skin: No rashes  Neurologic:     Mental  Status: Fully alert, attentive and oriented. Speech clear and fluent, no paraphasic errors.     Cranial Nerves: Visual fields intact. PERRL. EOMI with normal smooth pursuit. Facial sensation intact/symmetric. Facial movements symmetric. Hearing not formally tested but intact to conversation. Palate elevation symmetric, uvula midline. No dysarthria. Shoulder shrug strong bilaterally. Tongue protrusion midline.     Motor: No tremors or other abnormal movements observed. Muscle tone normal throughout. No pronator drift. Normal/symmetric rapid finger tapping. Strength 5/5 throughout upper and lower extremities.     Deep Tendon Reflexes: 2+/symmetric throughout upper and lower extremities. No clonus. Toes downgoing bilaterally (no radicular signs on exam).     Sensory: Intact/symmetric to light touch, pinprick, temperature, vibration and proprioception throughout upper and lower extremities (no loss of sensation in feet b/l, no radicular signs of sensory disturbance). Negative Romberg.      Coordination: Finger-nose-finger and heel-shin intact without dysmetria. Rapid alternating movements intact/symmetric with normal speed and rhythm.     Gait: Normal, steady casual gait. Able to walk on toes, heels and tandem without difficulty.    Pertinent Investigations since last visit:     MRI brain: 12/23/2019:  Punctate infarct in the right frontoparietal junction. Preliminary interpretation was of no acute infarct, but the inpatient  team is aware of the small infarct. Another possible additional infarct is seen in the right ventral bryson on the axial images only, not verified on the sagittal images    CTA head and neck: 12/23/2019  1. Head CTA demonstrates no aneurysm or stenosis of the major intracranial arteries.   2. Neck CTA demonstrates no stenosis of the major cervical arteries.           Assessment and Plan:   Assessment:  - R-frontal parietal and R-pontine ischemic strokes due to undetermined etiology    The patient  completed 21 DAP therapy and continues with ASA alone.  She had not tested positive for cardiolipin antibodies after discharge.  At this point, her stroke would be classified as cryptogenic, but given her lupus history she may be prone to small vessel infarctions.  She was unable to continue statin therapy and thought she was to stop this therapy 21 days after discharge.   I discussed with her that this was not the case and she should resume therapy.  We discussed that depression and anxiety can occur post stroke and that she should prioritize mental health at this time to avoid worsened depression leading to decreased nutrition, seep, and exercise.  I informed her that I will place a referral to both  for how to handle finances for this visit as well as referral for psychological assessment.  She has no findings on exam to indicate a large fiber neuropathy or multiple region radiculopathy, and her neuropathic symptoms are likely small fiber neuropathic in nature and best helped with gabapentin.       Plan:  - Continue with statin therapy: atorvastatin 80 mg QD  - Continue with aspirin 81 mg QD  -  will contact you about finances  - Psychology referral for post-stroke depression and anxiety  - Continue with gabapentin 300 mg BID for dysesthesias of the feet    Follow up in Neurology clinic in one year or earlier as needed should new concerns arise.    THOMAS Salgado D.O.   of Neurology    Greater than 50% of the total time (60 min) in this patient encounter was spent on counseling and/or coordination of care. We reviewed diagnostic results, impressions, and discussed other possible tests if symptoms do not improve. We discussed the implications of the diagnosis, as well as risks and benefits of management options. We reviewed treatment instructions and our scheduled follow-up as specified in the discharge plan. We also discussed the importance of compliance with the  chosen course of treatment. The patient is in agreement with this plan and has no further questions.

## 2020-03-02 NOTE — PATIENT INSTRUCTIONS
Continue with statin therapy  Continue with aspirin   will contact you about finances  Psychology referral for post-stroke depression and anxiety

## 2020-03-04 ENCOUNTER — HOSPITAL ENCOUNTER (OUTPATIENT)
Dept: OCCUPATIONAL THERAPY | Facility: CLINIC | Age: 60
Setting detail: THERAPIES SERIES
End: 2020-03-04
Attending: PHYSICIAN ASSISTANT
Payer: COMMERCIAL

## 2020-03-04 ENCOUNTER — HOSPITAL ENCOUNTER (OUTPATIENT)
Dept: PHYSICAL THERAPY | Facility: CLINIC | Age: 60
Setting detail: THERAPIES SERIES
End: 2020-03-04
Attending: PHYSICIAN ASSISTANT
Payer: COMMERCIAL

## 2020-03-04 PROCEDURE — 97112 NEUROMUSCULAR REEDUCATION: CPT | Mod: GP | Performed by: PHYSICAL THERAPIST

## 2020-03-04 PROCEDURE — 97750 PHYSICAL PERFORMANCE TEST: CPT | Mod: GP | Performed by: PHYSICAL THERAPIST

## 2020-03-04 PROCEDURE — 97112 NEUROMUSCULAR REEDUCATION: CPT | Mod: GO | Performed by: OCCUPATIONAL THERAPIST

## 2020-03-04 NOTE — PROGRESS NOTES
"Outpatient Occupational Therapy Discharge Note     Patient: Lesley Stafford  : 1960    Beginning/End Dates of Reporting Period:  20  to 3/4/2020    Referring Provider: Jessica Gutierres PA-C    Therapy Diagnosis: Cerebrovascular accident (CVA), unspecified mechanism , Lupus, pain of Left upper arm, pain of LLE    Client Self Report: Pt reports a lot of emotional stress over the past 2 weeks.  She has difficulty with being on her feet for the duration of her shifts at [employer] (4-5 hour shifts), and also finds it very difficult to interact with customers all day, as she is a .  She has had a lot of financial concerns, is considering bankruptcy and feels she has to avoid some doctor's appointments due to inability to pay.  Dr. Salgado placed a mental health referral and pt is awaiting call from them to schedule.  She is very eager to get started with a mental health provider.    Pt does report that her LUE ROM is greatly improved, and the numbness in her LUE is \"80% better.\"  She still has some tingling and stiffness in arms when driving. Is consistent with doing nerve glide and ROM exercises for LUE.  Pt feels that her attention and memory are still impaired but acknowledges that this is largely due to her mental health state and other stressors as listed above.      Objective Measurements:     Objective Measure:  strength   Details: LUE: 50, RUE: 65     Objective Measure: 9 hole peg   Details: 21 seconds     Objective Measure: Dynavision, Mode A   Details: 55 hits/minute.  No UE pain, but pt did become dizzy with the testing.  Score has improved 14 points since initial visit.       Goals:     Goal Identifier L shoulder ROM   Goal Description Pt will demonstrate full ROM on L shoulder, with no increase in pain, for increased reaching overhead and resumption of PLOF in household and work tasks.   Target Date 20   Date Met  20   Progress:  Goal met.  ROM is 180 " degrees (WNL) in Both shoulders     Goal Identifier L  strength   Goal Description Pt will demonstrate an 8 lb increase in L  strength, for increaed independence in ADLs and IADLs.    Target Date 03/08/20   Date Met      Progress: goal partially met.  Pt's LUE strength has improved by 5 lb, which is above the mean for her age.      Goal Identifier Dynavision    Goal Description Pt will demonstrate a Dynavision score of 52 or higher, as a measure of readiness for driving.    Target Date 03/08/20   Date Met      Progress: Goal met.  Pt scored 55 on dynavision, which is within a safe range for driving.  Of note, pt did experience dizziness with the head turns and following the lights of the test.     Goal Identifier Memory   Goal Description Pt will demonstrate understanding of 3 or more compensatory strategies to accommodate for decreased memory.    Target Date 03/08/20   Date Met      Progress:  Not formally re-assessed.  Pt reports that she can remember basic routine tasks, like grooming tasks or taking her daughter's dog for a walk on set days.  She has much more difficulty with remembering out-of-ordinary appointments or details.  She feels disorganized due to baseline ADHD.  She states that she uses a large planner for her personal appointments and tasks, but uses a smaller planner for work tasks. She is typically exhausted after work and forgets to transfer notes from one planner to another.     Therapist provided instruction on ways to simplify her organization system, with strong recommendation to use a single, paper-based planner that is very concrete. Pt again referred to her mental health concerns, and is hoping that organization will improve once she can better address her mental health.        Progress Toward Goals:   Progress this reporting period: Lesley has made nice progress toward LUE coordination and strength.  Her cognition has remained the same as baseline; however, she has had many  psychosocial factors contributing.      Plan:  Discharge from therapy. Recommend discharge from OT with a focus on getting in with a mental health provider.     Discharge:    Reason for Discharge: No further expectation of progress. Pt has met goals for motor function but not memory.     Discharge Plan: Patient to continue home program: Shoulder and upper back stretches, nerve glides.  Other services: Pt has a referral in place for Mental Health.  Therapist printed out the number to call in case she does not hear from the . Pt is very eager to work with a mental health provider.

## 2020-03-05 NOTE — PROGRESS NOTES
03/04/20 1000   Signing Clinician's Name / Credentials   Signing clinician's name / credentials Robb Lea DPT NCS   Functional Gait Assessment (TOY Mcgill, CARLINE Recinos, et al. (2004))   1. GAIT LEVEL SURFACE 3   2. CHANGE IN GAIT SPEED 3   3. GAIT WITH HORIZONTAL HEAD TURNS 3  (not as much dizziness)   4. GAIT WITH VERTICAL HEAD TURNS 3  (dizziness with this)   5. GAIT AND PIVOT TURN 3   6. STEP OVER OBSTACLE 3   7. GAIT WITH NARROW BASE OF SUPPORT 0   8. GAIT WITH EYES CLOSED 3  (6.1)   9. AMBULATING BACKWARDS 3   10. STEPS 3   Total Functional Gait Assessment Score   TOTAL SCORE: (MAXIMUM SCORE 30) 27   Functional Gait Assessment (FGA): The FGA assesses postural stability during various walking tasks.   Gait assistive device used: None    Patient Score: 27/30  Scores of <22/30 have been correlated with predicting falls in community-dwelling older adults according to Artem & Adrian 2010.   Scores of <18/30 have been correlated with increased risk for falls in patients with Parkinsons Disease according to Dimitry Ewing, Beasley et al 2014.  Minimal Detectable Change for patients with acute/chronic stroke = 4.2 according to Doris & Juan 2009  Minimal Detectable Change for patients with vestibular disorder = 8 according to Artem & Adrian 2010    Assessment (rationale for performing, application to patient s function & care plan): high level mobility    Minutes billed as physical performance test:10    Robb Lea DPT, MPT, NCS

## 2020-03-09 ENCOUNTER — TELEPHONE (OUTPATIENT)
Dept: FAMILY MEDICINE | Facility: CLINIC | Age: 60
End: 2020-03-09

## 2020-03-09 NOTE — TELEPHONE ENCOUNTER
"Reason for call:  Other   Patient called regarding (reason for call): call back  Additional comments: pt requesting a call back.  Want to discuss where to get \"first alert\".    Phone number to reach patient:  Home number on file [phone] (home)    Best Time:  anytime    Can we leave a detailed message on this number?  YES    Travel screening: Negative    "

## 2020-03-10 ENCOUNTER — PATIENT OUTREACH (OUTPATIENT)
Dept: CARE COORDINATION | Facility: CLINIC | Age: 60
End: 2020-03-10

## 2020-03-10 NOTE — TELEPHONE ENCOUNTER
Gave pt the contact for [facility]  and also the number for 1st alert 7     Swati Ghotra RN   [facility]

## 2020-03-10 NOTE — PROGRESS NOTES
Clinic Care Coordination Contact  [facility]/Voicemail     Clinical Data: Care Coordinator Outreach  Outreach attempted x 1.  Left message on patient's voicemail with call back information and requested return call.  Plan:Care Coordinator will try to reach patient again on 3/24/20.    Notes:  -[place]  -Help with [employer] application?

## 2020-03-11 ENCOUNTER — PATIENT OUTREACH (OUTPATIENT)
Dept: CARE COORDINATION | Facility: CLINIC | Age: 60
End: 2020-03-11

## 2020-03-11 NOTE — PROGRESS NOTES
Clinic Care Coordination Contact  [facility] [[email or web address]Voicemail    Clinical Data: Care Coordinator Outreach  Outreach attempted x 1.  Left message on patient's voicemail with call back information and requested return call.  Plan: Care Coordinator will try to reach patient again on 3/24/20.

## 2020-03-16 NOTE — PROGRESS NOTES
Social Work Telephone Message Note  [facility]    Patient Name:  Lesley Stafford  /Age:  1960 (59 year old)    Referral Source: Dr Salgado  Reason for Referral:  Financial concerns, cost of medications    Contacted Patient on 3/3/2020 a message was left on patient's voicemail. I then noticed that Pt was being following by her PCP care coord/SW and forwarded referral and they have left messages for her also.  TONI Doherty, [facility]    [facility]  [phone]

## 2020-03-24 ENCOUNTER — TRANSFERRED RECORDS (OUTPATIENT)
Dept: HEALTH INFORMATION MANAGEMENT | Facility: CLINIC | Age: 60
End: 2020-03-24

## 2020-03-24 LAB
AST SERPL-CCNC: 41 U/L (ref 5–34)
CREATININE (EXTERNAL): 0.52 MG/DL (ref 0.5–1.3)
GFR ESTIMATED (EXTERNAL): 128.3 ML/MIN/1.73M2

## 2020-03-25 RX ORDER — ASPIRIN 325 MG/1
TABLET, FILM COATED ORAL
Qty: 90 TABLET | Refills: 1 | Status: SHIPPED | OUTPATIENT
Start: 2020-03-25 | End: 2021-07-11

## 2020-03-25 NOTE — TELEPHONE ENCOUNTER
Dr Ana Luisa Virk has never been prescribed by you    Swati Ghotra RN   [facility]

## 2020-03-25 NOTE — TELEPHONE ENCOUNTER
"Requested Prescriptions   Pending Prescriptions Disp Refills     SM ASPIRIN 325 MG tablet [Pharmacy Med Name: SM ASPIRIN 325 MG TABLET] 30 tablet 1     Sig: TAKE 1 TABLET BY MOUTH EVERY DAY START TAKING 1/15/2020 AFTER TAKING 81 MG TAB   Last Written Prescription Date:  na  Last Fill Quantity: na,  # refills: na   Last office visit: 2/4/2020 with prescribing provider:     Future Office Visit:        Analgesics (Non-Narcotic Tylenol and ASA Only) Passed - 3/24/2020  3:51 PM        Passed - Recent (12 mo) or future (30 days) visit within the authorizing provider's specialty     Patient has had an office visit with the authorizing provider or a provider within the authorizing providers department within the previous 12 mos or has a future within next 30 days. See \"Patient Info\" tab in inbasket, or \"Choose Columns\" in Meds & Orders section of the refill encounter.              Passed - Patient is age 20 years or older     If ASA is flagged for ages under 20 years old. Forward to provider for confirmation Ryes Syndrome is not a concern.              Passed - Medication is active on med list           "

## 2020-04-02 ENCOUNTER — PATIENT OUTREACH (OUTPATIENT)
Dept: CARE COORDINATION | Facility: CLINIC | Age: 60
End: 2020-04-02

## 2020-04-02 NOTE — PROGRESS NOTES
Clinic Care Coordination Contact  [facility]/Voicemail    Clinical Data: Care Coordinator Outreach  Outreach attempted x 1.  Left message on patient's voicemail with call back information and requested return call.  Plan:Care Coordinator will try to reach patient again on 4/13/20.    In my message, I let pt know that [place] and [place] not [place] any active enrollments. Told her I would like to ask more questions and see if I can assist her with how to follow up on her case/application.

## 2020-04-03 ENCOUNTER — PATIENT OUTREACH (OUTPATIENT)
Dept: CARE COORDINATION | Facility: CLINIC | Age: 60
End: 2020-04-03

## 2020-04-03 NOTE — PROGRESS NOTES
Clinic Care Coordination Contact    Follow Up Progress Note     Goals addressed this encounter:   Goals Addressed                 This Visit's Progress       Patient Stated      Financial Wellbeing (pt-stated)        Goal Statement: I would like to set up a payment plan with [employer]   Date Goal set: 1/3/2020  Date to Achieve By: 2/20/2020  Patient expressed understanding of goal: yes     Action steps to achieve this goal:   1. I will contact [employer] billing and ask about my options to set up a payment plan.  2. I will update the Community Health Worker at monthly outreach calls.     (not discussed today)          Other (pt-stated)        Goal Statement: I want to apply for health insurance through [employer].  Date Goal set: 4/2/20  Barriers: navigating the [facility] system  Strengths: asking for help, CCC support  Date to Achieve By: 6/1/20  Patient expressed understanding of goal: yes    Action steps to achieve this goal:  1. I will call Sharyn Duenas, the [facility] Navigator I worked with, to ask for help following up on the status of my case and coverage.  2. I will provide any documents or information that [facility] or [facility] needs to process my case.  3. I will give the Community Health Worker updates at outreach calls and ask for more help if I need it.    Updated: 4/3/20              Intervention/Education provided during outreach:   Called pt back to ask for more information about health insurance. Pt said she contacted [facility] in January to report income changes. Pt had worked with a [facility] Navigator (Sharyn Duenas) during [facility]'s 2019 Open Enrollment period to select a Qualified Health Plan and was paying $250/mo in premiums. Pt said about a week ago, she submitted proof of her income and that her employment had ended to [facility]. She said she does not know why this process has taken so long. Encouraged pt to reach back out to the [facility] Navigator to see if she can assist pt in getting an update, since Navigators  have special access to [employer] that the general public does not have.     Pt said she has been applying for jobs but hasn't heard anything back. Pt also shared that her mental health has not been great recently, and she said her tendency is to retreat and not face issues or challenges. She said she has a mental therapist now and has her second telehealth visit scheduled for 4/9. She also said that she was referred to see a Lupus doctor by her neurologist, and she is trying to keep on top of her health. Pt said she is focusing on positives and trying to be a sienna instead of a victim. Pt thanked me for the call and said it helps to keep pushing her forward. Pt said she is working hard to not go to a liquor store and use alcohol as a coping strategy. Pt said her daughter has been reaching out and checking in, which also helps.     Plan:   CHW will call pt in one week (4/10) to follow up on health insurance and see if pt connected with [employer] Nav[facility].  Pt will call [employer] Nav[facility] to ask for help following up on her case.    CHW Care Coordinator will follow up in one week (4/10/20).

## 2020-04-10 ENCOUNTER — PATIENT OUTREACH (OUTPATIENT)
Dept: CARE COORDINATION | Facility: CLINIC | Age: 60
End: 2020-04-10

## 2020-04-10 NOTE — PROGRESS NOTES
Clinic Care Coordination Contact    Follow Up Progress Note     Goals addressed this encounter:   Goals Addressed                 This Visit's Progress       Patient Stated      Other (pt-stated)        Goal Statement: I want to apply for health insurance through [employer].  Date Goal set: 4/2/20  Barriers: navigating the [employer] system  Strengths: asking for help, CCC support  Date to Achieve By: 6/1/20  Patient expressed understanding of goal: yes    Action steps to achieve this goal:  1. I will call Sharyn Duenas, the [facility] Navigator I worked with, today to confirm that [facility] received my documents and get an update on my case.  2. I will give the Community Health Worker updates at outreach calls and ask for more help if I need it.    Updated: 4/10/20              Intervention/Education provided during outreach:   Spoke with pt today to follow up our conversation last week. Pt did connect with the Navigator she originally worked with. Pt said Sharyn called [facility] to confirm they received the documents pt submitted, and they said they did not get them. Pt said she was feeling incredibly frustrated and angry, but she kept focused and got copies to Sharyn for her to resubmit. Pt said she was planning on calling Sharyn after we talked this afternoon to get an update. Offered to follow up with pt next Friday afternoon, and pt agreed.    Pt said she is working hard to stay positive. She has been listening to online sermons, speaking with her sister and daughter. Pt said she got referred to a new therapist and is looking forward to connecting. Pt thanked me for checking in on her. I encouraged pt to reach out to me before Friday if she needs anything. Pt said she would and thanked me again.     Plan:   Pt will contact [facility] Navigator today for an update on her case and coverage.  Pt will call CHW if she needs help.     Care Coordinator will follow up in one week.

## 2020-04-21 ENCOUNTER — PATIENT OUTREACH (OUTPATIENT)
Dept: CARE COORDINATION | Facility: CLINIC | Age: 60
End: 2020-04-21

## 2020-04-21 NOTE — PROGRESS NOTES
Clinic Care Coordination Contact  Community Health Worker Follow Up    Goals:   Goals Addressed as of 4/21/2020 at 11:29 AM        Patient Stated      Other (pt-stated)     Added 4/2/20 by Mala Espitia     Goal Statement: I want to apply for health insurance through [facility].  Date Goal set: 4/2/20  Barriers: navigating the [facility] system  Strengths: asking for help, CCC support  Date to Achieve By: 6/1/20  Patient expressed understanding of goal: yes    Action steps to achieve this goal:  1. I will call Sharyn Duenas, the [facility] Navigator I worked with to get an update on my case.  2. If Sharyn does not have an update, I will discuss the option of appealing with her.   3. I will give the Community Health Worker updates at outreach calls and ask for more help if I need it.    Updated: 4/21/20             CHW Next Follow-up: Friday, April 24th     CHW Plan: Pt will contact [facility] Navigator today to check in and see if she has a status on pt's case.  Pt will discuss appealing as an option if there is no update on her case.  CHW will call pt to check in on Friday for an update.    Spoke with pt today and apologized for not calling on Friday as I said I would. Pt said she had talked with Sharyn (Navigator) yesterday in the late afternoon. Navigator told pt she was going to call the [facility] to ask for a case status update. Pt said she was planning on calling Navigator today to see where things are at. Discussed appealing as an option for pt, and pt understood. I encouraged pt to ask Navigator about this, pt said she would.    Pt asked if I could call back on Friday to check in. I agreed. Pt said the weekly calls help keep her motivated and her spirits up during this challenging time. Pt asked me to call in the late morning or after 1:30 pm. Pt listens to sermon online from 1-1:30 pm and does not want to miss it. Pt also mentioned that a trauma therapist she wants to work with called this morning to let  pt know that once her insurance coverage is active, they are ready to start therapy with her. Pt said she also wants to access an [facility] worker once insurance is figured out.

## 2020-04-24 ENCOUNTER — PATIENT OUTREACH (OUTPATIENT)
Dept: CARE COORDINATION | Facility: CLINIC | Age: 60
End: 2020-04-24

## 2020-04-24 NOTE — PROGRESS NOTES
Clinic Care Coordination Contact  Community Health Worker Follow Up    Goals:   Goals Addressed as of 4/24/2020 at 2:28 PM        Patient Stated      Other (pt-stated)     Added 4/2/20 by Mala Espitia     Goal Statement: I want to apply for health insurance through [facility].  Date Goal set: 4/2/20  Barriers: navigating the [facility] system  Strengths: asking for help, CCC support  Date to Achieve By: 6/1/20  Patient expressed understanding of goal: yes    Action steps to achieve this goal:  1. I will check my mail for the letter from [facility] stating what health insurance program I am eligible for  2. I will give the Community Health Worker updates at outreach calls    Updated: 4/24/20             CHW Next Follow-up: 2 business days (4/28)    CHW Plan: Pt will check her mail for the [facility] letter that is coming with her eligibility determination for health insurance. CHW [place]'s [place] is open and active on 4/28.    Called pt as promised to follow up on her health insurance. Pt thanked me for the call and said she is getting ready to take a nap. Pt celebrates Ramadan and has been up since 3:30 am this morning. Pt said it is going to be different this year but she is still looking forward to celebrating.    Pt said on Wednesday morning she got a call from Ozzy at [facility]. Pt said he went over some of the information pt had provided to [facility] as it was difficult to read because it had been faxed and scanned into their system. Pt said Ozzy told her she appears to be eligible for Medical Assistance and that she would get a letter in the mail confirming her eligibility. Pt said that call helped confirm her feelings that things are moving forward in a positive direction.     [place] offered [place] to see if pt's coverage is open and active. Pt agreed and asked me to do it on Tuesday. I agreed and then said I would check in with pt.     Once pt's coverage is open, pt said  her priorities are to start mental health therapy for herself and to get an [facility] worker. I offered our team's support and said that we can discuss next steps once we confirm (and celebrate!) that coverage is open. Pt agreed and thanked me for the follow up.

## 2020-04-28 ENCOUNTER — PATIENT OUTREACH (OUTPATIENT)
Dept: CARE COORDINATION | Facility: CLINIC | Age: 60
End: 2020-04-28

## 2020-04-28 NOTE — PROGRESS NOTES
Clinic Care Coordination Contact  Community Health Worker Follow Up    Goals:   Goals Addressed as of 4/28/2020 at 10:26 AM        Patient Stated      Other (pt-stated)     Added 4/2/20 by Mala Espitia     Goal Statement: I want to apply for health insurance through [facility].  Date Goal set: 4/2/20  Barriers: navigating the [facility] system  Strengths: asking for help, CCC support  Date to Achieve By: 6/1/20  Patient expressed understanding of goal: yes    Action steps to achieve this goal:  1. I will review the paperwork I received about my Medical Assistance case and let my Navigator or CHW know if I have questions.  2. I will give the Community Health Worker updates at outreach calls    Updated: 4/28/20             CHW Next Follow-up: One month    CHW Plan: Pt will read her mail today and let Navigator or CHW know if she has questions about next steps.     Spoke with [place] today [place] is [place] and active. After months of working on this, pt is excited that coverage is open and that she can move forward with things that have been on hold. From [place]:    Major Programs  This subscriber has eligibility for MA: Medical Assistance.  Elig Type AX: MA Early Expansion  Eligibility Begin Date: 04/01/2020  This subscriber is eligible for the following service types: Medical Care ,  Chiropractic ,  Dental Care ,  Hospital ,  Hospital - Inpatient ,  Hospital - Outpatient ,  Emergency Services ,  Pharmacy ,  Professional (Physician) Visit - Office ,  Vision (Optometry) ,  Mental Health ,  Urgent Care  Prepaid Health Plan  None     Pt said she received seven different pieces of mail from [facility] and the [facility]. She will review them today and get in touch with Sharyn (Navigator) or me if she has questions about next steps. I told pt that she may have a form asking her to choose a health plan. Pt said in the past, when she had [employer], she had [employer] as her health plan. Pt said she will let me  know by Friday if she has questions or if she can't reach Sharyn.    Pt also mentioned that her 85 y/o father is living at [facility]. Pt was just there on Saturday to visit him and found out from her sister last night that one staff person and resident have tested positive for COVID-19. Pt said she is worried about her father and herself, as she's unsure if she was potentially exposed. Pt said her sister is going to get more information from [facility]. Told pt if she has any symptoms in the next two weeks to contact the clinic & I mentioned [email or web address]. Pt said she understood and would keep me posted.    Routing to PCP and CC SOL as an FYI.

## 2020-05-04 ENCOUNTER — PATIENT OUTREACH (OUTPATIENT)
Dept: CARE COORDINATION | Facility: CLINIC | Age: 60
End: 2020-05-04

## 2020-05-11 DIAGNOSIS — F41.9 ANXIETY: ICD-10-CM

## 2020-05-12 NOTE — TELEPHONE ENCOUNTER
"Requested Prescriptions   Pending Prescriptions Disp Refills     hydrOXYzine (ATARAX) 25 MG tablet [Pharmacy Med Name: HYDROXYZINE HCL 25 MG TABLET] 90 tablet 0     Sig: TAKE 1 TABLET BY MOUTH THREE TIMES A DAY   Last Written Prescription Date: 4/13/2020  Last Fill Quantity: 90,  # refills: 0   Last office visit: 2/4/2020 with prescribing provider:     Future Office Visit:      Antihistamines Protocol Passed - 5/11/2020 12:23 AM        Passed - Recent (12 mo) or future (30 days) visit within the authorizing provider's specialty     Patient has had an office visit with the authorizing provider or a provider within the authorizing providers department within the previous 12 mos or has a future within next 30 days. See \"Patient Info\" tab in inbasket, or \"Choose Columns\" in Meds & Orders section of the refill encounter.              Passed - Patient is age 3 or older     Apply age and/or weight-based dosing for peds patients age 3 and older.    Forward request to provider for patients under the age of 3.          Passed - Medication is active on med list           "

## 2020-05-12 NOTE — TELEPHONE ENCOUNTER
Dr Ana Luisa Pryor fill was given at last refill  Do you want pt to have OV or is virtual visit OK    Swati Ghotra RN   [facility]

## 2020-05-13 RX ORDER — HYDROXYZINE HYDROCHLORIDE 25 MG/1
TABLET, FILM COATED ORAL
Qty: 90 TABLET | Refills: 0 | Status: SHIPPED | OUTPATIENT
Start: 2020-05-13 | End: 2020-07-02

## 2020-05-20 ENCOUNTER — DOCUMENTATION ONLY (OUTPATIENT)
Dept: OTHER | Facility: CLINIC | Age: 60
End: 2020-05-20

## 2020-05-20 ENCOUNTER — TELEPHONE (OUTPATIENT)
Dept: FAMILY MEDICINE | Facility: CLINIC | Age: 60
End: 2020-05-20

## 2020-05-20 NOTE — LETTER
[address] [address]  Phone: [phone]  Fax: [phone]    May 20, 2020        Lesley Stafford  [facility]  [place] [facility] [address]          To whom it may concern:    RE: Lesley Stafford    Due to medical problems that place her at higher risk for complications should she become infected with Covid19, I recommend that Ms Link Stafford not return to work 5/27/2020 if the risk of exposure at work is greater than if she remains home.     Please contact me for questions or concerns.      Sincerely,        Rafael Orosco MD

## 2020-05-20 NOTE — TELEPHONE ENCOUNTER
Reason for call:  Other   Patient called regarding (reason for call): call back  Additional comments: Patient called and stated that she will going back to work 5-27-20, patient has Lupus and had a stroke last December, and wants to know if is bush for her to call back to work. Patient would like a call back.    Phone number to reach patient:  Cell number on file:    Telephone Information:   Mobile [phone]       Best Time:  na    Can we leave a detailed message on this number?  YES    Travel screening: Not Applicable

## 2020-05-28 ENCOUNTER — TELEPHONE (OUTPATIENT)
Dept: FAMILY MEDICINE | Facility: CLINIC | Age: 60
End: 2020-05-28

## 2020-05-28 NOTE — TELEPHONE ENCOUNTER
Reason for call:  Other     Patient called regarding (reason for call): call back    Additional comments: Patient is calling and thinks she may have a problem with her feet and legs. She states it looks swollen and her ankle is in pain. Patient thinks something is off. I offered to schedule patient for a virtual visit but patient declined as she doesn't have an active insurance right now. Patient just wants to speak to a nurse and ask if she should get compression socks at the store and if this will help her as patient is having pain.       Phone number to reach patient:  Cell number on file:    Telephone Information:   Mobile [phone]       Best Time:  N/A    Can we leave a detailed message on this number?  YES    Travel screening: Not Applicable

## 2020-05-28 NOTE — TELEPHONE ENCOUNTER
Spoke with patient. Some swelling off and on bilateral feet and ankles, as well as pain. Neurologist thinks that it is due to lupus. Patient also having some anxiety with SOB/chest pain and she states her anxiety has been increased due to pain and swelling and dealing with lupus. Patient scheduled for in patient visit with Dr. Angeles on Monday at 1 pm    Sanjana Fraire RN   [facility]

## 2020-05-29 DIAGNOSIS — Z86.73 HISTORY OF STROKE: ICD-10-CM

## 2020-05-29 RX ORDER — ATORVASTATIN CALCIUM 80 MG/1
80 TABLET, FILM COATED ORAL DAILY
Qty: 90 TABLET | Refills: 2 | Status: SHIPPED | OUTPATIENT
Start: 2020-05-29 | End: 2021-04-19

## 2020-05-29 NOTE — TELEPHONE ENCOUNTER
"Requested Prescriptions   Pending Prescriptions Disp Refills     atorvastatin (LIPITOR) 80 MG tablet 90 tablet 0     Sig: Take 1 tablet (80 mg) by mouth daily       Statins Protocol Passed - 5/29/2020  1:11 PM        Passed - LDL on file in past 12 months     Recent Labs   Lab Test 12/24/19  0648   *             Passed - No abnormal creatine kinase in past 12 months     No lab results found.             Passed - Recent (12 mo) or future (30 days) visit within the authorizing provider's specialty     Patient has had an office visit with the authorizing provider or a provider within the authorizing providers department within the previous 12 mos or has a future within next 30 days. See \"Patient Info\" tab in inbasket, or \"Choose Columns\" in Meds & Orders section of the refill encounter.              Passed - Medication is active on med list        Passed - Patient is age 18 or older        Passed - No active pregnancy on record        Passed - No positive pregnancy test in past 12 months           Last Written Prescription Date: 3/2/20  Last Fill Quantity: 90,  # refills: 0   Last office visit: 2/4/2020 with prescribing provider: ELIDA Espitia   Future Office Visit:   Next 5 appointments (look out 90 days)    Jun 01, 2020  1:00 PM CDT  Office Visit with Brandyn Angeles MD  [facility] [address] [place] [id number] [phone]         Prescription approved per [facility] Refill Protocol.    Dora Mckeon RN   [facility]     "

## 2020-06-02 ENCOUNTER — PATIENT OUTREACH (OUTPATIENT)
Dept: CARE COORDINATION | Facility: CLINIC | Age: 60
End: 2020-06-02

## 2020-06-02 NOTE — PROGRESS NOTES
Clinic Care Coordination Contact  [facility]/Voicemail     Clinical Data: Care Coordinator Outreach  Outreach attempted x 1.  Left message on patient's voicemail with call back information and requested return call.  Plan:Care Coordinator will try to reach patient again in approximately 10 business days.

## 2020-06-08 ENCOUNTER — TELEPHONE (OUTPATIENT)
Dept: FAMILY MEDICINE | Facility: CLINIC | Age: 60
End: 2020-06-08

## 2020-06-08 NOTE — TELEPHONE ENCOUNTER
Spoke with patient and she is going to wait until next week after she has possibly been exposed and we can order serology testing for her.    Sanjana Fraire RN   [facility]

## 2020-06-08 NOTE — TELEPHONE ENCOUNTER
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 This is patient's PCP but has not been seen for COVID testing    Additional comments: Mentioned she's very anxious regarding COVID 19 recently. Did not mention having any symptoms    Phone number to reach patient:  Cell number on file:    Telephone Information:   Mobile [phone]       Best Time:  N/A    Can we leave a detailed message on this number?  YES    Travel screening: Negative

## 2020-06-10 NOTE — Clinical Note
FYI pt poor historian. As per chart review, pt independent in ambulation with device prior to admission.

## 2020-06-23 ENCOUNTER — PATIENT OUTREACH (OUTPATIENT)
Dept: CARE COORDINATION | Facility: CLINIC | Age: 60
End: 2020-06-23

## 2020-06-23 DIAGNOSIS — Z11.59 ENCOUNTER FOR SCREENING FOR OTHER VIRAL DISEASES: Primary | ICD-10-CM

## 2020-06-23 NOTE — PROGRESS NOTES
Clinic Care Coordination Contact    Follow Up Progress Note      Assessment: Pt sated she is doing well. She reported on 6/1/2020 she started receiving health insurance. Pt stated she was approved for [employer], but was over income for [place]. Pt stated that she has been in overall good spirits and is just trying to taking things day by day. Pt shared that she is seeing a therapist 1x a week and is going to be starting with an [facility] worker.      Goals addressed this encounter:   Goals Addressed                 This Visit's Progress       Patient Stated      Other (pt-stated)        Goal Statement: I want to apply for health insurance through [employer].  Date Goal set: 4/2/20  Barriers: navigating the [employer] system  Strengths: asking for help, CCC support  Date to Achieve By: 6/1/20  Patient expressed understanding of goal: yes    Action steps to achieve this goal:  1. I will review the paperwork I received about my Medical Assistance case and let my Navigator or CHW know if I have questions.-  Qualified for [place] [facility]- started on 6/1/2020  2. I will give the Community Health Worker updates at outreach calls    Updated: 6/23/20              Intervention/Education provided during outreach: SW asked pt if there was anything else she would like work on with a goal. Pt stated that she still needs to set up some kind of payment plan with [employer]. Pt stated she is overall feeling good and feeling a lot better since first enrolled in CC six months ago. Pt discussed that she would like a check in again next month, which SW stated could be done by the CC team.           Plan:   1) PT will contact [employer] to discuss a payment plan.   CHW will follow up in 1 month.   Care Coordinator will follow up in 45 days.     Henny White MSAYAN  Clinic Care Coordinator   [facility]   [phone]              On leave until august 1st

## 2020-06-24 ENCOUNTER — TELEPHONE (OUTPATIENT)
Dept: GASTROENTEROLOGY | Facility: CLINIC | Age: 60
End: 2020-06-24

## 2020-06-24 ENCOUNTER — VIRTUAL VISIT (OUTPATIENT)
Dept: FAMILY MEDICINE | Facility: CLINIC | Age: 60
End: 2020-06-24
Payer: COMMERCIAL

## 2020-06-24 DIAGNOSIS — G89.29 CHRONIC BILATERAL LOW BACK PAIN WITHOUT SCIATICA: ICD-10-CM

## 2020-06-24 DIAGNOSIS — M25.551 BILATERAL HIP PAIN: ICD-10-CM

## 2020-06-24 DIAGNOSIS — M79.672 LEFT FOOT PAIN: Primary | ICD-10-CM

## 2020-06-24 DIAGNOSIS — M54.50 CHRONIC BILATERAL LOW BACK PAIN WITHOUT SCIATICA: ICD-10-CM

## 2020-06-24 DIAGNOSIS — M25.552 BILATERAL HIP PAIN: ICD-10-CM

## 2020-06-24 PROCEDURE — 99213 OFFICE O/P EST LOW 20 MIN: CPT | Mod: 95 | Performed by: NURSE PRACTITIONER

## 2020-06-24 NOTE — TELEPHONE ENCOUNTER
Patient scheduled for Colonoscopy    Indication for procedure. History of colonic polyps    Referring Provider. Rafael Orosco MD    ? No     Arrival time verified? 7:15 AM    Facility location verified? [address] [facility]    Instructions given regarding prep and procedure 2 day prep instructions reviewed    Prep Type Golytely    Are you taking any anticoagulants or blood thinners? No     Instructions given? N/a     Electronic implanted devices? Denies     Pre procedure teaching completed? Yes    Transportation from procedure?  policy reviewed. Instructed patient to have someone stay with her for 6 hours post exam    H&P / Pre op physical completed? N/a

## 2020-06-24 NOTE — PROGRESS NOTES
"Lesley Stafford is a 59 year old female who is being evaluated via a billable telephone visit.      The patient has been notified of following:     \"This telephone visit will be conducted via a call between you and your physician/provider. We have found that certain health care needs can be provided without the need for a physical exam.  This service lets us provide the care you need with a short phone conversation.  If a prescription is necessary we can send it directly to your pharmacy.  If lab work is needed we can place an order for that and you can then stop by our lab to have the test done at a later time.    Telephone visits are billed at different rates depending on your insurance coverage. During this emergency period, for some insurers they may be billed the same as an in-person visit.  Please reach out to your insurance provider with any questions.    If during the course of the call the physician/provider feels a telephone visit is not appropriate, you will not be charged for this service.\"    Patient has given verbal consent for Telephone visit?  Yes    What phone number would you like to be contacted at? [phone]    How would you like to obtain your AVS? Mail a copy    Subjective     Lesley Stafford is a 59 year old female who presents via phone visit today for the following health issues:    HPI  Joint Pain    Onset: on and off since February 2020    Description:   Location: left foot, sharp pain in heel  Character: Sharp and Dull ache    Intensity: moderate    Progression of Symptoms: worse    Accompanying Signs & Symptoms:  Other symptoms: swelling in ankle, numbness in feet from lupus    History:   Previous similar pain: YES- before she got the inserts for her shoes      Precipitating factors:   Trauma or overuse: YES- possibly from standing frequently    Alleviating factors:  Improved by: nothing    Therapies Tried and outcome: inserts for shoes, tylenol and aleve-somewhat helpful, " elevate, heat, ice, aspercreme   She has also noticed long-standing pain in her low back, and bilateral hips. She has tried physical therapy for this in the past, and wonders if this pain may be contributing to her foot pain. She would like to try physical therapy again.           -------------------------------------    Patient Active Problem List   Diagnosis     Lupus (H)     Abnormal perimenopausal bleeding     Obesity, Class I, BMI 30-34.9     History of claustrophobia     Depressive disorder     Anxiety     Allergic state     Tired     Insomnia, unspecified type     Hemoglobin C trait (H)     Liver hemangioma     Moderate major depression (H)     Stroke (H)     History of stroke     Financial difficulties     Past Surgical History:   Procedure Laterality Date     COLONOSCOPY  01/2010    repeat 10 yrs     DILATION AND CURETTAGE, OPERATIVE HYSTEROSCOPY WITH MORCELLATOR, COMBINED N/A 2/15/2017    Procedure: COMBINED DILATION AND CURETTAGE, OPERATIVE HYSTEROSCOPY WITH MORCELLATOR;  Surgeon: Erin Gutierrez MD;  Location: [facility] OR     ESOPHAGOSCOPY, GASTROSCOPY, DUODENOSCOPY (EGD), COMBINED Left 2/5/2016    Procedure: COMBINED ESOPHAGOSCOPY, GASTROSCOPY, DUODENOSCOPY (EGD), BIOPSY SINGLE OR MULTIPLE;  Surgeon: Pierre Fernandez MD;  Location:  GI     GYN SURGERY         Social History     Tobacco Use     Smoking status: Former Smoker     Smokeless tobacco: Never Used     Tobacco comment: quit 27 years ago   Substance Use Topics     Alcohol use: No     Family History   Problem Relation Age of Onset     Lupus Mother      Asthma Mother      Diabetes Father      Brain Tumor Father         begnign on pituitary     Depression Sister      Anemia Sister            Reviewed and updated as needed this visit by Provider         Review of Systems   Constitutional, HEENT, cardiovascular, pulmonary, gi and gu systems are negative, except as otherwise noted.       Objective   Reported vitals:  LMP 08/12/2013    healthy,  alert and no distress  PSYCH: Alert and oriented times 3; coherent speech, normal   rate and volume, able to articulate logical thoughts, able   to abstract reason, no tangential thoughts, no hallucinations   or delusions  Her affect is normal  RESP: No cough, no audible wheezing, able to talk in full sentences  Remainder of exam unable to be completed due to telephone visits    Diagnostic Test Results:  Labs reviewed in Epic        Assessment/Plan:  1. Left foot pain    - Orthopedic & Spine  Referral; Future  - PHYSICAL THERAPY REFERRAL; Future    2. Chronic bilateral low back pain without sciatica    - PHYSICAL THERAPY REFERRAL; Future    3. Bilateral hip pain    - PHYSICAL THERAPY REFERRAL; Future    No follow-ups on file.      Phone call duration:  15 minutes    THOMAS Preston CNP

## 2020-06-27 DIAGNOSIS — Z11.59 ENCOUNTER FOR SCREENING FOR OTHER VIRAL DISEASES: ICD-10-CM

## 2020-06-27 PROCEDURE — U0003 INFECTIOUS AGENT DETECTION BY NUCLEIC ACID (DNA OR RNA); SEVERE ACUTE RESPIRATORY SYNDROME CORONAVIRUS 2 (SARS-COV-2) (CORONAVIRUS DISEASE [COVID-19]), AMPLIFIED PROBE TECHNIQUE, MAKING USE OF HIGH THROUGHPUT TECHNOLOGIES AS DESCRIBED BY CMS-2020-01-R: HCPCS | Performed by: INTERNAL MEDICINE

## 2020-06-28 ENCOUNTER — TELEPHONE (OUTPATIENT)
Dept: URGENT CARE | Facility: URGENT CARE | Age: 60
End: 2020-06-28

## 2020-06-28 ENCOUNTER — NURSE TRIAGE (OUTPATIENT)
Dept: NURSING | Facility: CLINIC | Age: 60
End: 2020-06-28

## 2020-06-28 LAB
SARS-COV-2 RNA SPEC QL NAA+PROBE: NOT DETECTED
SPECIMEN SOURCE: NORMAL

## 2020-06-28 NOTE — TELEPHONE ENCOUNTER
She is calling today to see if her results of her covid tests are back, due to her needing to start her prep for her colonoscopy at 3 pm today. Transferred her to the Covid results line.    Noy Baker RN/ [facility]    Additional Information    Health Information question, no triage required and triager able to answer question    Protocols used: INFORMATION ONLY CALL-A-AH

## 2020-06-28 NOTE — TELEPHONE ENCOUNTER
Coronavirus (COVID-19) Notification    Lab Result   Lab test 2019-nCoV rRt-PCR OR SARS-COV-2 PCR    Nasopharyngeal AND/OR Oropharyngeal swab is NEGATIVE for 2019-nCoV RNA [OR] SARS-COV-2 RNA (COVID-19) RNA    Your result was negative. This means that we didn't find the virus that causes COVID-19 in your sample. A test may show negative when you do actually have the virus. This can happen when the virus is in the early stages of infection, before you feel illness symptoms.    If you have symptoms   Stay home and away from others (self-isolate) until you meet ALL of the guidelines below:    You've had no fever--and no medicine that reduces fever--for 3 full days (72 hours). And      Your other symptoms have gotten better. For example, your cough or breathing has improved. And     At least 10 days have passed since your symptoms started.    During this time:    Stay home. Don't go to work, school or anywhere else.     Stay in your own room, including for meals. Use your own bathroom if you can.    Stay away from others in your home. No hugging, kissing or shaking hands. No visitors.    Clean  high touch  surfaces often (doorknobs, counters, handles, etc.). Use a household cleaning spray or wipes. You can find a full list on the EPA website at www.epa.gov/pesticide-registration/list-n-disinfectants-use-against-sars-cov-2.    Cover your mouth and nose with a mask, tissue or washcloth to avoid spreading germs.    Wash your hands and face often with soap and water.    Going back to work  Check with your employer for any guidelines to follow for going back to work.  You are sent a letter for your Employer which will serve as formal document notice that you, the employee, tested negative for COVID-19, as of the testing date shown above.    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    Where can I get more information?    [facility]:  [email or web address]    Coronavirus Basics: [email or web address]/coronavirus/basics.html    [facility] Hotline ([phone])    Blanca Reynoso MSN, RN

## 2020-06-30 ENCOUNTER — HOSPITAL ENCOUNTER (OUTPATIENT)
Facility: AMBULATORY SURGERY CENTER | Age: 60
End: 2020-06-30
Attending: INTERNAL MEDICINE
Payer: COMMERCIAL

## 2020-06-30 VITALS
HEART RATE: 70 BPM | TEMPERATURE: 97.7 F | OXYGEN SATURATION: 100 % | RESPIRATION RATE: 16 BRPM | DIASTOLIC BLOOD PRESSURE: 74 MMHG | WEIGHT: 174.8 LBS | SYSTOLIC BLOOD PRESSURE: 112 MMHG | BODY MASS INDEX: 29.12 KG/M2 | HEIGHT: 65 IN

## 2020-06-30 LAB — COLONOSCOPY: NORMAL

## 2020-06-30 RX ORDER — ONDANSETRON 2 MG/ML
4 INJECTION INTRAMUSCULAR; INTRAVENOUS EVERY 6 HOURS PRN
Status: CANCELLED | OUTPATIENT
Start: 2020-06-30

## 2020-06-30 RX ORDER — ONDANSETRON 2 MG/ML
4 INJECTION INTRAMUSCULAR; INTRAVENOUS
Status: DISCONTINUED | OUTPATIENT
Start: 2020-06-30 | End: 2020-07-01 | Stop reason: HOSPADM

## 2020-06-30 RX ORDER — SIMETHICONE
LIQUID (ML) MISCELLANEOUS PRN
Status: DISCONTINUED | OUTPATIENT
Start: 2020-06-30 | End: 2020-06-30 | Stop reason: HOSPADM

## 2020-06-30 RX ORDER — FENTANYL CITRATE 50 UG/ML
INJECTION, SOLUTION INTRAMUSCULAR; INTRAVENOUS PRN
Status: DISCONTINUED | OUTPATIENT
Start: 2020-06-30 | End: 2020-06-30 | Stop reason: HOSPADM

## 2020-06-30 RX ORDER — LIDOCAINE 40 MG/G
CREAM TOPICAL
Status: DISCONTINUED | OUTPATIENT
Start: 2020-06-30 | End: 2020-07-01 | Stop reason: HOSPADM

## 2020-06-30 RX ORDER — FLUMAZENIL 0.1 MG/ML
0.2 INJECTION, SOLUTION INTRAVENOUS
Status: CANCELLED | OUTPATIENT
Start: 2020-06-30 | End: 2020-06-30

## 2020-06-30 RX ORDER — ONDANSETRON 4 MG/1
4 TABLET, ORALLY DISINTEGRATING ORAL EVERY 6 HOURS PRN
Status: CANCELLED | OUTPATIENT
Start: 2020-06-30

## 2020-06-30 RX ORDER — NALOXONE HYDROCHLORIDE 0.4 MG/ML
.1-.4 INJECTION, SOLUTION INTRAMUSCULAR; INTRAVENOUS; SUBCUTANEOUS
Status: CANCELLED | OUTPATIENT
Start: 2020-06-30 | End: 2020-07-01

## 2020-06-30 ASSESSMENT — MIFFLIN-ST. JEOR: SCORE: 1358.77

## 2020-07-02 LAB — COPATH REPORT: NORMAL

## 2020-07-06 ENCOUNTER — TELEPHONE (OUTPATIENT)
Dept: FAMILY MEDICINE | Facility: CLINIC | Age: 60
End: 2020-07-06

## 2020-07-06 NOTE — TELEPHONE ENCOUNTER
Reason for call:  Form   Our goal is to have forms completed within 72 hours, however some forms may require a visit or additional information.     Who is the form from? Patient  Where did the form come from? Patient or family brought in     What clinic location was the form placed at? [facility]  Where was the form placed? [place] Box/Folder  What number is listed as a contact on the form? [phone]    Phone call message - patient request for a letter, form or note:     Date needed: as soon as possible  Please fax to [phone]  Has the patient signed a consent form for release of information? Not Applicable    Additional comments: n/a    Type of letter, form or note: return to work    Phone number to reach patient:  Home number on file [phone] (home)    Best Time:  n/a    Can we leave a detailed message on this number?  YES    Travel screening: Not Applicable

## 2020-07-06 NOTE — TELEPHONE ENCOUNTER
Pt was called back to work for [employer] after a furlough, but has complicated medical history so not sure what to do. 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     Hx: Stroke, Lupus, Obesity, and anxiety/depreesion. March 25th-see media records from [facility].     Covid was neg prior to her surgery on 6/27/20.     Her [employer] worker came yesterday and helped with her mail and review these forms, she had asked for an extension since they were due.   Letter completed bu Dr. Orosco prior, 5/20/20.     She has had some problems-pain with her feet and leg, but it is uncertain if it is Lupus causing this pain.     She will drop forms off at clinic today and has appt to discuss with Dr. Orosco on Wednesday.     Patient verbalized understanding and agree's with plan.       Dora Mckeon RN   [facility]

## 2020-07-06 NOTE — TELEPHONE ENCOUNTER
Reason for call:  Other   Patient called regarding (reason for call): call back  Additional comments:     Pt called and has 2 forms that need to be completed. One of them is a Fitness to Return to Work Certification and the patient is wondering if she needs to be seen by a provider in order to get that signed off on. Pt said she is going to bring both forms into clinic and drop them off later today for either Dr. Orosco or Rosa Weinstein to sign and will wait for a call regarding if she also needs to schedule an appt to complete the Fit Certification.     Phone number to reach patient:  Cell number on file:    Telephone Information:   Mobile [phone]       Best Time:  any    Can we leave a detailed message on this number?  YES    Travel screening: Not Applicable

## 2020-07-08 ENCOUNTER — VIRTUAL VISIT (OUTPATIENT)
Dept: FAMILY MEDICINE | Facility: CLINIC | Age: 60
End: 2020-07-08
Payer: COMMERCIAL

## 2020-07-08 DIAGNOSIS — M32.9 SYSTEMIC LUPUS ERYTHEMATOSUS, UNSPECIFIED SLE TYPE, UNSPECIFIED ORGAN INVOLVEMENT STATUS (H): Primary | ICD-10-CM

## 2020-07-08 DIAGNOSIS — I63.9 CEREBROVASCULAR ACCIDENT (CVA), UNSPECIFIED MECHANISM (H): ICD-10-CM

## 2020-07-08 PROCEDURE — 99213 OFFICE O/P EST LOW 20 MIN: CPT | Mod: 95 | Performed by: FAMILY MEDICINE

## 2020-07-08 NOTE — PROGRESS NOTES
"Lesley Stafford is a 59 year old female who is being evaluated via a billable telephone visit.      The patient has been notified of following:     \"This telephone visit will be conducted via a call between you and your physician/provider. We have found that certain health care needs can be provided without the need for a physical exam.  This service lets us provide the care you need with a short phone conversation.  If a prescription is necessary we can send it directly to your pharmacy.  If lab work is needed we can place an order for that and you can then stop by our lab to have the test done at a later time.    Telephone visits are billed at different rates depending on your insurance coverage. During this emergency period, for some insurers they may be billed the same as an in-person visit.  Please reach out to your insurance provider with any questions.    If during the course of the call the physician/provider feels a telephone visit is not appropriate, you will not be charged for this service.\"    Patient has given verbal consent for Telephone visit?  Yes    What phone number would you like to be contacted at? [phone]    How would you like to obtain your AVS? Mail a copy    Subjective     Lesley Stafford is a 59 year old female who presents via phone visit today for the following health issues:    HPI  Forms       Description (location/character/radiation): pt is concerned about going back to work due to her medical history and got called back. She dropped forms off at the clinic       Concern - lupus on plaquenil, history of stroke  Onset: years     Description:   Sees rheumatology for arthritis    Intensity: moderate     Progression of Symptoms:  waxing and waning    Accompanying Signs & Symptoms:  Joint pain    Previous history of similar problem:   yes    Precipitating factors:   Worsened by: stress    Alleviating factors:  Improved by: rest    Therapies Tried and outcome: plaquenil     I " have reviewed and updated the patient's Past Medical History, Social History, Family History and Medication List    Reviewed and updated as needed this visit by Provider         Review of Systems   Constitutional, HEENT, cardiovascular, pulmonary, gi and gu systems are negative, except as otherwise noted.       Objective   Reported vitals:  LMP 08/12/2013    no distress, over weight and fatigued  PSYCH: Alert and oriented times 3; coherent speech, normal   rate and volume, able to articulate logical thoughts, able   to abstract reason, no tangential thoughts, no hallucinations   or delusions  Her affect is pleasant  RESP: No cough, no audible wheezing, able to talk in full sentences  Remainder of exam unable to be completed due to telephone visits    Diagnostic Test Results:  Labs reviewed in Epic        Assessment/Plan:  1. Systemic lupus erythematosus, unspecified SLE type, unspecified organ involvement status (H)  Stable on medication. In high risk group for COVID infection    2. Cerebrovascular accident (CVA), unspecified mechanism (H)  No further symptoms     Patient Instructions   Remain off work due to high risk COVID group x 3 months. Form signed and faxed.     Phone call duration:  14 minutes    Rafael Orosco MD

## 2020-07-08 NOTE — LETTER
[facility]  [address]  [address]  [place] [address]  [phone]          July 8th, 2020    Lesley Stafford                                                                                                                     [address] [facility] [address]            Re: Lesely Hunt    To whom it may concern:    Remain off work due to high risk COVID group x 3 months.        Sincerely,         Rafael Orosco MD

## 2020-07-09 ENCOUNTER — THERAPY VISIT (OUTPATIENT)
Dept: PHYSICAL THERAPY | Facility: CLINIC | Age: 60
End: 2020-07-09
Attending: NURSE PRACTITIONER
Payer: COMMERCIAL

## 2020-07-09 DIAGNOSIS — M79.672 LEFT FOOT PAIN: ICD-10-CM

## 2020-07-09 DIAGNOSIS — G89.29 CHRONIC BILATERAL LOW BACK PAIN WITHOUT SCIATICA: ICD-10-CM

## 2020-07-09 DIAGNOSIS — G89.29 CHRONIC BILATERAL LOW BACK PAIN WITH SCIATICA, SCIATICA LATERALITY UNSPECIFIED: ICD-10-CM

## 2020-07-09 DIAGNOSIS — M25.552 BILATERAL HIP PAIN: ICD-10-CM

## 2020-07-09 DIAGNOSIS — M54.40 CHRONIC BILATERAL LOW BACK PAIN WITH SCIATICA, SCIATICA LATERALITY UNSPECIFIED: ICD-10-CM

## 2020-07-09 DIAGNOSIS — M54.50 CHRONIC BILATERAL LOW BACK PAIN WITHOUT SCIATICA: ICD-10-CM

## 2020-07-09 DIAGNOSIS — M25.551 BILATERAL HIP PAIN: ICD-10-CM

## 2020-07-09 PROBLEM — Z51.81 ENCOUNTER FOR MEDICATION MONITORING: Status: ACTIVE | Noted: 2020-07-09

## 2020-07-09 PROBLEM — M54.42 CHRONIC BILATERAL LOW BACK PAIN WITH SCIATICA: Status: ACTIVE | Noted: 2020-07-09

## 2020-07-09 PROCEDURE — 97110 THERAPEUTIC EXERCISES: CPT | Mod: GP | Performed by: PHYSICAL THERAPIST

## 2020-07-09 PROCEDURE — 97530 THERAPEUTIC ACTIVITIES: CPT | Mod: GP | Performed by: PHYSICAL THERAPIST

## 2020-07-09 PROCEDURE — 97162 PT EVAL MOD COMPLEX 30 MIN: CPT | Mod: GP | Performed by: PHYSICAL THERAPIST

## 2020-07-09 ASSESSMENT — ACTIVITIES OF DAILY LIVING (ADL)
GOING_UP_1_FLIGHT_OF_STAIRS: MODERATE DIFFICULTY
LIGHT_TO_MODERATE_WORK: MODERATE DIFFICULTY
DEEP_SQUATTING: EXTREME DIFFICULTY
HOS_ADL_HIGHEST_POTENTIAL_SCORE: 64
SITTING_FOR_15_MINUTES: SLIGHT DIFFICULTY
ROLLING_OVER_IN_BED: EXTREME DIFFICULTY
WALKING_UP_STEEP_HILLS: EXTREME DIFFICULTY
HOS_ADL_SCORE(%): 45.31
HOS_ADL_COUNT: 16
HOS_ADL_ITEM_SCORE_TOTAL: 29
TWISTING/PIVOTING_ON_INVOLVED_LEG: MODERATE DIFFICULTY
WALKING_INITIALLY: MODERATE DIFFICULTY
STANDING_FOR_15_MINUTES: MODERATE DIFFICULTY
WALKING_DOWN_STEEP_HILLS: MODERATE DIFFICULTY
PUTTING_ON_SOCKS_AND_SHOES: MODERATE DIFFICULTY
RECREATIONAL_ACTIVITIES: MODERATE DIFFICULTY
STEPPING_UP_AND_DOWN_CURBS: SLIGHT DIFFICULTY
WALKING_APPROXIMATELY_10_MINUTES: SLIGHT DIFFICULTY
HOW_WOULD_YOU_RATE_YOUR_CURRENT_LEVEL_OF_FUNCTION_DURING_YOUR_USUAL_ACTIVITIES_OF_DAILY_LIVING_FROM_0_TO_100_WITH_100_BEING_YOUR_LEVEL_OF_FUNCTION_PRIOR_TO_YOUR_HIP_PROBLEM_AND_0_BEING_THE_INABILITY_TO_PERFORM_ANY_OF_YOUR_USUAL_DAILY_ACTIVITIES?: 50
GOING_DOWN_1_FLIGHT_OF_STAIRS: SLIGHT DIFFICULTY
WALKING_15_MINUTES_OR_GREATER: EXTREME DIFFICULTY
HEAVY_WORK: EXTREME DIFFICULTY
GETTING_INTO_AND_OUT_OF_AN_AVERAGE_CAR: EXTREME DIFFICULTY
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
## 2020-07-16 ENCOUNTER — TELEPHONE (OUTPATIENT)
Dept: FAMILY MEDICINE | Facility: CLINIC | Age: 60
End: 2020-07-16

## 2020-07-16 NOTE — TELEPHONE ENCOUNTER
Spoke with patient and relayed message per provider per notes for results. Verbalized understanding    Sanjana Fraire RN   [facility]

## 2020-07-16 NOTE — TELEPHONE ENCOUNTER
Reason for call:  Results   Name of test or procedure: colonoscopy  Date of test or procedure: 06/30/2020  [facility]   [address] [place], [place] [address]., [place] [address]    Additional comments: Patient has been trying to view the results on [email or web address] and was not able to. Would like a call back with results.    Phone number to reach patient:  Home number on file [phone] (home)    Best Time:  n/a    Can we leave a detailed message on this number?  Not Applicable    Travel screening: Not Applicable

## 2020-07-21 ENCOUNTER — OFFICE VISIT (OUTPATIENT)
Dept: PODIATRY | Facility: CLINIC | Age: 60
End: 2020-07-21
Payer: COMMERCIAL

## 2020-07-21 ENCOUNTER — ANCILLARY PROCEDURE (OUTPATIENT)
Dept: GENERAL RADIOLOGY | Facility: CLINIC | Age: 60
End: 2020-07-21
Attending: PODIATRIST
Payer: COMMERCIAL

## 2020-07-21 DIAGNOSIS — M79.672 FOOT PAIN, LEFT: ICD-10-CM

## 2020-07-21 DIAGNOSIS — M72.2 PLANTAR FASCIITIS, LEFT: Primary | ICD-10-CM

## 2020-07-21 PROCEDURE — 73630 X-RAY EXAM OF FOOT: CPT | Mod: LT | Performed by: RADIOLOGY

## 2020-07-21 PROCEDURE — 99202 OFFICE O/P NEW SF 15 MIN: CPT | Performed by: PODIATRIST

## 2020-07-21 NOTE — LETTER
7/21/2020         RE: Lesley Stafford  [address]  [place] [address]        Dear Colleague,    Thank you for referring your patient, Lesley Stafford, to the [facility]. Please see a copy of my visit note below.    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     Patient Active Problem List   Diagnosis     Systemic lupus erythematosus, unspecified SLE type, unspecified organ involvement status (H)     Abnormal perimenopausal bleeding     Obesity, Class I, BMI 30-34.9     History of claustrophobia     Anxiety     Allergic state     Tired     Insomnia, unspecified type     Hemoglobin C trait (H)     Liver hemangioma     Moderate major depression (H)     Stroke (H)     History of stroke     Financial difficulties     Encounter for medication monitoring     Chronic bilateral low back pain with sciatica     Bilateral hip pain     Left foot pain     Past Surgical History:   Procedure Laterality Date     COLONOSCOPY  01/2010    repeat 10 yrs     COLONOSCOPY N/A 6/30/2020    Procedure: COLONOSCOPY, WITH POLYPECTOMY;  Surgeon: Rebecca Rojas MD;  [facility]     DILATION AND CURETTAGE, OPERATIVE HYSTEROSCOPY WITH MORCELLATOR, COMBINED N/A 2/15/2017    Procedure: COMBINED DILATION AND CURETTAGE, OPERATIVE HYSTEROSCOPY WITH MORCELLATOR;  Surgeon: Erin Gutierrez MD;  Location: [facility]     ESOPHAGOSCOPY, GASTROSCOPY, DUODENOSCOPY (EGD), COMBINED Left 2/5/2016    Procedure: COMBINED ESOPHAGOSCOPY, GASTROSCOPY, DUODENOSCOPY (EGD), BIOPSY SINGLE OR MULTIPLE;  Surgeon: Pierre Fernandez MD;  Location:  GI     GYN SURGERY       Social History     Socioeconomic History     Marital status:      Spouse name: Not on file     Number of children: Not on file     Years of education: Not on  file     Highest education level: Not on file   Occupational History     Not on file   Social Needs     Financial resource strain: Not on file     Food insecurity     Worry: Not on file     Inability: Not on file     Transportation needs     Medical: Not on file     Non-medical: Not on file   Tobacco Use     Smoking status: Former Smoker     Smokeless tobacco: Never Used     Tobacco comment: quit 27 years ago   Substance and Sexual Activity     Alcohol use: No     Drug use: No     Sexual activity: Yes     Partners: Male     Birth control/protection: None   Lifestyle     Physical activity     Days per week: Not on file     Minutes per session: Not on file     Stress: Not on file   Relationships     Social connections     Talks on phone: Not on file     Gets together: Not on file     Attends Latter-day service: Not on file     Active member of club or organization: Not on file     Attends meetings of clubs or organizations: Not on file     Relationship status: Not on file     Intimate partner violence     Fear of current or ex partner: Not on file     Emotionally abused: Not on file     Physically abused: Not on file     Forced sexual activity: Not on file   Other Topics Concern     Parent/sibling w/ CABG, MI or angioplasty before 65F 55M? No   Social History Narrative     Not on file     Family History   Problem Relation Age of Onset     Lupus Mother      Asthma Mother      Diabetes Father      Brain Tumor Father         begnign on pituitary     Depression Sister      Anemia Sister      SUBJECTIVE FINDINGS:  A 59-year-old female presents for THOMAS Ho, for left heel pain.  Patient relates that since January, it has bothered her.  She relates no injuries.  She relates she did have a stroke prior and she is wondering if that maybe contributed.  She relates she has seen her acupuncturist.  She has seen her chiropractor.  She relates she does have a limb length discrepancy, left is shorter than the right.  She  relates it hurts; if she sits sometimes, it just hurts, or if she gets up and walks.  She relates to no specific treatment, other than she did get some over-the-counter insoles that helped a little bit.  She relates she has seen physical therapy for her hip and her back.  No other specific relieving or aggravating factors.  She relates she does occasionally get pain in the right heel as well.        OBJECTIVE FINDINGS:  DP and PT are 2/4 bilaterally.  She has mild decreased ankle joint dorsiflexion bilaterally.  Her left leg is shorter than her right, lined up at the malleoli.  She relates the pain is in the plantar left heel when it occurs.  There is no pain on palpation today.  There is no erythema, no drainage, no odor, no calor bilaterally.  She has pain on palpation of the dorsal left third and fourth metatarsals.  She does have some pain along the central band of the plantar fascia with dorsiflexion of the foot and ankle at end range of motion.  There are no gross tendon voids bilaterally.  She has mild contracted digits bilaterally.      ASSESSMENT AND PLAN:  Plantar fasciitis, left.  Left foot pain.  Diagnosis and treatment options discussed with the patient.  She is advised on stretching.  Removal of taping and strapping, left foot, done upon consent.  Patient instructed on its use.  X-rays ordered and use discussed with her.  She will return to clinic and see me in 1 week.   Previous notes reviewed.       X-rays reviewed.  Joint cortical margins are intact.  She has plantar and posterior calcaneal spurring, decreased calcaneal inclination, anterior break in the cyma line.  She has some subchondral sclerosis and joint space narrowing in the talonavicular joint and the intertarsal joints.  She has a small ossicle or bony fragment on the medial first MPJ noted.         Again, thank you for allowing me to participate in the care of your patient.        Sincerely,        Alonzo Kerr DPM

## 2020-07-21 NOTE — NURSING NOTE
Lesley Stafford's chief complaint for this visit includes:  Chief Complaint   Patient presents with     Consult For     left foot pain     PCP: Rafael Orosco    Referring Provider:  THOMAS Preston CNP  [address] [place] [address] [address]  [place] [address]    [facility] 08/12/2013   Data Unavailable     Do you need any medication refills at today's visit? no

## 2020-07-21 NOTE — PROGRESS NOTES
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     Patient Active Problem List   Diagnosis     Systemic lupus erythematosus, unspecified SLE type, unspecified organ involvement status (H)     Abnormal perimenopausal bleeding     Obesity, Class I, BMI 30-34.9     History of claustrophobia     Anxiety     Allergic state     Tired     Insomnia, unspecified type     Hemoglobin C trait (H)     Liver hemangioma     Moderate major depression (H)     Stroke (H)     History of stroke     Financial difficulties     Encounter for medication monitoring     Chronic bilateral low back pain with sciatica     Bilateral hip pain     Left foot pain     Past Surgical History:   Procedure Laterality Date     COLONOSCOPY  01/2010    repeat 10 yrs     COLONOSCOPY N/A 6/30/2020    Procedure: COLONOSCOPY, WITH POLYPECTOMY;  Surgeon: Rebecca Rojas MD;  [facility]     DILATION AND CURETTAGE, OPERATIVE HYSTEROSCOPY WITH MORCELLATOR, COMBINED N/A 2/15/2017    Procedure: COMBINED DILATION AND CURETTAGE, OPERATIVE HYSTEROSCOPY WITH MORCELLATOR;  Surgeon: Erin Gutierrez MD;  [facility]     ESOPHAGOSCOPY, GASTROSCOPY, DUODENOSCOPY (EGD), COMBINED Left 2/5/2016    Procedure: COMBINED ESOPHAGOSCOPY, GASTROSCOPY, DUODENOSCOPY (EGD), BIOPSY SINGLE OR MULTIPLE;  Surgeon: Pierre Fernandez MD;  [facility]     GYN SURGERY       Social History     Socioeconomic History     Marital status:      Spouse name: Not on file     Number of children: Not on file     Years of education: Not on file     Highest education level: Not on file   Occupational History     Not on file   Social Needs     Financial resource strain: Not on file     Food insecurity     Worry: Not on file     Inability: Not on file     Transportation needs     Medical: Not on file      Non-medical: Not on file   Tobacco Use     Smoking status: Former Smoker     Smokeless tobacco: Never Used     Tobacco comment: quit 27 years ago   Substance and Sexual Activity     Alcohol use: No     Drug use: No     Sexual activity: Yes     Partners: Male     Birth control/protection: None   Lifestyle     Physical activity     Days per week: Not on file     Minutes per session: Not on file     Stress: Not on file   Relationships     Social connections     Talks on phone: Not on file     Gets together: Not on file     Attends Yazidi service: Not on file     Active member of club or organization: Not on file     Attends meetings of clubs or organizations: Not on file     Relationship status: Not on file     Intimate partner violence     Fear of current or ex partner: Not on file     Emotionally abused: Not on file     Physically abused: Not on file     Forced sexual activity: Not on file   Other Topics Concern     Parent/sibling w/ CABG, MI or angioplasty before 65F 55M? No   Social History Narrative     Not on file     Family History   Problem Relation Age of Onset     Lupus Mother      Asthma Mother      Diabetes Father      Brain Tumor Father         begnign on pituitary     Depression Sister      Anemia Sister      SUBJECTIVE FINDINGS:  A 59-year-old female presents for THOMAS Ho, for left heel pain.  Patient relates that since January, it has bothered her.  She relates no injuries.  She relates she did have a stroke prior and she is wondering if that maybe contributed.  She relates she has seen her acupuncturist.  She has seen her chiropractor.  She relates she does have a limb length discrepancy, left is shorter than the right.  She relates it hurts; if she sits sometimes, it just hurts, or if she gets up and walks.  She relates to no specific treatment, other than she did get some over-the-counter insoles that helped a little bit.  She relates she has seen physical therapy for her hip and her  back.  No other specific relieving or aggravating factors.  She relates she does occasionally get pain in the right heel as well.        OBJECTIVE FINDINGS:  DP and PT are 2/4 bilaterally.  She has mild decreased ankle joint dorsiflexion bilaterally.  Her left leg is shorter than her right, lined up at the malleoli.  She relates the pain is in the plantar left heel when it occurs.  There is no pain on palpation today.  There is no erythema, no drainage, no odor, no calor bilaterally.  She has pain on palpation of the dorsal left third and fourth metatarsals.  She does have some pain along the central band of the plantar fascia with dorsiflexion of the foot and ankle at end range of motion.  There are no gross tendon voids bilaterally.  She has mild contracted digits bilaterally.      ASSESSMENT AND PLAN:  Plantar fasciitis, left.  Left foot pain.  Diagnosis and treatment options discussed with the patient.  She is advised on stretching.  Removal of taping and strapping, left foot, done upon consent.  Patient instructed on its use.  X-rays ordered and use discussed with her.  She will return to clinic and see me in 1 week.   Previous notes reviewed.       X-rays reviewed.  Joint cortical margins are intact.  She has plantar and posterior calcaneal spurring, decreased calcaneal inclination, anterior break in the cyma line.  She has some subchondral sclerosis and joint space narrowing in the talonavicular joint and the intertarsal joints.  She has a small ossicle or bony fragment on the medial first MPJ noted.

## 2020-07-21 NOTE — PATIENT INSTRUCTIONS
Thanks for coming today.  [facility]  [address] [place] [address]    To schedule future appointments in Ortho Clinic, you may call [phone].    To schedule ordered imaging by your provider:   Call Central Imaging [phone]    To schedule an injection ordered by your provider:  Call Central Imaging Injection scheduling line: [phone]  [employer] available online at:  [email or web address]    Please call if any further questions or concerns ([phone]).  Clinic hours 8 am to 5 pm.    Return to clinic (call) if symptoms worsen or fail to improve.

## 2020-07-23 DIAGNOSIS — G89.29 CHRONIC BILATERAL LOW BACK PAIN WITHOUT SCIATICA: Primary | ICD-10-CM

## 2020-07-23 DIAGNOSIS — M54.50 CHRONIC BILATERAL LOW BACK PAIN WITHOUT SCIATICA: Primary | ICD-10-CM

## 2020-07-27 NOTE — TELEPHONE ENCOUNTER
Requested Prescriptions   Pending Prescriptions Disp Refills     tiZANidine (ZANAFLEX) 4 MG tablet  3       There is no refill protocol information for this order          Routing refill request to provider for review/approval because:  Medication is reported/historical

## 2020-08-03 ENCOUNTER — PATIENT OUTREACH (OUTPATIENT)
Dept: CARE COORDINATION | Facility: CLINIC | Age: 60
End: 2020-08-03

## 2020-08-03 NOTE — PROGRESS NOTES
Clinic Care Coordination Contact  Community Health Worker Follow Up    Goals:   Goals Addressed as of 8/3/2020 at 11:00 AM        Patient Stated      Other (pt-stated)     Added 8/3/20 by Mala Espitia     Goal Statement: I want to apply for [facility] for my medical bills in the next two months.  Date Goal set: 8/3/2020  Barriers: overwhelmed by process, lots of bills from different places/providers  Strengths: [employer] worker and CC support, motivated to take care of this  Date to Achieve By: 10/3/2020  Patient expressed understanding of goal: yes    Action steps to achieve this goal:  1. I will review and complete the [employer] application the CHW emailed me.  2. I will work with the CHW or my [email or web address] worker to return the application to [facility]'s Patient Financial Services office.  3. I will let the CHW know if I need more help with this process.  4. I will give the CHW updates at outreach calls.            Intervention and Education during outreach:     Spoke with pt today. She thanked me for the call and said that a lot has been going on since we were last talking. Pt started OT and PT. Is working with Dr. Aguilar, her therapist, which is very helpful. Pt is working with an [employer] worker again, and they are meeting in the [facility] parking lot near pt's house tomorrow. Pt said she's been having a lot of anxiety around going into stores, and they are going to work on coping strategies. Pt is back in touch with her psychiatrist (last visit was October 2019), has been seeing a podiatrist and had a neuropsychological assessment last week. Pt said the test showed some executive functioning deficits and that it's been helpful to figure out what kind of support she needs from professionals/providers in her life. Pt also shared that the 29th anniversary of her sobriety is coming up soon.    Pt said she has [email or web address] and is thankful that everything finally got figured out. Pt said her premium is $16/mo,  which is doable for her, and that she's paid through November.     When pt mentioned medical bills, I offered our support to help her apply for [employer]. Pt thanked me and said she finally feels like she's at a point where she can deal with and move forward in addressing outstanding bills. Pt said her [employer] worker helped her organize all of the bills into folders, label who they are for and how much pt owes. Pt said it is overwhelming and that there are some collection agencies involved. I clarified with pt that [employer] application is for [facility] providers, clinics, hospitals only. I asked how pt would like the information, and she asked me to email it to her ([email or web address]). I asked pt to let me know how I can support or if I can coordinate with [employer] worker. Pt thanked me and said she will review it and let me know what she needs.     Pt took down my information again. I offered to follow up in 3 weeks, and pt agreed.     CHW Plan: CHW will email pt [employer] application and information for Patient Financial Services' office. Pt will review CHW's email and complete application. Pt will let CHW know if she needs more help with the process. CHW will follow up in 3 weeks.    Routing to SANTY HORTON.

## 2020-08-09 DIAGNOSIS — F41.9 ANXIETY: ICD-10-CM

## 2020-08-10 RX ORDER — HYDROXYZINE HYDROCHLORIDE 25 MG/1
TABLET, FILM COATED ORAL
Qty: 90 TABLET | Refills: 0 | Status: SHIPPED | OUTPATIENT
Start: 2020-08-10 | End: 2020-09-14

## 2020-08-11 NOTE — TELEPHONE ENCOUNTER
"Requested Prescriptions   Pending Prescriptions Disp Refills     hydrOXYzine (ATARAX) 25 MG tablet [Pharmacy Med Name: HYDROXYZINE HCL 25 MG TABLET] 90 tablet 0     Sig: TAKE 1 TABLET BY MOUTH THREE TIMES A DAY       Antihistamines Protocol Passed - 8/9/2020 10:10 AM        Passed - Recent (12 mo) or future (30 days) visit within the authorizing provider's specialty     Patient has had an office visit with the authorizing provider or a provider within the authorizing providers department within the previous 12 mos or has a future within next 30 days. See \"Patient Info\" tab in inbasket, or \"Choose Columns\" in Meds & Orders section of the refill encounter.              Passed - Patient is age 3 or older     Apply age and/or weight-based dosing for peds patients age 3 and older.    Forward request to provider for patients under the age of 3.          Passed - Medication is active on med list           Signed Prescriptions:                        Disp   Refills    hydrOXYzine (ATARAX) 25 MG tablet          90 tab*0        Sig: TAKE 1 TABLET BY MOUTH THREE TIMES A DAY  Authorizing Provider: ODALIS CORONEL  Ordering User: BETI PRUITT      "

## 2020-08-24 ENCOUNTER — PATIENT OUTREACH (OUTPATIENT)
Dept: CARE COORDINATION | Facility: CLINIC | Age: 60
End: 2020-08-24

## 2020-08-24 NOTE — PROGRESS NOTES
Clinic Care Coordination Contact    Follow Up Progress Note      Assessment: Hugh talked with pt over the phone. Pt stated that things are going alright for her. She continues to work with her [employer] worker, and receive extra help for her mental health. Pt stated she has been taking things day by day. PT shared that her and his sister's are helping take care of their father. Pt shared this has been an extra stressor, but also that they will do anything for family.     Goals addressed this encounter:   Goals Addressed                 This Visit's Progress       Patient Stated      Other (pt-stated)   On track     Goal Statement: I want to apply for [employer] for my medical bills in the next two months.  Date Goal set: 8/3/2020  Barriers: overwhelmed by process, lots of bills from different places/providers  Strengths: [employer] worker and CC support, motivated to take care of this  Date to Achieve By: 10/3/2020  Patient expressed understanding of goal: yes    Action steps to achieve this goal:  1. I will review and complete the [employer] application the CHW emailed me.  2. I will work with the CHW or my [employer] worker to return the application to [facility]'s Patient Financial Services office.  3. I will let the CHW know if I need more help with this process.  4. I will give the CHW updates at outreach calls.    (not discussed at this outreach)              Intervention/Education provided during outreach: Hugh asked if there was anything pt needed at this time, and she stated she could use help with some resources for her father. Hugh helped pt locate these using online resources and [employer].       Plan: 1) Pt will continue to work toward her goal of applying of for [employer].   2)  Pt will notify Hugh if she is in need of any other resources for her or her family.   3) CHW will follow up in 1 month.   4) SW will follow up in 45 days.     TONI Jalloh  Clinic Care Coordinator   [facility]   [facility]  [facility]  [phone]

## 2020-08-25 ENCOUNTER — OFFICE VISIT (OUTPATIENT)
Dept: PODIATRY | Facility: CLINIC | Age: 60
End: 2020-08-25
Payer: COMMERCIAL

## 2020-08-25 VITALS
BODY MASS INDEX: 29.05 KG/M2 | OXYGEN SATURATION: 96 % | SYSTOLIC BLOOD PRESSURE: 112 MMHG | DIASTOLIC BLOOD PRESSURE: 74 MMHG | WEIGHT: 174.6 LBS | HEART RATE: 81 BPM

## 2020-08-25 DIAGNOSIS — M72.2 PLANTAR FASCIITIS, LEFT: Primary | ICD-10-CM

## 2020-08-25 DIAGNOSIS — M79.672 FOOT PAIN, LEFT: ICD-10-CM

## 2020-08-25 PROCEDURE — 99213 OFFICE O/P EST LOW 20 MIN: CPT | Performed by: PODIATRIST

## 2020-08-25 NOTE — PROGRESS NOTES
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     Patient Active Problem List   Diagnosis     Systemic lupus erythematosus, unspecified SLE type, unspecified organ involvement status (H)     Abnormal perimenopausal bleeding     Obesity, Class I, BMI 30-34.9     History of claustrophobia     Anxiety     Allergic state     Tired     Insomnia, unspecified type     Hemoglobin C trait (H)     Liver hemangioma     Moderate major depression (H)     Stroke (H)     History of stroke     Financial difficulties     Encounter for medication monitoring     Chronic bilateral low back pain with sciatica     Bilateral hip pain     Left foot pain     Past Surgical History:   Procedure Laterality Date     COLONOSCOPY  01/2010    repeat 10 yrs     COLONOSCOPY N/A 6/30/2020    Procedure: COLONOSCOPY, WITH POLYPECTOMY;  Surgeon: Rebecca Rojas MD;  [facility]     DILATION AND CURETTAGE, OPERATIVE HYSTEROSCOPY WITH MORCELLATOR, COMBINED N/A 2/15/2017    Procedure: COMBINED DILATION AND CURETTAGE, OPERATIVE HYSTEROSCOPY WITH MORCELLATOR;  Surgeon: Erin Gutierrez MD;  Location: [facility] OR     ESOPHAGOSCOPY, GASTROSCOPY, DUODENOSCOPY (EGD), COMBINED Left 2/5/2016    Procedure: COMBINED ESOPHAGOSCOPY, GASTROSCOPY, DUODENOSCOPY (EGD), BIOPSY SINGLE OR MULTIPLE;  Surgeon: Pierre Fernandez MD;  [facility]     GYN SURGERY       Social History     Socioeconomic History     Marital status:      Spouse name: Not on file     Number of children: Not on file     Years of education: Not on file     Highest education level: Not on file   Occupational History     Not on file   Social Needs     Financial resource strain: Not on file     Food insecurity     Worry: Not on file     Inability: Not on file     Transportation needs     Medical: Not on file      Non-medical: Not on file   Tobacco Use     Smoking status: Former Smoker     Smokeless tobacco: Never Used     Tobacco comment: quit 27 years ago   Substance and Sexual Activity     Alcohol use: No     Drug use: No     Sexual activity: Yes     Partners: Male     Birth control/protection: None   Lifestyle     Physical activity     Days per week: Not on file     Minutes per session: Not on file     Stress: Not on file   Relationships     Social connections     Talks on phone: Not on file     Gets together: Not on file     Attends Orthodox service: Not on file     Active member of club or organization: Not on file     Attends meetings of clubs or organizations: Not on file     Relationship status: Not on file     Intimate partner violence     Fear of current or ex partner: Not on file     Emotionally abused: Not on file     Physically abused: Not on file     Forced sexual activity: Not on file   Other Topics Concern     Parent/sibling w/ CABG, MI or angioplasty before 65F 55M? No   Social History Narrative     Not on file     Family History   Problem Relation Age of Onset     Lupus Mother      Asthma Mother      Diabetes Father      Brain Tumor Father         begnign on pituitary     Depression Sister      Anemia Sister      SUBJECTIVE FINDINGS:  A 59-year-old female returns to clinic for plantar fasciitis, left, left foot pain.  She relates it was doing really well, and then she did not wear the pad for a few days and it started to get sore again, so she started wearing it again.  She relates no other specific relieving or aggravating factors.      OBJECTIVE FINDINGS:  Skin is dry and intact, left.      ASSESSMENT/PLAN:  Plantar fasciitis, left.  Left foot pain.  Diagnosis and treatment options discussed with the patient.  Patient is casted for custom foot orthotics.  She is given the phone number and address [facility] to pick those up.  She will return to clinic and see me in about 8 weeks.   Removal of taping and strapping, left foot, done upon consent, and patient instructed on its use.

## 2020-08-25 NOTE — LETTER
8/25/2020         RE: Lesley Stafford  [address]  [place] [address]        Dear Colleague,    Thank you for referring your patient, Lesley Stafford, to the [facility]. Please see a copy of my visit note below.    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     Patient Active Problem List   Diagnosis     Systemic lupus erythematosus, unspecified SLE type, unspecified organ involvement status (H)     Abnormal perimenopausal bleeding     Obesity, Class I, BMI 30-34.9     History of claustrophobia     Anxiety     Allergic state     Tired     Insomnia, unspecified type     Hemoglobin C trait (H)     Liver hemangioma     Moderate major depression (H)     Stroke (H)     History of stroke     Financial difficulties     Encounter for medication monitoring     Chronic bilateral low back pain with sciatica     Bilateral hip pain     Left foot pain     Past Surgical History:   Procedure Laterality Date     COLONOSCOPY  01/2010    repeat 10 yrs     COLONOSCOPY N/A 6/30/2020    Procedure: COLONOSCOPY, WITH POLYPECTOMY;  Surgeon: Rebecca Rojas MD;  [facility]     DILATION AND CURETTAGE, OPERATIVE HYSTEROSCOPY WITH MORCELLATOR, COMBINED N/A 2/15/2017    Procedure: COMBINED DILATION AND CURETTAGE, OPERATIVE HYSTEROSCOPY WITH MORCELLATOR;  Surgeon: Erin Gutierrez MD;  [facility]     ESOPHAGOSCOPY, GASTROSCOPY, DUODENOSCOPY (EGD), COMBINED Left 2/5/2016    Procedure: COMBINED ESOPHAGOSCOPY, GASTROSCOPY, DUODENOSCOPY (EGD), BIOPSY SINGLE OR MULTIPLE;  Surgeon: Pierre Fernandez MD;  Location:  GI     GYN SURGERY       Social History     Socioeconomic History     Marital status:      Spouse name: Not on file     Number of children: Not on file     Years of education: Not on  file     Highest education level: Not on file   Occupational History     Not on file   Social Needs     Financial resource strain: Not on file     Food insecurity     Worry: Not on file     Inability: Not on file     Transportation needs     Medical: Not on file     Non-medical: Not on file   Tobacco Use     Smoking status: Former Smoker     Smokeless tobacco: Never Used     Tobacco comment: quit 27 years ago   Substance and Sexual Activity     Alcohol use: No     Drug use: No     Sexual activity: Yes     Partners: Male     Birth control/protection: None   Lifestyle     Physical activity     Days per week: Not on file     Minutes per session: Not on file     Stress: Not on file   Relationships     Social connections     Talks on phone: Not on file     Gets together: Not on file     Attends Christianity service: Not on file     Active member of club or organization: Not on file     Attends meetings of clubs or organizations: Not on file     Relationship status: Not on file     Intimate partner violence     Fear of current or ex partner: Not on file     Emotionally abused: Not on file     Physically abused: Not on file     Forced sexual activity: Not on file   Other Topics Concern     Parent/sibling w/ CABG, MI or angioplasty before 65F 55M? No   Social History Narrative     Not on file     Family History   Problem Relation Age of Onset     Lupus Mother      Asthma Mother      Diabetes Father      Brain Tumor Father         begnign on pituitary     Depression Sister      Anemia Sister      SUBJECTIVE FINDINGS:  A 59-year-old female returns to clinic for plantar fasciitis, left, left foot pain.  She relates it was doing really well, and then she did not wear the pad for a few days and it started to get sore again, so she started wearing it again.  She relates no other specific relieving or aggravating factors.      OBJECTIVE FINDINGS:  Skin is dry and intact, left.      ASSESSMENT/PLAN:  Plantar fasciitis, left.  Left  foot pain.  Diagnosis and treatment options discussed with the patient.  Patient is casted for custom foot orthotics.  She is given the phone number and address to [facility] to pick those up.  She will return to clinic and see me in about 8 weeks.  Removal of taping and strapping, left foot, done upon consent, and patient instructed on its use.         Again, thank you for allowing me to participate in the care of your patient.        Sincerely,        Alonzo Kerr DPM

## 2020-08-25 NOTE — NURSING NOTE
Lesley Stafford's chief complaint for this visit includes:  Chief Complaint   Patient presents with     RECHECK     plantar fasciitis left foot     PCP: Rafael Orosco    Referring Provider:  No referring provider defined for this encounter.    /74 (BP Location: Left arm, Patient Position: Sitting, Cuff Size: Adult Regular)   Pulse 81   Wt 79.2 kg (174 lb 9.6 oz)   LMP 08/12/2013   SpO2 96%   BMI 29.05 kg/m    Data Unavailable     Do you need any medication refills at today's visit? No    Liza Jurado, UMBERTO

## 2020-09-12 DIAGNOSIS — F41.9 ANXIETY: ICD-10-CM

## 2020-09-13 NOTE — TELEPHONE ENCOUNTER
"Requested Prescriptions   Pending Prescriptions Disp Refills     hydrOXYzine (ATARAX) 25 MG tablet [Pharmacy Med Name: HYDROXYZINE HCL 25 MG TABLET] 90 tablet 0     Sig: TAKE 1 TABLET BY MOUTH THREE TIMES A DAY       Antihistamines Protocol Passed - 9/12/2020  5:04 PM        Passed - Recent (12 mo) or future (30 days) visit within the authorizing provider's specialty     Patient has had an office visit with the authorizing provider or a provider within the authorizing providers department within the previous 12 mos or has a future within next 30 days. See \"Patient Info\" tab in inbasket, or \"Choose Columns\" in Meds & Orders section of the refill encounter.              Passed - Patient is age 3 or older     Apply age and/or weight-based dosing for peds patients age 3 and older.    Forward request to provider for patients under the age of 3.          Passed - Medication is active on med list           Routing refill request to provider for review/approval because:  Off lab use        "

## 2020-09-14 RX ORDER — HYDROXYZINE HYDROCHLORIDE 25 MG/1
TABLET, FILM COATED ORAL
Qty: 90 TABLET | Refills: 3 | Status: SHIPPED | OUTPATIENT
Start: 2020-09-14 | End: 2024-07-31

## 2020-09-15 ENCOUNTER — OFFICE VISIT (OUTPATIENT)
Dept: PODIATRY | Facility: CLINIC | Age: 60
End: 2020-09-15
Payer: COMMERCIAL

## 2020-09-15 VITALS — SYSTOLIC BLOOD PRESSURE: 111 MMHG | DIASTOLIC BLOOD PRESSURE: 73 MMHG | HEART RATE: 85 BPM | OXYGEN SATURATION: 98 %

## 2020-09-15 DIAGNOSIS — M79.672 FOOT PAIN, LEFT: ICD-10-CM

## 2020-09-15 DIAGNOSIS — M72.2 PLANTAR FASCIITIS, LEFT: Primary | ICD-10-CM

## 2020-09-15 PROCEDURE — 99212 OFFICE O/P EST SF 10 MIN: CPT | Performed by: PODIATRIST

## 2020-09-15 RX ORDER — COVID-19 ANTIGEN TEST
220 KIT MISCELLANEOUS 2 TIMES DAILY PRN
COMMUNITY
End: 2024-07-31

## 2020-09-15 NOTE — NURSING NOTE
Lesley Stafford's chief complaint for this visit includes:  Chief Complaint   Patient presents with     RECHECK     plantar fasciitis, left - improving      PCP: Rafael Orosco    Referring Provider:  No referring provider defined for this encounter.    /73 (BP Location: Left arm, Patient Position: Sitting, Cuff Size: Adult Regular)   Pulse 85   LMP 08/12/2013   SpO2 98%       Do you need any medication refills at today's visit? No    Liza Jurado CMA

## 2020-09-15 NOTE — H&P
Pt called, No answer. VM does not identify pt. Left general message for pt to call the [facility] clinic back at [phone]....Lindsay Ga, MD Tristan Castle Ronda, MD; Lindsay Garcia RN               Happy to see him :))     Thanks!     Ernst    Previous Messages     ----- Message -----   From: Reba Hudson MD   Sent: 9/15/2020  12:20 PM CDT   To: MORENO Ga MD, *   Subject: RE: Dr. Ga Referral                         Its for a depressed scar on scalp See if Dr. Ga thinks filler would help there. I lasered it. IT looks really good.     I could keep going with laser if plastics has nothing to add   ----- Message -----              [facility]    Stroke Admission Note    Chief Complaint  Left arm numbness      HPI  Lesley Stafford is a 59 year old female with PMH of SLE, depression and anxiety, and headaches who presented to urgent care with new onset of L numbness, tingling that started at 11 AM. Transferred to the [facility] ED due to concern for possible stroke. Pt has been having headaches for the past couple of days with light sensitivity. Additionally, she has been having confusion eg. Forgetting to go to work. She developed a L sided headache that was worse compared to her prior headaches, then developed LUE > LLE numbness, and tingling. She reports a history of shingles and chronic sided weakness. She has been having increased stress at work and is tearful in the ED.       Impression  1. Ischemic Stroke due to undetermined etiology   Patient with L arm numbness corresponding to known right parietal DWI.  Given SLE and potentially two foci, will need further investigation for etiology of stroke.  Plan  #right frontal/parietal small DWI/ADC  #right bryson small DWI/ADC  Acute Ischemic Stroke (without tPA) Plan  - Admit to Neurology  - Permissive HTN; labetalol PRN for SBP > 220  - Avoid hypotonic IV fluids  - Daily aspirin 81mg mg for secondary stroke prevention  - Plavix (clopidogrel) 75 mg PO Daily  - Statin: 40mg  - MRI Stroke Protocol  - TTE with Bubble Study  - Telemetry, EKG  - Bedside Glucose Monitoring  - A1c, Lipid Panel, Troponin x 3  - PT/OT/SLP  - PM&R  - Stroke Education  - Depression Screen  - Apnea Screen  - Euthermia, Euglycemia      CHRONIC PROBLEMS  #Lupus: Follows endocrinologist Dr. Moffett, [employer].  - PTA Plaquenil-  has not picked up prescription from pharmacy, Gabapentin and Prednisone (not currently taking)  # Chronic Low Back Pain: Stable  - Continue PTA Gabapentin, Tinatizidine, tylenol  #MDD  -duloxetine PTA    Prophylaxis            For VTE Prevention:  - pneumatic  compression device    For Acid Suppression:  - GI prophylaxis is not indicated    Code Status  Full Code    During initial physical assessment, the plan of care was discussed and developed with patient and family.  Plan of care includes: admission.    Patient was admitted via [facility] ED ([place])    The patient will be admitted to the Neuro Critical Care/Stroke team..     The patient was discussed with Stroke Fellow, Dr. Galindo.  The Stroke Staff is Dr. Gardiner.    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     Past Surgical History   Past Surgical History:   Procedure Laterality Date     COLONOSCOPY  01/2010    repeat 10 yrs     DILATION AND CURETTAGE, OPERATIVE HYSTEROSCOPY WITH MORCELLATOR, COMBINED N/A 2/15/2017    Procedure: COMBINED DILATION AND CURETTAGE, OPERATIVE HYSTEROSCOPY WITH MORCELLATOR;  Surgeon: Erin Gutierrez MD;  [facility]     ESOPHAGOSCOPY, GASTROSCOPY, DUODENOSCOPY (EGD), COMBINED Left 2/5/2016    Procedure: COMBINED ESOPHAGOSCOPY, GASTROSCOPY, DUODENOSCOPY (EGD), BIOPSY SINGLE OR MULTIPLE;  Surgeon: Pierre Fernandez MD;  Location:  GI     GYN SURGERY       Medications   Home Meds  Prior to Admission medications    Medication Sig Start Date End Date Taking? Authorizing Provider   amphetamine-dextroamphetamine (ADDERALL) 10 MG tablet Take 10 mg by mouth 2 times daily     Reported, Patient   Calcium-Magnesium-Vitamin D (CALCIUM 500 PO)     Reported, Patient   cloNIDine (CATAPRES) 0.1 MG tablet TAKE 2 TABLETS BY MOUTH DAILY AT BEDTIME  "10/11/19   Reported, Patient   cyclobenzaprine (FLEXERIL) 5 MG tablet TAKE 1 TABLET BY MOUTH 3 TIMES DAILY  Patient not taking: Reported on 12/17/2018 7/5/17   Rafael Orosco MD   DEEP SEA NASAL SPRAY 0.65 % nasal spray SPRAY 1 SPRAY INTO BOTH NOSTRILS DAILY AS NEEDED FOR CONGESTION 6/30/17   Rafael Orosco MD   DULoxetine (CYMBALTA) 30 MG capsule TAKE 1 CAPSULE BY MOUTH EVERY DAY 10/21/19   Rafael Orosco MD   ferrous sulfate (IRON) 325 (65 FE) MG tablet Take 1 tablet (325 mg) by mouth daily (with breakfast) 2/6/16   Wilson Rod PA-C   fexofenadine (ALLEGRA) 180 MG tablet Take 1 tablet (180 mg) by mouth daily  Patient not taking: Reported on 12/23/2019 5/21/18   Mary Schmidt APRN CNP   flunisolide (NASALIDE) 25 MCG/ACT (0.025%) SOLN spray INHALE 2 SPRAYS INTO EACH NOSTRIL 2 TIMES DAILY 10/9/18   Brandyn Angeles MD   gabapentin (NEURONTIN) 300 MG capsule TAKE 2 CAPSULE (300 MG) BY MOUTH 3 TIMES DAILY 11/4/19   Rafael Orosco MD   gabapentin (NEURONTIN) 300 MG capsule Take 1 capsule (300 mg) by mouth 3 times daily 11/2/19   Daniella Long PA-C   hydroxychloroquine (PLAQUENIL) 200 MG tablet TAKE 1 TABLET BY MOUTH DAILY 7/3/18   Brandyn Angeles MD   hydrOXYzine (ATARAX) 25 MG tablet TAKE 1 TABLET (25 MG) BY MOUTH 3 TIMES PER DAY 10/8/19   Rafael Orosco MD   mometasone (NASONEX) 50 MCG/ACT spray Spray 2 sprays into both nostrils daily  Patient not taking: Reported on 12/23/2019 5/21/18   Mary Schmidt APRN CNP   multivitamin, therapeutic with minerals (MULTI-VITAMIN) TABS Take 1 tablet by mouth daily    Reported, Patient   omega 3 1200 MG CAPS     Reported, Patient   order for DME [place], 9/20/17   Rafael Orosco MD   polyethylene glycol (GOLYTELY/NULYTELY) 236 g suspension Take 4,000 mLs (4 L) by mouth once for 1 dose Refer to \"Getting Ready for a Colonoscopy\" instruction handout 9/11/19 9/11/19  Ottoniel, " MD Gaetano   predniSONE (DELTASONE) 10 MG tablet Take 3 tablets (30 mg) by mouth daily for 7 days 11/13/19 11/20/19  Rosa Weinstein APRN CNP   tiZANidine (ZANAFLEX) 4 MG tablet TAKE 1-2 TABLETS BY MOUTH AS NEEDED THREE TIMES A DAY 12/11/18   Reported, Patient   triamcinolone (NASACORT) 55 MCG/ACT Inhaler Spray 2 sprays into both nostrils daily  Patient not taking: Reported on 12/31/2018 5/23/18   Rafael Orosco MD       Scheduled Meds    aspirin  325 mg Oral Once     atorvastatin  10 mg Oral Once     clopidogrel  75 mg Oral Once       Infusion Meds      PRN Meds      Allergies   Allergies   Allergen Reactions     Morphine Sulfate Itching     Sulfa Drugs Hives     Family History   Family History   Problem Relation Age of Onset     Lupus Mother      Asthma Mother      Diabetes Father      Brain Tumor Father         begnign on pituitary     Depression Sister      Anemia Sister      Social History   Social History     Tobacco Use     Smoking status: Former Smoker     Smokeless tobacco: Never Used   Substance Use Topics     Alcohol use: No     Drug use: No       Review of Systems   The 10 point Review of Systems is negative other than noted in the HPI or consult       PHYSICAL EXAMINATION  Temp:  [98.5  F (36.9  C)-98.8  F (37.1  C)] 98.8  F (37.1  C)  Pulse:  [85-92] 85  Heart Rate:  [79] 79  Resp:  [13-17] 13  BP: (112-131)/(69-94) 122/81  SpO2:  [97 %-100 %] 100 %    General:  patient laying in bed, tearful    HEENT:  normocephalic/atraumatic, no oral lesions, no epistaxis   Cardio:  RRR  Pulmonary:  no respiratory distress  Abdomen:  soft, non-tender, non-distended  Extremities:  no edema  Skin:  intact, warm/dry, no jaundice      Neurologic  Mental Status:  alert, oriented x 3, follows commands, speech clear and fluent, naming and repetition normal  Cranial Nerves:  visual fields intact, PERRL, EOMI with normal smooth pursuit, facial sensation intact and symmetric, facial movements symmetric, hearing not  formally tested but intact to conversation, palate elevation symmetric and uvula midline, no dysarthria, tongue protrusion midline  Motor:  normal muscle tone and bulk, no abnormal movements, able to move all limbs spontaneously, strength 5/5 throughout upper and lower extremities, no pronator drift  Reflexes:  no clonus  Sensory:  light touch sensation intact and symmetric throughout upper and lower extremities, no extinction on double simultaneous stimulation   Coordination:  normal finger-to-nose and heel-to-shin bilaterally without dysmetria  Station/Gait:  deferred       Dysphagia Screen  Symptoms unlikely to be due to acute stroke     Stroke Scales     NIHSS  Interval baseline (12/23/19 2100)   Interval Comments    1a. Level of Consciousness 0-->Alert, keenly responsive   1b. LOC Questions 0-->Answers both questions correctly   1c. LOC Commands 0-->Performs both tasks correctly   2.   Best Gaze 0-->Normal   3.   Visual 0-->No visual loss   4.   Facial Palsy 0-->Normal symmetrical movements   5a. Motor Arm, Left 0-->No drift, limb holds 90 (or 45) degrees for full 10 secs   5b. Motor Arm, Right 0-->No drift, limb holds 90 (or 45) degrees for full 10 secs   6a. Motor Leg, Left 0-->No drift, leg holds 30 degree position for full 5 secs   6b. Motor Leg, right 0-->No drift, leg holds 30 degree position for full 5 secs   7.   Limb Ataxia 0-->Absent   8.   Sensory 1-->Mild-to-moderate sensory loss, patient feels pinprick is less sharp or is dull on the affected side, or there is a loss of superficial pain with pinprick, but patient is aware of being touched   9.   Best Language 0-->No aphasia, normal   10. Dysarthria 0-->Normal   11. Extinction and Inattention  0-->No abnormality   Total 1 (12/23/19 1606)          Dysphagia Screen  Passed screening, no dysarthria - Regular Diet with thin liquids  12/23/2019 2030    Modified Nye Scale (pre-stroke):   0-No symptoms       Imaging  I personally reviewed all imaging;  relevant findings per the HPI.    Lab Results Data   CBC  Recent Labs   Lab 12/23/19  1517   WBC 5.6   RBC 4.86   HGB 13.7   HCT 38.7        Basic Metabolic Panel   Recent Labs   Lab 12/23/19  1517      POTASSIUM 3.9   CHLORIDE 107   CO2 27   BUN 9   CR 0.63   GLC 78   AKILA 9.6     Liver Panel  Recent Labs   Lab Test 05/21/19  0929 10/08/18  1000 03/05/18  1030   PROTTOTAL 8.3 8.1 8.4   ALBUMIN 3.9 3.9 3.9   BILITOTAL 0.4 0.6 0.6   ALKPHOS 67 59 66   AST 32 40 26   ALT 36 44 28     INR  Recent Labs   Lab Test 12/23/19  1517 02/06/16  1101 02/05/16  0720   INR 0.95 0.97 1.02      Lipid Profile  Recent Labs   Lab Test 11/02/16  1151   CHOL 263*   HDL 83   *   TRIG 147     A1C  Recent Labs   Lab Test 11/02/16  1151 10/05/15  1548   A1C 5.7 5.5     Troponin I  Recent Labs   Lab 12/23/19  1517   TROPI <0.015

## 2020-09-15 NOTE — LETTER
9/15/2020         RE: Lesley Stafford  [address]  [place] [address]        Dear Colleague,    Thank you for referring your patient, Lesley Stafford, to the [facility]. Please see a copy of my visit note below.    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     Patient Active Problem List   Diagnosis     Systemic lupus erythematosus, unspecified SLE type, unspecified organ involvement status (H)     Abnormal perimenopausal bleeding     Obesity, Class I, BMI 30-34.9     History of claustrophobia     Anxiety     Allergic state     Tired     Insomnia, unspecified type     Hemoglobin C trait (H)     Liver hemangioma     Moderate major depression (H)     Stroke (H)     History of stroke     Financial difficulties     Encounter for medication monitoring     Chronic bilateral low back pain with sciatica     Bilateral hip pain     Left foot pain     Past Surgical History:   Procedure Laterality Date     COLONOSCOPY  01/2010    repeat 10 yrs     COLONOSCOPY N/A 6/30/2020    Procedure: COLONOSCOPY, WITH POLYPECTOMY;  Surgeon: Rebecca Rojas MD;  [facility]     DILATION AND CURETTAGE, OPERATIVE HYSTEROSCOPY WITH MORCELLATOR, COMBINED N/A 2/15/2017    Procedure: COMBINED DILATION AND CURETTAGE, OPERATIVE HYSTEROSCOPY WITH MORCELLATOR;  Surgeon: Erin Gutierrez MD;  [facility]     ESOPHAGOSCOPY, GASTROSCOPY, DUODENOSCOPY (EGD), COMBINED Left 2/5/2016    Procedure: COMBINED ESOPHAGOSCOPY, GASTROSCOPY, DUODENOSCOPY (EGD), BIOPSY SINGLE OR MULTIPLE;  Surgeon: Pierre Fernandez MD;  Location:  GI     GYN SURGERY       Social History     Socioeconomic History     Marital status:      Spouse name: Not on file     Number of children: Not on file     Years of education: Not on  file     Highest education level: Not on file   Occupational History     Not on file   Social Needs     Financial resource strain: Not on file     Food insecurity     Worry: Not on file     Inability: Not on file     Transportation needs     Medical: Not on file     Non-medical: Not on file   Tobacco Use     Smoking status: Former Smoker     Smokeless tobacco: Never Used     Tobacco comment: quit 27 years ago   Substance and Sexual Activity     Alcohol use: No     Drug use: No     Sexual activity: Yes     Partners: Male     Birth control/protection: None   Lifestyle     Physical activity     Days per week: Not on file     Minutes per session: Not on file     Stress: Not on file   Relationships     Social connections     Talks on phone: Not on file     Gets together: Not on file     Attends Bahai service: Not on file     Active member of club or organization: Not on file     Attends meetings of clubs or organizations: Not on file     Relationship status: Not on file     Intimate partner violence     Fear of current or ex partner: Not on file     Emotionally abused: Not on file     Physically abused: Not on file     Forced sexual activity: Not on file   Other Topics Concern     Parent/sibling w/ CABG, MI or angioplasty before 65F 55M? No   Social History Narrative     Not on file     Family History   Problem Relation Age of Onset     Lupus Mother      Asthma Mother      Diabetes Father      Brain Tumor Father         begnign on pituitary     Depression Sister      Anemia Sister      SUBJECTIVE FINDINGS:  59-year-old female returns to clinic for plantar fasciitis left and left foot pain.  She relates it is doing okay.  She got her orthotics.  She is still adjusting to them.  She is going to try wearing them all day.  She has not been wearing them.  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  She is going to try and start doing that.  Also, she relates her  toenails feel sore in the corners.  Relates no injuries.        OBJECTIVE FINDINGS:  Unremarkable.      ASSESSMENT/PLAN:  Plantar fasciitis, left.  Left foot pain.  Diagnosis and treatment options discussed with her.  It sounds like she is getting some early ingrown toenails.  Advised her on nail border stretching and nail cares.  Continue the orthotics.  She will return to clinic and see me as needed.         Again, thank you for allowing me to participate in the care of your patient.        Sincerely,        Alonzo Kerr DPM

## 2020-09-15 NOTE — PROGRESS NOTES
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     Patient Active Problem List   Diagnosis     Systemic lupus erythematosus, unspecified SLE type, unspecified organ involvement status (H)     Abnormal perimenopausal bleeding     Obesity, Class I, BMI 30-34.9     History of claustrophobia     Anxiety     Allergic state     Tired     Insomnia, unspecified type     Hemoglobin C trait (H)     Liver hemangioma     Moderate major depression (H)     Stroke (H)     History of stroke     Financial difficulties     Encounter for medication monitoring     Chronic bilateral low back pain with sciatica     Bilateral hip pain     Left foot pain     Past Surgical History:   Procedure Laterality Date     COLONOSCOPY  01/2010    repeat 10 yrs     COLONOSCOPY N/A 6/30/2020    Procedure: COLONOSCOPY, WITH POLYPECTOMY;  Surgeon: Rebecca Rojas MD;  [facility]     DILATION AND CURETTAGE, OPERATIVE HYSTEROSCOPY WITH MORCELLATOR, COMBINED N/A 2/15/2017    Procedure: COMBINED DILATION AND CURETTAGE, OPERATIVE HYSTEROSCOPY WITH MORCELLATOR;  Surgeon: Erin Gutierrez MD;  [facility]     ESOPHAGOSCOPY, GASTROSCOPY, DUODENOSCOPY (EGD), COMBINED Left 2/5/2016    Procedure: COMBINED ESOPHAGOSCOPY, GASTROSCOPY, DUODENOSCOPY (EGD), BIOPSY SINGLE OR MULTIPLE;  Surgeon: Pierre Fernandez MD;  [facility] GI     GYN SURGERY       Social History     Socioeconomic History     Marital status:      Spouse name: Not on file     Number of children: Not on file     Years of education: Not on file     Highest education level: Not on file   Occupational History     Not on file   Social Needs     Financial resource strain: Not on file     Food insecurity     Worry: Not on file     Inability: Not on file     Transportation needs     Medical: Not on file      Non-medical: Not on file   Tobacco Use     Smoking status: Former Smoker     Smokeless tobacco: Never Used     Tobacco comment: quit 27 years ago   Substance and Sexual Activity     Alcohol use: No     Drug use: No     Sexual activity: Yes     Partners: Male     Birth control/protection: None   Lifestyle     Physical activity     Days per week: Not on file     Minutes per session: Not on file     Stress: Not on file   Relationships     Social connections     Talks on phone: Not on file     Gets together: Not on file     Attends Holiness service: Not on file     Active member of club or organization: Not on file     Attends meetings of clubs or organizations: Not on file     Relationship status: Not on file     Intimate partner violence     Fear of current or ex partner: Not on file     Emotionally abused: Not on file     Physically abused: Not on file     Forced sexual activity: Not on file   Other Topics Concern     Parent/sibling w/ CABG, MI or angioplasty before 65F 55M? No   Social History Narrative     Not on file     Family History   Problem Relation Age of Onset     Lupus Mother      Asthma Mother      Diabetes Father      Brain Tumor Father         begnign on pituitary     Depression Sister      Anemia Sister      SUBJECTIVE FINDINGS:  59-year-old female returns to clinic for plantar fasciitis left and left foot pain.  She relates it is doing okay.  She got her orthotics.  She is still adjusting to them.  She is going to try wearing them all day.  She has not been wearing them.  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  She is going to try and start doing that.  Also, she relates her toenails feel sore in the corners.  Relates no injuries.        OBJECTIVE FINDINGS:  Unremarkable.      ASSESSMENT/PLAN:  Plantar fasciitis, left.  Left foot pain.  Diagnosis and treatment options discussed with her.  It sounds like she is getting some early ingrown  toenails.  Advised her on nail border stretching and nail cares.  Continue the orthotics.  She will return to clinic and see me as needed.

## 2020-09-15 NOTE — PATIENT INSTRUCTIONS
Thanks for coming today.  Ortho[facility] Medicine Clinic  [address] [place] [address]    To schedule future appointments in Ortho Clinic, you may call [phone].    To schedule ordered imaging by your provider:   Call [facility] [phone]    To schedule an injection ordered by your provider:  Call [facility] Injection scheduling line: [phone]  [email or web address] available online at:  [email or web address]    Please call if any further questions or concerns ([phone]).  Clinic hours 8 am to 5 pm.    Return to clinic (call) if symptoms worsen or fail to improve.

## 2020-10-06 ENCOUNTER — PATIENT OUTREACH (OUTPATIENT)
Dept: CARE COORDINATION | Facility: CLINIC | Age: 60
End: 2020-10-06

## 2020-10-06 PROBLEM — M25.552 BILATERAL HIP PAIN: Status: RESOLVED | Noted: 2020-07-09 | Resolved: 2020-10-06

## 2020-10-06 PROBLEM — G89.29 CHRONIC BILATERAL LOW BACK PAIN WITH SCIATICA: Status: RESOLVED | Noted: 2020-07-09 | Resolved: 2020-10-06

## 2020-10-06 PROBLEM — M25.551 BILATERAL HIP PAIN: Status: RESOLVED | Noted: 2020-07-09 | Resolved: 2020-10-06

## 2020-10-06 PROBLEM — M54.40 CHRONIC BILATERAL LOW BACK PAIN WITH SCIATICA: Status: RESOLVED | Noted: 2020-07-09 | Resolved: 2020-10-06

## 2020-10-06 PROBLEM — M54.42 CHRONIC BILATERAL LOW BACK PAIN WITH SCIATICA: Status: RESOLVED | Noted: 2020-07-09 | Resolved: 2020-10-06

## 2020-10-06 PROBLEM — M54.41 CHRONIC BILATERAL LOW BACK PAIN WITH SCIATICA: Status: RESOLVED | Noted: 2020-07-09 | Resolved: 2020-10-06

## 2020-10-06 PROBLEM — M79.672 LEFT FOOT PAIN: Status: RESOLVED | Noted: 2020-07-09 | Resolved: 2020-10-06

## 2020-10-06 NOTE — PROGRESS NOTES
"Clinic Care Coordination Contact  Community Health Worker Follow Up    Goals:   Goals Addressed as of 10/6/2020 at 10:23 AM                 8/24/20       Patient Stated      Other (pt-stated)   On track    Added 8/3/20 by Mala Espitia     Goal Statement: I want to apply for [employer] for my medical bills in the next two months.  Date Goal set: 8/3/2020  Barriers: overwhelmed by process, lots of bills from different places/providers  Strengths: [facility] worker and CC support, motivated to take care of this  Date to Achieve By: 10/3/2020  Patient expressed understanding of goal: yes    Action steps to achieve this goal:  1. I will call my [facility] worker today to schedule another meeting to help me organize and prioritize my medical bills.  2. I will let the CHW know if I decide I want to apply for [employer] in the future and want help.  3. I will give the CHW updates at outreach calls.    Updated: 10/6/20              Intervention and Education during outreach:     Called pt to follow up. Asked how she's been doing since we last talked. She said she's hanging in there and that \"life keeps moving on.\" Pt said she has been busy caring for her dad and helping manage his home care and other healthcare services/appointments.     I asked if pt received and submitted the [employer] application I sent her after our last call. She said yes, she did get it, and she looked at it with her [facility] worker at their last meeting. Pt said she decided that she does not want to apply at this time. Pt said she is uncomfortable with the amount of information the application asks for, and she is worried that she would go through this process and then be told she doesn't qualify. I acknowledged pt's decision and let her know that I am here if she changes her mind in the future. She said she wants to continue paying her bills and working with her [facility] worker on prioritizing which ones she should pay first. Pt said she is going to call " her worker today to set up another meeting since it's the beginning of the month and some of the bills are coming due. Pt shared that she paid off the bill from the retina specialist last month and it felt great to get that one taken care of. I congratulated pt and encouraged her to keep at it.     I offered to call pt back next month to check in, and she agreed. I encouraged her to reach out if she needs anything before then, and she said she would.     CHW Plan: Pt will call her [facility] worker to schedule another meeting to prioritize/organize pt's medical bills and pay them. Pt will call CHW if she needs anything before next outreach. CHW will follow up with pt in one month.

## 2020-10-08 DIAGNOSIS — M25.552 HIP PAIN, LEFT: ICD-10-CM

## 2020-10-08 RX ORDER — DULOXETIN HYDROCHLORIDE 30 MG/1
CAPSULE, DELAYED RELEASE ORAL
Qty: 90 CAPSULE | Refills: 0 | Status: SHIPPED | OUTPATIENT
Start: 2020-10-08 | End: 2020-12-20

## 2020-10-08 NOTE — TELEPHONE ENCOUNTER
Prescription approved per [facility] Refill Protocol.    Swati Ghotra RN   [facility]

## 2020-10-19 ENCOUNTER — VIRTUAL VISIT (OUTPATIENT)
Dept: FAMILY MEDICINE | Facility: CLINIC | Age: 60
End: 2020-10-19
Payer: COMMERCIAL

## 2020-10-19 DIAGNOSIS — M32.9 SYSTEMIC LUPUS ERYTHEMATOSUS, UNSPECIFIED SLE TYPE, UNSPECIFIED ORGAN INVOLVEMENT STATUS (H): Primary | ICD-10-CM

## 2020-10-19 PROCEDURE — 99213 OFFICE O/P EST LOW 20 MIN: CPT | Mod: 95 | Performed by: FAMILY MEDICINE

## 2020-10-19 NOTE — LETTER
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 Patient may return to work as soon as these shifts are available. No other restrictions.    Please contact me for questions or concerns.      Sincerely,        Rafael Orosco MD

## 2020-10-19 NOTE — PROGRESS NOTES
"Lesley Stafford is a 59 year old female who is being evaluated via a billable telephone visit.      The patient has been notified of following:     \"This telephone visit will be conducted via a call between you and your physician/provider. We have found that certain health care needs can be provided without the need for a physical exam.  This service lets us provide the care you need with a short phone conversation.  If a prescription is necessary we can send it directly to your pharmacy.  If lab work is needed we can place an order for that and you can then stop by our lab to have the test done at a later time.    Telephone visits are billed at different rates depending on your insurance coverage. During this emergency period, for some insurers they may be billed the same as an in-person visit.  Please reach out to your insurance provider with any questions.    If during the course of the call the physician/provider feels a telephone visit is not appropriate, you will not be charged for this service.\"    Patient has given verbal consent for Telephone visit?  Yes    What phone number would you like to be contacted at? [phone]    How would you like to obtain your AVS? Noahhart    Subjective     Lesley Stafford is a 59 year old female who presents via phone visit today for the following health issues:    PT is here for evaluation regarding going back to work.     HPI     Cerebrovascular Follow-up      Patient history: ischemic cerebrovascular incident    Residual symptoms: None, left hemiplegia has resolved    Worsened or new symptoms since last visit: No    Daily aspirin use: Yes    Hypertension controlled: Yes    Concern - Lupus, immunosupression therapy  Onset: x years   Description: arthralgias, fatigue, depression  Intensity: mild  Progression of Symptoms:  same, constant and waxing and waning  Accompanying Signs & Symptoms:arthralgias, fatigue, depression  Previous history of similar problem: x many " "years   Precipitating factors:        Worsened by: stress  Alleviating factors:        Improved by: rest, light activity  Therapies tried and outcome: plaquenil helps      Review of Systems   Constitutional, HEENT, cardiovascular, pulmonary, gi and gu systems are negative, except as otherwise noted.       Objective          Vitals:  No vitals were obtained today due to virtual visit.    alert and mild distress  PSYCH: Alert and oriented times 3; coherent speech, normal   rate and volume, able to articulate logical thoughts, able   to abstract reason, no tangential thoughts, no hallucinations   or delusions  Her affect is normal and pleasant  RESP: No cough, no audible wheezing, able to talk in full sentences  Remainder of exam unable to be completed due to telephone visits       Assessment & Plan     Systemic lupus erythematosus, unspecified SLE type, unspecified organ involvement status (H)  Followup with rheumatology       BMI:   Estimated body mass index is 29.05 kg/m  as calculated from the following:    Height as of 6/30/20: 1.651 m (5' 5\").    Weight as of 8/25/20: 79.2 kg (174 lb 9.6 oz).   Weight management plan: Discussed healthy diet and exercise guidelines     Return to work letter with conditions given       Rafael Orosco MD  [facility] [place]    Phone call duration:  21 minutes              "

## 2020-10-20 ENCOUNTER — PATIENT OUTREACH (OUTPATIENT)
Dept: CARE COORDINATION | Facility: CLINIC | Age: 60
End: 2020-10-20

## 2020-10-20 NOTE — PROGRESS NOTES
Clinic Care Coordination Contact    Situation: Patient chart reviewed by care coordinator.    Background: SW CC's initial assessment and enrollment to Care Coordination was 1/3/20.   Patient centered goals were developed with participation from patient.   CC handed patient off to CHW for continued outreach every 30 days.    Assessment: CHW continues to touch base with pt every week. Pt stated that she will continue to work with her [facility] worker to get her goals completed.         Plan/Recommendations: PT will continue to talk with pt every 30 days and SW every 45 days.       TONI Jalloh  Clinic Care Coordinator   [facility]  [phone]

## 2020-11-10 ENCOUNTER — PATIENT OUTREACH (OUTPATIENT)
Dept: CARE COORDINATION | Facility: CLINIC | Age: 60
End: 2020-11-10

## 2020-11-10 NOTE — PROGRESS NOTES
Clinic Care Coordination Contact  [facility]/Voicemail    Clinical Data: Care Coordinator Outreach  Outreach attempted x 1.  Left message on patient's voicemail with call back information and requested return call.  Plan: Care Coordinator will try to reach patient again in 10 business days.

## 2020-11-12 ENCOUNTER — VIRTUAL VISIT (OUTPATIENT)
Dept: FAMILY MEDICINE | Facility: OTHER | Age: 60
End: 2020-11-12
Payer: COMMERCIAL

## 2020-11-12 PROCEDURE — 99421 OL DIG E/M SVC 5-10 MIN: CPT | Performed by: PHYSICIAN ASSISTANT

## 2020-11-12 NOTE — PROGRESS NOTES
"Date: 2020 14:13:25  Clinician: Ammon Shaw  Clinician NPI: [id number]  Patient: Lesley Field  Patient : 1960  Patient Address: [address] [place] [id number]  Patient Phone: [phone]  Visit Protocol: URI  Patient Summary:  Lesley is a 59 year old ( : 1960 ) female who initiated a [facility] Visit for COVID-19 (Coronavirus) evaluation and screening. When asked the question \"Please sign me up to receive news, health information and promotions from [facility].\", Lesley responded \"Yes\".    Lesley states her symptoms started 1-2 days ago.   Her symptoms consist of rhinitis, facial pain or pressure, myalgia, chills, malaise, a sore throat, ear pain, a headache, nasal congestion, and nausea. She is experiencing mild difficulty breathing with activities but can speak normally in full sentences.   Symptom details     Nasal secretions: The color of her mucus is yellow and white.    Sore throat: Lesley reports having moderate throat pain (4-6 on a 10 point pain scale), does not have exudate on her tonsils, and can swallow liquids. She is not sure if the lymph nodes in her neck are enlarged. A rash has not appeared on the skin since the sore throat started.     Facial pain or pressure: The facial pain or pressure feels worse when bending over or leaning forward.     Headache: She states the headache is mild (1-3 on a 10 point pain scale).      Lesley denies having vomiting, teeth pain, ageusia, diarrhea, wheezing, fever, cough, and anosmia. She also denies taking antibiotic medication in the past month and having recent facial or sinus surgery in the past 60 days.   Precipitating events  Lesley is not sure if she has been exposed to someone with strep throat. She has not recently been exposed to someone with influenza. Lesley has been in close contact with the following high risk individuals: immunocompromised people, adults 65 or older, children under the age of 5, and people with " asthma, heart disease or diabetes.   Pertinent COVID-19 (Coronavirus) information  Lesley does not work or volunteer as healthcare worker or a . In the past 14 days, Lesley has worked or volunteered at a healthcare facility or a group living setting. 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   In the past 14 days, she has not lived in a congregate living setting.   Lesley has not had a close contact with a laboratory-confirmed COVID-19 patient within 14 days of symptom onset.    Since December 2019, Lesley has been tested for COVID-19 and has not had upper respiratory infection or influenza-like illness.      Result of COVID-19 test: Negative     Date of her COVID-19 test: 06/29/2020      Pertinent medical history  Lesley had 1 sinus infection within the past year.   Lesley typically gets a yeast infection when she takes antibiotics. She is not sure if she has used fluconazole (Diflucan) to treat previous yeast infections.   Lesley needs a return to work/school note.   Weight: 175 lbs   Lesley does not smoke or use smokeless tobacco.   Additional information as reported by the patient (free text): My niece who also provides cares for my  87 y.o. diabetic father. Her daughter plays volleyball and one of her teams mates  was positive for COVID yesterday. I also have Systemic  Lupus &amp; my throat has been sore for 2 days.   Weight: 175 lbs    MEDICATIONS: aspirin oral, Lipitor oral, hydroxyzine pamoate oral, hydroxychloroquine oral, albuterol sulfate inhalation, duloxetine oral, Adderall XR oral, Plaquenil oral, gabapentin oral, ALLERGIES: Morpholine Analogues, sulfur dioxide  Clinician Response:  Dear Lesley,   Your symptoms show that you may have coronavirus (COVID-19). This illness can cause fever, cough and trouble breathing. Many people get a mild case and get better on their own. Some people can get very sick.  What should  "I do?  We would like to test you for this virus.   1. Please call [phone] to schedule your visit. Explain that you were referred by [facility] to have a COVID-19 test. Be ready to share your [facility] visit ID number.  * If you need to schedule in [facility] please call [phone] or for [employer] employees please call [phone].  * If you need to schedule in the [place] area please call [phone]. [place] employees call [phone].  The following will serve as your written order for this COVID Test, ordered by me, for the indication of suspected COVID [Z20.828]: The test will be ordered in [employer]pi[facility], our electronic health record, after you are scheduled. It will show as ordered and authorized by Vernon Shah MD.  Order: COVID-19 (Coronavirus) PCR for SYMPTOMATIC testing from [facility].   2. When it's time for your COVID test:  Stay at least 6 feet away from others. (If someone will drive you to your test, stay in the backseat, as far away from the  as you can.)   Cover your mouth and nose with a mask, tissue or washcloth.  Go straight to the testing site. Don't make any stops on the way there or back.      3.Starting now: Stay home and away from others (self-isolate) until:   You've had no fever---and no medicine that reduces fever---for one full day (24 hours). And...   Your other symptoms have gotten better. For example, your cough or breathing has improved. And...   At least 10 days have passed since your symptoms started.       During this time, don't leave the house except for testing or medical care.   Stay in your own room, even for meals. Use your own bathroom if you can.   Stay away from others in your home. No hugging, kissing or shaking hands. No visitors.  Don't go to work, school or anywhere else.    Clean \"high touch\" surfaces often (doorknobs, counters, handles, etc.). Use a household cleaning spray or wipes. You'll find a full list of  on the EPA website: " www.epa.gov/pesticide-registration/list-n-disinfectants-use-against-sars-cov-2.   Cover your mouth and nose with a mask, tissue or washcloth to avoid spreading germs.  Wash your hands and face often. Use soap and water.  Caregivers in these groups are at risk for severe illness due to COVID-19:  o People 65 years and older  o People who live in a nursing home or long-term care facility  o People with chronic disease (lung, heart, cancer, diabetes, kidney, liver, immunologic)  o People who have a weakened immune system, including those who:   Are in cancer treatment  Take medicine that weakens the immune system, such as corticosteroids  Had a bone marrow or organ transplant  Have an immune deficiency  Have poorly controlled HIV or AIDS  Are obese (body mass index of 40 or higher)  Smoke regularly   o Caregivers should wear gloves while washing dishes, handling laundry and cleaning bedrooms and bathrooms.  o Use caution when washing and drying laundry: Don't shake dirty laundry, and use the warmest water setting that you can.  o For more tips, go to www.cdc.gov/coronavirus/2019-ncov/downloads/10Things.pdf.       How can I take care of myself?   Get lots of rest. Drink extra fluids (unless a doctor has told you not to).   Take Tylenol (acetaminophen) for fever or pain. If you have liver or kidney problems, ask your family doctor if it's okay to take Tylenol.   Adults can take either:    650 mg (two 325 mg pills) every 4 to 6 hours, or...   1,000 mg (two 500 mg pills) every 8 hours as needed.    Note: Don't take more than 3,000 mg in one day. Acetaminophen is found in many medicines (both prescribed and over-the-counter medicines). Read all labels to be sure you don't take too much.   For children, check the Tylenol bottle for the right dose. The dose is based on the child's age or weight.    If you have other health problems (like cancer, heart failure, an organ transplant or severe kidney disease): Call your specialty  clinic if you don't feel better in the next 2 days.       Know when to call 911. Emergency warning signs include:    Trouble breathing or shortness of breath Pain or pressure in the chest that doesn't go away Feeling confused like you haven't felt before, or not being able to wake up Bluish-colored lips or face.  Where can I get more information?   [facility] -- About COVID-19: [email or web address]   CDC -- What to Do If You're Sick: www.cdc.gov/coronavirus/2019-ncov/about/steps-when-sick.html   [facility] -- Ending Home Isolation: www.cdc.gov/coronavirus/2019-ncov/hcp/disposition-in-home-patients.html   [facility] -- Caring for Someone: www.cdc.gov/coronavirus/2019-ncov/if-you-are-sick/care-for-someone.html   [facility] -- Interim Guidance for Hospital Discharge to Home: www.[facility].mn./diseases/coronavirus/hcp/hospdischarge.pdf   [facility] clinical trials (COVID-19 research studies): clinicalaffairs.[facility]-clinical-trials    Below are the COVID-19 hotlines at the [facility] ([facility]). Interpreters are available.    For health questions: Call 998-909-7451 or 1-343.698.6683 (7 a.m. to 7 p.m.) For questions about schools and childcare: Call 868-390-8448 or 1-447.429.1419 (7 a.m. to 7 p.m.)    Diagnosis: Contact with and (suspected) exposure to other viral communicable diseases  Diagnosis ICD: Z20.828

## 2020-11-17 DIAGNOSIS — Z20.822 SUSPECTED 2019 NOVEL CORONAVIRUS INFECTION: Primary | ICD-10-CM

## 2020-11-30 ENCOUNTER — PATIENT OUTREACH (OUTPATIENT)
Dept: CARE COORDINATION | Facility: CLINIC | Age: 60
End: 2020-11-30

## 2020-11-30 NOTE — PROGRESS NOTES
Clinic Care Coordination Contact    Follow Up Progress Note      Assessment: SOL'er talked with pt over the phone. Pt stated that she was doing pretty good. Pt stated she was approved for SSDI and received her first check last wednsday. Pt shared that she continues to work with a therapists and [facility] worker. She continues to spend a lot of time with her father helping him out. She also shared she is attending AA over zoom and is happy about this experience.     Goals addressed this encounter:   Goals Addressed                 This Visit's Progress       Patient Stated      COMPLETED: Other (pt-stated)   On track     Goal Statement: I want to apply for [facility] for my medical bills in the next two months.  Date Goal set: 8/3/2020  Barriers: overwhelmed by process, lots of bills from different places/providers  Strengths: [facility] worker and CC support, motivated to take care of this  Date to Achieve By: 10/3/2020  Patient expressed understanding of goal: yes    Action steps to achieve this goal:  1. I will call my [facility] worker today to schedule another meeting to help me organize and prioritize my medical bills.  2. I will let the CHW know if I decide I want to apply for [facility] in the future and want help.  3. I will give the CHW updates at outreach calls.  4. Pt stated she no longer wants to complete the paperwork because it was too much. Set up a payment plan with [employer].        Updated: 10/6/20          Psychosocial (pt-stated)        Goal Statement: I would like to find ways to keep improving my mental health over the next 3 months.   Date Goal set: 11/30/2020  Barriers: n/a   Strengths: wants to keep improving.   Date to Achieve By: 3/30/2020  Patient expressed understanding of goal: yes  Action steps to achieve this goal:  1. I will continue attending therapy appointments.   2. I will continue meeting with [facility] worker.   3. I will find ways to work on my overall health and wellness.                 Intervention/Education provided during outreach: SOL'er talked with pt about her current goals. She stated she no longer is going to do [facility] because it was too long of a process. Pt shared that she talked with the financial department and will be making small payments here and there. Pt stated she feels that things are good. She made a new goal of maintaining mental health as she wants to make sure she says good over the holidays and into the new year.            Plan:   1) Pt will continue to work with therapist and [facility] worker.   2) CHW will follow up with pt in 30 days.   Care Coordinator will follow up in 45 days.     Henny White MSW  Clinic Care Coordinator   [facility]  [phone]

## 2020-12-20 DIAGNOSIS — M25.552 HIP PAIN, LEFT: ICD-10-CM

## 2020-12-20 RX ORDER — DULOXETIN HYDROCHLORIDE 30 MG/1
CAPSULE, DELAYED RELEASE ORAL
Qty: 90 CAPSULE | Refills: 0 | Status: SHIPPED | OUTPATIENT
Start: 2020-12-20 | End: 2021-01-06

## 2020-12-20 NOTE — TELEPHONE ENCOUNTER
"Requested Prescriptions   Pending Prescriptions Disp Refills     DULoxetine (CYMBALTA) 30 MG capsule [Pharmacy Med Name: DULOXETINE HCL DR 30 MG CAP] 90 capsule 0     Sig: TAKE 1 CAPSULE BY MOUTH EVERY DAY       Serotonin-Norepinephrine Reuptake Inhibitors  Passed - 12/20/2020 11:10 AM        Passed - Blood pressure under 140/90 in past 12 months     BP Readings from Last 3 Encounters:   09/15/20 111/73   08/25/20 112/74   06/30/20 112/74                 Passed - Recent (12 mo) or future (30 days) visit within the authorizing provider's specialty     Patient has had an office visit with the authorizing provider or a provider within the authorizing providers department within the previous 12 mos or has a future within next 30 days. See \"Patient Info\" tab in inbasket, or \"Choose Columns\" in Meds & Orders section of the refill encounter.              Passed - Medication is active on med list        Passed - Patient is age 18 or older        Passed - No active pregnancy on record        Passed - No positive pregnancy test in past 12 months           Signed Prescriptions:                        Disp   Refills    DULoxetine (CYMBALTA) 30 MG capsule        90 cap*0        Sig: TAKE 1 CAPSULE BY MOUTH EVERY DAY  Authorizing Provider: ODALIS CORONEL  Ordering User: BETI PRUITT      "

## 2020-12-23 DIAGNOSIS — G62.9 PERIPHERAL POLYNEUROPATHY: ICD-10-CM

## 2020-12-23 RX ORDER — GABAPENTIN 300 MG/1
CAPSULE ORAL
Qty: 180 CAPSULE | Refills: 6 | Status: SHIPPED | OUTPATIENT
Start: 2020-12-23 | End: 2021-07-11

## 2020-12-23 NOTE — TELEPHONE ENCOUNTER
Signed Prescriptions:                        Disp   Refills    gabapentin (NEURONTIN) 300 MG capsule      180 ca*6        Sig: TAKE 2 CAPSULES BY MOUTH 3 TIMES A DAY  Authorizing Provider: WIL CADET    Updated patient

## 2020-12-23 NOTE — TELEPHONE ENCOUNTER
Requested Prescriptions   Pending Prescriptions Disp Refills     gabapentin (NEURONTIN) 300 MG capsule [Pharmacy Med Name: GABAPENTIN 300 MG CAPSULE] 180 capsule 6     Sig: TAKE 2 CAPSULES BY MOUTH 3 TIMES A DAY       There is no refill protocol information for this order        Routing refill request to provider for review/approval because:  Drug not on the [facility] refill protocol   [place]  reviewed 12/23/2020  Last refill: 11/3/20   Disp: 180  Concerns: none

## 2021-01-06 ENCOUNTER — OFFICE VISIT (OUTPATIENT)
Dept: FAMILY MEDICINE | Facility: CLINIC | Age: 61
End: 2021-01-06
Payer: COMMERCIAL

## 2021-01-06 VITALS
OXYGEN SATURATION: 98 % | SYSTOLIC BLOOD PRESSURE: 98 MMHG | RESPIRATION RATE: 16 BRPM | WEIGHT: 176 LBS | HEART RATE: 82 BPM | HEIGHT: 65 IN | DIASTOLIC BLOOD PRESSURE: 64 MMHG | BODY MASS INDEX: 29.32 KG/M2 | TEMPERATURE: 97.5 F

## 2021-01-06 DIAGNOSIS — N95.0 POST-MENOPAUSAL BLEEDING: Primary | ICD-10-CM

## 2021-01-06 DIAGNOSIS — N90.89 LABIAL LESION: ICD-10-CM

## 2021-01-06 DIAGNOSIS — K60.2 ANAL FISSURE: ICD-10-CM

## 2021-01-06 LAB
ALBUMIN UR-MCNC: NEGATIVE MG/DL
APPEARANCE UR: CLEAR
BILIRUB UR QL STRIP: NEGATIVE
COLOR UR AUTO: YELLOW
GLUCOSE UR STRIP-MCNC: NEGATIVE MG/DL
HGB UR QL STRIP: ABNORMAL
KETONES UR STRIP-MCNC: NEGATIVE MG/DL
LEUKOCYTE ESTERASE UR QL STRIP: NEGATIVE
NITRATE UR QL: NEGATIVE
PH UR STRIP: 6 PH (ref 5–7)
RBC #/AREA URNS AUTO: ABNORMAL /HPF
SOURCE: ABNORMAL
SP GR UR STRIP: 1.01 (ref 1–1.03)
UROBILINOGEN UR STRIP-ACNC: 0.2 EU/DL (ref 0.2–1)
WBC #/AREA URNS AUTO: ABNORMAL /HPF

## 2021-01-06 PROCEDURE — 99214 OFFICE O/P EST MOD 30 MIN: CPT | Performed by: FAMILY MEDICINE

## 2021-01-06 PROCEDURE — 81001 URINALYSIS AUTO W/SCOPE: CPT | Performed by: FAMILY MEDICINE

## 2021-01-06 RX ORDER — DULOXETIN HYDROCHLORIDE 60 MG/1
60 CAPSULE, DELAYED RELEASE ORAL DAILY
COMMUNITY
Start: 2020-12-20 | End: 2023-01-06

## 2021-01-06 ASSESSMENT — MIFFLIN-ST. JEOR: SCORE: 1359.21

## 2021-01-06 NOTE — PATIENT INSTRUCTIONS
Call [phone] for appointments: possible pelvic ultrasound, PAP gyn appointment   Christina mccormick

## 2021-01-06 NOTE — PROGRESS NOTES
Assessment & Plan     Post-menopausal bleeding  Possible endometrial hyperplasia  - US Pelvic Complete with Transvaginal; Future    Labial lesion  Possible left Bartholin's infammation, mild    Anal fissure  Possible cause of perineal blood on underclothes. Colonoscopy negative for cancer 7 months ago      Review of external notes as documented above     35 minutes spent on the date of the encounter doing chart review, history and exam, documentation and further activities as noted above       Patient Instructions   Call [phone] for appointments: possible pelvic ultrasound, PAP gyn appointment   Christina Orosco MD  [facility]     Catrachitorashaun MILLER Link Stafford is a 60 year old who presents to clinic today for the following health issues     HPI     Vaginal Symptoms  Onset/Duration: bleeding started after Yuly   Description:  Vaginal Discharge: none - before this started , she had a odor that was really smelly.  Itching (Pruritis): YES  Burning sensation:  No- more irritation from the vaginal area   Odor: before the bleeding started , she did notice an odor .   Accompanying Signs & Symptoms:  Urinary symptoms: no  Abdominal pain: no, she is having discomfort that reminds her of a bad headache .  Fever: no  History:   Sexually active: YES  New Partner: no  Possibility of Pregnancy:  no  Recent antibiotic use: no  Previous vaginitis issues: YES  Precipitating or alleviating factors: making sure to use proper hygiene   Therapies tried and outcome: cranberry juice and increasing fluids       Concern - labial lesion/ possible blood per rectum/constipation  Onset: several weeks   Description: lump slightly sore, lower part of left labia, occasionally blood on underwear, occasionally constipated despite prn miralax  Intensity: mild  Progression of Symptoms:  same and waxing and waning  Accompanying Signs & Symptoms: see vag sxs above  Previous history of similar  "problem: occasionally   Precipitating factors:        Worsened by: not known  Alleviating factors:        Improved by: nothing  Therapies tried and outcome:  none       Review of Systems   Constitutional, HEENT, cardiovascular, pulmonary, gi and gu systems are negative, except as otherwise noted.      Objective    BP 98/64   Pulse 82   Temp 97.5  F (36.4  C) (Temporal)   Resp 16   Ht 1.651 m (5' 5\")   Wt 79.8 kg (176 lb)   LMP 08/12/2013   SpO2 98%   BMI 29.29 kg/m    Body mass index is 29.29 kg/m .  Physical Exam   GENERAL: alert and mild distress  ABDOMEN: soft, nontender, liver span normal to percussion, bowel sounds normal and mass stool LLQ   (female): normal female external genitalia, normal urethral meatus  and Bartholin's gland left palpable, slightly enlarged, nontender, faintly red  RECTAL (female): normal sphincter tone, no rectal masses and anal fissure not visible, DIGITAL RECTAL EXAM positive, no bleeding  MS: no gross musculoskeletal defects noted, no edema  SKIN: no suspicious lesions or rashes  PSYCH: mentation appears normal and anxious          "

## 2021-01-10 ENCOUNTER — HEALTH MAINTENANCE LETTER (OUTPATIENT)
Age: 61
End: 2021-01-10

## 2021-01-12 ENCOUNTER — PATIENT OUTREACH (OUTPATIENT)
Dept: CARE COORDINATION | Facility: CLINIC | Age: 61
End: 2021-01-12

## 2021-01-17 RX ORDER — DULOXETIN HYDROCHLORIDE 30 MG/1
CAPSULE, DELAYED RELEASE ORAL
COMMUNITY
Start: 2020-10-31 | End: 2021-01-29

## 2021-01-17 RX ORDER — DEXTROAMPHETAMINE SACCHARATE, AMPHETAMINE ASPARTATE, DEXTROAMPHETAMINE SULFATE AND AMPHETAMINE SULFATE 2.5; 2.5; 2.5; 2.5 MG/1; MG/1; MG/1; MG/1
TABLET ORAL
COMMUNITY
Start: 2020-12-10 | End: 2021-03-01

## 2021-01-28 NOTE — PROGRESS NOTES
"No chief complaint on file.      Initial LMP 08/12/2013  Estimated body mass index is 29.29 kg/m  as calculated from the following:    Height as of 1/6/21: 1.651 m (5' 5\").    Weight as of 1/6/21: 79.8 kg (176 lb).  BP completed using cuff size: {BP CUFF SIZE:507::\"regular\"}    Questioned patient about current smoking habits.  Pt. {SMOKE:191979}      No obstetric history on file.    The following HM Due: {WOHMLIST:194897}      The following patient reported/Care Every where data was sent to:  P ABSTRACT QUALITY INITIATIVES [70524]  {ABSTRACT list:239026}      {WOHMoutcome:539698}                "

## 2021-01-29 ENCOUNTER — ANCILLARY PROCEDURE (OUTPATIENT)
Dept: ULTRASOUND IMAGING | Facility: CLINIC | Age: 61
End: 2021-01-29
Attending: FAMILY MEDICINE
Payer: COMMERCIAL

## 2021-01-29 ENCOUNTER — OFFICE VISIT (OUTPATIENT)
Dept: OBGYN | Facility: CLINIC | Age: 61
End: 2021-01-29
Payer: COMMERCIAL

## 2021-01-29 VITALS
WEIGHT: 175.8 LBS | DIASTOLIC BLOOD PRESSURE: 68 MMHG | SYSTOLIC BLOOD PRESSURE: 109 MMHG | HEART RATE: 85 BPM | BODY MASS INDEX: 29.25 KG/M2 | TEMPERATURE: 98.3 F

## 2021-01-29 DIAGNOSIS — N90.89 LABIAL LESION: ICD-10-CM

## 2021-01-29 DIAGNOSIS — Z12.31 ENCOUNTER FOR SCREENING MAMMOGRAM FOR MALIGNANT NEOPLASM OF BREAST: ICD-10-CM

## 2021-01-29 DIAGNOSIS — Z11.3 ROUTINE SCREENING FOR STI (SEXUALLY TRANSMITTED INFECTION): ICD-10-CM

## 2021-01-29 DIAGNOSIS — Z12.4 SCREENING FOR CERVICAL CANCER: ICD-10-CM

## 2021-01-29 DIAGNOSIS — N95.0 PMB (POSTMENOPAUSAL BLEEDING): Primary | ICD-10-CM

## 2021-01-29 DIAGNOSIS — N95.0 POST-MENOPAUSAL BLEEDING: ICD-10-CM

## 2021-01-29 DIAGNOSIS — B00.89 RECURRENT HERPES SIMPLEX VIRUS (HSV) INFECTION OF BUTTOCK: ICD-10-CM

## 2021-01-29 PROCEDURE — 87491 CHLMYD TRACH DNA AMP PROBE: CPT | Performed by: OBSTETRICS & GYNECOLOGY

## 2021-01-29 PROCEDURE — 76830 TRANSVAGINAL US NON-OB: CPT | Performed by: OBSTETRICS & GYNECOLOGY

## 2021-01-29 PROCEDURE — G0145 SCR C/V CYTO,THINLAYER,RESCR: HCPCS | Performed by: OBSTETRICS & GYNECOLOGY

## 2021-01-29 PROCEDURE — 87591 N.GONORRHOEAE DNA AMP PROB: CPT | Performed by: OBSTETRICS & GYNECOLOGY

## 2021-01-29 PROCEDURE — 99204 OFFICE O/P NEW MOD 45 MIN: CPT | Performed by: OBSTETRICS & GYNECOLOGY

## 2021-01-29 PROCEDURE — 87624 HPV HI-RISK TYP POOLED RSLT: CPT | Performed by: OBSTETRICS & GYNECOLOGY

## 2021-01-29 PROCEDURE — 76856 US EXAM PELVIC COMPLETE: CPT | Performed by: OBSTETRICS & GYNECOLOGY

## 2021-01-29 RX ORDER — VALACYCLOVIR HYDROCHLORIDE 500 MG/1
500 TABLET, FILM COATED ORAL 2 TIMES DAILY
Qty: 6 TABLET | Refills: 0 | Status: SHIPPED | OUTPATIENT
Start: 2021-01-29 | End: 2021-04-28

## 2021-01-30 LAB
C TRACH DNA SPEC QL NAA+PROBE: NEGATIVE
N GONORRHOEA DNA SPEC QL NAA+PROBE: NEGATIVE
SPECIMEN SOURCE: NORMAL
SPECIMEN SOURCE: NORMAL

## 2021-01-31 NOTE — PROGRESS NOTES
Assessment & Plan     PMB (postmenopausal bleeding)  Reviewed ultrasound report and images.   Thin EMS, submucous myoma.   Discussed thin EMS reasonably excludes endometrial cancer at this time.     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   Can take valtrex for recurrences to shorten symptoms.   Lesley avoids sexual intercourse during outbreaks.   - valACYclovir (VALTREX) 500 MG tablet; Take 1 tablet (500 mg) by mouth 2 times daily for 3 days    Encounter for screening mammogram for malignant neoplasm of breast  - MA Screening Digital Bilateral; Future    Screening for cervical cancer  - HPV High Risk Types DNA Cervical  - Pap imaged thin layer screen with HPV - recommended age 30 - 65 years (select HPV order below)    Routine screening for STI (sexually transmitted infection)  Asymptomatic  - NEISSERIA GONORRHOEA PCR  - CHLAMYDIA TRACHOMATIS PCR    Labial lesion  Resolved today. RTC if recurrent.       Review of the result(s) of each unique test - Pelvic ultrasound                         No follow-ups on file.    Farzana Alba MD  [facility]    Subjective     Lesley is a 60 year old who presents to clinic today for the following health issues:post-menopausal bleeding.     HPI   CC/HPI:  Lesley is a 60 year old who presents for evaluation of post-menopausal bleeding.  Onset:  1 month ago  Duration: a few days  Age of menopause: Patient's last menstrual period was 08/12/2013.   Change in weight: No  Early satiety: No  Rectal bleeding: Yes: has had anal fissure, currently thinks it's resolved   Change in bowel or bladder function: No  HRT: No  Precipitating factors: Yes: anal fissure? Labial cyst?    Labial cyst was present at the time of her recent clinic visit with Dr. Orosco on 1/6/21. Also had anal fissure at that time. Hard for her to tell where the bleeding  was coming from. Since then the cyst seemed to drain some foul smelling fluid and is now resolved.       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       Past Surgical History:   Procedure Laterality Date     COLONOSCOPY  01/2010    repeat 10 yrs     COLONOSCOPY N/A 6/30/2020    Procedure: COLONOSCOPY, WITH POLYPECTOMY;  Surgeon: Rebecca Rojas MD;  Location: [facility] OR     DILATION AND CURETTAGE, OPERATIVE HYSTEROSCOPY WITH MORCELLATOR, COMBINED N/A 2/15/2017    Procedure: COMBINED DILATION AND CURETTAGE, OPERATIVE HYSTEROSCOPY WITH MORCELLATOR;  Surgeon: Erin Gutierrez MD;  Location: [facility] OR     ESOPHAGOSCOPY, GASTROSCOPY, DUODENOSCOPY (EGD), COMBINED Left 2/5/2016    Procedure: COMBINED ESOPHAGOSCOPY, GASTROSCOPY, DUODENOSCOPY (EGD), BIOPSY SINGLE OR MULTIPLE;  Surgeon: Pierre Fernandez MD;  Location: [facility]     GYN SURGERY         Family History   Problem Relation Age of Onset     Lupus Mother      Asthma Mother      Diabetes Father      Brain Tumor Father         begnign on pituitary     Depression Sister      Anemia Sister        Social History     Socioeconomic History     Marital status:      Spouse name: Not on file     Number of children: Not on file     Years of education: Not on file     Highest education level: Not on file   Occupational History     Not on file   Social Needs     Financial resource strain: Not on file     Food insecurity     Worry: Not on file     Inability: Not on file     Transportation needs     Medical: Not on file     Non-medical: Not on file   Tobacco Use     Smoking status: Former Smoker     Smokeless tobacco: Never Used     Tobacco comment: quit 27 years ago   Substance and Sexual Activity     Alcohol use: No     Drug use: No     Sexual activity: Yes     Partners: Male     Birth  control/protection: None   Lifestyle     Physical activity     Days per week: Not on file     Minutes per session: Not on file     Stress: Not on file   Relationships     Social connections     Talks on phone: Not on file     Gets together: Not on file     Attends Rastafarian service: Not on file     Active member of club or organization: Not on file     Attends meetings of clubs or organizations: Not on file     Relationship status: Not on file     Intimate partner violence     Fear of current or ex partner: Not on file     Emotionally abused: Not on file     Physically abused: Not on file     Forced sexual activity: Not on file   Other Topics Concern     Parent/sibling w/ CABG, MI or angioplasty before 65F 55M? No   Social History Narrative     Not on file         Current Outpatient Medications:      albuterol (PROAIR HFA/PROVENTIL HFA/VENTOLIN HFA) 108 (90 Base) MCG/ACT inhaler, Inhale 2 puffs into the lungs every 4 hours as needed for shortness of breath / dyspnea, wheezing or other (coughing), Disp: 1 Inhaler, Rfl: 0     amphetamine-dextroamphetamine (ADDERALL) 10 MG tablet, 1 TAB PER DAY DISP 12/10, Disp: , Rfl:      aspirin (ASA) 325 MG EC tablet, Take 325 mg by mouth daily, Disp: , Rfl:      atorvastatin (LIPITOR) 80 MG tablet, Take 1 tablet (80 mg) by mouth daily, Disp: 90 tablet, Rfl: 2     DEEP SEA NASAL SPRAY 0.65 % nasal spray, SPRAY 1 SPRAY INTO BOTH NOSTRILS DAILY AS NEEDED FOR CONGESTION, Disp: 1 Bottle, Rfl: 2     DULoxetine (CYMBALTA) 60 MG capsule, Take 60 mg by mouth daily, Disp: , Rfl:      flunisolide (NASALIDE) 25 MCG/ACT (0.025%) SOLN spray, INHALE 2 SPRAYS INTO EACH NOSTRIL 2 TIMES DAILY, Disp: 1 Bottle, Rfl: 3     gabapentin (NEURONTIN) 300 MG capsule, TAKE 2 CAPSULES BY MOUTH 3 TIMES A DAY, Disp: 180 capsule, Rfl: 6     hydroxychloroquine (PLAQUENIL) 200 MG tablet, TAKE 1 TABLET BY MOUTH DAILY, Disp: 90 tablet, Rfl: 2     hydrOXYzine (ATARAX) 25 MG tablet, TAKE 1 TABLET BY MOUTH THREE TIMES  A DAY, Disp: 90 tablet, Rfl: 3     multivitamin, therapeutic with minerals (MULTI-VITAMIN) TABS, Take 1 tablet by mouth daily, Disp: , Rfl:      naproxen sodium 220 MG capsule, Take 220 mg by mouth 2 times daily (with meals), Disp: , Rfl:      omega 3 1200 MG CAPS, , Disp: , Rfl:      order for DME, Cane,, Disp: 1 Device, Rfl: 0     SM ASPIRIN 325 MG tablet, TAKE 1 TABLET BY MOUTH EVERY DAY START TAKING 1/15/2020 AFTER TAKING 81 MG TAB, Disp: 90 tablet, Rfl: 1     tiZANidine (ZANAFLEX) 4 MG tablet, TAKE 1-2 TABLETS BY MOUTH AS NEEDED THREE TIMES A DAY, Disp: 270 tablet, Rfl: 3     valACYclovir (VALTREX) 500 MG tablet, Take 1 tablet (500 mg) by mouth 2 times daily for 3 days, Disp: 6 tablet, Rfl: 0    Allergies   Allergen Reactions     Morphine Sulfate Itching     Sulfa Drugs Hives       Exam:  Vitals:    01/29/21 1104   BP: 109/68   Pulse: 85   Temp: 98.3  F (36.8  C)   TempSrc: Oral   Weight: 79.7 kg (175 lb 12.8 oz)       Gen: well appearing  Abd: soft, NT, ND, no masses or fluid wave  : normal external genitalia, cervix parous, uterus normal in size and contour, adnexa normal.    Gynecological Ultrasound Report  Pelvic U/S - Transabdominal and Transvaginal   [facility]  Referring Provider: Farzana Alba MD     Sonographer:  Ana Luisa Santos RDMS  Indication: Bleeding/Menses- Postmenopausal bleeding  LMP: Patient's last menstrual period was 08/12/2013.     Gynecological Ultrasonography:   Uterus: retroverted. Contour is irregular w/ myomata: 1 Intramural 1.4 x 1.1 x 1.4 cm and 2 Subserosal 3.5 x 2.2 x 3.4 cm.  Size: 7.0 x 7.2 x 3.7 cm  Endometrium: Thickness Total 1.5 mm     Right Ovary: 1.7 x 1.6 x 1.1 cm. Wnl  Left Ovary: 1.8 x 1.6 x 1.3 cm. Wnl  Cul de Sac Free Fluid: No free fluid     Impression:      The uterus and ovaries were visualized and no abnormalities were seen. The endometrial stripe is thin and appropriate for a postmenopausal female.  The uterus contains 3  small fibroids.     Mala Esteves MD

## 2021-02-01 NOTE — RESULT ENCOUNTER NOTE
Ion Sutton, good news! your recent pelvic ultrasound is normal. Please contact if any questions.   Rafael

## 2021-02-03 LAB
COPATH REPORT: NORMAL
PAP: NORMAL

## 2021-02-04 LAB
FINAL DIAGNOSIS: NORMAL
HPV HR 12 DNA CVX QL NAA+PROBE: NEGATIVE
HPV16 DNA SPEC QL NAA+PROBE: NEGATIVE
HPV18 DNA SPEC QL NAA+PROBE: NEGATIVE
SPECIMEN DESCRIPTION: NORMAL
SPECIMEN SOURCE CVX/VAG CYTO: NORMAL

## 2021-02-11 ENCOUNTER — ANCILLARY PROCEDURE (OUTPATIENT)
Dept: MAMMOGRAPHY | Facility: CLINIC | Age: 61
End: 2021-02-11
Attending: OBSTETRICS & GYNECOLOGY
Payer: COMMERCIAL

## 2021-02-11 DIAGNOSIS — Z12.31 ENCOUNTER FOR SCREENING MAMMOGRAM FOR MALIGNANT NEOPLASM OF BREAST: ICD-10-CM

## 2021-02-11 PROCEDURE — 77067 SCR MAMMO BI INCL CAD: CPT | Mod: GC | Performed by: RADIOLOGY

## 2021-02-28 ENCOUNTER — NURSE TRIAGE (OUTPATIENT)
Dept: NURSING | Facility: CLINIC | Age: 61
End: 2021-02-28

## 2021-02-28 NOTE — TELEPHONE ENCOUNTER
[facility]NA triage call :   Presenting problem : Pt called.  New  Rx started yesterday from psychiatrist .       Having Right eye   vision change for the last 24 hours =  pea size black spot W white spot in center , and  LUE numbness / tingling .  .  Guideline used : vision loss or change A AH and then neuro deficit A AH .   Disposition and recommendations : COVID 19 Nurse Triage Plan/Patient Instructions    Please be aware that novel coronavirus (COVID-19) may be circulating in the community. If you develop symptoms such as fever, cough, or SOB or if you have concerns about the presence of another infection including coronavirus (COVID-19), please contact your health care provider or visit [email or web address].     Disposition/Instructions/ Pt agrees to call 911 now .     Call to EMS/911 recommended. Follow protocol based instructions.     Bring Your Own Device:  Please also bring your smart device(s) (smart phones, tablets, laptops) and their charging cables for your personal use and to communicate with your care team during your visit.    Thank you for taking steps to prevent the spread of this virus.  o Limit your contact with others.  o Wear a simple mask to cover your cough.  o Wash your hands well and often.    Caller verbalizes understanding and denies further questions and will call back if further symptoms to triage or questions  . Aracelis Short RN  - [place] Nurse Advisor     Reason for Disposition    [1] Numbness (i.e., loss of sensation) of the face, arm / hand, or leg / foot on one side of the body AND [2] sudden onset AND [3] present now    Additional Information    Weakness of the face, arm or leg on one side of the body    Negative: [1] SEVERE weakness (i.e., unable to walk or barely able to walk, requires support) AND [2] new onset or worsening    Negative: [1] Weakness (i.e., paralysis, loss of muscle strength) of the face, arm / hand, or leg / foot on one side of the body AND [2] sudden onset  AND [3] present now    Protocols used: NEUROLOGIC DEFICIT-A-AH, VISION LOSS OR CHANGE-A-AH

## 2021-03-01 ENCOUNTER — HOSPITAL ENCOUNTER (EMERGENCY)
Facility: CLINIC | Age: 61
Discharge: HOME | End: 2021-03-01
Attending: EMERGENCY MEDICINE | Admitting: EMERGENCY MEDICINE
Payer: COMMERCIAL

## 2021-03-01 ENCOUNTER — APPOINTMENT (OUTPATIENT)
Dept: MRI IMAGING | Facility: CLINIC | Age: 61
End: 2021-03-01
Attending: EMERGENCY MEDICINE
Payer: COMMERCIAL

## 2021-03-01 ENCOUNTER — TELEPHONE (OUTPATIENT)
Dept: NEUROLOGY | Facility: CLINIC | Age: 61
End: 2021-03-01

## 2021-03-01 VITALS
HEIGHT: 65 IN | DIASTOLIC BLOOD PRESSURE: 88 MMHG | HEART RATE: 82 BPM | SYSTOLIC BLOOD PRESSURE: 123 MMHG | WEIGHT: 178.2 LBS | RESPIRATION RATE: 18 BRPM | BODY MASS INDEX: 29.69 KG/M2 | OXYGEN SATURATION: 100 % | TEMPERATURE: 98.2 F

## 2021-03-01 DIAGNOSIS — R53.1 WEAKNESS: ICD-10-CM

## 2021-03-01 DIAGNOSIS — H53.9 VISUAL DISTURBANCE: ICD-10-CM

## 2021-03-01 LAB
ALBUMIN UR-MCNC: NEGATIVE MG/DL
ANION GAP SERPL CALCULATED.3IONS-SCNC: 3 MMOL/L (ref 3–14)
APPEARANCE UR: CLEAR
APTT PPP: 27 SEC (ref 22–37)
BASOPHILS # BLD AUTO: 0 10E9/L (ref 0–0.2)
BASOPHILS NFR BLD AUTO: 0.7 %
BILIRUB UR QL STRIP: NEGATIVE
BUN SERPL-MCNC: 13 MG/DL (ref 7–30)
CALCIUM SERPL-MCNC: 9.4 MG/DL (ref 8.5–10.1)
CHLORIDE SERPL-SCNC: 107 MMOL/L (ref 94–109)
CO2 SERPL-SCNC: 29 MMOL/L (ref 20–32)
COLOR UR AUTO: ABNORMAL
CREAT SERPL-MCNC: 0.56 MG/DL (ref 0.52–1.04)
DIFFERENTIAL METHOD BLD: ABNORMAL
EOSINOPHIL # BLD AUTO: 0.2 10E9/L (ref 0–0.7)
EOSINOPHIL NFR BLD AUTO: 4.3 %
ERYTHROCYTE [DISTWIDTH] IN BLOOD BY AUTOMATED COUNT: 14.1 % (ref 10–15)
GFR SERPL CREATININE-BSD FRML MDRD: >90 ML/MIN/{1.73_M2}
GLUCOSE SERPL-MCNC: 81 MG/DL (ref 70–99)
GLUCOSE UR STRIP-MCNC: NEGATIVE MG/DL
HCT VFR BLD AUTO: 34.8 % (ref 35–47)
HGB BLD-MCNC: 12.3 G/DL (ref 11.7–15.7)
HGB UR QL STRIP: NEGATIVE
IMM GRANULOCYTES # BLD: 0 10E9/L (ref 0–0.4)
IMM GRANULOCYTES NFR BLD: 0.2 %
INR PPP: 0.95 (ref 0.86–1.14)
INTERPRETATION ECG - MUSE: NORMAL
KETONES UR STRIP-MCNC: NEGATIVE MG/DL
LEUKOCYTE ESTERASE UR QL STRIP: NEGATIVE
LYMPHOCYTES # BLD AUTO: 1.5 10E9/L (ref 0.8–5.3)
LYMPHOCYTES NFR BLD AUTO: 34.6 %
MCH RBC QN AUTO: 27.8 PG (ref 26.5–33)
MCHC RBC AUTO-ENTMCNC: 35.3 G/DL (ref 31.5–36.5)
MCV RBC AUTO: 79 FL (ref 78–100)
MONOCYTES # BLD AUTO: 0.4 10E9/L (ref 0–1.3)
MONOCYTES NFR BLD AUTO: 8.8 %
MUCOUS THREADS #/AREA URNS LPF: PRESENT /LPF
NEUTROPHILS # BLD AUTO: 2.3 10E9/L (ref 1.6–8.3)
NEUTROPHILS NFR BLD AUTO: 51.4 %
NITRATE UR QL: NEGATIVE
NRBC # BLD AUTO: 0 10*3/UL
NRBC BLD AUTO-RTO: 0 /100
PH UR STRIP: 6.5 PH (ref 5–7)
PLATELET # BLD AUTO: 112 10E9/L (ref 150–450)
POTASSIUM SERPL-SCNC: 4.7 MMOL/L (ref 3.4–5.3)
RADIOLOGIST FLAGS: ABNORMAL
RBC # BLD AUTO: 4.43 10E12/L (ref 3.8–5.2)
RBC #/AREA URNS AUTO: 4 /HPF (ref 0–2)
SODIUM SERPL-SCNC: 139 MMOL/L (ref 133–144)
SOURCE: ABNORMAL
SP GR UR STRIP: 1.02 (ref 1–1.03)
SQUAMOUS #/AREA URNS AUTO: 2 /HPF (ref 0–1)
TROPONIN I SERPL-MCNC: <0.015 UG/L (ref 0–0.04)
UROBILINOGEN UR STRIP-MCNC: NORMAL MG/DL (ref 0–2)
WBC # BLD AUTO: 4.5 10E9/L (ref 4–11)
WBC #/AREA URNS AUTO: 1 /HPF (ref 0–5)

## 2021-03-01 PROCEDURE — 70549 MR ANGIOGRAPH NECK W/O&W/DYE: CPT | Mod: 26 | Performed by: STUDENT IN AN ORGANIZED HEALTH CARE EDUCATION/TRAINING PROGRAM

## 2021-03-01 PROCEDURE — 84484 ASSAY OF TROPONIN QUANT: CPT | Performed by: EMERGENCY MEDICINE

## 2021-03-01 PROCEDURE — 99285 EMERGENCY DEPT VISIT HI MDM: CPT | Mod: 25

## 2021-03-01 PROCEDURE — 85025 COMPLETE CBC W/AUTO DIFF WBC: CPT | Performed by: EMERGENCY MEDICINE

## 2021-03-01 PROCEDURE — 99207 MR BRAIN FOR STROKE COMPLETE W/O & W CONTRAST: CPT | Mod: 26 | Performed by: STUDENT IN AN ORGANIZED HEALTH CARE EDUCATION/TRAINING PROGRAM

## 2021-03-01 PROCEDURE — 70553 MRI BRAIN STEM W/O & W/DYE: CPT

## 2021-03-01 PROCEDURE — 70553 MRI BRAIN STEM W/O & W/DYE: CPT | Mod: 26 | Performed by: STUDENT IN AN ORGANIZED HEALTH CARE EDUCATION/TRAINING PROGRAM

## 2021-03-01 PROCEDURE — 99285 EMERGENCY DEPT VISIT HI MDM: CPT | Mod: 25 | Performed by: EMERGENCY MEDICINE

## 2021-03-01 PROCEDURE — 255N000002 HC RX 255 OP 636: Performed by: EMERGENCY MEDICINE

## 2021-03-01 PROCEDURE — 99207 PR NO BILLABLE SERVICE THIS VISIT: CPT | Performed by: PSYCHIATRY & NEUROLOGY

## 2021-03-01 PROCEDURE — 85730 THROMBOPLASTIN TIME PARTIAL: CPT | Performed by: EMERGENCY MEDICINE

## 2021-03-01 PROCEDURE — A9585 GADOBUTROL INJECTION: HCPCS | Performed by: EMERGENCY MEDICINE

## 2021-03-01 PROCEDURE — 81001 URINALYSIS AUTO W/SCOPE: CPT | Performed by: STUDENT IN AN ORGANIZED HEALTH CARE EDUCATION/TRAINING PROGRAM

## 2021-03-01 PROCEDURE — 93005 ELECTROCARDIOGRAM TRACING: CPT

## 2021-03-01 PROCEDURE — 250N000013 HC RX MED GY IP 250 OP 250 PS 637: Performed by: EMERGENCY MEDICINE

## 2021-03-01 PROCEDURE — 80048 BASIC METABOLIC PNL TOTAL CA: CPT | Performed by: EMERGENCY MEDICINE

## 2021-03-01 PROCEDURE — 93010 ELECTROCARDIOGRAM REPORT: CPT | Performed by: EMERGENCY MEDICINE

## 2021-03-01 PROCEDURE — 85610 PROTHROMBIN TIME: CPT | Performed by: EMERGENCY MEDICINE

## 2021-03-01 RX ORDER — GADOBUTROL 604.72 MG/ML
10 INJECTION INTRAVENOUS ONCE
Status: COMPLETED | OUTPATIENT
Start: 2021-03-01 | End: 2021-03-01

## 2021-03-01 RX ORDER — SODIUM CHLORIDE 9 MG/ML
INJECTION, SOLUTION INTRAVENOUS CONTINUOUS PRN
Status: DISCONTINUED | OUTPATIENT
Start: 2021-03-01 | End: 2021-03-01

## 2021-03-01 RX ORDER — LORAZEPAM 0.5 MG/1
1 TABLET ORAL ONCE
Status: COMPLETED | OUTPATIENT
Start: 2021-03-01 | End: 2021-03-01

## 2021-03-01 RX ORDER — LISDEXAMFETAMINE DIMESYLATE 10 MG/1
10 CAPSULE ORAL DAILY
COMMUNITY
Start: 2021-02-26 | End: 2021-07-11

## 2021-03-01 RX ADMIN — GADOBUTROL 8 ML: 604.72 INJECTION INTRAVENOUS at 11:38

## 2021-03-01 RX ADMIN — LORAZEPAM 1 MG: 0.5 TABLET ORAL at 11:20

## 2021-03-01 ASSESSMENT — ENCOUNTER SYMPTOMS
DYSURIA: 0
CHILLS: 0
FEVER: 0
SHORTNESS OF BREATH: 0
FACIAL ASYMMETRY: 0
VOMITING: 0
HEADACHES: 1
COUGH: 0
NAUSEA: 0
DIFFICULTY URINATING: 0
CONFUSION: 0
WEAKNESS: 1
NUMBNESS: 1

## 2021-03-01 ASSESSMENT — MIFFLIN-ST. JEOR: SCORE: 1369.19

## 2021-03-01 NOTE — ED NOTES
Dr. Jean-Baptiste notified of critical brain MRI results. RN instructed her to call imaging center for further communication with the radiologist.

## 2021-03-01 NOTE — ED PROVIDER NOTES
"    [facility]ERGENCY DEPARTMENT ([facility])  March 1, 2021  ED 17    10:00 AM   History     Chief Complaint   Patient presents with     Neurologic Problem     The history is provided by the patient and medical records.     Lesley Stafford is a 60 year old female with past medical history of lupus on Plaquenil, right frontal parietal and right pontine ischemic strokes, ADD who presents with numbness and tingling in left arm and a visual disturbance in her right eye. [facility]/care everywhere records reviewed, patient had recent mechanical slip and fall with head injury on 1/16/2020, was evaluated at [facility] ER, had a normal and was diagnosed with concussion.  Patient states that she has had headaches since her concussion but no visual auras. Patient states that when she had her stroke in 2019 she first noticed symptoms when she was in the shower and kept dropping her wash cloth with her left hand. In the last week or so she has lost control of the left side of her and nearly fell. She has had to use her cane more. On Saturday (2 days ago) she developed a \"Pacman\" shape visual disturbance in the right eye that moves around.  She states that it looks like a clear yellow [place] like a fish oil capsule with black outline that moves around in her field of vision. She has some blurry vision with floater out of right eye only. She has head pressure as well. She then noticed some left arm paresthesias yesterday, arm seems more \"fuzzy\" in terms of tactile discrimination.  She contacted her neurology clinic and was advised to go to the ER.  She called 911 and was brought here for evaluation.  Patient states that she took acetaminophen 2 days ago for her headache, no medications today.  As for any medication changes, she states that she was off Adderall x 3 weeks and recently her psychiatrist put her on Vyvanse.  She has chronic generalized body aches secondary to lupus. No cough, fever. No heart " disease.      Patient is followed by Dr. Deven Salgado of [facility] Neurology.      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       PAST SURGICAL HISTORY:   Past Surgical History:   Procedure Laterality Date     COLONOSCOPY  01/2010    repeat 10 yrs     COLONOSCOPY N/A 6/30/2020    Procedure: COLONOSCOPY, WITH POLYPECTOMY;  Surgeon: Rebecca Rojas MD;  Location: [facility] OR     DILATION AND CURETTAGE, OPERATIVE HYSTEROSCOPY WITH MORCELLATOR, COMBINED N/A 2/15/2017    Procedure: COMBINED DILATION AND CURETTAGE, OPERATIVE HYSTEROSCOPY WITH MORCELLATOR;  Surgeon: Erin Gutierrez MD;  Location: [facility] OR     ESOPHAGOSCOPY, GASTROSCOPY, DUODENOSCOPY (EGD), COMBINED Left 2/5/2016    Procedure: COMBINED ESOPHAGOSCOPY, GASTROSCOPY, DUODENOSCOPY (EGD), BIOPSY SINGLE OR MULTIPLE;  Surgeon: Pierre Fernandez MD;  Location:  GI     GYN SURGERY         Past medical history, past surgical history, medications, and allergies were reviewed with the patient. Additional pertinent items: None    FAMILY HISTORY:   Family History   Problem Relation Age of Onset     Lupus Mother      Asthma Mother      Diabetes Father      Brain Tumor Father         begnign on pituitary     Depression Sister      Anemia Sister        SOCIAL HISTORY:   Social History     Tobacco Use     Smoking status: Former Smoker     Smokeless tobacco: Never Used     Tobacco comment: quit 27 years ago   Substance Use Topics     Alcohol use: No     Social history was reviewed with the patient. Additional pertinent items: None      Patient's Medications   New Prescriptions    No medications on file   Previous Medications    ALBUTEROL (PROAIR HFA/PROVENTIL HFA/VENTOLIN HFA) 108 (90 BASE) MCG/ACT INHALER    Inhale 2 puffs  into the lungs every 4 hours as needed for shortness of breath / dyspnea, wheezing or other (coughing)    ASPIRIN (ASA) 325 MG EC TABLET    Take 325 mg by mouth daily    ATORVASTATIN (LIPITOR) 80 MG TABLET    Take 1 tablet (80 mg) by mouth daily    DEEP SEA NASAL SPRAY 0.65 % NASAL SPRAY    SPRAY 1 SPRAY INTO BOTH NOSTRILS DAILY AS NEEDED FOR CONGESTION    DULOXETINE (CYMBALTA) 60 MG CAPSULE    Take 60 mg by mouth daily    FLUNISOLIDE (NASALIDE) 25 MCG/ACT (0.025%) SOLN SPRAY    INHALE 2 SPRAYS INTO EACH NOSTRIL 2 TIMES DAILY    GABAPENTIN (NEURONTIN) 300 MG CAPSULE    TAKE 2 CAPSULES BY MOUTH 3 TIMES A DAY    HYDROXYCHLOROQUINE (PLAQUENIL) 200 MG TABLET    TAKE 1 TABLET BY MOUTH DAILY    HYDROXYZINE (ATARAX) 25 MG TABLET    TAKE 1 TABLET BY MOUTH THREE TIMES A DAY    MULTIVITAMIN, THERAPEUTIC WITH MINERALS (MULTI-VITAMIN) TABS    Take 1 tablet by mouth daily    NAPROXEN SODIUM 220 MG CAPSULE    Take 220 mg by mouth 2 times daily (with meals)    OMEGA 3 1200 MG CAPS        ORDER FOR DME    Cane,    SM ASPIRIN 325 MG TABLET    TAKE 1 TABLET BY MOUTH EVERY DAY START TAKING 1/15/2020 AFTER TAKING 81 MG TAB    TIZANIDINE (ZANAFLEX) 4 MG TABLET    TAKE 1-2 TABLETS BY MOUTH AS NEEDED THREE TIMES A DAY    VALACYCLOVIR (VALTREX) 500 MG TABLET    Take 1 tablet (500 mg) by mouth 2 times daily for 3 days    VYVANSE 10 MG CAPSULE    10 mg by Oral or Feeding Tube route daily   Modified Medications    No medications on file   Discontinued Medications    AMPHETAMINE-DEXTROAMPHETAMINE (ADDERALL) 10 MG TABLET    1 TAB PER DAY DISP 12/10          Allergies   Allergen Reactions     Morphine Sulfate Itching     Sulfa Drugs Hives        Review of Systems   Constitutional: Negative for chills and fever.   Eyes: Positive for visual disturbance (yellow floater in right eye).   Respiratory: Negative for cough and shortness of breath.    Cardiovascular: Negative for chest pain.   Gastrointestinal: Negative for nausea and vomiting.  "  Genitourinary: Negative for difficulty urinating and dysuria.   Musculoskeletal: Positive for gait problem (needing to use cane more).   Neurological: Positive for weakness, numbness (left upper extremity) and headaches. Negative for facial asymmetry.   Psychiatric/Behavioral: Negative for confusion.   All other systems reviewed and are negative.    A complete review of systems was performed with pertinent positives and negatives noted in the HPI, and all other systems negative.    Physical Exam   BP: 122/80  Pulse: 95  Temp: 98.2  F (36.8  C)  Resp: 16  Height: 165.1 cm (5' 5\")  Weight: 80.8 kg (178 lb 3.2 oz)  SpO2: 100 %      Physical Exam  Vitals signs and nursing note reviewed.   Constitutional:       General: She is not in acute distress.     Appearance: Normal appearance. She is well-developed. She is not ill-appearing or diaphoretic.   HENT:      Head: Normocephalic and atraumatic.      Nose: Nose normal.   Eyes:      General: No scleral icterus.     Extraocular Movements: Extraocular movements intact.      Conjunctiva/sclera: Conjunctivae normal.      Pupils: Pupils are equal, round, and reactive to light.   Neck:      Musculoskeletal: Normal range of motion and neck supple. No neck rigidity.   Cardiovascular:      Rate and Rhythm: Normal rate.   Pulmonary:      Effort: Pulmonary effort is normal. No respiratory distress.      Breath sounds: No stridor.   Abdominal:      General: There is no distension.      Tenderness: There is no abdominal tenderness.   Musculoskeletal: Normal range of motion.         General: No deformity or signs of injury.   Skin:     General: Skin is warm and dry.      Coloration: Skin is not jaundiced.      Findings: No rash.   Neurological:      Mental Status: She is alert and oriented to person, place, and time.      GCS: GCS eye subscore is 4. GCS verbal subscore is 5. GCS motor subscore is 6.      Cranial Nerves: No dysarthria or facial asymmetry.      Sensory: Sensory deficit " present.      Motor: Weakness present. No tremor, abnormal muscle tone, seizure activity or pronator drift.      Comments: 4+/5 hip flexion on left. 5/5 on right. Subjective diminished sensation in left hand compared to right at baseline   Psychiatric:         Mood and Affect: Mood normal.         Behavior: Behavior normal.         Thought Content: Thought content normal.         ED Course        Procedures             EKG Interpretation:      Interpreted by Shirin Jean-Baptiste MD  Time reviewed: 10:20  Symptoms at time of EKG: weakness   Rhythm: normal sinus   Rate: normal  Axis: normal  Ectopy: none  Conduction: normal  ST Segments/ T Waves: No ST-T wave changes  Q Waves: septal  Comparison to prior: Unchanged    Clinical Impression: septal q waves                      Results for orders placed or performed during the hospital encounter of 03/01/21 (from the past 24 hour(s))   EKG 12-lead, tracing only   Result Value Ref Range    Interpretation ECG Click View Image link to view waveform and result    CBC with Platelets & Differential   Result Value Ref Range    WBC 4.5 4.0 - 11.0 10e9/L    RBC Count 4.43 3.8 - 5.2 10e12/L    Hemoglobin 12.3 11.7 - 15.7 g/dL    Hematocrit 34.8 (L) 35.0 - 47.0 %    MCV 79 78 - 100 fl    MCH 27.8 26.5 - 33.0 pg    MCHC 35.3 31.5 - 36.5 g/dL    RDW 14.1 10.0 - 15.0 %    Platelet Count 112 (L) 150 - 450 10e9/L    Diff Method Automated Method     % Neutrophils 51.4 %    % Lymphocytes 34.6 %    % Monocytes 8.8 %    % Eosinophils 4.3 %    % Basophils 0.7 %    % Immature Granulocytes 0.2 %    Nucleated RBCs 0 0 /100    Absolute Neutrophil 2.3 1.6 - 8.3 10e9/L    Absolute Lymphocytes 1.5 0.8 - 5.3 10e9/L    Absolute Monocytes 0.4 0.0 - 1.3 10e9/L    Absolute Eosinophils 0.2 0.0 - 0.7 10e9/L    Absolute Basophils 0.0 0.0 - 0.2 10e9/L    Abs Immature Granulocytes 0.0 0 - 0.4 10e9/L    Absolute Nucleated RBC 0.0    Basic metabolic panel   Result Value Ref Range    Sodium 139 133 - 144 mmol/L     Potassium 4.7 3.4 - 5.3 mmol/L    Chloride 107 94 - 109 mmol/L    Carbon Dioxide 29 20 - 32 mmol/L    Anion Gap 3 3 - 14 mmol/L    Glucose 81 70 - 99 mg/dL    Urea Nitrogen 13 7 - 30 mg/dL    Creatinine 0.56 0.52 - 1.04 mg/dL    GFR Estimate >90 >60 mL/min/[1.73_m2]    GFR Estimate If Black >90 >60 mL/min/[1.73_m2]    Calcium 9.4 8.5 - 10.1 mg/dL   INR   Result Value Ref Range    INR 0.95 0.86 - 1.14   Partial thromboplastin time   Result Value Ref Range    PTT 27 22 - 37 sec   Troponin I   Result Value Ref Range    Troponin I ES <0.015 0.000 - 0.045 ug/L   UA with Microscopic reflex to Culture    Specimen: Urine Midstream; Midstream Urine   Result Value Ref Range    Color Urine Light Yellow     Appearance Urine Clear     Glucose Urine Negative NEG^Negative mg/dL    Bilirubin Urine Negative NEG^Negative    Ketones Urine Negative NEG^Negative mg/dL    Specific Gravity Urine 1.021 1.003 - 1.035    Blood Urine Negative NEG^Negative    pH Urine 6.5 5.0 - 7.0 pH    Protein Albumin Urine Negative NEG^Negative mg/dL    Urobilinogen mg/dL Normal 0.0 - 2.0 mg/dL    Nitrite Urine Negative NEG^Negative    Leukocyte Esterase Urine Negative NEG^Negative    Source Midstream Urine     WBC Urine 1 0 - 5 /HPF    RBC Urine 4 (H) 0 - 2 /HPF    Squamous Epithelial /HPF Urine 2 (H) 0 - 1 /HPF    Mucous Urine Present (A) NEG^Negative /LPF   MR Brain for Stroke Cmpl w/o & w Contras   Result Value Ref Range    Radiologist flags As above (Urgent)     Narrative    MRI brain without and with contrast  MRA of the head without contrast  Neck MRA without and with contrast    Provided History:  Neuro deficit, acute, stroke suspected; ; Acute  neuro deficit, stroke suspected.  Per EPIC: past medical history of lupus on Plaquenil, right frontal  parietal and right pontine ischemic strokes, ADD?who?presents with  numbness and tingling in left arm?and a visual disturbance?in her  right eye. Had recent?mechanical slip and fall with head injury  on  1/16/2020, was evaluated in ER, had a normal and was diagnosed  with?concussion.??    Comparison: Limited stroke MR I from 12/23/2019.     Technique:   Brain MRI:  Axial diffusion, FLAIR, T2-weighted, susceptibility, and  coronal T1-weighted images were obtained without intravenous contrast.  Following intravenous gadolinium-based contrast administration, axial  and coronal T1-weighted images were obtained.    Head MRA: 3D time-of-flight MRA of the [place] was performed  without intravenous contrast.  Neck MRA:  Limited non contrast 2DTOF images were obtained of the  mid-cervical region. Following intravenous gadolinium-based contrast  administration, a contrast enhanced MRA of the neck/cervical vessels  was performed.  Three-dimensional reconstructions of the neck and head MRA were  created, which were reviewed by the radiologist.    Dose: 8cc Gadavist injected    Findings:   Brain MRI: Axial diffusion weighted images demonstrate no definite  acute infarct. Resolution of the diffusion restriction within the  right frontoparietal junction and right side of the bryson. However only  seen on axial T2-weighted series (13 image 5) there is new 5 mm round  shaped T2 hyperintensity within the vicinity of right inferior olivary  nucleus, slightly inferior to the previously seen pontine infarct.    Unchanged T2 hyperintensities within the bilateral parietal white  matter, most compatible with chronic small vessel ischemic disease.  There is no definite intracranial hemorrhage on susceptibility images.  Ventricles are proportionate to the cerebral sulci. Contrast-enhanced  images of the brain demonstrate no abnormal intra- or extra-axial  enhancement. Partially empty sella. Tortuous course of the bilateral  optic nerves with mild prominence of the optic nerve sheaths, which is  nonspecific.    Right cerebellar hypointensity on ADC map is favored to be artifact  (series 5 image 9).No corresponding abnormal signal  on DWI or other  sequences.    Head MRA demonstrates no definite aneurysm or stenosis of the major  intracranial arteries. Anterior and left posterior communicating  arteries are patent. Right posterior communicating artery is thin.  Patent ophthalmic arteries.    Neck MRA demonstrates patent major cervical arteries. The normal  distal right internal carotid artery measures 5 mm. The normal distal  left internal carotid artery measures 5 mm. Antegrade flow in the  major cervical vasculature.      Impression    Impression:  1. There is no evidence of acute infarction or intracranial  hemorrhage. Resolution of previously seen diffusion restriction within  the right frontoparietal junction and right side of the bryson compared  to MRI from December 2019. However there is new questionable round  shaped T2 hyperintensity within the vicinity of right inferior olivary  nucleus. This could be artifactual. Alternatively, hypertrophic  olivary degeneration is in the differential. Recommend clinical  correlation for pathology involving the Guillain?Mollaret triangle.  2. Stable leukoaraiosis.  3. Head MRA demonstrates no definite aneurysm or stenosis of the major  intracranial arteries.  4. Neck MRA demonstrates patent major cervical arteries.      [Access Center: As above]    This report will be copied to the [facility] [place] to ensure a  provider acknowledges the finding. Access Center is available Monday  through Friday 8am-3:30 pm.     I have personally reviewed the examination and initial interpretation  and I agree with the findings.    SARAH BETH ROWE MD     Medications   LORazepam (ATIVAN) tablet 1 mg (1 mg Oral Given 3/1/21 1120)   gadobutrol (GADAVIST) injection 10 mL (8 mLs Intravenous Given 3/1/21 1138)             Assessments & Plan (with Medical Decision Making)   Lesley Stafford is a 60 year old female with past medical history of lupus on Plaquenil, right frontal parietal and right pontine ischemic  strokes, ADD who presents with numbness and tingling in left arm and a visual disturbance in her right eye.    Ddx: atypical/optical migraine, recrudescent stroke symptoms, chronic stroke deficits, CVA    Stroke MRI/MRA ordered. Defer stroke code with symptom onset >2 days ago. Stroke team consulted. Labs unremarkable. Trop neg. EKG w/o acute ischemic changes.     MRI/MRA without acute infarction or hemorrhage.  No aneurysm or stenosis of the major intracranial or cervical arteries.  Resolution of previously seen diffusion restriction was noted in the right frontoparietal junction and right side of the bryson compared to MRI from December 2019.  However there was a new questionable round shaped T2 hyperintensity within the vicinity of the right inferior olivary nucleus.  This was read as possible artifact versus hypertrophic olivary degeneration.  The neurology stroke service reviewed the MRI with radiology and signed off.  General neurology was consulted for evaluation of the above abnormal finding.      General neurology requested an ophthalmology consult.  I spoke with ophthalmology resident and she reviewed patient's imaging and history with her senior on-call.  They felt that patient was at risk for side effect of Plaquenil medication as a cause for her visual disturbance.  They would like her to come to clinic tomorrow morning for a comprehensive exam.  They have scheduled her for an appointment in the [facility] ninth floor at 10:30 AM tomorrow.    Neurology agreed with ophthalmology consult to take place tomorrow morning.  They felt that the MRI findings were likely artifactual.  They recommended completing a urinalysis prior to discharge in case this is a cause for patient's recrudescent left-sided weakness.  Urinalysis resulted normal.  Patient informed of the above recommendations including a follow-up appointment with ophthalmology tomorrow.  She is able to attend this appointment.  I also advised her to  follow-up with her primary neurologist as soon as possible.  Detailed return precautions provided.  Patient verbalized understanding and felt comfortable with plan for discharge.    I have reviewed the nursing notes.    I have reviewed the findings, diagnosis, plan and need for follow up with the patient.    New Prescriptions    No medications on file       Final diagnoses:   Visual disturbance   Weakness   I, Shelby Be, am serving as a trained medical scribe to document services personally performed by Shirin Jean-Baptiste MD based on the provider's statements to me on March 1, 2021.  This document has been checked and approved by the attending provider.    I, Shirin Jean-Baptiste MD, was physically present and have reviewed and verified the accuracy of this note documented by Shelby Be, medical scribe.       3/1/2021   [facility] EMERGENCY DEPARTMENT     Shirin Jean-Baptiste MD  03/01/21 [id number]

## 2021-03-01 NOTE — TELEPHONE ENCOUNTER
I called Lesley back.  She continues to have numbness and tingling in her left arm and right eye fogginess/blurry.  I advised that she go to the ED now to be evaluated.  She does have a history of stroke.  She verbalized understanding and agreement of the plan.

## 2021-03-01 NOTE — TELEPHONE ENCOUNTER
[facility] Call Center    Phone Message    May a detailed message be left on voicemail: yes     Reason for Call: Symptoms or Concerns     If patient has red-flag symptoms, warm transfer to triage line    Current symptom or concern: Right eye has a fogginess on her eye like a pac man. Left arm numbness and tingling that goes down arm to fingers.    Symptoms have been present for:  Eye since Saturday 2/27/21, Arm 2/28/21    Lesley started taking Vvvanse 10 mg since Saturday and is not sure if this is what is causing her symptoms.    Has patient previously been seen for this? No    By :     Date:     Are there any new or worsening symptoms? Yes: Both symptoms are new.      Action Taken: Message routed to:  [facility] ([facility]    Travel Screening: Not Applicable

## 2021-03-01 NOTE — ED TRIAGE NOTES
Pt comes in for foggy vision in right eye with spot and tingling in left arm down hand that started Saturday night. Pt started Vyvanse Saturday, hx stroke '19 & concussion Jan '21

## 2021-03-01 NOTE — DISCHARGE INSTRUCTIONS
Please make an appointment to follow up with Eye Clinic (phone: [phone]) and Neurology Clinic (phone: [phone]) as soon as possible.    You should call to schedule a follow-up appointment with your primary neurologist, as soon as possible.    You should attend a follow-up appointment with the ophthalmology clinic at [facility] on the ninth floor.  You have been scheduled for 10:30 AM tomorrow morning.  This will be a longer appointment is they would like to do comprehensive testing of your retina.    [facility] clinic 9th floor 10:30 AM 3/2/21

## 2021-03-01 NOTE — CONSULTS
"[facility]  Neurology Consultation    Patient Name:  Lesley Stafford  MRN:  [id number]    :  1960  Date of Service:  2021  Primary care provider:  Rafael Orosco      Neurology consultation service was asked to see Lesley Stafford by Dr. Jean-Baptiste to evaluate L arm numbness and R eye scotoma.    History of Present Illness:   60 year old female h/o lupus on HCQ, R frontal parietal and R pontine ischemic strokes in 2019, who presents with 2 days of L arm numbness and tingling, and 2 days of a visual disturbance in her R eye.    Ms. Link Stafford reports that on Saturday she began to notice that she was having frequent numbness and tingling in her L arm. She also reports that she has had to use her cane more to walk, although when I ask her about deficits in her sensation in her lower extremities she denies these. She states that this is extremely similar to how she felt at the time of her strokes in 2019.    Additionally she reports a \"Pacman\" shape in her R eye that moves within her visual field. This has been present for 2 days and has \"possibly\" increased in size.    On 21 she had a fall with head trauma, and was evaluated at [facility]. CT at that time was negative for acute changes and she was diagnosed with concussion. Addtionally recently started vyvanse in place of adderall for ADHD.    ROS  A 10-point ROS was performed and is negative except as per HPI.    PMH  Past Medical History:   Diagnosis Date     Allergy      Anemia      Anxiety      Cerebral infarction (H)      Depressive disorder      hands/feet      Hemoglobin C trait (H) 2016     Lupus (H)      Substance abuse (H)     Has not used for 19 years.     Past Surgical History:   Procedure Laterality Date     COLONOSCOPY  2010    repeat 10 yrs     COLONOSCOPY N/A 2020    Procedure: COLONOSCOPY, WITH POLYPECTOMY;  Surgeon: Rebecca Rojas MD;  Location:  OR     " "DILATION AND CURETTAGE, OPERATIVE HYSTEROSCOPY WITH MORCELLATOR, COMBINED N/A 2/15/2017    Procedure: COMBINED DILATION AND CURETTAGE, OPERATIVE HYSTEROSCOPY WITH MORCELLATOR;  Surgeon: Erin Gutierrez MD;  Location: [facility] OR     ESOPHAGOSCOPY, GASTROSCOPY, DUODENOSCOPY (EGD), COMBINED Left 2/5/2016    Procedure: COMBINED ESOPHAGOSCOPY, GASTROSCOPY, DUODENOSCOPY (EGD), BIOPSY SINGLE OR MULTIPLE;  Surgeon: Pierre Fernandez MD;  Location: [facility]     GYN SURGERY         Medications   (Not in a hospital admission)      Allergies  Allergies   Allergen Reactions     Morphine Sulfate Itching     Sulfa Drugs Hives       Social History  Social History     Tobacco Use     Smoking status: Former Smoker     Smokeless tobacco: Never Used     Tobacco comment: quit 27 years ago   Substance Use Topics     Alcohol use: No       Family History    Family History   Problem Relation Age of Onset     Lupus Mother      Asthma Mother      Diabetes Father      Brain Tumor Father         begnign on pituitary     Depression Sister      Anemia Sister          Physical Examination   Vitals: /73   Pulse 84   Temp 98.2  F (36.8  C) (Oral)   Resp 16   Ht 1.651 m (5' 5\")   Wt 80.8 kg (178 lb 3.2 oz)   LMP 08/12/2013   SpO2 99%   BMI 29.65 kg/m    General: Adult female patient, lying in bed, NAD  HEENT: NC/AT, no icterus, op pink and moist  Chest: non-labored on RA  Abdomen: S/NT/ND  Extremities: Warm, no edema  Skin: No rash or lesion   Psych: Mood pleasant, affect congruent  Neuro:  Mental status: Awake, alert, attentive, oriented to self, time, place, and circumstance. Language is fluent and coherent with intact comprehension of complex commands, naming and repetition.  Cranial nerves: VFF, PERRL, conjugate gaze, EOMI, facial sensation intact, face symmetric, shoulder shrug strong, tongue/uvula midline, no dysarthria. Normal retinal exam b/l with no papilledema and normal optic disc.  Motor: Normal bulk and tone. No abnormal " "movements. 5/5 strength in 4/4 extremities.   Reflexes: 2+ reflexic and symmetric biceps, brachioradialis, triceps, patellae, and achilles. Negative Perla.  Sensory: L arm with diminished \"dull\" light touch and pin. Symmetric in b/l LE. Proprioception intact b/l.  Coordination: FNF and HS without ataxia or dysmetria. Rapid alternating movements intact.   Gait: Not assessed.    Investigations   Recent Labs   Lab 03/01/21  1059   WBC 4.5   HGB 12.3   MCV 79   *   INR 0.95      POTASSIUM 4.7   CHLORIDE 107   CO2 29   BUN 13   CR 0.56   ANIONGAP 3   AKILA 9.4   GLC 81   TROPI <0.015     MRI Brain for stroke complete w/con:  Impression:  1. There is no evidence of acute infarction or intracranial  hemorrhage. Resolution of previously seen diffusion restriction within  the right frontoparietal junction and right side of the bryson compared  to MRI from December 2019. However there is new questionable round  shaped T2 hyperintensity within the vicinity of right inferior olivary  nucleus. This could be artifactual. Alternatively, hypertrophic  olivary degeneration is in the differential. Recommend clinical  correlation for pathology involving the Guillain?Mollaret triangle.  2. Stable leukoaraiosis.  3. Head MRA demonstrates no definite aneurysm or stenosis of the major  intracranial arteries.  4. Neck MRA demonstrates patent major cervical arteries.    Impression  # Recurrence of prior stroke deficits s/p recent concussion  No MRI/MRA evidence of new stroke, and in fact has resolution of some of the findings of her prior MRI. The new T2 hyperintensity in the olivary nucleus would likely not explain her symptoms (further discussion below). We did add on a UA to evaluate for UTI, which can cause recrudescence of stroke symptoms.    Given that these symptoms are extremely similar to her prior stroke symptoms I would favor the explanation that her recent concussion is causing recurrence of her prior CNS deficits, " for which she is normally well compensated. No evidence of new stroke, and she can non-urgently follow up with her outpatient neurologist.    # R monocular floater  Unclear etiology. No headache evidence of migraine, although could be purely optical. No physical deficit observed on opthalmoscopic exam, no MRI findings in visual areas of the brain. Vyvanse has been known to cause ophthalmologic side effects, but I have not seen this particular manifestation before. All that being said, her hydroxychloroquine puts her at risk for ophthalmologic side effects, and she should be evaluated. Per ED provider they are arranging for her to have retinal examination per ophthalmology tomorrow - agree with this approach.    # ? hypertrophic olivary degeneration  There are no physical symptoms that correspond to her MRI findings. 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 While she does describe very infrequent myoclonic jerks of the arm, she endorses that this is longstanding and not changed recently.    Recommendations  - Follow up with outpatient neurologist within 2-4 weeks  - Evaluation by ophthalmology tomorrow as arranged by ED  - No specific follow-up required for olivary T2 hyperintensity    Thank you for involving Neurology in the care of Lesley Stafford.  Please do not hesitate to call with questions/concerns (consult pager [phone]).      Patient was seen and discussed with Dr. Vasquez.

## 2021-03-03 ENCOUNTER — PATIENT OUTREACH (OUTPATIENT)
Dept: CARE COORDINATION | Facility: CLINIC | Age: 61
End: 2021-03-03

## 2021-03-03 DIAGNOSIS — E55.9 VITAMIN D DEFICIENCY: Primary | ICD-10-CM

## 2021-03-03 NOTE — PROGRESS NOTES
"Clinic Care Coordination Contact  Community Health Worker Follow Up    Goals:   Goals Addressed as of 3/3/2021 at 3:21 PM        Patient Stated      Psychosocial (pt-stated)     Added 11/30/20 by Henny White BSW     Goal Statement: I would like to find ways to keep improving my mental health over the next 3 months.   Date Goal set: 11/30/2020  Barriers: n/a   Strengths: wants to keep improving.   Date to Achieve By: 3/30/2021  Patient expressed understanding of goal: yes    Action steps to achieve this goal:  1. I will continue attending therapy appointments.   2. I will continue meeting with [facility] worker.   3. I will find ways to work on my overall health and wellness.   4. I will coontinue attending AA over zoom.     (not discussed today)              Intervention and Education during outreach:     Called pt to check in. Pt went to the ED on Monday. I asked how she's doing, and she said \"I'm doing good.\" Pt shared that she missed caring for her dad on Monday and yesterday but was back with him today when I called. She said that she was not feeling well over the weekend. She had some symptoms that she'd experienced previously when she had a stroke (December 2019). Pt said she called her neurologist's office to check in and they advised her to go to the ED for follow up. Pt said she had an MRI, blood work and other tests done but \"they weren't exactly sure what was going on with me.\" Pt said she has been on Plaquenil \"long-term for Lupus\" and that they were wondering if that could be causing the \"floater\" that pt has in her right eye.     Pt said she has a neurologist appointment next week and will also be seeing a neuro-ophthalmologist as a follow up. Pt said she is waiting for a call back from her retina specialists to schedule an appointment. I thanked pt for letting me know and that I'm glad she went in to get checked out. Pt said she is looking forward to getting more information and \"hopefully, more answers " "in the next few weeks.\"     When I asked if pt needed anything from her PCP or us, she said she did have a question about the lab results from the ED. Pt asked if her Vitamin B and Vitamin D levels are low. She said she was talking with her mental health therapist, and she asked pt, which made pt \"curious to find out.\" Pt said she has had very low Vitamin D levels before in the past. She said that \"they took four tubes of my blood at the hospital\" and would like to have someone look at the results. I offered to send a note to pt's PCP and triage RNs at the clinic, as I can't interpret results, which pt said she understood. I let her know that either I or someone from the clinic would follow up. She thanked me for this and for calling to check in with her.     CHW Plan: Pt will attend her follow up appointments. CHW will ask pt's PCP or triage RNs to review pt's lab results from the ED.     "

## 2021-03-04 NOTE — TELEPHONE ENCOUNTER
Complete blood count done in ER was normal; last vit D check 5 years ago (had increased to normal).  Recommend to continue vit D supplements and schedule a nonfasting lab only appointment to test blood for vit D. Order signed, please notify, thanks Rhonda Hall

## 2021-03-04 NOTE — TELEPHONE ENCOUNTER
Informed Pt of notes per Dr. Orosco.     Pt has not been on a Vit D supplement for a long time so the lab draw would reflect no supplement use.    Pt scheduled to have lab for Vit D next week.     Future Appointments   Date Time Provider Department Center   3/10/2021  9:00 AM RD LAB [facility]        Dora Mckeon RN   [facility]

## 2021-03-09 ENCOUNTER — OFFICE VISIT (OUTPATIENT)
Dept: OPHTHALMOLOGY | Facility: CLINIC | Age: 61
End: 2021-03-09
Attending: OPHTHALMOLOGY
Payer: COMMERCIAL

## 2021-03-09 DIAGNOSIS — H43.813 POSTERIOR VITREOUS DETACHMENT OF BOTH EYES: ICD-10-CM

## 2021-03-09 DIAGNOSIS — H52.4 PRESBYOPIA: ICD-10-CM

## 2021-03-09 DIAGNOSIS — H04.123 DRY EYES: ICD-10-CM

## 2021-03-09 DIAGNOSIS — Z79.899 LONG-TERM USE OF PLAQUENIL: Primary | ICD-10-CM

## 2021-03-09 DIAGNOSIS — H52.13 MYOPIA OF BOTH EYES: ICD-10-CM

## 2021-03-09 PROCEDURE — 92004 COMPRE OPH EXAM NEW PT 1/>: CPT | Performed by: OPHTHALMOLOGY

## 2021-03-09 PROCEDURE — G0463 HOSPITAL OUTPT CLINIC VISIT: HCPCS | Mod: 25

## 2021-03-09 PROCEDURE — 92083 EXTENDED VISUAL FIELD XM: CPT | Performed by: OPHTHALMOLOGY

## 2021-03-09 PROCEDURE — 99207 FUNDUS AUTOFLUORESCENCE IMAGE (FAF) OU (BOTH EYES): CPT | Performed by: OPHTHALMOLOGY

## 2021-03-09 PROCEDURE — 92250 FUNDUS PHOTOGRAPHY W/I&R: CPT | Performed by: OPHTHALMOLOGY

## 2021-03-09 PROCEDURE — 92134 CPTRZ OPH DX IMG PST SGM RTA: CPT | Performed by: OPHTHALMOLOGY

## 2021-03-09 PROCEDURE — 92015 DETERMINE REFRACTIVE STATE: CPT

## 2021-03-09 ASSESSMENT — VISUAL ACUITY
OD_CC: 20/40
OD_PH_CC: 20/25
OD_PH_CC+: -2
METHOD: SNELLEN - LINEAR
OS_CC: 20/20

## 2021-03-09 ASSESSMENT — REFRACTION_WEARINGRX
OS_SPHERE: -0.75
OS_AXIS: 180
OD_AXIS: 175
OD_SPHERE: -1.00
OD_CYLINDER: +0.75
OS_ADD: +2.00
OS_CYLINDER: +0.75
OD_ADD: +2.00

## 2021-03-09 ASSESSMENT — REFRACTION_MANIFEST
OS_AXIS: 175
OS_CYLINDER: +1.00
OD_CYLINDER: +1.25
OD_AXIS: 180
OD_SPHERE: -1.25
OS_ADD: +2.00
OS_SPHERE: -0.75
OD_ADD: +2.00

## 2021-03-09 ASSESSMENT — SLIT LAMP EXAM - LIDS
COMMENTS: NORMAL
COMMENTS: NORMAL

## 2021-03-09 ASSESSMENT — TONOMETRY
OS_IOP_MMHG: 10
IOP_METHOD: ICARE
OD_IOP_MMHG: 10

## 2021-03-09 ASSESSMENT — CUP TO DISC RATIO
OD_RATIO: 0.3
OS_RATIO: 0.3

## 2021-03-09 ASSESSMENT — CONF VISUAL FIELD
OD_NORMAL: 1
OS_NORMAL: 1

## 2021-03-09 ASSESSMENT — EXTERNAL EXAM - RIGHT EYE: OD_EXAM: PROMINENT GLOBES

## 2021-03-09 ASSESSMENT — EXTERNAL EXAM - LEFT EYE: OS_EXAM: PROMINENT GLOBES
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
## 2021-03-10 ENCOUNTER — PATIENT OUTREACH (OUTPATIENT)
Dept: CARE COORDINATION | Facility: CLINIC | Age: 61
End: 2021-03-10

## 2021-03-10 NOTE — PATIENT INSTRUCTIONS
Labs so far no infection  Std testing today  Consider flu or tdap   See dr suarez about memory concern  See judson about neck and back pain and tingling  Referral to physical therapy made  Continue care with pain and rheumatology      Pt states that she has a missed call.    Pt requesting to speak with RN.    Message confirmed with caller.     Call connected to call center triage queue.  Routed to Call Center Triage Pool ([phone])

## 2021-03-10 NOTE — PROGRESS NOTES
Clinic Care Coordination Contact    Follow Up Progress Note      Assessment:  called and completed follow up with patient today.    SW let's pt know they reviewed her appt yesterday and asked how that went. Patient states that it went very well and was very impressed with the staff. Pt shares that CHW Rhonda will also be following up her after appt's.    Patient and SW discussed current goal. Pt shares that she is still attending AA meetings and will be getting a new [facility] worker by the end of this month which she is greatly looking forward to!    Patient has no immediate needs/concerns at this time and states that she really appreciates the added support she get's through CC.  SW let's her know she can reach out anytime.    Goals addressed this encounter:   Goals Addressed                 This Visit's Progress       Patient Stated      Psychosocial (pt-stated)   On track     Goal Statement: I would like to find ways to keep improving my mental health over the next 3 months.   Date Goal set: 11/30/2020  Barriers: n/a   Strengths: wants to keep improving.   Date to Achieve By: 3/30/2021  Patient expressed understanding of goal: yes    Action steps to achieve this goal:  1. I will continue attending therapy appointments.   2. I will continue meeting with [facility] worker. (Has been on hold, will get new [facility] worker at the end of this month)  3. I will find ways to work on my overall health and wellness.   4. I will coontinue attending AA over zoom.     Goal updated: 3/9/2021 [facility]               Intervention/Education provided during outreach:   Discussed appt yesterday and current goal      Plan:   CHW to follow up monthly  SW will complete next follow up in 45 days

## 2021-03-11 DIAGNOSIS — G89.29 CHRONIC BILATERAL LOW BACK PAIN WITHOUT SCIATICA: ICD-10-CM

## 2021-03-11 DIAGNOSIS — M54.50 CHRONIC BILATERAL LOW BACK PAIN WITHOUT SCIATICA: ICD-10-CM

## 2021-03-12 ENCOUNTER — OFFICE VISIT (OUTPATIENT)
Dept: NEUROLOGY | Facility: CLINIC | Age: 61
End: 2021-03-12
Payer: COMMERCIAL

## 2021-03-12 VITALS
BODY MASS INDEX: 29.79 KG/M2 | RESPIRATION RATE: 16 BRPM | DIASTOLIC BLOOD PRESSURE: 74 MMHG | HEART RATE: 87 BPM | SYSTOLIC BLOOD PRESSURE: 104 MMHG | WEIGHT: 179 LBS | OXYGEN SATURATION: 99 %

## 2021-03-12 DIAGNOSIS — Z86.73 HISTORY OF STROKE: Primary | ICD-10-CM

## 2021-03-12 PROCEDURE — 99214 OFFICE O/P EST MOD 30 MIN: CPT | Performed by: PSYCHIATRY & NEUROLOGY

## 2021-03-12 ASSESSMENT — PAIN SCALES - GENERAL: PAINLEVEL: NO PAIN (0)

## 2021-03-12 NOTE — LETTER
3/12/2021       RE: Lesley Stafford  [address]  [place] [address]     Dear Colleague,    Thank you for referring your patient, Lesley Stafford, to the [facility] NEUROLOGY CLINIC [place] at [facility]. Please see a copy of my visit note below.    [facility] Neurology Follow Up Visit    Lesley Stafford MRN# [id number]   Age: 60 year old YOB: 1960     Brief history of symptoms: The patient was initially seen in neurologic consultation on 12/23/20219 through 12/24/2019 for R-frontal and parietal ischemic strokes (ESUS) with the stroke team, and then for follow up with myself on 3/2/2020. Please see the comprehensive neurologic consultation notes from those dates in the Epic records for details. On my last evaluation, the patient had ongoing neuropathy in her feet leading to some pain (on duloxetine, helpful for likely small fiber neuropathy), and otherwise her prior stroke deficits of LUE/LLE weakness were not seen on examination.  Thep atient was reporting ongoing signs of depression and was referred to psychology, and also was reporting difficulties with finances, so a  provider was recommended.  Otherwise, she was to continue with statin, aspirin, and gabapentin with overall HTN goals of under 135/80.    Interval history: She had a slip and fall on ice (1/16/2021) with concussion reported and evaluated with [facility]. The patient was seen in the ED 3/1/2021 for numbness and tingling in her left arm.  She reported that two days prior she was having visual obscuration (pacman shape in right eye that moves around). Exam at the ED was revealing for weakness/numnbess in her left upper extremity and lower extremity (4+/5 HF on left, subjective diminished sensation on left hand) but vitals were stable (no elevated BP, fever, tachycardia, tachypnea).  MRI/MRA were unrevealing for new infarct or hemorrhage,  "but there was a questionable round T2 hyperintensity of the right inferior olivary nucleus (artifact versus hypertrophic degeneration).  Review of imaging through neurology (in hospital) led to thoughts that the image finding was indeed artifact.  Please see 3/1/2021 consultation noted through Neurology for further details on presentation, exam, and impression/plan.    The patient followed up with Ophthalmology 3/9/93647 for left visual obscuration and \"pacman\" like shape with intermittent fuzzy vision of the right eye.  It was determined that the patient had posterior vitreous detachment of both eyes with new symptoms emerging 3/2021.  There was low suspicion that long term plaquenil use was leading to toxicity or her eye findings.    Today, The patient has had more frequent left shoulder, arm, and finger-tip numbness.  This is occurring a few times per week, since the concussion.  Prior to the concussion, she doesn't really recall the numbness/sensory changes.   There is no major other symptoms of weakness, of memory deficits with the periods of paresthesias.  The events last a few minutes at most.  There is no major motor symptoms.  She may have difficulty holding items during the episodes.  There is no trigger with a neck or arm motion.  She can become foggy in mentation, difficulty with putting thoughts together during the event (but has no major memory deficits or amnesia about the episodes).       The patient also reports having poor sleep, with elements of vivid dreams and acting out her dreams.  She doesn't feel rested when waking, and has somnambulism. She has awoken when acting out a dream and was fearful of this.      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   - Lupus " with use of plaquenil     Medications:     Current Outpatient Medications   Medication Sig     albuterol (PROAIR HFA/PROVENTIL HFA/VENTOLIN HFA) 108 (90 Base) MCG/ACT inhaler Inhale 2 puffs into the lungs every 4 hours as needed for shortness of breath / dyspnea, wheezing or other (coughing)     aspirin (ASA) 325 MG EC tablet Take 325 mg by mouth daily     atorvastatin (LIPITOR) 80 MG tablet Take 1 tablet (80 mg) by mouth daily     DEEP SEA NASAL SPRAY 0.65 % nasal spray SPRAY 1 SPRAY INTO BOTH NOSTRILS DAILY AS NEEDED FOR CONGESTION     DULoxetine (CYMBALTA) 60 MG capsule Take 60 mg by mouth daily     flunisolide (NASALIDE) 25 MCG/ACT (0.025%) SOLN spray INHALE 2 SPRAYS INTO EACH NOSTRIL 2 TIMES DAILY     gabapentin (NEURONTIN) 300 MG capsule TAKE 2 CAPSULES BY MOUTH 3 TIMES A DAY     hydroxychloroquine (PLAQUENIL) 200 MG tablet TAKE 1 TABLET BY MOUTH DAILY     hydrOXYzine (ATARAX) 25 MG tablet TAKE 1 TABLET BY MOUTH THREE TIMES A DAY     multivitamin, therapeutic with minerals (MULTI-VITAMIN) TABS Take 1 tablet by mouth daily     naproxen sodium 220 MG capsule Take 220 mg by mouth 2 times daily (with meals)     omega 3 1200 MG CAPS      order for DME Cane,     SM ASPIRIN 325 MG tablet TAKE 1 TABLET BY MOUTH EVERY DAY START TAKING 1/15/2020 AFTER TAKING 81 MG TAB     tiZANidine (ZANAFLEX) 4 MG tablet TAKE 1-2 TABLETS BY MOUTH AS NEEDED THREE TIMES A DAY     valACYclovir (VALTREX) 500 MG tablet Take 1 tablet (500 mg) by mouth 2 times daily for 3 days     VYVANSE 10 MG capsule 10 mg by Oral or Feeding Tube route daily      Review of Systems:   A comprehensive 10 point review of systems (constitutional, ENT, cardiac, peripheral vascular, lymphatic, respiratory, GI, , Musculoskeletal, skin, Neurological) was performed and found to be negative except as described in this note.      Physical Exam:   Vitals: /74   Pulse 87   Resp 16   Wt 81.2 kg (179 lb)   LMP 08/12/2013   SpO2 99%   BMI 29.79 kg/m     General: Seated comfortably in no acute distress.  Neurologic:     Mental Status: Fully alert, attentive and oriented. Speech clear and fluent, no paraphasic errors.     Cranial Nerves: EOMI with normal smooth pursuit. Facial movements symmetric. Hearing not formally tested but intact to conversation.  No dysarthria.     Motor: No tremors or other abnormal movements observed.      Gait: Normal, steady casual gait.         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  Overall, her presentation is concerning for either a focal seizure or recrudescence of her prior stroke symptoms following a concussion.  Further testing with EEG will be needed to further support a possible focal seizure etiology.  Otherwise, if negative, I suspect her symptoms should improve over the next month as her concussion abates. It is odd that a concussion could led to stroke like symptoms in a prior stroke patient, as I would expect more generalized symptoms (headache, full blurred vision, nausea, imbalance) compared to focal sensory and motor weakness.    Curiously, the patient also reported worsened sleep for a few months and cites acting out her dreams, yelling in her sleep, and somnambulation.  It is unclear as to why the dramatic shift in sleep is occurring, but I do think further investigation with a sleep consult is needed to define whether there is evidence for an underlying sleep disorder or a possible frontal seizure occurring.     Plan:  - Video EEG (3 hours, with sleep depravation)   - Sleep study    Follow up in Neurology clinic in 3 months (Video) or earlier as needed should new concerns arise.    THOMAS Salgado D.O.   of  Neurology      Total time (35 min) in this patient encounter was spent on precharting, counseling and/or coordination of care. We reviewed diagnostic results, impressions, and discussed other possible tests if symptoms do not improve. We discussed the implications of the diagnosis, as well as risks and benefits of management options. We reviewed treatment instructions and our scheduled follow-up as specified in the discharge plan. We also discussed the importance of compliance with the chosen course of treatment. The patient is in agreement with this plan and has no further questions.

## 2021-03-12 NOTE — PATIENT INSTRUCTIONS
Your events of left arm numbness, hand weakness, and confusion lasting for minutes and occurring since your concussion are concerning for possible seizure arising from where your old stroke occurred (right side of brain).  I suggest we do an EEG to look for possible seizures.    Given your poor sleep and changes in sleep behavior, a sleep study should also be done.

## 2021-03-12 NOTE — NURSING NOTE
Chief Complaint   Patient presents with     RECHECK     [facility] RETURN- yearly stroke f/u      Wong Zelaya

## 2021-03-12 NOTE — PROGRESS NOTES
[facility] Neurology Follow Up Visit    Lesley Stafford MRN# [id number]   Age: 60 year old YOB: 1960     Brief history of symptoms: The patient was initially seen in neurologic consultation on 12/23/20219 through 12/24/2019 for R-frontal and parietal ischemic strokes (ESUS) with the stroke team, and then for follow up with myself on 3/2/2020. Please see the comprehensive neurologic consultation notes from those dates in the Epic records for details. On my last evaluation, the patient had ongoing neuropathy in her feet leading to some pain (on duloxetine, helpful for likely small fiber neuropathy), and otherwise her prior stroke deficits of LUE/LLE weakness were not seen on examination.  Thep atient was reporting ongoing signs of depression and was referred to psychology, and also was reporting difficulties with finances, so a  provider was recommended.  Otherwise, she was to continue with statin, aspirin, and gabapentin with overall HTN goals of under 135/80.    Interval history: She had a slip and fall on ice (1/16/2021) with concussion reported and evaluated with [facility]. The patient was seen in the ED 3/1/2021 for numbness and tingling in her left arm.  She reported that two days prior she was having visual obscuration (pacman shape in right eye that moves around). Exam at the ED was revealing for weakness/numnbess in her left upper extremity and lower extremity (4+/5 HF on left, subjective diminished sensation on left hand) but vitals were stable (no elevated BP, fever, tachycardia, tachypnea).  MRI/MRA were unrevealing for new infarct or hemorrhage, but there was a questionable round T2 hyperintensity of the right inferior olivary nucleus (artifact versus hypertrophic degeneration).  Review of imaging through neurology (in hospital) led to thoughts that the image finding was indeed artifact.  Please see 3/1/2021 consultation noted through Neurology for further details on  "presentation, exam, and impression/plan.    The patient followed up with Ophthalmology 3/9/39810 for left visual obscuration and \"pacman\" like shape with intermittent fuzzy vision of the right eye.  It was determined that the patient had posterior vitreous detachment of both eyes with new symptoms emerging 3/2021.  There was low suspicion that long term plaquenil use was leading to toxicity or her eye findings.    Today, The patient has had more frequent left shoulder, arm, and finger-tip numbness.  This is occurring a few times per week, since the concussion.  Prior to the concussion, she doesn't really recall the numbness/sensory changes.   There is no major other symptoms of weakness, of memory deficits with the periods of paresthesias.  The events last a few minutes at most.  There is no major motor symptoms.  She may have difficulty holding items during the episodes.  There is no trigger with a neck or arm motion.  She can become foggy in mentation, difficulty with putting thoughts together during the event (but has no major memory deficits or amnesia about the episodes).       The patient also reports having poor sleep, with elements of vivid dreams and acting out her dreams.  She doesn't feel rested when waking, and has somnambulism. She has awoken when acting out a dream and was fearful of this.      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   - Lupus with use of plaquenil     Medications:     Current Outpatient Medications   Medication Sig     albuterol (PROAIR HFA/PROVENTIL HFA/VENTOLIN HFA) 108 (90 Base) MCG/ACT inhaler Inhale 2 puffs into the lungs every 4 hours as needed for shortness of breath / dyspnea, wheezing or other (coughing)     aspirin (ASA) 325 MG EC tablet " Take 325 mg by mouth daily     atorvastatin (LIPITOR) 80 MG tablet Take 1 tablet (80 mg) by mouth daily     DEEP SEA NASAL SPRAY 0.65 % nasal spray SPRAY 1 SPRAY INTO BOTH NOSTRILS DAILY AS NEEDED FOR CONGESTION     DULoxetine (CYMBALTA) 60 MG capsule Take 60 mg by mouth daily     flunisolide (NASALIDE) 25 MCG/ACT (0.025%) SOLN spray INHALE 2 SPRAYS INTO EACH NOSTRIL 2 TIMES DAILY     gabapentin (NEURONTIN) 300 MG capsule TAKE 2 CAPSULES BY MOUTH 3 TIMES A DAY     hydroxychloroquine (PLAQUENIL) 200 MG tablet TAKE 1 TABLET BY MOUTH DAILY     hydrOXYzine (ATARAX) 25 MG tablet TAKE 1 TABLET BY MOUTH THREE TIMES A DAY     multivitamin, therapeutic with minerals (MULTI-VITAMIN) TABS Take 1 tablet by mouth daily     naproxen sodium 220 MG capsule Take 220 mg by mouth 2 times daily (with meals)     omega 3 1200 MG CAPS      order for DME Cane,     SM ASPIRIN 325 MG tablet TAKE 1 TABLET BY MOUTH EVERY DAY START TAKING 1/15/2020 AFTER TAKING 81 MG TAB     tiZANidine (ZANAFLEX) 4 MG tablet TAKE 1-2 TABLETS BY MOUTH AS NEEDED THREE TIMES A DAY     valACYclovir (VALTREX) 500 MG tablet Take 1 tablet (500 mg) by mouth 2 times daily for 3 days     VYVANSE 10 MG capsule 10 mg by Oral or Feeding Tube route daily      Review of Systems:   A comprehensive 10 point review of systems (constitutional, ENT, cardiac, peripheral vascular, lymphatic, respiratory, GI, , Musculoskeletal, skin, Neurological) was performed and found to be negative except as described in this note.      Physical Exam:   Vitals: /74   Pulse 87   Resp 16   Wt 81.2 kg (179 lb)   LMP 08/12/2013   SpO2 99%   BMI 29.79 kg/m    General: Seated comfortably in no acute distress.  Neurologic:     Mental Status: Fully alert, attentive and oriented. Speech clear and fluent, no paraphasic errors.     Cranial Nerves: EOMI with normal smooth pursuit. Facial movements symmetric. Hearing not formally tested but intact to conversation.  No dysarthria.     Motor: No  tremors or other abnormal movements observed.      Gait: Normal, steady casual gait.         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  Overall, her presentation is concerning for either a focal seizure or recrudescence of her prior stroke symptoms following a concussion.  Further testing with EEG will be needed to further support a possible focal seizure etiology.  Otherwise, if negative, I suspect her symptoms should improve over the next month as her concussion abates. It is odd that a concussion could led to stroke like symptoms in a prior stroke patient, as I would expect more generalized symptoms (headache, full blurred vision, nausea, imbalance) compared to focal sensory and motor weakness.    Curiously, the patient also reported worsened sleep for a few months and cites acting out her dreams, yelling in her sleep, and somnambulation.  It is unclear as to why the dramatic shift in sleep is occurring, but I do think further investigation with a sleep consult is needed to define whether there is evidence for an underlying sleep disorder or a possible frontal seizure occurring.     Plan:  - Video EEG (3 hours, with sleep depravation)   - Sleep study    Follow up in Neurology clinic in 3 months (Video) or earlier as needed should new concerns arise.    THOMAS Salgado D.O.   of Neurology      Total time (35 min) in this patient encounter was spent on precharting, counseling and/or coordination of care. We reviewed diagnostic results, impressions, and discussed other possible tests if symptoms do not improve. We discussed the implications of the diagnosis, as well as risks and benefits of management options. We  reviewed treatment instructions and our scheduled follow-up as specified in the discharge plan. We also discussed the importance of compliance with the chosen course of treatment. The patient is in agreement with this plan and has no further questions.

## 2021-03-12 NOTE — TELEPHONE ENCOUNTER
Routing refill request to provider for review/approval because:  Drug not on the [facility] refill protocol   Betzy BROWN RN

## 2021-03-24 ENCOUNTER — TELEPHONE (OUTPATIENT)
Dept: FAMILY MEDICINE | Facility: CLINIC | Age: 61
End: 2021-03-24

## 2021-03-24 DIAGNOSIS — E55.9 VITAMIN D DEFICIENCY: ICD-10-CM

## 2021-03-24 DIAGNOSIS — D69.6 THROMBOCYTOPENIA (H): ICD-10-CM

## 2021-03-24 DIAGNOSIS — E78.5 HYPERLIPIDEMIA LDL GOAL <130: Primary | ICD-10-CM

## 2021-03-24 DIAGNOSIS — E78.5 HYPERLIPIDEMIA LDL GOAL <130: ICD-10-CM

## 2021-03-24 LAB
DEPRECATED CALCIDIOL+CALCIFEROL SERPL-MC: 39 UG/L (ref 20–75)
ERYTHROCYTE [DISTWIDTH] IN BLOOD BY AUTOMATED COUNT: 14.2 % (ref 10–15)
HCT VFR BLD AUTO: 35.6 % (ref 35–47)
HGB BLD-MCNC: 12.7 G/DL (ref 11.7–15.7)
MCH RBC QN AUTO: 28.1 PG (ref 26.5–33)
MCHC RBC AUTO-ENTMCNC: 35.7 G/DL (ref 31.5–36.5)
MCV RBC AUTO: 79 FL (ref 78–100)
PLATELET # BLD AUTO: 122 10E9/L (ref 150–450)
RBC # BLD AUTO: 4.52 10E12/L (ref 3.8–5.2)
WBC # BLD AUTO: 4.5 10E9/L (ref 4–11)

## 2021-03-24 PROCEDURE — 85027 COMPLETE CBC AUTOMATED: CPT | Performed by: FAMILY MEDICINE

## 2021-03-24 PROCEDURE — 80053 COMPREHEN METABOLIC PANEL: CPT | Performed by: FAMILY MEDICINE

## 2021-03-24 PROCEDURE — 82306 VITAMIN D 25 HYDROXY: CPT | Performed by: FAMILY MEDICINE

## 2021-03-24 PROCEDURE — 80061 LIPID PANEL: CPT | Performed by: FAMILY MEDICINE

## 2021-03-24 PROCEDURE — 36415 COLL VENOUS BLD VENIPUNCTURE: CPT | Performed by: FAMILY MEDICINE

## 2021-03-24 NOTE — TELEPHONE ENCOUNTER
Dr. Orosco,    Patient came in today for vitamin D labs. She said she should've had other labs done, so lab jamar a rainbow. After review of previous labs looks like she just had some done beginning of March from another provider. Lab did tell patient that if she just had some labs done recently they may not be covered by insurance. Are there any others she should have done? Please advise    Thanks  Sanjana Fraire RN   [facility]

## 2021-03-25 LAB
ALBUMIN SERPL-MCNC: 3.9 G/DL (ref 3.4–5)
ALP SERPL-CCNC: 72 U/L (ref 40–150)
ALT SERPL W P-5'-P-CCNC: 104 U/L (ref 0–50)
ANION GAP SERPL CALCULATED.3IONS-SCNC: 5 MMOL/L (ref 3–14)
AST SERPL W P-5'-P-CCNC: 63 U/L (ref 0–45)
BILIRUB SERPL-MCNC: 0.8 MG/DL (ref 0.2–1.3)
BUN SERPL-MCNC: 12 MG/DL (ref 7–30)
CALCIUM SERPL-MCNC: 9.7 MG/DL (ref 8.5–10.1)
CHLORIDE SERPL-SCNC: 106 MMOL/L (ref 94–109)
CHOLEST SERPL-MCNC: 157 MG/DL
CO2 SERPL-SCNC: 28 MMOL/L (ref 20–32)
CREAT SERPL-MCNC: 0.66 MG/DL (ref 0.52–1.04)
GFR SERPL CREATININE-BSD FRML MDRD: >90 ML/MIN/{1.73_M2}
GLUCOSE SERPL-MCNC: 99 MG/DL (ref 70–99)
HDLC SERPL-MCNC: 85 MG/DL
LDLC SERPL CALC-MCNC: 60 MG/DL
NONHDLC SERPL-MCNC: 72 MG/DL
POTASSIUM SERPL-SCNC: 4.2 MMOL/L (ref 3.4–5.3)
PROT SERPL-MCNC: 8.1 G/DL (ref 6.8–8.8)
SODIUM SERPL-SCNC: 139 MMOL/L (ref 133–144)
TRIGL SERPL-MCNC: 61 MG/DL

## 2021-03-30 DIAGNOSIS — R74.01 TRANSAMINITIS: Primary | ICD-10-CM

## 2021-03-30 DIAGNOSIS — E78.5 HYPERLIPIDEMIA LDL GOAL <70: ICD-10-CM

## 2021-03-30 NOTE — RESULT ENCOUNTER NOTE
Ion Sutton: Your recent results show normal vit D, improved cholesterol numbers, but slightly elevated liver tests likely due to the increased atorvastatin. Please continue same medications, and schedule fasting lab only appointment in 4 weeks to recheck ALT/AST. Contact if questions- continue to stay healthy!    Rafael

## 2021-04-06 ENCOUNTER — ANCILLARY PROCEDURE (OUTPATIENT)
Dept: NEUROLOGY | Facility: CLINIC | Age: 61
End: 2021-04-06
Attending: PSYCHIATRY & NEUROLOGY

## 2021-04-06 DIAGNOSIS — Z86.73 HISTORY OF STROKE: ICD-10-CM

## 2021-04-10 ENCOUNTER — OFFICE VISIT (OUTPATIENT)
Dept: URGENT CARE | Facility: URGENT CARE | Age: 61
End: 2021-04-10
Payer: COMMERCIAL

## 2021-04-10 ENCOUNTER — NURSE TRIAGE (OUTPATIENT)
Dept: NURSING | Facility: CLINIC | Age: 61
End: 2021-04-10

## 2021-04-10 VITALS
BODY MASS INDEX: 29.8 KG/M2 | HEART RATE: 76 BPM | OXYGEN SATURATION: 98 % | WEIGHT: 179.06 LBS | SYSTOLIC BLOOD PRESSURE: 106 MMHG | DIASTOLIC BLOOD PRESSURE: 71 MMHG | TEMPERATURE: 98 F

## 2021-04-10 DIAGNOSIS — R07.0 THROAT PAIN: ICD-10-CM

## 2021-04-10 DIAGNOSIS — J30.2 SEASONAL ALLERGIC RHINITIS, UNSPECIFIED TRIGGER: Primary | ICD-10-CM

## 2021-04-10 DIAGNOSIS — H10.31 ACUTE BACTERIAL CONJUNCTIVITIS OF RIGHT EYE: ICD-10-CM

## 2021-04-10 LAB
DEPRECATED S PYO AG THROAT QL EIA: NEGATIVE
SPECIMEN SOURCE: NORMAL
SPECIMEN SOURCE: NORMAL
STREP GROUP A PCR: NOT DETECTED

## 2021-04-10 PROCEDURE — 99213 OFFICE O/P EST LOW 20 MIN: CPT | Performed by: FAMILY MEDICINE

## 2021-04-10 PROCEDURE — 87651 STREP A DNA AMP PROBE: CPT | Performed by: FAMILY MEDICINE

## 2021-04-10 RX ORDER — GENTAMICIN SULFATE 3 MG/ML
1-2 SOLUTION/ DROPS OPHTHALMIC EVERY 4 HOURS
Qty: 5 ML | Refills: 3 | Status: SHIPPED | OUTPATIENT
Start: 2021-04-10 | End: 2021-07-11

## 2021-04-10 RX ORDER — FLUTICASONE PROPIONATE 50 MCG
1 SPRAY, SUSPENSION (ML) NASAL DAILY
Qty: 1 G | Refills: 11 | Status: SHIPPED | OUTPATIENT
Start: 2021-04-10 | End: 2024-07-31

## 2021-04-10 NOTE — PROGRESS NOTES
Assessment & Plan     Throat pain  Negative for Rapid strep  Could be allergies  Flonase, use as needed    - Streptococcus A Rapid Scr w Reflx to PCR  - Group A Streptococcus PCR Throat Swab    Seasonal allergic rhinitis, unspecified trigger    - fluticasone (FLONASE) 50 MCG/ACT nasal spray; Spray 1 spray into both nostrils daily    Acute bacterial conjunctivitis of right eye    - gentamicin (GARAMYCIN) 0.3 % ophthalmic solution; Place 1-2 drops into the right eye every 4 hours    See Patient Instructions    No follow-ups on file.    Ludin De La Torre MD  [facility] URGENT CARE SHAYY Sutton is a 60 year old who presents for the following health issues:  Sore throat, for the past 5 days or so.  No fever, sinus congestion, allergies.  Right eye been pain, started for the past 2 days.  Does not work eye contact.  HPI     Review of Systems   Constitutional, HEENT, cardiovascular, pulmonary, gi and gu systems are negative, except as otherwise noted.      Objective    /71 (BP Location: Right arm, Patient Position: Sitting, Cuff Size: Adult Regular)   Pulse 76   Temp 98  F (36.7  C) (Oral)   Wt 81.2 kg (179 lb 1 oz)   LMP 08/12/2013   SpO2 98%   BMI 29.80 kg/m    Body mass index is 29.8 kg/m .  Physical Exam   GENERAL: healthy, alert and no distress  EYES: conjunctival injection, red,  Right eye  NECK: no adenopathy, no asymmetry, masses, or scars and thyroid normal to palpation  RESP: lungs clear to auscultation - no rales, rhonchi or wheezes  CV: regular rate and rhythm, normal S1 S2, no S3 or S4, no murmur, click or rub, no peripheral edema and peripheral pulses strong  ABDOMEN: soft, nontender, no hepatosplenomegaly, no masses and bowel sounds normal  MS: no gross musculoskeletal defects noted, no edema  SKIN: no suspicious lesions or rashes    Negative for Rapid Strep.    Ludin De La Torre MD

## 2021-04-10 NOTE — TELEPHONE ENCOUNTER
For the last week had sore throat had covid test and was negative.  Yesterday woke up and right eye is really red.  Pus discharge was present in am.  Denies fever.  Denies eyelid being swollen.  Lesley will go to urgent care today.    COVID 19 Nurse Triage Plan/Patient Instructions    Please be aware that novel coronavirus (COVID-19) may be circulating in the community. If you develop symptoms such as fever, cough, or SOB or if you have concerns about the presence of another infection including coronavirus (COVID-19), please contact your health care provider or visit [email or web address].     Disposition/Instructions    In-Person Visit with provider recommended. Reference Visit Selection Guide.    Thank you for taking steps to prevent the spread of this virus.  o Limit your contact with others.  o Wear a simple mask to cover your cough.  o Wash your hands well and often.    Resources    [facility]: About COVID-19: [email or web address]    CDC: What to Do If You're Sick: www.cdc.gov/coronavirus/2019-ncov/about/steps-when-sick.html    CDC: Ending Home Isolation: www.cdc.gov/coronavirus/2019-ncov/hcp/disposition-in-home-patients.html     CDC: Caring for Someone: www.cdc.gov/coronavirus/2019-ncov/if-you-are-sick/care-for-someone.html     [facility]: Interim Guidance for Hospital Discharge to Home: [email or web address]/coronavirus/hcp/hospdischarge.pdf    [facility] clinical trials (COVID-19 research studies): [email or web address]     Below are the COVID-19 hotlines at the [facility] of Health ([facility]). Interpreters are available.   o For health questions: Call 391-311-1619 or 1-248.720.9647 (7 a.m. to 7 p.m.)  o For questions about schools and childcare: Call 188-553-6432 or 1-405.420.6282 (7 a.m. to 7 p.m.)                       Reason for Disposition    [1] Eye with yellow/green discharge or eyelashes stick together AND [2] NO PCP standing order  to call in antibiotic eye drops    Additional Information    Negative: SEVERE eye pain (e.g., excruciating)    Negative: [1] Eyelids are very swollen (shut or almost) AND [2] fever    Negative: [1] Eyelid (outer) is very red (or tender to touch) AND [2] fever    Negative: Patient sounds very sick or weak to the triager    Negative: MODERATE eye pain (e.g., interferes with normal activities)    Negative: Fever > 104 F (40 C)    Negative: Cloudy spot or sore seen on the cornea (clear part of the eye)    Negative: Blurred vision    Negative: Eye is very swollen (shut or almost)    Negative: [1] MILD eye pain or discomfort AND [2] wears contacts    Negative: Eyelid is red and painful (or tender to touch)    Negative: Discharge from penis    Negative: New or abnormal vaginal discharge    Negative: Fever present > 3 days (72 hours)    Negative: [1] Lots of yellow or green NASAL discharge AND [2] present now AND [3] fever    Negative: Weak immune system (e.g., HIV positive, cancer chemo, splenectomy, organ transplant, chronic steroids)    Protocols used: EYE - PUS OR [id number]

## 2021-04-13 ENCOUNTER — OFFICE VISIT (OUTPATIENT)
Dept: OPHTHALMOLOGY | Facility: CLINIC | Age: 61
End: 2021-04-13
Attending: OPHTHALMOLOGY
Payer: COMMERCIAL

## 2021-04-13 DIAGNOSIS — H43.813 POSTERIOR VITREOUS DETACHMENT OF BOTH EYES: Primary | ICD-10-CM

## 2021-04-13 DIAGNOSIS — H43.391 VITREOUS OPACITIES OF RIGHT EYE: ICD-10-CM

## 2021-04-13 DIAGNOSIS — H04.123 DRY EYES: ICD-10-CM

## 2021-04-13 PROCEDURE — 99214 OFFICE O/P EST MOD 30 MIN: CPT | Performed by: OPHTHALMOLOGY

## 2021-04-13 PROCEDURE — G0463 HOSPITAL OUTPT CLINIC VISIT: HCPCS

## 2021-04-13 PROCEDURE — 92134 CPTRZ OPH DX IMG PST SGM RTA: CPT | Performed by: OPHTHALMOLOGY

## 2021-04-13 ASSESSMENT — CONF VISUAL FIELD
OS_NORMAL: 1
METHOD: COUNTING FINGERS
OD_NORMAL: 1

## 2021-04-13 ASSESSMENT — TONOMETRY
OD_IOP_MMHG: 10
IOP_METHOD: TONOPEN
OS_IOP_MMHG: 10

## 2021-04-13 ASSESSMENT — VISUAL ACUITY
OS_CC: 20/25
OS_CC+: +1
OD_CC: 20/25
METHOD: SNELLEN - LINEAR
OD_CC+: +2
CORRECTION_TYPE: GLASSES

## 2021-04-13 ASSESSMENT — CUP TO DISC RATIO
OD_RATIO: 0.3
OS_RATIO: 0.3

## 2021-04-13 ASSESSMENT — REFRACTION_WEARINGRX
OD_SPHERE: -1.00
OS_SPHERE: -0.75
OS_ADD: +2.00
OD_CYLINDER: +0.75
OD_ADD: +2.00
OD_AXIS: 175
OS_AXIS: 180
OS_CYLINDER: +0.75

## 2021-04-13 ASSESSMENT — EXTERNAL EXAM - RIGHT EYE: OD_EXAM: PROMINENT GLOBES

## 2021-04-13 ASSESSMENT — EXTERNAL EXAM - LEFT EYE: OS_EXAM: PROMINENT GLOBES

## 2021-04-13 ASSESSMENT — SLIT LAMP EXAM - LIDS
COMMENTS: NORMAL
COMMENTS: NORMAL

## 2021-04-13 NOTE — NURSING NOTE
Chief Complaints and History of Present Illnesses   Patient presents with     Posterior Vitreous Detachment Follow Up     1 month follow up both eyes     Chief Complaint(s) and History of Present Illness(es)     Posterior Vitreous Detachment Follow Up     Comments: 1 month follow up both eyes              Comments     Pt states that vision is the same as last visit. Pt recently diagnosed with pink eye, started gentamycin drops on Saturday. Pt using gentimycin drops TID RE for on e week.  No eye pain today. Still seeing floaters, no changes in size or frequency.    Mukund Winters [facility] April 13, 2021 9:08 AM
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
## 2021-04-18 DIAGNOSIS — Z86.73 HISTORY OF STROKE: ICD-10-CM

## 2021-04-19 RX ORDER — ATORVASTATIN CALCIUM 80 MG/1
TABLET, FILM COATED ORAL
Qty: 90 TABLET | Refills: 3 | Status: SHIPPED | OUTPATIENT
Start: 2021-04-19 | End: 2021-05-04

## 2021-04-20 NOTE — TELEPHONE ENCOUNTER
"Requested Prescriptions   Pending Prescriptions Disp Refills     atorvastatin (LIPITOR) 80 MG tablet [Pharmacy Med Name: ATORVASTATIN 80 MG TABLET] 30 tablet 2     Sig: TAKE 1 TABLET BY MOUTH EVERY DAY       Statins Protocol Passed - 4/18/2021 10:02 AM        Passed - LDL on file in past 12 months     Recent Labs   Lab Test 03/24/21  0926   LDL 60             Passed - No abnormal creatine kinase in past 12 months     No lab results found.             Passed - Recent (12 mo) or future (30 days) visit within the authorizing provider's specialty     Patient has had an office visit with the authorizing provider or a provider within the authorizing providers department within the previous 12 mos or has a future within next 30 days. See \"Patient Info\" tab in inbasket, or \"Choose Columns\" in Meds & Orders section of the refill encounter.              Passed - Medication is active on med list        Passed - Patient is age 18 or older        Passed - No active pregnancy on record        Passed - No positive pregnancy test in past 12 months           Signed Prescriptions:                        Disp   Refills    atorvastatin (LIPITOR) 80 MG tablet        90 tab*3        Sig: TAKE 1 TABLET BY MOUTH EVERY DAY  Authorizing Provider: ODALIS CORONEL  Ordering User: BETI PRUITT      "

## 2021-04-21 ENCOUNTER — PATIENT OUTREACH (OUTPATIENT)
Dept: CARE COORDINATION | Facility: CLINIC | Age: 61
End: 2021-04-21

## 2021-04-23 NOTE — PROGRESS NOTES
"Clinic Care Coordination Contact  Community Health Worker Follow Up    Goals:   Goals Addressed as of 4/23/2021 at 10:40 AM                 4/21/21    3/10/21       Patient Stated      Psychosocial (pt-stated)   60%  On track    Added 11/30/20 by Henny White BSW     Goal Statement: I would like to find ways to keep improving my mental health over the next 3 months.   Date Goal set: 11/30/2020  Barriers: n/a   Strengths: wants to keep improving.   Date to Achieve By: 3/30/2021  Patient expressed understanding of goal: yes    Action steps to achieve this goal:  1. I will continue attending therapy appointments. (ongoing)  2. I will start meeting with my new ARM worker at the end of the month.   3. I will find ways to work on my overall health and wellness.   4. I will continue attending AA over Zoom. (ongoing)    Updated: 4/23/21              Intervention and Education during outreach:     Called pt to check in. She said \"I talked to a  yesterday about my dad.\" She said she appreciates the check in calls from  for herself and her father. I thanked pt for letting me know and asked how pt's father has been doing. She said \"overall he's good.\" Pt shared that her father continues making comments that he's \"not sure if he's going to make it much longer,\" but pt said this has been going on for a long time. She said the pressure ulcer pt has is \"almost healed\" and that he continues to \"get good care from me, my sister and my family.\" Let her know I was glad to hear this.    I let pt know that I was happy to speak with her because I wanted her to hear from me that I am transitioning into a new role. Briefly shared what I will be doing and let her know that I won't be leaving [facility]. She said \"thank you for telling me.\" Pt said she appreciated the heads up because \"it is hard to let people in and trust.\" Pt said she has had other professionals leave without notice and then \"a new person calls and " "it's like starting all over again.\" Thanked pt for trusting me and for sharing information about herself, her dad and her family. I let her know how much I've enjoyed speaking with her and told her about the new CHW who will be taking over. Let pt know that she can continue to call the number she has to reach the new CHW.     I asked how pt has been doing this week, with everything that has been going on. Pt said \"I've been up and down.\" Pt said with the [place] trial verdict, she feels \"relief but I also know there is so far to go and so much work to do.\" I thanked her for sharing and asked how she's been taking care of herself. Pt said she is spending time today with her 8 month old grandson, \"which helps so much,\" and that he is starting to pull up and take a few steps. Pt said she's also been speaking with her therapist and \"leaning on family.\" Updated goal.    Thanked pt again and wished her and her family the best in the future.     CHW Plan: Pt will continue attending therapy and doing what she needs to do to take care of herself and her mental health. Pt will contact the CHW before next outreach with questions or updates. CHW will follow up with pt in one month.       "

## 2021-04-25 DIAGNOSIS — Z86.73 HISTORY OF STROKE: Primary | ICD-10-CM

## 2021-04-26 NOTE — TELEPHONE ENCOUNTER
"Dr. Orosco,    Requested Prescriptions   Pending Prescriptions Disp Refills     aspirin (ASA) 325 MG EC tablet [Pharmacy Med Name: ASPIRIN  MG TABLET] 30 tablet 5     Sig: TAKE 1 TABLET BY MOUTH EVERY DAY START TAKING 1/15/2020 AFTER TAKING 81 MG TAB.       Analgesics (Non-Narcotic Tylenol and ASA Only) Passed - 4/25/2021  9:02 AM        Passed - Recent (12 mo) or future (30 days) visit within the authorizing provider's specialty     Patient has had an office visit with the authorizing provider or a provider within the authorizing providers department within the previous 12 mos or has a future within next 30 days. See \"Patient Info\" tab in inbasket, or \"Choose Columns\" in Meds & Orders section of the refill encounter.              Passed - Patient is age 20 years or older     If ASA is flagged for ages under 20 years old. Forward to provider for confirmation Ryes Syndrome is not a concern.              Passed - Medication is active on med list           Routing refill request to provider for review/approval because:  Medication is reported/historical    Marcela Valenzuela RN  [facility]          "

## 2021-04-27 RX ORDER — ASPIRIN 325 MG
TABLET, DELAYED RELEASE (ENTERIC COATED) ORAL
Qty: 30 TABLET | Refills: 5 | Status: SHIPPED | OUTPATIENT
Start: 2021-04-27 | End: 2021-11-29

## 2021-04-28 ENCOUNTER — VIRTUAL VISIT (OUTPATIENT)
Dept: FAMILY MEDICINE | Facility: CLINIC | Age: 61
End: 2021-04-28
Payer: COMMERCIAL

## 2021-04-28 DIAGNOSIS — B00.89 RECURRENT HERPES SIMPLEX VIRUS (HSV) INFECTION OF BUTTOCK: Primary | ICD-10-CM

## 2021-04-28 DIAGNOSIS — R74.01 TRANSAMINITIS: ICD-10-CM

## 2021-04-28 DIAGNOSIS — E78.5 HYPERLIPIDEMIA LDL GOAL <70: ICD-10-CM

## 2021-04-28 PROBLEM — I63.9 STROKE (H): Status: RESOLVED | Noted: 2019-12-23 | Resolved: 2021-04-28

## 2021-04-28 PROCEDURE — 99214 OFFICE O/P EST MOD 30 MIN: CPT | Mod: 95 | Performed by: FAMILY MEDICINE

## 2021-04-28 RX ORDER — VALACYCLOVIR HYDROCHLORIDE 500 MG/1
500 TABLET, FILM COATED ORAL 2 TIMES DAILY
Qty: 30 TABLET | Refills: 1 | Status: SHIPPED | OUTPATIENT
Start: 2021-04-28 | End: 2024-06-24

## 2021-04-28 NOTE — PROGRESS NOTES
"Lesley is a 60 year old who is being evaluated via a billable telephone visit.      What phone number would you like to be contacted at? [phone]   How would you like to obtain your AVS? 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    Weight as of 4/10/21: 81.2 kg (179 lb 1 oz).   Weight management plan: Discussed healthy diet and exercise guidelines    Patient Instructions   Filled larger Rx for more frequent herpes   Recheck AST/ALT schedule fasting lab only       No follow-ups on file.    Rafael Orosco MD  [facility]    Subjective   Lesley is a 60 year old who presents for the following health issues     HPI     Concern - Herpes  Onset: a couple days ago- Monday  Description: Herpes flair up buttocks- left side   Intensity: moderate  Progression of Symptoms:  worsening  Accompanying Signs & Symptoms: Shooting pains in nerve system  Previous history of similar problem: Yes  Precipitating factors:        Worsened by: Stress  Alleviating factors:        Improved by: Valtrex in past-  Therapies tried and outcome: None    Hyperlipidemia Follow-Up      Are you regularly taking any medication or supplement to lower your cholesterol?   Yes- atorvastin increased from 40 to 80 mg    Are you having muscle aches or other side effects that you think could be " caused by your cholesterol lowering medication?  No    Cerebrovascular Follow-up      Patient history: ischemic cerebrovascular incident    Residual symptoms: None    Worsened or new symptoms since last visit: No    Daily aspirin use: Yes    Hypertension controlled: Yes    How many days per week do you miss taking your medication? 0      Review of Systems   Constitutional, HEENT, cardiovascular, pulmonary, gi and gu systems are negative, except as otherwise noted.      Objective           Vitals:  No vitals were obtained today due to virtual visit.    Physical Exam   healthy, alert and no distress  PSYCH: Alert and oriented times 3; coherent speech, normal   rate and volume, able to articulate logical thoughts, able   to abstract reason, no tangential thoughts, no hallucinations   or delusions  Her affect is normal and pleasant  RESP: No cough, no audible wheezing, able to talk in full sentences  Remainder of exam unable to be completed due to telephone visits        Phone call duration: 30 minutes

## 2021-04-29 ENCOUNTER — TELEPHONE (OUTPATIENT)
Dept: FAMILY MEDICINE | Facility: CLINIC | Age: 61
End: 2021-04-29

## 2021-04-29 NOTE — TELEPHONE ENCOUNTER
Left vm for patient to return call to clinic to reschedule lab appt for Monday. Dr. Orosco has not completed visit notes or completed lab orders yet, and he is off today and not in the office on Friday. Please reschedule lab appointment for Monday when patient calls back    Sanjana Fraire RN   [facility]    General

## 2021-04-30 DIAGNOSIS — R74.01 TRANSAMINITIS: ICD-10-CM

## 2021-04-30 LAB
ALT SERPL W P-5'-P-CCNC: 125 U/L (ref 0–50)
AST SERPL W P-5'-P-CCNC: 77 U/L (ref 0–45)

## 2021-04-30 PROCEDURE — 36415 COLL VENOUS BLD VENIPUNCTURE: CPT | Performed by: FAMILY MEDICINE

## 2021-04-30 PROCEDURE — 84460 ALANINE AMINO (ALT) (SGPT): CPT | Performed by: FAMILY MEDICINE

## 2021-04-30 PROCEDURE — 84450 TRANSFERASE (AST) (SGOT): CPT | Performed by: FAMILY MEDICINE

## 2021-05-04 DIAGNOSIS — Z86.73 HISTORY OF STROKE: ICD-10-CM

## 2021-05-04 DIAGNOSIS — R74.01 TRANSAMINITIS: Primary | ICD-10-CM

## 2021-05-04 RX ORDER — ATORVASTATIN CALCIUM 80 MG/1
40 TABLET, FILM COATED ORAL DAILY
Qty: 45 TABLET | Refills: 3
Start: 2021-05-04 | End: 2022-04-13

## 2021-05-04 NOTE — RESULT ENCOUNTER NOTE
Ion Sutton: Your liver tests AST and ALT show a slight increase of liver inflammation from a month ago, likely due to the atorvastatin. Please cut your atorvastatin 80 mg in half and take 40 mg for a month, then schedule a fasting lab only to have the liver tests rechecked. Contact if questions. Continue to stay healthy!    Rafael

## 2021-05-04 NOTE — TELEPHONE ENCOUNTER
Writer notes patient completed labs 4/30/21     Rafael Orosco MD   5/4/2021  9:50 AM CDT      Ion Sutton: Your liver tests AST and ALT show a slight increase of liver inflammation from a month ago, likely due to the atorvastatin. Please cut your atorvastatin 80 mg in half and take 40 mg for a month, then schedule a fasting lab only to have the liver tests rechecked. Contact if questions. Continue to stay healthy!     Rafael     Call to patient reviewed labs and recheck - she verbalized understanding and agrees with plan

## 2021-05-05 ENCOUNTER — TELEPHONE (OUTPATIENT)
Dept: FAMILY MEDICINE | Facility: CLINIC | Age: 61
End: 2021-05-05

## 2021-05-05 DIAGNOSIS — Z86.73 HISTORY OF STROKE: ICD-10-CM

## 2021-05-05 DIAGNOSIS — M32.9 SYSTEMIC LUPUS ERYTHEMATOSUS, UNSPECIFIED SLE TYPE, UNSPECIFIED ORGAN INVOLVEMENT STATUS (H): Primary | ICD-10-CM

## 2021-05-05 NOTE — TELEPHONE ENCOUNTER
Dr. Orosco,    Pt requesting referral for rheumatology. She has been following with a rheumatologist, Dr. Meléndez, outside of [place] for some time but would like to see a rheumatologist within [place] for better continuity of care. Pt also notes that she is currently having a lupus flare-up, requesting we place referral for her to be seen by rheumatology sooner rather than later if possible.    Urgent rheumatology referral cued if agree.    Pt requesting call back when signed.    Marcela Valenzuela RN  [facility]

## 2021-05-06 ENCOUNTER — PATIENT OUTREACH (OUTPATIENT)
Dept: CARE COORDINATION | Facility: CLINIC | Age: 61
End: 2021-05-06

## 2021-05-06 NOTE — TELEPHONE ENCOUNTER
Pt notified that referral was in and she could schedule.       Dora cMkeon RN   [facility]

## 2021-05-06 NOTE — PROGRESS NOTES
Clinic Care Coordination Contact    Follow Up Progress Note      Assessment:  spoke with patient over the phone. Patient shared things are going well. She discussed that she spoke with her therapist yesterday which was great for her. She discussed she continues to just take one day at a time and work hard to stay positive. Patient shared that she was just helping her dad at the moment doing some cares. Patient stated he is doing well and they are doing everything they can to stay healthy.     Pt was notified of  leaving and Jacquelin Lozano becoming the new . Patient shared she was thankful for social workers time and to be apart of this season of her life.     Goals addressed this encounter:   Goals Addressed                 This Visit's Progress       Patient Stated      Psychosocial (pt-stated)   On track     Goal Statement: I would like to find ways to keep improving my mental health over the next 3 months.   Date Goal set: 11/30/2020  Barriers: n/a   Strengths: wants to keep improving.   Date to Achieve By: 3/30/2021  Patient expressed understanding of goal: yes    Action steps to achieve this goal:  1. I will continue attending therapy appointments. (ongoing)  2. I will start meeting with my new [facility] worker at the end of the month.   3. I will find ways to work on my overall health and wellness.   4. I will continue attending AA over Zoom. (ongoing)    Updated: 4/23/21               Intervention/Education provided during outreach:  discussed for patient to continue working on health and wellness and to continue to go out and educate people as this is something she loves to do.  encouraged patient to reach out to her therapist or any other supportive resources if she feels she is in need.      Outreach Frequency: monthly    Plan:   1) Pt will continue working on overall mental health.   Care Coordinator will follow up in 6 weeks.     Henny White  [facility]  Clinic Care Coordinator,   [facility]  [phone]

## 2021-05-24 ENCOUNTER — VIRTUAL VISIT (OUTPATIENT)
Dept: SLEEP MEDICINE | Facility: CLINIC | Age: 61
End: 2021-05-24
Payer: COMMERCIAL

## 2021-05-24 DIAGNOSIS — R06.83 SNORING: Primary | ICD-10-CM

## 2021-05-24 DIAGNOSIS — G47.52 DREAM ENACTMENT BEHAVIOR: ICD-10-CM

## 2021-05-24 DIAGNOSIS — Z86.73 HISTORY OF STROKE: ICD-10-CM

## 2021-05-24 DIAGNOSIS — R06.81 WITNESSED APNEIC SPELLS: ICD-10-CM

## 2021-05-24 PROCEDURE — 99214 OFFICE O/P EST MOD 30 MIN: CPT | Mod: 95 | Performed by: INTERNAL MEDICINE

## 2021-05-24 NOTE — PROGRESS NOTES
"Lesley Stafford is a 60 year old female being evaluated via a billable telephone visit.     Does patient have any form of state insurance? Yes    Do you have wifi? Yes  Do you have a smart phone? Yes  Can you download an nora on your phone comfortably with out assistance? Yes  Can you watch a Youtube video? Yes      \"This telephone visit will be conducted via a call between you and your physician/provider. We have found that certain health care needs can be provided without the need for an in-person visit or physical exam.  This service lets us provide the care you need with a telephone conversation.  If a prescription is necessary we can send it directly to your pharmacy.  If lab work is needed we can place an order for that and you can then stop by our lab to have the test done at a later time.\"    Telephone visits are billed at different rates depending on your insurance coverage.  Please reach out to your insurance provider with any questions.    Patient has given verbal consent for  a Telephone visit? Yes    What telephone number would you like your provider to contact at at:  [phone]    How would you like to obtain your AVS? Adali Green MA    Telephone Visit Details:     Telephone Visit Start Time: 2:15 PM    Telephone Visit End Time:2:45 PM      Sleep Consultation:    Date on this visit: 5/24/2021    Lesley Stafford is sent by Deven Ramesh for a sleep consultation regarding poor sleep and dream enactment behavior.    Primary Physician: Rafael Orosco     Lesley Stafford is a 60 years old female with history of SLE, CVA, obesity, depression & chronic pain who is sent for a sleep evaluation by Neurology. She has maintained 30 years' sobriety from alcohol, cocaine and cannabis.     Patient had previous sleep evaluations, with the last sleep study in 2018.  This test was negative for sleep apnea with an apnea-hypopnea index of 3.9/h and RDI of 4.3/h.  A previous " sleep study in 2005 at another facility had shown mild obstructive sleep apnea.     Lesley does snore every night. Patient does not have a regular bed partner. There is report of snoring, gasping and poor quality of sleep.  She does have witnessed apneas.  Patient sleeps on her side. She has occasional morning headaches, and frequent restless legs. Lesley denies any sleep walking, sleep paralysis, cataplexy and hypnogogic/hypnopompic hallucinations. She has a history of bruxism.     Patient has a history of restless leg syndrome. She is currently on Gabapentin 600 mg three times per day.  There is some degree of residual restless leg symptoms.  However patient does not consider this to be a significant burden.  She has a history of dream enactment behavior for the last 4 months    She has a history of dream enactment behavior for the last 4 months.  Episodes usually occur early in the morning and consists of a threatening dream and flailing in response to it.  She does not have a bed partner and denies having hurt herself during these episodes.    Lesley goes to sleep at 11:00 PM during the week. She wakes up at 6:30 AM without an alarm. She falls asleep in 40 minutes.  Lesley has difficulty falling asleep.  She wakes up 1-2 times a night for 15 minutes before falling back to sleep.  Lesley wakes up to go to the bathroom and pain.  On weekends, Lesley goes to sleep at 11:30 PM.  She wakes up at 6:30 AM without an alarm. She falls asleep in 40 minutes.  Patient gets an average of 7 hours of sleep per night.     Patient's [place] Sleepiness score 12/24 consistent with mild daytime sleepiness.      Lesley naps 0-1 times per week for 30-60 minutes, feels refreshed after naps. She takes no inadvertant naps.  She denies closing eyes, dozing and falling asleep while driving. Patient was counseled on the importance of driving while alert, to pull over if drowsy, or nap before getting into the vehicle if sleepy.      She uses 2  cups/day of coffee. Last caffeine intake is usually before noon.    Allergies:    Allergies   Allergen Reactions     Morphine Sulfate Itching     Sulfa Drugs Hives       Medications:    Current Outpatient Medications   Medication Sig Dispense Refill     albuterol (PROAIR HFA/PROVENTIL HFA/VENTOLIN HFA) 108 (90 Base) MCG/ACT inhaler Inhale 2 puffs into the lungs every 4 hours as needed for shortness of breath / dyspnea, wheezing or other (coughing) 1 Inhaler 0     aspirin (ASA) 325 MG EC tablet TAKE 1 TABLET BY MOUTH EVERY DAY START TAKING 1/15/2020 AFTER TAKING 81 MG TAB. 30 tablet 5     atorvastatin (LIPITOR) 80 MG tablet Take 0.5 tablets (40 mg) by mouth daily 45 tablet 3     DEEP SEA NASAL SPRAY 0.65 % nasal spray SPRAY 1 SPRAY INTO BOTH NOSTRILS DAILY AS NEEDED FOR CONGESTION 1 Bottle 2     DULoxetine (CYMBALTA) 60 MG capsule Take 60 mg by mouth daily       flunisolide (NASALIDE) 25 MCG/ACT (0.025%) SOLN spray INHALE 2 SPRAYS INTO EACH NOSTRIL 2 TIMES DAILY 1 Bottle 3     fluticasone (FLONASE) 50 MCG/ACT nasal spray Spray 1 spray into both nostrils daily 1 g 11     gabapentin (NEURONTIN) 300 MG capsule TAKE 2 CAPSULES BY MOUTH 3 TIMES A  capsule 6     gentamicin (GARAMYCIN) 0.3 % ophthalmic solution Place 1-2 drops into the right eye every 4 hours 5 mL 3     hydroxychloroquine (PLAQUENIL) 200 MG tablet TAKE 1 TABLET BY MOUTH DAILY 90 tablet 2     hydrOXYzine (ATARAX) 25 MG tablet TAKE 1 TABLET BY MOUTH THREE TIMES A DAY 90 tablet 3     multivitamin, therapeutic with minerals (MULTI-VITAMIN) TABS Take 1 tablet by mouth daily       naproxen sodium 220 MG capsule Take 220 mg by mouth 2 times daily (with meals)       omega 3 1200 MG CAPS        order for [facility] Cane, 1 Device 0     SM ASPIRIN 325 MG tablet TAKE 1 TABLET BY MOUTH EVERY DAY START TAKING 1/15/2020 AFTER TAKING 81 MG TAB 90 tablet 1     tiZANidine (ZANAFLEX) 4 MG tablet TAKE 1-2 TABLETS BY MOUTH AS NEEDED THREE TIMES A  tablet 3      valACYclovir (VALTREX) 500 MG tablet Take 1 tablet (500 mg) by mouth 2 times daily 30 tablet 1     VYVANSE 10 MG capsule 10 mg by Oral or Feeding Tube route daily         Problem List:  Patient Active Problem List    Diagnosis Date Noted     Vitreous opacities of right eye 04/13/2021     Priority: Medium     Transaminitis 03/30/2021     Priority: Medium     Hyperlipidemia LDL goal <70 03/30/2021     Priority: Medium     Thrombocytopenia (H) 03/24/2021     Priority: Medium     Posterior vitreous detachment of both eyes 03/09/2021     Priority: Medium     Long-term use of Plaquenil 03/09/2021     Priority: Medium     Myopia of both eyes 03/09/2021     Priority: Medium     Presbyopia 03/09/2021     Priority: Medium     Dry eyes 03/09/2021     Priority: Medium     Anal fissure 01/06/2021     Priority: Medium     Labial lesion 01/06/2021     Priority: Medium     Encounter for medication monitoring 07/09/2020     Priority: Medium     History of stroke 02/05/2020     Priority: Medium     Financial difficulties 02/05/2020     Priority: Medium     Moderate major depression (H) 10/08/2018     Priority: Medium     Liver hemangioma 12/14/2016     Priority: Medium     4 on MRI '09       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     Priority: Medium     Insomnia, unspecified type 08/19/2016     Priority: Medium     Tired 04/25/2016     Priority: Medium     Anxiety 02/19/2016     Priority: Medium     Allergic state 02/19/2016     Priority: Medium     History of claustrophobia 08/03/2015     Priority: Medium     Obesity, Class I, BMI 30-34.9 10/31/2014     Priority: Medium     Abnormal perimenopausal bleeding 08/24/2013     Priority: Medium     Systemic lupus erythematosus, unspecified SLE type, unspecified organ involvement status (H) 09/24/2012     Priority: Medium        Past Medical/Surgical History:  Past Medical History:   Diagnosis Date     Allergy      Anemia      Anxiety      Cerebral infarction (H)      Depressive disorder       hands/feet      Hemoglobin C trait (H) 8/29/2016     Hyperlipidemia LDL goal <70 3/30/2021     Lupus (H)      Substance abuse (H)     Has not used for 19 years.     Thrombocytopenia (H) 3/24/2021     Past Surgical History:   Procedure Laterality Date     COLONOSCOPY  01/2010    repeat 10 yrs     COLONOSCOPY N/A 6/30/2020    Procedure: COLONOSCOPY, WITH POLYPECTOMY;  Surgeon: Rebecca Rojsa MD;  Location: [facility]     DILATION AND CURETTAGE, OPERATIVE HYSTEROSCOPY WITH MORCELLATOR, COMBINED N/A 2/15/2017    Procedure: COMBINED DILATION AND CURETTAGE, OPERATIVE HYSTEROSCOPY WITH MORCELLATOR;  Surgeon: Erin Gutierrez MD;  Location: [facility] OR     ESOPHAGOSCOPY, GASTROSCOPY, DUODENOSCOPY (EGD), COMBINED Left 2/5/2016    Procedure: COMBINED ESOPHAGOSCOPY, GASTROSCOPY, DUODENOSCOPY (EGD), BIOPSY SINGLE OR MULTIPLE;  Surgeon: Pierre Fernandez MD;  Location: [facility]     GYN SURGERY         Social History:  Social History     Socioeconomic History     Marital status:      Spouse name: Not on file     Number of children: Not on file     Years of education: Not on file     Highest education level: Not on file   Occupational History     Not on file   Social Needs     Financial resource strain: Not on file     Food insecurity     Worry: Not on file     Inability: Not on file     Transportation needs     Medical: Not on file     Non-medical: Not on file   Tobacco Use     Smoking status: Former Smoker     Smokeless tobacco: Never Used     Tobacco comment: quit 27 years ago   Substance and Sexual Activity     Alcohol use: No     Drug use: No     Sexual activity: Yes     Partners: Male     Birth control/protection: None   Lifestyle     Physical activity     Days per week: Not on file     Minutes per session: Not on file     Stress: Not on file   Relationships     Social connections     Talks on phone: Not on file     Gets together: Not on file     Attends Caodaism service: Not on file     Active member of club or  organization: Not on file     Attends meetings of clubs or organizations: Not on file     Relationship status: Not on file     Intimate partner violence     Fear of current or ex partner: Not on file     Emotionally abused: Not on file     Physically abused: Not on file     Forced sexual activity: Not on file   Other Topics Concern     Parent/sibling w/ CABG, MI or angioplasty before 65F 55M? No   Social History Narrative     Not on file       Family History:  Family History   Problem Relation Age of Onset     Lupus Mother      Asthma Mother      Diabetes Father      Brain Tumor Father         begnign on pituitary     Depression Sister      Anemia Sister      Glaucoma Maternal Grandfather      Macular Degeneration No family hx of        Screening:  Vitals: LMP 08/12/2013   BMI= There is no height or weight on file to calculate BMI.         [place] Total Score 5/21/2021   Total score - [place] 12            Impression/Plan:    1. Possible Obstructive sleep apnea    Based on her symptoms of snoring, witnessed apneas, nonrestorative sleep and daytime fatigue, there is a high risk for obstructive sleep apnea.  An overnight sleep study is recommended for further assessment.    - PSG for assessment of sleep apnea and parasomnia     2. Dream enactment behavior     Patient's symptoms of dream enactment behaviors are concerning for REM sleep behavioral disorder.  She is on antidepressant therapy with duloxetine, which could be a precipitating factor.  We will assess REM EMG tone during her PSG for confirmation of diagnosis.     3. Restless leg syndrome     Although she is on gabapentin 600 mg 3 times a day, there is residual restless leg symptoms around bedtime.  Currently she reports that this is not a significant symptomatic burden.  We will continue to monitor symptoms and adjust pharmacotherapy if needed.     She will follow up with me in approximately two weeks after her sleep study has been competed to review the  results and discuss plan of care.       Polysomnography reviewed.  Obstructive sleep apnea reviewed.  Complications of untreated sleep apnea were reviewed.    Dr. Israel Pennington     CC: Deven Farnsworth*

## 2021-05-24 NOTE — PROGRESS NOTES
"Lesley Stafford is a 60 year old female being evaluated via a billable telephone visit.     \"This telephone visit will be conducted via a call between you and your physician/provider. We have found that certain health care needs can be provided without the need for an in-person visit or physical exam.  This service lets us provide the care you need with a telephone conversation.  If a prescription is necessary we can send it directly to your pharmacy.  If lab work is needed we can place an order for that and you can then stop by our lab to have the test done at a later time.\"    Telephone visits are billed at different rates depending on your insurance coverage.  Please reach out to your insurance provider with any questions.    Patient has given verbal consent for  a Telephone visit? {YES-NO  Default Yes:4444::\"Yes\"}    What telephone number would you like your provider to contact at at:  ***    How would you like to obtain your AVS? {AVS Preference:[id number]}    Marina Green MA    Telephone Visit Details:     Telephone Visit Start Time: {telephone visit start/end time for provider to select:552624}    Telephone Visit End Time:{telephone visit start/end time for provider to select:978182}      "

## 2021-05-24 NOTE — PATIENT INSTRUCTIONS

## 2021-05-28 ENCOUNTER — PATIENT OUTREACH (OUTPATIENT)
Dept: NURSING | Facility: CLINIC | Age: 61
End: 2021-05-28
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
## 2021-06-10 ENCOUNTER — TELEPHONE (OUTPATIENT)
Dept: NEUROLOGY | Facility: CLINIC | Age: 61
End: 2021-06-10

## 2021-06-10 NOTE — TELEPHONE ENCOUNTER
I returned call to pt to discuss her visit Monday. As she is not able to come into clinic we will update visit type to video visit. Appointment updated.     Rachel KATE

## 2021-06-10 NOTE — TELEPHONE ENCOUNTER
[facility] Call Center    Phone Message    May a detailed message be left on voicemail: yes     Reason for Call: Please respond and call pt back to let her know IF the appt on Mon 6/14 can be virtual.   Important: pt has already had to push this appt out once, pt is still experiencing symptoms: seeing things that are not there and becoming more pronounced.  Pt's sleep study is not until July.  Pt would really like a virtual appt to discuss what is happening to her, pt is concerned.  Pt had a stroke in 2019 and pt is wondering IF she is having residual effects from the stroke. Pt does NOT want this appt to be pushed out farther. Pt would just like to speak to Dr. Salgado.    Thank you.    Action Taken: Message routed to:  [facility]    Travel Screening: Not Applicable

## 2021-06-14 ENCOUNTER — VIRTUAL VISIT (OUTPATIENT)
Dept: NEUROLOGY | Facility: CLINIC | Age: 61
End: 2021-06-14
Payer: COMMERCIAL

## 2021-06-14 DIAGNOSIS — H81.4 CENTRAL NERVOUS SYSTEM ORIGIN VERTIGO: Primary | ICD-10-CM

## 2021-06-14 PROCEDURE — 99215 OFFICE O/P EST HI 40 MIN: CPT | Mod: 95 | Performed by: PSYCHIATRY & NEUROLOGY

## 2021-06-14 NOTE — PROGRESS NOTES
"[facility] Neurology Follow Up Visit    Lesley Stafford MRN# [id number]   Age: 60 year old YOB: 1960     Brief history of symptoms: The patient was initially seen in neurologic consultation on 12/23/2019 through 12/24/2019 for R-frontal and parietal ischemic strokes (ESUS) with the stroke team, and most recently for follow up with myself on 3/12/2021 after being seen again 3/1/2021 as an inpatient consult for left arm numbness and R-eye scotoma following a fall where she struck her head.  Please refer to these notes for further details on prior history, impression, and plans.  Overall, the patient has had neuropathy in her feet leading to pain along with her history of stroke.  She was to continue with statin, aspirin, and HTN goals for stroke prophylaxis as well as continue gabapentin for neuropathic pain.   After her follow up with ophthalmology, 3/9/2021, she was found to have posterior vitreous detachment of both eyes, and was still reporting left visual field obscuration with a  \"pacman\" like shape.  After our last visit, the patient was still reporting left sided UE numbness occurring a few times per week and lasting minutes.  Given that she was having repeated stereotyped sensory events without changes noted on MRI done in early 3/2021, it was thought that she may be having focal seizures and an EEG was to be done.  Prior MRI (3/1/2021) was revealing for resolution of prior diffusion restriction of right frontoparietal junction and right sided bryson, but there was also a questionable round T2 hyperintensity near the right inferior olivary nucleus (artifact versus other). She also reported a history of somnambulism, yelling our in her sleep and acting out dreams.  She was referred to sleep medicine for concern of REM behavior sleep disorder and RLS.  She was to follow up with me to discuss whether her concussion symptoms (headache, blurred vision, nausea, imbalance) were improving over time as " expected.    Interval history: EEG on 4/6/2021 did show occasional runs of left temporal alpha activities in drowsiness, but otherwise there was noted generalized theta slowing in waking.  Impression was for mild electrographic encephalopathy.    The patient still feels that she is having visual obscuration (seeing things in odd ways (shapes/people)) over her left visual field.  She still has a Pac-man shape in her right eye alone.  She does feel that her nausea, blurred vision, headache are improved but has a continued sense of being off-balance. She may have some tinnitus occasionally, mostly in her left ear.  Her headaches are present 1-2 times per week, and has tried improving water intake.  The pain is in the posterior occipital, and throbbing.  The pain comes from a her neck, and improves with a tylenol.  The headaches last 30-40 minutes.  There is no migrainous qualities with these headaches.  When describing her imbalance, she reports a feeling of light-headedness and swirling sensation.  She doesn't feel that her head is clear or steady.  The sensations come on quickly and sub-acutely (over a few minutes).  She may feel like her brain isn't moving at the speed of time around her, like she is in the twilight zone in her head.     Medications:     Current Outpatient Medications   Medication Sig     albuterol (PROAIR HFA/PROVENTIL HFA/VENTOLIN HFA) 108 (90 Base) MCG/ACT inhaler Inhale 2 puffs into the lungs every 4 hours as needed for shortness of breath / dyspnea, wheezing or other (coughing)     aspirin (ASA) 325 MG EC tablet TAKE 1 TABLET BY MOUTH EVERY DAY START TAKING 1/15/2020 AFTER TAKING 81 MG TAB.     atorvastatin (LIPITOR) 80 MG tablet Take 0.5 tablets (40 mg) by mouth daily     DEEP SEA NASAL SPRAY 0.65 % nasal spray SPRAY 1 SPRAY INTO BOTH NOSTRILS DAILY AS NEEDED FOR CONGESTION     DULoxetine (CYMBALTA) 60 MG capsule Take 60 mg by mouth daily     flunisolide (NASALIDE) 25 MCG/ACT (0.025%) SOLN  spray INHALE 2 SPRAYS INTO EACH NOSTRIL 2 TIMES DAILY     fluticasone (FLONASE) 50 MCG/ACT nasal spray Spray 1 spray into both nostrils daily     gabapentin (NEURONTIN) 300 MG capsule TAKE 2 CAPSULES BY MOUTH 3 TIMES A DAY     gentamicin (GARAMYCIN) 0.3 % ophthalmic solution Place 1-2 drops into the right eye every 4 hours     hydroxychloroquine (PLAQUENIL) 200 MG tablet TAKE 1 TABLET BY MOUTH DAILY     hydrOXYzine (ATARAX) 25 MG tablet TAKE 1 TABLET BY MOUTH THREE TIMES A DAY     multivitamin, therapeutic with minerals (MULTI-VITAMIN) TABS Take 1 tablet by mouth daily     naproxen sodium 220 MG capsule Take 220 mg by mouth 2 times daily (with meals)     omega 3 1200 MG CAPS      order for [facility]ane,     SM ASPIRIN 325 MG tablet TAKE 1 TABLET BY MOUTH EVERY DAY START TAKING 1/15/2020 AFTER TAKING 81 MG TAB     tiZANidine (ZANAFLEX) 4 MG tablet TAKE 1-2 TABLETS BY MOUTH AS NEEDED THREE TIMES A DAY     valACYclovir (VALTREX) 500 MG tablet Take 1 tablet (500 mg) by mouth 2 times daily     VYVANSE 10 MG capsule 10 mg by Oral or Feeding Tube route daily      Physical Exam:   General: Seated comfortably in no acute distress.  HEENT: Neck supple with normal range of motion.   Skin: No rashes  Neurologic:     Mental Status: Fully alert, attentive and oriented. Speech clear and fluent, no paraphasic errors.     Cranial Nerves: EOMI with normal smooth pursuit. Facial movements symmetric. Hearing not formally tested but intact to conversation.  No dysarthria.     Motor: No tremors or other abnormal movements observed.          Assessment and Plan:   Assessment:  Central vertigo, transient  Cervicogenic headache, posterior occiput b/l    The patient is still having some headaches, as well as imbalance that is more of a light-headed feeling or atypical sensation of personal space/processing information around her.  Her prior MRI findings of frontal diffusion restriction had abated, and she has no current symptoms (focal) that are  attributed to her prior region of insult.  I still am uncertain as to what to make f her olivary T2 hyperintensity, given her lack of symptoms associated with a lesion in that region.  Given her ongoing imbalance, headaches, I think expanding her w/up to include other etiologies that could cause a olivary signal change as seen in her MRI as well as other signal changes in prior MRI needs to be done (paraneoplastic, vitamin deficiencies, autoimmune related etiologies).  She would also benefit from having vestibular testing to indicate if there is truly an ongoing central vertigo versus peripheral vertigo.  I will want close follow up with the patient, as pending the results of testing, she will likely need repeat MRI brain in the near future to compare to 3/2021 imaging.     Plan:  Vestibular evaluation  Paraneoplastic, anti-ASHLEY, anti-gliadin, anti-endomysial, TPO, TSH w/T4 reflex, B1, B12, B6    Follow up in Neurology clinic in 4 weeks or earlier as needed should new concerns arise.    THOMAS Salgado D.O.   of Neurology    Total time today (45 min) in this patient encounter was spent on pre-charting, counseling and/or coordination of care. We reviewed diagnostic results, impressions, and discussed other possible tests if symptoms do not improve. We discussed the implications of the diagnosis, as well as risks and benefits of management options. We reviewed treatment instructions and our scheduled follow-up as specified in the discharge plan. We also discussed the importance of compliance with the chosen course of treatment. The patient is in agreement with this plan and has no further questions.

## 2021-06-14 NOTE — PROGRESS NOTES
Lesley is a 60 year old who is being evaluated via a billable video visit.      How would you like to obtain your AVS? Mychart  If the video visit is dropped, the invitation should be resent by: [phone]      Video Start Time: 0900  Video-Visit Details    Type of service:  Video Visit    Video End Time:9:23 AM    Originating Location (pt. Location): Home    Distant Location (provider location):  [facility] NEUROLOGY [facility]     Platform used for Video Visit: [employer]

## 2021-06-16 NOTE — PROGRESS NOTES
Clinic Care Coordination Contact    Situation: Patient chart reviewed by care coordinator.    Assessment: SANTY HORTON reviewed pt's chart and recent interactions with [facility].    Plan/Recommendations: Pt will be moved to Maintenance with Care Coordination. [facility] will outreach to pt 2 months after last encounter.    Delores Barksdale [facility]  Clinic Care Coordinator  [facility] Awilda Graham  [employer]  [phone]  [email or web address]

## 2021-06-24 ENCOUNTER — TELEPHONE (OUTPATIENT)
Dept: AUDIOLOGY | Facility: CLINIC | Age: 61
End: 2021-06-24

## 2021-06-24 NOTE — TELEPHONE ENCOUNTER
[facility] Call Center    Phone Message    May a detailed message be left on voicemail: yes     Reason for Call: Appointment Intake    Referring Provider Name: Dr. Salgado  Diagnosis and/or Symptoms: Balance testing, vertigo    Action Taken: Message routed to:  [facility] Audiology    Travel Screening: Not Applicable

## 2021-07-02 ENCOUNTER — THERAPY VISIT (OUTPATIENT)
Dept: SLEEP MEDICINE | Facility: CLINIC | Age: 61
End: 2021-07-02
Payer: COMMERCIAL

## 2021-07-02 DIAGNOSIS — Z86.73 HISTORY OF STROKE: ICD-10-CM

## 2021-07-02 DIAGNOSIS — R06.83 SNORING: ICD-10-CM

## 2021-07-02 DIAGNOSIS — G47.52 DREAM ENACTMENT BEHAVIOR: ICD-10-CM

## 2021-07-02 DIAGNOSIS — R06.81 WITNESSED APNEIC SPELLS: ICD-10-CM

## 2021-07-02 PROCEDURE — 95810 POLYSOM 6/> YRS 4/> PARAM: CPT | Performed by: INTERNAL MEDICINE

## 2021-07-03 NOTE — PROGRESS NOTES
Diagnostic PSG completed per provider order.  Patient did not meet criteria for PAP therapy.    RBD / 4-Limb montage applied per physician order.   Patient endorsed to awake, alert, responsive, moving all extremities, on cardiac monitor. Family at bedside. Awaiting inpatient bed.

## 2021-07-11 DIAGNOSIS — G62.9 PERIPHERAL POLYNEUROPATHY: ICD-10-CM

## 2021-07-11 RX ORDER — GABAPENTIN 300 MG/1
CAPSULE ORAL
Qty: 180 CAPSULE | Refills: 2 | Status: SHIPPED | OUTPATIENT
Start: 2021-07-11 | End: 2021-07-12

## 2021-07-12 DIAGNOSIS — G62.9 PERIPHERAL POLYNEUROPATHY: ICD-10-CM

## 2021-07-12 RX ORDER — GABAPENTIN 300 MG/1
CAPSULE ORAL
Qty: 180 CAPSULE | Refills: 6 | Status: SHIPPED | OUTPATIENT
Start: 2021-07-12 | End: 2021-11-30

## 2021-07-12 NOTE — PROCEDURES
" SLEEP STUDY INTERPRETATION  DIAGNOSTIC POLYSOMNOGRAPHY REPORT      Patient: CARA GIL  YOB: 1960  Study Date: 7/2/2021  MRN: [id number]  Referring Provider: Deven Salgado DO  Ordering Provider: Israel Pennington MD    Indications for Polysomnography: The patient is a 60 year old Female who is 5' 5\" and weighs 179.0 lbs. Her BMI is 29.8, Rice sleepiness scale 12 and neck circumference is 34 cm. Relevant medical history includes SLE, CVA, obesity, depression & chronic pain. A diagnostic polysomnogram was performed to evaluate for sleep apnea/ RBD.    Polysomnogram Data: A full night polysomnogram recorded the standard physiologic parameters including EEG, EOG, EMG, ECG, nasal and oral airflow. Respiratory parameters of chest and abdominal movements were recorded with respiratory inductance plethysmography. Oxygen saturation was recorded by pulse oximetry. Hypopnea scoring rule used: 1B 4%.    Sleep Architecture: Fragmented sleep.   The total recording time of the polysomnogram was 484.0 minutes. The total sleep time was 428.5 minutes. Sleep latency was normal at 8.5 minutes without the use of a sleep aid. REM latency was 286.0 minutes. Arousal index was increased at 37.2 arousals per hour. Sleep efficiency was normal at 88.5%. Wake after sleep onset was 46.5 minutes. The patient spent 13.4% of total sleep time in Stage N1, 60.4% in Stage N2, 10.6% in Stage N3, and 15.5% in REM. Time in REM supine was 48.5 minutes.    Respiration: Mild sleep apnea.     Events ? The polysomnogram revealed a presence of 11 obstructive, 15 central, and 6 mixed apneas resulting in an apnea index of 4.5 events per hour. There were 5 obstructive hypopneas and - central hypopneas resulting in an obstructive hypopnea index of 0.7 and central hypopnea index of - events per hour. The combined apnea/hypopnea index was 5.2 events per hour (central apnea/hypopnea index was 2.1 events per hour). The REM AHI was 1.8 " events per hour. The supine AHI was 7.1 events per hour. The RERA index was 2.1 events per hour.  The RDI was 7.3 events per hour.    Snoring - was reported as moderate.    Respiratory rate and pattern - was notable for normal respiratory rate and pattern.    Sustained Sleep Associated Hypoventilation - Transcutaneous carbon dioxide monitoring was not used, however significant hypoventilation was not suggested by oximetry.    Sleep Associated Hypoxemia - (Greater than 5 minutes O2 sat at or below 88%) was not present. Baseline oxygen saturation was 96.3%. Lowest oxygen saturation was 86.9%. Time spent less than or equal to 88% was 0 minutes. Time spent less than or equal to 89% was 0 minutes.    Movement Activity:     Periodic Limb Activity - There were 21 PLMs during the entire study. The PLM index was 2.9 movements per hour. The PLM Arousal Index was - per hour.    REM EMG Activity - Excessive transient muscle activity was present.    Nocturnal Behavior - Abnormal sleep related behaviors were noted arising out of REM sleep. The behaviors appeared to be consistent with dream enactment. These events were characterized as yelling and moaning.    Bruxism - None apparent.    Cardiac Summary: Sinus rhythm.   The average pulse rate was 73.8 bpm. The minimum pulse rate was 61.8 bpm while the maximum pulse rate was 91.0 bpm.  Arrhythmias were not noted.    Assessment:     This sleep study shows a very mild degree of sleep apnea consisting of both obstructive and central apnea components. There was no significant hypoxemia.     There was an episode of yelling and moaning in REM sleep suggestive of dream enactment. Although chin EMG tone remained atonic for majority of REM period, transient elevation in limb EMG tone was noted. Overall, the clinical & PSG picture is characteristic of REM sleep behavioral disorder.     Recommendations:    Rather borderline degree of sleep disordered breathing seen on this test is unlikely to  be a significant cardiovascular risk. Patient may be a candidate for dental appliance through referral to Sleep Dentistry for the treatment of this degree of obstructive sleep apnea.    Clinical management of REM sleep behavioral disorder.     Suggest optimizing sleep schedule and avoiding sleep deprivation.    Diagnostic Codes:   Obstructive Sleep Apnea G47.33  Repetitive Intrusions Into Sleep F51.8  Parasomnia, REM Behavior Disorder G47.52    7/2/2021 [facility] Diagnostic Sleep Study (179.0 lbs) - AHI 5.2, RDI 7.3, Supine AHI 7.1, REM AHI 1.8, Low O2 86.9%, Time Spent ?88% 0 minutes / Time Spent ?89% 0 minutes.    _____________________________________   Electronically Signed By: Israel Pennington MD 07/11/2021

## 2021-07-15 LAB — SLPCOMP: NORMAL

## 2021-07-16 ENCOUNTER — VIRTUAL VISIT (OUTPATIENT)
Dept: SLEEP MEDICINE | Facility: CLINIC | Age: 61
End: 2021-07-16
Payer: COMMERCIAL

## 2021-07-16 DIAGNOSIS — G47.9 SLEEP DISTURBANCE: Primary | ICD-10-CM

## 2021-07-20 ENCOUNTER — TELEPHONE (OUTPATIENT)
Dept: AUDIOLOGY | Facility: CLINIC | Age: 61
End: 2021-07-20

## 2021-07-20 NOTE — TELEPHONE ENCOUNTER
"\"I m calling from the Audiology and Balance Testing department at the . This is just a call to remind you of your upcoming Balance Testing appointment on [Date], and to see if you have any questions or concerns regarding the balance testing you'll be doing. You should have received an itinerary via mail or via [employer], if you are active, that goes over what to expect and explains the dos and don ts both 48 hours before, and the day of. There is a list of medications for you to review on the itinerary that we would like you to stop taking beforehand. If you didn t receive the itinerary or you still have questions, please give our clinic a call at [phone]. Otherwise, we will see you on [Date] starting at [Time].\"    Please send encounter if patient would like to reschedule.  "

## 2021-07-20 NOTE — NURSING NOTE
Lesley Stafford's goals for this visit include:       Systemic lupus erythematosus, unspecified     She requests these members of her care team be copied on today's visit information:     PCP: Rafael Orosco    Referring Provider:  Rafael Orosco MD  [address] [facility] [address]  [place] [address]    /76 (BP Location: Left arm, Patient Position: Sitting, Cuff Size: Adult Large)   Pulse 82   Temp 98.4  F (36.9  C) (Oral)   Wt 81.2 kg (179 lb)   LMP 08/12/2013   SpO2 99%   BMI 29.79 kg/m      Do you need any medication refills at today's visit? EVARISTO Corrales [facility] Rheumatology

## 2021-07-21 ENCOUNTER — TELEPHONE (OUTPATIENT)
Dept: AUDIOLOGY | Facility: CLINIC | Age: 61
End: 2021-07-21

## 2021-07-21 NOTE — TELEPHONE ENCOUNTER
Spoke to patient, answered her questions. She will keep her appt as scheduled. Notes that she and her sister help take care of her elderly father. Reports she will also be going to see a new lupus doctor soon.      Holli Patterson. CCC-A  Licensed Audiologist   [place] #[id number]

## 2021-07-21 NOTE — TELEPHONE ENCOUNTER
[facility] Call Center    Phone Message    May a detailed message be left on voicemail: yes     Reason for Call: Other: Pt called in with several questions regarding her upcoming balance testing Appt. Pt is wondering if she'll be able to take an Uber to and from this Appt. States she has a lot going on in her personal life, is very emotional, and is also wondering if she should just put this Appt off for a while. Please call Pt to discuss. Thank you.     Action Taken: Message routed to:  [facility] Audiology    Travel Screening: Not Applicable
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
## 2021-07-29 ENCOUNTER — OFFICE VISIT (OUTPATIENT)
Dept: AUDIOLOGY | Facility: CLINIC | Age: 61
End: 2021-07-29
Payer: COMMERCIAL

## 2021-07-29 ENCOUNTER — OFFICE VISIT (OUTPATIENT)
Dept: RHEUMATOLOGY | Facility: CLINIC | Age: 61
End: 2021-07-29
Attending: FAMILY MEDICINE
Payer: COMMERCIAL

## 2021-07-29 VITALS
HEART RATE: 82 BPM | WEIGHT: 179 LBS | TEMPERATURE: 98.4 F | SYSTOLIC BLOOD PRESSURE: 119 MMHG | BODY MASS INDEX: 29.79 KG/M2 | OXYGEN SATURATION: 99 % | DIASTOLIC BLOOD PRESSURE: 76 MMHG

## 2021-07-29 DIAGNOSIS — H81.4 CENTRAL NERVOUS SYSTEM ORIGIN VERTIGO: ICD-10-CM

## 2021-07-29 DIAGNOSIS — R42 DISEQUILIBRIUM: ICD-10-CM

## 2021-07-29 DIAGNOSIS — M32.9 SYSTEMIC LUPUS ERYTHEMATOSUS, UNSPECIFIED SLE TYPE, UNSPECIFIED ORGAN INVOLVEMENT STATUS (H): ICD-10-CM

## 2021-07-29 DIAGNOSIS — R42 DIZZINESS AND GIDDINESS: Primary | ICD-10-CM

## 2021-07-29 LAB
ALBUMIN UR-MCNC: NEGATIVE MG/DL
APPEARANCE UR: CLEAR
BILIRUB UR QL STRIP: NEGATIVE
CK SERPL-CCNC: 389 U/L (ref 30–225)
COLOR UR AUTO: NORMAL
CREAT UR-MCNC: 28 MG/DL
CRP SERPL-MCNC: <2.9 MG/L (ref 0–8)
ERYTHROCYTE [SEDIMENTATION RATE] IN BLOOD BY WESTERGREN METHOD: 26 MM/HR (ref 0–30)
GLUCOSE UR STRIP-MCNC: NEGATIVE MG/DL
HGB UR QL STRIP: NEGATIVE
KETONES UR STRIP-MCNC: NEGATIVE MG/DL
LEUKOCYTE ESTERASE UR QL STRIP: NEGATIVE
NITRATE UR QL: NEGATIVE
PH UR STRIP: 6.5 [PH] (ref 5–7)
PROT UR-MCNC: <0.05 G/L
PROT/CREAT 24H UR: NORMAL MG/G{CREAT}
RBC #/AREA URNS AUTO: ABNORMAL /HPF
SP GR UR STRIP: 1 (ref 1–1.03)
SQUAMOUS #/AREA URNS AUTO: ABNORMAL /LPF
UROBILINOGEN UR STRIP-MCNC: NORMAL MG/DL
WBC #/AREA URNS AUTO: ABNORMAL /HPF

## 2021-07-29 PROCEDURE — 86200 CCP ANTIBODY: CPT | Performed by: STUDENT IN AN ORGANIZED HEALTH CARE EDUCATION/TRAINING PROGRAM

## 2021-07-29 PROCEDURE — 86235 NUCLEAR ANTIGEN ANTIBODY: CPT | Performed by: STUDENT IN AN ORGANIZED HEALTH CARE EDUCATION/TRAINING PROGRAM

## 2021-07-29 PROCEDURE — 92542 POSITIONAL NYSTAGMUS TEST: CPT | Mod: 59 | Performed by: AUDIOLOGIST

## 2021-07-29 PROCEDURE — 82085 ASSAY OF ALDOLASE: CPT | Mod: 90 | Performed by: STUDENT IN AN ORGANIZED HEALTH CARE EDUCATION/TRAINING PROGRAM

## 2021-07-29 PROCEDURE — 86803 HEPATITIS C AB TEST: CPT | Performed by: STUDENT IN AN ORGANIZED HEALTH CARE EDUCATION/TRAINING PROGRAM

## 2021-07-29 PROCEDURE — 99000 SPECIMEN HANDLING OFFICE-LAB: CPT | Performed by: STUDENT IN AN ORGANIZED HEALTH CARE EDUCATION/TRAINING PROGRAM

## 2021-07-29 PROCEDURE — 84156 ASSAY OF PROTEIN URINE: CPT | Performed by: STUDENT IN AN ORGANIZED HEALTH CARE EDUCATION/TRAINING PROGRAM

## 2021-07-29 PROCEDURE — 85652 RBC SED RATE AUTOMATED: CPT | Performed by: STUDENT IN AN ORGANIZED HEALTH CARE EDUCATION/TRAINING PROGRAM

## 2021-07-29 PROCEDURE — 82550 ASSAY OF CK (CPK): CPT | Performed by: STUDENT IN AN ORGANIZED HEALTH CARE EDUCATION/TRAINING PROGRAM

## 2021-07-29 PROCEDURE — 86039 ANTINUCLEAR ANTIBODIES (ANA): CPT | Performed by: STUDENT IN AN ORGANIZED HEALTH CARE EDUCATION/TRAINING PROGRAM

## 2021-07-29 PROCEDURE — 87340 HEPATITIS B SURFACE AG IA: CPT | Performed by: STUDENT IN AN ORGANIZED HEALTH CARE EDUCATION/TRAINING PROGRAM

## 2021-07-29 PROCEDURE — 92541 SPONTANEOUS NYSTAGMUS TEST: CPT | Performed by: AUDIOLOGIST

## 2021-07-29 PROCEDURE — 86481 TB AG RESPONSE T-CELL SUSP: CPT | Performed by: STUDENT IN AN ORGANIZED HEALTH CARE EDUCATION/TRAINING PROGRAM

## 2021-07-29 PROCEDURE — 36415 COLL VENOUS BLD VENIPUNCTURE: CPT | Performed by: STUDENT IN AN ORGANIZED HEALTH CARE EDUCATION/TRAINING PROGRAM

## 2021-07-29 PROCEDURE — 86140 C-REACTIVE PROTEIN: CPT | Performed by: STUDENT IN AN ORGANIZED HEALTH CARE EDUCATION/TRAINING PROGRAM

## 2021-07-29 PROCEDURE — 99244 OFF/OP CNSLTJ NEW/EST MOD 40: CPT | Performed by: STUDENT IN AN ORGANIZED HEALTH CARE EDUCATION/TRAINING PROGRAM

## 2021-07-29 PROCEDURE — 92537 CALORIC VSTBLR TEST W/REC: CPT | Performed by: AUDIOLOGIST

## 2021-07-29 PROCEDURE — 86431 RHEUMATOID FACTOR QUANT: CPT | Performed by: STUDENT IN AN ORGANIZED HEALTH CARE EDUCATION/TRAINING PROGRAM

## 2021-07-29 PROCEDURE — 86704 HEP B CORE ANTIBODY TOTAL: CPT | Performed by: STUDENT IN AN ORGANIZED HEALTH CARE EDUCATION/TRAINING PROGRAM

## 2021-07-29 PROCEDURE — 92545 OSCILLATING TRACKING TEST: CPT | Mod: 59 | Performed by: AUDIOLOGIST

## 2021-07-29 PROCEDURE — 81001 URINALYSIS AUTO W/SCOPE: CPT | Performed by: STUDENT IN AN ORGANIZED HEALTH CARE EDUCATION/TRAINING PROGRAM

## 2021-07-29 PROCEDURE — 86038 ANTINUCLEAR ANTIBODIES: CPT | Performed by: STUDENT IN AN ORGANIZED HEALTH CARE EDUCATION/TRAINING PROGRAM

## 2021-07-29 PROCEDURE — 92567 TYMPANOMETRY: CPT | Performed by: AUDIOLOGIST

## 2021-07-29 RX ORDER — CEVIMELINE HYDROCHLORIDE 30 MG/1
30 CAPSULE ORAL 3 TIMES DAILY
Qty: 90 CAPSULE | Refills: 1 | Status: SHIPPED | OUTPATIENT
Start: 2021-07-29 | End: 2021-12-01

## 2021-07-29 RX ORDER — HYDROXYCHLOROQUINE SULFATE 200 MG/1
400 TABLET, FILM COATED ORAL DAILY
Qty: 60 TABLET | Refills: 3 | Status: SHIPPED | OUTPATIENT
Start: 2021-07-29 | End: 2021-09-30

## 2021-07-29 ASSESSMENT — PAIN SCALES - GENERAL: PAINLEVEL: NO PAIN (0)

## 2021-07-29 NOTE — PROGRESS NOTES
Rheumatology Clinic Visit     Lesley Stafford MRN# [id number]   YOB: 1960 Age: 60 year old     Date of Visit: Jul 29, 2021   Primary care provider: Rafael Orosco          Assessment and Plan:     Assessment     Systemic lupus( + ARIEL, +dsDNA,+ RNP, Thrombocytopenia, arthralgias, sicca, raynaud's, oral sores )  Fibromyalgia  Hx of stroke  Depression, anxiety  Obesity, sleep apnea  Posterior vitreous detachment of both eyes  Long term Plaquenil use    Ms. Link Stafford is 60 year old female seen in clinic for evaluation of systemic lupus    Systemic lupus : She was diagnosed with systemic lupus in 2014.  And started on hydroxychloroquine 200 mg twice daily.  She presented with arthralgias, fatigue, sicca symptoms, oral sores and had + ARIEL, +dsDNA,+ RNP, Thrombocytopenia.  She continued on hydroxychloroquine until 2020 when she discontinued on her own.  She had no renal, pulmonary, CNS involvement due to systemic lupus.  Her symptoms of fatigue, fibromyalgia are controlled with Cymbalta.    Today she reports that she is off of hydroxychloroquine for a year.  Her main symptoms include intermittent episodes of joint pains, severe dry mouth, generalized muscle aches, fatigue, oral sores.  She denies photosensitivity, pleurisy or muscle weakness.     I will get basic labs today including CBC, liver, kidney function test, inflammatory markers, urinalysis to evaluate for proteinuria.  Will get baseline autoimmune serologies today. I discussed with her about utility of plaquenil in patients with Systemic lupus. Based on her prior blood tests and symptoms she did meet criteria for systemic lupus.     Discussed risks and benefits of Plaquenil with the patient.  It has been shown to reduce lupus flares by 50%, prevent lupus involvement of brain/kidneys and prolong life. It also has anti-thrombotic effect. Plaquenil has a rare side effect of retinal toxicity (1/40,000 before 5 yr of use), the risk could go  up to 1% after 5 yr of continous use and is more with doses above 5 mg/kg. It requires an annual eye exam (visual field and OCT). Was informed that it's slow acting and might take to 3 months to show benefit.    Sicca symptoms : She has a severe dry mouth and dry eyes.  She is using Biotene products.  We will start her on cholinergic agonists like cevimeline.  Side effects of cevimeline including flushing, increased sweating, blurry vision, abdominal cramping discussed with the patient.  She can use 30 mg 3 times a day.  Continue using lubricating eyedrops.  Follow-up with dentist every 6 months.    Fibromyalgia: She is on Cymbalta which helps with fibromyalgia. For fibromyalgia she was instructed to try relaxing techniques like Yoga, Haroon chi. She can try Aerobic conditioning. Low-impact aerobic activities such as fast walking, biking, swimming, or water aerobics are most successful among the interventions that have been studied.    Plan    Will do blood tests     Will try Evoxac for dry mouth. 30 mg three times a day    Start Plaquenil 400 mg daily based on her weight.     Follow up in 2 months     -- Orders placed this encounter  Orders Placed This Encounter   Procedures     CBC with platelets     Comprehensive metabolic panel     RACHEL antibody panel     Complement C3     Complement C4     DNA ANTIBODY, NATV/2 STRAND     Centromere Antibody IgG     Scleroderma Antibody Scl70 RACHEL IgG     Erythrocyte sedimentation rate auto     CRP inflammation     Routine UA with Micro Reflex to Culture     Protein  random urine with Creat Ratio     CK total     Aldolase     Hepatitis B core antibody     Hepatitis B surface antigen     Hepatitis C antibody     Rheumatoid factor     Cyclic Citrullinated Peptide Antibody IgG       -- Medications   Orders Placed This Encounter   Medications     HEMP OIL OR EXTRACT OR OTHER CBD CANNABINOID, NOT MEDICAL CANNABIS,     hydroxychloroquine (PLAQUENIL) 200 MG tablet     Sig: Take 2 tablets  (400 mg) by mouth daily     Dispense:  60 tablet     Refill:  3     cevimeline (EVOXAC) 30 MG capsule     Sig: Take 1 capsule (30 mg) by mouth 3 times daily     Dispense:  90 capsule     Refill:  1             TIME SPENT:   A total of 60 minutes was spent on the patient today, greater than 50% of that time was spent on face to face counseling and care coordination regarding diagnoses and treatment options as mentioned above.                    Active Problem List:     Patient Active Problem List    Diagnosis Date Noted     Vitreous opacities of right eye 04/13/2021     Priority: Medium     Transaminitis 03/30/2021     Priority: Medium     Hyperlipidemia LDL goal <70 03/30/2021     Priority: Medium     Thrombocytopenia (H) 03/24/2021     Priority: Medium     Posterior vitreous detachment of both eyes 03/09/2021     Priority: Medium     Long-term use of Plaquenil 03/09/2021     Priority: Medium     Myopia of both eyes 03/09/2021     Priority: Medium     Presbyopia 03/09/2021     Priority: Medium     Dry eyes 03/09/2021     Priority: Medium     Anal fissure 01/06/2021     Priority: Medium     Labial lesion 01/06/2021     Priority: Medium     Encounter for medication monitoring 07/09/2020     Priority: Medium     History of stroke 02/05/2020     Priority: Medium     Financial difficulties 02/05/2020     Priority: Medium     Moderate major depression (H) 10/08/2018     Priority: Medium     Liver hemangioma 12/14/2016     Priority: Medium     4 on MRI '09       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     Priority: Medium     Insomnia, unspecified type 08/19/2016     Priority: Medium     Tired 04/25/2016     Priority: Medium     Anxiety 02/19/2016     Priority: Medium     Allergic state 02/19/2016     Priority: Medium     History of claustrophobia 08/03/2015     Priority: Medium     Obesity, Class I, BMI 30-34.9 10/31/2014     Priority: Medium     Abnormal perimenopausal bleeding 08/24/2013     Priority: Medium     Systemic  lupus erythematosus, unspecified SLE type, unspecified organ involvement status (H) 09/24/2012     Priority: Medium            History of Present Illness:   Lesley Stafford is a 60 year old female with PMH of systemic lupus, sicca, fibromyalgia, depression, anxiety, obesity, fibromyalgia, stroke seen in the clinic in consultation at request of Dr Orosco for management of systemic lupus.    She was diagnosed with systemic lupus in 2014 by Dr. Ritesh Alcantara.  She had arthralgias, sicca symptoms, generalized muscle aches, fatigue her presentation.  Her blood work showed positive ARIEL, positive dsDNA, RNP antibody, thrombocytopenia.  She also had recurrent oral sores, mild Raynaud's symptoms.  She started on hydroxychloroquine 200 mg daily 2014 and was maintained on it up until 2020 when she discontinued it on her own.  In August 2017 she [facility]  In 2019 she developed stroke and was managed at [facility].  Stroke was not considered related to CNS lupus.  She is on full dose aspirin, statins.  Denies any history of high blood pressure, diabetes.  She has posterior vitreous detachment bilateral eyes and follows with Dr. Rice.  No evidence of Plaquenil toxicity noted on her recent eye exam.    She is off of Plaquenil since 2020.  Today she reports that last week she had a flare where all her joints were hurting.  Her right fourth finger was very swollen and painful.  She had pain in her wrists, elbows, shoulders, lower back, neck, ankles.  During that week she rested, did acupuncture which helped.  She started CBD oil 3 months ago and feels like it is helping.    She also gets recurrent oral sores, has severe dry mouth and dry eyes.  Follows with dentist every 6 months.  She started using Biotene products and uses lubricating eyedrops.  Her other main issue is chronic constipation.  She is MiraLAX couple times a week.  She always has bloating.    Last week her  joints were hurting, wrist, elbows were huting and she thinks that it was related to stress. Ankles, middle toe was hurting. Back, shoulders, muscles hurt. She went to [facility] and was able to rest. Her whole body was on fire. If she stay stress free, then she do better to manage her pain. CBD oil has helped her. Feet felt swollen. She is on CBD oil for 3 months and it has helped.  Occasionally she has chest pain and try to take shallow breaths.  She denies photosensitivity.  She gets Raynaud's in her fingers.  She has trouble swallowing food due to severe dry mouth.    She had recent sleep study which showed mild sleep apnea.  She also had night terrors.  She follows with her therapist in regards to anxiety and depression.    Her fatigue, fibromyalgia is managed with Xarelto.  She has been history of cutaneous lupus in her daughter.  Also reports extreme photosensitivity in her mom and sister.         Review of Systems:     Review Of Systems  Constitutional: denies fever, chills, night sweats and weight loss.  Skin: No skin rash.  Eyes:+ dryness or irritation in eyes. No episode of eye inflammation or redness.   Ears/Nose/Throat: no recurrent sinus infections.  Respiratory: No shortness of breath, dyspnea on exertion, cough, or hemoptysis  Cardiovascular: no chest pain or palpitations.  Gastrointestinal: no nausea, vomiting, abdominal pain.  Normal bowel movements.  Genitourinary: no dysuria, frequency  or hematuria.  Musculoskeletal: as in HPI  Neurologic: no numbness, tingling.  Psychiatric: + mood disorders.  Hematologic/Lymphatic/Immunologic: no history of easy bruising, petechia or purpura.  No abnormal bleeding.   Endocrine: no h/o thyroid disease or Diabetes.                  Past Medical History:     Past Medical History:   Diagnosis Date     Allergy      Anemia      Anxiety      Cerebral infarction (H)      Depressive disorder      hands/feet      Hemoglobin C trait (H) 8/29/2016     Hyperlipidemia LDL  goal <70 3/30/2021     Lupus (H)      Substance abuse (H)     Has not used for 19 years.     Thrombocytopenia (H) 3/24/2021     Past Surgical History:   Procedure Laterality Date     COLONOSCOPY  01/2010    repeat 10 yrs     COLONOSCOPY N/A 6/30/2020    Procedure: COLONOSCOPY, WITH POLYPECTOMY;  Surgeon: Rebecca Rojas MD;  Location: [facility] OR     DILATION AND CURETTAGE, OPERATIVE HYSTEROSCOPY WITH MORCELLATOR, COMBINED N/A 2/15/2017    Procedure: COMBINED DILATION AND CURETTAGE, OPERATIVE HYSTEROSCOPY WITH MORCELLATOR;  Surgeon: Erin Gutierrez MD;  Location: [facility] OR     ESOPHAGOSCOPY, GASTROSCOPY, DUODENOSCOPY (EGD), COMBINED Left 2/5/2016    Procedure: COMBINED ESOPHAGOSCOPY, GASTROSCOPY, DUODENOSCOPY (EGD), BIOPSY SINGLE OR MULTIPLE;  Surgeon: Pierre Fernandez MD;  Location:  GI     GYN SURGERY              Social History:     Social History     Occupational History     Not on file   Tobacco Use     Smoking status: Former Smoker     Smokeless tobacco: Never Used     Tobacco comment: quit 27 years ago   Substance and Sexual Activity     Alcohol use: No     Drug use: No     Sexual activity: Yes     Partners: Male     Birth control/protection: None            Family History:     Family History   Problem Relation Age of Onset     Lupus Mother      Asthma Mother      Diabetes Father      Brain Tumor Father         begnign on pituitary     Depression Sister      Anemia Sister      Glaucoma Maternal Grandfather      Macular Degeneration No family hx of             Allergies:     Allergies   Allergen Reactions     Morphine Sulfate Itching     Sulfa Drugs Hives            Medications:     Current Outpatient Medications   Medication Sig Dispense Refill     albuterol (PROAIR HFA/PROVENTIL HFA/VENTOLIN HFA) 108 (90 Base) MCG/ACT inhaler Inhale 2 puffs into the lungs every 4 hours as needed for shortness of breath / dyspnea, wheezing or other (coughing) 1 Inhaler 0     aspirin (ASA) 325 MG EC tablet TAKE 1 TABLET BY  MOUTH EVERY DAY START TAKING 1/15/2020 AFTER TAKING 81 MG TAB. 30 tablet 5     atorvastatin (LIPITOR) 80 MG tablet Take 0.5 tablets (40 mg) by mouth daily 45 tablet 3     cevimeline (EVOXAC) 30 MG capsule Take 1 capsule (30 mg) by mouth 3 times daily 90 capsule 1     DULoxetine (CYMBALTA) 60 MG capsule Take 60 mg by mouth daily       fluticasone (FLONASE) 50 MCG/ACT nasal spray Spray 1 spray into both nostrils daily 1 g 11     gabapentin (NEURONTIN) 300 MG capsule TAKE 2 CAPSULES 3 TIMES A  capsule 6     HEMP OIL OR EXTRACT OR OTHER CBD CANNABINOID, NOT MEDICAL CANNABIS,        hydroxychloroquine (PLAQUENIL) 200 MG tablet Take 2 tablets (400 mg) by mouth daily 60 tablet 3     hydrOXYzine (ATARAX) 25 MG tablet TAKE 1 TABLET BY MOUTH THREE TIMES A DAY 90 tablet 3     multivitamin, therapeutic with minerals (MULTI-VITAMIN) TABS Take 1 tablet by mouth daily       naproxen sodium 220 MG capsule Take 220 mg by mouth 2 times daily (with meals)       omega 3 1200 MG CAPS        order for DME Cane, 1 Device 0     tiZANidine (ZANAFLEX) 4 MG tablet TAKE 1-2 TABLETS BY MOUTH AS NEEDED THREE TIMES A  tablet 3     valACYclovir (VALTREX) 500 MG tablet Take 1 tablet (500 mg) by mouth 2 times daily 30 tablet 1            Physical Exam:   Blood pressure 119/76, pulse 82, temperature 98.4  F (36.9  C), temperature source Oral, weight 81.2 kg (179 lb), last menstrual period 08/12/2013, SpO2 99 %, not currently breastfeeding.  Wt Readings from Last 4 Encounters:   07/29/21 81.2 kg (179 lb)   04/10/21 81.2 kg (179 lb 1 oz)   03/12/21 81.2 kg (179 lb)   03/01/21 80.8 kg (178 lb 3.2 oz)       Constitutional: Obese, appearing stated age; cooperative  Eyes: nl EOM, PERRLA, vision, conjunctiva, sclera  ENT: nl external ears, nose, hearing, lips, teeth, gums, throat  No mucous membrane lesions, normal saliva pool  Neck: no mass or thyroid enlargement  Resp: lungs clear to auscultation, nl to palpation  CV: RRR, no murmurs, rubs  or gallops, no edema  GI: no ABD mass or tenderness, no HSM  : not tested  Lymph: no cervical, supraclavicular, inguinal or epitrochlear nodes    MS: All TMJ, neck, shoulder, elbow, wrist, MCP/PIP/DIP, spine, hip, knee, ankle, and foot MTP/IP joints were examined.   -- Mild tenderness reported over PIP joints but no synovitis noted over finger joints, wrists, elbows, Normal ROM of shoulders. No tenderness or effusion over knees. Mild tenderness over ankles. No tenderness over MTP joints.     -- No dactylitis,  tenosynovitis, enthesopathy.    Skin: no nail pitting, alopecia, rash, nodules or lesions  Neuro: nl cranial nerves, strength, sensation, DTRs.   Psych: nl judgement, orientation, memory, affect.         Data:     No results found for any visits on 07/29/21.    Recent Labs   Lab Test 03/24/21  0926 03/01/21  1059 12/24/19  0829 12/24/19  0648 12/23/19  1519 05/21/19  0929 07/28/17  1632 05/08/17  0351 01/29/16  1804 01/27/16  1217   WBC 4.5 4.5 4.5 Canceled, Test credited  --  4.0  --   --   --  4.9   RBC 4.52 4.43 4.83 Canceled, Test credited  --  4.42  --   --   --  4.02   HGB 12.7 12.3 13.5 Canceled, Test credited  --  12.6  --   --   --  11.7   HCT 35.6 34.8* 38.2 Canceled, Test credited  --  34.7*  --   --   --  32.2*   MCV 79 79 79 Canceled, Test credited  --  79  --   --   --  80   RDW 14.2 14.1 14.3 Canceled, Test credited  --  14.0  --   --   --  13.6   * 112* 145* Canceled, Test credited  --  124*  --   --   --  155   ALBUMIN 3.9  --   --  3.4  --  3.9   < >  --    < > 3.7   CRP  --   --  <2.9  --   --   --   --  <2.9  --  <2.9   BUN 12 13  --  7  --  10   < >  --   --  16   CREAT  --   --   --   --  0.7  --   --   --   --   --     < > = values in this interval not displayed.      Recent Labs   Lab Test 05/21/19  0929 10/08/18  1000 11/02/16  1151   TSH 1.20 0.97 0.85     Hemoglobin   Date Value Ref Range Status   03/24/2021 12.7 11.7 - 15.7 g/dL Final   03/01/2021 12.3 11.7 - 15.7 g/dL  Final   12/24/2019 13.5 11.7 - 15.7 g/dL Final     Urea Nitrogen   Date Value Ref Range Status   03/24/2021 12 7 - 30 mg/dL Final   03/01/2021 13 7 - 30 mg/dL Final   12/24/2019 7 7 - 30 mg/dL Final     Creatinine   Date Value Ref Range Status   12/23/2019 0.7 0.52 - 1.04 mg/dL Final     Sed Rate   Date Value Ref Range Status   12/24/2019 19 0 - 30 mm/h Final   05/08/2017 15 0 - 30 mm/h Final   05/02/2016 18 0 - 30 mm/h Final     CRP Inflammation   Date Value Ref Range Status   12/24/2019 <2.9 0.0 - 8.0 mg/L Final   05/08/2017 <2.9 0.0 - 8.0 mg/L Final   01/27/2016 <2.9 0.0 - 8.0 mg/L Final     AST   Date Value Ref Range Status   04/30/2021 77 (H) 0 - 45 U/L Final   03/24/2021 63 (H) 0 - 45 U/L Final   12/24/2019 26 0 - 45 U/L Final     Albumin   Date Value Ref Range Status   03/24/2021 3.9 3.4 - 5.0 g/dL Final   12/24/2019 3.4 3.4 - 5.0 g/dL Final   05/21/2019 3.9 3.4 - 5.0 g/dL Final     Alkaline Phosphatase   Date Value Ref Range Status   03/24/2021 72 40 - 150 U/L Final   12/24/2019 67 40 - 150 U/L Final   05/21/2019 67 40 - 150 U/L Final     ALT   Date Value Ref Range Status   04/30/2021 125 (H) 0 - 50 U/L Final   03/24/2021 104 (H) 0 - 50 U/L Final   12/24/2019 30 0 - 50 U/L Final     Recent Labs   Lab Test 04/30/21  1510 03/24/21  0926 03/01/21  1059 12/24/19  0829 12/24/19  0648 05/21/19  0929 10/08/18  1000 02/13/17  1048 11/02/16  1151   WBC  --  4.5 4.5 4.5 Canceled, Test credited 4.0 4.3  --  3.9*   HGB  --  12.7 12.3 13.5 Canceled, Test credited 12.6 12.5  --  13.5   HCT  --  35.6 34.8* 38.2 Canceled, Test credited 34.7* 34.1*  --  37.2   MCV  --  79 79 79 Canceled, Test credited 79 78  --  81   PLT  --  122* 112* 145* Canceled, Test credited 124* 115*  --  154   BUN  --  12 13  --  7 10 10   < > 11   TSH  --   --   --   --   --  1.20 0.97  --  0.85   AST 77* 63*  --   --  26 32 40   < > 34   * 104*  --   --  30 36 44   < > 43   ALKPHOS  --  72  --   --  67 67 59   < > 64    < > = values in this  interval not displayed.     Outside studies reviewed: [facility] from [facility] reviewed.    7/2019 : + RNP - 985, + dsDNA - 73, + ssDNA - 288, + Chromatin - 1081  Cbc -     Reviewed Rheumatology lab flowsheet    Elvia Gonzalez MD  [facility]  Department of Rheumatology & Autoimmune Disorders  [facility]: [phone]   Pager - [phone]

## 2021-07-29 NOTE — Clinical Note
I'm recommending a few things for this patient pending your review: 1. VEMP as she is having dizziness associated with pressure changes and autophony. 2. Hearing test as she reports possible changes in her hearing and left tinnitus. 3. Referral to vestibular physical therapy as she is very concerned about falling and has had a few in the past. Thanks!  -Kylie

## 2021-07-30 ENCOUNTER — PATIENT OUTREACH (OUTPATIENT)
Dept: NURSING | Facility: CLINIC | Age: 61
End: 2021-07-30
Payer: COMMERCIAL

## 2021-07-30 LAB
ALDOLASE SERPL-CCNC: 4.5 U/L
ANA PAT SER IF-IMP: ABNORMAL
ANA SER QL IF: POSITIVE
ANA TITR SER IF: ABNORMAL {TITER}
CCP AB SER IA-ACNC: 1.4 U/ML
ENA SCL70 IGG SER IA-ACNC: 0.9 U/ML
ENA SCL70 IGG SER IA-ACNC: NEGATIVE
HBV CORE AB SERPL QL IA: NONREACTIVE
HBV SURFACE AG SERPL QL IA: NONREACTIVE
HCV AB SERPL QL IA: NONREACTIVE
RHEUMATOID FACT SER NEPH-ACNC: <7 IU/ML

## 2021-07-30 NOTE — PROGRESS NOTES
"Clinic Care Coordination Contact  Community Health Worker Follow Up    Spoke with pt this afternoon.     Goals:   Goals Addressed as of 7/30/2021 at 2:10 PM        I would like to learn how to swim (pt-stated)     Added 7/30/21 by Erin Strong      Goal Statement: I will look into water exercise classes in my area in the next 3 months  Date Goal set: 7/30/21  Barriers: Time commitment  Strengths: Motivated to learn to swim, enrolled in   Date to Achieve By: 10/21/21  Patient expressed understanding of goal: yes  Action steps to achieve this goal:  1. I will contact [employer] in [place] to find out about their Silver Sneakers program  2. I will contact [facility] to inquire about water aerobics and swimming lessons schedules  3. I will contact my insurance program to see if they would cover the cost of swim lessons/gym memberships  4. I will contact my CHW to discuss further resources as needed.            Intervention and Education during outreach: Introduced self to the patient and role of CHW in care coordination. Pt states she is doing so much better then 2 weeks ago. Pt was sick for a few days. Pt discusses the challenges emotionally of being a caregiver to her dad, being a mother, a sibling and a friend. 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 Pt's dad lives at her sister's house and her sister is \"controlling\". Pt shares that her father was sick \"with a bug\" 2 or so weeks ago and \"we didn't know if he would make it.\"\"He wasn't eating or drinking, he had a fever, his bowels were not working, but after sleeping a lot for several days he rebounded.\" Pt reports doing some praying and soul searching about caregiving. Pt states she knows she has to take care of her own MH in order to be an effective caregiver and not to let her father's anger affect her.    CHW asks how pt is addressing her mental health. Pt states she sees a therapist regularly, has a support group for sobriety " "(recently celebrated 30 years of sobriety recently), reads, and spends time in quietness to improve her mental health. Pt also walks and bikes regularly. Pt admits that, \"I don't trust people,\" which makes it easy sometimes to stay away from people.\" Pt does tell a story of how she recently has met a gentleman in her neighborhood who states he has wanted to get into biking. One day after this conversation, this gentleman showed up with a bike and they have been going on some bike rides together. She has \"allowed him into her life\" and he has been helping her with some outdoor household projects. Pt states working with her [facility] worker is also important for her to stay on top of her schedule. CHW commends pt on her efforts to improve her mental health.    CHW asks if pt has any further concerns or need for resources as this time. Pt states she does. Pt reports she would like to lose some weight and learn how to swim. CHW asks if she has a membership to a [employer] or other fitness center which she does not. Pt will call her insurance company to see if they will assist in covering a [employer] membership. Pt is interested in water aerobics and the Silver Sneaker's program at the [facility]. CHW asks if [facility] [place] has a community center. Pt states they do and it is very close to her home. CHW recommends pt call [facility] to see if they have water aerobics and swim lessons. We discussed how to work swimming/water aerobics into her schedule and she thinks going right after she is done caring for her dad would be better then in the morning.     Maintenance CC is completed. Pt remains enrolled in CC and a new goal has been added.    CHW made sure pt has CHW contact information. Pt will contact CHW with questions or updates before next outreach.     CHW Plan: CHW will follow up with pt in one month.      Erin Strong  Community Health Worker  [facility] & " [facility]  [phone]

## 2021-08-02 ENCOUNTER — PATIENT OUTREACH (OUTPATIENT)
Dept: CARE COORDINATION | Facility: CLINIC | Age: 61
End: 2021-08-02

## 2021-08-02 NOTE — PROGRESS NOTES
Care Coordination Clinician Chart Review  Situation: Patient chart reviewed by care coordinator.       Background: Care Coordination initial assessment and enrollment to Care Coordination was 1/3/2020.   Patient centered goals were developed with participation from patient.  SOL CC handed patient off to CHW for continued outreach every 30 days.        Assessment: Per chart review, patient outreach completed by CC CHW on 7/30/2021.  Patient is actively working to accomplish goal.  Patient's goal remains appropriate and relevant at this time.   Patient is due for updated Complex Care Plan.  Annual assessment will be due 1/3/2022.      Goals        Patient Stated       I would like to learn how to swim (pt-stated)       Goal Statement: I will look into water exercise classes in my area in the next 3 months  Date Goal set: 7/30/21  Barriers: Time commitment  Strengths: Motivated to learn to swim, enrolled in CC  Date to Achieve By: 10/21/21  Patient expressed understanding of goal: yes  Action steps to achieve this goal:  1. I will contact [facility] in [place] to find out about their Silver Sneakers program  2. I will contact [facility] to inquire about water aerobics and swimming lessons schedules  3. I will contact my insurance program to see if they would cover the cost of swim lessons/gym memberships  4. I will contact my CHW to discuss further resources as needed.                      Plan/Recommendations: The patient will continue working with Care Coordination to achieve goal as above.  CHW will involve SOL MADERA as needed or if patient is ready to move to maintenance.  SOL CC will continue to monitor progress to goals and CHW outreaches every 6 weeks.   Care Plan updated and mailed to patient: Yes    Delores Barksdale [facility]  Clinic Care Coordinator  [facility]  [place]  [phone]  [email or web address]

## 2021-08-02 NOTE — LETTER
Patient:   MARISEL GALE            MRN: [id number]            FIN: [id number]              Age:   89 years     Sex:  FEMALE     :  26   Associated Diagnoses:   None   Author:   MGEAN WHITE     History of Present Illness   Patient seen and examined.  No new issues overnight she is laying in bed comfortable     Histories   Past Med History: Past Medical History   CKD (chronic kidney disease)  Cellulitis  Dementia  Dyslipidemia  Glaucoma  H/O: blood transfusion  Heart failure  History of Clostridium difficile intestinal infection  History of anesthesia problem  Hx of osteoarthritis  Hypertension  Hypothyroidism, adult  Lymphadenopathy  Muscle weakness (generalized)  Pneumonia  Risk factors for obstructive sleep apnea  Sepsis    Family History:    Heart attack  FATHER    Procedure History:    No active procedure history items have been selected or recorded.  Social History       Alcohol  Details: Alcohol Abuse in Household: No.  Use: Current.  If current Alcohol user: More than 5 (M) or 4 (F) drinks within a couple of hours? No.  Details: Use: Current.  Frequency: 1-2 times per month.  Last Use: glass of wine once in awhile.  Blackouts: Denies.  Change in Tolerance: Denies.  Home/Environment  Details: Alcohol Abuse in Household: No.  Substance Abuse in Household: No.  Smoker in Household: No.  Patient Lives With: Alone, Caregiver.  Current Home Treatments: Home Care Assistance - Assistant.  Assistive Devices Home: Cane, Glasses, Hearing Aid.  Substance Abuse  Details: Use: None.  Tobacco  Details: Smoker in Houshold: No.  Smoked/Smokeless Tobacco Last 30 Days: No.  Smoking Tobacco Use: Never smoker.  Smokeless Tobacco Use Never.  Details: Smoked/Smokeless Tobacco Last 30 Days: No.  Use: Never smoker.; Comment(s):  is a smoker  .       Health Status   Allergies:    Allergic Reactions (All)  Severity Not Documented  Clindamycin- Rash and urticaria.  Latex- No reactions were documented.  [employer]-  [facility]  [address] [place] [address]    August 2, 2021        Lesley Colorado  [address] [facility] [address]          Dear Adrian Sutton is an updated Complex Care Plan for your continued enrollment in Care Coordination. Please let us know if you have additional questions, concerns, or goals that we can assist with.    Sincerely,    Delores Barksdale [facility]  Clinic Care Coordinator  [phone]            [facility]  Complex Care Plan  About Me:    Patient Name:  Lesley Colorado    YOB: 1960  Age:         60 year old   Maljamar MRN:    [id number] Telephone Information:  Home Phone [phone]   Mobile [phone]       Address:  [address] [place], [place] [address] [address] Email address:  [email or web address]      Emergency Contact(s)    Name Relationship Lgl Grd Work Phone Home Phone Mobile Phone   1. LISSETTE ISAAC Daughter No   [phone]   2. DARREN GARCIA Sister No  [phone]   3. AURELIO OVERTON* Mother No   [phone]   4. ADRIAN COLORADO, S* Son No  [phone]       Health Maintenance  Health Maintenance Reviewed:      My Access Plan  Medical Emergency 911   Primary Clinic Line [facility] - [phone]   24 Hour Appointment Line [phone] or  [phone]) (toll-free)   24 Hour Nurse Line [phone] (toll-free)   Preferred Urgent Care     Preferred Hospital     Preferred Pharmacy [id number] - [place], [place] - [address] [facility]     Behavioral Health Crisis Line The National Suicide Prevention Lifeline at 1-966.850.2659 or 911     My Care Team Members  Patient Care Team       Relationship Specialty Notifications Start End    Rafael Orosco MD PCP - General Family Practice  5/13/11     Phone: [phone] Fax: [phone]         [address] [place] [facility] [address]    Alonzo Kerr DPM Assigned  Facial swelling.   Current medications:  (Selected)   Inpatient Medications  Ordered  AMIODArone oral 200 mg tablet (Cordarone): 200 mg = 1 tab, Oral, Daily, Routine, Order Start: 08/06/19 9:00:00 CDT, Tab  Cosopt ophthalmic 2%-0.5% solution: 1 drop, Each Eye, BID, Routine, Order Start: 08/06/19 9:00:00 CDT, Ophth Soln  Eliquis oral 2.5 mg tablet: 2.5 mg = 1 tab, Oral, Q12H, Indication: Atrial Fibrillation, Routine, Order Start: 08/13/19 21:00:00 CDT, Tab, If 2 of the following exist: SCr equal to or greater than 1.5 mg/dL, age equal to or greater than 80 years, weight less than or equal to 60 kg...  Haldol injection 5 mg/mL: 2 mg = 0.4 mL, IM, Q8H, PRN agitation, Routine, Order Start: 08/06/19 3:18:00 CDT, Injection  MiraLax oral powder for solution: 17 gm = 1 packet, Oral, Daily, Routine, Order Start: 08/11/19 13:20:00 CDT, Oral Soln, Dissolve in 8 oz. water  Nephro-Emerald Rx oral tablet: 1 tab, Oral, Daily, Routine, Order Start: 08/21/19 13:28:00 CDT, Tab  Sodium Chloride 0.9% 500 mL: 500 mL, IV, RATE: 500 mL/hr, Infuse over 1 hr, 500 mL TOTAL Volume, Routine, Order Start: 08/15/19 12:00:00 CDT, IV Soln, Weight: 110.5 kg Clinical Weight  Zofran ODT: 4 mg = 1 tab, Oral, Q4H, PRN nausea, Routine, Order Start: 08/16/19 15:04:00 CDT, Tab Disintegrating  acetaminophen oral 500 mg tablet (Tylenol): 500 mg = 1 tab, Oral, Q4H, PRN pain mild, Routine, Order Start: 08/06/19 3:12:00 CDT, Tab  acetaminophen-hydrocodone oral 325-5 mg tablet: 1 tab, Oral, Q6H, PRN pain severe, Routine, Order Start: 08/10/19 11:58:00 CDT, Tab  brimonidine ophthalmic 0.2% solution (Alphagan): 1 drop, Each Eye, Q12H, Routine, Order Start: 08/06/19 9:00:00 CDT, Ophth Soln  ferrous sulfate: 325 mg = 1 tab, Oral, BID, Order Start: 08/06/19 9:00:00 CDT, Tab  heparin flush injection 10 unit/mL.: 30 unit = 3 mL, Flush, Q12H, Routine, Order Start: 08/06/19 21:00:00 CDT, Injection  latanoprost ophthalmic 0.005% solution: 1 drop, Each Eye, Q Bedtime,  Musculoskeletal Provider   10/23/20     Phone: [phone] Fax: [phone]         [address] [place] [address]    Deven Salgado DO Assigned Neuroscience Provider   10/23/20     Phone: [phone] Fax: [phone]         [address] [facility] [address]    Rafael Orosco MD Assigned PCP   11/1/20     Phone: [phone] Fax: [phone]         [address] [place] [facility] [address]    Farzana Alba MD Assigned OBGYN Provider   1/31/21     Phone: [phone] Fax: [phone]         [address] [place] [facility] [address]    Latoya Rice MD MD Ophthalmology  3/2/21     Phone: [phone] Fax: [phone]         [address] [facility] [address]    Latoya Rice MD Assigned Surgical Provider   3/21/21     Phone: [phone] Fax: [phone]         [address] [facility] [address]    Erin Strong Community Health Worker Primary Care - CC Admissions 4/24/21     [phone]    Israel Pennington MD Assigned Sleep Provider   5/30/21     Phone: [phone] Fax: [phone] [address] [facility] [facility] [facility] [address] [facility] [address]    Adilene Barksdale [facility] Lead Care Coordinator Primary Care - CC Admissions 6/15/21             My Care Plans  Self Management and Treatment Plan  Goals and (Comments)  Goals        General     I would like to learn how to swim (pt-stated)      Notes - Note created  7/30/2021  2:07 PM by Erin Strong     Goal Statement: I will look into water exercise classes in my area in the next 3 months  Date Goal set: 7/30/21  Barriers: Time commitment  Strengths: Motivated to learn to swim, enrolled in CC  Date to Achieve By: 10/21/21  Patient expressed understanding of goal: yes  Action steps to achieve this goal:  1. I will contact [facility] in [place] to find out about their Silver Sneakers program  2. I will contact [facility] to inquire about water aerobics and swimming lessons schedules  3. I  Routine, Order Start: 08/06/19 21:00:00 CDT, Ophth Soln  levothyroxine oral tablet (Synthroid): 125 mcg = 1 tab, Oral, QAM at 6, Routine, Order Start: 08/06/19 6:00:00 CDT, Tab  magnesium oxide oral 400 mg tablet (Mag-Ox 400): 400 mg = 1 tab, Oral, Daily, Routine, Order Start: 08/06/19 9:00:00 CDT, Tab  morphine oral 10 mg/5 mL (2 mg/mL) solution: 2.5 mg = 1.25 mL, Oral, Q4H, PRN pain severe, Routine, Order Start: 08/13/19 14:20:00 CDT, Liquid  sodium chloride flush injection 0.9%.: 10 mL, Flush, As Directed PRN, maintain patency, Routine, Order Start: 08/06/19 15:15:00 CDT, Injection  sodium chloride flush injection 0.9%.: 10 mL, Flush, Q12H, Routine, Order Start: 08/06/19 21:00:00 CDT, Injection, Flush every 12 hours  sodium chloride flush injection 0.9%: 20 mL, Flush, As Directed PRN, maintain patency, Routine, Order Start: 08/06/19 15:15:00 CDT, Injection  Prescriptions  Prescribed  acetaminophen-hydrocodone oral 325-5 mg tablet: = 1 tab, Oral, Q6H, PRN pain severe, Tab, # 24 tab, 0 Refills, Maintenance  Documented Medications  Documented  AMIODArone oral 200 mg tablet (Cordarone): 200 mg = 1 tab, Oral, Daily, Tab, Maintenance  Cosopt ophthalmic 2%-0.5% solution: = 1 drop, Each Eye, BID, Solution, # 10 mL, Maintenance  Eliquis oral 2.5 mg tablet: 2.5 mg = 1 tab, Oral, Q12H, Tab, Maintenance  Lumify 0.025% ophthalmic solution: See Instructions, Instill 1 drop in both eyes Q12 hours, Maintenance  MiraLax: Oral, Daily, Powder Recon, Maintenance  ferrous gluconate 324 mg (38 mg elemental iron) oral tablet: 324 mg, Oral, BID, Maintenance  latanoprost ophthalmic 0.005% solution: = 1 drop, Each Eye, Q Bedtime, Solution, Maintenance  levothyroxine 125 mcg (0.125 mg) oral tablet: 125 mcg = 1 tab, Oral, Daily, Tab, # 30 tab, Maintenance  magnesium oxide oral 400 mg tablet (Mag-Ox 400): 400 mg = 1 tab, Oral, Daily, Tab, Maintenance,   Medications (20) Active  Scheduled: (12)  AMIODArone 200 mg tab  200 mg 1 tab, Oral,  will contact my insurance program to see if they would cover the cost of swim lessons/gym memberships  4. I will contact my CHW to discuss further resources as needed.                   Action Plans on File:            Depression            My Medical and Care Information  Problem List   Patient Active Problem List   Diagnosis     Systemic lupus erythematosus, unspecified SLE type, unspecified organ involvement status (H)     Abnormal perimenopausal bleeding     Obesity, Class I, BMI 30-34.9     History of claustrophobia     Anxiety     Allergic state     Tired     Insomnia, unspecified type     Hemoglobin C trait (H)     Liver hemangioma     Moderate major depression (H)     History of stroke     Financial difficulties     Encounter for medication monitoring     Anal fissure     Labial lesion     Posterior vitreous detachment of both eyes     Long-term use of Plaquenil     Myopia of both eyes     Presbyopia     Dry eyes     Thrombocytopenia (H)     Transaminitis     Hyperlipidemia LDL goal <70     Vitreous opacities of right eye        Care Coordination Start Date: 1/3/2020   Frequency of Care Coordination: monthly   Form Last Updated: 08/02/2021        Daily  ApixaBAN 2.5 mg tab  2.5 mg 1 tab, Oral, Q12H  Brimonidine 0.2% ophth soln 5 mL  1 drop, Each Eye, Q12H  Ferrous sulfate 325 mg tab [Fe 65 mg]  325 mg 1 tab, Oral, BID  Heparin flush PF 10 unit/mL inj 5 mL SDV  30 unit 3 mL, Flush, Q12H  Latanoprost 0.005% ophth soln 2.5 mL  1 drop, Each Eye, Q Bedtime  Levothyroxine 125 mcg tab  125 mcg 1 tab, Oral, QAM at 6  Magnesium oxide 400 mg tab  400 mg 1 tab, Oral, Daily  Multivitamin B complex with C-folic acid renal tab  1 tab, Oral, Daily  Polyethylene glycol 3350 oral recon powder 17 gm packet UD  17 gm 1 packet, Oral, Daily  Sodium chloride PF 0.9% flush inj 10 mL  10 mL, Flush, Q12H  Timolol-dorzolamide 0.5%-2% ophth soln 10 mL  1 drop, Each Eye, BID  Continuous: (1)  Sodium Chloride 0.9% 500 mL  500 mL, IV, 500 mL/hr  PRN: (7)  Acetaminophen 500 mg tab  500 mg 1 tab, Oral, Q4H  Haloperidol 5 mg/1 mL inj SDV  2 mg 0.4 mL, IM, Q8H  Hydrocodone-acetaminophen 5-325 mg tab  1 tab, Oral, Q6H  Morphine 5 mg/2.5 mL oral liquid repack  2.5 mg 1.25 mL, Oral, Q4H  Ondansetron 4 mg disintegrating tab  4 mg 1 tab, Oral, Q4H  Sodium chloride PF 0.9% flush inj 10 mL  10 mL, Flush, As Directed PRN  Sodium chloride PF 0.9% flush inj 10 mL  20 mL, Flush, As Directed PRN      Physical Examination   VS/Measurements     Vitals between:   25-AUG-2019 08:39:19   TO   26-AUG-2019 08:39:19                   LAST RESULT MINIMUM MAXIMUM  Temperature 37 36.3 37.1  Heart Rate 71 71 79  Respiratory Rate 16 16 16  NISBP           124 113 147  NIDBP           66 66 79  SpO2                    95 92 95    General:  Alert and oriented, No acute distress.    Eye:  Pupils are equal, round and reactive to light, Extraocular movements are intact.   HENT:  Normocephalic.    Neck:  Supple, Non-tender.    Respiratory:  Lungs are clear to auscultation.    Cardiovascular:  Normal rate, Regular rhythm, No murmur, No edema.   Gastrointestinal:  Soft, Non-tender, Non-distended, Normal bowel sounds.    Neurologic:  Alert, Oriented.    Cognition and Speech:  Oriented, Speech clear and coherent.    Psychiatric:  Cooperative, Appropriate mood & affect.          Review / Management   Laboratory results:     Labs between:  25-AUG-2019 08:39 to 26-AUG-2019 08:39  CBC:                 WBC  HgB  Hct  Plt  MCV  RDW   26-AUG-2019 8.3  (L) 7.5  (L) 24.2  202  93.1  14.3   DIFF:                 Seg  Neutroph//ABS  Lymph//ABS  Mono//ABS  EOS/ABS  26-AUG-2019 NOT APPLICABLE  68 // 5.7 15 // 1.3 10 // 0.8 4 // 0.3  BMP:                 Na  Cl  BUN  Glu   26-AUG-2019 137  104  (H) 40  88                              K  CO2  Cr  Ca                              4.4  30  (H) 5.58  (L) 8.2   Other Chem:             Mg  Phos  Triglycerides  GGTP  DirectBili                           2.0  3.9                        .      Impression and Plan   Dx and Plan   Hemorrhagic shock  Acute hypoxic respiratory failure  Right calf hematoma  Acute blood loss anemia  UTI  Hyperkalemia  Acute oliguric renal failure  CKD stage IV  A. fib - on eliquis  CAD  CHF HLD  History of C. difficile colitis  Dementia  Gout  Hypothyroidism  Osteoarthritis  Plan:  Continue to monitor on medical surgical floor  Wound VAC exchange today  Status post right lower extremity hematoma evacuation  Vascular surgery follow-up  Continue with hemodialysis per renal  Blood pressure stable continue to monitor  Continue with Eliquis-no evidence of bleeding so  Continue amiodarone  Continue with other current medication  Hemoglobin is fluctuating we will continue to monitor any further drop  Continue O2 supplement we will wean slowly  DVT prophylaxis: Patient currently on Eliquis  Dispo : Patient has been declined by LTAC placement as she is lost 43-year-old meet criteria patient waiting for subacute rehab placement

## 2021-08-04 LAB
GAMMA INTERFERON BACKGROUND BLD IA-ACNC: 0.12 IU/ML
M TB IFN-G BLD-IMP: NEGATIVE
M TB IFN-G CD4+ BCKGRND COR BLD-ACNC: 9.88 IU/ML
MITOGEN IGNF BCKGRD COR BLD-ACNC: 0 IU/ML
MITOGEN IGNF BCKGRD COR BLD-ACNC: 0.02 IU/ML
QUANTIFERON MITOGEN: 10 IU/ML
QUANTIFERON NIL TUBE: 0.12 IU/ML
QUANTIFERON TB1 TUBE: 0.12 IU/ML
QUANTIFERON TB2 TUBE: 0.14

## 2021-08-10 ENCOUNTER — TELEPHONE (OUTPATIENT)
Dept: RHEUMATOLOGY | Facility: CLINIC | Age: 61
End: 2021-08-10

## 2021-08-10 ENCOUNTER — APPOINTMENT (OUTPATIENT)
Dept: LAB | Facility: CLINIC | Age: 61
End: 2021-08-10
Payer: COMMERCIAL

## 2021-08-10 LAB
ALBUMIN SERPL-MCNC: 3.9 G/DL (ref 3.4–5)
ALP SERPL-CCNC: 73 U/L (ref 40–150)
ALT SERPL W P-5'-P-CCNC: 82 U/L (ref 0–50)
ANION GAP SERPL CALCULATED.3IONS-SCNC: 4 MMOL/L (ref 3–14)
AST SERPL W P-5'-P-CCNC: 47 U/L (ref 0–45)
BILIRUB SERPL-MCNC: 0.7 MG/DL (ref 0.2–1.3)
BUN SERPL-MCNC: 12 MG/DL (ref 7–30)
CALCIUM SERPL-MCNC: 9.2 MG/DL (ref 8.5–10.1)
CHLORIDE BLD-SCNC: 108 MMOL/L (ref 94–109)
CO2 SERPL-SCNC: 28 MMOL/L (ref 20–32)
CREAT SERPL-MCNC: 0.71 MG/DL (ref 0.52–1.04)
ERYTHROCYTE [DISTWIDTH] IN BLOOD BY AUTOMATED COUNT: 14.2 % (ref 10–15)
GFR SERPL CREATININE-BSD FRML MDRD: >90 ML/MIN/1.73M2
GLUCOSE BLD-MCNC: 98 MG/DL (ref 70–99)
HCT VFR BLD AUTO: 34.3 % (ref 35–47)
HGB BLD-MCNC: 12.6 G/DL (ref 11.7–15.7)
MCH RBC QN AUTO: 28.2 PG (ref 26.5–33)
MCHC RBC AUTO-ENTMCNC: 36.7 G/DL (ref 31.5–36.5)
MCV RBC AUTO: 77 FL (ref 78–100)
PLATELET # BLD AUTO: 152 10E3/UL (ref 150–450)
POTASSIUM BLD-SCNC: 3.9 MMOL/L (ref 3.4–5.3)
PROT SERPL-MCNC: 8.4 G/DL (ref 6.8–8.8)
RBC # BLD AUTO: 4.47 10E6/UL (ref 3.8–5.2)
SODIUM SERPL-SCNC: 140 MMOL/L (ref 133–144)
WBC # BLD AUTO: 6.1 10E3/UL (ref 4–11)

## 2021-08-10 PROCEDURE — 85027 COMPLETE CBC AUTOMATED: CPT | Performed by: STUDENT IN AN ORGANIZED HEALTH CARE EDUCATION/TRAINING PROGRAM

## 2021-08-10 PROCEDURE — 86225 DNA ANTIBODY NATIVE: CPT | Performed by: STUDENT IN AN ORGANIZED HEALTH CARE EDUCATION/TRAINING PROGRAM

## 2021-08-10 PROCEDURE — 86160 COMPLEMENT ANTIGEN: CPT | Performed by: STUDENT IN AN ORGANIZED HEALTH CARE EDUCATION/TRAINING PROGRAM

## 2021-08-10 PROCEDURE — 86235 NUCLEAR ANTIGEN ANTIBODY: CPT | Performed by: STUDENT IN AN ORGANIZED HEALTH CARE EDUCATION/TRAINING PROGRAM

## 2021-08-10 PROCEDURE — 80053 COMPREHEN METABOLIC PANEL: CPT | Performed by: STUDENT IN AN ORGANIZED HEALTH CARE EDUCATION/TRAINING PROGRAM

## 2021-08-10 PROCEDURE — 36415 COLL VENOUS BLD VENIPUNCTURE: CPT | Performed by: STUDENT IN AN ORGANIZED HEALTH CARE EDUCATION/TRAINING PROGRAM

## 2021-08-10 NOTE — TELEPHONE ENCOUNTER
[facility] Call Center    Phone Message    May a detailed message be left on voicemail: yes     Reason for Call: Other: labs      Patient had a lab draw on 7/29 and she received another call on 8/5 asking her to come back for more labs.  She would like a call back to clarify if labs are needed again.    Action Taken: Message routed to:  Adult Clinics: Rheumatology p [id number]    Travel Screening: Not Applicable

## 2021-08-10 NOTE — TELEPHONE ENCOUNTER
Called Lesley, confirmed she does need to have additional labs drawn and assisted with scheduling appointment.  Informed her the mistake has been escalated. She had no further questions at this time.     Sherice PRINCE BSN, RN   Medical Specialty Care Coordinator   [facility]

## 2021-08-11 LAB
C3 SERPL-MCNC: 107 MG/DL (ref 81–157)
C4 SERPL-MCNC: 20 MG/DL (ref 13–39)
DSDNA AB SER-ACNC: 3.2 IU/ML
ENA SM IGG SER IA-ACNC: 2.5 U/ML
ENA SM IGG SER IA-ACNC: NEGATIVE
ENA SS-A AB SER IA-ACNC: 0.6 U/ML
ENA SS-A AB SER IA-ACNC: NEGATIVE
ENA SS-B IGG SER IA-ACNC: <0.6 U/ML
ENA SS-B IGG SER IA-ACNC: NEGATIVE
U1 SNRNP IGG SER IA-ACNC: 86 U/ML
U1 SNRNP IGG SER IA-ACNC: POSITIVE

## 2021-08-13 LAB
CENTROMERE B AB SER-ACNC: <0.7 U/ML
CENTROMERE B AB SER-ACNC: NEGATIVE

## 2021-08-30 ENCOUNTER — PATIENT OUTREACH (OUTPATIENT)
Dept: NURSING | Facility: CLINIC | Age: 61
End: 2021-08-30
Payer: COMMERCIAL

## 2021-08-30 NOTE — PROGRESS NOTES
"Clinic Care Coordination Contact    Community Health Worker Follow Up    Spoke with pt this afternoon.    Goals:   Goals Addressed as of 8/30/2021 at 1:40 PM                    Today       I would like to learn how to swim (pt-stated)   20%    Added 7/30/21 by Erin Strong      Goal Statement: I will look into water exercise classes in my area in the next 3 months  Date Goal set: 7/30/21  Barriers: Time commitment  Strengths: Motivated to learn to swim, enrolled in   Date to Achieve By: 10/21/21  Patient expressed understanding of goal: yes  Action steps to achieve this goal:  1. I will contact [employer] in [place] to find out about their Silver Sneakers program  2. I will contact [facility] to inquire about water aerobics and swimming lessons schedules (in progress)  3. I will contact my insurance program to see if they would cover the cost of swim lessons/gym memberships  4. I will contact my CHW to discuss further resources as needed.    8/30/21: Pt will sign up for Nov/Dec swim lessons through [facility] when enrollment opens 9/1/21.                Intervention and Education during outreach: Pt reports she is doing well. She continue to walk and bike, even on the days she doesn't feel like it because she knows the benefit it is to her MH. She will be doing in-person trauma work with her therapist, driving into neighborhoods in [place] which are triggers for anxiety. \"I am not looking forward to it, but it is necessary.\" Pt reports she celebrated 30 years of sobriety on 8/26/21 by ordering a meal from [employer]. CHW congratulates pt on this great accomplishment. Pt recently helped her son celebrate his 30th birthday.     Pt plans to enroll for weekly, 45-minute swim lessons Nov/Dec through [facility] when enrollment opens 9/1/21. She has always wanted to learn how to swim so is very excited about this opportunity which is affordable for her " ($35.00). It is located just 2 blocks from where she lives at [facility].    CHW asks if pt has any further concerns or need for resources as this time. Pt states she does not. CHW made sure pt has CHW contact information. Pt will contact CHW with questions or updates before next outreach.    CHW Plan: CHW will follow up with pt in one month.    Erin Strong  Community Health Worker  [facility]  [phone]    __________________________________________________________    Next Outreach:  9/27/21  Planned Outreach Frequency: monthly  Preferred Phone Number: [phone]    Enrollment Date:  1/3/20  Last Care Plan Assessment:  8/2/21

## 2021-08-30 NOTE — LETTER
Socorro Sutton,  Great to talk with you today! Congratulations, again, on 30 years of sobriety! That is quite an accomplishment!  I contacted Kriss, one of our financial resources workers, about when to renew [place] [employer] [place]. She said [place] [employer] should be sending you your renewal paperwork in the mail. If you would like to know when they are sending it, you could contact [place] [employer] at [phone]. Ask when your renewal is due so you could be on the lookout for the renewal paperwork.   If you have further questions, do not hesitate to contact me. Have a great week, Lesley!  Erin Strong  Community Health Worker  [facility]  [phone]     ?

## 2021-09-07 ENCOUNTER — PATIENT OUTREACH (OUTPATIENT)
Dept: NURSING | Facility: CLINIC | Age: 61
End: 2021-09-07
Payer: COMMERCIAL

## 2021-09-07 NOTE — PROGRESS NOTES
Clinic Care Coordination Contact    Community Health Worker Follow Up    Spoke with pt this afternoon, before scheduled CHW outreach, as CHW had to talk with pt any ways about her father who is also enrolled in CC.    Care Gaps:     Health Maintenance Due   Topic Date Due     URINE DRUG SCREEN  Never done     Pneumococcal Vaccine: Pediatrics (0 to 5 Years) and At-Risk Patients (6 to 64 Years) (1 of 4 - PCV13) Never done     COVID-19 Vaccine (1) Never done     DTAP/TDAP/TD IMMUNIZATION (1 - Tdap) Never done     ZOSTER IMMUNIZATION (1 of 2) Never done     PREVENTIVE CARE VISIT  03/05/2019     PHQ-9  11/21/2019     INFLUENZA VACCINE (1) Never done       Postponed to next visit     Goals:   Goals Addressed as of 9/7/2021 at 2:16 PM                    Today    8/30/21       I would like to learn how to swim (pt-stated)   20%  20%    Added 7/30/21 by Erin Strong      Goal Statement: I will look into water exercise classes in my area in the next 3 months  Date Goal set: 7/30/21  Barriers: Time commitment  Strengths: Motivated to learn to swim, enrolled in   Date to Achieve By: 10/21/21  Patient expressed understanding of goal: yes  Action steps to achieve this goal:  1. I will contact [employer] in [place] to find out about their Silver [employer] program  2. I will contact [facility] to inquire about water aerobics and swimming lessons schedules (in progress)  3. I will contact my insurance program to see if they would cover the cost of swim lessons/gym memberships  4. I will contact my CHW to discuss further resources as needed.            Intervention and Education during outreach: Spoke briefly with pt today. Pt states she has not enrolled for swim lessons through the [facility], but she plans to soon. She is excited to give swimming lessons a try, as she has always wanted to attend swim lessons.    Last CHW visit, pt wonders when she has to renew her [place] [employer] [place].  CHW contact JESSE Watt, regarding this. Kriss states, [employer] will automatically send renewal forms to pt's mailbox when it time to renew. Kriss suggests pt contact [employer] at [phone] if she would like to know when to expect those renewal forms. Lesley thinks it is the end of Sept or beginning of Oct when she usually re-enrolls. CHW asks if pt would like the [employer] phone number, but pt states she already has it. If she has not received her [place] [employer] renewal forms in the next 2 weeks, she will reach out to [employer].     CHW asks if pt has any further concerns or need for resources as this time. Pt states she does not. CHW made sure pt has CHW contact information. Pt will contact CHW with questions or updates before next outreach.     CHW Plan: CHW will follow up with pt in one month.    Erin Strong  Community Health Worker  [facility]  [phone]    ___________________________________________________________    Next Outreach:  10/5/21  Planned Outreach Frequency: monthly  Preferred Phone Number: [phone]    Enrollment Date: 1/3/20  Last Care Plan Assessment:  8/2/21

## 2021-09-13 ENCOUNTER — TELEPHONE (OUTPATIENT)
Dept: FAMILY MEDICINE | Facility: CLINIC | Age: 61
End: 2021-09-13

## 2021-09-13 DIAGNOSIS — J40 BRONCHITIS: Primary | ICD-10-CM

## 2021-09-13 NOTE — TELEPHONE ENCOUNTER
Routing refill request to provider for review/approval because:  Is using this for her allergies   Last filled: 12/26/2019 1 inhaler  Last visit: 1/6/2021  Has a visit 9/20/2021 for a med follow up  Thank you

## 2021-09-14 ENCOUNTER — PATIENT OUTREACH (OUTPATIENT)
Dept: CARE COORDINATION | Facility: CLINIC | Age: 61
End: 2021-09-14

## 2021-09-14 RX ORDER — ALBUTEROL SULFATE 90 UG/1
2 AEROSOL, METERED RESPIRATORY (INHALATION) EVERY 4 HOURS PRN
Qty: 18 G | Refills: 11 | Status: SHIPPED | OUTPATIENT
Start: 2021-09-14 | End: 2024-07-31

## 2021-09-14 NOTE — PROGRESS NOTES
Care Coordination Clinician Chart Review  Situation: Patient chart reviewed by care coordinator.       Background: Care Coordination initial assessment and enrollment to Care Coordination was 9/7/2021.   Patient centered goals were developed with participation from patient.  [facility] handed patient off to CHW for continued outreach every 30 days.        Assessment: Per chart review, patient outreach completed by CC CHW on 1/2020.  Patient is actively working to accomplish goal.  Patient's goal remains appropriate and relevant at this time.   Patient is nor due for updated Plan of Care.  Annual assessment will be due 1/2022.      Goals        Patient Stated       I would like to learn how to swim (pt-stated)       Goal Statement: I will look into water exercise classes in my area in the next 3 months  Date Goal set: 7/30/21  Barriers: Time commitment  Strengths: Motivated to learn to swim, enrolled in CC  Date to Achieve By: 10/21/21  Patient expressed understanding of goal: yes  Action steps to achieve this goal:  1. I will contact [employer] in [place] to find out about their Silver Sne[employer] program  2. I will contact [facility] to inquire about water aerobics and swimming lessons schedules (in progress)  3. I will contact my insurance program to see if they would cover the cost of swim lessons/gym memberships  4. I will contact my CHW to discuss further resources as needed.                      Plan/Recommendations: The patient will continue working with Care Coordination to achieve goal as above.  CHW will involve [facility] as needed or if patient is ready to move to maintenance.  [facility] will continue to monitor progress to goals and CHW outreaches every 6 weeks.   Plan of Care updated and mailed to patient: GORDY Becerra  Clinic Care Coordinator  [facility]  [place]  [phone]  [email or web address]

## 2021-09-20 ENCOUNTER — VIRTUAL VISIT (OUTPATIENT)
Dept: FAMILY MEDICINE | Facility: CLINIC | Age: 61
End: 2021-09-20
Payer: COMMERCIAL

## 2021-09-20 DIAGNOSIS — H10.13 ALLERGIC CONJUNCTIVITIS, BILATERAL: Primary | ICD-10-CM

## 2021-09-20 PROCEDURE — 99213 OFFICE O/P EST LOW 20 MIN: CPT | Mod: 95 | Performed by: NURSE PRACTITIONER

## 2021-09-20 RX ORDER — CETIRIZINE HYDROCHLORIDE 10 MG/1
10 TABLET ORAL DAILY
Qty: 90 TABLET | Refills: 1 | Status: SHIPPED | OUTPATIENT
Start: 2021-09-20 | End: 2022-06-16

## 2021-09-20 ASSESSMENT — PATIENT HEALTH QUESTIONNAIRE - PHQ9: SUM OF ALL RESPONSES TO PHQ QUESTIONS 1-9: 3

## 2021-09-20 NOTE — PROGRESS NOTES
Lesley is a 60 year old who is being evaluated via a billable telephone visit.      What phone number would you like to be contacted at? [phone]  How would you like to obtain your AVS? [facility]    Assessment & Plan   Problem List Items Addressed This Visit     None      Visit Diagnoses     Allergic conjunctivitis, bilateral    -  Primary    Relevant Medications    cetirizine (ZYRTEC) 10 MG tablet         20 minutes spent on the date of the encounter doing chart review, history and exam, documentation and further activities per the note       See Patient Instructions    No follow-ups on file.    THOMAS Preston CNP  [facility]    Subjective   Lesley is a 60 year old who presents for the following health issues  HPI       Patient is here to do a medication check for her inhaler and to also talk about allergies. Has noticed very itchy eyes and tearing. NO cough. No shortness of breath; no loss of taste or smell; no fever or chills. Has been tired and achy due to not sleeping well due to allergy symptoms.      Review of Systems   Constitutional, HEENT, cardiovascular, pulmonary, gi and gu systems are negative, except as otherwise noted.      Objective           Vitals:  No vitals were obtained today due to virtual visit.    Physical Exam   healthy, alert and no distress  PSYCH: Alert and oriented times 3; coherent speech, normal   rate and volume, able to articulate logical thoughts, able   to abstract reason, no tangential thoughts, no hallucinations   or delusions  Her affect is normal  RESP: No cough, no audible wheezing, able to talk in full sentences  Remainder of exam unable to be completed due to telephone visits    Office Visit on 07/29/2021   Component Date Value Ref Range Status     ARIEL interpretation 07/29/2021 Positive* Negative Final      Negative:              <1:40  Borderline Positive:   1:40 - 1:80  Positive:              >1:80     ARIEL pattern 1 07/29/2021 Homogeneous    Final     ARIEL titer 1 07/29/2021 >1:1280   Final     Cyclic Citrullinated Peptide Antib* 07/29/2021 1.4  U/mL Final    Negative     Rheumatoid Factor 07/29/2021 <7  <20 IU/mL Final     Hepatitis C Antibody 07/29/2021 Nonreactive  Nonreactive Final     Hepatitis B Surface Antigen 07/29/2021 Nonreactive  Nonreactive Final     Hepatitis B Core Antibody Total 07/29/2021 Nonreactive  Nonreactive Final     Aldolase 07/29/2021 4.5  1.5 - 8.1 U/L Final    REFERENCE INTERVAL: Aldolase    Access complete set of age- and/or gender-specific   reference intervals for this test in the [facility]   Test Directory ([email or web address]).     CK 07/29/2021 389* 30 - 225 U/L Final     Total Protein Random Urine g/L 07/29/2021 <0.05  g/L Final    The reference range has not been established for total protein in random urine samples.  The result should be integrated into the clinical context for interpretation.     Total Protein Urine g/gr Creatinine 07/29/2021    Final    Unable to calculate:  Urine creatinine or protein value below detectable level     Creatinine Urine mg/dL 07/29/2021 28  mg/dL Final     Color Urine 07/29/2021 Light Yellow  Colorless, Straw, Light Yellow, Yellow Final     Appearance Urine 07/29/2021 Clear  Clear Final     Glucose Urine 07/29/2021 Negative  Negative mg/dL Final     Bilirubin Urine 07/29/2021 Negative  Negative Final     Ketones Urine 07/29/2021 Negative  Negative mg/dL Final     Specific Gravity Urine 07/29/2021 1.003  1.003 - 1.035 Final     Blood Urine 07/29/2021 Negative  Negative Final     pH Urine 07/29/2021 6.5  5.0 - 7.0 Final     Protein Albumin Urine 07/29/2021 Negative  Negative mg/dL Final     Nitrite Urine 07/29/2021 Negative  Negative Final     Leukocyte Esterase Urine 07/29/2021 Negative  Negative Final     Urobilinogen Urine 07/29/2021 Normal  Normal, 2.0 mg/dL Final     CRP Inflammation 07/29/2021 <2.9  0.0 - 8.0 mg/L Final     Erythrocyte Sedimentation Rate 07/29/2021 26  0 - 30 mm/hr  Final     Scl-70 [place] IgG Instrument Value 07/29/2021 0.9  <7.0 U/mL Final     Scl-70 Antibody IgG 07/29/2021 Negative  Negative Final     Centromere L[place]ara IgG Instrument Value 08/10/2021 <0.7  <7.0 U/mL Final     Centromere Antibody IgG 08/10/2021 Negative  Negative Final     DNA (ds) Antibody 08/10/2021 3.2  <10.0 IU/mL Final    Negative     C4 Complement 08/10/2021 20  13 - 39 mg/dL Final     C3 Complement 08/10/2021 107  81 - 157 mg/dL Final     RNP [place] IgG Instrument Value 08/10/2021 86.0* <5.0 U/mL Final     RNP Antibody IgG 08/10/2021 Positive* Negative Final     Franco RACHEL Lazara IgG Instrument Value 08/10/2021 2.5  <7.0 U/mL Final     Franco RACHEL Antibody IgG 08/10/2021 Negative  Negative Final     SSA [place] IgG Instrument Value 08/10/2021 0.6  <7.0 U/mL Final     SSA (Ro) Antibody IgG 08/10/2021 Negative  Negative Final     SSB L[place]ara IgG Instrument Value 08/10/2021 <0.6  <7.0 U/mL Final     SSB (La) Antibody IgG 08/10/2021 Negative  Negative Final     Sodium 08/10/2021 140  133 - 144 mmol/L Final     Potassium 08/10/2021 3.9  3.4 - 5.3 mmol/L Final     Chloride 08/10/2021 108  94 - 109 mmol/L Final     Carbon Dioxide (CO2) 08/10/2021 28  20 - 32 mmol/L Final     Anion Gap 08/10/2021 4  3 - 14 mmol/L Final     Urea Nitrogen 08/10/2021 12  7 - 30 mg/dL Final     Creatinine 08/10/2021 0.71  0.52 - 1.04 mg/dL Final     Calcium 08/10/2021 9.2  8.5 - 10.1 mg/dL Final     Glucose 08/10/2021 98  70 - 99 mg/dL Final     Alkaline Phosphatase 08/10/2021 73  40 - 150 U/L Final     AST 08/10/2021 47* 0 - 45 U/L Final     ALT 08/10/2021 82* 0 - 50 U/L Final     Protein Total 08/10/2021 8.4  6.8 - 8.8 g/dL Final     Albumin 08/10/2021 3.9  3.4 - 5.0 g/dL Final     Bilirubin Total 08/10/2021 0.7  0.2 - 1.3 mg/dL Final     GFR Estimate 08/10/2021 >90  >60 mL/min/1.73m2 Final    As of July 11, 2021, eGFR is calculated by the CKD-EPI creatinine equation, without race adjustment. eGFR can be influenced by muscle mass, exercise, and diet.  The reported eGFR is an estimation only and is only applicable if the renal function is stable.     WBC Count 08/10/2021 6.1  4.0 - 11.0 10e3/uL Final     RBC Count 08/10/2021 4.47  3.80 - 5.20 10e6/uL Final     Hemoglobin 08/10/2021 12.6  11.7 - 15.7 g/dL Final     Hematocrit 08/10/2021 34.3* 35.0 - 47.0 % Final     MCV 08/10/2021 77* 78 - 100 fL Final     MCH 08/10/2021 28.2  26.5 - 33.0 pg Final     MCHC 08/10/2021 36.7* 31.5 - 36.5 g/dL Final     RDW 08/10/2021 14.2  10.0 - 15.0 % Final     Platelet Count 08/10/2021 152  150 - 450 10e3/uL Final     Quantiferon Nil Tube 07/29/2021 0.12  IU/mL Final     Quantiferon TB1 Tube 07/29/2021 0.12  IU/mL Final     Quantiferon TB2 Tube 07/29/2021 0.14   Final     Quantiferon Mitogen 07/29/2021 10.00  IU/mL Final     RBC Urine 07/29/2021 0-2  0-2 /HPF /HPF Final     WBC Urine 07/29/2021 0-5  0-5 /HPF /HPF Final     Squamous Epithelials Urine 07/29/2021 Few* None Seen /LPF Final     Quantiferon-TB Gold Plus 07/29/2021 Negative  Negative Final    No interferon gamma response to M.tuberculosis antigens was detected. Infection with M.tuberculosis is unlikely, however a single negative result does not exclude infection. In patients at high risk for infection, a second test should be considered in accordance with the 2017 ATS/IDSA/CDC Clinical Pract  ice Guidelines for Diagnosis of Tuberculosis in Adults and Children      TB1 Ag minus Nil Value 07/29/2021 0.00  IU/mL Final     TB2 Ag minus Nil Value 07/29/2021 0.02  IU/mL Final     Mitogen minus Nil Result 07/29/2021 9.88  IU/mL Final     Nil Result 07/29/2021 0.12  IU/mL Final               Phone call duration: 20 minutes

## 2021-09-22 NOTE — NURSING NOTE
"Chief Complaint   Patient presents with     RECHECK     Systemic lupus erythematosus, unspecified SLE type, unspecified organ involvement status (H)     Plaquenil     eye exam completed 4/3/2021       Initial BP 97/63 (BP Location: Left arm, Patient Position: Sitting, Cuff Size: Adult Large)   Pulse 85   Temp 98.4  F (36.9  C) (Oral)   Wt 80.7 kg (178 lb)   LMP 08/12/2013   SpO2 100%   BMI 29.62 kg/m   Estimated body mass index is 29.62 kg/m  as calculated from the following:    Height as of 3/1/21: 1.651 m (5' 5\").    Weight as of this encounter: 80.7 kg (178 lb)..  She requests these members of her care team be copied on today's visit information:     PCP: Rafael Orosco    Referring Provider:    Do you need any medication refills at today's visit?  no    EVARISTO Flores [facility]     "

## 2021-09-30 ENCOUNTER — OFFICE VISIT (OUTPATIENT)
Dept: RHEUMATOLOGY | Facility: CLINIC | Age: 61
End: 2021-09-30
Payer: COMMERCIAL

## 2021-09-30 VITALS
OXYGEN SATURATION: 100 % | WEIGHT: 178 LBS | BODY MASS INDEX: 29.62 KG/M2 | SYSTOLIC BLOOD PRESSURE: 97 MMHG | HEART RATE: 85 BPM | TEMPERATURE: 98.4 F | DIASTOLIC BLOOD PRESSURE: 63 MMHG

## 2021-09-30 DIAGNOSIS — M32.9 SYSTEMIC LUPUS ERYTHEMATOSUS, UNSPECIFIED SLE TYPE, UNSPECIFIED ORGAN INVOLVEMENT STATUS (H): Primary | ICD-10-CM

## 2021-09-30 DIAGNOSIS — R79.89 ELEVATED LFTS: ICD-10-CM

## 2021-09-30 LAB
ALBUMIN SERPL-MCNC: 3.9 G/DL (ref 3.4–5)
ALP SERPL-CCNC: 69 U/L (ref 40–150)
ALT SERPL W P-5'-P-CCNC: 74 U/L (ref 0–50)
ANION GAP SERPL CALCULATED.3IONS-SCNC: 6 MMOL/L (ref 3–14)
AST SERPL W P-5'-P-CCNC: 47 U/L (ref 0–45)
BILIRUB SERPL-MCNC: 0.6 MG/DL (ref 0.2–1.3)
BUN SERPL-MCNC: 9 MG/DL (ref 7–30)
CALCIUM SERPL-MCNC: 9.5 MG/DL (ref 8.5–10.1)
CHLORIDE BLD-SCNC: 108 MMOL/L (ref 94–109)
CK SERPL-CCNC: 424 U/L (ref 30–225)
CO2 SERPL-SCNC: 26 MMOL/L (ref 20–32)
CREAT SERPL-MCNC: 0.7 MG/DL (ref 0.52–1.04)
CRP SERPL-MCNC: <2.9 MG/L (ref 0–8)
ERYTHROCYTE [DISTWIDTH] IN BLOOD BY AUTOMATED COUNT: 14.4 % (ref 10–15)
ERYTHROCYTE [SEDIMENTATION RATE] IN BLOOD BY WESTERGREN METHOD: 18 MM/HR (ref 0–30)
GFR SERPL CREATININE-BSD FRML MDRD: >90 ML/MIN/1.73M2
GLUCOSE BLD-MCNC: 93 MG/DL (ref 70–99)
HCT VFR BLD AUTO: 33.1 % (ref 35–47)
HGB BLD-MCNC: 12 G/DL (ref 11.7–15.7)
MCH RBC QN AUTO: 28.2 PG (ref 26.5–33)
MCHC RBC AUTO-ENTMCNC: 36.3 G/DL (ref 31.5–36.5)
MCV RBC AUTO: 78 FL (ref 78–100)
PLATELET # BLD AUTO: 94 10E3/UL (ref 150–450)
POTASSIUM BLD-SCNC: 3.9 MMOL/L (ref 3.4–5.3)
PROT SERPL-MCNC: 8.2 G/DL (ref 6.8–8.8)
RBC # BLD AUTO: 4.26 10E6/UL (ref 3.8–5.2)
SODIUM SERPL-SCNC: 140 MMOL/L (ref 133–144)
WBC # BLD AUTO: 5.5 10E3/UL (ref 4–11)

## 2021-09-30 PROCEDURE — 80053 COMPREHEN METABOLIC PANEL: CPT | Performed by: STUDENT IN AN ORGANIZED HEALTH CARE EDUCATION/TRAINING PROGRAM

## 2021-09-30 PROCEDURE — 86225 DNA ANTIBODY NATIVE: CPT | Performed by: STUDENT IN AN ORGANIZED HEALTH CARE EDUCATION/TRAINING PROGRAM

## 2021-09-30 PROCEDURE — 85027 COMPLETE CBC AUTOMATED: CPT | Performed by: STUDENT IN AN ORGANIZED HEALTH CARE EDUCATION/TRAINING PROGRAM

## 2021-09-30 PROCEDURE — 36415 COLL VENOUS BLD VENIPUNCTURE: CPT | Performed by: STUDENT IN AN ORGANIZED HEALTH CARE EDUCATION/TRAINING PROGRAM

## 2021-09-30 PROCEDURE — 82550 ASSAY OF CK (CPK): CPT | Performed by: STUDENT IN AN ORGANIZED HEALTH CARE EDUCATION/TRAINING PROGRAM

## 2021-09-30 PROCEDURE — 99214 OFFICE O/P EST MOD 30 MIN: CPT | Performed by: STUDENT IN AN ORGANIZED HEALTH CARE EDUCATION/TRAINING PROGRAM

## 2021-09-30 PROCEDURE — 86160 COMPLEMENT ANTIGEN: CPT | Performed by: STUDENT IN AN ORGANIZED HEALTH CARE EDUCATION/TRAINING PROGRAM

## 2021-09-30 PROCEDURE — 82657 ENZYME CELL ACTIVITY: CPT | Mod: 90 | Performed by: STUDENT IN AN ORGANIZED HEALTH CARE EDUCATION/TRAINING PROGRAM

## 2021-09-30 PROCEDURE — 83516 IMMUNOASSAY NONANTIBODY: CPT | Mod: 90 | Performed by: STUDENT IN AN ORGANIZED HEALTH CARE EDUCATION/TRAINING PROGRAM

## 2021-09-30 PROCEDURE — 85652 RBC SED RATE AUTOMATED: CPT | Performed by: STUDENT IN AN ORGANIZED HEALTH CARE EDUCATION/TRAINING PROGRAM

## 2021-09-30 PROCEDURE — 86140 C-REACTIVE PROTEIN: CPT | Performed by: STUDENT IN AN ORGANIZED HEALTH CARE EDUCATION/TRAINING PROGRAM

## 2021-09-30 PROCEDURE — 99000 SPECIMEN HANDLING OFFICE-LAB: CPT | Performed by: STUDENT IN AN ORGANIZED HEALTH CARE EDUCATION/TRAINING PROGRAM

## 2021-09-30 PROCEDURE — 86256 FLUORESCENT ANTIBODY TITER: CPT | Mod: 90 | Performed by: STUDENT IN AN ORGANIZED HEALTH CARE EDUCATION/TRAINING PROGRAM

## 2021-09-30 RX ORDER — HYDROXYCHLOROQUINE SULFATE 200 MG/1
400 TABLET, FILM COATED ORAL DAILY
Qty: 180 TABLET | Refills: 1 | Status: SHIPPED | OUTPATIENT
Start: 2021-09-30 | End: 2022-01-11

## 2021-09-30 ASSESSMENT — PAIN SCALES - GENERAL: PAINLEVEL: MILD PAIN (2)

## 2021-09-30 NOTE — PATIENT INSTRUCTIONS
Blood tests today and in 3 months     Take Plaquenil 400 mg daily     You can use Voltaren gel as needed for joint pain     Follow up in 3 months

## 2021-09-30 NOTE — PROGRESS NOTES
Rheumatology Clinic Visit     Lesley Stafford MRN# [id number]   YOB: 1960 Age: 60 year old     Date of Visit: Sep 30, 2021   Primary care provider: Rafael Orosco          Assessment and Plan:     Assessment     Systemic lupus( + ARIEL, +dsDNA,+ RNP, Thrombocytopenia, arthralgias, sicca, raynaud's, oral sores )  Fibromyalgia  Hx of stroke  Depression, anxiety  Obesity, sleep apnea  Posterior vitreous detachment of both eyes  Long term Plaquenil use    Ms. Link Stafford is 60 year old female seen in clinic for evaluation of systemic lupus    Systemic lupus : She was diagnosed with systemic lupus in 2014. She presented with arthralgias, fatigue, sicca symptoms, oral sores and had + ARIEL, +dsDNA,+ RNP, Thrombocytopenia and was started on hydroxychloroquine 200 mg twice daily. She continued on hydroxychloroquine until 2020 when she discontinued on her own.  She had no renal, pulmonary, CNS involvement due to systemic lupus.  Her symptoms of fatigue, fibromyalgia are controlled with Cymbalta.    Her main symptoms include intermittent episodes of joint pains, generalized muscle aches, fatigue, dry mouth, oral sores, pleurisy. She restarted Plaquenil in 7/2021 but is only taking 200 mg daily as compared to 400 mg daily which was prescribed. She has noticed improvements in chest pains, oral sores. Dry mouth improved with Cevimeline.     She reports pain in her hands, feet, shoulders, lower back, neck and hips. On exam she has tenderness over 2,3 rd MCP, PIP joints, left wrist, b/l ankles, elbows. She also has myofascial tenderness due to Fibromyalgia.     Plan is to continue hydroxychloroquine.  Increase to 400 mg daily. I will get some baseline labs including CBC, liver, kidney function test, inflammatory markers, CBC C4, dsDNA.  I discussed that if joint pains do not improve then we can add azathioprine to hydroxychloroquine.  Will avoid methotrexate, sulfasalazine or leflunomide due to elevated liver  enzymes.      Sicca symptoms : She has severe dry mouth and dry eyes.  She is using Biotene products. Cholinergic agonists cevimeline has helped. Continue using lubricating eyedrops.  Follow-up with dentist every 6 months.    Fibromyalgia: She is on Cymbalta which helps with fibromyalgia. For fibromyalgia she was instructed to try relaxing techniques like Yoga, Haroon chi. She can try Aerobic conditioning. Low-impact aerobic activities such as fast walking, biking, swimming, or water aerobics are most successful among the interventions that have been studied.    Elevated liver enzymes: I have ordered F-actin, AMA labs to evaluate for autoimmune hepatitis.  Elevated liver enzymes could  be related to fatty liver.  CK levels ordered.    Vaccinations: Vaccinations reviewed with Ms. Link Stafford. Risks and benefits of vaccinations were discussed.  - Influenza: encouraged yearly vaccination  - [id number]: recommend to get it   - [id number]: to receive 8 weeks after [id number] is administered  - Zostavax: recommend to get it   - COVID- 19 : recommend to get it       Plan    Blood tests today and in 3 months - CBC, CMP, ESR, CRP, C3/C4, dsDNA    Take Plaquenil 400 mg daily     Follow up in 3 months with labs     -- Orders placed this encounter  Orders Placed This Encounter   Procedures     CBC with platelets     Comprehensive metabolic panel     CK total     Erythrocyte sedimentation rate auto     CRP inflammation     Thiopurine Methyltransferase RBC     Complement C3     Complement C4     DNA ANTIBODY, NATV/2 STRAND     F Actin EIA with reflex     Mitochondrial M2 Antibody IgG                     Active Problem List:     Patient Active Problem List    Diagnosis Date Noted     Vitreous opacities of right eye 04/13/2021     Priority: Medium     Transaminitis 03/30/2021     Priority: Medium     Hyperlipidemia LDL goal <70 03/30/2021     Priority: Medium     Thrombocytopenia (H) 03/24/2021     Priority: Medium     Posterior  vitreous detachment of both eyes 03/09/2021     Priority: Medium     Long-term use of Plaquenil 03/09/2021     Priority: Medium     Myopia of both eyes 03/09/2021     Priority: Medium     Presbyopia 03/09/2021     Priority: Medium     Dry eyes 03/09/2021     Priority: Medium     Anal fissure 01/06/2021     Priority: Medium     Labial lesion 01/06/2021     Priority: Medium     Encounter for medication monitoring 07/09/2020     Priority: Medium     History of stroke 02/05/2020     Priority: Medium     Financial difficulties 02/05/2020     Priority: Medium     Moderate major depression (H) 10/08/2018     Priority: Medium     Liver hemangioma 12/14/2016     Priority: Medium     4 on MRI '09       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     Priority: Medium     Insomnia, unspecified type 08/19/2016     Priority: Medium     Tired 04/25/2016     Priority: Medium     Anxiety 02/19/2016     Priority: Medium     Allergic state 02/19/2016     Priority: Medium     History of claustrophobia 08/03/2015     Priority: Medium     Obesity, Class I, BMI 30-34.9 10/31/2014     Priority: Medium     Abnormal perimenopausal bleeding 08/24/2013     Priority: Medium     Systemic lupus erythematosus, unspecified SLE type, unspecified organ involvement status (H) 09/24/2012     Priority: Medium            History of Present Illness:   Lesley Stafford is a 60 year old female with PMH of systemic lupus, sicca, fibromyalgia, depression, anxiety, obesity, fibromyalgia, stroke seen in the clinic in consultation at request of Dr Orosco for management of systemic lupus.    She was diagnosed with systemic lupus in 2014 by Dr. Ritesh Alcantara.  She had arthralgias, sicca symptoms, generalized muscle aches, fatigue her presentation.  Her blood work showed positive ARIEL, positive dsDNA, RNP antibody, thrombocytopenia.  She also had recurrent oral sores, mild Raynaud's symptoms.  She started on hydroxychloroquine 200 mg daily 2014 and was maintained on it up  until 2020 when she discontinued it on her own.  In August 2017 she [facility]  In 2019 she developed stroke and was managed at [facility].  Stroke was not considered related to CNS lupus.  She is on full dose aspirin, statins.  Denies any history of high blood pressure, diabetes.  She has posterior vitreous detachment bilateral eyes and follows with Dr. Rice.  No evidence of Plaquenil toxicity noted on her recent eye exam.    She is off of Plaquenil since 2020.  Today she reports that last week she had a flare where all her joints were hurting.  Her right fourth finger was very swollen and painful.  She had pain in her wrists, elbows, shoulders, lower back, neck, ankles.  During that week she rested, did acupuncture which helped.  She started CBD oil 3 months ago and feels like it is helping.    She also gets recurrent oral sores, has severe dry mouth and dry eyes.  Follows with dentist every 6 months.  She started using Biotene products and uses lubricating eyedrops.  Her other main issue is chronic constipation.  She is MiraLAX couple times a week.  She always has bloating.    Last week her joints were hurting, wrist, elbows were huting and she thinks that it was related to stress. Ankles, middle toe was hurting. Back, shoulders, muscles hurt. She went to [facility] and was able to rest. Her whole body was on fire. If she stay stress free, then she do better to manage her pain. CBD oil has helped her. Feet felt swollen. She is on CBD oil for 3 months and it has helped.  Occasionally she has chest pain and try to take shallow breaths.  She denies photosensitivity.  She gets Raynaud's in her fingers.  She has trouble swallowing food due to severe dry mouth.    She had recent sleep study which showed mild sleep apnea.  She also had night terrors.  She follows with her therapist in regards to anxiety and depression.    Her fatigue, fibromyalgia is managed  with Xarelto.  She has been history of cutaneous lupus in her daughter.  Also reports extreme photosensitivity in her mom and sister.    September 30, 2021 - She has pain in her wrists, shoulder. Pain in her left hip wakes her up. She has lower back pain. She gets pain in her knuckles. Twice a month she gets pain in her joints which last for few days. She see chiropracter, get massage therapy, and use CBD. She has dry mouth and cevimeline helps with her dry mouth. She gets chest pain off and on but not has been that bad. She has raynaud's in her fingers. She has not has oral sores for a while.          Review of Systems:     Review Of Systems  Constitutional: denies fever, chills, night sweats and weight loss.  Skin: No skin rash.  Eyes:+ dryness or irritation in eyes. No episode of eye inflammation or redness.   Ears/Nose/Throat: no recurrent sinus infections.  Respiratory: No shortness of breath, dyspnea on exertion, cough, or hemoptysis  Cardiovascular: no chest pain or palpitations.  Gastrointestinal: no nausea, vomiting, abdominal pain.  Normal bowel movements.  Genitourinary: no dysuria, frequency  or hematuria.  Musculoskeletal: as in HPI  Neurologic: no numbness, tingling.  Psychiatric: + mood disorders.  Hematologic/Lymphatic/Immunologic: no history of easy bruising, petechia or purpura.  No abnormal bleeding.   Endocrine: no h/o thyroid disease or Diabetes.                  Past Medical History:     Past Medical History:   Diagnosis Date     Allergy      Anemia      Anxiety      Cerebral infarction (H)      Depressive disorder      hands/feet      Hemoglobin C trait (H) 8/29/2016     Hyperlipidemia LDL goal <70 3/30/2021     Lupus (H)      Substance abuse (H)     Has not used for 19 years.     Thrombocytopenia (H) 3/24/2021     Past Surgical History:   Procedure Laterality Date     COLONOSCOPY  01/2010    repeat 10 yrs     COLONOSCOPY N/A 6/30/2020    Procedure: COLONOSCOPY, WITH POLYPECTOMY;  Surgeon:  Rebecca Rojas MD;  [facility]     DILATION AND CURETTAGE, OPERATIVE HYSTEROSCOPY WITH MORCELLATOR, COMBINED N/A 2/15/2017    Procedure: COMBINED DILATION AND CURETTAGE, OPERATIVE HYSTEROSCOPY WITH MORCELLATOR;  Surgeon: Erin Gutierrez MD;  Location: [facility]     ESOPHAGOSCOPY, GASTROSCOPY, DUODENOSCOPY (EGD), COMBINED Left 2/5/2016    Procedure: COMBINED ESOPHAGOSCOPY, GASTROSCOPY, DUODENOSCOPY (EGD), BIOPSY SINGLE OR MULTIPLE;  Surgeon: Pierre Fernandez MD;  [facility]     GYN SURGERY              Social History:     Social History     Occupational History     Not on file   Tobacco Use     Smoking status: Former Smoker     Smokeless tobacco: Never Used     Tobacco comment: quit 27 years ago   Substance and Sexual Activity     Alcohol use: No     Drug use: No     Sexual activity: Yes     Partners: Male     Birth control/protection: None            Family History:     Family History   Problem Relation Age of Onset     Lupus Mother      Asthma Mother      Diabetes Father      Brain Tumor Father         begnign on pituitary     Depression Sister      Anemia Sister      Glaucoma Maternal Grandfather      Macular Degeneration No family hx of             Allergies:     Allergies   Allergen Reactions     Morphine Sulfate Itching     Sulfa Drugs Hives            Medications:     Current Outpatient Medications   Medication Sig Dispense Refill     albuterol (PROAIR HFA/PROVENTIL HFA/VENTOLIN HFA) 108 (90 Base) MCG/ACT inhaler Inhale 2 puffs into the lungs every 4 hours as needed for shortness of breath / dyspnea, wheezing or other (coughing) 18 g 11     aspirin (ASA) 325 MG EC tablet TAKE 1 TABLET BY MOUTH EVERY DAY START TAKING 1/15/2020 AFTER TAKING 81 MG TAB. 30 tablet 5     atorvastatin (LIPITOR) 80 MG tablet Take 0.5 tablets (40 mg) by mouth daily 45 tablet 3     cetirizine (ZYRTEC) 10 MG tablet Take 1 tablet (10 mg) by mouth daily 90 tablet 1     cevimeline (EVOXAC) 30 MG capsule Take 1 capsule (30  mg) by mouth 3 times daily 90 capsule 1     DULoxetine (CYMBALTA) 60 MG capsule Take 60 mg by mouth daily       fluticasone (FLONASE) 50 MCG/ACT nasal spray Spray 1 spray into both nostrils daily 1 g 11     gabapentin (NEURONTIN) 300 MG capsule TAKE 2 CAPSULES 3 TIMES A  capsule 6     HEMP OIL OR EXTRACT OR OTHER CBD CANNABINOID, NOT MEDICAL CANNABIS,        hydroxychloroquine (PLAQUENIL) 200 MG tablet Take 2 tablets (400 mg) by mouth daily 60 tablet 3     hydrOXYzine (ATARAX) 25 MG tablet TAKE 1 TABLET BY MOUTH THREE TIMES A DAY 90 tablet 3     multivitamin, therapeutic with minerals (MULTI-VITAMIN) TABS Take 1 tablet by mouth daily       naproxen sodium 220 MG capsule Take 220 mg by mouth 2 times daily (with meals)       omega 3 1200 MG CAPS        order for DME Cane, 1 Device 0     tiZANidine (ZANAFLEX) 4 MG tablet TAKE 1-2 TABLETS BY MOUTH AS NEEDED THREE TIMES A  tablet 3     valACYclovir (VALTREX) 500 MG tablet Take 1 tablet (500 mg) by mouth 2 times daily 30 tablet 1            Physical Exam:   Blood pressure 97/63, pulse 85, temperature 98.4  F (36.9  C), temperature source Oral, weight 80.7 kg (178 lb), last menstrual period 08/12/2013, SpO2 100 %, not currently breastfeeding.  Wt Readings from Last 4 Encounters:   09/30/21 80.7 kg (178 lb)   07/29/21 81.2 kg (179 lb)   04/10/21 81.2 kg (179 lb 1 oz)   03/12/21 81.2 kg (179 lb)       Constitutional: Obese, appearing stated age; cooperative  Eyes: nl EOM, PERRLA, vision, conjunctiva, sclera  ENT: nl external ears, nose, hearing, lips, teeth, gums, throat  No mucous membrane lesions, normal saliva pool  Neck: no mass or thyroid enlargement  Resp: lungs clear to auscultation, nl to palpation  CV: RRR, no murmurs, rubs or gallops, no edema  GI: no ABD mass or tenderness, no HSM  : not tested  Lymph: no cervical, supraclavicular, inguinal or epitrochlear nodes    MS: All TMJ, neck, shoulder, elbow, wrist, MCP/PIP/DIP, spine, hip, knee, ankle, and  foot MTP/IP joints were examined.     -- On exam she has tenderness and synovitis over 2,3 rd MCP, PIP joints. Tenderness over left wrist, b/l ankles, elbows. She also has myofascial tenderness due to Fibromyalgia.     -- No dactylitis,  tenosynovitis, enthesopathy.    Skin: no nail pitting, alopecia, rash, nodules or lesions  Neuro: nl cranial nerves, strength, sensation, DTRs.   Psych: nl judgement, orientation, memory, affect.         Data:     No results found for any visits on 09/30/21.    Recent Labs   Lab Test 03/24/21  0926 03/01/21  1059 12/24/19  0829 12/24/19  0648 12/23/19  1519 05/21/19  0929 07/28/17  1632 05/08/17  0351 01/29/16  1804 01/27/16  1217   WBC 4.5 4.5 4.5 Canceled, Test credited  --  4.0  --   --   --  4.9   RBC 4.52 4.43 4.83 Canceled, Test credited  --  4.42  --   --   --  4.02   HGB 12.7 12.3 13.5 Canceled, Test credited  --  12.6  --   --   --  11.7   HCT 35.6 34.8* 38.2 Canceled, Test credited  --  34.7*  --   --   --  32.2*   MCV 79 79 79 Canceled, Test credited  --  79  --   --   --  80   RDW 14.2 14.1 14.3 Canceled, Test credited  --  14.0  --   --   --  13.6   * 112* 145* Canceled, Test credited  --  124*  --   --   --  155   ALBUMIN 3.9  --   --  3.4  --  3.9   < >  --    < > 3.7   CRP  --   --  <2.9  --   --   --   --  <2.9  --  <2.9   BUN 12 13  --  7  --  10   < >  --   --  16   CREAT  --   --   --   --  0.7  --   --   --   --   --     < > = values in this interval not displayed.      Recent Labs   Lab Test 05/21/19  0929 10/08/18  1000 11/02/16  1151   TSH 1.20 0.97 0.85     Hemoglobin   Date Value Ref Range Status   08/10/2021 12.6 11.7 - 15.7 g/dL Final   03/24/2021 12.7 11.7 - 15.7 g/dL Final   03/01/2021 12.3 11.7 - 15.7 g/dL Final   12/24/2019 13.5 11.7 - 15.7 g/dL Final     Urea Nitrogen   Date Value Ref Range Status   08/10/2021 12 7 - 30 mg/dL Final   03/24/2021 12 7 - 30 mg/dL Final   03/01/2021 13 7 - 30 mg/dL Final   12/24/2019 7 7 - 30 mg/dL Final      Creatinine   Date Value Ref Range Status   12/23/2019 0.7 0.52 - 1.04 mg/dL Final     Sed Rate   Date Value Ref Range Status   12/24/2019 19 0 - 30 mm/h Final   05/08/2017 15 0 - 30 mm/h Final   05/02/2016 18 0 - 30 mm/h Final     Erythrocyte Sedimentation Rate   Date Value Ref Range Status   07/29/2021 26 0 - 30 mm/hr Final     CRP Inflammation   Date Value Ref Range Status   07/29/2021 <2.9 0.0 - 8.0 mg/L Final   12/24/2019 <2.9 0.0 - 8.0 mg/L Final   05/08/2017 <2.9 0.0 - 8.0 mg/L Final   01/27/2016 <2.9 0.0 - 8.0 mg/L Final     AST   Date Value Ref Range Status   08/10/2021 47 (H) 0 - 45 U/L Final   04/30/2021 77 (H) 0 - 45 U/L Final   03/24/2021 63 (H) 0 - 45 U/L Final   12/24/2019 26 0 - 45 U/L Final     Albumin   Date Value Ref Range Status   08/10/2021 3.9 3.4 - 5.0 g/dL Final   03/24/2021 3.9 3.4 - 5.0 g/dL Final   12/24/2019 3.4 3.4 - 5.0 g/dL Final   05/21/2019 3.9 3.4 - 5.0 g/dL Final     Alkaline Phosphatase   Date Value Ref Range Status   08/10/2021 73 40 - 150 U/L Final   03/24/2021 72 40 - 150 U/L Final   12/24/2019 67 40 - 150 U/L Final   05/21/2019 67 40 - 150 U/L Final     ALT   Date Value Ref Range Status   08/10/2021 82 (H) 0 - 50 U/L Final   04/30/2021 125 (H) 0 - 50 U/L Final   03/24/2021 104 (H) 0 - 50 U/L Final   12/24/2019 30 0 - 50 U/L Final     Rheumatoid Factor   Date Value Ref Range Status   07/29/2021 <7 <20 IU/mL Final     Recent Labs   Lab Test 08/10/21  1740 04/30/21  1510 03/24/21  0926 03/01/21  1059 12/24/19  0829 12/24/19  0648 12/24/19  0648 12/23/19  1517 05/21/19  0929 10/08/18  1000 10/08/18  1000 02/13/17  1048 11/02/16  1151   WBC 6.1  --  4.5 4.5   < >  --  Canceled, Test credited   < > 4.0   < > 4.3   < > 3.9*   HGB 12.6  --  12.7 12.3   < >  --  Canceled, Test credited   < > 12.6   < > 12.5   < > 13.5   HCT 34.3*  --  35.6 34.8*   < >  --  Canceled, Test credited   < > 34.7*   < > 34.1*   < > 37.2   MCV 77*  --  79 79   < >  --  Canceled, Test credited   < > 79   <  > 78   < > 81     --  122* 112*   < >  --  Canceled, Test credited   < > 124*   < > 115*   < > 154   BUN 12  --  12 13   < >  --  7   < > 10   < > 10   < > 11   TSH  --   --   --   --   --   --   --   --  1.20  --  0.97  --  0.85   AST 47* 77* 63*  --   --    < > 26  --  32   < > 40   < > 34   ALT 82* 125* 104*  --   --    < > 30  --  36   < > 44   < > 43   ALKPHOS 73  --  72  --   --   --  67  --  67   < > 59   < > 64    < > = values in this interval not displayed.     Outside studies reviewed: [facility].    7/2019 : + RNP - 985, + dsDNA - 73, + ssDNA - 288, + Chromatin - 1081  Cbc -     Reviewed Rheumatology lab flowsheet    Elvia Gonzalez MD  [facility]  Department of Rheumatology & Autoimmune Disorders  [facility]: [phone]   Pager - [phone]

## 2021-09-30 NOTE — RESULT ENCOUNTER NOTE
Results discussed with the patient at the time of visit.     Elvia Gonzalez MD   9/30/2021  4:07 PM

## 2021-10-01 LAB
C3 SERPL-MCNC: 101 MG/DL (ref 81–157)
C4 SERPL-MCNC: 18 MG/DL (ref 13–39)
DSDNA AB SER-ACNC: 3.9 IU/ML
MITOCHONDRIA M2 IGG SER-ACNC: 4.5 U/ML

## 2021-10-02 LAB — SMA IGG SER-ACNC: 39 UNITS

## 2021-10-03 LAB — SMOOTH MUSCLE IGG TITR SER: NORMAL {TITER}

## 2021-10-04 LAB — TPMT BLD-CCNC: 23 U/ML

## 2021-10-13 ENCOUNTER — PATIENT OUTREACH (OUTPATIENT)
Dept: CARE COORDINATION | Facility: CLINIC | Age: 61
End: 2021-10-13

## 2021-10-13 ENCOUNTER — PATIENT OUTREACH (OUTPATIENT)
Dept: NURSING | Facility: CLINIC | Age: 61
End: 2021-10-13
Payer: COMMERCIAL

## 2021-10-13 NOTE — PROGRESS NOTES
Clinic Care Coordination Contact  [facility]    Called 10/12/21, but pt was waiting for her therapist to call. Requested CHW call her on 10/13/21 in the afternoon which CHW did. Left VM on pt's phone this afternoon.    Clinical Data: Care Coordinator Outreach  Outreach attempted x 1.  Left message on patient's voicemail with call back information and requested return call.  Plan:  Care Coordinator will try to reach patient again in 7-10 business days.    Erin Strong  Community Health Worker  [facility]  [phone]

## 2021-10-13 NOTE — PROGRESS NOTES
"Clinic Care Coordination Contact    Community Health Worker Follow Up    Spoke to pt this afternoon.    Care Gaps:     Health Maintenance Due   Topic Date Due     URINE DRUG SCREEN  Never done     Pneumococcal Vaccine: Pediatrics (0 to 5 Years) and At-Risk Patients (6 to 64 Years) (1 of 4 - PCV13) Never done     COVID-19 Vaccine (1) Never done     DTAP/TDAP/TD IMMUNIZATION (1 - Tdap) Never done     ZOSTER IMMUNIZATION (1 of 2) Never done     PREVENTIVE CARE VISIT  03/05/2019     INFLUENZA VACCINE (1) Never done       Care Gaps Last addressed on 10/13/21. Pt is not interested in any vaccinations other then the DTAP/TDAP/TD immunization. Pt will call Dr. Orosco's office to schedule appointment for the drug screening and DTAP immunization. Pt declined transferring to central scheduling line to schedule today.    Goals:   Goals Addressed as of 10/13/2021 at 2:20 PM        Psychosocial (pt-stated)     Added 10/13/21 by Erin Strong      Goal Statement: I will renew my medical insurance through [employer]  Date Goal set: 10/13/21  Barriers: Focus to call [employer]  Strengths: Motivated, enrolled in   Date to Achieve By:  12/31/21  Patient expressed understanding of goal: yes  Action steps to achieve this goal:  1. I will look for [employer] forms to come in the mail or I will call the [employer] phone line to discuss with a   2. I will complete the necessary documentation to continue insurance coverage through [employer]  3. I will contact, HANNY Khan, at [phone] for assistance of further resources.            Intervention and Education during outreach: Pt is not interested in pursuing swim lessons this fall. She is focused on her mental health right now. Pt continues to see a therapist regularly; currently they are beginning to discuss the book, My Body Keeps the Score.\" Pt did not drive through a neighborhood in [place] which triggers much anxiety for pt as planned last month, because her therapist " could not make this appointment. Pt will make a visit with her psychiatrist on 10/21/21 to discuss adjusting bipolar medication(s) so her moods do not swing as much.     Pt reports walking for exercise to improve her mental health as well.    Pt has not received any paperwork from [facility] regarding re-enrolling for medical insurance. CC goal established around pt looking fr paperwork to arrive and/or contacting [facility] directly to discuss. Pt has the phone number for [facility].  CHW asks if pt has any further concerns or need for resources as this time. Pt states she does not. CHW made sure pt has CHW contact information. Pt will contact CHW with questions or updates before next outreach.     CHW Plan: CHW will follow up with pt in one month.    Erin Strong  Community Health Worker  [facility]  [phone]    ______________________________________________________________    Next Outreach:  11/10/21  Planned Outreach Frequency: monthly  Preferred Phone Number: [phone]     Enrollment Date: 1/3/20  Last Care Plan Assessment:  8/2/21

## 2021-10-18 DIAGNOSIS — Z79.899 LONG-TERM USE OF PLAQUENIL: Primary | ICD-10-CM

## 2021-10-19 PROBLEM — F32.9 MAJOR DEPRESSION: Status: ACTIVE | Noted: 2018-10-08

## 2021-10-23 ENCOUNTER — HEALTH MAINTENANCE LETTER (OUTPATIENT)
Age: 61
End: 2021-10-23

## 2021-10-26 ENCOUNTER — PATIENT OUTREACH (OUTPATIENT)
Dept: CARE COORDINATION | Facility: CLINIC | Age: 61
End: 2021-10-26

## 2021-10-26 NOTE — PROGRESS NOTES
Care Coordination Clinician Chart Review  Situation: Patient chart reviewed by care coordinator.       Background: Care Coordination initial assessment and enrollment to Care Coordination was 1/3/2020.   Patient centered goals were developed with participation from patient.  SOL CC handed patient off to CHW for continued outreach every 30 days.        Assessment: Per chart review, patient outreach completed by CC CHW on 10/13/2021.  Patient is actively working to accomplish goal.  Patient's goal remains appropriate and relevant at this time.   Patient is not due for updated Plan of Care.  Annual assessment will be due 1/2022.      Goals        Patient Stated       Psychosocial (pt-stated)       Goal Statement: I will renew my medical insurance through [employer]  Date Goal set: 10/13/21  Barriers: [facility]  Strengths: Motivated, enrolled in   Date to Achieve By:  12/31/21  Patient expressed understanding of goal: yes  Action steps to achieve this goal:  1. I will look for [employer] forms to come in the mail or I will call the [employer] phone line to discuss with a   2. I will complete the necessary documentation to continue insurance coverage through [employer]  3. I will contact, HANNY Khan, at [phone] for assistance of further resources.                Plan/Recommendations: The patient will continue working with Care Coordination to achieve goal as above.  CHW will involve SOL MADERA as needed or if patient is ready to move to maintenance.  SOL CC will continue to monitor progress to goals and CHW outreaches every 6 weeks.   Plan of Care updated and mailed to patient: Asha Barksdale [facility]  Clinic Care Coordinator  [facility] Awilda Vermilion  [facility]  [phone]  [email or web address]

## 2021-11-12 ENCOUNTER — OFFICE VISIT (OUTPATIENT)
Dept: OPHTHALMOLOGY | Facility: CLINIC | Age: 61
End: 2021-11-12
Attending: OPHTHALMOLOGY
Payer: COMMERCIAL

## 2021-11-12 DIAGNOSIS — H35.349: ICD-10-CM

## 2021-11-12 DIAGNOSIS — H43.391 VITREOUS OPACITIES OF RIGHT EYE: ICD-10-CM

## 2021-11-12 DIAGNOSIS — H04.123 DRY EYES: ICD-10-CM

## 2021-11-12 DIAGNOSIS — H43.813 POSTERIOR VITREOUS DETACHMENT OF BOTH EYES: Primary | ICD-10-CM

## 2021-11-12 PROCEDURE — 92250 FUNDUS PHOTOGRAPHY W/I&R: CPT | Performed by: OPHTHALMOLOGY

## 2021-11-12 PROCEDURE — 92015 DETERMINE REFRACTIVE STATE: CPT

## 2021-11-12 PROCEDURE — 92014 COMPRE OPH EXAM EST PT 1/>: CPT | Performed by: OPHTHALMOLOGY

## 2021-11-12 PROCEDURE — 92250 FUNDUS PHOTOGRAPHY W/I&R: CPT | Mod: 59 | Performed by: OPHTHALMOLOGY

## 2021-11-12 PROCEDURE — 92134 CPTRZ OPH DX IMG PST SGM RTA: CPT | Performed by: OPHTHALMOLOGY

## 2021-11-12 PROCEDURE — G0463 HOSPITAL OUTPT CLINIC VISIT: HCPCS

## 2021-11-12 ASSESSMENT — VISUAL ACUITY
METHOD: SNELLEN - LINEAR
OD_CC: 20/30
OS_CC: 20/25
OS_CC+: -

## 2021-11-12 ASSESSMENT — REFRACTION_MANIFEST
OS_ADD: +2.50
OS_CYLINDER: +0.75
OD_CYLINDER: +1.00
OD_AXIS: 010
OS_SPHERE: -0.50
OD_ADD: +2.50
OD_SPHERE: -0.75
OS_AXIS: 005

## 2021-11-12 ASSESSMENT — SLIT LAMP EXAM - LIDS
COMMENTS: NORMAL
COMMENTS: NORMAL

## 2021-11-12 ASSESSMENT — CONF VISUAL FIELD
METHOD: COUNTING FINGERS
OD_NORMAL: 1
OS_NORMAL: 1

## 2021-11-12 ASSESSMENT — CUP TO DISC RATIO
OS_RATIO: 0.3
OD_RATIO: 0.3

## 2021-11-12 ASSESSMENT — EXTERNAL EXAM - LEFT EYE: OS_EXAM: PROMINENT GLOBES

## 2021-11-12 ASSESSMENT — TONOMETRY
IOP_METHOD: TONOPEN
OD_IOP_MMHG: 12
OS_IOP_MMHG: 10

## 2021-11-12 ASSESSMENT — EXTERNAL EXAM - RIGHT EYE: OD_EXAM: PROMINENT GLOBES
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
## 2021-11-12 NOTE — NURSING NOTE
"Chief Complaints and History of Present Illnesses   Patient presents with     Follow Up     Posterior vitreous detachment of both eyes     Chief Complaint(s) and History of Present Illness(es)     Follow Up     Laterality: both eyes    Course: gradually worsening    Associated symptoms: flashes (ocassional in both eyes), redness and dryness.  Negative for floaters and eye pain    Treatments tried: artificial tears    Pain scale: 0/10    Comments: Posterior vitreous detachment of both eyes              Comments     She tells me that her vision seems blurred \"all the time\" in both eyes.  Her glasses help to some degree, but she did not bring them with her to this exam.    Mary Pace, COT 9:22 AM  November 12, 2021                   "

## 2021-11-15 ENCOUNTER — PATIENT OUTREACH (OUTPATIENT)
Dept: NURSING | Facility: CLINIC | Age: 61
End: 2021-11-15
Payer: COMMERCIAL

## 2021-11-15 NOTE — PROGRESS NOTES
Clinic Care Coordination Contact    Community Health Worker Follow Up    Care Gaps: Discussed 10/13/21    Health Maintenance Due   Topic Date Due     URINE DRUG SCREEN  Never done     Pneumococcal Vaccine: Pediatrics (0 to 5 Years) and At-Risk Patients (6 to 64 Years) (1 of 4 - PCV13) Never done     COVID-19 Vaccine (1) Never done     DTAP/TDAP/TD IMMUNIZATION (1 - Tdap) Never done     ZOSTER IMMUNIZATION (1 of 2) Never done     PREVENTIVE CARE VISIT  03/05/2019     LUNG CANCER SCREENING  12/26/2020     INFLUENZA VACCINE (1) Never done       Care Gaps Last addressed on 10/13/21    Goals:   Goals Addressed as of 11/15/2021 at 12:06 PM                    Today       2. Functional (pt-stated)       Added 11/15/21 by Erin Strong      Goal Statement: I will begin exercising 2-3 times per week  Date Goal set: 11/15/21  Barriers: difficult to change wellness habits  Strengths: Motivated, enrolled in CC  Date to Achieve By: 2/15/21  Patient expressed understanding of goal: yes  Action steps to achieve this goal:  1. I will look at options in my neighborhood for indoor exercise programs  2. I will enroll in the best exercise plan for me  3. I will contact HANNY Khan, if I need more resources for exercise in my area           Psychosocial (pt-stated)   50%    Added 10/13/21 by Erin Strong      Goal Statement: I will renew my medical insurance through [employer]  Date Goal set: 10/13/21  Barriers[facility]  Strengths: Motivated, enrolled in CC  Date to Achieve By:  12/31/21  Patient expressed understanding of goal: yes  Action steps to achieve this goal:  1. I will look for [employer] forms to come in the mail or I will call the [employer] phone line to discuss with a  (in process)  2. I will complete the necessary documentation to continue insurance coverage [facility] (completed)  3. I will contact, HANNY Khan, at [phone] for assistance of further resources.          Intervention and  Education during outreach: Pt reports she is doing really well. Pt feels like she is doing really well with her mental health. Her Cymbalta medication has been adjusted up in the last 3 weeks which might be impacting her mood positively. Pt will see her psychiatrics on 11/19/21 to further discuss medication adjustments. Pt is meeting with a therapist weekly. Pt met with her [facility] worked this morning and they have decided to drop her visits to twice per month, not on a weekly basis. Pt wants to take more control over her activities and sees this as a step in the right direction. Pt needs to redo her ACDs and change something on her mortgage documents. Pt states she is having some random, unscheduled visits with her ex- who is not doing well health-wise and that is going well.    Pt received a letter from [facility] stating she is enroll with her insurance plan through 2022. Pt is still going to call this week to confirm this letter.    Pt states she wants to begin exercising 2-3 times/wk as another way to improve her mental health. Pt will research exercise locations in her area and begin exercising. She is aware she might need to begin a membership, but feels it is important enough to her to be willing to do this. Exercise goal established today.    CHW asks if pt has any further concerns or need for resources as this time. Pt states she does not. CHW made sure pt has CHW contact information. Pt will contact CHW with questions or updates before next outreach.     CHW Plan: CHW will follow up with pt in one month.    Erin Strong  Community Health Worker  [facility]  [phone]    _________________________________________________________       Next Outreach:  12/13/21  Planned Outreach Frequency: monthly  Preferred Phone Number: [phone]     Enrollment Date: 1/3/20  Last Care Plan Assessment:  8/2/21

## 2021-11-28 DIAGNOSIS — M32.9 SYSTEMIC LUPUS ERYTHEMATOSUS, UNSPECIFIED SLE TYPE, UNSPECIFIED ORGAN INVOLVEMENT STATUS (H): ICD-10-CM

## 2021-12-01 RX ORDER — CEVIMELINE HYDROCHLORIDE 30 MG/1
30 CAPSULE ORAL 3 TIMES DAILY
Qty: 90 CAPSULE | Refills: 1 | Status: SHIPPED | OUTPATIENT
Start: 2021-12-01 | End: 2022-03-11

## 2021-12-01 NOTE — TELEPHONE ENCOUNTER
CEVIMELINE HCL 30 MG CAPSULE  Last Written Prescription Date:  7/29/2021  Last Fill Quantity: 90,   # refills: 1  Last Office Visit :  9/30/2021  Future Office visit:  1/12/2022  90 Capsules  1 Refill sent to pharm 12/1/2021      Lina Guzman RN  Central Triage Red Flags/Med Refills

## 2021-12-03 ENCOUNTER — LAB (OUTPATIENT)
Dept: LAB | Facility: CLINIC | Age: 61
End: 2021-12-03
Attending: PSYCHIATRY & NEUROLOGY
Payer: COMMERCIAL

## 2021-12-03 ENCOUNTER — OFFICE VISIT (OUTPATIENT)
Dept: NEUROLOGY | Facility: CLINIC | Age: 61
End: 2021-12-03
Payer: COMMERCIAL

## 2021-12-03 VITALS
HEIGHT: 65 IN | DIASTOLIC BLOOD PRESSURE: 72 MMHG | OXYGEN SATURATION: 98 % | RESPIRATION RATE: 16 BRPM | HEART RATE: 86 BPM | SYSTOLIC BLOOD PRESSURE: 109 MMHG | WEIGHT: 175 LBS | BODY MASS INDEX: 29.16 KG/M2

## 2021-12-03 DIAGNOSIS — H81.4 CENTRAL NERVOUS SYSTEM ORIGIN VERTIGO: Primary | ICD-10-CM

## 2021-12-03 DIAGNOSIS — H81.4 CENTRAL NERVOUS SYSTEM ORIGIN VERTIGO: ICD-10-CM

## 2021-12-03 DIAGNOSIS — H93.13 TINNITUS, BILATERAL: ICD-10-CM

## 2021-12-03 LAB
THYROPEROXIDASE AB SERPL-ACNC: 10 IU/ML
TOTAL PROTEIN SERUM FOR ELP: 7.8 G/DL (ref 6.8–8.8)
TSH SERPL DL<=0.005 MIU/L-ACNC: 1.75 MU/L (ref 0.4–4)
VIT B12 SERPL-MCNC: 788 PG/ML (ref 193–986)

## 2021-12-03 PROCEDURE — 84155 ASSAY OF PROTEIN SERUM: CPT | Mod: 90 | Performed by: PATHOLOGY

## 2021-12-03 PROCEDURE — 99214 OFFICE O/P EST MOD 30 MIN: CPT | Performed by: PSYCHIATRY & NEUROLOGY

## 2021-12-03 PROCEDURE — 84425 ASSAY OF VITAMIN B-1: CPT | Mod: 90 | Performed by: PATHOLOGY

## 2021-12-03 PROCEDURE — 82607 VITAMIN B-12: CPT | Performed by: PATHOLOGY

## 2021-12-03 PROCEDURE — 83516 IMMUNOASSAY NONANTIBODY: CPT | Mod: 90 | Performed by: PATHOLOGY

## 2021-12-03 PROCEDURE — 84443 ASSAY THYROID STIM HORMONE: CPT | Performed by: PATHOLOGY

## 2021-12-03 PROCEDURE — 99000 SPECIMEN HANDLING OFFICE-LAB: CPT | Performed by: PATHOLOGY

## 2021-12-03 PROCEDURE — 86334 IMMUNOFIX E-PHORESIS SERUM: CPT | Mod: 26 | Performed by: PATHOLOGY

## 2021-12-03 PROCEDURE — 86341 ISLET CELL ANTIBODY: CPT | Mod: 90 | Performed by: PATHOLOGY

## 2021-12-03 PROCEDURE — 84165 PROTEIN E-PHORESIS SERUM: CPT | Mod: 26 | Performed by: PATHOLOGY

## 2021-12-03 PROCEDURE — 86256 FLUORESCENT ANTIBODY TITER: CPT | Mod: 90 | Performed by: PATHOLOGY

## 2021-12-03 PROCEDURE — 36415 COLL VENOUS BLD VENIPUNCTURE: CPT | Performed by: PATHOLOGY

## 2021-12-03 PROCEDURE — 86255 FLUORESCENT ANTIBODY SCREEN: CPT | Mod: 90 | Performed by: PATHOLOGY

## 2021-12-03 PROCEDURE — 84207 ASSAY OF VITAMIN B-6: CPT | Mod: 90 | Performed by: PATHOLOGY

## 2021-12-03 PROCEDURE — 86376 MICROSOMAL ANTIBODY EACH: CPT | Mod: 90 | Performed by: PATHOLOGY

## 2021-12-03 PROCEDURE — 83519 RIA NONANTIBODY: CPT | Mod: 90 | Performed by: PATHOLOGY

## 2021-12-03 ASSESSMENT — MIFFLIN-ST. JEOR: SCORE: 1354.67

## 2021-12-03 ASSESSMENT — PAIN SCALES - GENERAL: PAINLEVEL: MODERATE PAIN (4)

## 2021-12-03 NOTE — TELEPHONE ENCOUNTER
FUTURE VISIT INFORMATION      FUTURE VISIT INFORMATION:    Date: 1/14/2022    Time: 1:10PM    [place] [facility]  REFERRAL INFORMATION:    Referring provider:  Deven Salgado DO    Referring providers clinic:  [facility]     Reason for visit/diagnosis  Tinnitus, bilateral per Pt, Ref by Deven Salgado DO in [facility], Recs in [facility].    RECORDS REQUESTED FROM:       Clinic name Comments Records Status Imaging Status   [facility]  12/3/2021 note from Deven Salgado DO [facility] 7/29/2021 VNG from Kylie Ballestreos [facility]    Imaging 3/1/2021 MR abhijit   12/23/2019 MR Brain   12/23/2019 CTA Head Neck and CT Head [facility] PACS

## 2021-12-03 NOTE — LETTER
"12/3/2021       RE: Lesley Stafford  [address]  [place] [address]     Dear Colleague,    Thank you for referring your patient, Lesley Stafford, to the [facility] [place] [facility]. Please see a copy of my visit note below.    [facility] Follow Up Visit    Lesley Stafford MRN# [id number]   Age: 60 year old YOB: 1960     Brief history of symptoms: The patient was initially seen in neurologic consultation on 12/23/2019 and with me 3/12/2021, most recently 6/14/2021 for evaluation of of multiple issues; R-frontal and parietal ischemic infarctions, left arm nubmness with right eye scotoma. Please see the comprehensive neurologic consultation notes from those dates in the Epic records for details.     Overall, the patient has had neuropathy in her feet leading to pain along with her history of stroke.  She was to continue with statin, aspirin, and HTN goals for stroke prophylaxis as well as continue gabapentin for neuropathic pain. After her follow up with ophthalmology, 3/9/2021, she was found to have posterior vitreous detachment of both eyes, and was still reporting left visual field obscuration with a  \"pacman\" like shape.  After our last visit, the patient was still reporting left sided UE numbness occurring a few times per week and lasting minutes.  Given that she was having repeated stereotyped sensory events without changes noted on MRI done in early 3/2021, it was thought that she may be having focal seizures, but EEG on 4/6/2021 was unrevealing except for occasional runs of left temporal alpha in sleep.  Prior MRI (3/1/2021) was revealing for resolution of prior diffusion restriction of right frontoparietal junction and right sided bryson, but there was also a questionable round T2 hyperintensity near the right inferior olivary nucleus (artifact versus other). She also reported a " "history of somnambulism, yelling our in her sleep and acting out dreams.  She was referred to sleep medicine for concern of REM behavior sleep disorder and RLS.    At our last visit, the patient was still having a central transient vertigo, as well as suspected cervicogenic headache syndrome  She was to have vestibular testing, and further testing regarding signal changes in the brainstem.    Interval history:  Serum studies: paraneoplastic, ASHLEY lobo, Gliadin Lobo, Endomysial Lobo, TPO, TSH, B1, B12, B6 were ordered but not done.    Vestibular testing 7/29/2021 showed mild central vestibular deficits but not peripheral vestibular involvement.  VEMP testing was recommended, as well as audiology testing and vestibular PT.    She has been followed with Rheumatology since 2014 for systemic lupus, as well as fibromyalgia.  At her last visit, she was continuing on plaquenil, and Cymbalta.    Sleep study 7/16/2021: \"Rather borderline degree of sleep disordered breathing seen on this test is unlikely to be a significant cardiovascular risk. Patient may be a candidate for dental appliance through referral to Sleep Dentistry for the treatment of this degree of obstructive sleep apnea.Clinical management of REM sleep behavioral disorder. Suggest optimizing sleep schedule and avoiding sleep deprivation.\"    Today, the patient has no new neurological issues that developed.  She has increased duloxetine as per her psychiatrist, which has helped along with CBD gummies for her sleep related issues (going to bed at a regular time, fatigue in general). She has no major changes in vision at this time, regarding hemianopsia.       Physical Exam:   Vitals: /72   Pulse 86   Resp 16   Ht 1.651 m (5' 5\")   Wt 79.4 kg (175 lb)   LMP 08/12/2013   SpO2 98%   BMI 29.12 kg/m     General: Seated comfortably in no acute distress.  HEENT: Neck supple with normal range of motion.   Skin: No rashes  Neurologic:     Mental Status: Fully " alert, attentive and oriented. Speech clear and fluent, no paraphasic errors.     Cranial Nerves: EOMI with normal smooth pursuit. Facial movements symmetric. Hearing not formally tested but intact to conversation.  No dysarthria.     Motor: No tremors or other abnormal movements observed.      Sensory: Negative Romberg today         Assessment and Plan:   Assessment:  Central vertigo  REM behavior sleep disorder  Hx of R- parietal/frontal infarction    The patient has ongoing central vertigo symptoms that are transient, and given her findings on vestibular testing, she will benefit from vestibular PT.  I will order VEMP testing, as well as audiometry testing to complete her evaluations and better determine any further interventions/treatments available.  The patient did have some signal change in the superior olivary nuclei in her 03/2021 imaging, and I still feel she should complete her serum studies to look for other possible etiologies beyond small vessel disease or chronic inflammation related to her SLE.  Repeat imagine may be needed to further clarify this signal change and whether it is progressing or changing over time.      Regarding her REM behavior disorder, she doesn't report feeling symptomatic from this condition at this time.  However, common treatment for the condition is melatonin 3-5 mg at bedtime, and I will ask she trial this supplement to see if any portion of her sleep improved.  At this point, she has no other symptosm to indicate parkinsonians, or DLB, but continued monitoring for these conditions with cognitive testing and repeat physical exams should be done.     Plan:  Melatonin 3 mg at bedtime (2 hours prior to sleep), will take OTC  Vestibular therapy  VEMP testing  Serum tests to be done today    Follow up in Neurology clinic in 6 months (to discuss whether to obtain MRI brain, and update on balance) or earlier as needed should new concerns arise.    THOMAS Salgado D.O.  Assistant  Professor of Neurology    Total time today (37 min) in this patient encounter was spent on pre-charting, counseling and/or coordination of care.

## 2021-12-03 NOTE — PATIENT INSTRUCTIONS
You have deficits in processing your balance information coming from your inner ear/balance organs.  You may also have some deficits/errors in how information is beign sent to your brain.  I suspect your issues are related to old brain-stem injury from blood vessel injury or your prior stroke.  - Attend physical therapy for vestibular rehab  - Obtain new testing:    - Audiometry (hearing test)   - VEMP (signal testing to see where an injury along hearing and balance nerves may be)    I will also recommend you start taking melatonin (3 or 5 mg) at night for your REM behavior sleep disorder.

## 2021-12-03 NOTE — PROGRESS NOTES
"[facility] Neurology Follow Up Visit    Lesley Stafford MRN# [id number]   Age: 60 year old YOB: 1960     Brief history of symptoms: The patient was initially seen in neurologic consultation on 12/23/2019 and with me 3/12/2021, most recently 6/14/2021 for evaluation of of multiple issues; R-frontal and parietal ischemic infarctions, left arm nubmness with right eye scotoma. Please see the comprehensive neurologic consultation notes from those dates in the Epic records for details.     Overall, the patient has had neuropathy in her feet leading to pain along with her history of stroke.  She was to continue with statin, aspirin, and HTN goals for stroke prophylaxis as well as continue gabapentin for neuropathic pain. After her follow up with ophthalmology, 3/9/2021, she was found to have posterior vitreous detachment of both eyes, and was still reporting left visual field obscuration with a  \"pacman\" like shape.  After our last visit, the patient was still reporting left sided UE numbness occurring a few times per week and lasting minutes.  Given that she was having repeated stereotyped sensory events without changes noted on MRI done in early 3/2021, it was thought that she may be having focal seizures, but EEG on 4/6/2021 was unrevealing except for occasional runs of left temporal alpha in sleep.  Prior MRI (3/1/2021) was revealing for resolution of prior diffusion restriction of right frontoparietal junction and right sided bryson, but there was also a questionable round T2 hyperintensity near the right inferior olivary nucleus (artifact versus other). She also reported a history of somnambulism, yelling our in her sleep and acting out dreams.  She was referred to sleep medicine for concern of REM behavior sleep disorder and RLS.    At our last visit, the patient was still having a central transient vertigo, as well as suspected cervicogenic headache syndrome  She was to have vestibular testing, and " "further testing regarding signal changes in the brainstem.    Interval history:  Serum studies: paraneoplastic, ASHLEY lobo, Gliadin Lobo, Endomysial Lobo, TPO, TSH, B1, B12, B6 were ordered but not done.    Vestibular testing 7/29/2021 showed mild central vestibular deficits but not peripheral vestibular involvement.  VEMP testing was recommended, as well as audiology testing and vestibular PT.    She has been followed with Rheumatology since 2014 for systemic lupus, as well as fibromyalgia.  At her last visit, she was continuing on plaquenil, and Cymbalta.    Sleep study 7/16/2021: \"Rather borderline degree of sleep disordered breathing seen on this test is unlikely to be a significant cardiovascular risk. Patient may be a candidate for dental appliance through referral to Sleep Dentistry for the treatment of this degree of obstructive sleep apnea.Clinical management of REM sleep behavioral disorder. Suggest optimizing sleep schedule and avoiding sleep deprivation.\"    Today, the patient has no new neurological issues that developed.  She has increased duloxetine as per her psychiatrist, which has helped along with CBD gummies for her sleep related issues (going to bed at a regular time, fatigue in general). She has no major changes in vision at this time, regarding hemianopsia.       Physical Exam:   Vitals: /72   Pulse 86   Resp 16   Ht 1.651 m (5' 5\")   Wt 79.4 kg (175 lb)   LMP 08/12/2013   SpO2 98%   BMI 29.12 kg/m     General: Seated comfortably in no acute distress.  HEENT: Neck supple with normal range of motion.   Skin: No rashes  Neurologic:     Mental Status: Fully alert, attentive and oriented. Speech clear and fluent, no paraphasic errors.     Cranial Nerves: EOMI with normal smooth pursuit. Facial movements symmetric. Hearing not formally tested but intact to conversation.  No dysarthria.     Motor: No tremors or other abnormal movements observed.      Sensory: Negative Romberg today         " Assessment and Plan:   Assessment:  Central vertigo  REM behavior sleep disorder  Hx of R- parietal/frontal infarction    The patient has ongoing central vertigo symptoms that are transient, and given her findings on vestibular testing, she will benefit from vestibular PT.  I will order VEMP testing, as well as audiometry testing to complete her evaluations and better determine any further interventions/treatments available.  The patient did have some signal change in the superior olivary nuclei in her 03/2021 imaging, and I still feel she should complete her serum studies to look for other possible etiologies beyond small vessel disease or chronic inflammation related to her SLE.  Repeat imagine may be needed to further clarify this signal change and whether it is progressing or changing over time.      Regarding her REM behavior disorder, she doesn't report feeling symptomatic from this condition at this time.  However, common treatment for the condition is melatonin 3-5 mg at bedtime, and I will ask she trial this supplement to see if any portion of her sleep improved.  At this point, she has no other symptosm to indicate parkinsonians, or DLB, but continued monitoring for these conditions with cognitive testing and repeat physical exams should be done.     Plan:  Melatonin 3 mg at bedtime (2 hours prior to sleep), will take OTC  Vestibular therapy  VEMP testing  Serum tests to be done today    Follow up in Neurology clinic in 6 months (to discuss whether to obtain MRI brain, and update on balance) or earlier as needed should new concerns arise.    THOMAS Salgado D.O.   of Neurology    Total time today (37 min) in this patient encounter was spent on pre-charting, counseling and/or coordination of care.

## 2021-12-05 LAB — ENDOMYSIUM IGA TITR SER IF: NORMAL {TITER}

## 2021-12-06 LAB
ALBUMIN SERPL ELPH-MCNC: 4.2 G/DL (ref 3.7–5.1)
ALPHA1 GLOB SERPL ELPH-MCNC: 0.3 G/DL (ref 0.2–0.4)
ALPHA2 GLOB SERPL ELPH-MCNC: 0.5 G/DL (ref 0.5–0.9)
B-GLOBULIN SERPL ELPH-MCNC: 1 G/DL (ref 0.6–1)
GAD65 AB SER IA-ACNC: <5 IU/ML
GAMMA GLOB SERPL ELPH-MCNC: 1.8 G/DL (ref 0.7–1.6)
M PROTEIN SERPL ELPH-MCNC: 0 G/DL
PROT PATTERN SERPL ELPH-IMP: ABNORMAL
PROT PATTERN SERPL IFE-IMP: NORMAL
PYRIDOXAL PHOS SERPL-SCNC: 43.3 NMOL/L
VIT B1 PYROPHOSHATE BLD-SCNC: 101 NMOL/L

## 2021-12-07 ENCOUNTER — PATIENT OUTREACH (OUTPATIENT)
Dept: CARE COORDINATION | Facility: CLINIC | Age: 61
End: 2021-12-07
Payer: COMMERCIAL

## 2021-12-07 LAB
AMPHIPHYSIN AB TITR SER: NEGATIVE TITER
CV2 IGG TITR SER: NEGATIVE TITER
GLIAL NUC TYPE 1 AB TITR SER: NEGATIVE TITER
HU1 AB TITR SER: NEGATIVE TITER
HU2 AB TITR SER IF: NEGATIVE TITER
HU3 AB TITR SER: NEGATIVE TITER
IMMUNOLOGIST REVIEW: NORMAL
LABORATORY COMMENT REPORT: NORMAL
NACHR AB SER-SCNC: 0.01 NMOL/L
PCA-1 AB TITR SER: NEGATIVE TITER
PCA-2 AB TITR SER: NEGATIVE TITER
PCA-TR AB TITR SER IF: NEGATIVE TITER
TTG IGA SER-ACNC: 1.5 U/ML
TTG IGG SER-ACNC: 1 U/ML
VGCC-P/Q BIND AB SER-SCNC: 0 NMOL/L
VGKC AB SER-SCNC: 0 NMOL/L

## 2021-12-07 NOTE — PROGRESS NOTES
Care Coordination Clinician Chart Review  Situation: Patient chart reviewed by care coordinator.       Background: Care Coordination initial assessment and enrollment to Care Coordination was 1/3/2020.   Patient centered goals were developed with participation from patient.  [facility] handed patient off to CHW for continued outreach every 30 days.        Assessment: Per chart review, patient outreach completed by  CHW on 11/15/2021.  Patient is actively working to accomplish goals.  Patient's goals remain appropriate and relevant at this time.   Patient is due for updated Plan of Care.  Annual assessment will be due 1/2021.      Goals        Patient Stated       2. Functional (pt-stated)       Goal Statement: I will begin exercising 2-3 times per week  Date Goal set: 11/15/21  Barriers: difficult to change wellness habits  Strengths: Motivated, enrolled in   Date to Achieve By: 2/15/21  Patient expressed understanding of goal: yes  Action steps to achieve this goal:  1. I will look at options in my neighborhood for indoor exercise programs  2. I will enroll in the best exercise plan for me  3. I will contact HANNY Khan, if I need more resources for exercise in my area           Psychosocial (pt-stated)       Goal Statement: I will renew my medical insurance through [employer]  Date Goal set: 10/13/21  Barriers: [facility]  Strengths: Motivated, enrolled in   Date to Achieve By:  12/31/21  Patient expressed understanding of goal: yes  Action steps to achieve this goal:  1. I will look for [employer] forms to come in the mail or I will call the [employer] phone line to discuss with a  (in process)  2. I will complete the necessary documentation to continue insurance coverage through [employer] (completed)  3. I will contact, HANNY Khan, at [phone] for assistance of further resources.                Plan/Recommendations: The patient will continue working with Care Coordination to achieve  goals as above.  CHW will involve SW CC as needed or if patient is ready to move to maintenance.  SW CC will continue to monitor progress to goals and CHW outreaches every 6 weeks.   Plan of Care updated and mailed to patient: Yes    Delores Barksdale [facility]  Clinic Care Coordinator  [facility] Awilda CanÃ³vanas  [facility]  [phone]  [email or web address]

## 2021-12-07 NOTE — LETTER
[facility] CARE COORDINATION  [address] [place] [address]    December 7, 2021        Lesley Colorado  [address] [place] [place] [address]          Dear Adrian Sutton is an updated Patient Centered Plan of Care for your continued enrollment in Care Coordination. Please let us know if you have additional questions, concerns, or goals that we can assist with.    Sincerely,    Delores Barksdale [facility]  Clinic Care Coordinator  [facility]  [phone]          [facility]  Patient Centered Plan of Care  About Me:        Patient Name:  Lesley Colorado    YOB: 1960  Age:         60 year old   [place] MRN:    [id number] Telephone Information:  Home Phone [phone]   Mobile [phone]       Address:  [address] [place] [place] [address] Email address:  [email or web address]      Emergency Contact(s)    Name Relationship Lgl Grd Work Phone Home Phone Mobile Phone   1. LISSETTE ISAAC Daughter No   [phone]   2. DARREN GARCIA Sister No  [phone]   3. AURELIO OVERTON* Mother No   [phone]   4. ADRIAN COLORADO, S* Son No  [phone]           My Access Plan  Medical Emergency 911   Primary Clinic Line [facility] - [phone]   24 Hour Appointment Line [phone] or  [phone]) (toll-free)   24 Hour Nurse Line [phone] (toll-free)   Preferred Urgent Care No data recorded   Preferred Hospital [facility]  [phone]     Preferred Pharmacy [facility] [place] [facility] [place], [place] - [address]     Behavioral Health Crisis Line The National Suicide Prevention Lifeline at 1-807.961.7231 or 911       My Care Team Members  Patient Care Team       Relationship Specialty Notifications Start End    Rafael Orosco MD PCP - General Family Practice  5/13/11     Phone: [phone] Fax: [phone] [place] [address] [facility] [address]    Alonzo Kerr DPM Assigned Musculoskeletal Provider   10/23/20     Phone: [phone] Fax: [phone]         [facility] [address] [address]    Deven Salgado DO Assigned Neuroscience Provider   10/23/20     Phone: [phone] Fax: [phone]         [address] [facility] [address]    Rafael Orosco MD Assigned PCP   11/1/20     Phone: [phone] Fax: [phone]         [address] [facility] [address]    Farzana Alba MD Assigned OBGYN Provider   1/31/21     Phone: [phone] Fax: [phone]         [address] [facility] [address] [facility] [address]    Latoya Rice MD MD Ophthalmology  3/2/21     Phone: [phone] Fax: [phone]         [address] [place] [address]    Latoya Rice MD Assigned Surgical Provider   3/21/21     Phone: [phone] Fax: [phone]         [address] [place] [address]    Erin Strong [facility] Worker Primary Care - CC Admissions 4/24/21     [phone]    Israel Pennington MD Assigned Sleep Provider   5/30/21     Phone: [phone] Fax: [phone] [address] [facility] [address] [place] [address]    Adilene Barksdale [facility] Lead Care Coordinator Primary Care - CC Admissions 6/15/21     Elvia Gonzalez MD Assigned Rheumatology Provider   8/1/21     Phone: [phone] Fax: [phone]         [address] [facility] [address]    Rosa Weinstein APRN CNP Nurse Practitioner Family Medicine  11/17/21     Phone: [phone] Fax: [phone]         [address] [address]    Theresa Harper AuD Audiologist Audiology  12/3/21     Audrey Arboleda, NP Nurse Practitioner ENT-Otolaryngology  12/3/21     Phone: [phone] Fax: [phone] [address] [facility] [address]            My Care Plans  Self Management and Treatment Plan  Goals and (Comments)  Goals        General     2.  Functional (pt-stated)      Notes - Note created  11/15/2021 12:01 PM by Erin Strong     Goal Statement: I will begin exercising 2-3 times per week  Date Goal set: 11/15/21  Barriers: difficult to change wellness habits  Strengths: Motivated, enrolled in   Date to Achieve By: 2/15/21  Patient expressed understanding of goal: yes  Action steps to achieve this goal:  1. I will look at options in my neighborhood for indoor exercise programs  2. I will enroll in the best exercise plan for me  3. I will contact HANNY Khan, if I need more resources for exercise in my area         Psychosocial (pt-stated)      Notes - Note edited  11/15/2021 11:57 AM by Erin Strong     Goal Statement: I will renew my medical insurance through [employer]  Date Goal set: 10/13/21  Barriers: Focus to call [employer]  Strengths: Motivated, enrolled in   Date to Achieve By:  12/31/21  Patient expressed understanding of goal: yes  Action steps to achieve this goal:  1. I will look for [employer] forms to come in the mail or I will call the [employer] phone line to discuss with a  (in process)  2. I will complete the necessary documentation to continue insurance coverage through [employer] (completed)  3. I will contact, HANNY Khan, at [phone] for assistance of further resources.             Action Plans on File:            Depression          My Medical and Care Information  Problem List   Patient Active Problem List   Diagnosis     Systemic lupus erythematosus, unspecified SLE type, unspecified organ involvement status (H)     Abnormal perimenopausal bleeding     Obesity, Class I, BMI 30-34.9     History of claustrophobia     Anxiety     Allergic state     Tired     Insomnia, unspecified type     Hemoglobin C trait (H)     Liver hemangioma     Moderate major depression (H)     History of stroke     Financial difficulties     Encounter for medication monitoring     Anal fissure     Labial lesion     Posterior vitreous  detachment of both eyes     Long-term use of Plaquenil     Myopia of both eyes     Presbyopia     Dry eyes     Thrombocytopenia (H)     Transaminitis     Hyperlipidemia LDL goal <70     Vitreous opacities of right eye        Care Coordination Start Date: 1/3/2020   Frequency of Care Coordination: monthly     Form Last Updated: 12/07/2021

## 2021-12-13 ENCOUNTER — APPOINTMENT (OUTPATIENT)
Dept: URGENT CARE | Facility: CLINIC | Age: 61
End: 2021-12-13
Payer: COMMERCIAL

## 2021-12-14 ENCOUNTER — OFFICE VISIT (OUTPATIENT)
Dept: FAMILY MEDICINE | Facility: CLINIC | Age: 61
End: 2021-12-14
Payer: COMMERCIAL

## 2021-12-14 VITALS
WEIGHT: 174.6 LBS | TEMPERATURE: 97.7 F | BODY MASS INDEX: 29.09 KG/M2 | HEIGHT: 65 IN | OXYGEN SATURATION: 99 % | SYSTOLIC BLOOD PRESSURE: 103 MMHG | DIASTOLIC BLOOD PRESSURE: 70 MMHG | HEART RATE: 87 BPM

## 2021-12-14 DIAGNOSIS — N94.89 VAGINAL BURNING: ICD-10-CM

## 2021-12-14 DIAGNOSIS — B96.89 BV (BACTERIAL VAGINOSIS): ICD-10-CM

## 2021-12-14 DIAGNOSIS — R30.0 DYSURIA: Primary | ICD-10-CM

## 2021-12-14 DIAGNOSIS — N76.0 BV (BACTERIAL VAGINOSIS): ICD-10-CM

## 2021-12-14 DIAGNOSIS — R20.0 NUMBNESS AND TINGLING SENSATION OF SKIN: ICD-10-CM

## 2021-12-14 DIAGNOSIS — R20.2 NUMBNESS AND TINGLING SENSATION OF SKIN: ICD-10-CM

## 2021-12-14 LAB

## 2021-12-14 PROCEDURE — 87210 SMEAR WET MOUNT SALINE/INK: CPT | Performed by: FAMILY MEDICINE

## 2021-12-14 PROCEDURE — 81003 URINALYSIS AUTO W/O SCOPE: CPT | Performed by: FAMILY MEDICINE

## 2021-12-14 PROCEDURE — 99214 OFFICE O/P EST MOD 30 MIN: CPT | Performed by: FAMILY MEDICINE

## 2021-12-14 RX ORDER — METRONIDAZOLE 7.5 MG/G
1 GEL VAGINAL DAILY
Qty: 70 G | Refills: 0 | Status: SHIPPED | OUTPATIENT
Start: 2021-12-14 | End: 2021-12-21

## 2021-12-14 ASSESSMENT — MIFFLIN-ST. JEOR: SCORE: 1352.86

## 2021-12-14 NOTE — PROGRESS NOTES
"  Assessment & Plan     Dysuria  Ua is completely normal , see belos   - UA Macro with Reflex to Micro and Culture - lab collect; Future  - UA Macro with Reflex to Micro and Culture - lab collect    Vaginal burning  Will check for yeast and BV   - Wet prep - Clinic Collect    BV (bacterial vaginosis)  Discussed treatment for the BV   - metroNIDAZOLE (METROGEL) 0.75 % vaginal gel; Place 1 applicator (5 g) vaginally daily for 7 days Place one applicator vaginally at bedtime for 7 nights    Numbness and tingling sensation of skin  Could be related to her shingles outbreaks , she has had in the past starting same way , she has some Valtrex at she can start which has helped for the shingles outbreaks       RTC if no improving or worsening.  Pt is aware  and comfortable with the current plan.       BMI:   Estimated body mass index is 29.05 kg/m  as calculated from the following:    Height as of this encounter: 1.651 m (5' 5\").    Weight as of this encounter: 79.2 kg (174 lb 9.6 oz).         Jenae Gifford MD  [facility]   Lesley is a 60 year old who presents for the following health issues     HPI   Started with symptoms dysuria about a week ago , right buttocks area pain like scraping for  Hx of UTI not for yrs , used to have them before , dysuria , irritated when voiding , more often , does not come out , cannot hold it , pressure in the low abdomen, no flank pain, no fever no chills , no hematuria ,   Hx of shingles SLE , hx of stroke , Dec 2019   Genitourinary - Female  Onset/Duration: for about a week  Description:   Painful urination (Dysuria): YES           Frequency: YES  Blood in urine (Hematuria): no  Delay in urine (Hesitency): YES  Intensity: moderate  Progression of Symptoms:  worsening  Accompanying Signs & Symptoms:  Fever/chills: no  Flank pain: YES  Nausea and vomiting: no  Vaginal symptoms: odor and itching  Abdominal/Pelvic Pain: YES  History:   History of frequent " UTI s: YES  History of kidney stones: no  Sexually Active: no  Possibility of pregnancy: No  Precipitating or alleviating factors: None  Therapies tried and outcome: Cranberry juice prn (contraindicated in Coumadin patients)         Review of Systems         Objective    LMP 08/12/2013   There is no height or weight on file to calculate BMI.  Physical Exam

## 2021-12-20 ENCOUNTER — PATIENT OUTREACH (OUTPATIENT)
Dept: NURSING | Facility: CLINIC | Age: 61
End: 2021-12-20
Payer: COMMERCIAL

## 2021-12-20 NOTE — PROGRESS NOTES
Clinic Care Coordination Contact    Community Health Worker Follow Up    Spoke with pt this morning.    Care Gaps: Discussed 10/13/21    Health Maintenance Due   Topic Date Due     URINE DRUG SCREEN  Never done     COVID-19 Vaccine (1) Never done     Pneumococcal Vaccine: Pediatrics (0 to 5 Years) and At-Risk Patients (6 to 64 Years) (1 of 4 - PCV13) Never done     DTAP/TDAP/TD IMMUNIZATION (1 - Tdap) Never done     ZOSTER IMMUNIZATION (1 of 2) Never done     PREVENTIVE CARE VISIT  03/05/2019     LUNG CANCER SCREENING  12/26/2020     INFLUENZA VACCINE (1) Never done       Care Gaps Last addressed on 10/13/21    Goals:   Goals Addressed as of 12/20/2021 at 10:19 AM                    Today       1. Functional (pt-stated)   0%    Added 11/15/21 by Erin Strong      Goal Statement: I will begin exercising 2-3 times per week  Date Goal set: 11/15/21  Barriers: difficult to change wellness habits  Strengths: Motivated, enrolled in   Date to Achieve By: 2/15/22  Patient expressed understanding of goal: yes  Action steps to achieve this goal:  1. I will look at options in my neighborhood for indoor exercise programs  2. I will enroll in the best exercise plan for me  3. I will contact CYNTHIA KhanW, if I need more resources for exercise in my area              Intervention and Education during outreach: Pt has insurance through 2022 through [employer]. Completed insurance goal  Pt reports she has not been exercising consistently other then house work and stretching. Pt desires to do some aerobic ex. Pt has not researched ex venues in her area since our last conversation. Pt states the [facility] has a minimal membership, $32/year, to use the exercise equipment and track. Pt reports she will go investigate this option at the end of this week. CHW also mentioned use of [employer] over her shoes/boots to allow for amb outside. Pt was interested in this option to promote ex as well. CHW encouraged pt to pursue  "exercise as an adjunct to medications to improve her mental health. Pt is in agreement. \"I always feel better when I exercise.\"  Pt took the day off for her birthday, [birth year]. She will spend the day with her daughter; viewing the new Matrix movie.  Pt reports she will be seeing an audiologist to help determine the reason for some ongoing dizziness - residual from stroke, vestibular??    CHW asks if pt has any further concerns or need for resources as this time. Pt states she does not. CHW made sure pt has CHW contact information. Pt will contact CHW with questions or updates before next outreach.     CHW Plan: CHW will follow up with pt in one month.    Erin Strong  Community Health Worker  [facility]  [phone]    _______________________________________________________________       Next Outreach:  1/17/21  Planned Outreach Frequency: monthly  Preferred Phone Number: [phone]     Enrollment Date: 1/3/20  Last Care Plan Assessment:  12/7/21                  "

## 2021-12-28 NOTE — PROGRESS NOTES
Lesley is a 61 year old who is being evaluated via a billable video visit.      How would you like to obtain your AVS? MyChart  If the video visit is dropped, the invitation should be resent by: Text to cell phone: [phone]  Will anyone else be joining your video visit? No      Video Start Time: 12:08 PM    Rheumatology Clinic Visit     Lesley Stafford MRN# [id number]   YOB: 1960 Age: 60 year old     Date of Visit: Jan 11, 2022   Primary care provider: Rafael Orosco          Assessment and Plan:     Assessment     Systemic lupus( + ARIEL, +dsDNA,+ RNP, Thrombocytopenia, arthralgias, sicca, raynaud's, oral sores )  Fibromyalgia  Hx of stroke  Depression, anxiety  Obesity, sleep apnea  Posterior vitreous detachment of both eyes  Long term Plaquenil use    Ms. Link Stafford is 61 year old female seen in clinic for evaluation of systemic lupus    Systemic lupus : She was diagnosed with systemic lupus in 2014. She presented with arthralgias, fatigue, sicca symptoms, oral sores and had + ARIEL, +dsDNA,+ RNP, Thrombocytopenia and was started on hydroxychloroquine 200 mg twice daily. She continued on hydroxychloroquine until 2020 when she discontinued on her own.  She had no renal, pulmonary, CNS involvement due to systemic lupus.  Her symptoms of fatigue, fibromyalgia are controlled with Cymbalta.    Her main symptoms include intermittent episodes of joint pains, generalized muscle aches, fatigue, dry mouth, oral sores, pleurisy. She restarted Plaquenil in 7/2021 but was only taking 200 mg daily as compared to 400 mg daily which was prescribed. She has noticed improvements in chest pains, oral sores. Dry mouth improved with Cevimeline.     She reports off-and-on pain in her joints.  Now for the last few months she is taking 400 mg daily hydroxychloroquine and overall feels stable.  She has not been concentrating on her own health lately due to her father being very sick.  She is his caregiver.  Her  autoimmune work-up done in September showed normal inflammation markers.  She had elevated AST, ALT, positive F-actin and mildly elevated antimitochondrial antibody.  She had low platelet count and mildly elevated CK level-424.    I discussed with her before that azathioprine can be added to hydroxychloroquine due to worsening joint pain but her TPMT level is low.  Azathioprine can not be used.  In addition leflunomide and methotrexate is not good choices due to elevated liver enzymes.    Will put in the hepatology referral to evaluate for autoimmune hepatitis.  Based on that we can use CellCept which can help with both systemic lupus and autoimmune hepatitis.      Sicca symptoms : She has severe dry mouth and dry eyes.  She is using Biotene products. Cholinergic agonists cevimeline has helped. Continue using lubricating eyedrops.  Follow-up with dentist every 6 months.    Fibromyalgia: She is on Cymbalta which helps with fibromyalgia. For fibromyalgia she was instructed to try relaxing techniques like Yoga, Haroon chi. She can try Aerobic conditioning. Low-impact aerobic activities such as fast walking, biking, swimming, or water aerobics are most successful among the interventions that have been studied.    Elevated liver enzymes: Hepatology referral placed due to positive F-actin, antimitochondrial antibody and elevated AST, ALT.    Vaccinations: Vaccinations reviewed with Ms. Link Stafford. Risks and benefits of vaccinations were discussed.  - Influenza: encouraged yearly vaccination  - [id number]: recommend to get it   - [id number]: to receive 8 weeks after [id number] is administered  - Zostavax: recommend to get it   - COVID- 19 : recommend to get it       Plan    We will continue hydroxychloroquine 400 mg daily.    Blood test orders placed    Will place hepatology referral    Follow-up in 2 months, March 11th.     -- Orders placed this encounter  Orders Placed This Encounter   Procedures     CBC with platelets      AST     ALT     Albumin level     Creatinine     Complement C3     Complement C4     DNA ANTIBODY, NATV/2 STRAND     Routine UA with Micro Reflex to Culture     Protein  random urine     CRP inflammation     Erythrocyte sedimentation rate auto     Adult Gastro Ref - Consult Only                     Active Problem List:     Patient Active Problem List    Diagnosis Date Noted     Vitreous opacities of right eye 04/13/2021     Priority: Medium     Transaminitis 03/30/2021     Priority: Medium     Hyperlipidemia LDL goal <70 03/30/2021     Priority: Medium     Thrombocytopenia (H) 03/24/2021     Priority: Medium     Posterior vitreous detachment of both eyes 03/09/2021     Priority: Medium     Long-term use of Plaquenil 03/09/2021     Priority: Medium     Myopia of both eyes 03/09/2021     Priority: Medium     Presbyopia 03/09/2021     Priority: Medium     Dry eyes 03/09/2021     Priority: Medium     Anal fissure 01/06/2021     Priority: Medium     Labial lesion 01/06/2021     Priority: Medium     Encounter for medication monitoring 07/09/2020     Priority: Medium     History of stroke 02/05/2020     Priority: Medium     Financial difficulties 02/05/2020     Priority: Medium     Moderate major depression (H) 10/08/2018     Priority: Medium     Liver hemangioma 12/14/2016     Priority: Medium     4 on MRI '09       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     Priority: Medium     Insomnia, unspecified type 08/19/2016     Priority: Medium     Tired 04/25/2016     Priority: Medium     Anxiety 02/19/2016     Priority: Medium     Allergic state 02/19/2016     Priority: Medium     History of claustrophobia 08/03/2015     Priority: Medium     Obesity, Class I, BMI 30-34.9 10/31/2014     Priority: Medium     Abnormal perimenopausal bleeding 08/24/2013     Priority: Medium     Systemic lupus erythematosus, unspecified SLE type, unspecified organ involvement status (H) 09/24/2012     Priority: Medium            History of Present  Illness:   Lesley Stafford is a 60 year old female with PMH of systemic lupus, sicca, fibromyalgia, depression, anxiety, obesity, fibromyalgia, stroke seen in the clinic in consultation at request of Dr Orosco for management of systemic lupus.    She was diagnosed with systemic lupus in 2014 by Dr. Ritesh Alcantara.  She had arthralgias, sicca symptoms, generalized muscle aches, fatigue her presentation.  Her blood work showed positive ARIEL, positive dsDNA, RNP antibody, thrombocytopenia.  She also had recurrent oral sores, mild Raynaud's symptoms.  She started on hydroxychloroquine 200 mg daily 2014 and was maintained on it up until 2020 when she discontinued it on her own.  In August 2017 she [facility]  In 2019 she developed stroke and was managed at [facility].  Stroke was not considered related to CNS lupus.  She is on full dose aspirin, statins.  Denies any history of high blood pressure, diabetes.  She has posterior vitreous detachment bilateral eyes and follows with Dr. Rice.  No evidence of Plaquenil toxicity noted on her recent eye exam.    She is off of Plaquenil since 2020.  Today she reports that last week she had a flare where all her joints were hurting.  Her right fourth finger was very swollen and painful.  She had pain in her wrists, elbows, shoulders, lower back, neck, ankles.  During that week she rested, did acupuncture which helped.  She started CBD oil 3 months ago and feels like it is helping.    She also gets recurrent oral sores, has severe dry mouth and dry eyes.  Follows with dentist every 6 months.  She started using Biotene products and uses lubricating eyedrops.  Her other main issue is chronic constipation.  She is MiraLAX couple times a week.  She always has bloating.    Last week her joints were hurting, wrist, elbows were huting and she thinks that it was related to stress. Ankles, middle toe was hurting. Back, shoulders,  muscles hurt. She went to [facility] and was able to rest. Her whole body was on fire. If she stay stress free, then she do better to manage her pain. CBD oil has helped her. Feet felt swollen. She is on CBD oil for 3 months and it has helped.  Occasionally she has chest pain and try to take shallow breaths.  She denies photosensitivity.  She gets Raynaud's in her fingers.  She has trouble swallowing food due to severe dry mouth.    She had recent sleep study which showed mild sleep apnea.  She also had night terrors.  She follows with her therapist in regards to anxiety and depression.    Her fatigue, fibromyalgia is managed with Xarelto.  She has been history of cutaneous lupus in her daughter.  Also reports extreme photosensitivity in her mom and sister.    September 30, 2021 - She has pain in her wrists, shoulder. Pain in her left hip wakes her up. She has lower back pain. She gets pain in her knuckles. Twice a month she gets pain in her joints which last for few days. She see chiropracter, get massage therapy, and use CBD. She has dry mouth and cevimeline helps with her dry mouth. She gets chest pain off and on but not has been that bad. She has raynaud's in her fingers. She has not has oral sores for a while.     January 11, 2022 - She is in a lot of stress due to her father's health.  She is going to put her father in hospice today. She has not been concentrating on her own health for the last couple months.  She is taking hydroxychloroquine 400 mg daily and has noticed improvement.  Denies significant joint pains.  Her blood work done on 9/30/2021 showed elevated AST-47, ALT-74, platelet count-94, CK-424, positive F-actin-39, mitochondrial antibody-4.5, negative dsDNA, normal C3/C4, normal ESR, CRP.  Her TPMT level was low at 23.           Review of Systems:     Review Of Systems  Constitutional: denies fever, chills, night sweats and weight loss.  Skin: No skin rash.  Eyes:+ dryness or irritation in eyes.  No episode of eye inflammation or redness.   Ears/Nose/Throat: no recurrent sinus infections.  Respiratory: No shortness of breath, dyspnea on exertion, cough, or hemoptysis  Cardiovascular: no chest pain or palpitations.  Gastrointestinal: no nausea, vomiting, abdominal pain.  Normal bowel movements.  Genitourinary: no dysuria, frequency  or hematuria.  Musculoskeletal: as in HPI  Neurologic: no numbness, tingling.  Psychiatric: + mood disorders.  Hematologic/Lymphatic/Immunologic: no history of easy bruising, petechia or purpura.  No abnormal bleeding.   Endocrine: no h/o thyroid disease or Diabetes.                  Past Medical History:     Past Medical History:   Diagnosis Date     Allergy      Anemia      Anxiety      Cerebral infarction (H)      Depressive disorder      hands/feet      Hemoglobin C trait (H) 8/29/2016     Hyperlipidemia LDL goal <70 3/30/2021     Lupus (H)      Substance abuse (H)     Has not used for 19 years.     Thrombocytopenia (H) 3/24/2021     Past Surgical History:   Procedure Laterality Date     COLONOSCOPY  01/2010    repeat 10 yrs     COLONOSCOPY N/A 6/30/2020    Procedure: COLONOSCOPY, WITH POLYPECTOMY;  Surgeon: Rebecca Rojas MD;  Location: [facility] OR     DILATION AND CURETTAGE, OPERATIVE HYSTEROSCOPY WITH MORCELLATOR, COMBINED N/A 2/15/2017    Procedure: COMBINED DILATION AND CURETTAGE, OPERATIVE HYSTEROSCOPY WITH MORCELLATOR;  Surgeon: Erin Gutierrez MD;  Location: [facility] OR     ESOPHAGOSCOPY, GASTROSCOPY, DUODENOSCOPY (EGD), COMBINED Left 2/5/2016    Procedure: COMBINED ESOPHAGOSCOPY, GASTROSCOPY, DUODENOSCOPY (EGD), BIOPSY SINGLE OR MULTIPLE;  Surgeon: Pierre Fernandez MD;  Location:  GI     GYN SURGERY              Social History:     Social History     Occupational History     Not on file   Tobacco Use     Smoking status: Former Smoker     Smokeless tobacco: Never Used     Tobacco comment: quit 27 years ago   Substance and Sexual Activity     Alcohol use: No     Drug  use: No     Sexual activity: Yes     Partners: Male     Birth control/protection: None            Family History:     Family History   Problem Relation Age of Onset     Lupus Mother      Asthma Mother      Diabetes Father      Brain Tumor Father         begnign on pituitary     Depression Sister      Anemia Sister      Glaucoma Maternal Grandfather      Macular Degeneration No family hx of             Allergies:     Allergies   Allergen Reactions     Morphine Sulfate Itching     Sulfa Drugs Hives            Medications:     Current Outpatient Medications   Medication Sig Dispense Refill     albuterol (PROAIR HFA/PROVENTIL HFA/VENTOLIN HFA) 108 (90 Base) MCG/ACT inhaler Inhale 2 puffs into the lungs every 4 hours as needed for shortness of breath / dyspnea, wheezing or other (coughing) 18 g 11     ascorbic acid 1000 MG TABS tablet Take 1,000 mg by mouth as needed       aspirin (ASA) 325 MG EC tablet Take 1 tablet (325 mg) by mouth daily 90 tablet 1     cetirizine (ZYRTEC) 10 MG tablet Take 1 tablet (10 mg) by mouth daily 90 tablet 1     cevimeline (EVOXAC) 30 MG capsule Take 1 capsule (30 mg) by mouth 3 times daily 90 capsule 1     DULoxetine (CYMBALTA) 60 MG capsule Take 60 mg by mouth daily Alternates between 1 tablet and 2 tablets every other day.       fluticasone (FLONASE) 50 MCG/ACT nasal spray Spray 1 spray into both nostrils daily 1 g 11     gabapentin (NEURONTIN) 300 MG capsule TAKE 2 CAPSULES BY MOUTH 3 TIMES A  capsule 2     HEMP OIL OR EXTRACT OR OTHER CBD CANNABINOID, NOT MEDICAL CANNABIS,        hydroxychloroquine (PLAQUENIL) 200 MG tablet Take 2 tablets (400 mg) by mouth daily 180 tablet 1     hydrOXYzine (ATARAX) 25 MG tablet TAKE 1 TABLET BY MOUTH THREE TIMES A DAY 90 tablet 3     multivitamin, therapeutic with minerals (MULTI-VITAMIN) TABS Take 1 tablet by mouth daily       naproxen sodium 220 MG capsule Take 220 mg by mouth 2 times daily as needed        omega 3 1200 MG CAPS        order for  DME Cane, 1 Device 0     tiZANidine (ZANAFLEX) 4 MG tablet TAKE 1-2 TABLETS BY MOUTH AS NEEDED THREE TIMES A  tablet 3     valACYclovir (VALTREX) 500 MG tablet Take 1 tablet (500 mg) by mouth 2 times daily 30 tablet 1     atorvastatin (LIPITOR) 80 MG tablet Take 0.5 tablets (40 mg) by mouth daily (Patient not taking: Reported on 12/3/2021) 45 tablet 3            Physical Exam:   Last menstrual period 08/12/2013, not currently breastfeeding.  Wt Readings from Last 4 Encounters:   12/14/21 79.2 kg (174 lb 9.6 oz)   12/03/21 79.4 kg (175 lb)   09/30/21 80.7 kg (178 lb)   07/29/21 81.2 kg (179 lb)       Constitutional: Obese, appearing stated age; cooperative  Musculoskeletal: she has tenderness  2,3 rd MCP, PIP joints. Tenderness over left wrist, b/l ankles, elbows. She also has myofascial tenderness due to Fibromyalgia.  No dactylitis,  tenosynovitis, enthesopathy.    Skin: no nail pitting, alopecia, rash, nodules or lesions  Neuro: nl cranial nerves, strength, sensation, DTRs.   Psych: nl judgement, orientation, memory, affect.         Data:     No results found for any visits on 01/11/22.    Recent Labs   Lab Test 03/24/21  0926 03/01/21  1059 12/24/19  0829 12/24/19  0648 12/23/19  1519 05/21/19  0929 07/28/17  1632 05/08/17  0351 01/29/16  1804 01/27/16  1217   WBC 4.5 4.5 4.5 Canceled, Test credited  --  4.0  --   --   --  4.9   RBC 4.52 4.43 4.83 Canceled, Test credited  --  4.42  --   --   --  4.02   HGB 12.7 12.3 13.5 Canceled, Test credited  --  12.6  --   --   --  11.7   HCT 35.6 34.8* 38.2 Canceled, Test credited  --  34.7*  --   --   --  32.2*   MCV 79 79 79 Canceled, Test credited  --  79  --   --   --  80   RDW 14.2 14.1 14.3 Canceled, Test credited  --  14.0  --   --   --  13.6   * 112* 145* Canceled, Test credited  --  124*  --   --   --  155   ALBUMIN 3.9  --   --  3.4  --  3.9   < >  --    < > 3.7   CRP  --   --  <2.9  --   --   --   --  <2.9  --  <2.9   BUN 12 13  --  7  --  10   < >   --   --  16   CREAT  --   --   --   --  0.7  --   --   --   --   --     < > = values in this interval not displayed.      Recent Labs   Lab Test 05/21/19  0929 10/08/18  1000 11/02/16  1151   TSH 1.20 0.97 0.85     Hemoglobin   Date Value Ref Range Status   09/30/2021 12.0 11.7 - 15.7 g/dL Final   08/10/2021 12.6 11.7 - 15.7 g/dL Final   03/24/2021 12.7 11.7 - 15.7 g/dL Final   03/01/2021 12.3 11.7 - 15.7 g/dL Final   12/24/2019 13.5 11.7 - 15.7 g/dL Final     Urea Nitrogen   Date Value Ref Range Status   09/30/2021 9 7 - 30 mg/dL Final   08/10/2021 12 7 - 30 mg/dL Final   03/24/2021 12 7 - 30 mg/dL Final   03/01/2021 13 7 - 30 mg/dL Final   12/24/2019 7 7 - 30 mg/dL Final     Creatinine   Date Value Ref Range Status   12/23/2019 0.7 0.52 - 1.04 mg/dL Final     Sed Rate   Date Value Ref Range Status   12/24/2019 19 0 - 30 mm/h Final   05/08/2017 15 0 - 30 mm/h Final   05/02/2016 18 0 - 30 mm/h Final     Erythrocyte Sedimentation Rate   Date Value Ref Range Status   09/30/2021 18 0 - 30 mm/hr Final   07/29/2021 26 0 - 30 mm/hr Final     CRP Inflammation   Date Value Ref Range Status   09/30/2021 <2.9 0.0 - 8.0 mg/L Final   07/29/2021 <2.9 0.0 - 8.0 mg/L Final   12/24/2019 <2.9 0.0 - 8.0 mg/L Final   05/08/2017 <2.9 0.0 - 8.0 mg/L Final   01/27/2016 <2.9 0.0 - 8.0 mg/L Final     AST   Date Value Ref Range Status   09/30/2021 47 (H) 0 - 45 U/L Final   08/10/2021 47 (H) 0 - 45 U/L Final   04/30/2021 77 (H) 0 - 45 U/L Final   03/24/2021 63 (H) 0 - 45 U/L Final   12/24/2019 26 0 - 45 U/L Final     Albumin   Date Value Ref Range Status   09/30/2021 3.9 3.4 - 5.0 g/dL Final   08/10/2021 3.9 3.4 - 5.0 g/dL Final   03/24/2021 3.9 3.4 - 5.0 g/dL Final   12/24/2019 3.4 3.4 - 5.0 g/dL Final   05/21/2019 3.9 3.4 - 5.0 g/dL Final     Alkaline Phosphatase   Date Value Ref Range Status   09/30/2021 69 40 - 150 U/L Final   08/10/2021 73 40 - 150 U/L Final   03/24/2021 72 40 - 150 U/L Final   12/24/2019 67 40 - 150 U/L Final    05/21/2019 67 40 - 150 U/L Final     ALT   Date Value Ref Range Status   09/30/2021 74 (H) 0 - 50 U/L Final   08/10/2021 82 (H) 0 - 50 U/L Final   04/30/2021 125 (H) 0 - 50 U/L Final   03/24/2021 104 (H) 0 - 50 U/L Final   12/24/2019 30 0 - 50 U/L Final     Rheumatoid Factor   Date Value Ref Range Status   07/29/2021 <7 <20 IU/mL Final     Recent Labs   Lab Test 12/03/21  0912 09/30/21  1656 09/30/21  1655 08/10/21  1740 04/30/21  1510 03/24/21  0926 12/23/19  1517 05/21/19  0929 10/08/18  1000   WBC  --  5.5  --  6.1  --  4.5   < > 4.0 4.3   HGB  --  12.0  --  12.6  --  12.7   < > 12.6 12.5   HCT  --  33.1*  --  34.3*  --  35.6   < > 34.7* 34.1*   MCV  --  78  --  77*  --  79   < > 79 78   PLT  --  94*  --  152  --  122*   < > 124* 115*   BUN  --   --  9 12  --  12   < > 10 10   TSH 1.75  --   --   --   --   --   --  1.20 0.97   AST  --   --  47* 47* 77* 63*   < > 32 40   ALT  --   --  74* 82* 125* 104*   < > 36 44   ALKPHOS  --   --  69 73  --  72   < > 67 59    < > = values in this interval not displayed.     Outside studies reviewed: [facility] reviewed.    7/2019 : + RNP - 985, + dsDNA - 73, + ssDNA - 288, + Chromatin - 1081  Cbc -     Reviewed Rheumatology lab flowsheet    Elvia Gonzalez MD  [facility]  Department of Rheumatology & Autoimmune Disorders  [employer] [place]: [phone]   Pager - [phone]    Video-Visit Details    Type of service:  Video Visit    Video End Time: 12: 30 PM     Originating Location (pt. Location): Home    Distant Location (provider location):  [facility]     Platform used for Video Visit: EvelinaWell

## 2022-01-06 ENCOUNTER — HOSPITAL ENCOUNTER (OUTPATIENT)
Dept: PHYSICAL THERAPY | Facility: CLINIC | Age: 62
Setting detail: THERAPIES SERIES
End: 2022-01-06
Attending: PSYCHIATRY & NEUROLOGY
Payer: COMMERCIAL

## 2022-01-06 DIAGNOSIS — R26.81 GAIT INSTABILITY: Primary | ICD-10-CM

## 2022-01-06 PROCEDURE — 97110 THERAPEUTIC EXERCISES: CPT | Mod: GP | Performed by: PHYSICAL THERAPIST

## 2022-01-06 PROCEDURE — 97161 PT EVAL LOW COMPLEX 20 MIN: CPT | Mod: GP | Performed by: PHYSICAL THERAPIST

## 2022-01-06 NOTE — PROGRESS NOTES
[facility]                                                                                   OUTPATIENT PHYSICAL THERAPY FUNCTIONAL EVALUATION  PLAN OF TREATMENT FOR OUTPATIENT REHABILITATION  (COMPLETE FOR INITIAL CLAIMS ONLY)  Patient's Last Name, First Name, M.I.  YOB: 1960  Lesley Pfeiffer     Provider's Name   [facility]   Medical Record No.  [id number]     Start of Care Date:  01/06/22   Onset Date:  12/03/21   Type:     _X__PT   ____OT  ____SLP Medical Diagnosis:  Central nervous system origin vertigo     PT Diagnosis:  balance impairments and weakness  Visits from SOC:  1                              __________________________________________________________________________________  Plan of Treatment/Functional Goals:  IADL retraining,balance training,gait training,neuromuscular re-education,ROM,strengthening,stretching,transfer training,orthotic fitting/training           GOALS  6MWT  Lesley will perform 6MWT at 1.2 m/s in order to improve her speed with comunity ambulation.   03/06/22    stairs  Lesley will perform 12 steps x 3 reps w/o rest break using reciprocal pattern in order to show improved LE endurnace for negotiating stairs in her town home.   03/06/22    balance   Lesley will perform gait over 50' distance with head turning and deny increase in dizziness/imbalance systems in order to better navigate distances in the community.   03/06/22    HEP   Lesley will be independent and compliant with HEP for balance, strengthening and lengthening in order to improve her overall mobility and quality of life.   04/05/22        Therapy Frequency:  other (see comments) (every other week )   Predicted Duration of Therapy Intervention:  up to 90 days    Desire Mix PT                                    I CERTIFY THE NEED FOR THESE SERVICES FURNISHED  UNDER        THIS PLAN OF TREATMENT AND WHILE UNDER MY CARE     (Physician co-signature of this document indicates review and certification of the therapy plan).                Certification Date From:  01/06/22   Certification Date To:  04/05/22    Referring Provider:  Dr. Deven Salgado     Initial Assessment  See Epic Evaluation- Start of Care Date: 01/06/22

## 2022-01-06 NOTE — PROGRESS NOTES
Functional Gait Assessment (FGA): The FGA assesses postural stability during various walking tasks.   Gait assistive device used: None    Patient Score: 26/30  Scores of <22/30 have been correlated with predicting falls in community-dwelling older adults according to Artem & Adrian 2010.   Scores of <18/30 have been correlated with increased risk for falls in patients with Parkinsons Disease according to Dimitry Ewing Zhou et al 2014.  Minimal Detectable Change for patients with acute/chronic stroke = 4.2 according to Thigerard & Hillschel 2009  Minimal Detectable Change for patients with vestibular disorder = 8 according to Artem & Adrian 2010    Assessment (rationale for performing, application to patient s function & care plan): Performed to assess balance with gait. She is scoring a low risk of falls. Dizziness with head turns and higher level tasks. Will benefit from PT to address these areas of impairment.   Minutes billed as physical performance test: 8         01/06/22 1100   Signing Clinician's Name / Credentials   Signing clinician's name / credentials Desire Mix, PT, DPT    Functional Gait Assessment (TOY Mcgill., Grisel, GKyra F., et al. (2004))   1. GAIT LEVEL SURFACE 3   2. CHANGE IN GAIT SPEED 3   3. GAIT WITH HORIZONTAL HEAD TURNS 2   4. GAIT WITH VERTICAL HEAD TURNS 3   5. GAIT AND PIVOT TURN 3   6. STEP OVER OBSTACLE 3   7. GAIT WITH NARROW BASE OF SUPPORT 2   8. GAIT WITH EYES CLOSED 3   9. AMBULATING BACKWARDS 2   10. STEPS 2   Total Functional Gait Assessment Score   TOTAL SCORE: (MAXIMUM SCORE 30) 26

## 2022-01-06 NOTE — PROGRESS NOTES
"   01/06/22 1100   Quick Adds   Quick Adds Certification   Type of Visit Initial OP PT Evaluation   General Information   Start of Care Date 01/06/22   Referring Physician Dr. Deven Salgado    Orders Evaluate and Treat as Indicated   Order Date 12/03/21   Medical Diagnosis Central nervous system origin vertigo   Onset of illness/injury or Date of Surgery 12/03/21   Surgical/Medical history reviewed Yes  (CVA, SLE, fibromyalgia )   Pertinent history of current problem (include personal factors and/or comorbidities that impact the POC) Hx of CVA affecting L side and remaining central finding with vestibular testing (see audiology noted). L ear does feel \"off\", she is having audiology appt next week for hearing test and order in for VEMP. Also seeing neurootologist. Swirly feeling in her head. Ringing in L ear. Notices  is worse for dizziness. Walking can feel off. Does feel she has more trouble with her balance, often. Notices it more when she is moving or thinking fast, slowing down mentally does seem to help. Neuropathy in BLEs, feet are worse, increased cold sensation and less circulation. ROM is still challenging on L side. Acupuncutre and massage are helpful for L side and she does these every other week.    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 No   Have you fallen and had an injury in the past year? No   Timed Up and Go score (seconds) not tested   Is patient a fall risk? No   Fall screen comments almost fell on the ice last week   Abuse Screen (yes response referral indicated)   Feels Unsafe at Home or Work/School no   Feels Threatened by Someone no   Does Anyone Try to Keep You From Having Contact with Others or Doing Things Outside Your Home? no   Physical Signs of Abuse Present no   Functional Scales   Functional Scales and Outcomes 62/100   Pain   Pain comments no major pain today but this can vary with her fibro    Cognitive Status Examination   Orientation orientation to person, place and time   Level of Consciousness alert   Follows Commands and Answers Questions 100% of the time   Personal Safety and Judgment intact   Memory intact   Posture   Posture Comments mild forward shoulder posture   Range of Motion (ROM)   ROM Comment decreased L shoulder ER by 10 degrees compared to R, L IR to bra line (R IR to several inches past), shoulder flexion 170 B    Strength   Strength Comments 4/5 L hip flexion, 4/5 L HS, 4/5 L DF; 5/5 in RLE    Bed Mobility   Bed Mobility Comments independent    Transfer Skills   Transfer Comments sit to stand with and without UEs    Gait   Gait Comments ambulates with slight decreased step length through LLE, reports mild hip pain on L side    Gait Special Tests   Gait Special Tests 25 FOOT TIMED WALK;FUNCTIONAL GAIT ASSESSMENT   Gait Special Tests 25 Foot Timed Walk   Seconds 5.96   Steps 12 Steps   Gait Special Tests Functional Gait Assessment Score out of 30   Score out of 30 26   Comments see progress note    Balance   Balance Comments decreased stability with SLS, see below; sometimes feelings a \"swirling\" feeling in head with head turns in " standing or sitting    Balance Special Tests   Balance Special Tests Single leg stance right;Single leg stance left;Modified CTSIB Conditions   Balance Special Tests Single Leg Stance Right,   Right, seconds 2 Seconds   Balance Special Tests Single Leg Stance Left   Left, seconds 3 Seconds   Balance Special Tests Modified CTSIB Conditions   Condition 1, seconds 30 Seconds   Condition 2, seconds 30 Seconds   Condition 4, seconds 30 Seconds   Condition 5, seconds 15 Seconds   Sensory Examination   Sensory Perception Comments decreased sensation in B feet,    Planned Therapy Interventions   Planned Therapy Interventions IADL retraining;balance training;gait training;neuromuscular re-education;ROM;strengthening;stretching;transfer training;orthotic fitting/training   Clinical Impression   Criteria for Skilled Therapeutic Interventions Met yes, treatment indicated   PT Diagnosis balance impairments and weakness    Influenced by the following impairments decreaesd strength, impaired ROM, balance problem, gait instability, neuropathy, hx of dizziness, fatigue    Functional limitations due to impairments decreased tolerance for work as PCA, decreased safety with gait in the community    Clinical Presentation Stable/Uncomplicated   Clinical Presentation Rationale stable medically, PT impairments, clinical judgement    Clinical Decision Making (Complexity) Low complexity   Therapy Frequency other (see comments)  (every other week )   Predicted Duration of Therapy Intervention (days/wks) up to 90 days   Risk & Benefits of therapy have been explained Yes   Patient, Family & other staff in agreement with plan of care Yes   Clinical Impression Comments Patient presents with hx of dizziness, imbalance and weakness. Central finding present with VNG testing and is having further work up with audiology next week. She is overall moving well but with mild imbalance and dizziness. She also has L UE and hip weakness and will benefit from  strengthening and lengthening. Patient is going to be coming every other week to build HEP to address these areas of impairments.    GOALS   PT Eval Goals 1;2;3;4   Goal 1   Goal Identifier 6MWT   Goal Description Lesley will perform 6MWT at 1.2 m/s in order to improve her speed with comunity ambulation.    Target Date 03/06/22   Goal 2   Goal Identifier stairs   Goal Description Lesley will perform 12 steps x 3 reps w/o rest break using reciprocal pattern in order to show improved LE endurnace for negotiating stairs in her town home.    Target Date 03/06/22   Goal 3   Goal Identifier balance    Goal Description Lesley will perform gait over 50' distance with head turning and deny increase in dizziness/imbalance systems in order to better navigate distances in the community.    Target Date 03/06/22   Goal 4   Goal Identifier HEP    Goal Description Lesley will be independent and compliant with HEP for balance, strengthening and lengthening in order to improve her overall mobility and quality of life.    Target Date 04/05/22   Total Evaluation Time   PT Eval, Low Complexity Minutes (42559) 35   Therapy Certification   Certification date from 01/06/22   Certification date to 04/05/22   Medical Diagnosis Central nervous system origin vertigo   Certification I certify the need for these services furnished under this plan of treatment and while under my care.  (Physician co-signature of this document indicates review and certification of the therapy plan).

## 2022-01-11 ENCOUNTER — VIRTUAL VISIT (OUTPATIENT)
Dept: RHEUMATOLOGY | Facility: CLINIC | Age: 62
End: 2022-01-11
Payer: COMMERCIAL

## 2022-01-11 DIAGNOSIS — R79.89 ELEVATED LFTS: Primary | ICD-10-CM

## 2022-01-11 DIAGNOSIS — M32.9 SYSTEMIC LUPUS ERYTHEMATOSUS, UNSPECIFIED SLE TYPE, UNSPECIFIED ORGAN INVOLVEMENT STATUS (H): ICD-10-CM

## 2022-01-11 PROCEDURE — 99214 OFFICE O/P EST MOD 30 MIN: CPT | Mod: 95 | Performed by: STUDENT IN AN ORGANIZED HEALTH CARE EDUCATION/TRAINING PROGRAM

## 2022-01-11 RX ORDER — HYDROXYCHLOROQUINE SULFATE 200 MG/1
400 TABLET, FILM COATED ORAL DAILY
Qty: 180 TABLET | Refills: 1 | Status: SHIPPED | OUTPATIENT
Start: 2022-01-11 | End: 2022-03-11

## 2022-01-11 NOTE — RESULT ENCOUNTER NOTE
Results discussed with the patient at the time of visit.     Elvia Gonzalez MD   1/11/2022  12:10 PM

## 2022-01-11 NOTE — PATIENT INSTRUCTIONS
We will continue hydroxychloroquine 400 mg daily.    Blood test orders placed    Will place hepatology referral    Follow-up in 2 months, March 11th.

## 2022-01-14 ENCOUNTER — OFFICE VISIT (OUTPATIENT)
Dept: AUDIOLOGY | Facility: CLINIC | Age: 62
End: 2022-01-14
Payer: COMMERCIAL

## 2022-01-14 ENCOUNTER — PRE VISIT (OUTPATIENT)
Dept: OTOLARYNGOLOGY | Facility: CLINIC | Age: 62
End: 2022-01-14

## 2022-01-14 ENCOUNTER — OFFICE VISIT (OUTPATIENT)
Dept: OTOLARYNGOLOGY | Facility: CLINIC | Age: 62
End: 2022-01-14
Attending: PSYCHIATRY & NEUROLOGY
Payer: COMMERCIAL

## 2022-01-14 VITALS
TEMPERATURE: 96.7 F | WEIGHT: 178 LBS | HEIGHT: 65 IN | SYSTOLIC BLOOD PRESSURE: 113 MMHG | BODY MASS INDEX: 29.66 KG/M2 | DIASTOLIC BLOOD PRESSURE: 64 MMHG | HEART RATE: 80 BPM | OXYGEN SATURATION: 95 %

## 2022-01-14 DIAGNOSIS — H93.19 TINNITUS, UNSPECIFIED LATERALITY: Primary | ICD-10-CM

## 2022-01-14 DIAGNOSIS — H81.4 CENTRAL NERVOUS SYSTEM ORIGIN VERTIGO: ICD-10-CM

## 2022-01-14 DIAGNOSIS — H93.13 TINNITUS, BILATERAL: ICD-10-CM

## 2022-01-14 DIAGNOSIS — H90.3 BILATERAL SENSORINEURAL HEARING LOSS: Primary | ICD-10-CM

## 2022-01-14 PROCEDURE — 99203 OFFICE O/P NEW LOW 30 MIN: CPT | Performed by: REGISTERED NURSE

## 2022-01-14 PROCEDURE — 92550 TYMPANOMETRY & REFLEX THRESH: CPT | Performed by: AUDIOLOGIST

## 2022-01-14 PROCEDURE — 92557 COMPREHENSIVE HEARING TEST: CPT | Performed by: AUDIOLOGIST

## 2022-01-14 ASSESSMENT — PAIN SCALES - GENERAL: PAINLEVEL: NO PAIN (0)

## 2022-01-14 ASSESSMENT — MIFFLIN-ST. JEOR: SCORE: 1363.28

## 2022-01-14 NOTE — LETTER
1/14/2022      RE: Lesley Stafford  [address]  [place] [address]         Otolaryngology Clinic  January 14, 2022    Chief Complaint:   Hearing loss       History of Present Illness:   Lesley Stafford is a 61 year old female who presents today for evaluation of hearing loss and bilateral ear pressure/fullness. Patient has a history of CVA, lupus, fibromyalgia, and anxiety/depression. Patient has been following with neurology for imbalance that is likely due to her previous stroke.     Patient reports a gradual feeling of hearing loss.  Difficulty with soft speakers.  Reports head feeling full which makes it difficult to hear.  Intermittent tinnitus that seems to be bilateral but not pulsating.  Left ear itchy.     Patient denies any otorrhea, facial numbness/weakness, history of frequent ear infections, or ear surgeries.     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     Thrombocytopenia (H) 3/24/2021       Past Surgical History:  Past Surgical History:   Procedure Laterality Date     COLONOSCOPY  01/2010    repeat 10 yrs     COLONOSCOPY N/A 6/30/2020    Procedure: COLONOSCOPY, WITH POLYPECTOMY;  Surgeon: Rebecca Rojas MD;  Location: [facility] OR     DILATION AND CURETTAGE, OPERATIVE HYSTEROSCOPY WITH MORCELLATOR, COMBINED N/A 2/15/2017    Procedure: COMBINED DILATION AND CURETTAGE, OPERATIVE HYSTEROSCOPY WITH MORCELLATOR;  Surgeon: Erin Gutierrez MD;  Location: [facility] OR     ESOPHAGOSCOPY, GASTROSCOPY, DUODENOSCOPY (EGD), COMBINED Left 2/5/2016    Procedure: COMBINED ESOPHAGOSCOPY, GASTROSCOPY, DUODENOSCOPY (EGD), BIOPSY SINGLE OR MULTIPLE;  Surgeon: Peirre Fernandez MD;  Location:  GI     GYN SURGERY          Medications:  Current Outpatient Medications   Medication Sig Dispense Refill     albuterol (PROAIR HFA/PROVENTIL HFA/VENTOLIN HFA) 108 (90 Base) MCG/ACT inhaler Inhale 2 puffs into the lungs every 4 hours as needed for shortness of breath / dyspnea, wheezing or other (coughing) 18 g 11     ascorbic acid 1000 MG TABS tablet Take 1,000 mg by mouth as needed       aspirin (ASA) 325 MG EC tablet Take 1 tablet (325 mg) by mouth daily 90 tablet 1     cetirizine (ZYRTEC) 10 MG tablet Take 1 tablet (10 mg) by mouth daily 90 tablet 1     cevimeline (EVOXAC) 30 MG capsule Take 1 capsule (30 mg) by mouth 3 times daily 90 capsule 1     DULoxetine (CYMBALTA) 60 MG capsule Take 60 mg by mouth daily Alternates between 1 tablet and 2 tablets every other day.       fluticasone (FLONASE) 50 MCG/ACT nasal spray Spray 1 spray into both nostrils daily 1 g 11     gabapentin (NEURONTIN) 300 MG capsule TAKE 2 CAPSULES BY MOUTH 3 TIMES A  capsule 2     HEMP OIL OR EXTRACT OR OTHER CBD CANNABINOID, NOT MEDICAL CANNABIS,        hydroxychloroquine (PLAQUENIL) 200 MG tablet Take 2 tablets (400 mg) by mouth daily 180 tablet 1     hydrOXYzine (ATARAX) 25 MG tablet TAKE 1 TABLET BY MOUTH THREE TIMES A DAY 90 tablet 3     multivitamin, therapeutic with minerals (MULTI-VITAMIN) TABS Take 1 tablet by mouth daily       naproxen sodium 220 MG capsule Take 220 mg by mouth 2 times daily as needed        omega 3 1200 MG CAPS        order for DME Cane, 1 Device 0     tiZANidine (ZANAFLEX) 4 MG tablet TAKE 1-2 TABLETS BY MOUTH AS NEEDED THREE TIMES A  tablet 3     valACYclovir (VALTREX) 500 MG tablet Take 1 tablet (500 mg) by mouth 2 times daily 30 tablet 1     atorvastatin (LIPITOR) 80 MG tablet Take 0.5 tablets (40 mg) by mouth daily (Patient not taking: Reported on 12/3/2021) 45 tablet 3     Allergies:  Allergies   Allergen Reactions     Morphine Sulfate Itching     Sulfa Drugs Hives      Social History:  Social History     Tobacco  "Use     Smoking status: Former Smoker     Smokeless tobacco: Never Used     Tobacco comment: quit 27 years ago   Substance Use Topics     Alcohol use: No     Drug use: No     ROS: 10 point ROS neg other than the symptoms noted above in the HPI.    Physical Exam:    /64 (BP Location: Right arm, Patient Position: Sitting, Cuff Size: Adult Large)   Pulse 80   Temp (!) 96.7  F (35.9  C) (Temporal)   Ht 1.651 m (5' 5\")   Wt 80.7 kg (178 lb)   LMP 08/12/2013   SpO2 95%   BMI 29.62 kg/m       Constitutional:  The patient was unaccompanied, well-groomed, and in no acute distress.     Skin: Normal:  warm and pink without rash    Neurologic: Alert and oriented x 3.  Voice normal.   Psychiatric: The patient's affect was calm, cooperative, and appropriate.    Communication:  Normal; communicates verbally, normal voice quality.    Respiratory: Breathing comfortably without stridor or exertion of accessory muscles.    Ears: Pinnae and tragus non-tender.  EAC's and TM's were clear.      Audiogram: 1/14/2022- data independently reviewed  Bilateral borderline normal to mild SNHL   WR right: 92% left: 100% @ 70 dB  Acoustic Reflexes: present in all conditions  Tympanograms: type A bilaterally           Assessment and Plan:  1. Bilateral sensorineural hearing loss  Patient with mild bilateral sensorineural hearing loss.  Reviewed audiogram with patient today.  This mild hearing loss may be contributing to difficulty hearing in certain environments and feeling of ear fullness.  Discussed criteria for hearing aid candidacy.  Patient is a borderline candidate and may find some value in hearing aids.  Patient does not feel that she needs to pursue hearing aid consult at this time and will continue to monitor.    2. Bilateral ear fullness  Patient with bilateral ear fullness and pressure.  Mild hearing loss may be contributing to symptoms, however, patient also has significant history of TMJ and clenching.  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  Discussed relationship of TMJ inflammation and sense of pressure in ears.  This TMJ dysfunction may also be contributing to intermittent tinnitus symptoms.  Reviewed management strategies of TMJ including use of NSAIDs, warm compresses, and adhering to a soft diet.  Discussed TMJ clinic referral.  Due to current family obligations, patient will use management strategies discussed above and contact clinic if she would like to proceed with a TMJ clinic referral.    3. Central nervous system origin vertigo  Patient followed by neurology for vertigo.  Recommendations by neurology for balance therapy.  Patient has attended 1 session and I encouraged patient to continue with this therapy.  It also appears that neurology had recommended further VEMP testing which has not yet been scheduled.  I will ask our audiology balance scheduling to reach out to patient to schedule this at patient's convenience.     Recommend patient follow-up in 1 year with audiogram to continue to monitor hearing.    Trisha Arboleda DNP, APRN, CNP  Otolaryngology  Head & Neck Surgery  [phone]    40 minutes spent on the date of the encounter doing chart review, history and exam, documentation and further activities per the note        Audrey Arboleda, GREGORY

## 2022-01-14 NOTE — LETTER
1/14/2022       RE: Lesley Stafford  [address]  [place] [address]     Dear Colleague,    Thank you for referring your patient, Lesley Stafford, to the [facility] [place] at [facility]. Please see a copy of my visit note below.      Otolaryngology Clinic  January 14, 2022    Chief Complaint:   Hearing loss       History of Present Illness:   Lesley Stafford is a 61 year old female who presents today for evaluation of hearing loss and bilateral ear pressure/fullness. Patient has a history of CVA, lupus, fibromyalgia, and anxiety/depression. Patient has been following with neurology for imbalance that is likely due to her previous stroke.     Patient reports a gradual feeling of hearing loss.  Difficulty with soft speakers.  Reports head feeling full which makes it difficult to hear.  Intermittent tinnitus that seems to be bilateral but not pulsating.  Left ear itchy.     Patient denies any otorrhea, facial numbness/weakness, history of frequent ear infections, or ear surgeries.     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     Thrombocytopenia (H) 3/24/2021       Past Surgical History:  Past Surgical History:   Procedure Laterality Date     COLONOSCOPY  01/2010    repeat 10 yrs     COLONOSCOPY N/A 6/30/2020    Procedure: COLONOSCOPY, WITH POLYPECTOMY;  Surgeon: Rebecca Rojas MD;  Location:  OR     DILATION AND CURETTAGE, OPERATIVE HYSTEROSCOPY WITH MORCELLATOR, COMBINED N/A 2/15/2017    Procedure: COMBINED DILATION AND CURETTAGE, OPERATIVE HYSTEROSCOPY WITH MORCELLATOR;  Surgeon: Erin Gutierrez MD;  Location:  OR      ESOPHAGOSCOPY, GASTROSCOPY, DUODENOSCOPY (EGD), COMBINED Left 2/5/2016    Procedure: COMBINED ESOPHAGOSCOPY, GASTROSCOPY, DUODENOSCOPY (EGD), BIOPSY SINGLE OR MULTIPLE;  Surgeon: Pierre Fernandez MD;  Location:  GI     GYN SURGERY         Medications:  Current Outpatient Medications   Medication Sig Dispense Refill     albuterol (PROAIR HFA/PROVENTIL HFA/VENTOLIN HFA) 108 (90 Base) MCG/ACT inhaler Inhale 2 puffs into the lungs every 4 hours as needed for shortness of breath / dyspnea, wheezing or other (coughing) 18 g 11     ascorbic acid 1000 MG TABS tablet Take 1,000 mg by mouth as needed       aspirin (ASA) 325 MG EC tablet Take 1 tablet (325 mg) by mouth daily 90 tablet 1     cetirizine (ZYRTEC) 10 MG tablet Take 1 tablet (10 mg) by mouth daily 90 tablet 1     cevimeline (EVOXAC) 30 MG capsule Take 1 capsule (30 mg) by mouth 3 times daily 90 capsule 1     DULoxetine (CYMBALTA) 60 MG capsule Take 60 mg by mouth daily Alternates between 1 tablet and 2 tablets every other day.       fluticasone (FLONASE) 50 MCG/ACT nasal spray Spray 1 spray into both nostrils daily 1 g 11     gabapentin (NEURONTIN) 300 MG capsule TAKE 2 CAPSULES BY MOUTH 3 TIMES A  capsule 2     HEMP OIL OR EXTRACT OR OTHER CBD CANNABINOID, NOT MEDICAL CANNABIS,        hydroxychloroquine (PLAQUENIL) 200 MG tablet Take 2 tablets (400 mg) by mouth daily 180 tablet 1     hydrOXYzine (ATARAX) 25 MG tablet TAKE 1 TABLET BY MOUTH THREE TIMES A DAY 90 tablet 3     multivitamin, therapeutic with minerals (MULTI-VITAMIN) TABS Take 1 tablet by mouth daily       naproxen sodium 220 MG capsule Take 220 mg by mouth 2 times daily as needed        omega 3 1200 MG CAPS        order for DME Cane, 1 Device 0     tiZANidine (ZANAFLEX) 4 MG tablet TAKE 1-2 TABLETS BY MOUTH AS NEEDED THREE TIMES A  tablet 3     valACYclovir (VALTREX) 500 MG tablet Take 1 tablet (500 mg) by mouth 2 times daily 30 tablet 1     atorvastatin (LIPITOR) 80 MG  "tablet Take 0.5 tablets (40 mg) by mouth daily (Patient not taking: Reported on 12/3/2021) 45 tablet 3     Allergies:  Allergies   Allergen Reactions     Morphine Sulfate Itching     Sulfa Drugs Hives      Social History:  Social History     Tobacco Use     Smoking status: Former Smoker     Smokeless tobacco: Never Used     Tobacco comment: quit 27 years ago   Substance Use Topics     Alcohol use: No     Drug use: No     ROS: 10 point ROS neg other than the symptoms noted above in the HPI.    Physical Exam:    /64 (BP Location: Right arm, Patient Position: Sitting, Cuff Size: Adult Large)   Pulse 80   Temp (!) 96.7  F (35.9  C) (Temporal)   Ht 1.651 m (5' 5\")   Wt 80.7 kg (178 lb)   LMP 08/12/2013   SpO2 95%   BMI 29.62 kg/m       Constitutional:  The patient was unaccompanied, well-groomed, and in no acute distress.     Skin: Normal:  warm and pink without rash    Neurologic: Alert and oriented x 3.  Voice normal.   Psychiatric: The patient's affect was calm, cooperative, and appropriate.    Communication:  Normal; communicates verbally, normal voice quality.    Respiratory: Breathing comfortably without stridor or exertion of accessory muscles.    Ears: Pinnae and tragus non-tender.  EAC's and TM's were clear.      Audiogram: 1/14/2022- data independently reviewed  Bilateral borderline normal to mild SNHL   WR right: 92% left: 100% @ 70 dB  Acoustic Reflexes: present in all conditions  Tympanograms: type A bilaterally           Assessment and Plan:  1. Bilateral sensorineural hearing loss  Patient with mild bilateral sensorineural hearing loss.  Reviewed audiogram with patient today.  This mild hearing loss may be contributing to difficulty hearing in certain environments and feeling of ear fullness.  Discussed criteria for hearing aid candidacy.  Patient is a borderline candidate and may find some value in hearing aids.  Patient does not feel that she needs to pursue hearing aid consult at this time " and will continue to monitor.    2. Bilateral ear fullness  Patient with bilateral ear fullness and pressure.  Mild hearing loss may be contributing to symptoms, however, patient also has significant history of TMJ and clenching.  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  Discussed relationship of TMJ inflammation and sense of pressure in ears.  This TMJ dysfunction may also be contributing to intermittent tinnitus symptoms.  Reviewed management strategies of TMJ including use of NSAIDs, warm compresses, and adhering to a soft diet.  Discussed TMJ clinic referral.  Due to current family obligations, patient will use management strategies discussed above and contact clinic if she would like to proceed with a TMJ clinic referral.    3. Central nervous system origin vertigo  Patient followed by neurology for vertigo.  Recommendations by neurology for balance therapy.  Patient has attended 1 session and I encouraged patient to continue with this therapy.  It also appears that neurology had recommended further VEMP testing which has not yet been scheduled.  I will ask our audiology balance scheduling to reach out to patient to schedule this at patient's convenience.     Recommend patient follow-up in 1 year with audiogram to continue to monitor hearing.    Trisha Arboleda DNP, APRN, CNP  Otolaryngology  Head & Neck Surgery  [phone]    40 minutes spent on the date of the encounter doing chart review, history and exam, documentation and further activities per the note        Again, thank you for allowing me to participate in the care of your patient.      Sincerely,    Audrey Arboleda, NP

## 2022-01-14 NOTE — PROGRESS NOTES
Otolaryngology Clinic  January 14, 2022    Chief Complaint:   Hearing loss       History of Present Illness:   Lesley Stafford is a 61 year old female who presents today for evaluation of hearing loss and bilateral ear pressure/fullness. Patient has a history of CVA, lupus, fibromyalgia, and anxiety/depression. Patient has been following with neurology for imbalance that is likely due to her previous stroke.     Patient reports a gradual feeling of hearing loss.  Difficulty with soft speakers.  Reports head feeling full which makes it difficult to hear.  Intermittent tinnitus that seems to be bilateral but not pulsating.  Left ear itchy.     Patient denies any otorrhea, facial numbness/weakness, history of frequent ear infections, or ear surgeries.     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     Thrombocytopenia (H) 3/24/2021       Past Surgical History:  Past Surgical History:   Procedure Laterality Date     COLONOSCOPY  01/2010    repeat 10 yrs     COLONOSCOPY N/A 6/30/2020    Procedure: COLONOSCOPY, WITH POLYPECTOMY;  Surgeon: Rebecca Rojas MD;  Location: [facility]     DILATION AND CURETTAGE, OPERATIVE HYSTEROSCOPY WITH MORCELLATOR, COMBINED N/A 2/15/2017    Procedure: COMBINED DILATION AND CURETTAGE, OPERATIVE HYSTEROSCOPY WITH MORCELLATOR;  Surgeon: Erin Gutierrez MD;  Location: [facility] OR     ESOPHAGOSCOPY, GASTROSCOPY, DUODENOSCOPY (EGD), COMBINED Left 2/5/2016    Procedure: COMBINED ESOPHAGOSCOPY, GASTROSCOPY, DUODENOSCOPY (EGD), BIOPSY SINGLE OR MULTIPLE;  Surgeon: Pierre Fernandez MD;  Location:  GI     GYN SURGERY         Medications:  Current Outpatient Medications   Medication Sig Dispense Refill     albuterol  (PROAIR HFA/PROVENTIL HFA/VENTOLIN HFA) 108 (90 Base) MCG/ACT inhaler Inhale 2 puffs into the lungs every 4 hours as needed for shortness of breath / dyspnea, wheezing or other (coughing) 18 g 11     ascorbic acid 1000 MG TABS tablet Take 1,000 mg by mouth as needed       aspirin (ASA) 325 MG EC tablet Take 1 tablet (325 mg) by mouth daily 90 tablet 1     cetirizine (ZYRTEC) 10 MG tablet Take 1 tablet (10 mg) by mouth daily 90 tablet 1     cevimeline (EVOXAC) 30 MG capsule Take 1 capsule (30 mg) by mouth 3 times daily 90 capsule 1     DULoxetine (CYMBALTA) 60 MG capsule Take 60 mg by mouth daily Alternates between 1 tablet and 2 tablets every other day.       fluticasone (FLONASE) 50 MCG/ACT nasal spray Spray 1 spray into both nostrils daily 1 g 11     gabapentin (NEURONTIN) 300 MG capsule TAKE 2 CAPSULES BY MOUTH 3 TIMES A  capsule 2     HEMP OIL OR EXTRACT OR OTHER CBD CANNABINOID, NOT MEDICAL CANNABIS,        hydroxychloroquine (PLAQUENIL) 200 MG tablet Take 2 tablets (400 mg) by mouth daily 180 tablet 1     hydrOXYzine (ATARAX) 25 MG tablet TAKE 1 TABLET BY MOUTH THREE TIMES A DAY 90 tablet 3     multivitamin, therapeutic with minerals (MULTI-VITAMIN) TABS Take 1 tablet by mouth daily       naproxen sodium 220 MG capsule Take 220 mg by mouth 2 times daily as needed        omega 3 1200 MG CAPS        order for DME Cane, 1 Device 0     tiZANidine (ZANAFLEX) 4 MG tablet TAKE 1-2 TABLETS BY MOUTH AS NEEDED THREE TIMES A  tablet 3     valACYclovir (VALTREX) 500 MG tablet Take 1 tablet (500 mg) by mouth 2 times daily 30 tablet 1     atorvastatin (LIPITOR) 80 MG tablet Take 0.5 tablets (40 mg) by mouth daily (Patient not taking: Reported on 12/3/2021) 45 tablet 3     Allergies:  Allergies   Allergen Reactions     Morphine Sulfate Itching     Sulfa Drugs Hives      Social History:  Social History     Tobacco Use     Smoking status: Former Smoker     Smokeless tobacco: Never Used     Tobacco comment: quit  "27 years ago   Substance Use Topics     Alcohol use: No     Drug use: No     ROS: 10 point ROS neg other than the symptoms noted above in the HPI.    Physical Exam:    /64 (BP Location: Right arm, Patient Position: Sitting, Cuff Size: Adult Large)   Pulse 80   Temp (!) 96.7  F (35.9  C) (Temporal)   Ht 1.651 m (5' 5\")   Wt 80.7 kg (178 lb)   LMP 08/12/2013   SpO2 95%   BMI 29.62 kg/m       Constitutional:  The patient was unaccompanied, well-groomed, and in no acute distress.     Skin: Normal:  warm and pink without rash    Neurologic: Alert and oriented x 3.  Voice normal.   Psychiatric: The patient's affect was calm, cooperative, and appropriate.    Communication:  Normal; communicates verbally, normal voice quality.    Respiratory: Breathing comfortably without stridor or exertion of accessory muscles.    Ears: Pinnae and tragus non-tender.  EAC's and TM's were clear.      Audiogram: 1/14/2022- data independently reviewed  Bilateral borderline normal to mild SNHL   WR right: 92% left: 100% @ 70 dB  Acoustic Reflexes: present in all conditions  Tympanograms: type A bilaterally           Assessment and Plan:  1. Bilateral sensorineural hearing loss  Patient with mild bilateral sensorineural hearing loss.  Reviewed audiogram with patient today.  This mild hearing loss may be contributing to difficulty hearing in certain environments and feeling of ear fullness.  Discussed criteria for hearing aid candidacy.  Patient is a borderline candidate and may find some value in hearing aids.  Patient does not feel that she needs to pursue hearing aid consult at this time and will continue to monitor.    2. Bilateral ear fullness  Patient with bilateral ear fullness and pressure.  Mild hearing loss may be contributing to symptoms, however, patient also has significant history of TMJ and clenching.  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  Discussed " relationship of TMJ inflammation and sense of pressure in ears.  This TMJ dysfunction may also be contributing to intermittent tinnitus symptoms.  Reviewed management strategies of TMJ including use of NSAIDs, warm compresses, and adhering to a soft diet.  Discussed TMJ clinic referral.  Due to current family obligations, patient will use management strategies discussed above and contact clinic if she would like to proceed with a TMJ clinic referral.    3. Central nervous system origin vertigo  Patient followed by neurology for vertigo.  Recommendations by neurology for balance therapy.  Patient has attended 1 session and I encouraged patient to continue with this therapy.  It also appears that neurology had recommended further VEMP testing which has not yet been scheduled.  I will ask our audiology balance scheduling to reach out to patient to schedule this at patient's convenience.     Recommend patient follow-up in 1 year with audiogram to continue to monitor hearing.    Trisha Arboleda DNP, APRN, CNP  Otolaryngology  Head & Neck Surgery  [phone]    40 minutes spent on the date of the encounter doing chart review, history and exam, documentation and further activities per the note

## 2022-01-14 NOTE — PROGRESS NOTES
AUDIOLOGY REPORT    SUMMARY: Audiology visit completed. See audiogram for results.      RECOMMENDATIONS: Follow-up with ENT.      Holli Nava.  Licensed Audiologist  [place] #[id number]

## 2022-01-21 ENCOUNTER — HOSPITAL ENCOUNTER (OUTPATIENT)
Dept: PHYSICAL THERAPY | Facility: CLINIC | Age: 62
Setting detail: THERAPIES SERIES
End: 2022-01-21
Attending: PSYCHIATRY & NEUROLOGY
Payer: COMMERCIAL

## 2022-01-21 PROCEDURE — 97110 THERAPEUTIC EXERCISES: CPT | Mod: GP | Performed by: PHYSICAL THERAPIST

## 2022-01-21 PROCEDURE — 97116 GAIT TRAINING THERAPY: CPT | Mod: GP | Performed by: PHYSICAL THERAPIST

## 2022-01-24 ENCOUNTER — PATIENT OUTREACH (OUTPATIENT)
Dept: NURSING | Facility: CLINIC | Age: 62
End: 2022-01-24
Payer: COMMERCIAL

## 2022-01-24 NOTE — PROGRESS NOTES
"Clinic Care Coordination Contact    Community Health Worker Follow Up    Care Gaps: 11/13/21    Health Maintenance Due   Topic Date Due     URINE DRUG SCREEN  Never done     COVID-19 Vaccine (1) Never done     Pneumococcal Vaccine: Pediatrics (0 to 5 Years) and At-Risk Patients (6 to 64 Years) (1 of 4 - PCV13) Never done     DTAP/TDAP/TD IMMUNIZATION (1 - Tdap) Never done     ZOSTER IMMUNIZATION (1 of 2) Never done     PREVENTIVE CARE VISIT  03/05/2019     LUNG CANCER SCREENING  12/26/2020     INFLUENZA VACCINE (1) Never done         Goals:   Goals Addressed as of 1/24/2022 at 11:56 AM                    Today    12/20/21       1. Functional (pt-stated)   20%  0%    Added 11/15/21 by Erin Strong      Goal Statement: I will begin exercising 2-3 times per week  Date Goal set: 11/15/21  Barriers: difficult to change wellness habits  Strengths: Motivated, enrolled in   Date to Achieve By: 2/15/22  Patient expressed understanding of goal: yes  Action steps to achieve this goal:  1. I will look at options in my neighborhood for indoor exercise programs  2. I will enroll in the best exercise plan for me  3. I will contact HANNY Khan, if I need more resources for exercise in my area    1/24/22 - working on exercises from vestibular physical therapist 1-2x/day              Interventions and education performed during outreach: Pt states she \"is just taking it one day at a time right now\". For 3 weeks pt's father has been on hospice. A nurse comes in on a regular basis and there is a  available should family/pt needed support. Pt has been staying at her sister's house (where her father has lived for 1 1/2 years) Sundays thru Wednesdays during the last 3 weeks to assist with pt cares and to assist sister as well.  Pt states that yesterday, 30 people paid tribute to father via a Zoom meeting which family arranged. Pt states she and her sister are doing pretty well, working together to care for pt's father. There are " "times we have to \"agree to disagree\" but mostly, things are going well. Pt reports missing spending time with her grandchildren, but with Omicran COVID infections still rising, she does not want to expose herself/her sister and father to the COVID virus. CHW encouraged pt that her grandchildren know that she loves them (pt had already vocalized this fact earlier in our conversation), and that when the time is right, pt can once again be in contact with her grandchildren and it will be very special. Pt agrees and looks forward to that day.    Pt reports she is continuing to see her therapist. She has not connected with her [facility] worker in a month, due to pt's schedule the last 3 weeks and [facility] worker's difficult schedule right now. Pt is doing exercises as directed by a new vestibular PT she is seeing and \"they allow my spine to breathe.\" Pt is to be doing them twice daily, but she is actually doing them once daily. CHW encouraged pt to perform deep breathing multiple times throughout the day, especially if she feels her tummy become hard. Discussed with pt how deep breathing facilitates a soft tummy which promotes relaxation.     CHW asks if pt has any further concerns or need for resources as this time. Pt states she does not. CHW made sure pt has CHW contact information. Pt will contact CHW with questions or updates before next outreach.     CHW Plan: CHW will follow up with pt in one month.    Erin Strong  Community Health Worker  [facility]  [phone]    ___________________________________________________________    Next Outreach: 1/21/22  Planned Outreach Frequency: monthly  Preferred Phone Number: [phone]     Enrollment Date: 1/3/20  Last Care Plan Assessment:  12/7/21      "

## 2022-01-25 ENCOUNTER — PATIENT OUTREACH (OUTPATIENT)
Dept: CARE COORDINATION | Facility: CLINIC | Age: 62
End: 2022-01-25
Payer: COMMERCIAL

## 2022-01-25 NOTE — PROGRESS NOTES
Care Coordination Clinician Chart Review  Situation: Patient chart reviewed by care coordinator.       Background: Care Coordination initial assessment and enrollment to Care Coordination was 1/3/2020.   Patient centered goals were developed with participation from patient.  SOL CC handed patient off to CHW for continued outreach every 30 days.        Assessment: Per chart review, patient outreach completed by CC CHW on 1/24/2022.  Patient is actively working to accomplish goal.  Patient's goal remains appropriate and relevant at this time.   Patient is not due for updated Plan of Care.  Annual assessment will be due 1/2022.      Goals        Patient Stated       1. Functional (pt-stated)       Goal Statement: I will begin exercising 2-3 times per week  Date Goal set: 11/15/21  Barriers: difficult to change wellness habits  Strengths: Motivated, enrolled in CC  Date to Achieve By: 2/15/22  Patient expressed understanding of goal: yes  Action steps to achieve this goal:  1. I will look at options in my neighborhood for indoor exercise programs  2. I will enroll in the best exercise plan for me  3. I will contact HANNY Khan, if I need more resources for exercise in my area    1/24/22 - working on exercises from vestibular physical therapist 1-2x/day                  Plan/Recommendations: The patient will continue working with Care Coordination to achieve goal as above.  CHW will involve SOL MADERA as needed or if patient is ready to move to maintenance.  SOL CC will continue to monitor progress to goals and CHW outreaches every 6 weeks.   Plan of Care updated and mailed to patient: Asha Barksdale [facility]  Clinic Care Coordinator  [facility]  [facility]  [phone]  [email or web address]

## 2022-01-27 NOTE — TELEPHONE ENCOUNTER
RECORDS RECEIVED FROM: Internal   Appt Date: 02.02.2022   NOTES STATUS DETAILS   OFFICE NOTE from referring provider Internal 01.11.2022 Elvia Gonzalez MD   OFFICE NOTES from other specialists N/A    DISCHARGE SUMMARY from hospital N/A    MEDICATION LIST Internal    LIVER BIOSPY (IF APPLICABLE)      PATHOLOGY REPORTS  N/A    IMAGING     ENDOSCOPY (IF AVAILABLE) N/A    COLONOSCOPY (IF AVAILABLE) Internal 06.30.2020   ULTRASOUND LIVER N/A    CT OF ABDOMEN N/A    MRI OF LIVER N/A    FIBROSCAN, US ELASTOGRAPHY, FIBROSIS SCAN, MR ELASTOGRAPHY N/A    LABS     HEPATIC PANEL (LIVER PANEL) N/A    BASIC METABOLIC PANEL Internal 03.01.2021   COMPLETE METABOLIC PANEL Internal 09.30.2021   COMPLETE BLOOD COUNT (CBC) Internal 09.30.2021   INTERNATIONAL NORMALIZED RATIO (INR) Internal 03.01.2021   HEPATITIS C ANTIBODY Internal 07.29.2021   HEPATITIS C VIRAL LOAD/PCR N/A    HEPATITIS C GENOTYPE N/A    HEPATITIS B SURFACE ANTIGEN N/A    HEPATITIS B SURFACE ANTIBODY Internal 07.29.2021   HEPATITIS B DNA QUANT LEVEL N/A    HEPATITIS B CORE ANTIBODY Internal 07.29.2021

## 2022-02-01 ENCOUNTER — TELEPHONE (OUTPATIENT)
Dept: GASTROENTEROLOGY | Facility: CLINIC | Age: 62
End: 2022-02-01
Payer: COMMERCIAL

## 2022-02-02 ENCOUNTER — PRE VISIT (OUTPATIENT)
Dept: GASTROENTEROLOGY | Facility: CLINIC | Age: 62
End: 2022-02-02
Payer: COMMERCIAL

## 2022-02-12 ENCOUNTER — HEALTH MAINTENANCE LETTER (OUTPATIENT)
Age: 62
End: 2022-02-12

## 2022-02-17 ENCOUNTER — HOSPITAL ENCOUNTER (OUTPATIENT)
Dept: PHYSICAL THERAPY | Facility: CLINIC | Age: 62
Setting detail: THERAPIES SERIES
End: 2022-02-17
Attending: PSYCHIATRY & NEUROLOGY
Payer: COMMERCIAL

## 2022-02-17 ENCOUNTER — TELEPHONE (OUTPATIENT)
Dept: OPHTHALMOLOGY | Facility: CLINIC | Age: 62
End: 2022-02-17
Payer: COMMERCIAL

## 2022-02-17 PROCEDURE — 97110 THERAPEUTIC EXERCISES: CPT | Mod: GP | Performed by: PHYSICAL THERAPIST

## 2022-02-18 ENCOUNTER — TELEPHONE (OUTPATIENT)
Dept: OPHTHALMOLOGY | Facility: CLINIC | Age: 62
End: 2022-02-18
Payer: COMMERCIAL

## 2022-02-18 NOTE — TELEPHONE ENCOUNTER
Daphney Mckenzie was referred to genetic counseling at [facility] to discuss aneuploidy and carrier screening options. She was unaccompanied.  This note is intended to summarize the 30 minute discussion I had with Daphney. This patient was seen via real-time videoconferencing.  This patient was located at [facility], and the genetic counselor was at a remote office in [place].     OBSTETRIC HISTORY: Daphney is a 25-year-old  female. Her ABRAM is 2022, making her 12 weeks and 4 days today. She denies the use of cigarettes, alcohol, or any additional teratogens. The benefits of taking folic acid prior to conception and the dangers of alcohol use during pregnancy were discussed.    FAMILY HISTORY: A three-generation family history was obtained. Daphney reported that she has one sister who is normal and healthy.    · Daphney reported that her maternal and paternal grandmothers both had breast cancer in their 60s. No additional family members have had cancer. Based on the family history provided, this patient does not meet NCCN criteria for genetic testing for hereditary cancer syndromes. Daphney was encouraged to be diligent about breast cancer screening.     The patient reported an otherwise negative family history for cognitive impairment/developmental delay, recurrent miscarriages, stillbirths or infancy deaths, chromosomal or genetic disorders, and birth defects. She also reported no additional family history of significant breast, colon, ovarian, or any other cancers. Consanguinity was denied.     Daphney reports her ancestry as White , and the FOB as White . Ashkenazi Quaker and Kosovan-Kersey ancestry was denied.     COUNSELING: We began by discussing Daphney Mckenzie's age-related risk for chromosome conditions.  Based on the fact that this patient will be 25 years of age at delivery, her overall age-related risk of having a baby with any chromosome condition is 1 in 476.  We  TRAMADOL HCL 50 MG TABLET        Last Written Prescription Date:  4/19/17  Last Fill Quantity: 60,   # refills: 0  Last Office Visit with [facility] or  Health prescribing provider: 5/24/17  Future Office visit:       Routing refill request to provider for review/approval because:  Drug not on the [facility], [facility] or [facility] refill protocol or controlled substance   discussed some of the more common chromosome abnormalities including Down syndrome, trisomy 13, and trisomy 18.  We talked about the etiologies, clinical characteristics, and medical management of these conditions.       We discussed the purpose of a nuchal translucency ultrasound at 11-14wks gestation. This ultrasound is to measure the nuchal translucency which is a form of aneuploidy screening and should be considered as such.  If a patient elects to have an NT only, this might not yield much clinically significant information or alter the age-related risk for aneuploidy. NT measurement is most useful when used in combination with serum screening (i.e. FTS or NIPS).      We talked about serum screening options. We discussed that one screening option is first trimester screening. The patient was informed that this screening option provides risk assessment numbers for Down syndrome and trisomy 13/18. It was explained that this screening tool uses a combination of ultrasound measurement of the NT, presence or absence of the nasal bone, and maternal serum markers (KUN-A, free beta hCG, and AFP) in addition to maternal age to provide a risk assessment. This test yields up to a 96% sensitivity for trisomy 21 and 95% sensitivity for trisomy 13/18 in a lopez pregnancy. Daphney declined first trimester screening.     According to updated ACOG and ACMG guidelines, we also explored the option of cell-free fetal DNA testing, also known as non-invasive prenatal screening (NIPS).  We reviewed that this blood test has >99% sensitivity and specificity in the detection of Down syndrome and is also able to detect trisomy 13 and trisomy 18. Sex chromosome aneuploidies and select chromosome microdeletions are also available by request.  Daphney indicated that she would like to proceed with cell free fetal DNA testing. [facility]    Both of these screening options can result in false positive or  false negative results. We reviewed the limitations of the tests including the absence of coverage of all chromosomes and the variable positive predictive values. Confirmatory diagnostic testing should be considered before irreversible decisions are made.      We then reviewed the option of prenatal diagnostic testing (CVS and amniocentesis). We talked about the risks, benefits, and limitations of these procedures. The patient was not interested in diagnostic testing.     A detailed fetal anatomic survey is available between 18 and 22 weeks gestation to screen for birth defects or soft markers of a chromosome abnormality.  We explained ultrasound is not diagnostic for chromosome abnormalities and cannot detect all birth defects.     Per ACOG and ACMG recommendations, carrier screening for cystic fibrosis (CF) and spinal muscular atrophy (SMA) should be offered to all pregnant women and other diseases should be offered based on the patient's ethnicity. A family history is not always necessary for a couple to be at risk of having a child with a recessive disorder. We reviewed phenotype, autosomal recessive inheritance and estimated carrier frequency for these diseases. Daphney was also offered the option of screening for 175 recessive and X-linked conditions.  These options were declined today.    PLAN:  1. The patient accepted NIPT through [facility]. Results will be disclosed once available.    2. The patient declined carrier screening for CF, SMA, and hemoglobinopathies. She also declined expanded carrier screening.   3. MSAFP ONLY should be offered at [id number] for ONTD risk assessment. A full quad screen is not necessary.   4. Targeted fetal anatomic survey should be offered at 18-22 wks gestation.     Thank you for the referral of this patient to genetic counseling. Please contact me with any additional concerns.    Ines Booth, MS [facility]  Certified Genetic Counselor

## 2022-02-24 ENCOUNTER — PATIENT OUTREACH (OUTPATIENT)
Dept: NURSING | Facility: CLINIC | Age: 62
End: 2022-02-24
Payer: COMMERCIAL
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
## 2022-03-01 ENCOUNTER — PATIENT OUTREACH (OUTPATIENT)
Dept: NURSING | Facility: CLINIC | Age: 62
End: 2022-03-01
Payer: COMMERCIAL

## 2022-03-01 NOTE — PROGRESS NOTES
Clinic Care Coordination Contact    Follow Up Progress Note      Assessment: CC SOL spoke with pt for annual assessment for Care Coordination. Pt shared she has a lot going on and with the state of the world it causes her anxiety.    Pt shared that she is still sober, see therapist regularly, and PT. 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 Hospice has been very helpful for him.    It has been very helpful to have Erin call every month to discuss self care and problem solving. She has lupus and Fibromyalgia.    She would like to learn to swim but this is daunting for her. She likes to walk but she isn't able to do this outside in the winter for fear of calling. She is doing PT exercises and this is helpful for her strength and balance. She is trying to learn yoga but the breathing it difficult. She like to bike and hike in the winter.     She had an [employer] worker until December but this ended due to the holidays. Pt feels like she has leaned some skills from her but she is missing the weekly accountability. She does better with procrastination and organization when she meets with [employer] worker regularly.     Pt shared the ups and downs of her mental health and sobriety. She shared that routine is very helpful for her wellbeing. Positive self talk and affirmations are also good.     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     Care Gaps:    Health Maintenance Due   Topic Date Due     URINE DRUG SCREEN  Never done     COVID-19 Vaccine (1) Never done     Pneumococcal Vaccine: Pediatrics (0 to 5 Years) and At-Risk Patients (6 to 64 Years) (1 of 4 - PCV13) Never done     DTAP/TDAP/TD IMMUNIZATION (1 - Tdap) Never done     ZOSTER IMMUNIZATION (1 of 2) Never done     PREVENTIVE CARE VISIT  03/05/2019     LUNG CANCER SCREENING  12/26/2020     INFLUENZA VACCINE (1) Never done     PHQ-9  03/20/2022     Care Gaps Last addressed on 12/14/2021    Goals addressed this encounter:   Goals  Addressed                    This Visit's Progress       Patient Stated       1. Functional (pt-stated)   30%      Goal Statement: I will begin exercising 2-3 times per week  Date Goal set: 11/15/21  Barriers: difficult to change wellness habits  Strengths: Motivated, enrolled in CC  Date to Achieve By: 2/15/22 // date extended 7/1/2022  Patient expressed understanding of goal: yes  Action steps to achieve this goal:  1. I will look at options in my neighborhood for indoor exercise programs  2. I will enroll in the best exercise plan for me  3. I will contact HANNY Khan, if I need more resources for exercise in my area    1/24/22 - working on exercises from vestibular physical therapist 1-2x/day          Outreach Frequency: monthly    Plan: CC CHW will outreach to pt in 1 month. CC SW will review chart in 6 weeks and outreach as needed.    Delores Barksdale, [facility]  Clinic Care Coordinator  [facility] [place]  [facility]  [phone]  [email or web address]

## 2022-03-07 ENCOUNTER — OFFICE VISIT (OUTPATIENT)
Dept: GASTROENTEROLOGY | Facility: CLINIC | Age: 62
End: 2022-03-07
Attending: INTERNAL MEDICINE
Payer: COMMERCIAL

## 2022-03-07 VITALS — DIASTOLIC BLOOD PRESSURE: 70 MMHG | HEART RATE: 91 BPM | OXYGEN SATURATION: 97 % | SYSTOLIC BLOOD PRESSURE: 105 MMHG

## 2022-03-07 DIAGNOSIS — R94.5 ABNORMAL RESULTS OF LIVER FUNCTION STUDIES: Primary | ICD-10-CM

## 2022-03-07 PROCEDURE — 99204 OFFICE O/P NEW MOD 45 MIN: CPT | Performed by: INTERNAL MEDICINE

## 2022-03-07 NOTE — PROGRESS NOTES
"[facility]    New Patient Visit    Referring provider:  Elvia Gonzalez      61 year old female    Chief complaint:  Abnormal liver function tests    HPI:  The patient presents for further evaluation and management of abnormal liver function tests.  The patient has had abnormal liver function tests since .  Evaluation to date has been negative for hepatitis B, C and celiac disease.  Antimitochondrial antibody was positive as was antinuclear antibody and F-actin antibody.    The patient is well today.  She has no symptoms related to liver disease.    The patient denies itch, jaundice, abdominal distention, lower extremity edema, lethargy or confusion.    The patient denies any melena, hematemesis or hematochezia.    The patient denies fever, sweats or chills.  She is not vaccinated for COVID-19 \"out of choice.\"     Weight is stable.  The patient currently weighs 174 pounds and is 5 feet 4 inches in height.  Appetite is normal.    History of cerebrovascular accident, hyperlipidemia, RACHNA and obesity is noted.  No known history of CAD, hypertension, PAD or diabetes.    The patient does not drink alcohol.  She has a history of addiction to alcohol but has not consumed any alcohol for 30 years.    The patient has a distant history of marijuana and crack cocaine use.  She has not used any of these substances for 30 years.    The patient is an ex-smoker of 29 years.      Medical hx Surgical hx   Past Medical History:   Diagnosis Date     Anxiety      Cerebral infarction (H)      Depressive disorder      GERD (gastroesophageal reflux disease)      Hemoglobin C trait (H) 2016     Hyperlipidemia LDL goal <70 2021     Lupus (H)      RACHNA (obstructive sleep apnea)      Substance abuse (H)     Has not used for 19 years.      Past Surgical History:   Procedure Laterality Date      SECTION       COLONOSCOPY  2010    repeat 10 yrs     COLONOSCOPY N/A 2020    Procedure: " COLONOSCOPY, WITH POLYPECTOMY;  Surgeon: Rebecca Rojas MD;  [facility]     DILATION AND CURETTAGE, OPERATIVE HYSTEROSCOPY WITH MORCELLATOR, COMBINED N/A 02/15/2017    Procedure: COMBINED DILATION AND CURETTAGE, OPERATIVE HYSTEROSCOPY WITH MORCELLATOR;  Surgeon: Erin Gutierrez MD;  [facility]     ESOPHAGOSCOPY, GASTROSCOPY, DUODENOSCOPY (EGD), COMBINED Left 02/05/2016    Procedure: COMBINED ESOPHAGOSCOPY, GASTROSCOPY, DUODENOSCOPY (EGD), BIOPSY SINGLE OR MULTIPLE;  Surgeon: Pierre Fernandez MD;  Location: [facility]          Medications  Current Outpatient Medications   Medication Sig Dispense Refill     albuterol (PROAIR HFA/PROVENTIL HFA/VENTOLIN HFA) 108 (90 Base) MCG/ACT inhaler Inhale 2 puffs into the lungs every 4 hours as needed for shortness of breath / dyspnea, wheezing or other (coughing) 18 g 11     ascorbic acid 1000 MG TABS tablet Take 1,000 mg by mouth as needed       aspirin (ASA) 325 MG EC tablet Take 1 tablet (325 mg) by mouth daily 90 tablet 1     cetirizine (ZYRTEC) 10 MG tablet Take 1 tablet (10 mg) by mouth daily 90 tablet 1     cevimeline (EVOXAC) 30 MG capsule Take 1 capsule (30 mg) by mouth 3 times daily 90 capsule 1     DULoxetine (CYMBALTA) 60 MG capsule Take 60 mg by mouth daily Alternates between 1 tablet and 2 tablets every other day.       fluticasone (FLONASE) 50 MCG/ACT nasal spray Spray 1 spray into both nostrils daily 1 g 11     gabapentin (NEURONTIN) 300 MG capsule TAKE 2 CAPSULES BY MOUTH 3 TIMES A DAY Reduced refill amt: - call [phone] to schedule appointment/fasting labs (Patient taking differently: Take 300 mg by mouth 3 times daily TAKE 2 CAPSULES BY MOUTH 3 TIMES A DAY Reduced refill amt: - call [phone] to schedule appointment/fasting labs) 180 capsule 0     HEMP OIL OR EXTRACT OR OTHER CBD CANNABINOID, NOT MEDICAL CANNABIS,        hydroxychloroquine (PLAQUENIL) 200 MG tablet Take 2 tablets (400 mg) by mouth daily 180 tablet 1     hydrOXYzine  (ATARAX) 25 MG tablet TAKE 1 TABLET BY MOUTH THREE TIMES A DAY 90 tablet 3     multivitamin, therapeutic with minerals (MULTI-VITAMIN) TABS Take 1 tablet by mouth daily       naproxen sodium 220 MG capsule Take 220 mg by mouth 2 times daily as needed        omega 3 1200 MG CAPS        order for DME Cane, 1 Device 0     tiZANidine (ZANAFLEX) 4 MG tablet TAKE 1-2 TABLETS BY MOUTH AS NEEDED THREE TIMES A  tablet 3     valACYclovir (VALTREX) 500 MG tablet Take 1 tablet (500 mg) by mouth 2 times daily 30 tablet 1     atorvastatin (LIPITOR) 80 MG tablet Take 0.5 tablets (40 mg) by mouth daily (Patient not taking: Reported on 12/3/2021) 45 tablet 3       Allergies  Allergies   Allergen Reactions     Morphine Sulfate Itching     Sulfa Drugs Hives       Family hx Social hx   Family History   Problem Relation Age of Onset     Lupus Mother      Asthma Mother      Diabetes Father      Brain Tumor Father         begnign on pituitary     Depression Sister      Anemia Sister      Glaucoma Maternal Grandfather      Alcoholism Maternal Grandfather      Colon Cancer Maternal Aunt 65     Macular Degeneration No family hx of      Liver Disease No family hx of       Social History     Tobacco Use     Smoking status: Former Smoker     Smokeless tobacco: Never Used     Tobacco comment: quit 27 years ago   Substance Use Topics     Alcohol use: No     Drug use: No     .  Lives with son (recent).  Previously living alone.  5 children (1 RIP from SIDS), all healthy.  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     Review of systems  A 10-point review of systems was negative.    Examination  /70   Pulse 91   LMP 08/12/2013   SpO2 97%   There is no height or weight on file to calculate BMI.    Gen- well, NAD, A+Ox3, normal color  Eye- EOMI  ENT- MMM, normal oropharynx  Lym- no palpable lymphadenopathy  CVS- S1, S2 normal, no added sounds, RRR  RS- CTA  Abd- overweight, SNT, no ascites or  organomegaly on palpation or percussion, BS+  Extr- pulses good, no JULIAN  MS- hands normal- no clubbing  Neuro- A+Ox3, no asterixis  Skin- no rash or jaundice  Psych- normal mood    Laboratory  Lab Results   Component Value Date     09/30/2021     03/24/2021    POTASSIUM 3.9 09/30/2021    POTASSIUM 4.2 03/24/2021    CHLORIDE 108 09/30/2021    CHLORIDE 106 03/24/2021    CO2 26 09/30/2021    CO2 28 03/24/2021    BUN 9 09/30/2021    BUN 12 03/24/2021    CR 0.70 09/30/2021    CR 0.66 03/24/2021       Lab Results   Component Value Date    BILITOTAL 0.6 09/30/2021    BILITOTAL 0.8 03/24/2021    ALT 74 09/30/2021     04/30/2021    AST 47 09/30/2021    AST 77 04/30/2021    ALKPHOS 69 09/30/2021    ALKPHOS 72 03/24/2021       Lab Results   Component Value Date    ALBUMIN 3.9 09/30/2021    ALBUMIN 3.9 03/24/2021    PROTTOTAL 8.2 09/30/2021    PROTTOTAL 8.1 03/24/2021        Lab Results   Component Value Date    WBC 5.5 09/30/2021    WBC 4.5 03/24/2021    HGB 12.0 09/30/2021    HGB 12.7 03/24/2021    MCV 78 09/30/2021    MCV 79 03/24/2021    PLT 94 09/30/2021     03/24/2021       Lab Results   Component Value Date    INR 0.95 03/01/2021 7/29/2021 11:47 9/30/2021 16:55   F-Actin (Smooth Muscle) Ab, IgG by SIRISHA  39 (H)   ANTI NUCLEAR SHILPI IGG BY IFA WITH REFLEX Rpt (A)    Mitochondrial M2 Antibody IgG  4.5 (H)        9/25/2018 15:25 7/29/2021 11:47   Hep B Surface Agn Nonreactive Nonreactive   Hepatitis B Core Shilpi  Nonreactive   Hepatitis B Surface Antibody 4.82    Hepatitis C Antibody Nonreactive Nonreactive      7/28/2017 16:32 10/8/2018 10:00   Iron  79   Iron Binding Cap  308   Iron Saturation Index  26   Ferritin 98        Radiology  Nil recent    Assessment  61-year-old female with history of SLE and metabolic syndrome who presents for further evaluation and management of abnormal liver function tests.  Differential diagnoses include non-alcoholic fatty liver disease (more likely) and  autoimmune hepatitis.  Will obtain abdominal imaging to evaluate for increased hepatic echogenicity that would be seen with NAFLD.  The patient may need a liver biopsy to definitively rule in or out of autoimmune hepatitis if ultrasound is equivocal or normal.    We discussed the importance of vaccination against COVID-19 to reduce disease serious infection, reduce mortality and reduce transmission.    Plan  1.  Abd U/S  2.  Weight loss with diet and exercise  3.  Consider liver bx as detailed above  4.  Follow-up in 6 months    Quique Kuo MD  Hepatology  [facility]

## 2022-03-07 NOTE — LETTER
"  3/7/2022    RE: Lesley Stafford  [address]  [place] [address]    Dear Colleague,    Thank you for referring your patient, Lesley Stafford, to the [facility] [place]. Please see a copy of my visit note below.    [facility]    New Patient Visit    Referring provider:  Elvia Gonzalez      61 year old female    Chief complaint:  Abnormal liver function tests    HPI:  The patient presents for further evaluation and management of abnormal liver function tests.  The patient has had abnormal liver function tests since 2021.  Evaluation to date has been negative for hepatitis B, C and celiac disease.  Antimitochondrial antibody was positive as was antinuclear antibody and F-actin antibody.    The patient is well today.  She has no symptoms related to liver disease.    The patient denies itch, jaundice, abdominal distention, lower extremity edema, lethargy or confusion.    The patient denies any melena, hematemesis or hematochezia.    The patient denies fever, sweats or chills.  She is not vaccinated for COVID-19 \"out of choice.\"     Weight is stable.  The patient currently weighs 174 pounds and is 5 feet 4 inches in height.  Appetite is normal.    History of cerebrovascular accident, hyperlipidemia, RACHNA and obesity is noted.  No known history of CAD, hypertension, PAD or diabetes.    The patient does not drink alcohol.  She has a history of addiction to alcohol but has not consumed any alcohol for 30 years.    The patient has a distant history of marijuana and crack cocaine use.  She has not used any of these substances for 30 years.    The patient is an ex-smoker of 29 years.      Medical hx Surgical hx   Past Medical History:   Diagnosis Date     Anxiety      Cerebral infarction (H)      Depressive disorder      GERD (gastroesophageal reflux disease)      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     Hyperlipidemia LDL goal <70 03/30/2021     Lupus (H) "      RACHNA (obstructive sleep apnea)      Substance abuse (H)     Has not used for 19 years.      Past Surgical History:   Procedure Laterality Date      SECTION       COLONOSCOPY  2010    repeat 10 yrs     COLONOSCOPY N/A 2020    Procedure: COLONOSCOPY, WITH POLYPECTOMY;  Surgeon: Rebecca Rojas MD;  [facility]     DILATION AND CURETTAGE, OPERATIVE HYSTEROSCOPY WITH MORCELLATOR, COMBINED N/A 02/15/2017    Procedure: COMBINED DILATION AND CURETTAGE, OPERATIVE HYSTEROSCOPY WITH MORCELLATOR;  Surgeon: Erin Gutierrez MD;  [facility]     ESOPHAGOSCOPY, GASTROSCOPY, DUODENOSCOPY (EGD), COMBINED Left 2016    Procedure: COMBINED ESOPHAGOSCOPY, GASTROSCOPY, DUODENOSCOPY (EGD), BIOPSY SINGLE OR MULTIPLE;  Surgeon: Pierre Fernandez MD;  [facility]: [facility]          Medications  Current Outpatient Medications   Medication Sig Dispense Refill     albuterol (PROAIR HFA/PROVENTIL HFA/VENTOLIN HFA) 108 (90 Base) MCG/ACT inhaler Inhale 2 puffs into the lungs every 4 hours as needed for shortness of breath / dyspnea, wheezing or other (coughing) 18 g 11     ascorbic acid 1000 MG TABS tablet Take 1,000 mg by mouth as needed       aspirin (ASA) 325 MG EC tablet Take 1 tablet (325 mg) by mouth daily 90 tablet 1     cetirizine (ZYRTEC) 10 MG tablet Take 1 tablet (10 mg) by mouth daily 90 tablet 1     cevimeline (EVOXAC) 30 MG capsule Take 1 capsule (30 mg) by mouth 3 times daily 90 capsule 1     DULoxetine (CYMBALTA) 60 MG capsule Take 60 mg by mouth daily Alternates between 1 tablet and 2 tablets every other day.       fluticasone (FLONASE) 50 MCG/ACT nasal spray Spray 1 spray into both nostrils daily 1 g 11     gabapentin (NEURONTIN) 300 MG capsule TAKE 2 CAPSULES BY MOUTH 3 TIMES A DAY Reduced refill amt: - call [phone] to schedule appointment/fasting labs (Patient taking differently: Take 300 mg by mouth 3 times daily TAKE 2 CAPSULES BY MOUTH 3 TIMES A DAY Reduced refill amt: - call  [phone] to schedule appointment/fasting labs) 180 capsule 0     HEMP OIL OR EXTRACT OR OTHER CBD CANNABINOID, NOT MEDICAL CANNABIS,        hydroxychloroquine (PLAQUENIL) 200 MG tablet Take 2 tablets (400 mg) by mouth daily 180 tablet 1     hydrOXYzine (ATARAX) 25 MG tablet TAKE 1 TABLET BY MOUTH THREE TIMES A DAY 90 tablet 3     multivitamin, therapeutic with minerals (MULTI-VITAMIN) TABS Take 1 tablet by mouth daily       naproxen sodium 220 MG capsule Take 220 mg by mouth 2 times daily as needed        omega 3 1200 MG CAPS        order for DME Cane, 1 Device 0     tiZANidine (ZANAFLEX) 4 MG tablet TAKE 1-2 TABLETS BY MOUTH AS NEEDED THREE TIMES A  tablet 3     valACYclovir (VALTREX) 500 MG tablet Take 1 tablet (500 mg) by mouth 2 times daily 30 tablet 1     atorvastatin (LIPITOR) 80 MG tablet Take 0.5 tablets (40 mg) by mouth daily (Patient not taking: Reported on 12/3/2021) 45 tablet 3       Allergies  Allergies   Allergen Reactions     Morphine Sulfate Itching     Sulfa Drugs Hives       Family hx Social hx   Family History   Problem Relation Age of Onset     Lupus Mother      Asthma Mother      Diabetes Father      Brain Tumor Father         begnign on pituitary     Depression Sister      Anemia Sister      Glaucoma Maternal Grandfather      Alcoholism Maternal Grandfather      Colon Cancer Maternal Aunt 65     Macular Degeneration No family hx of      Liver Disease No family hx of       Social History     Tobacco Use     Smoking status: Former Smoker     Smokeless tobacco: Never Used     Tobacco comment: quit 27 years ago   Substance Use Topics     Alcohol use: No     Drug use: No     .  Lives with son (recent).  Previously living alone.  5 children (1 RIP from SIDS), all healthy.  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     Review of systems  A 10-point review of systems was negative.    Examination  /70   Pulse 91   LMP 08/12/2013   SpO2 97%   There is  no height or weight on file to calculate BMI.    Gen- well, NAD, A+Ox3, normal color  Eye- EOMI  ENT- MMM, normal oropharynx  Lym- no palpable lymphadenopathy  CVS- S1, S2 normal, no added sounds, RRR  RS- CTA  Abd- overweight, SNT, no ascites or organomegaly on palpation or percussion, BS+  Extr- pulses good, no JULIAN  MS- hands normal- no clubbing  Neuro- A+Ox3, no asterixis  Skin- no rash or jaundice  Psych- normal mood    Laboratory  Lab Results   Component Value Date     09/30/2021     03/24/2021    POTASSIUM 3.9 09/30/2021    POTASSIUM 4.2 03/24/2021    CHLORIDE 108 09/30/2021    CHLORIDE 106 03/24/2021    CO2 26 09/30/2021    CO2 28 03/24/2021    BUN 9 09/30/2021    BUN 12 03/24/2021    CR 0.70 09/30/2021    CR 0.66 03/24/2021       Lab Results   Component Value Date    BILITOTAL 0.6 09/30/2021    BILITOTAL 0.8 03/24/2021    ALT 74 09/30/2021     04/30/2021    AST 47 09/30/2021    AST 77 04/30/2021    ALKPHOS 69 09/30/2021    ALKPHOS 72 03/24/2021       Lab Results   Component Value Date    ALBUMIN 3.9 09/30/2021    ALBUMIN 3.9 03/24/2021    PROTTOTAL 8.2 09/30/2021    PROTTOTAL 8.1 03/24/2021        Lab Results   Component Value Date    WBC 5.5 09/30/2021    WBC 4.5 03/24/2021    HGB 12.0 09/30/2021    HGB 12.7 03/24/2021    MCV 78 09/30/2021    MCV 79 03/24/2021    PLT 94 09/30/2021     03/24/2021       Lab Results   Component Value Date    INR 0.95 03/01/2021 7/29/2021 11:47 9/30/2021 16:55   F-Actin (Smooth Muscle) Ab, IgG by SIRISHA  39 (H)   ANTI NUCLEAR SHILPI IGG BY IFA WITH REFLEX Rpt (A)    Mitochondrial M2 Antibody IgG  4.5 (H)        9/25/2018 15:25 7/29/2021 11:47   Hep B Surface Agn Nonreactive Nonreactive   Hepatitis B Core Shilpi  Nonreactive   Hepatitis B Surface Antibody 4.82    Hepatitis C Antibody Nonreactive Nonreactive      7/28/2017 16:32 10/8/2018 10:00   Iron  79   Iron Binding Cap  308   Iron Saturation Index  26   Ferritin 98        Radiology  Nil  recent    Assessment  61-year-old female with history of SLE and metabolic syndrome who presents for further evaluation and management of abnormal liver function tests.  Differential diagnoses include non-alcoholic fatty liver disease (more likely) and autoimmune hepatitis.  Will obtain abdominal imaging to evaluate for increased hepatic echogenicity that would be seen with NAFLD.  The patient may need a liver biopsy to definitively rule in or out of autoimmune hepatitis if ultrasound is equivocal or normal.    We discussed the importance of vaccination against COVID-19 to reduce disease serious infection, reduce mortality and reduce transmission.    Plan  1.  Abd U/S  2.  Weight loss with diet and exercise  3.  Consider liver bx as detailed above  4.  Follow-up in 6 months    Quique Kuo MD  Hepatology  [facility]

## 2022-03-11 ENCOUNTER — OFFICE VISIT (OUTPATIENT)
Dept: RHEUMATOLOGY | Facility: CLINIC | Age: 62
End: 2022-03-11
Payer: COMMERCIAL

## 2022-03-11 VITALS
HEART RATE: 85 BPM | WEIGHT: 174.6 LBS | SYSTOLIC BLOOD PRESSURE: 109 MMHG | HEIGHT: 65 IN | BODY MASS INDEX: 29.09 KG/M2 | DIASTOLIC BLOOD PRESSURE: 70 MMHG | OXYGEN SATURATION: 100 %

## 2022-03-11 DIAGNOSIS — M32.9 SYSTEMIC LUPUS ERYTHEMATOSUS, UNSPECIFIED SLE TYPE, UNSPECIFIED ORGAN INVOLVEMENT STATUS (H): Primary | ICD-10-CM

## 2022-03-11 DIAGNOSIS — R79.89 ELEVATED LFTS: ICD-10-CM

## 2022-03-11 PROCEDURE — 99214 OFFICE O/P EST MOD 30 MIN: CPT | Performed by: STUDENT IN AN ORGANIZED HEALTH CARE EDUCATION/TRAINING PROGRAM

## 2022-03-11 RX ORDER — HYDROXYCHLOROQUINE SULFATE 200 MG/1
400 TABLET, FILM COATED ORAL DAILY
Qty: 180 TABLET | Refills: 1 | Status: SHIPPED | OUTPATIENT
Start: 2022-03-11 | End: 2022-11-17

## 2022-03-11 RX ORDER — CEVIMELINE HYDROCHLORIDE 30 MG/1
30 CAPSULE ORAL 3 TIMES DAILY
Qty: 90 CAPSULE | Refills: 1 | Status: SHIPPED | OUTPATIENT
Start: 2022-03-11 | End: 2022-10-10

## 2022-03-11 NOTE — PROGRESS NOTES
Rheumatology Clinic Visit     Lesley Stafford MRN# [id number]   YOB: 1960 Age: 60 year old     Date of Visit: Mar 11, 2022   Primary care provider: Rafael Orosco          Assessment and Plan:     Assessment     Systemic lupus( + ARIEL, +dsDNA,+ RNP, Thrombocytopenia, arthralgias, sicca, raynaud's, oral sores )  Fibromyalgia  Hx of stroke  Depression, anxiety  Obesity, sleep apnea  Posterior vitreous detachment of both eyes  Long term Plaquenil use    Ms. Link Stafford is 61 year old female seen in clinic for evaluation of systemic lupus    Systemic lupus : She was diagnosed with systemic lupus in 2014. She presented with arthralgias, fatigue, sicca symptoms, oral sores and had + ARIEL, +dsDNA,+ RNP, Thrombocytopenia and was started on hydroxychloroquine 200 mg twice daily. She continued on hydroxychloroquine until 2020 when she discontinued on her own.  She had no renal, pulmonary, CNS involvement due to systemic lupus.  Her symptoms of fatigue, fibromyalgia are controlled with Cymbalta.    Her main symptoms include intermittent episodes of joint pains, generalized muscle aches, fatigue, dry mouth, oral sores, pleurisy. She restarted Plaquenil in 7/2021 but was only taking 200 mg daily as compared to 400 mg daily which was prescribed. She has noticed improvements in chest pains, oral sores. Dry mouth improved with Cevimeline.     She reports off-and-on pain in her joints.  Now for the last few months she is taking 400 mg daily hydroxychloroquine and overall feels stable.  She has been very stressed lately due to her ex 's health.    Her blood tests done recently have shown normal CBC, AST, ALT, Cr, ESR, CRP, dsDNA, normal C3/C4. Will continue  mg daily. No need to intensify immune suppression.     Elevated LFT : Follows with hepatology. She had elevated AST, ALT, positive F-actin and mildly elevated antimitochondrial antibody.  She had low platelet count and mildly elevated CK  level-424.    Sicca symptoms : She has severe dry mouth and dry eyes.  She is using Biotene products. Cholinergic agonists cevimeline has helped. Continue using lubricating eyedrops.  Follow-up with dentist every 6 months.    Fibromyalgia: She is on Cymbalta which helps with fibromyalgia. For fibromyalgia she was instructed to try relaxing techniques like Yoga, Haroon chi. She can try Aerobic conditioning. Low-impact aerobic activities such as fast walking, biking, swimming, or water aerobics are most successful among the interventions that have been studied.      Vaccinations: Vaccinations reviewed with Ms. Link Stafford. Risks and benefits of vaccinations were discussed.  - Influenza: encouraged yearly vaccination  - [id number]: recommend to get it   - [id number]: to receive 8 weeks after ddydyur47 is administered  - Zostavax: recommend to get it   - COVID- 19 : recommend to get it       Plan    Continue Plaquenil 400 mg daily     Continue Cevimeline 30 mg three times a day    Follow up with Dr. Kuo for liver disease.      Follow up in 6 months     -- Orders placed this encounter  Orders Placed This Encounter   Procedures     CBC with platelets     AST     ALT     Albumin level     Creatinine     CRP inflammation     Erythrocyte sedimentation rate auto     Complement C3     Complement C4     DNA ANTIBODY, NATV/2 STRAND     Routine UA with Micro Reflex to Culture     Protein  random urine                     Active Problem List:     Patient Active Problem List    Diagnosis Date Noted     Vitreous opacities of right eye 04/13/2021     Priority: Medium     Transaminitis 03/30/2021     Priority: Medium     Hyperlipidemia LDL goal <70 03/30/2021     Priority: Medium     Thrombocytopenia (H) 03/24/2021     Priority: Medium     Posterior vitreous detachment of both eyes 03/09/2021     Priority: Medium     Long-term use of Plaquenil 03/09/2021     Priority: Medium     Myopia of both eyes 03/09/2021     Priority: Medium      Presbyopia 03/09/2021     Priority: Medium     Dry eyes 03/09/2021     Priority: Medium     Anal fissure 01/06/2021     Priority: Medium     Labial lesion 01/06/2021     Priority: Medium     Encounter for medication monitoring 07/09/2020     Priority: Medium     History of stroke 02/05/2020     Priority: Medium     Financial difficulties 02/05/2020     Priority: Medium     Moderate major depression (H) 10/08/2018     Priority: Medium     Liver hemangioma 12/14/2016     Priority: Medium     4 on MRI '09       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     Priority: Medium     Insomnia, unspecified type 08/19/2016     Priority: Medium     Tired 04/25/2016     Priority: Medium     Anxiety 02/19/2016     Priority: Medium     Allergic state 02/19/2016     Priority: Medium     History of claustrophobia 08/03/2015     Priority: Medium     Obesity, Class I, BMI 30-34.9 10/31/2014     Priority: Medium     Abnormal perimenopausal bleeding 08/24/2013     Priority: Medium     Systemic lupus erythematosus, unspecified SLE type, unspecified organ involvement status (H) 09/24/2012     Priority: Medium            History of Present Illness:   Lesley Stafford is a 60 year old female with PMH of systemic lupus, sicca, fibromyalgia, depression, anxiety, obesity, fibromyalgia, stroke seen in the clinic in consultation at request of Dr Orosco for management of systemic lupus.    She was diagnosed with systemic lupus in 2014 by Dr. Ritesh Alcantara.  She had arthralgias, sicca symptoms, generalized muscle aches, fatigue her presentation.  Her blood work showed positive ARIEL, positive dsDNA, RNP antibody, thrombocytopenia.  She also had recurrent oral sores, mild Raynaud's symptoms.  She started on hydroxychloroquine 200 mg daily 2014 and was maintained on it up until 2020 when she discontinued it on her own.  In August 2017 she [facility]  In 2019 she developed stroke and was managed [facility].  Stroke was not considered related to CNS lupus.  She is on full dose aspirin, statins.  Denies any history of high blood pressure, diabetes.  She has posterior vitreous detachment bilateral eyes and follows with Dr. Rice.  No evidence of Plaquenil toxicity noted on her recent eye exam.    She is off of Plaquenil since 2020.  Today she reports that last week she had a flare where all her joints were hurting.  Her right fourth finger was very swollen and painful.  She had pain in her wrists, elbows, shoulders, lower back, neck, ankles.  During that week she rested, did acupuncture which helped.  She started CBD oil 3 months ago and feels like it is helping.    She also gets recurrent oral sores, has severe dry mouth and dry eyes.  Follows with dentist every 6 months.  She started using Biotene products and uses lubricating eyedrops.  Her other main issue is chronic constipation.  She is MiraLAX couple times a week.  She always has bloating.    Last week her joints were hurting, wrist, elbows were huting and she thinks that it was related to stress. Ankles, middle toe was hurting. Back, shoulders, muscles hurt. She went to Acup[facility] and was able to rest. Her whole body was on fire. If she stay stress free, then she do better to manage her pain. CBD oil has helped her. Feet felt swollen. She is on CBD oil for 3 months and it has helped.  Occasionally she has chest pain and try to take shallow breaths.  She denies photosensitivity.  She gets Raynaud's in her fingers.  She has trouble swallowing food due to severe dry mouth.    She had recent sleep study which showed mild sleep apnea.  She also had night terrors.  She follows with her therapist in regards to anxiety and depression.    Her fatigue, fibromyalgia is managed with Xarelto.  She has been history of cutaneous lupus in her daughter.  Also reports extreme photosensitivity in her mom and sister.    September 30, 2021 - She has pain in her wrists,  shoulder. Pain in her left hip wakes her up. She has lower back pain. She gets pain in her knuckles. Twice a month she gets pain in her joints which last for few days. She see chiropracter, get massage therapy, and use CBD. She has dry mouth and cevimeline helps with her dry mouth. She gets chest pain off and on but not has been that bad. She has raynaud's in her fingers. She has not has oral sores for a while.     January 11, 2022 - She is in a lot of stress due to her father's health.  She is going to put her father in hospice today. She has not been concentrating on her own health for the last couple months.  She is taking hydroxychloroquine 400 mg daily and has noticed improvement.  Denies significant joint pains.  Her blood work done on 9/30/2021 showed elevated AST-47, ALT-74, platelet count-94, CK-424, positive F-actin-39, mitochondrial antibody-4.5, negative dsDNA, normal C3/C4, normal ESR, CRP.  Her TPMT level was low at 23.    March 11, 2022 - She is taking Plaquenil 1 tab twice a day and has noticed improvement in her lupus symptoms. She has cut down on Gabapentin to 1 tab three times a day. She had herpes outbreak on Saturday in buttock area. She has pain in her joints mainly in her knees and hips. She is in lot of stress due to her ex  health. Her toes feel cold. She is using  which is causing oral sores.           Review of Systems:     Review Of Systems  Constitutional: denies fever, chills, night sweats and weight loss.  Skin: No skin rash.  Eyes:+ dryness or irritation in eyes. No episode of eye inflammation or redness.   Ears/Nose/Throat: no recurrent sinus infections.  Respiratory: No shortness of breath, dyspnea on exertion, cough, or hemoptysis  Cardiovascular: no chest pain or palpitations.  Gastrointestinal: no nausea, vomiting, abdominal pain.  Normal bowel movements.  Genitourinary: no dysuria, frequency  or hematuria.  Musculoskeletal: as in HPI  Neurologic: no numbness,  tingling.  Psychiatric: + mood disorders.  Hematologic/Lymphatic/Immunologic: no history of easy bruising, petechia or purpura.  No abnormal bleeding.   Endocrine: no h/o thyroid disease or Diabetes.                  Past Medical History:     Past Medical History:   Diagnosis Date     Anxiety      Cerebral infarction (H)      Depressive disorder      Fibromyalgia      GERD (gastroesophageal reflux disease)      Hemoglobin C trait (H) 2016     Hyperlipidemia LDL goal <70 2021     Lupus (H)      RACHNA (obstructive sleep apnea)      Substance abuse (H)     Has not used for 19 years.     Past Surgical History:   Procedure Laterality Date      SECTION       COLONOSCOPY  2010    repeat 10 yrs     COLONOSCOPY N/A 2020    Procedure: COLONOSCOPY, WITH POLYPECTOMY;  Surgeon: Rebecca Rojas MD;  Location: [facility]     DILATION AND CURETTAGE, OPERATIVE HYSTEROSCOPY WITH MORCELLATOR, COMBINED N/A 02/15/2017    Procedure: COMBINED DILATION AND CURETTAGE, OPERATIVE HYSTEROSCOPY WITH MORCELLATOR;  Surgeon: Erin Gutierrez MD;  Location: [facility]     ESOPHAGOSCOPY, GASTROSCOPY, DUODENOSCOPY (EGD), COMBINED Left 2016    Procedure: COMBINED ESOPHAGOSCOPY, GASTROSCOPY, DUODENOSCOPY (EGD), BIOPSY SINGLE OR MULTIPLE;  Surgeon: Pierre Fernandez MD;  Location: [facility]            Social History:     Social History     Occupational History     Not on file   Tobacco Use     Smoking status: Former Smoker     Smokeless tobacco: Never Used     Tobacco comment: quit 27 years ago   Substance and Sexual Activity     Alcohol use: No     Drug use: No     Sexual activity: Yes     Partners: Male     Birth control/protection: None            Family History:     Family History   Problem Relation Age of Onset     Lupus Mother      Asthma Mother      Diabetes Father      Brain Tumor Father         begnign on pituitary     Depression Sister      Anemia Sister      Glaucoma Maternal Grandfather      Alcoholism  Maternal Grandfather      Colon Cancer Maternal Aunt 65     Macular Degeneration No family hx of      Liver Disease No family hx of             Allergies:     Allergies   Allergen Reactions     Morphine Sulfate Itching     Sulfa Drugs Hives            Medications:     Current Outpatient Medications   Medication Sig Dispense Refill     albuterol (PROAIR HFA/PROVENTIL HFA/VENTOLIN HFA) 108 (90 Base) MCG/ACT inhaler Inhale 2 puffs into the lungs every 4 hours as needed for shortness of breath / dyspnea, wheezing or other (coughing) 18 g 11     ascorbic acid 1000 MG TABS tablet Take 1,000 mg by mouth as needed       aspirin (ASA) 325 MG EC tablet Take 1 tablet (325 mg) by mouth daily 90 tablet 1     cetirizine (ZYRTEC) 10 MG tablet Take 1 tablet (10 mg) by mouth daily 90 tablet 1     cevimeline (EVOXAC) 30 MG capsule Take 1 capsule (30 mg) by mouth 3 times daily 90 capsule 1     DULoxetine (CYMBALTA) 60 MG capsule Take 60 mg by mouth daily Alternates between 1 tablet and 2 tablets every other day.       fluticasone (FLONASE) 50 MCG/ACT nasal spray Spray 1 spray into both nostrils daily 1 g 11     gabapentin (NEURONTIN) 300 MG capsule TAKE 2 CAPSULES BY MOUTH 3 TIMES A DAY Reduced refill amt: - call [phone] to schedule appointment/fasting labs (Patient taking differently: Take 300 mg by mouth 3 times daily TAKE 2 CAPSULES BY MOUTH 3 TIMES A DAY Reduced refill amt: - call [phone] to schedule appointment/fasting labs) 180 capsule 0     HEMP OIL OR EXTRACT OR OTHER CBD CANNABINOID, NOT MEDICAL CANNABIS,        hydroxychloroquine (PLAQUENIL) 200 MG tablet Take 2 tablets (400 mg) by mouth daily 180 tablet 1     hydrOXYzine (ATARAX) 25 MG tablet TAKE 1 TABLET BY MOUTH THREE TIMES A DAY 90 tablet 3     multivitamin, therapeutic with minerals (MULTI-VITAMIN) TABS Take 1 tablet by mouth daily       naproxen sodium 220 MG capsule Take 220 mg by mouth 2 times daily as needed        omega 3 1200 MG CAPS        order for  "DME Cane, 1 Device 0     tiZANidine (ZANAFLEX) 4 MG tablet TAKE 1-2 TABLETS BY MOUTH AS NEEDED THREE TIMES A  tablet 3     valACYclovir (VALTREX) 500 MG tablet Take 1 tablet (500 mg) by mouth 2 times daily 30 tablet 1     atorvastatin (LIPITOR) 80 MG tablet Take 0.5 tablets (40 mg) by mouth daily (Patient not taking: Reported on 12/3/2021) 45 tablet 3            Physical Exam:   Blood pressure 109/70, pulse 85, height 1.651 m (5' 5\"), weight 79.2 kg (174 lb 9.6 oz), last menstrual period 08/12/2013, SpO2 100 %, not currently breastfeeding.  Wt Readings from Last 4 Encounters:   03/11/22 79.2 kg (174 lb 9.6 oz)   01/14/22 80.7 kg (178 lb)   12/14/21 79.2 kg (174 lb 9.6 oz)   12/03/21 79.4 kg (175 lb)       Constitutional: Obese, appearing stated age; cooperative    Musculoskeletal: Tenderness over MCP, PIP joints, wrists, ankles is better. She also has myofascial tenderness due to Fibromyalgia.  No dactylitis,  tenosynovitis, enthesopathy.    Skin: no nail pitting, alopecia, rash, nodules or lesions  Neuro: nl cranial nerves, strength, sensation, DTRs.   Psych: nl judgement, orientation, memory, affect.         Data:     No results found for any visits on 03/11/22.    Recent Labs   Lab Test 03/24/21  0926 03/01/21  1059 12/24/19  0829 12/24/19  0648 12/23/19  1519 05/21/19  0929 07/28/17  1632 05/08/17  0351 01/29/16  1804 01/27/16  1217   WBC 4.5 4.5 4.5 Canceled, Test credited  --  4.0  --   --   --  4.9   RBC 4.52 4.43 4.83 Canceled, Test credited  --  4.42  --   --   --  4.02   HGB 12.7 12.3 13.5 Canceled, Test credited  --  12.6  --   --   --  11.7   HCT 35.6 34.8* 38.2 Canceled, Test credited  --  34.7*  --   --   --  32.2*   MCV 79 79 79 Canceled, Test credited  --  79  --   --   --  80   RDW 14.2 14.1 14.3 Canceled, Test credited  --  14.0  --   --   --  13.6   * 112* 145* Canceled, Test credited  --  124*  --   --   --  155   ALBUMIN 3.9  --   --  3.4  --  3.9   < >  --    < > 3.7   CRP  --   " --  <2.9  --   --   --   --  <2.9  --  <2.9   BUN 12 13  --  7  --  10   < >  --   --  16   CREAT  --   --   --   --  0.7  --   --   --   --   --     < > = values in this interval not displayed.      Recent Labs   Lab Test 05/21/19  0929 10/08/18  1000 11/02/16  1151   TSH 1.20 0.97 0.85     Hemoglobin   Date Value Ref Range Status   03/25/2022 13.3 11.7 - 15.7 g/dL Final   09/30/2021 12.0 11.7 - 15.7 g/dL Final   08/10/2021 12.6 11.7 - 15.7 g/dL Final   03/24/2021 12.7 11.7 - 15.7 g/dL Final   03/01/2021 12.3 11.7 - 15.7 g/dL Final   12/24/2019 13.5 11.7 - 15.7 g/dL Final     Urea Nitrogen   Date Value Ref Range Status   09/30/2021 9 7 - 30 mg/dL Final   08/10/2021 12 7 - 30 mg/dL Final   03/24/2021 12 7 - 30 mg/dL Final   03/01/2021 13 7 - 30 mg/dL Final   12/24/2019 7 7 - 30 mg/dL Final     Creatinine   Date Value Ref Range Status   12/23/2019 0.7 0.52 - 1.04 mg/dL Final     Sed Rate   Date Value Ref Range Status   12/24/2019 19 0 - 30 mm/h Final   05/08/2017 15 0 - 30 mm/h Final   05/02/2016 18 0 - 30 mm/h Final     Erythrocyte Sedimentation Rate   Date Value Ref Range Status   03/25/2022 14 0 - 30 mm/hr Final   09/30/2021 18 0 - 30 mm/hr Final   07/29/2021 26 0 - 30 mm/hr Final     CRP Inflammation   Date Value Ref Range Status   03/25/2022 <2.9 0.0 - 8.0 mg/L Final   09/30/2021 <2.9 0.0 - 8.0 mg/L Final   07/29/2021 <2.9 0.0 - 8.0 mg/L Final   12/24/2019 <2.9 0.0 - 8.0 mg/L Final   05/08/2017 <2.9 0.0 - 8.0 mg/L Final   01/27/2016 <2.9 0.0 - 8.0 mg/L Final     AST   Date Value Ref Range Status   03/25/2022 36 0 - 45 U/L Final   09/30/2021 47 (H) 0 - 45 U/L Final   08/10/2021 47 (H) 0 - 45 U/L Final   04/30/2021 77 (H) 0 - 45 U/L Final   03/24/2021 63 (H) 0 - 45 U/L Final   12/24/2019 26 0 - 45 U/L Final     Albumin   Date Value Ref Range Status   03/25/2022 3.5 3.4 - 5.0 g/dL Final   09/30/2021 3.9 3.4 - 5.0 g/dL Final   08/10/2021 3.9 3.4 - 5.0 g/dL Final   03/24/2021 3.9 3.4 - 5.0 g/dL Final   12/24/2019 3.4  3.4 - 5.0 g/dL Final   05/21/2019 3.9 3.4 - 5.0 g/dL Final     Alkaline Phosphatase   Date Value Ref Range Status   09/30/2021 69 40 - 150 U/L Final   08/10/2021 73 40 - 150 U/L Final   03/24/2021 72 40 - 150 U/L Final   12/24/2019 67 40 - 150 U/L Final   05/21/2019 67 40 - 150 U/L Final     ALT   Date Value Ref Range Status   03/25/2022 45 0 - 50 U/L Final   09/30/2021 74 (H) 0 - 50 U/L Final   08/10/2021 82 (H) 0 - 50 U/L Final   04/30/2021 125 (H) 0 - 50 U/L Final   03/24/2021 104 (H) 0 - 50 U/L Final   12/24/2019 30 0 - 50 U/L Final     Rheumatoid Factor   Date Value Ref Range Status   07/29/2021 <7 <20 IU/mL Final     Recent Labs   Lab Test 03/25/22  0952 12/03/21  0912 09/30/21  1656 09/30/21  1655 08/10/21  1740 04/30/21  1510 03/24/21  0926 12/23/19  1517 05/21/19  0929 10/08/18  1000   WBC 4.0  --  5.5  --  6.1  --  4.5   < > 4.0 4.3   HGB 13.3  --  12.0  --  12.6  --  12.7   < > 12.6 12.5   HCT 36.5  --  33.1*  --  34.3*  --  35.6   < > 34.7* 34.1*   MCV 78  --  78  --  77*  --  79   < > 79 78   *  --  94*  --  152  --  122*   < > 124* 115*   BUN  --   --   --  9 12  --  12   < > 10 10   TSH  --  1.75  --   --   --   --   --   --  1.20 0.97   AST 36  --   --  47* 47*   < > 63*   < > 32 40   ALT 45  --   --  74* 82*   < > 104*   < > 36 44   ALKPHOS  --   --   --  69 73  --  72   < > 67 59    < > = values in this interval not displayed.     Outside studies reviewed: [facility] reviewed.    7/2019 : + RNP - 985, + dsDNA - 73, + ssDNA - 288, + Chromatin - 1081  Cbc -     Reviewed Rheumatology lab flowsheet    Elvia Gonzalez MD  [facility]  Department of Rheumatology & Autoimmune Disorders  [facility]: [phone]   Pager - [phone]

## 2022-03-11 NOTE — NURSING NOTE
"Chief Complaint   Patient presents with     Follow Up       Vitals:    03/11/22 1044   BP: 109/70   BP Location: Left arm   Patient Position: Sitting   Cuff Size: Adult Regular   Pulse: 85   SpO2: 100%   Weight: 79.2 kg (174 lb 9.6 oz)   Height: 1.651 m (5' 5\")       Body mass index is 29.05 kg/m .      Cecilia Johnson MA    "

## 2022-03-11 NOTE — PATIENT INSTRUCTIONS
Continue Plaquenil 400 mg daily     Continue Cevimeline 30 mg three times a day    Follow up with Dr. Kuo for liver disease.      Follow up in 6 months

## 2022-03-15 ENCOUNTER — HOSPITAL ENCOUNTER (OUTPATIENT)
Dept: PHYSICAL THERAPY | Facility: CLINIC | Age: 62
Setting detail: THERAPIES SERIES
Discharge: HOME | End: 2022-03-15
Attending: PSYCHIATRY & NEUROLOGY
Payer: COMMERCIAL

## 2022-03-15 DIAGNOSIS — M25.579 PAIN IN JOINT, ANKLE AND FOOT, UNSPECIFIED LATERALITY: Primary | ICD-10-CM

## 2022-03-15 PROCEDURE — 97110 THERAPEUTIC EXERCISES: CPT | Mod: GP | Performed by: PHYSICAL THERAPIST

## 2022-03-25 ENCOUNTER — LAB (OUTPATIENT)
Dept: LAB | Facility: CLINIC | Age: 62
End: 2022-03-25
Payer: COMMERCIAL

## 2022-03-25 DIAGNOSIS — M32.9 SYSTEMIC LUPUS ERYTHEMATOSUS, UNSPECIFIED SLE TYPE, UNSPECIFIED ORGAN INVOLVEMENT STATUS (H): ICD-10-CM

## 2022-03-25 DIAGNOSIS — R79.89 ELEVATED LFTS: ICD-10-CM

## 2022-03-25 LAB
ALBUMIN SERPL-MCNC: 3.5 G/DL (ref 3.4–5)
ALBUMIN UR-MCNC: NEGATIVE MG/DL
ALT SERPL W P-5'-P-CCNC: 45 U/L (ref 0–50)
APPEARANCE UR: CLEAR
AST SERPL W P-5'-P-CCNC: 36 U/L (ref 0–45)
BACTERIA #/AREA URNS HPF: ABNORMAL /HPF
BILIRUB UR QL STRIP: NEGATIVE
COLOR UR AUTO: YELLOW
CREAT SERPL-MCNC: 0.72 MG/DL (ref 0.52–1.04)
CREAT UR-MCNC: 109 MG/DL
CRP SERPL-MCNC: <2.9 MG/L (ref 0–8)
ERYTHROCYTE [DISTWIDTH] IN BLOOD BY AUTOMATED COUNT: 14.1 % (ref 10–15)
ERYTHROCYTE [SEDIMENTATION RATE] IN BLOOD BY WESTERGREN METHOD: 14 MM/HR (ref 0–30)
GFR SERPL CREATININE-BSD FRML MDRD: >90 ML/MIN/1.73M2
GLUCOSE UR STRIP-MCNC: NEGATIVE MG/DL
HCT VFR BLD AUTO: 36.5 % (ref 35–47)
HGB BLD-MCNC: 13.3 G/DL (ref 11.7–15.7)
HGB UR QL STRIP: ABNORMAL
KETONES UR STRIP-MCNC: NEGATIVE MG/DL
LEUKOCYTE ESTERASE UR QL STRIP: NEGATIVE
MCH RBC QN AUTO: 28.3 PG (ref 26.5–33)
MCHC RBC AUTO-ENTMCNC: 36.4 G/DL (ref 31.5–36.5)
MCV RBC AUTO: 78 FL (ref 78–100)
NITRATE UR QL: NEGATIVE
PH UR STRIP: 5.5 [PH] (ref 5–7)
PLATELET # BLD AUTO: 133 10E3/UL (ref 150–450)
PROT UR-MCNC: 0.19 G/L
PROT/CREAT 24H UR: 0.17 G/G CR (ref 0–0.2)
RBC # BLD AUTO: 4.7 10E6/UL (ref 3.8–5.2)
RBC #/AREA URNS AUTO: ABNORMAL /HPF
SP GR UR STRIP: >=1.03 (ref 1–1.03)
SQUAMOUS #/AREA URNS AUTO: ABNORMAL /LPF
UROBILINOGEN UR STRIP-ACNC: 0.2 E.U./DL
WBC # BLD AUTO: 4 10E3/UL (ref 4–11)
WBC #/AREA URNS AUTO: ABNORMAL /HPF

## 2022-03-25 PROCEDURE — 85027 COMPLETE CBC AUTOMATED: CPT

## 2022-03-25 PROCEDURE — 82040 ASSAY OF SERUM ALBUMIN: CPT

## 2022-03-25 PROCEDURE — 82565 ASSAY OF CREATININE: CPT

## 2022-03-25 PROCEDURE — 84450 TRANSFERASE (AST) (SGOT): CPT

## 2022-03-25 PROCEDURE — 81001 URINALYSIS AUTO W/SCOPE: CPT

## 2022-03-25 PROCEDURE — 84460 ALANINE AMINO (ALT) (SGPT): CPT

## 2022-03-25 PROCEDURE — 85652 RBC SED RATE AUTOMATED: CPT

## 2022-03-25 PROCEDURE — 36415 COLL VENOUS BLD VENIPUNCTURE: CPT

## 2022-03-25 PROCEDURE — 84156 ASSAY OF PROTEIN URINE: CPT

## 2022-03-25 PROCEDURE — 86140 C-REACTIVE PROTEIN: CPT

## 2022-03-25 PROCEDURE — 86160 COMPLEMENT ANTIGEN: CPT

## 2022-03-25 PROCEDURE — 86225 DNA ANTIBODY NATIVE: CPT

## 2022-03-28 LAB
C3 SERPL-MCNC: 101 MG/DL (ref 81–157)
C4 SERPL-MCNC: 18 MG/DL (ref 13–39)
DSDNA AB SER-ACNC: 2.6 IU/ML

## 2022-03-29 ENCOUNTER — PATIENT OUTREACH (OUTPATIENT)
Dept: CARE COORDINATION | Facility: CLINIC | Age: 62
End: 2022-03-29
Payer: COMMERCIAL

## 2022-03-29 NOTE — LETTER
[facility] CARE COORDINATION  [facility]  [address]  [facility] [address]    March 30, 2022    Lesley Stafford  [address]  [place] [address]      Socorro Sutton,    Thank you for taking the time to speak with me today. Below I have included some EMDR clinicians who accept [facility] in your area:    Angelic [facility]  [address] [place] [address] [phone]  Wait list for new clients    Shaista Delacruz  [facility]  [address] [place] [address] [phone]    Ophelia Thompson- [facility]  [address]  [address]  [place] [address] [phone]    Sanjana Leonard  [facility]  [address]  [address]  [place] [address] [phone]    Dee Martinez  [facility]  [address] [place] [address] [phone]    [place] [address] [phone]  Waitlist for new clients      If you have any further questions, do not hesitate to contact me.    Erin Strong  Community Health Worker  [facility]  [phone]

## 2022-03-29 NOTE — PROGRESS NOTES
Clinic Care Coordination Contact  [facility]/Voicemail       Clinical Data: Care Coordinator Outreach  Outreach attempted x 1.  Spoke briefly to pt who states it is not a good time to talk right now. Pt suggests calling her tomorrow in the afternoon.  Plan: Care Coordinator will try to reach patient again 3/30/22 in the afternoon, per pt request.    Erin Strong  Community Health Worker  [facility]  [phone]    3/7/22 Dr. Ayaan Leavitt - Liver biopsy and Abdom US (4/8/22)  3/11/22 rheumatology visit

## 2022-03-30 NOTE — PROGRESS NOTES
Clinic Care Coordination Contact    Spoke with pt this afternoon.    Community Health Worker Follow Up    Care Gaps: Did not discuss    Health Maintenance Due   Topic Date Due     COVID-19 Vaccine (1) Never done     Pneumococcal Vaccine: Pediatrics (0 to 5 Years) and At-Risk Patients (6 to 64 Years) (1 of 4 - PCV13) Never done     DTAP/TDAP/TD IMMUNIZATION (1 - Tdap) Never done     ZOSTER IMMUNIZATION (1 of 2) Never done     URINE DRUG SCREEN  02/04/2011     PREVENTIVE CARE VISIT  03/05/2019     LUNG CANCER SCREENING  12/26/2020     INFLUENZA VACCINE (1) Never done     PHQ-9  03/20/2022       Goals:   Goals Addressed as of 3/30/2022 at 3:29 PM                    3/29/22    3/1/22       1. Functional (pt-stated)   40%  30%    Added 11/15/21 by Erin Strong      Goal Statement: I will begin exercising 2-3 times per week and practicing good self-care  Date Goal set: 11/15/21  Barriers: difficult to change wellness habits  Strengths: Motivated, enrolled in   Date to Achieve By: 2/15/22 // date extended 7/1/2022  Patient expressed understanding of goal: yes  Action steps to achieve this goal:  1. I will look at options in my neighborhood for indoor exercise programs  2. I will enroll in the best exercise plan for me  3. I will practice good self-care - reaching out to people, increasing prayer times, eating well, and staying busy  4. I will contact HANNY Khan, if I need more resources for exercise in my area    Updated 3/30/22                2. Psychosocial (pt-stated)         Added 3/30/22 by Erin Strong      Goal Statement: I will begin EMDR therapy in the next 6 months  Date Goal set: 3/30/22  Barriers: none noted  Strengths: motivated, enrolled in   Date to Achieve By: 9/30/22  Patient expressed understanding of goal: yes  Action steps to achieve this goal:  1. I will look through EMDR therapy/agencies sent to me by CHW   2. I will call out to EMDR therapy/agencies to put myself on a waiting list for therapy  3.  I will let HANNYErin, [phone] know if I need further EMDR therapy/agency resources            Intervention and Education during outreach: Pt reports her ex- passed away 2 weeks ago of a possible stroke. He had been ill with cancer. Pt is proud of the way her children and step-children are pulling all the details together. His  is next Wednesday, 22. Pt states it has been a very emotional time for her and as a result, she has been physically tired. CHW asks how pt is practicing self-care during this time. She lists reaching out to people, increasing prayer time, staying busy, eating better, and stretching each night before she goes to bed as ways she is caring for herself.    CHW asks if pt has any further concerns or need for resources as this time. Pt states she does. Pt would like to start some EMDR therapy. Pt states she did call out to a therapist and is on their waiting list, though pt cannot remember how long the wait list is currently. CHW will research some EMDR therapists/agencies and mail to pt, per her request. Goal set regarding EMDR therapy.    [facility]  [address] [place] [place] [address] [phone]  Wait list for new clients    [facility]  [address] [place] [address] [phone]    [facility]  [address]  [place] [address] [phone]    Sanjana Leonard  [facility]  [address]  [address]  [place] [address] [phone]    Dee Martinez  [facility]  [address] [place] [address] [phone]    [place] [address] [phone]  Waitlist for new clients    CHW made sure pt has CHW contact information. Pt will contact CHW with questions or updates before next outreach.     CHW Plan: CHW will follow up with pt in one month.  Pt will mail out to pt a list of EMDR therapists/agencies relatively close to pt's home.    Erin Strong  Community Health Worker  [facility]  [phone]    ____________________________________________________________       Next Outreach: 4/27/22  Planned Outreach Frequency: monthly  Preferred Phone Number: [phone]     Enrollment Date: 1/3/20  Last Care Plan Assessment:  12/7/21

## 2022-04-05 NOTE — PROGRESS NOTES
[facility]    Outpatient Physical Therapy Discharge Note  Patient: Lesley Stafford  : 1960    Beginning/End Dates of Reporting Period:  22 to 3/15/22    Referring Provider: Deven Salgado DO Therapy Diagnosis: balance impairments and weakness      Client Self Report: She has been dealing with family in the hospital so she is quite tired today. She has been working on her stretches and exercises as she can and always feels better after. She does feel a bit stronger in her legs. Ankles will give out sometimes, she is not sure why? Feels her ankles has less circulation as well.       Goals:  Goal Identifier 6MWT   Goal Description Lesley will perform 6MWT at 1.2 m/s in order to improve her speed with comunity ambulation.    Target Date 22   Date Met  22   Progress (detail required for progress note): 1.3 m/s, MET     Goal Identifier stairs   Goal Description Lesley will perform 12 steps x 3 reps w/o rest break using reciprocal pattern in order to show improved LE endurnace for negotiating stairs in her town home.    Target Date 22   Date Met  03/15/22   Progress (detail required for progress note): MET     Goal Identifier balance    Goal Description Lesley will perform gait over 50' distance with head turning and deny increase in dizziness/imbalance systems in order to better navigate distances in the community.    Target Date 22   Date Met      Progress (detail required for progress note): no dizziness today with this but some imbalance still, this will be continued part of HEP      Goal Identifier HEP    Goal Description Lesley will be independent and compliant with HEP for balance, strengthening and lengthening in order to improve her overall mobility and quality of life.    Target Date 22   Date Met  03/15/22   Progress (detail required for progress note):  MET     Seen for 4 PT sessions. Close to meeting all of her goals. She noted decreased dizziness as well as improved strength and muscle length with development of HEP that she will continue on her own.     Plan:  Discharge from therapy.    Discharge:    Reason for Discharge: Patient has met all goals.    Equipment Issued: none    Discharge Plan: Patient to continue home program.

## 2022-04-08 ENCOUNTER — ANCILLARY PROCEDURE (OUTPATIENT)
Dept: ULTRASOUND IMAGING | Facility: CLINIC | Age: 62
End: 2022-04-08
Attending: INTERNAL MEDICINE
Payer: COMMERCIAL

## 2022-04-08 DIAGNOSIS — R94.5 ABNORMAL RESULTS OF LIVER FUNCTION STUDIES: ICD-10-CM

## 2022-04-08 PROCEDURE — 76700 US EXAM ABDOM COMPLETE: CPT | Performed by: RADIOLOGY

## 2022-04-09 DIAGNOSIS — G62.9 PERIPHERAL POLYNEUROPATHY: ICD-10-CM

## 2022-04-09 DIAGNOSIS — G89.29 CHRONIC BILATERAL LOW BACK PAIN WITHOUT SCIATICA: ICD-10-CM

## 2022-04-09 DIAGNOSIS — M54.50 CHRONIC BILATERAL LOW BACK PAIN WITHOUT SCIATICA: ICD-10-CM

## 2022-04-11 NOTE — TELEPHONE ENCOUNTER
Requested Prescriptions   Pending Prescriptions Disp Refills     gabapentin (NEURONTIN) 300 MG capsule [Pharmacy Med Name: GABAPENTIN 300 MG CAPSULE] 180 capsule 0     Sig: TAKE 2 CAPSULES BY MOUTH 3 TIMES A DAY - CALL [phone] TO SCHEDULE APPOINTMENT/FASTING LABS       There is no refill protocol information for this order        tiZANidine (ZANAFLEX) 4 MG tablet [Pharmacy Med Name: TIZANIDINE HCL 4 MG TABLET] 270 tablet 3     Sig: TAKE 1-2 TABLETS BY MOUTH AS NEEDED THREE TIMES A DAY       There is no refill protocol information for this order        Routing refill request to provider for review/approval because:  Drug not on the [facility] refill protocol     Jackie Lozano RN  [facility]

## 2022-04-12 NOTE — RESULT ENCOUNTER NOTE
Socorro Sutton,     Your blood work has shown normal Kidney tests, lupus markers and inflammation markers. Liver tests are mildly elevated, follow up with Hepatology. Urine analysis did not show protein loss. Platelet counts are better. Continue Plaquenil.     Elvia Gonzalez MD

## 2022-04-13 RX ORDER — GABAPENTIN 300 MG/1
CAPSULE ORAL
Qty: 90 CAPSULE | Refills: 0 | Status: SHIPPED | OUTPATIENT
Start: 2022-04-13 | End: 2022-05-06

## 2022-04-14 ENCOUNTER — PATIENT OUTREACH (OUTPATIENT)
Dept: CARE COORDINATION | Facility: CLINIC | Age: 62
End: 2022-04-14
Payer: COMMERCIAL

## 2022-04-14 NOTE — PROGRESS NOTES
Care Coordination Clinician Chart Review     Situation: Patient chart reviewed by care coordinator.?     Background: Initial assessment and enrollment to Care Coordination was 1/3/2020.?? Patient centered goals were developed with participation from patient.? Lead CC handed patient off to CHW for continued outreach every 30 days.??     Assessment: Per chart review, patient outreach completed by CC CHW on 3/29/2022.? Patient is actively working to accomplish goal(s).? Patient's goal(s) remain(s) appropriate at this time.? Patient is due for updated Plan of Care.? Annual assessment will be due 1/2023.      Goals        Patient Stated       1. Functional (pt-stated)       Goal Statement: I will begin exercising 2-3 times per week and practicing good self-care  Date Goal set: 11/15/21  Barriers: difficult to change wellness habits  Strengths: Motivated, enrolled in CC  Date to Achieve By: 2/15/22 // date extended 7/1/2022  Patient expressed understanding of goal: yes  Action steps to achieve this goal:  1. I will look at options in my neighborhood for indoor exercise programs  2. I will enroll in the best exercise plan for me  3. I will practice good self-care - reaching out to people, increasing prayer times, eating well, and staying busy  4. I will contact HANNY Khan, if I need more resources for exercise in my area    Updated 3/30/22             2. Psychosocial (pt-stated)       Goal Statement: I will begin EMDR therapy in the next 6 months  Date Goal set: 3/30/22  Barriers: none noted  Strengths: motivated, enrolled in [facility] to Achieve By: 9/30/22  Patient expressed understanding of goal: yes  Action steps to achieve this goal:  1. I will look through EMDR therapy/agencies sent to me by CYNTHIAW   2. I will call out to EMDR therapy/agencies to put myself on a waiting list for therapy  3. I will let Erin GAMINO, [phone] know if I need further EMDR therapy/agency resources          ??     Plan/Recommendations: The  patient will continue working with Care Coordination to achieve above goal(s).? CHW will involve Lead CC as needed or if patient is ready to move to maintenance.? Lead CC will continue to monitor CHW s monthly outreaches and progress to goal(s) every 6 weeks.?     Plan of Care updated and sent to patient: Yes    Delores Barksdale [facility]  Clinic Care Coordinator  [facility] [place]  [facility]  [phone]  [email or web address]

## 2022-04-14 NOTE — LETTER
[facility] CARE COORDINATION  [address] [place] [address]    April 14, 2022        Lesley Colorado  [address] [place] [address]          Dear Lesley,     Adrian is an updated Patient Centered Plan of Care for your continued enrollment in Care Coordination. Please let us know if you have additional questions, concerns, or goals that we can assist with.    Sincerely,    Delores Barks[facility]  Clinic Care Coordinator  [facility]  [phone]          [facility]  Patient Centered Plan of Care  About Me:        Patient Name:  Lesley Colorado    YOB: 1960  Age:         61 year old   [place] MRN:    [id number] Telephone Information:  Home Phone [phone]   Mobile [phone]       Address:  [address] [address] Email address:  [email or web address]      Emergency Contact(s)    Name Relationship Lgl Grd Work Phone Home Phone Mobile Phone   1. LISSETTE ISAAC Daughter No   [phone]   2. DARREN GARCIA Sister No  [phone]   3. ALFREDMARCELO AURELIO* Mother No   [phone]   4. ADRIAN COLORADO, S* Son No  [phone]         Health Maintenance  Health Maintenance Reviewed: Due/Overdue   Health Maintenance Due   Topic Date Due     COVID-19 Vaccine (1) Never done     Pneumococcal Vaccine: Pediatrics (0 to 5 Years) and At-Risk Patients (6 to 64 Years) (1 of 4 - PCV13) Never done     DTAP/TDAP/TD IMMUNIZATION (1 - Tdap) Never done     ZOSTER IMMUNIZATION (1 of 2) Never done     URINE DRUG SCREEN  02/04/2011     PREVENTIVE CARE VISIT  03/05/2019     LUNG CANCER SCREENING  12/26/2020     INFLUENZA VACCINE (1) Never done     PHQ-9  03/20/2022         My Access Plan  Medical Emergency 911   Primary Clinic Line [facility] - [phone]   24 Hour Appointment Line [phone] or  [phone]) (toll-free)   24 Hour Nurse Line [phone] (toll-free)    Preferred Urgent Care No data recorded   Preferred Hospital [facility]  [phone]     Preferred Pharmacy [facility] - [place], [place] - [address]     Behavioral Health Crisis Line The National Suicide Prevention Lifeline at 1-401.822.7379 or 911             My Care Team Members  Patient Care Team       Relationship Specialty Notifications Start End    Rafael Orosco MD PCP - General Family Practice  5/13/11     Phone: [phone] Fax: [phone]         [address] [facility] [address]    Deven Salgado DO Assigned Neuroscience Provider   10/23/20     Phone: [phone] Fax: [phone]         [address] [facility] [address]    Rafael Orosco MD Assigned PCP   11/1/20     Phone: [phone] Fax: [phone]         [address] [facility] [address]    Farzana Alba MD Assigned OBGYN Provider   1/31/21     Phone: [phone] Fax: [phone]         [address] [facility] [address]    Latoya Rice MD MD Ophthalmology  3/2/21     Phone: [phone] Fax: [phone]         [address] [facility] [address]    Latoya Rice MD Assigned Surgical Provider   3/21/21     Phone: [phone] Fax: [phone]         [address] [facility] [address]    Erin Strong Community Health Worker Primary Care - CC Admissions 4/24/21     [phone]    Israel Pennington MD Assigned Sleep Provider   5/30/21     Phone: [phone] Fax: [phone] [address] [facility] [address]    Adilene Barksdale [facility] Lead Care Coordinator Primary Care - CC Admissions 6/15/21     Elvia Gonzalez MD Assigned Rheumatology Provider   8/1/21     Phone: [phone] Fax: [phone] [address] [facility] [address]    Rosa Weinstein APRN CNP Nurse Practitioner Family Medicine  11/17/21     Phone: [phone] Fax:  [phone]         [address] [facility] [address]    Theresa Harper AuD Audiologist Audiology  12/3/21     Audrey Arboleda NP Nurse Practitioner ENT-Otolaryngology  12/3/21     Phone: [phone] Fax: [phone]         [address] [facility] [address]    Quique Santos MD MD Gastroenterology  1/14/22     Phone: [phone] Fax: [phone]         [address] [place] [address] [address] [facility] [address]    Quique Santos MD Assigned Gastroenterology Provider   3/13/22     Phone: [phone] Fax: [phone]         [address] [place] [address] [address] [facility] [address]            My Care Plans  Self Management and Treatment Plan  Goals and (Comments)   Goals        General     1. Functional (pt-stated)      Notes - Note edited  3/30/2022  3:28 PM by Erin Strong     Goal Statement: I will begin exercising 2-3 times per week and practicing good self-care  Date Goal set: 11/15/21  Barriers: difficult to change wellness habits  Strengths: Motivated, enrolled in   Date to Achieve By: 2/15/22 // date extended 7/1/2022  Patient expressed understanding of goal: yes  Action steps to achieve this goal:  1. I will look at options in my neighborhood for indoor exercise programs  2. I will enroll in the best exercise plan for me  3. I will practice good self-care - reaching out to people, increasing prayer times, eating well, and staying busy  4. I will contact HANNY Khan, if I need more resources for exercise in my area    Updated 3/30/22           2. Psychosocial (pt-stated)      Notes - Note created  3/30/2022  3:38 PM by Erin Strong     Goal Statement: I will begin EMDR therapy in the next 6 months  Date Goal set: 3/30/22  Barriers: none noted  Strengths: motivated, enrolled in   Date to Achieve By: 9/30/22  Patient expressed understanding of goal: yes  Action steps to achieve this goal:  1. I will look through EMDR therapy/agencies sent to me by CHW   2. I will call out to EMDR therapy/agencies to  put myself on a waiting list for therapy  3. I will let AHNNYErin, [phone] know if I need further EMDR therapy/agency resources               Action Plans on File:            Depression          Advance Care Plans/Directives Type:   No data recorded    My Medical and Care Information  Problem List   Patient Active Problem List   Diagnosis     Systemic lupus erythematosus, unspecified SLE type, unspecified organ involvement status (H)     Abnormal perimenopausal bleeding     Obesity, Class I, BMI 30-34.9     History of claustrophobia     Anxiety     Allergic state     Tired     Insomnia, unspecified type     Hemoglobin C trait (H)     Liver hemangioma     Moderate major depression (H)     History of stroke     Financial difficulties     Encounter for medication monitoring     Anal fissure     Labial lesion     Posterior vitreous detachment of both eyes     Long-term use of Plaquenil     Myopia of both eyes     Presbyopia     Dry eyes     Thrombocytopenia (H)     Transaminitis     Hyperlipidemia LDL goal <70     Vitreous opacities of right eye        Care Coordination Start Date: 1/3/2020   Frequency of Care Coordination: monthly     Form Last Updated: 04/14/2022

## 2022-04-27 ENCOUNTER — PATIENT OUTREACH (OUTPATIENT)
Dept: CARE COORDINATION | Facility: CLINIC | Age: 62
End: 2022-04-27
Payer: COMMERCIAL

## 2022-04-27 NOTE — PROGRESS NOTES
Clinic Care Coordination Contact  [facility]/Voicemail       Clinical Data: Care Coordinator Outreach  Outreach attempted x 1.  Left message on patient's voicemail with call back information and requested return call.  Plan: Care Coordinator will try to reach patient again in 5-7 business days.        Barb Kothari  Community Health Worker  [facility] Care Coordination  [email or web address]  Office: [phone]            Notes:    Were you able to look through the EMDR therapy options CHW sent? Were you able to put yourself on the wait list?     Have you still been exercising 2-3 times a week? Have you been able to look into exercise programs in your area?

## 2022-05-06 ENCOUNTER — VIRTUAL VISIT (OUTPATIENT)
Dept: FAMILY MEDICINE | Facility: CLINIC | Age: 62
End: 2022-05-06
Payer: COMMERCIAL

## 2022-05-06 DIAGNOSIS — G62.9 PERIPHERAL POLYNEUROPATHY: Primary | ICD-10-CM

## 2022-05-06 DIAGNOSIS — E78.5 HYPERLIPIDEMIA LDL GOAL <70: ICD-10-CM

## 2022-05-06 PROCEDURE — 99214 OFFICE O/P EST MOD 30 MIN: CPT | Mod: 95 | Performed by: FAMILY MEDICINE

## 2022-05-06 RX ORDER — GABAPENTIN 300 MG/1
300 CAPSULE ORAL AT BEDTIME
Qty: 90 CAPSULE | Refills: 0 | COMMUNITY
Start: 2022-05-06 | End: 2022-05-24

## 2022-05-06 RX ORDER — GABAPENTIN 100 MG/1
200 CAPSULE ORAL 2 TIMES DAILY PRN
Qty: 120 CAPSULE | Refills: 11 | Status: SHIPPED | OUTPATIENT
Start: 2022-05-06 | End: 2023-05-22

## 2022-05-06 RX ORDER — ATORVASTATIN CALCIUM 40 MG/1
40 TABLET, FILM COATED ORAL DAILY
Qty: 90 TABLET | Refills: 3 | Status: SHIPPED | OUTPATIENT
Start: 2022-05-06 | End: 2023-06-05

## 2022-05-07 NOTE — PROGRESS NOTES
"eLsley is a 61 year old who is being evaluated via a billable video visit.      How would you like to obtain your AVS? MyChart  If the video visit is dropped, the invitation should be resent by: Mobile  Will anyone else be joining your video visit? No    Video Start Time:  05/06/2022 07:31 am failed, switch to telephone.  See timestamp    Assessment & Plan     Peripheral polyneuropathy  Idiopathic  - gabapentin (NEURONTIN) 300 MG capsule; Take 1 capsule (300 mg) by mouth At Bedtime  - gabapentin (NEURONTIN) 100 MG capsule; Take 2 capsules (200 mg) by mouth 2 times daily as needed for neuropathic pain    Hyperlipidemia LDL goal <70  Off medication  - atorvastatin (LIPITOR) 40 MG tablet; Take 1 tablet (40 mg) by mouth daily    Review of external notes as documented elsewhere in note  Ordering of each unique test  Prescription drug management  ~30 minutes spent on the date of the encounter doing chart review, history and exam, documentation and further activities per the note     BMI:   Estimated body mass index is 29.05 kg/m  as calculated from the following:    Height as of 3/11/22: 1.651 m (5' 5\").    Weight as of 3/11/22: 79.2 kg (174 lb 9.6 oz).   Weight management plan: Discussed healthy diet and exercise guidelines    Patient Instructions   Restart cholesterol medication atorvastatin 40 mg once daily.  Contact if any side effects such as muscle soreness.  Recommend rechecking cholesterol panel when lupus labs are drawn    Reduce gabapentin 300 mg from 3 times daily to just at night.  New prescription given for 100-200 mg 2-3 times a day.  For neuropathy.    Regular walks, exercises with daughter several times a week.      No follow-ups on file.    Rafael Orosco MD  [facility]    Subjective   Lesley is a 61 year old who presents for the following health issues    HPI     Hyperlipidemia Follow-Up      Are you regularly taking any medication or supplement to lower your cholesterol?   " No    Are you having muscle aches or other side effects that you think could be caused by your cholesterol lowering medication?  Became concerned that high-dose atorvastatin 80 mg that she had been on since her stroke 3 years ago may have been causing some muscle aches and stopped it about a year ago.  She is willing to restart but would like to try a lower dose rather than a different medication    Pain History:  When did you first notice your pain? - Chronic Pain   Have you seen this provider for your pain in the past?   Yes   Where in your body do you have pain?  Legs and feet  Are you seeing anyone else for your pain? Yes -rheumatologist    PHQ-9 SCORE 9/25/2018 5/21/2019 9/20/2021   PHQ-9 Total Score - - -   PHQ-9 Total Score MyChart - - -   PHQ-9 Total Score 12 11 3     Chronic Pain Follow Up:    Location of pain: Legs and feet  Analgesia/pain control:    - Recent changes: None   - Overall control: Tolerable with discomfort    - Current treatments: Gabapentin 300 mg 3 times daily  Adherence:     - Do you ever take more pain medicine than prescribed? No    - When did you take your last dose of pain medicine?  Today  Adverse effects: Yes: Has had problems with feeling groggy and unsteady on her feet during the day for some time.  Believes it may be related to gabapentin    PDMP Review     None        Last CSA Agreement:   CSA -- Patient Level:    CSA: None found at the patient level.       Last UDS: Not applicable      How many days per week do you miss taking your medication? 7 atorvastatin only    What makes it hard for you to take your medications?  side effects perceived or potential    Review of Systems   Constitutional, HEENT, cardiovascular, pulmonary, gi and gu systems are negative, except as otherwise noted.      Objective           Vitals:  No vitals were obtained today due to virtual visit.    Physical Exam   GENERAL: Sounds healthy, alert and no distress  RESP: No audible wheeze, cough, or visible  cyanosis.  No visible retractions or increased work of breathing.    NEURO: Mentation and speech appropriate for age.  PSYCH:  affect normal/bright, judgement and insight intact, normal speech         Video-Visit Details    Type of service:  Video Visit    Video End Time: Stop: 05/06/2022 07:47 am    Originating Location (pt. Location): Home    Distant Location (provider location):  [facility]     Platform used for Video Visit: Unable to complete video visit

## 2022-05-07 NOTE — PATIENT INSTRUCTIONS
Restart cholesterol medication atorvastatin 40 mg once daily.  Contact if any side effects such as muscle soreness.  Recommend rechecking cholesterol panel when lupus labs are drawn    Reduce gabapentin 300 mg from 3 times daily to just at night.  New prescription given for 100-200 mg 2-3 times a day.  For neuropathy.    Regular walks, exercises with daughter several times a week.

## 2022-05-13 ENCOUNTER — PATIENT OUTREACH (OUTPATIENT)
Dept: CARE COORDINATION | Facility: CLINIC | Age: 62
End: 2022-05-13
Payer: COMMERCIAL

## 2022-05-13 NOTE — PROGRESS NOTES
Clinic Care Coordination Contact  [facility]/Voicemail    Clinical Data: Care Coordinator Outreach  Outreach attempted x 2.  Spoke briefly to pt today. Pt states she is talking with her sister on the phone, so now is not a good time. Pt states her has COVID, her mother has blood cancer, and her father is in the hospital. Pt recommends CHW call her on Monday, 5/16/22.    Plan: Care Coordinator will try to reach patient again on 5/16/22 per pt request.    Erin Strong  Community Health Worker  [facility]  [phone]    Were you able to look through the EMDR therapy options CHW sent? Were you able to put yourself on the wait list?      Have you still been exercising 2-3 times a week? Have you been able to look into exercise programs in your area?

## 2022-05-16 NOTE — PROGRESS NOTES
Clinic Care Coordination Contact  [facility]/Voicemail    Clinical Data: Care Coordinator Outreach  Outreach attempted x 3.  Left message on patient's voicemail with call back information and requested return call.  Plan:  Care Coordinator will try to reach patient again in 1-2 business days.    Were you able to look through the EMDR therapy options CHW sent? Were you able to put yourself on the wait list?      Have you still been exercising 2-3 times a week? Have you been able to look into exercise programs in your area?        CHAD DUNBAR ; 02/23/2022 ; NPP Med Endocr [address] [facility]

## 2022-05-17 NOTE — PROGRESS NOTES
Clinic Care Coordination Contact    Spoke with pt this morning.    Community Health Worker Follow Up    Care Gaps: Did not discuss today.    Health Maintenance Due   Topic Date Due     COVID-19 Vaccine (1) Never done     Pneumococcal Vaccine: Pediatrics (0 to 5 Years) and At-Risk Patients (6 to 64 Years) (1 - PCV) Never done     DTAP/TDAP/TD IMMUNIZATION (1 - Tdap) Never done     ZOSTER IMMUNIZATION (1 of 2) Never done     URINE DRUG SCREEN  02/04/2011     PREVENTIVE CARE VISIT  03/05/2019     LUNG CANCER SCREENING  12/26/2020     PHQ-9  03/20/2022       Goals:    Goals Addressed as of 5/17/2022 at 11:04 AM                    5/13/22    3/29/22       1. Functional (pt-stated)   40%  40%    Added 11/15/21 by Erin Strong      Goal Statement: I will begin exercising 2-3 times per week and practicing good self-care  Date Goal set: 11/15/21  Barriers: difficult to change wellness habits  Strengths: Motivated, enrolled in   Date to Achieve By: 2/15/22 // date extended 7/1/2022  Patient expressed understanding of goal: yes  Action steps to achieve this goal:  1. I will look at options in my neighborhood for indoor exercise programs  2. I will enroll in the best exercise plan for me  3. I will practice good self-care - reaching out to people, increasing prayer times, eating well, and staying busy  4. I will contact HANNY Khan, if I need more resources for exercise in my area    Updated 3/30/22             2. Psychosocial (pt-stated)   10%      Added 3/30/22 by Erin Strong      Goal Statement: I will begin EMDR therapy in the next 6 months  Date Goal set: 3/30/22  Barriers: none noted  Strengths: motivated, enrolled in   Date to Achieve By: 9/30/22  Patient expressed understanding of goal: yes  Action steps to achieve this goal:  1. I will look through EMDR therapy/agencies sent to me by HANNY   2. I will call out to EMDR therapy/agencies to put myself on a waiting list for therapy  3. I will let Erin GAMINO, [phone]  "know if I need further EMDR therapy/agency resources              Intervention and Education during outreach: Pt tested positive for COVID Tuesday, 5/10/22, and has had minimal symptoms such as sore throat, fatigue, brain fog. Family met at sister's house for Mother's Day and almost everyone in the family has tested positive for COVID, including pt's father who needed hospitalization, but now is home. Pt is feeling much better now. \"I have been holding on.\" There were a couple of times when I thought, \"What's the point of staying sober?\" Pt admits there have been some good things to come out of the last month or two (pt lost her ex- recently also). Pt has been able to forgave her mother this last month, after she cheated on pt's father when pt was in middle school. Pt reports it has been so good to purge those feelings of anger against her mother and another niece. \"I did this for me and not my mother.\" CHW commended pt for being able to purge this anger and move towards forgiveness.    Pt has looked over the EMDR agencies sent her by CHW. Pt was wanting to see a Yazidism EMDR therapist, but now feels like that is not as important. Pt will be looking through the resources to find an EMDR therapist who can see her in-person (this was recommended by her present counselor).    Pt admits she has not exercised much prior to getting COVID due to the cold weather and now due to COVID. As she heals, pt plans to walk in her neighborhood.    CHW asks if pt has any further concerns or need for resources as this time. Pt states she does not. CHW made sure pt has CHW contact information. Pt will contact CHW with questions or updates before next outreach.     CHW Plan: CHW will follow up with pt in one month.    Erin Strong  Community Health Worker  [facility]  [phone]    ________________________________________________________    Next " Outreach: 6/14/22  Planned Outreach Frequency: monthly  Preferred Phone Number: [phone]     Enrollment Date: 1/3/20  Last Care Plan Assessment:  4/14/22

## 2022-05-23 DIAGNOSIS — G89.29 CHRONIC BILATERAL LOW BACK PAIN WITHOUT SCIATICA: ICD-10-CM

## 2022-05-23 DIAGNOSIS — G62.9 PERIPHERAL POLYNEUROPATHY: ICD-10-CM

## 2022-05-23 DIAGNOSIS — M54.50 CHRONIC BILATERAL LOW BACK PAIN WITHOUT SCIATICA: ICD-10-CM

## 2022-05-23 NOTE — TELEPHONE ENCOUNTER
Routing refill request to provider for review/approval because:  Drug not on the [facility] refill protocol refill request for gabapentin and zanaflex    Swati Ghotra RN   [facility]

## 2022-05-24 RX ORDER — GABAPENTIN 300 MG/1
CAPSULE ORAL
Qty: 90 CAPSULE | Refills: 0 | Status: SHIPPED | OUTPATIENT
Start: 2022-05-24 | End: 2022-07-15
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
## 2022-05-25 ENCOUNTER — PATIENT OUTREACH (OUTPATIENT)
Dept: CARE COORDINATION | Facility: CLINIC | Age: 62
End: 2022-05-25
Payer: COMMERCIAL

## 2022-05-25 NOTE — PROGRESS NOTES
Care Coordination Clinician Chart Review     Situation: Patient chart reviewed by care coordinator.?     Background: Initial assessment and enrollment to Care Coordination was 1/3/2020.?? Patient centered goals were developed with participation from patient.? Lead CC handed patient off to CHW for continued outreach every 30 days.??     Assessment: Per chart review, patient outreach completed by CC CHW on 5/13/2022.? Patient is actively working to accomplish goal(s).? Patient's goal(s) remain(s) appropriate at this time.? Patient is not due for updated Plan of Care.? Annual assessment will be due 1/2023.      Goals        Patient Stated       1. Functional (pt-stated)       Goal Statement: I will begin exercising 2-3 times per week and practicing good self-care  Date Goal set: 11/15/21  Barriers: difficult to change wellness habits  Strengths: Motivated, enrolled in CC  Date to Achieve By: 2/15/22 // date extended 7/1/2022  Patient expressed understanding of goal: yes  Action steps to achieve this goal:  1. I will look at options in my neighborhood for indoor exercise programs  2. I will enroll in the best exercise plan for me  3. I will practice good self-care - reaching out to people, increasing prayer times, eating well, and staying busy  4. I will contact HANNY Khan, if I need more resources for exercise in my area    Updated 3/30/22             2. Psychosocial (pt-stated)       Goal Statement: I will begin EMDR therapy in the next 6 months  Date Goal set: 3/30/22  Barriers: none noted  Strengths: motivated, enrolled in [facility] to Achieve By: 9/30/22  Patient expressed understanding of goal: yes  Action steps to achieve this goal:  1. I will look through EMDR therapy/agencies sent to me by CYNTHIAW   2. I will call out to EMDR therapy/agencies to put myself on a waiting list for therapy  3. I will let Erin GAMINO, [phone] know if I need further EMDR therapy/agency resources          ??     Plan/Recommendations: The  patient will continue working with Care Coordination to achieve above goal(s).? CHW will involve Lead CC as needed or if patient is ready to move to maintenance.? Lead CC will continue to monitor CHW s monthly outreaches and progress to goal(s) every 6 weeks.?     Plan of Care updated and sent to patient: Asha Barksdale [facility]  Clinic Care Coordinator  [facility]  [phone]  [email or web address]

## 2022-05-27 ENCOUNTER — OFFICE VISIT (OUTPATIENT)
Dept: OPHTHALMOLOGY | Facility: CLINIC | Age: 62
End: 2022-05-27
Attending: OPHTHALMOLOGY
Payer: COMMERCIAL

## 2022-05-27 DIAGNOSIS — Z79.899 LONG-TERM USE OF PLAQUENIL: ICD-10-CM

## 2022-05-27 DIAGNOSIS — H43.391 VITREOUS OPACITIES OF RIGHT EYE: ICD-10-CM

## 2022-05-27 DIAGNOSIS — H52.13 MYOPIA OF BOTH EYES: ICD-10-CM

## 2022-05-27 DIAGNOSIS — H04.123 DRY EYES, BILATERAL: ICD-10-CM

## 2022-05-27 DIAGNOSIS — H52.4 PRESBYOPIA: ICD-10-CM

## 2022-05-27 DIAGNOSIS — H43.813 POSTERIOR VITREOUS DETACHMENT OF BOTH EYES: Primary | ICD-10-CM

## 2022-05-27 PROCEDURE — 92014 COMPRE OPH EXAM EST PT 1/>: CPT | Performed by: OPHTHALMOLOGY

## 2022-05-27 PROCEDURE — 92250 FUNDUS PHOTOGRAPHY W/I&R: CPT | Performed by: OPHTHALMOLOGY

## 2022-05-27 PROCEDURE — G0463 HOSPITAL OUTPT CLINIC VISIT: HCPCS | Mod: 25

## 2022-05-27 PROCEDURE — 92134 CPTRZ OPH DX IMG PST SGM RTA: CPT | Performed by: OPHTHALMOLOGY

## 2022-05-27 PROCEDURE — 99207 FUNDUS AUTOFLUORESCENCE IMAGE (FAF) OU (BOTH EYES): CPT | Performed by: OPHTHALMOLOGY

## 2022-05-27 PROCEDURE — 92083 EXTENDED VISUAL FIELD XM: CPT | Performed by: OPHTHALMOLOGY

## 2022-05-27 ASSESSMENT — VISUAL ACUITY
METHOD: SNELLEN - LINEAR
OS_BAT_HIGH: 20/20
OS_CC+: -2
OD_BAT_HIGH: 20/25
OD_CC: 20/25
OS_CC: 20/20
OD_CC+: -2
CORRECTION_TYPE: GLASSES

## 2022-05-27 ASSESSMENT — CUP TO DISC RATIO
OD_RATIO: 0.3
OS_RATIO: 0.3

## 2022-05-27 ASSESSMENT — TONOMETRY
OD_IOP_MMHG: 14
IOP_METHOD: TONOPEN
OS_IOP_MMHG: 16

## 2022-05-27 ASSESSMENT — REFRACTION_WEARINGRX
OD_SPHERE: -1.00
OS_AXIS: 180
OS_CYLINDER: +0.75
OS_ADD: +2.00
OS_SPHERE: -0.75
OD_AXIS: 175
OD_CYLINDER: +0.75
OD_ADD: +2.00

## 2022-05-27 ASSESSMENT — CONF VISUAL FIELD
OS_NORMAL: 1
OD_NORMAL: 1

## 2022-05-27 ASSESSMENT — EXTERNAL EXAM - RIGHT EYE: OD_EXAM: PROMINENT GLOBES

## 2022-05-27 ASSESSMENT — EXTERNAL EXAM - LEFT EYE: OS_EXAM: PROMINENT GLOBES

## 2022-05-27 ASSESSMENT — SLIT LAMP EXAM - LIDS
COMMENTS: NORMAL
COMMENTS: NORMAL

## 2022-05-27 NOTE — NURSING NOTE
Chief Complaints and History of Present Illnesses   Patient presents with     Follow Up     Chief Complaint(s) and History of Present Illness(es)     Follow Up               Comments     Pt states that she is still seeing shadows in her vision and is dealing with dry eyes. Pt reports that her vision is still not great and that her peripheral vision is not as sharp as she would like it to be.    Ralf Hopper OT 3:56 PM May 27, 2022

## 2022-06-07 ENCOUNTER — PATIENT OUTREACH (OUTPATIENT)
Dept: NURSING | Facility: CLINIC | Age: 62
End: 2022-06-07
Payer: COMMERCIAL

## 2022-06-07 NOTE — PROGRESS NOTES
Clinic Care Coordination Contact    Pt called CHW 6/6/22 at 9:55am requesting respite care resources for pt's father.    Community Health Worker Follow Up    Care Gaps: Did not discuss. Phone conversation brief.    Health Maintenance Due   Topic Date Due     COVID-19 Vaccine (1) Never done     Pneumococcal Vaccine: Pediatrics (0 to 5 Years) and At-Risk Patients (6 to 64 Years) (1 - PCV) Never done     DTAP/TDAP/TD IMMUNIZATION (1 - Tdap) Never done     ZOSTER IMMUNIZATION (1 of 2) Never done     URINE DRUG SCREEN  02/04/2011     PREVENTIVE CARE VISIT  03/05/2019     LUNG CANCER SCREENING  12/26/2020     PHQ-9  03/20/2022       Goals:    Goals Addressed as of 6/7/2022 at 4:14 PM                    Today    5/13/22       1. Functional (pt-stated)   50%  40%    Added 11/15/21 by Erin Strong      Goal Statement: I will begin exercising 2-3 times per week and practicing good self-care  Date Goal set: 11/15/21  Barriers: difficult to change wellness habits  Strengths: Motivated, enrolled in   Date to Achieve By: 2/15/22 // date extended 7/1/2022  Patient expressed understanding of goal: yes  Action steps to achieve this goal:  1. I will look at options in my neighborhood for indoor exercise programs  2. I will enroll in the best exercise plan for me  3. I will practice good self-care - reaching out to people, increasing prayer times, eating well, and staying busy  4. I will contact HANNY Khan, if I need more resources for exercise in my area    Updated 3/30/22             2. Psychosocial (pt-stated)   10%  10%    Added 3/30/22 by Erin Strong      Goal Statement: I will begin EMDR therapy in the next 6 months  Date Goal set: 3/30/22  Barriers: none noted  Strengths: motivated, enrolled in   Date to Achieve By: 9/30/22  Patient expressed understanding of goal: yes  Action steps to achieve this goal:  1. I will look through EMDR therapy/agencies sent to me by CHW   2. I will call out to EMDR therapy/agencies to put  myself on a waiting list for therapy  3. I will let Erin GAMINO, [phone] know if I need further EMDR therapy/agency resources           3. Other - respite care for pt's father (pt-stated)         Added 6/7/22 by Erin Strong      Goal Statement: In the next 3 months, I will look for respite care for my father  Date Goal set: 6/7/22  Barriers: None noted  Strengths: Pt and pt's family proactively thinking about respite care for pt's father, motivated, enrolled in   Date to Achieve By: 9/7/22  Patient expressed understanding of goal: yes  Action steps to achieve this goal:  1. I will look through the list of respite resources sent to me by CHW, and think about the questions to ask respite agencies.  2. I will contact [facility], [facility] [place], and [facility] initially, to see if these agencies meet my families' needs for respite care  3. I will elicit respite care for my father as needed  4. I will contact HANNY Khan, if I have further questions about respite care.                 Intervention and Education during outreach:     Pt states she has been walking twice this week already!! Pt is having a lot of pain following COVID-19 approximately one month ago. She thinks it has affected her lupus. Pt will seek acupuncture treatment twice this week.    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 Pt's family is not looking for respite care immediately, rather thinking proactively, so they would know which agency to go with when the time comes. CHW will send a list of respite resources to pt. CHW has had heard good things from patients about [facility], [facility] [place], and [facility]. CHW communicated this with pt.    RESPITE CARE- [facility] and surrounding areas.      Adult Help &  Care [place] [phone]     [facility] [place] [phone]      Critical access hospital  [place] [phone]    [facility] of [place] [phone]    [facility] of [place] [phone]     [facility] [phone] [email or web address] ViewMap    [facility] of [place] [phone] [email or web address]     [facility] [facility] of [place] [phone] [email or web address] ViewMap    [facility] [phone] [email or web address]     [facility] [place] [phone] [email or web address]    [facility] [place] [phone] [email or web address]    [facility],  [place],  [email or web address]   [phone] (SANTY Bauman )      [facility] [place] [phone]   [email or web address] ViewMap      [facility] [place] [phone]    [facility] [place] [phone]  [email or web address]    [facility] [place] [phone] [email or web address]    [facility] [place] [phone]      [facility] [place] [phone] [email or web address] World        [facility] [place] [phone]    [facility] [phone]    [facility],  [place] and [facility], [email or web address] [phone], (Delores Barksdale, CC )    [facility] In[facility]Home Care [place] [phone]  [email or web address]    [facility] of [facility] [phone]  [email or web address], Keila Amado [phone]  [email or web address]       [facility] [place]  [address]  [place] [phone] [email or web address]        How much advance scheduling is required for respite care?    On average, what is the typical time frame for respite care?    What is your policy for medical emergencies?    What daily activities do you offer?    What type of training do your staff members receive?    Do you provide transportation?    Additional Questions    What types of services do you provide?      What accommodations are available?    What is the staff to resident care guest ratio?    Does this type of care qualify for coverage by Medicare or other insurance?    What snack/meal options are provided?    Do respite care guests have access to all community amenities?    What is your policy for visitors?      CHW asks if pt has any further concerns or need for resources as this time. Pt states she does not. CHW made sure pt has CHW contact information. Pt will contact CHW with questions or updates before next outreach.     CHW Plan: CHW will follow up with pt in one month.    Erin Strong  Community Health Worker  [facility]  [phone]  ___________________________________________    Next Outreach: 7/6/22  Planned Outreach Frequency: monthly  Preferred Phone Number: [phone]     Enrollment Date: 1/3/20  Last Care Plan Assessment:  4/14/22

## 2022-06-07 NOTE — LETTER
[facility] CARE COORDINATION  [facility]  [address]  [place] [address]    June 7, 2022    Lesley Stafford  [address]  [place] [address]      [place]     Thank you for taking the time to speak with me today. Below I have included the resources which we discussed.     RESPITE CARE- [facility] and surrounding areas.      Adult Help &  Care [place] [phone]     [facility] [place] [phone]      [facility] [place] [facility] [place] [phone]    [facility] [phone]    [facility] [phone]     [facility] [phone] [email or web address] ViewMap    [facility] [place] [place] [phone] [email or web address]     [facility] [place] [phone] [email or web address] ViewMap    [facility] [phone] [email or web address]     [facility] [place] [facility] [place] [phone] [email or web address]    [facility] [place] [phone] [email or web address]    [facility],  [place],  [email or web address]   [phone] (SANTY Bauman )      [facility] [place] [phone]   [email or web address] ViewMap      [facility] [place] [phone]    [facility] [place] [phone]  [email or web address]    FamilyM[place] [phone] [email or web address]    [facility] [place] [phone]      [facility] [phone] [email or web address]        [facility].  [place] [phone]    [facility] [phone]    [facility],  [place] and [facility], [email or web address] [phone], (SANTY Bauman )    [facility] Seniors In-Home Care [place] [phone]  [email or web address]    [facility] of [facility] [phone]  [email or web address]. Ingris [phone]  [email or web address]       [facility] [place] [facility] [phone] [email or web address]        How much advance scheduling is required for respite care?    On average, what is the typical time frame for respite care?    What is your policy for medical emergencies?    What daily activities do you offer?    What type of training do your staff members receive?    Do you provide transportation?    Additional Questions    What types of services do you provide?      What accommodations are available?    What is the staff to resident care guest ratio?    Does this type of care qualify for coverage by Medicare or other insurance?    What snack/meal options are provided?    Do respite care guests have access to all community amenities?    What is your policy for visitors?      If you have any further questions, do not hesitate to contact me.    Erin Strong  Community Health Worker  [facility]  [phone]

## 2022-07-08 ENCOUNTER — PATIENT OUTREACH (OUTPATIENT)
Dept: CARE COORDINATION | Facility: CLINIC | Age: 62
End: 2022-07-08

## 2022-07-08 NOTE — PROGRESS NOTES
Care Coordination Clinician Chart Review     Situation: Patient chart reviewed by care coordinator.?     Background: Initial assessment and enrollment to Care Coordination was 1/3/2020.?? Patient centered goals were developed with participation from patient.? Lead CC handed patient off to CHW for continued outreach every 30 days.??     Assessment: Per chart review, patient outreach completed by CC CHW on 6/7/2022.? Patient is actively working to accomplish goal(s).? Patient's goal(s) remain(s) appropriate at this time.? Patient is due for updated Plan of Care.? Annual assessment will be due 1/2023.      Goals        Patient Stated       1. Functional (pt-stated)       Goal Statement: I will begin exercising 2-3 times per week and practicing good self-care  Date Goal set: 11/15/21  Barriers: difficult to change wellness habits  Strengths: Motivated, enrolled in CC  Date to Achieve By: 2/15/22 // date extended 7/1/2022  Patient expressed understanding of goal: yes  Action steps to achieve this goal:  1. I will look at options in my neighborhood for indoor exercise programs  2. I will enroll in the best exercise plan for me  3. I will practice good self-care - reaching out to people, increasing prayer times, eating well, and staying busy  4. I will contact HANNY Khan, if I need more resources for exercise in my area    Updated 3/30/22             2. Psychosocial (pt-stated)       Goal Statement: I will begin EMDR therapy in the next 6 months  Date Goal set: 3/30/22  Barriers: none noted  Strengths: motivated, enrolled in [facility] to Achieve By: 9/30/22  Patient expressed understanding of goal: yes  Action steps to achieve this goal:  1. I will look through EMDR therapy/agencies sent to me by CHW   2. I will call out to EMDR therapy/agencies to put myself on a waiting list for therapy  3. I will let Erin GAMINO, [phone] know if I need further EMDR therapy/agency resources           3. Other - respite care for pt's  father (pt-stated)       Goal Statement: In the next 3 months, I will look for respite care for my father  Date Goal set: 6/7/22  Barriers: None noted  Strengths: Pt and pt's family proactively thinking about respite care for pt's father, motivated, enrolled in CC  Date to Achieve By: 9/7/22  Patient expressed understanding of goal: yes  Action steps to achieve this goal:  1. I will look through the list of respite resources sent to me by CHW, and think about the questions to ask respite agencies.  2. I will contact [facility], [facility], and [facility] initially, to see if these agencies meet my families' needs for respite care  3. I will elicit respite care for my father as needed  4. I will contact HANNY Khan, if I have further questions about respite care.             ??     Plan/Recommendations: The patient will continue working with Care Coordination to achieve above goal(s).? CHW will involve Lead CC as needed or if patient is ready to move to maintenance.? Lead CC will continue to monitor CHW s monthly outreaches and progress to goal(s) every 6 weeks.?     Plan of Care updated and sent to patient: Yes    Delores Barksdale [facility]  Clinic Care Coordinator  [facility]  [phone]  [email or web address]

## 2022-07-08 NOTE — LETTER
[facility] CARE COORDINATION  [address] [place] [address]    July 8, 2022        Lesley Colorado  [address] [facility] [address]          Dear Lesley,     Adrian is an updated Patient Centered Plan of Care for your continued enrollment in Care Coordination. Please let us know if you have additional questions, concerns, or goals that we can assist with.    Sincerely,    Delores Barksdale [facility]  Clinic Care Coordinator  [facility]  [phone]              [facility]  Patient Centered Plan of Care  About Me:        Patient Name:  Lesley Colorado    YOB: 1960  Age:         61 year old   [facility] MRN:    [id number] Telephone Information:  Home Phone [phone]   Mobile [phone]       Address:  [address] [address] Email address:  [email or web address]      Emergency Contact(s)    Name Relationship Lgl Grd Work Phone Home Phone Mobile Phone   1. LISSETTE ISAAC Daughter No   [phone]   2. DARREN GARCIA Sister No  [phone]   3. AURELIO OVERTON* Mother No   [phone]   4. ADRIAN COLORADO, S* Son No  [phone]       Health Maintenance  Health Maintenance Reviewed: Due/Overdue   Health Maintenance Due   Topic Date Due     COVID-19 Vaccine (1) Never done     Pneumococcal Vaccine: Pediatrics (0 to 5 Years) and At-Risk Patients (6 to 64 Years) (1 - PCV) Never done     ZOSTER IMMUNIZATION (1 of 2) Never done     DTAP/TDAP/TD IMMUNIZATION (1 - Tdap) Never done     URINE DRUG SCREEN  02/04/2011     PREVENTIVE CARE VISIT  03/05/2019     LUNG CANCER SCREENING  12/26/2020     PHQ-9  03/20/2022       My Access Plan  Medical Emergency 911   Primary Clinic Line [facility] - [phone]   24 Hour Appointment Line [phone] or  [phone]) (toll-free)   24 Hour Nurse Line [phone] (toll-free)   Preferred Urgent Care No data recorded   Preferred  Hospital No data recorded   Preferred Pharmacy [facility] - [place], [place] - [address]     Behavioral Health Crisis Line The National Suicide Prevention Lifeline at 1-112.926.2389 or 911             My Care Team Members  Patient Care Team       Relationship Specialty Notifications Start End    Rafael Orosco MD PCP - General Family Practice  5/13/11     Phone: [phone] Fax: [phone]         [address] [facility] [address]    Deven Salgado DO Assigned Neuroscience Provider   10/23/20     Phone: [phone] Fax: [phone]         [address] [facility] [address]    Rafael Orosco MD Assigned PCP   11/1/20     Phone: [phone] Fax: [phone]         [address] [facility] [address]    Farzana Alba MD Assigned OBGYN Provider   1/31/21     Phone: [phone] Fax: [phone]         [address] [facility] [address]    Latoya Rice MD MD Ophthalmology  3/2/21     Phone: [phone] Fax: [phone]         [address] [facility] [address]    Latoya Rice MD Assigned Surgical Provider   3/21/21     Phone: [phone] Fax: [phone]          [facility] [address]    Erin Strong Community Health Worker Primary Care - CC Admissions 4/24/21     [phone]    Israel Pennington MD Assigned Sleep Provider   5/30/21     Phone: [phone] Fax: [phone] [address] [place] [address]    Adilene Barksdale [facility] Lead Care Coordinator Primary Care - CC Admissions 6/15/21     Elvia Gonzalez MD Assigned Rheumatology Provider   8/1/21     Phone: [phone] Fax: [phone] [address] [facility] [address]    Rosa Weinstein APRN CNP Nurse Practitioner Family Medicine  11/17/21     Phone: [phone] Fax: [phone]         [address] [place] [address]    Theresa Harper AuD Audiologist Audiology  12/3/21      Phone: [phone]          [address] [facility] [address]    Audrey Arboleda, NP Nurse Practitioner ENT-Otolaryngology  12/3/21     Phone: [phone] Fax: [phone]         [address] [facility] [address]    Quique Santos MD MD Gastroenterology  1/14/22     Phone: [phone] Fax: [phone]         [address] [place] [address]    Quique Snatos MD Assigned Gastroenterology Provider   3/13/22     Phone: [phone] Fax: [phone]         [address] [place], [place] [address]            My Care Plans  Self Management and Treatment Plan  Goals and (Comments)   Goals        General     1. Functional (pt-stated)      Notes - Note edited  3/30/2022  3:28 PM by Erin Strong     Goal Statement: I will begin exercising 2-3 times per week and practicing good self-care  Date Goal set: 11/15/21  Barriers: difficult to change wellness habits  Strengths: Motivated, enrolled in   Date to Achieve By: 2/15/22 // date extended 7/1/2022  Patient expressed understanding of goal: yes  Action steps to achieve this goal:  1. I will look at options in my neighborhood for indoor exercise programs  2. I will enroll in the best exercise plan for me  3. I will practice good self-care - reaching out to people, increasing prayer times, eating well, and staying busy  4. I will contact HANNY Khan, if I need more resources for exercise in my area    Updated 3/30/22           2. Psychosocial (pt-stated)      Notes - Note created  3/30/2022  3:38 PM by Erin Strong     Goal Statement: I will begin EMDR therapy in the next 6 months  Date Goal set: 3/30/22  Barriers: none noted  Strengths: motivated, enrolled in   Date to Achieve By: 9/30/22  Patient expressed understanding of goal: yes  Action steps to achieve this goal:  1. I will look through EMDR therapy/agencies sent to me by CHW   2. I will call out to EMDR therapy/agencies to put myself on a waiting list for therapy  3. I  will let Erin GAMINO, [phone] know if I need further EMDR therapy/agency resources         3. Other - respite care for pt's father (pt-stated)      Notes - Note edited  6/7/2022  4:13 PM by Erin Strong     Goal Statement: In the next 3 months, I will look for respite care for my father  Date Goal set: 6/7/22  Barriers: None noted  Strengths: Pt and pt's family proactively thinking about respite care for pt's father, motivated, enrolled in   Date to Achieve By: 9/7/22  Patient expressed understanding of goal: yes  Action steps to achieve this goal:  1. I will look through the list of respite resources sent to me by CHW, and think about the questions to ask respite agencies.  2. I will contact [facility], [facility], and [facility] initially, to see if these agencies meet my families' needs for respite care  3. I will elicit respite care for my father as needed  4. I will contact HANNY Khan, if I have further questions about respite care.                  Action Plans on File:            Depression          My Medical and Care Information  Problem List   Patient Active Problem List   Diagnosis     Systemic lupus erythematosus, unspecified SLE type, unspecified organ involvement status (H)     Abnormal perimenopausal bleeding     Obesity, Class I, BMI 30-34.9     History of claustrophobia     Anxiety     Allergic state     Tired     Insomnia, unspecified type     Hemoglobin C trait (H)     Liver hemangioma     Moderate major depression (H)     History of stroke     Financial difficulties     Encounter for medication monitoring     Anal fissure     Labial lesion     Posterior vitreous detachment of both eyes     Long-term use of Plaquenil     Myopia of both eyes     Presbyopia     Dry eyes     Thrombocytopenia (H)     Transaminitis     Hyperlipidemia LDL goal <70     Vitreous opacities of right eye        Care Coordination Start Date: 1/3/2020   Frequency of Care Coordination: monthly     Form  Last Updated: 07/08/2022

## 2022-07-11 ENCOUNTER — PATIENT OUTREACH (OUTPATIENT)
Dept: CARE COORDINATION | Facility: CLINIC | Age: 62
End: 2022-07-11

## 2022-07-11 NOTE — PROGRESS NOTES
Clinic Care Coordination Contact  [facility]/Voicemail       Clinical Data: Care Coordinator Outreach  Outreach attempted x 1.  Left message on patient's voicemail with call back information and requested return call.  Plan: Care Coordinator will try to reach patient again in 7-10 business days.    Erin Strong  Community Health Worker  [facility]  [phone]    Were you able to call out on any of the respite resources I sent to you last month?  Have you scheduled EMDR therapy?  Have you been exercising regularly?

## 2022-07-20 ENCOUNTER — ANCILLARY PROCEDURE (OUTPATIENT)
Dept: MAMMOGRAPHY | Facility: CLINIC | Age: 62
End: 2022-07-20
Attending: FAMILY MEDICINE
Payer: COMMERCIAL

## 2022-07-20 DIAGNOSIS — Z12.31 VISIT FOR SCREENING MAMMOGRAM: ICD-10-CM

## 2022-07-20 PROCEDURE — 77063 BREAST TOMOSYNTHESIS BI: CPT | Mod: GC | Performed by: RADIOLOGY

## 2022-07-20 PROCEDURE — 77067 SCR MAMMO BI INCL CAD: CPT | Mod: GC | Performed by: RADIOLOGY

## 2022-07-26 ENCOUNTER — PATIENT OUTREACH (OUTPATIENT)
Dept: CARE COORDINATION | Facility: CLINIC | Age: 62
End: 2022-07-26

## 2022-07-26 NOTE — PROGRESS NOTES
Clinic Care Coordination Contact    Community Health Worker Follow Up    Care Gaps: Pt completed mammogram 7/20/22; preventative care visit scheduled for 7/29/22 with Dr. Irene    Health Maintenance Due   Topic Date Due     COVID-19 Vaccine (1) Never done     Pneumococcal Vaccine: Pediatrics (0 to 5 Years) and At-Risk Patients (6 to 64 Years) (1 - PCV) Never done     ZOSTER IMMUNIZATION (1 of 2) Never done     DTAP/TDAP/TD IMMUNIZATION (1 - Tdap) Never done     URINE DRUG SCREEN  02/04/2011     PREVENTIVE CARE VISIT  03/05/2019     LUNG CANCER SCREENING  12/26/2020     PHQ-9  03/20/2022       Care Gap Goal set: Yes    Goals:    Goals Addressed as of 7/26/2022 at 10:18 AM                    Today    6/7/22       1. Functional (pt-stated)   60%  50%    Added 11/15/21 by Erin Strong      Goal Statement: I will begin exercising 2-3 times per week and practicing good self-care  Date Goal set: 11/15/21  Barriers: difficult to change wellness habits  Strengths: Motivated, enrolled in   Date to Achieve By: 2/15/22 // date extended 7/1/2022 // date extended to 10/26/22  Patient expressed understanding of goal: yes  Action steps to achieve this goal:  1. I will look at options in my neighborhood for indoor exercise programs (on hold)  2. I will enroll in the best exercise plan for me (on hold)  3. I will practice good self-care - reaching out to people, increasing prayer times, eating well, and staying busy  4. I will begin by walking 5 min at a time, then increase this time to reach 30 min, 3x/wk  5. I will contact HANNY Khan, if I need more resources for exercise in my area    Updated 3/30/22             2. Psychosocial (pt-stated)   10%  10%    Added 3/30/22 by Erin Strong      Goal Statement: I will begin EMDR therapy in the next 6 months  Date Goal set: 3/30/22  Barriers: none noted  Strengths: motivated, enrolled in   Date to Achieve By: 9/30/22  Patient expressed understanding of goal: yes  Action steps to  "achieve this goal:  1. I will look through EMDR therapy/agencies sent to me by CHW   2. I will call out to EMDR therapy/agencies to put myself on a waiting list for therapy  3. I will let Erin GAMINO, [phone] know if I need further EMDR therapy/agency resources           3. Other - respite care for pt's father (pt-stated)   0%      Added 6/7/22 by Erin Strong      Goal Statement: In the next 3 months, I will look for respite care for my father  Date Goal set: 6/7/22  Barriers: None noted  Strengths: Pt and pt's family proactively thinking about respite care for pt's father, motivated, enrolled in   Date to Achieve By: 9/7/22  Patient expressed understanding of goal: yes  Action steps to achieve this goal:  1. I will look through the list of respite resources sent to me by CHW, and think about the questions to ask respite agencies.  2. I will contact [facility], [facility], and [facility] initially, to see if these agencies meet my families' needs for respite care  3. I will elicit respite care for my father as needed  4. I will contact HANNY Khan, if I have further questions about respite care.                 Intervention and Education during outreach:     Pt states how this year has been really difficult for her with the death of her ex-, mother being diagnosed with a blood cancer, and her father coming close to death once. \"I miss talking to my father, He was my rock and now he is different.\" Pt reports that even though she has manuel and practices the [employer], she still has doubts about evil in this world, mentioning [place] as an example.     Pt has applied for Medicare insurance this last month. \"That was stressful.\" Pt was motivated to get a mammogram and schedule her preventative care visit with Dr. Orosco, her PCP, on 7/29/22 due to starting on Medicare in August.    Pt went to see her chiropractor yesterday who encourages her to walk. \"I have had my tennis shoes with " "socks in my car for the last 2 weeks. I just can't seem to begin walking.\" CHW and pt talked about how sometimes, just beginning a new behavior is tough, but once you have begun with a new behavior, it doesn't feel so daunting of a task. CHW encouraged pt to start walking for 5 min at a time, increasing the time as she feels she can. Pt agrees with this plan and plans to walk right after our phone conversation.    Pt does not feel like she is \"back to normal\" following COVID in May.     Pt continues to see her counselor twice monthly, with the ability to text her counselor between visits should she need reassurance or to talk about something.     Pt is practicing self-care by listening to her favorite music, reaching out to friends, and talking each Monday with her cousin.    Did not discuss respite resources this visit    CHW asks if pt has any further concerns or need for resources as this time. Pt states she does not. CHW made sure pt has CHW contact information. Pt will contact CHW with questions or updates before next outreach.     CHW Plan: CHW will follow up with pt in one month.    Erin Strong  Community Health Worker  [facility]  [phone]    ________________________________________________________    Next Outreach: 8/23/22   Planned Outreach Frequency: monthly  Preferred Phone Number: [phone]    Enrollment Date:  1/3/2020, 3/1/22 annual reassessment  Last Care Plan Assessment: 7/8/22                "

## 2022-07-29 ENCOUNTER — OFFICE VISIT (OUTPATIENT)
Dept: FAMILY MEDICINE | Facility: CLINIC | Age: 62
End: 2022-07-29
Payer: COMMERCIAL

## 2022-07-29 VITALS
HEART RATE: 84 BPM | DIASTOLIC BLOOD PRESSURE: 60 MMHG | TEMPERATURE: 97.1 F | WEIGHT: 175 LBS | OXYGEN SATURATION: 97 % | HEIGHT: 66 IN | RESPIRATION RATE: 14 BRPM | SYSTOLIC BLOOD PRESSURE: 98 MMHG | BODY MASS INDEX: 28.12 KG/M2

## 2022-07-29 DIAGNOSIS — Z00.01 ENCOUNTER FOR PREVENTATIVE ADULT HEALTH CARE EXAM WITH ABNORMAL FINDINGS: Primary | ICD-10-CM

## 2022-07-29 DIAGNOSIS — Z79.899 LONG-TERM USE OF PLAQUENIL: ICD-10-CM

## 2022-07-29 DIAGNOSIS — M32.9 SYSTEMIC LUPUS ERYTHEMATOSUS, UNSPECIFIED SLE TYPE, UNSPECIFIED ORGAN INVOLVEMENT STATUS (H): ICD-10-CM

## 2022-07-29 DIAGNOSIS — E78.5 HYPERLIPIDEMIA LDL GOAL <70: ICD-10-CM

## 2022-07-29 DIAGNOSIS — G40.209 LOCALIZATION-RELATED (FOCAL) (PARTIAL) SYMPTOMATIC EPILEPSY AND EPILEPTIC SYNDROMES WITH COMPLEX PARTIAL SEIZURES, NOT INTRACTABLE, WITHOUT STATUS EPILEPTICUS (H): ICD-10-CM

## 2022-07-29 DIAGNOSIS — F10.21 HISTORY OF ALCOHOLISM (H): ICD-10-CM

## 2022-07-29 DIAGNOSIS — D69.6 THROMBOCYTOPENIA (H): ICD-10-CM

## 2022-07-29 DIAGNOSIS — F32.1 MODERATE MAJOR DEPRESSION (H): ICD-10-CM

## 2022-07-29 DIAGNOSIS — M54.42 BILATERAL LOW BACK PAIN WITH LEFT-SIDED SCIATICA, UNSPECIFIED CHRONICITY: ICD-10-CM

## 2022-07-29 PROBLEM — R74.01 TRANSAMINITIS: Status: RESOLVED | Noted: 2021-03-30 | Resolved: 2022-07-29

## 2022-07-29 PROBLEM — N90.89 LABIAL LESION: Status: RESOLVED | Noted: 2021-01-06 | Resolved: 2022-07-29

## 2022-07-29 PROBLEM — H43.813 POSTERIOR VITREOUS DETACHMENT OF BOTH EYES: Status: RESOLVED | Noted: 2021-03-09 | Resolved: 2022-07-29

## 2022-07-29 PROCEDURE — 96127 BRIEF EMOTIONAL/BEHAV ASSMT: CPT | Performed by: FAMILY MEDICINE

## 2022-07-29 PROCEDURE — 99213 OFFICE O/P EST LOW 20 MIN: CPT | Mod: 25 | Performed by: FAMILY MEDICINE

## 2022-07-29 PROCEDURE — 99396 PREV VISIT EST AGE 40-64: CPT | Performed by: FAMILY MEDICINE

## 2022-07-29 ASSESSMENT — ENCOUNTER SYMPTOMS
PARESTHESIAS: 1
DIZZINESS: 1
ARTHRALGIAS: 1
DYSURIA: 0
DIARRHEA: 1
CHILLS: 0
BREAST MASS: 0
HEMATOCHEZIA: 0
HEMATURIA: 0
HEADACHES: 0
SHORTNESS OF BREATH: 0
JOINT SWELLING: 1
SORE THROAT: 1
MYALGIAS: 1
NERVOUS/ANXIOUS: 1
ABDOMINAL PAIN: 0
FREQUENCY: 1
EYE PAIN: 0
COUGH: 1
NAUSEA: 0
HEARTBURN: 0
WEAKNESS: 1
CONSTIPATION: 1
FEVER: 0
PALPITATIONS: 0

## 2022-07-29 ASSESSMENT — PATIENT HEALTH QUESTIONNAIRE - PHQ9
10. IF YOU CHECKED OFF ANY PROBLEMS, HOW DIFFICULT HAVE THESE PROBLEMS MADE IT FOR YOU TO DO YOUR WORK, TAKE CARE OF THINGS AT HOME, OR GET ALONG WITH OTHER PEOPLE: SOMEWHAT DIFFICULT
SUM OF ALL RESPONSES TO PHQ QUESTIONS 1-9: 13
SUM OF ALL RESPONSES TO PHQ QUESTIONS 1-9: 13

## 2022-07-29 ASSESSMENT — PAIN SCALES - GENERAL: PAINLEVEL: MILD PAIN (3)

## 2022-07-29 NOTE — PATIENT INSTRUCTIONS
Maintain close connection with support networks and therapist during this difficult time. Do not hesitate to reach out with concerns. Continue to develop and maintain a healthy lifestyle through activity and a varried diet as your schedule allows. As discussed, you may add a fiber supplement to your diet as desired. Congratulations on another year of sobriety!       A referral has been placed for PT. IF you do not hear from them in 2-3 business days, call the clinic to follow up      Continue to stay engaged with rhkaseym and sanjivho as ordered. Report to lab as your schedule allows.     Follow up in 6 weeks to discuss physical therapy results.
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
## 2022-08-18 ENCOUNTER — PATIENT OUTREACH (OUTPATIENT)
Dept: CARE COORDINATION | Facility: CLINIC | Age: 62
End: 2022-08-18

## 2022-08-23 ENCOUNTER — PATIENT OUTREACH (OUTPATIENT)
Dept: CARE COORDINATION | Facility: CLINIC | Age: 62
End: 2022-08-23
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
## 2022-08-25 ENCOUNTER — PATIENT OUTREACH (OUTPATIENT)
Dept: CARE COORDINATION | Facility: CLINIC | Age: 62
End: 2022-08-25

## 2022-08-25 NOTE — PROGRESS NOTES
Clinic Care Coordination - Chart Review Only    Situation/Background: Patient chart reviewed by care coordinator related to [facility] conversion.    Assessment: Patient continues to be followed by Clinic Care Coordination.    Plan: Patient's chart updated to align with [facility] program for ongoing patient management.    Delores Barksdale [facility]  Clinic Care Coordinator  [facility]  [phone]  [email or web address]

## 2022-08-25 NOTE — PROGRESS NOTES
Care Coordination Clinician Chart Review     Situation: Patient chart reviewed by care coordinator.?     Background: Initial assessment and enrollment to Care Coordination was 1/3/2020.?? Care plan(s) with patient-centered goal(s) were developed with participation from patient.? Lead CC handed patient off to CHW for continued outreach every 30 days.??     Assessment: Per chart review, patient outreach completed by CC CHW on 8/23/2022.? Patient is actively working to accomplish goal(s).? Patient's goal(s) remain(s) appropriate at this time.? Patient is not due for updated Plan of Care.? Annual assessment will be due 1/2023.     Care Plan: Physical Activity     Problem: Patient is inactive     Goal: Exercise at 2-3 time per week     Start Date: 11/15/2021 Expected End Date: 10/26/2022    This Visit's Progress: 50%    Priority: High    Note:     Barriers: difficult to change wellness habits  Strengths: Motivated, enrolled in     Action steps to achieve this goal:  1. I will look at options in my neighborhood for indoor exercise programs (on hold)  2. I will enroll in the best exercise plan for me (on hold)  3. I will practice good self-care - reaching out to people, increasing prayer times, eating well, and staying busy  4. I will begin by walking 5 min at a time, then increase this time to reach 30 min, 3x/wk  5. I will contact HANNY Khan, if I need more resources for exercise in my area                    Care Plan: Mental Health     Problem: Mood/Psychosocial Concerns     Goal: Begin EMDR therapy  Completed 8/23/2022    Start Date: 3/30/2022 Expected End Date: 9/30/2022    This Visit's Progress: 100%    Note:     Barriers: none noted  Strengths: motivated, enrolled in     Action steps to achieve this goal:  1. I will look through EMDR therapy/agencies sent to me by CHW (completed)  2. I will call out to EMDR therapy/agencies to put myself on a waiting list for therapy (completed)  3. I will let Erin GAMINO  [phone] know if I need further EMDR therapy/agency resources            Goal: Establish Mental Health care     Start Date: 3/20/2022 Expected End Date: 9/30/2022    Note:     Goal Statement: I will begin EMDR therapy in the next 6 months    Barriers: none noted  Strengths: motivated, enrolled in CC    Patient expressed understanding of goal: yes  Action steps to achieve this goal:  1. I will look through EMDR therapy/agencies sent to me by CHW   2. I will call out to EMDR therapy/agencies to put myself on a waiting list for therapy  3. I will let Erin GAMINO, [phone] know if I need further EMDR therapy/agency resources                    Care Plan: Identfy appropriate care for father     Problem: Father needs additional support     Goal: Complete Health Care Directive  Completed 8/23/2022    Start Date: 6/7/2022 Expected End Date: 9/7/2022    This Visit's Progress: 100%    Note:     Barriers: None noted  Strengths: Pt and pt's family proactively thinking about respite care for pt's father, motivated, enrolled in     Action steps to achieve this goal:  1. I will look through the list of respite resources sent to me by CHW, and think about the questions to ask respite agencies. (Completed)  2. I will contact [facility], [facility], and [facility] initially, to see if these agencies meet my families' needs for respite care (completed)  3. I will elicit respite care for my father as needed  4. I will contact HANNY Khan, if I have further questions about respite care.               Goal: Identify In-home support services     Start Date: 6/7/2022 Expected End Date: 9/30/2022    Note:     Goal Statement: In the next 3 months, I will look for respite care for my father    Barriers: None noted  Strengths: Pt and pt's family proactively thinking about respite care for pt's father, motivated, enrolled in     Patient expressed understanding of goal: yes  Action steps to achieve this goal:  1. I  will look through the list of respite resources sent to me by CHW, and think about the questions to ask respite agencies.  2. I will contact [facility], [facility], and [facility] initially, to see if these agencies meet my families' needs for respite care  3. I will elicit respite care for my father as needed  4. I will contact HANNY Khan, if I have further questions about respite care.                           Plan/Recommendations: The patient will continue working with Care Coordination to achieve above goal(s).? CHW will involve Lead CC as needed or if patient is ready to move to maintenance.? Lead CC will continue to monitor CHW s monthly outreaches and progress to goal(s) every 6 weeks.    Plan of Care updated and sent to patient: Asha Barksdale [facility]  Clinic Care Coordinator  [facility]  [phone]  [email or web address]

## 2022-09-02 DIAGNOSIS — G89.29 CHRONIC BILATERAL LOW BACK PAIN WITHOUT SCIATICA: ICD-10-CM

## 2022-09-02 DIAGNOSIS — M54.50 CHRONIC BILATERAL LOW BACK PAIN WITHOUT SCIATICA: ICD-10-CM

## 2022-09-02 NOTE — TELEPHONE ENCOUNTER
Requesting ASAP. Only has 2 tablets left       [facility] - [facility], [place] - [address]

## 2022-09-15 DIAGNOSIS — R94.5 ABNORMAL RESULTS OF LIVER FUNCTION STUDIES: Primary | ICD-10-CM

## 2022-09-20 ENCOUNTER — PATIENT OUTREACH (OUTPATIENT)
Dept: CARE COORDINATION | Facility: CLINIC | Age: 62
End: 2022-09-20
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
## 2022-09-20 NOTE — LETTER
[facility] CARE COORDINATION  [facility] CL[facility]IC  [address]  [facility] [address]    September 22, 2022    Lesley Stafford  [address] [facility] [address]      Socorro Sutton,     Thank you for taking the time to speak with me the other day. Below I have included just a few of the Memory Care Assisted Living Facilities in the [place], [place], [facility] and [place] areas:    [facility] prison :https://www.[employer] Technology CompanyChildren's Hospital for RehabilitationTrellis Technology/, [address] [place] [address]    [facility]: https://www.[employer]/, [address] [place] [address]     [facility] prison: https://W[facility]ap[facility].org/[facility]/, [address] [facility], [address]    [facility] in [place]: https://www.[facility].[employer]/, [address] [place] [address]    [facility] in [place]:  https: //www.[facility] HospitalShanghai Southgene TechnologyPenrose HospitalVariad DiagnosticsAd[facility].com/htknsxkj-zmec-wr,  [address] [place] [address]    [facility] JESENIA: https://[employer]/, [address] [place] [address]    Medicare.gov is a great website to compare skilled nursing facility (and home care agencies) on a 5 star rating system. Unfortunately, Assisted Living Facilities (prison) do not have a similar rating system. However, if these facilities do have skilled nursing facilities associated with them (traditional nursing homes), they will be rated each year. It could give you an idea of the care in their ALFs as well.      If you have any further questions, do not hesitate to contact me.    Erin Strong  Community Health Worker  [facility]  [phone]

## 2022-09-26 ENCOUNTER — TELEPHONE (OUTPATIENT)
Dept: FAMILY MEDICINE | Facility: CLINIC | Age: 62
End: 2022-09-26

## 2022-10-06 ENCOUNTER — LAB (OUTPATIENT)
Dept: LAB | Facility: CLINIC | Age: 62
End: 2022-10-06
Payer: COMMERCIAL

## 2022-10-06 DIAGNOSIS — R94.5 ABNORMAL RESULTS OF LIVER FUNCTION STUDIES: ICD-10-CM

## 2022-10-06 LAB
ALBUMIN SERPL-MCNC: 3.7 G/DL (ref 3.4–5)
ALP SERPL-CCNC: 68 U/L (ref 40–150)
ALT SERPL W P-5'-P-CCNC: 39 U/L (ref 0–50)
ANION GAP SERPL CALCULATED.3IONS-SCNC: 4 MMOL/L (ref 3–14)
AST SERPL W P-5'-P-CCNC: 35 U/L (ref 0–45)
BILIRUB DIRECT SERPL-MCNC: 0.2 MG/DL (ref 0–0.2)
BILIRUB SERPL-MCNC: 0.6 MG/DL (ref 0.2–1.3)
BUN SERPL-MCNC: 7 MG/DL (ref 7–30)
CALCIUM SERPL-MCNC: 9.9 MG/DL (ref 8.5–10.1)
CHLORIDE BLD-SCNC: 108 MMOL/L (ref 94–109)
CO2 SERPL-SCNC: 28 MMOL/L (ref 20–32)
CREAT SERPL-MCNC: 0.68 MG/DL (ref 0.52–1.04)
ERYTHROCYTE [DISTWIDTH] IN BLOOD BY AUTOMATED COUNT: 13.7 % (ref 10–15)
GFR SERPL CREATININE-BSD FRML MDRD: >90 ML/MIN/1.73M2
GLUCOSE BLD-MCNC: 84 MG/DL (ref 70–99)
HCT VFR BLD AUTO: 36.2 % (ref 35–47)
HGB BLD-MCNC: 12.9 G/DL (ref 11.7–15.7)
INR PPP: 0.9 (ref 0.85–1.15)
MCH RBC QN AUTO: 28.2 PG (ref 26.5–33)
MCHC RBC AUTO-ENTMCNC: 35.6 G/DL (ref 31.5–36.5)
MCV RBC AUTO: 79 FL (ref 78–100)
PLATELET # BLD AUTO: 110 10E3/UL (ref 150–450)
POTASSIUM BLD-SCNC: 4.3 MMOL/L (ref 3.4–5.3)
PROT SERPL-MCNC: 7.7 G/DL (ref 6.8–8.8)
RBC # BLD AUTO: 4.58 10E6/UL (ref 3.8–5.2)
SODIUM SERPL-SCNC: 140 MMOL/L (ref 133–144)
WBC # BLD AUTO: 4.5 10E3/UL (ref 4–11)

## 2022-10-06 PROCEDURE — 85610 PROTHROMBIN TIME: CPT

## 2022-10-06 PROCEDURE — 36415 COLL VENOUS BLD VENIPUNCTURE: CPT

## 2022-10-06 PROCEDURE — 82248 BILIRUBIN DIRECT: CPT

## 2022-10-06 PROCEDURE — 85027 COMPLETE CBC AUTOMATED: CPT

## 2022-10-06 PROCEDURE — 80053 COMPREHEN METABOLIC PANEL: CPT

## 2022-10-06 NOTE — PROGRESS NOTES
[facility]    Follow Up Visit    Chief complaint:  Abnormal liver function tests    HPI:  61-year-old female with history of SLE, CVA, hyperlipidemia, obstructive sleep apnea and depression who presents for follow up of abnormal liver studies.     [ ] AIH vs NAFLD  [ ] COVID vaccination    ****    The patient is well today.  She has no symptoms related to liver disease.    The patient denies itch, jaundice, abdominal distention, lower extremity edema, lethargy or confusion.    The patient denies any melena, hematemesis or hematochezia.    The patient denies fever, sweats or chills.     Weight is stable.        Medical hx Surgical hx   Past Medical History:   Diagnosis Date     Anxiety      Cerebral infarction (H)      Depressive disorder      Fibromyalgia      GERD (gastroesophageal reflux disease)      Hemoglobin C trait (H) 2016     Hyperlipidemia LDL goal <70 2021     Lupus (H)      RACHNA (obstructive sleep apnea)      Substance abuse (H)     Has not used for 19 years.      Past Surgical History:   Procedure Laterality Date      SECTION       COLONOSCOPY  2010    repeat 10 yrs     COLONOSCOPY N/A 2020    Procedure: COLONOSCOPY, WITH POLYPECTOMY;  Surgeon: Rebecca Rojas MD;  [facility]     DILATION AND CURETTAGE, OPERATIVE HYSTEROSCOPY WITH MORCELLATOR, COMBINED N/A 02/15/2017    Procedure: COMBINED DILATION AND CURETTAGE, OPERATIVE HYSTEROSCOPY WITH MORCELLATOR;  Surgeon: Erin Gutierrez MD;  Location: [facility] OR     ESOPHAGOSCOPY, GASTROSCOPY, DUODENOSCOPY (EGD), COMBINED Left 2016    Procedure: COMBINED ESOPHAGOSCOPY, GASTROSCOPY, DUODENOSCOPY (EGD), BIOPSY SINGLE OR MULTIPLE;  Surgeon: Pierre Fernandez MD;  [facility]          Medications  Current Outpatient Medications   Medication Sig Dispense Refill     albuterol (PROAIR HFA/PROVENTIL HFA/VENTOLIN HFA) 108 (90 Base) MCG/ACT inhaler Inhale 2 puffs into the lungs every 4 hours as needed  for shortness of breath / dyspnea, wheezing or other (coughing) 18 g 11     ascorbic acid 1000 MG TABS tablet Take 1,000 mg by mouth as needed       aspirin (ASA) 325 MG EC tablet Take 1 tablet (325 mg) by mouth daily 90 tablet 1     atorvastatin (LIPITOR) 40 MG tablet Take 1 tablet (40 mg) by mouth daily 90 tablet 3     cetirizine (ZYRTEC) 10 MG tablet TAKE 1 TABLET BY MOUTH EVERY DAY 90 tablet 2     cevimeline (EVOXAC) 30 MG capsule Take 1 capsule (30 mg) by mouth 3 times daily 90 capsule 1     DULoxetine (CYMBALTA) 60 MG capsule Take 60 mg by mouth daily Alternates between 1 tablet and 2 tablets every other day.       fluticasone (FLONASE) 50 MCG/ACT nasal spray Spray 1 spray into both nostrils daily 1 g 11     gabapentin (NEURONTIN) 100 MG capsule Take 2 capsules (200 mg) by mouth 2 times daily as needed for neuropathic pain 120 capsule 11     gabapentin (NEURONTIN) 300 MG capsule Take 2 capsules (600 mg) by mouth 3 times daily as needed for neuropathic pain 90 capsule 0     HEMP OIL OR EXTRACT OR OTHER CBD CANNABINOID, NOT MEDICAL CANNABIS,        hydroxychloroquine (PLAQUENIL) 200 MG tablet Take 2 tablets (400 mg) by mouth daily 180 tablet 1     hydrOXYzine (ATARAX) 25 MG tablet TAKE 1 TABLET BY MOUTH THREE TIMES A DAY 90 tablet 3     multivitamin w/minerals (THERA-VIT-M) tablet Take 1 tablet by mouth daily       naproxen sodium 220 MG capsule Take 220 mg by mouth 2 times daily as needed        omega 3 1200 MG CAPS        order for DME Cane, 1 Device 0     tiZANidine (ZANAFLEX) 4 MG tablet TAKE 1-2 TABLETS BY MOUTH AS NEEDED THREE TIMES A DAY 90 tablet 0     valACYclovir (VALTREX) 500 MG tablet Take 1 tablet (500 mg) by mouth 2 times daily 30 tablet 1       Allergies  Allergies   Allergen Reactions     Morphine Sulfate Itching     Sulfa Drugs Hives       Family hx Social hx   Family History   Problem Relation Age of Onset     Lupus Mother      Asthma Mother      Bone Cancer Mother      Diabetes Father       Brain Tumor Father         begnign on pituitary     Depression Sister      Anemia Sister      Colon Cancer Maternal Aunt 65     Glaucoma Maternal Grandfather      Alcoholism Maternal Grandfather      Macular Degeneration No family hx of      Liver Disease No family hx of       - .  - Lives with son (recent).  Previously living alone.  - 5 children (1 RIP from SIDS), all healthy.    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    - Previously worked as mental health professional.  - Distant history of crack cocaine & mariajuana use  - Former tobacco use, quit > 20 years ago  - Former alcohol over use, quit > 20 years ago     Review of systems  A 10-point review of systems was negative.    Examination  LMP 08/12/2013   There is no height or weight on file to calculate BMI.    Gen- well, NAD, A+Ox3, normal color  Eye- EOMI  ENT- MMM, normal oropharynx  Lym- no palpable lymphadenopathy  CVS- S1, S2 normal, no added sounds, RRR  RS- CTA  Abd- overweight, SNT, no ascites or organomegaly on palpation or percussion, BS+  Extr- pulses good, no JULIAN  MS- hands normal- no clubbing  Neuro- A+Ox3, no asterixis  Skin- no rash or jaundice  Psych- normal mood    Laboratory  BMPRecent Labs   Lab Test 10/06/22  1112 03/25/22  0952 09/30/21  1655 08/10/21  1740 03/24/21  0926     --  140 140 139   POTASSIUM 4.3  --  3.9 3.9 4.2   CHLORIDE 108  --  108 108 106   AKILA 9.9  --  9.5 9.2 9.7   CO2 28  --  26 28 28   BUN 7  --  9 12 12   CR 0.68 0.72 0.70 0.71 0.66   GLC 84  --  93 98 99     CBC  Recent Labs   Lab Test 10/06/22  1112 03/25/22  0952 09/30/21  1656 08/10/21  1740   WBC 4.5 4.0 5.5 6.1   RBC 4.58 4.70 4.26 4.47   HGB 12.9 13.3 12.0 12.6   HCT 36.2 36.5 33.1* 34.3*   MCV 79 78 78 77*   MCH 28.2 28.3 28.2 28.2   MCHC 35.6 36.4 36.3 36.7*   RDW 13.7 14.1 14.4 14.2   * 133* 94* 152     Liver Enzymes   Recent Labs   Lab Test 10/06/22  1112   PROTTOTAL 7.7   ALBUMIN 3.7   BILITOTAL 0.6   ALKPHOS 68   AST 35   ALT 39      INR 0.9  10/6/22     F-actin 39 (H) 9/2021  ARIEL 1:280 (H) 7/2021  AMA 4.5 (H) 9/2021 2017  Hep B s Ag non-reactive  Hep B s Ab 4.82  Hep C Ab non-reactive  Iron saturation: 26%    Radiology  Abdominal US 4/2022: hepatic steatosis noted. 2.2 hypoechoic lesion in inferior hepatic lobe, suggestive of hemangioma.     Assessment  61-year-old female with history of SLE and metabolic syndrome who presents for further evaluation and management of abnormal liver function tests.  Differential diagnoses include non-alcoholic fatty liver disease (more likely) and autoimmune hepatitis.  Will obtain abdominal imaging to evaluate for increased hepatic echogenicity that would be seen with NAFLD.  The patient may need a liver biopsy to definitively rule in or out of autoimmune hepatitis if ultrasound is equivocal or normal.    We discussed the importance of vaccination against COVID-19 to reduce disease serious infection, reduce mortality and reduce transmission.    [ ] Liver biopsy    Plan    2.  Weight loss with diet and exercise  3.  Consider liver bx as detailed above  4.  Follow-up in 6 months    ***

## 2022-10-07 ENCOUNTER — PATIENT OUTREACH (OUTPATIENT)
Dept: CARE COORDINATION | Facility: CLINIC | Age: 62
End: 2022-10-07

## 2022-10-07 NOTE — LETTER
[facility] CARE COORDINATION  [address] [place] [address]    October 7, 2022        Lesley Colorado  [address]  [place] [address]          Dear Lesley,     Adrian is an updated Patient Centered Plan of Care for your continued enrollment in Care Coordination. Please let us know if you have additional questions, concerns, or goals that we can assist with.    Sincerely,    Delores Barksdale [facility]  Clinic Care Coordinator  [facility]  [phone]            [facility]  Patient Centered Plan of Care  About Me:        Patient Name:  Lesley Colorado    YOB: 1960  Age:         61 year old   [place] MRN:    [id number] Telephone Information:  Home Phone [phone]   Mobile [phone]       Address:  [address]  [place] [address] Email address:  [email or web address]      Emergency Contact(s)    Name Relationship Lgl Grd Work Phone Home Phone Mobile Phone   1. LISSETTE ISAAC Daughter No   [phone]   2. DARREN GARCIA Sister No  [phone]   3. ALFREDAURELIO ROBERTSON* Mother No   [phone]   4. ADRIAN COLORADO, S* Son No  [phone]         Health Maintenance  Health Maintenance Reviewed: Due/Overdue   Health Maintenance Due   Topic Date Due     COVID-19 Vaccine (1) Never done     Pneumococcal Vaccine: Pediatrics (0 to 5 Years) and At-Risk Patients (6 to 64 Years) (1 - PCV) Never done     ZOSTER IMMUNIZATION (1 of 2) Never done     DTAP/TDAP/TD IMMUNIZATION (1 - Tdap) Never done     URINE DRUG SCREEN  02/04/2011     LUNG CANCER SCREENING  12/26/2020     INFLUENZA VACCINE (1) Never done       My Access Plan  Medical Emergency 911   Primary Clinic Line [facility] - [phone]   24 Hour Appointment Line [phone] or  [phone]) (toll-free)   24 Hour Nurse Line [phone] (toll-free)   Preferred Urgent Care No data recorded   Preferred Hospital No data  recorded   Preferred Pharmacy [id number] - [place], [place] - [address] [facility]     Behavioral Health Crisis Line The National Suicide Prevention Lifeline at 1-356.286.9176 or Text/Call 988         My Care Team Members  Patient Care Team       Relationship Specialty Notifications Start End    Rafael Orosco MD PCP - General Family Practice  5/13/11     Phone: [phone] Fax: [phone]         [address] [facility] [address]    Adilene Barksdale, [facility] Lead Care Coordinator Primary Care - CC Admissions 1/3/20     Deven Salgado DO Assigned Neuroscience Provider   10/23/20     Phone: [phone] Fax: [phone]          [facility] [address]    Rafael Orosco MD Assigned PCP   11/1/20     Phone: [phone] Fax: [phone] [address] [facility] [address]    Latoya Rice MD (Inactive) MD Ophthalmology  3/2/21     Phone: [phone] Fax: [phone]         [address] [facility] [address]    Latoya Rice MD (Inactive) Assigned Surgical Provider   3/21/21     Phone: [phone] Fax: [phone]         [address] [place] [address]    Erin Strong [facility] Worker Primary Care - CC Admissions 4/24/21     [phone]    Israel Pennington MD Assigned Sleep Provider   5/30/21     Phone: [phone] Fax: [phone] [address] [place] [address]    Elvia Gonzalez MD Assigned Rheumatology Provider   8/1/21     Phone: [phone] Fax: [phone]         [address] [facility] [address]    Rosa Weinstein APRN CNP Nurse Practitioner Family Medicine  11/17/21     Phone: [phone] Fax: [phone]         [address] [facility] [address]    Theresa Harper AuD Audiologist Audiology  12/3/21     Phone: [phone]          [address] [place] [address]    Audrey Arboleda, NP Nurse Practitioner ENT-Otolaryngology  12/3/21      Phone: [phone] Fax: [phone]         [address] [facility] [address]    Quique Santos MD MD Gastroenterology  1/14/22     Phone: [phone] Fax: [phone]         [address] [place] [address]    Quique Santos MD Assigned Gastroenterology Provider   3/13/22     Phone: [phone] Fax: [phone]         [address] [place] [address]            My Care Plans  Self Management and Treatment Plan  Care Plan  Care Plan: Physical Activity     Problem: Patient is inactive     Goal: Exercise at 2-3 time per week     Start Date: 11/15/2021 Expected End Date: 10/26/2022    This Visit's Progress: 50% Recent Progress: 50%    Priority: High    Note:     Barriers: difficult to change wellness habits  Strengths: Motivated, enrolled in CC    Action steps to achieve this goal:  1. I will look at options in my neighborhood for indoor exercise programs (on hold)  2. I will enroll in the best exercise plan for me (on hold)  3. I will practice good self-care - reaching out to people, increasing prayer times, eating well, and staying busy  4. I will begin by walking 5 min at a time, then increase this time to reach 30 min, 3x/wk  5. I will contact HANNY Khan, if I need more resources for exercise in my area                    Care Plan: Mental Health     Problem: Mood/Psychosocial Concerns     Goal: Begin EMDR therapy  Completed 8/23/2022    Start Date: 3/30/2022 Expected End Date: 9/30/2022    This Visit's Progress: 100%    Note:     Barriers: none noted  Strengths: motivated, enrolled in CC    Action steps to achieve this goal:  1. I will look through EMDR therapy/agencies sent to me by HANNY (completed)  2. I will call out to EMDR therapy/agencies to put myself on a waiting list for therapy (completed)  3. I will let Erin GAMINO, [phone] know if I need further EMDR therapy/agency resources            Goal: Establish Mental Health care     Start Date: 3/20/2022 Expected  End Date: 9/30/2022    Note:     Goal Statement: I will begin EMDR therapy in the next 6 months    Barriers: none noted  Strengths: motivated, enrolled in CC    Patient expressed understanding of goal: yes  Action steps to achieve this goal:  1. I will look through EMDR therapy/agencies sent to me by CHW   2. I will call out to EMDR therapy/agencies to put myself on a waiting list for therapy  3. I will let Erin GAMINO, [phone] know if I need further EMDR therapy/agency resources                    Care Plan: Identfy appropriate care for father     Problem: Father needs additional support     Goal: Complete Health Care Directive  Completed 8/23/2022    Start Date: 6/7/2022 Expected End Date: 9/7/2022    This Visit's Progress: 100%    Note:     Barriers: None noted  Strengths: Pt and pt's family proactively thinking about respite care for pt's father, motivated, enrolled in     Action steps to achieve this goal:  1. I will look through the list of respite resources sent to me by CHW, and think about the questions to ask respite agencies. (Completed)  2. I will contact [facility], [facility], and [facility] initially, to see if these agencies meet my families' needs for respite care (completed)  3. I will elicit respite care for my father as needed  4. I will contact HANNY Khan, if I have further questions about respite care.               Goal: Identify In-home support services     Start Date: 6/7/2022 Expected End Date: 9/30/2022    Note:     Goal Statement: In the next 3 months, I will look for respite care for my father    Barriers: None noted  Strengths: Pt and pt's family proactively thinking about respite care for pt's father, motivated, enrolled in     Patient expressed understanding of goal: yes  Action steps to achieve this goal:  1. I will look through the list of respite resources sent to me by CHW, and think about the questions to ask respite agencies.  2. I will contact [facility], [facility], and [facility] initially, to see if these agencies meet my families' needs for respite care  3. I will elicit respite care for my father as needed  4. I will contact HANNY Khan, if I have further questions about respite care.                            Action Plans on File:            Depression          My Medical and Care Information  Problem List   Patient Active Problem List   Diagnosis     Systemic lupus erythematosus, unspecified SLE type, unspecified organ involvement status (H)     History of claustrophobia     Anxiety     Allergic state     Tired     Insomnia, unspecified type     Hemoglobin C trait (H)     Liver hemangioma     Moderate major depression (H)     History of stroke     Financial difficulties     Encounter for medication monitoring     Anal fissure     Long-term use of Plaquenil     Myopia of both eyes     Presbyopia     Dry eyes     Thrombocytopenia (H)     Hyperlipidemia LDL goal <70     Vitreous opacities of right eye     History of alcoholism (H)     Localization-related (focal) (partial) symptomatic epilepsy and epileptic syndromes with complex partial seizures, not intractable, without status epilepticus (H)        Care Coordination Start Date: 1/3/2020   Frequency of Care Coordination: monthly     Form Last Updated: 10/07/2022

## 2022-10-07 NOTE — PROGRESS NOTES
Clinic Care Coordination Contact    Care Coordination Clinician Chart Review    Situation: Patient chart reviewed by care coordinator.       Background: Care Coordination initial assessment and enrollment to Care Coordination was 1/3/2020.   Patient centered goals were developed with participation from patient.  SOL MADERA handed patient off to CHW for continued outreach every 30 days.        Assessment: Per chart review, patient outreach completed by CC CHW on 9/20/2022.  Patient is actively working to accomplish goals.  Patient's goals remain appropriate and relevant at this time.   Patient is due for updated Plan of Care.  Annual assessment will be due 1/2023.      Care Plan: Physical Activity     Problem: Patient is inactive     Goal: Exercise at 2-3 time per week     Start Date: 11/15/2021 Expected End Date: 10/26/2022    This Visit's Progress: 50% Recent Progress: 50%    Priority: High    Note:     Barriers: difficult to change wellness habits  Strengths: Motivated, enrolled in     Action steps to achieve this goal:  1. I will look at options in my neighborhood for indoor exercise programs (on hold)  2. I will enroll in the best exercise plan for me (on hold)  3. I will practice good self-care - reaching out to people, increasing prayer times, eating well, and staying busy  4. I will begin by walking 5 min at a time, then increase this time to reach 30 min, 3x/wk  5. I will contact HANNY Khan, if I need more resources for exercise in my area                    Care Plan: Mental Health     Problem: Mood/Psychosocial Concerns     Goal: Begin EMDR therapy  Completed 8/23/2022    Start Date: 3/30/2022 Expected End Date: 9/30/2022    This Visit's Progress: 100%    Note:     Barriers: none noted  Strengths: motivated, enrolled in     Action steps to achieve this goal:  1. I will look through EMDR therapy/agencies sent to me by CHW (completed)  2. I will call out to EMDR therapy/agencies to put myself on a waiting list  for therapy (completed)  3. I will let Erin GAMINO, [phone] know if I need further EMDR therapy/agency resources            Goal: Establish Mental Health care     Start Date: 3/20/2022 Expected End Date: 9/30/2022    Note:     Goal Statement: I will begin EMDR therapy in the next 6 months    Barriers: none noted  Strengths: motivated, enrolled in CC    Patient expressed understanding of goal: yes  Action steps to achieve this goal:  1. I will look through EMDR therapy/agencies sent to me by CHW   2. I will call out to EMDR therapy/agencies to put myself on a waiting list for therapy  3. I will let Erin GAMINO, [phone] know if I need further EMDR therapy/agency resources                    Care Plan: Identfy appropriate care for father     Problem: Father needs additional support     Goal: Complete Health Care Directive  Completed 8/23/2022    Start Date: 6/7/2022 Expected End Date: 9/7/2022    This Visit's Progress: 100%    Note:     Barriers: None noted  Strengths: Pt and pt's family proactively thinking about respite care for pt's father, motivated, enrolled in     Action steps to achieve this goal:  1. I will look through the list of respite resources sent to me by CHW, and think about the questions to ask respite agencies. (Completed)  2. I will contact [facility], [facility], and [facility] initially, to see if these agencies meet my families' needs for respite care (completed)  3. I will elicit respite care for my father as needed  4. I will contact HANNY Khan, if I have further questions about respite care.               Goal: Identify In-home support services     Start Date: 6/7/2022 Expected End Date: 9/30/2022    Note:     Goal Statement: In the next 3 months, I will look for respite care for my father    Barriers: None noted  Strengths: Pt and pt's family proactively thinking about respite care for pt's father, motivated, enrolled in     Patient expressed understanding of  goal: yes  Action steps to achieve this goal:  1. I will look through the list of respite resources sent to me by CHW, and think about the questions to ask respite agencies.  2. I will contact [facility], [facility], and [facility] initially, to see if these agencies meet my families' needs for respite care  3. I will elicit respite care for my father as needed  4. I will contact HANNY Khan, if I have further questions about respite care.                               Plan/Recommendations: The patient will continue working with Care Coordination to achieve goals as above.  CHW will involve SW CC as needed or if patient is ready to move to maintenance.  SW CC will continue to monitor progress to goals and CHW outreaches every 6 weeks.     Plan of Care updated and mailed to patient: Yes    Delores Barksdale [facility]  Clinic Care Coordinator  [facility]  [phone]  [email or web address]

## 2022-10-10 ENCOUNTER — HEALTH MAINTENANCE LETTER (OUTPATIENT)
Age: 62
End: 2022-10-10

## 2022-10-10 ENCOUNTER — OFFICE VISIT (OUTPATIENT)
Dept: GASTROENTEROLOGY | Facility: CLINIC | Age: 62
End: 2022-10-10
Attending: INTERNAL MEDICINE
Payer: COMMERCIAL

## 2022-10-10 VITALS
DIASTOLIC BLOOD PRESSURE: 73 MMHG | WEIGHT: 172.6 LBS | BODY MASS INDEX: 28 KG/M2 | OXYGEN SATURATION: 96 % | HEART RATE: 90 BPM | SYSTOLIC BLOOD PRESSURE: 110 MMHG

## 2022-10-10 DIAGNOSIS — K76.0 NAFLD (NONALCOHOLIC FATTY LIVER DISEASE): Primary | ICD-10-CM

## 2022-10-10 PROCEDURE — G0463 HOSPITAL OUTPT CLINIC VISIT: HCPCS

## 2022-10-10 PROCEDURE — 99214 OFFICE O/P EST MOD 30 MIN: CPT | Mod: GC | Performed by: INTERNAL MEDICINE

## 2022-10-10 ASSESSMENT — PAIN SCALES - GENERAL: PAINLEVEL: NO PAIN (0)

## 2022-10-10 NOTE — LETTER
10/10/2022         RE: Lesley Stafford  [address]  [place] [address]      [facility] Hepatology    Follow up Visit    Chief complaint:  Abnormal liver function tests    HPI:  60 y/o F with PMHx of lupus, hyperlipidemia, obstructive sleep apnea, anxiety, depression and fibromyalgia who presents for follow up of abnormal liver tests.    Her mother passed away from cancer on September 23, 2022.  She reports that the last 4 months since her diagnosis were stressful.  She reports that she had concerns that she would return to substance use given her history of crack cocaine but she did not.  She denies suicidal ideation.  She sees to mental health specialists.  It has also been stressful caring for her father Monday through Friday who has advanced dementia.    Prior to her mother being diagnosed with cancer she was working on weight loss.  In the setting of her mom's death she had a lack of appetite.  She has lost 10 pounds since her last visit.  Her walking is limited by her left hip pain and bilateral knee pain.  She uses marijuana gummies to manage her pain.  Her energy is slowly returning in the setting of working through her grief.    She denies any symptoms related to liver disease.  She denies any pruritus, jaundice, abdominal distention, lower extremity, lethargy or confusion.  She has chronic constipation for which she intermittently takes fiber. She denies any melena or hematochezia    She has mild obstructive sleep apnea but does not wear a CPAP.    The patient denies any alcohol use, tobacco use or cocaine use.  She uses marijuana gummies for pain.    She has not yet gotten the COVID-19 vaccination.    Medical hx Surgical hx   Past Medical History:   Diagnosis Date     Anxiety      Cerebral infarction (H)      Depressive disorder      Fibromyalgia      GERD (gastroesophageal reflux disease)      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     Hyperlipidemia LDL goal <70 03/30/2021      Lupus (H)      RACHNA (obstructive sleep apnea)      Substance abuse (H)     Has not used for 19 years.      Past Surgical History:   Procedure Laterality Date      SECTION       COLONOSCOPY  2010    repeat 10 yrs     COLONOSCOPY N/A 2020    Procedure: COLONOSCOPY, WITH POLYPECTOMY;  Surgeon: Rebecca Rojas MD;  Location: [facility] OR     DILATION AND CURETTAGE, OPERATIVE HYSTEROSCOPY WITH MORCELLATOR, COMBINED N/A 02/15/2017    Procedure: COMBINED DILATION AND CURETTAGE, OPERATIVE HYSTEROSCOPY WITH MORCELLATOR;  Surgeon: Erin Gutierrez MD;  Location: [facility] OR     ESOPHAGOSCOPY, GASTROSCOPY, DUODENOSCOPY (EGD), COMBINED Left 2016    Procedure: COMBINED ESOPHAGOSCOPY, GASTROSCOPY, DUODENOSCOPY (EGD), BIOPSY SINGLE OR MULTIPLE;  Surgeon: Pierre Fernandez MD;  Location: [facility] GI          Medications  Current Outpatient Medications   Medication Sig Dispense Refill     albuterol (PROAIR HFA/PROVENTIL HFA/VENTOLIN HFA) 108 (90 Base) MCG/ACT inhaler Inhale 2 puffs into the lungs every 4 hours as needed for shortness of breath / dyspnea, wheezing or other (coughing) 18 g 11     ascorbic acid 1000 MG TABS tablet Take 1,000 mg by mouth as needed       aspirin (ASA) 325 MG EC tablet Take 1 tablet (325 mg) by mouth daily 90 tablet 1     atorvastatin (LIPITOR) 40 MG tablet Take 1 tablet (40 mg) by mouth daily 90 tablet 3     cetirizine (ZYRTEC) 10 MG tablet TAKE 1 TABLET BY MOUTH EVERY DAY 90 tablet 2     DULoxetine (CYMBALTA) 60 MG capsule Take 60 mg by mouth daily Alternates between 1 tablet and 2 tablets every other day.       fluticasone (FLONASE) 50 MCG/ACT nasal spray Spray 1 spray into both nostrils daily 1 g 11     gabapentin (NEURONTIN) 100 MG capsule Take 2 capsules (200 mg) by mouth 2 times daily as needed for neuropathic pain 120 capsule 11     gabapentin (NEURONTIN) 300 MG capsule Take 2 capsules (600 mg) by mouth 3 times daily as needed for neuropathic pain 90 capsule 0     HEMP OIL OR  EXTRACT OR OTHER CBD CANNABINOID, NOT MEDICAL CANNABIS,        hydroxychloroquine (PLAQUENIL) 200 MG tablet Take 2 tablets (400 mg) by mouth daily 180 tablet 1     hydrOXYzine (ATARAX) 25 MG tablet TAKE 1 TABLET BY MOUTH THREE TIMES A DAY 90 tablet 3     multivitamin w/minerals (THERA-VIT-M) tablet Take 1 tablet by mouth daily       naproxen sodium 220 MG capsule Take 220 mg by mouth 2 times daily as needed        omega 3 1200 MG CAPS        order for DME Cane, 1 Device 0     tiZANidine (ZANAFLEX) 4 MG tablet TAKE 1-2 TABLETS BY MOUTH AS NEEDED THREE TIMES A DAY 90 tablet 1     valACYclovir (VALTREX) 500 MG tablet Take 1 tablet (500 mg) by mouth 2 times daily 30 tablet 1     cevimeline (EVOXAC) 30 MG capsule Take 1 capsule (30 mg) by mouth 3 times daily 90 capsule 1       Allergies  Allergies   Allergen Reactions     Morphine Sulfate Itching     Sulfa Drugs Hives       Family hx Social hx   Family History   Problem Relation Age of Onset     Lupus Mother      Asthma Mother      Bone Cancer Mother      Diabetes Father      Brain Tumor Father         begnign on pituitary     Depression Sister      Anemia Sister      Colon Cancer Maternal Aunt 65     Glaucoma Maternal Grandfather      Alcoholism Maternal Grandfather      Macular Degeneration No family hx of      Liver Disease No family hx of    - Daughter: Lupus   - .    - Lives alone  - 5 children (1 RIP from SIDS), all healthy.    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  - Former tobacco use, quit > 20 years ago  - Alcohol: Denies     Review of systems  A 10-point review of systems was negative.    Examination  /73   Pulse 90   Wt 78.3 kg (172 lb 9.6 oz)   LMP 08/12/2013   SpO2 96%   BMI 28.00 kg/m    Body mass index is 28 kg/m .    Gen- well, NAD  Eyes - conjunctiva without icterics   CVS- RRR  RS- CTA  Abd-overweight, soft abdomen, nontender throughout, no ascites.  Extr- pulses good, no JULIAN  MS- no clubbing  Neuro-  A+Ox3, no asterixis  Skin- no jaundice  Psych- normal mood    Laboratory  BMP  Recent Labs   Lab Test 10/06/22  1112 03/25/22  0952 09/30/21  1655 08/10/21  1740 03/24/21  0926     --  140 140 139   POTASSIUM 4.3  --  3.9 3.9 4.2   CHLORIDE 108  --  108 108 106   AKILA 9.9  --  9.5 9.2 9.7   CO2 28  --  26 28 28   BUN 7  --  9 12 12   CR 0.68 0.72 0.70 0.71 0.66   GLC 84  --  93 98 99     CBC  Recent Labs   Lab Test 10/06/22  1112 03/25/22  0952 09/30/21  1656 08/10/21  1740   WBC 4.5 4.0 5.5 6.1   RBC 4.58 4.70 4.26 4.47   HGB 12.9 13.3 12.0 12.6   HCT 36.2 36.5 33.1* 34.3*   MCV 79 78 78 77*   MCH 28.2 28.3 28.2 28.2   MCHC 35.6 36.4 36.3 36.7*   RDW 13.7 14.1 14.4 14.2   * 133* 94* 152     Liver Enzymes   Recent Labs   Lab Test 10/06/22  1112   PROTTOTAL 7.7   ALBUMIN 3.7   BILITOTAL 0.6   ALKPHOS 68   AST 35   ALT 39      INR   INR   Date Value Ref Range Status   10/06/2022 0.90 0.85 - 1.15 Final     Comment:     Some International Normalized Ratio (INR) results performed at the [facility] for patients 6 months and older reported between 07/11/2021 and 5/17/2022 were evaluated against an outdated reference interval of 0.86-1.14 rather than the intended reference interval of 0.85-1.15. The INR value itself was accurate, but may not have been flagged correctly due to the outdated reference interval.   03/01/2021 0.95 0.86 - 1.14 Final        F-actin 39 (H) 9/2021  ARIEL 1: 1,280 (H) 2021  AMA 4.5 (H) 9/2021    Hep B s Ag non-reactive 2021  Hep B c Ab non-reactive 2021  Hep B s Ab 4.82  Hep C Ab non-reactive    Radiology  Abdominal US 4/2022: Hepatic Steatosis. 2.2cm hypoechoic lesion in the inferior hepatic lobe, suggesitve of hemangioma.     Assessment  61-year-old female with past medical history of lupus and metabolic syndrome (obesity, obstructive sleep apnea, hyperlipidemia).  She presents for follow-up of her abnormal liver studies.  In the setting of a 10 pound weight loss her  AST and ALT of improved significantly which is suggestive of nonalcoholic fatty liver disease.  She has an abdominal ultrasound in the spring 2022 which shows hepatic steatosis.  She has no splenomegaly to suggest portal hypertension.  She has thrombocytopenia chronically but in the setting of a normal spleen size suspect that this may be related to her lupus.  She has previously undergone a peripheral smear with normal platelet morphology.    Plan  -- Counseled on the importance of weight loss through lifestyle changes, dietary modifications and exercise  -- Continue statin    RTC: PRN    Discussed with Ayaan, transplant hepatologist.     Marina Haile MD ([place]izzie)  Gastroenterology/Hepatology Fellow  [phone]        Attestation with edits by Quique Santos MD at 10/10/2022 12:24 PM:  Physician Attestation  I, Quique Kuo, saw this patient with the fellow and agree with the fellow s findings and plan of care as documented in the fellow s note.  I personally reviewed vital signs, medications, labs, and imaging.    Evaluation of abnormal LFTs and response to weight loss consistent with NAFLD.  No radiographic or clinical features to suggest advanced liver disease:  thrombocytopenia is related to SLE.  Can follow-up with PCP +/- rheumatology.      Quique Kuo  Date of Service (when I saw the patient): 10/10/22

## 2022-10-10 NOTE — NURSING NOTE
Chief Complaint   Patient presents with     RECHECK     Liver hemangioma         /73   Pulse 90   Wt 78.3 kg (172 lb 9.6 oz)   LMP 08/12/2013   SpO2 96%   BMI 28.00 kg/m        Cruzito Jay MA

## 2022-10-10 NOTE — LETTER
10/10/2022         RE: Lesley Stafford  [address]  [place] [address]        Dear Colleague,    Thank you for referring your patient, Lesley Stafford, to the [facility] [place]. Please see a copy of my visit note below.    [facility] Hepatology    Follow up Visit    Chief complaint:  Abnormal liver function tests    HPI:  62 y/o F with PMHx of lupus, hyperlipidemia, obstructive sleep apnea, anxiety, depression and fibromyalgia who presents for follow up of abnormal liver tests.    Her mother passed away from cancer on September 23, 2022.  She reports that the last 4 months since her diagnosis were stressful.  She reports that she had concerns that she would return to substance use given her history of crack cocaine but she did not.  She denies suicidal ideation.  She sees to mental health specialists.  It has also been stressful caring for her father Monday through Friday who has advanced dementia.    Prior to her mother being diagnosed with cancer she was working on weight loss.  In the setting of her mom's death she had a lack of appetite.  She has lost 10 pounds since her last visit.  Her walking is limited by her left hip pain and bilateral knee pain.  She uses marijuana gummies to manage her pain.  Her energy is slowly returning in the setting of working through her grief.    She denies any symptoms related to liver disease.  She denies any pruritus, jaundice, abdominal distention, lower extremity, lethargy or confusion.  She has chronic constipation for which she intermittently takes fiber. She denies any melena or hematochezia    She has mild obstructive sleep apnea but does not wear a CPAP.    The patient denies any alcohol use, tobacco use or cocaine use.  She uses marijuana gummies for pain.    She has not yet gotten the COVID-19 vaccination.    Medical hx Surgical hx   Past Medical History:   Diagnosis Date     Anxiety      Cerebral  infarction (H)      Depressive disorder      Fibromyalgia      GERD (gastroesophageal reflux disease)      Hemoglobin C trait (H) 2016     Hyperlipidemia LDL goal <70 2021     Lupus (H)      RACHNA (obstructive sleep apnea)      Substance abuse (H)     Has not used for 19 years.      Past Surgical History:   Procedure Laterality Date      SECTION       COLONOSCOPY  2010    repeat 10 yrs     COLONOSCOPY N/A 2020    Procedure: COLONOSCOPY, WITH POLYPECTOMY;  Surgeon: Rebecca Rojas MD;  [facility]: [facility]     DILATION AND CURETTAGE, OPERATIVE HYSTEROSCOPY WITH MORCELLATOR, COMBINED N/A 02/15/2017    Procedure: COMBINED DILATION AND CURETTAGE, OPERATIVE HYSTEROSCOPY WITH MORCELLATOR;  Surgeon: Erin Gutierrez MD;  Location: [facility] OR     ESOPHAGOSCOPY, GASTROSCOPY, DUODENOSCOPY (EGD), COMBINED Left 2016    Procedure: COMBINED ESOPHAGOSCOPY, GASTROSCOPY, DUODENOSCOPY (EGD), BIOPSY SINGLE OR MULTIPLE;  Surgeon: Pierre Fernandez MD;  Location: [facility] GI          Medications  Current Outpatient Medications   Medication Sig Dispense Refill     albuterol (PROAIR HFA/PROVENTIL HFA/VENTOLIN HFA) 108 (90 Base) MCG/ACT inhaler Inhale 2 puffs into the lungs every 4 hours as needed for shortness of breath / dyspnea, wheezing or other (coughing) 18 g 11     ascorbic acid 1000 MG TABS tablet Take 1,000 mg by mouth as needed       aspirin (ASA) 325 MG EC tablet Take 1 tablet (325 mg) by mouth daily 90 tablet 1     atorvastatin (LIPITOR) 40 MG tablet Take 1 tablet (40 mg) by mouth daily 90 tablet 3     cetirizine (ZYRTEC) 10 MG tablet TAKE 1 TABLET BY MOUTH EVERY DAY 90 tablet 2     DULoxetine (CYMBALTA) 60 MG capsule Take 60 mg by mouth daily Alternates between 1 tablet and 2 tablets every other day.       fluticasone (FLONASE) 50 MCG/ACT nasal spray Spray 1 spray into both nostrils daily 1 g 11     gabapentin (NEURONTIN) 100 MG capsule Take 2 capsules (200 mg) by mouth 2 times daily as needed for  neuropathic pain 120 capsule 11     gabapentin (NEURONTIN) 300 MG capsule Take 2 capsules (600 mg) by mouth 3 times daily as needed for neuropathic pain 90 capsule 0     HEMP OIL OR EXTRACT OR OTHER CBD CANNABINOID, NOT MEDICAL CANNABIS,        hydroxychloroquine (PLAQUENIL) 200 MG tablet Take 2 tablets (400 mg) by mouth daily 180 tablet 1     hydrOXYzine (ATARAX) 25 MG tablet TAKE 1 TABLET BY MOUTH THREE TIMES A DAY 90 tablet 3     multivitamin w/minerals (THERA-VIT-M) tablet Take 1 tablet by mouth daily       naproxen sodium 220 MG capsule Take 220 mg by mouth 2 times daily as needed        omega 3 1200 MG CAPS        order for DME Cane, 1 Device 0     tiZANidine (ZANAFLEX) 4 MG tablet TAKE 1-2 TABLETS BY MOUTH AS NEEDED THREE TIMES A DAY 90 tablet 1     valACYclovir (VALTREX) 500 MG tablet Take 1 tablet (500 mg) by mouth 2 times daily 30 tablet 1     cevimeline (EVOXAC) 30 MG capsule Take 1 capsule (30 mg) by mouth 3 times daily 90 capsule 1       Allergies  Allergies   Allergen Reactions     Morphine Sulfate Itching     Sulfa Drugs Hives       Family hx Social hx   Family History   Problem Relation Age of Onset     Lupus Mother      Asthma Mother      Bone Cancer Mother      Diabetes Father      Brain Tumor Father         begnign on pituitary     Depression Sister      Anemia Sister      Colon Cancer Maternal Aunt 65     Glaucoma Maternal Grandfather      Alcoholism Maternal Grandfather      Macular Degeneration No family hx of      Liver Disease No family hx of    - Daughter: Lupus   - .    - Lives alone  - 5 children (1 RIP from SIDS), all healthy.    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  - Former tobacco use, quit > 20 years ago  - Alcohol: Denies     Review of systems  A 10-point review of systems was negative.    Examination  /73   Pulse 90   Wt 78.3 kg (172 lb 9.6 oz)   LMP 08/12/2013   SpO2 96%   BMI 28.00 kg/m    Body mass index is 28 kg/m .    Gen- well,  NAD  Eyes - conjunctiva without icterics   CVS- RRR  RS- CTA  Abd-overweight, soft abdomen, nontender throughout, no ascites.  Extr- pulses good, no JULIAN  MS- no clubbing  Neuro- A+Ox3, no asterixis  Skin- no jaundice  Psych- normal mood    Laboratory  BMP  Recent Labs   Lab Test 10/06/22  1112 03/25/22  0952 09/30/21  1655 08/10/21  1740 03/24/21  0926     --  140 140 139   POTASSIUM 4.3  --  3.9 3.9 4.2   CHLORIDE 108  --  108 108 106   AKILA 9.9  --  9.5 9.2 9.7   CO2 28  --  26 28 28   BUN 7  --  9 12 12   CR 0.68 0.72 0.70 0.71 0.66   GLC 84  --  93 98 99     CBC  Recent Labs   Lab Test 10/06/22  1112 03/25/22  0952 09/30/21  1656 08/10/21  1740   WBC 4.5 4.0 5.5 6.1   RBC 4.58 4.70 4.26 4.47   HGB 12.9 13.3 12.0 12.6   HCT 36.2 36.5 33.1* 34.3*   MCV 79 78 78 77*   MCH 28.2 28.3 28.2 28.2   MCHC 35.6 36.4 36.3 36.7*   RDW 13.7 14.1 14.4 14.2   * 133* 94* 152     Liver Enzymes   Recent Labs   Lab Test 10/06/22  1112   PROTTOTAL 7.7   ALBUMIN 3.7   BILITOTAL 0.6   ALKPHOS 68   AST 35   ALT 39      INR   INR   Date Value Ref Range Status   10/06/2022 0.90 0.85 - 1.15 Final     Comment:     Some International Normalized Ratio (INR) results performed at the [facility] Acute Care Lab for patients 6 months and older reported between 07/11/2021 and 5/17/2022 were evaluated against an outdated reference interval of 0.86-1.14 rather than the intended reference interval of 0.85-1.15. The INR value itself was accurate, but may not have been flagged correctly due to the outdated reference interval.   03/01/2021 0.95 0.86 - 1.14 Final        F-actin 39 (H) 9/2021  ARIEL 1: 1,280 (H) 2021  AMA 4.5 (H) 9/2021    Hep B s Ag non-reactive 2021  Hep B c Ab non-reactive 2021  Hep B s Ab 4.82  Hep C Ab non-reactive    Radiology  Abdominal US 4/2022: Hepatic Steatosis. 2.2cm hypoechoic lesion in the inferior hepatic lobe, suggesitve of hemangioma.     Assessment  61-year-old female with past medical history of lupus and  metabolic syndrome (obesity, obstructive sleep apnea, hyperlipidemia).  She presents for follow-up of her abnormal liver studies.  In the setting of a 10 pound weight loss her AST and ALT of improved significantly which is suggestive of nonalcoholic fatty liver disease.  She has an abdominal ultrasound in the spring 2022 which shows hepatic steatosis.  She has no splenomegaly to suggest portal hypertension.  She has thrombocytopenia chronically but in the setting of a normal spleen size suspect that this may be related to her lupus.  She has previously undergone a peripheral smear with normal platelet morphology.    Plan  -- Counseled on the importance of weight loss through lifestyle changes, dietary modifications and exercise  -- Continue statin    RTC: PRN    Discussed with Ayaan transplant hepatologist.     Marina Haile MD (Lizzie)  Gastroenterology/Hepatology Fellow  [phone]        Attestation with edits by Quique Santos MD at 10/10/2022 12:24 PM:  Physician Attestation  I, Quique Kuo, saw this patient with the fellow and agree with the fellow s findings and plan of care as documented in the fellow s note.  I personally reviewed vital signs, medications, labs, and imaging.    Evaluation of abnormal LFTs and response to weight loss consistent with NAFLD.  No radiographic or clinical features to suggest advanced liver disease:  thrombocytopenia is related to SLE.  Can follow-up with PCP +/- rheumatology.      Quique Kuo  Date of Service (when I saw the patient): 10/10/22        Again, thank you for allowing me to participate in the care of your patient.        Sincerely,        Quique Kuo MD

## 2022-10-10 NOTE — PROGRESS NOTES
[facility] Hepatology    Follow up Visit    Chief complaint:  Abnormal liver function tests    HPI:  60 y/o F with PMHx of lupus, hyperlipidemia, obstructive sleep apnea, anxiety, depression and fibromyalgia who presents for follow up of abnormal liver tests.    Her mother passed away from cancer on September 23, 2022.  She reports that the last 4 months since her diagnosis were stressful.  She reports that she had concerns that she would return to substance use given her history of crack cocaine but she did not.  She denies suicidal ideation.  She sees to mental health specialists.  It has also been stressful caring for her father Monday through Friday who has advanced dementia.    Prior to her mother being diagnosed with cancer she was working on weight loss.  In the setting of her mom's death she had a lack of appetite.  She has lost 10 pounds since her last visit.  Her walking is limited by her left hip pain and bilateral knee pain.  She uses marijuana gummies to manage her pain.  Her energy is slowly returning in the setting of working through her grief.    She denies any symptoms related to liver disease.  She denies any pruritus, jaundice, abdominal distention, lower extremity, lethargy or confusion.  She has chronic constipation for which she intermittently takes fiber. She denies any melena or hematochezia    She has mild obstructive sleep apnea but does not wear a CPAP.    The patient denies any alcohol use, tobacco use or cocaine use.  She uses marijuana gummies for pain.    She has not yet gotten the COVID-19 vaccination.    Medical hx Surgical hx   Past Medical History:   Diagnosis Date    Anxiety     Cerebral infarction (H)     Depressive disorder     Fibromyalgia     GERD (gastroesophageal reflux disease)     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    Hyperlipidemia LDL goal <70 03/30/2021    Lupus (H)     RACHNA (obstructive sleep apnea)     Substance abuse (H)     Has not used for 19 years.      Past  Surgical History:   Procedure Laterality Date     SECTION      COLONOSCOPY  2010    repeat 10 yrs    COLONOSCOPY N/A 2020    Procedure: COLONOSCOPY, WITH POLYPECTOMY;  Surgeon: Rebecca Rojas MD;  Location: [facility] OR    DILATION AND CURETTAGE, OPERATIVE HYSTEROSCOPY WITH MORCELLATOR, COMBINED N/A 02/15/2017    Procedure: COMBINED DILATION AND CURETTAGE, OPERATIVE HYSTEROSCOPY WITH MORCELLATOR;  Surgeon: Erin Gutierrez MD;  Location: [facility] OR    ESOPHAGOSCOPY, GASTROSCOPY, DUODENOSCOPY (EGD), COMBINED Left 2016    Procedure: COMBINED ESOPHAGOSCOPY, GASTROSCOPY, DUODENOSCOPY (EGD), BIOPSY SINGLE OR MULTIPLE;  Surgeon: Pierre Fernandez MD;  Location: [facility] GI          Medications  Current Outpatient Medications   Medication Sig Dispense Refill    albuterol (PROAIR HFA/PROVENTIL HFA/VENTOLIN HFA) 108 (90 Base) MCG/ACT inhaler Inhale 2 puffs into the lungs every 4 hours as needed for shortness of breath / dyspnea, wheezing or other (coughing) 18 g 11    ascorbic acid 1000 MG TABS tablet Take 1,000 mg by mouth as needed      aspirin (ASA) 325 MG EC tablet Take 1 tablet (325 mg) by mouth daily 90 tablet 1    atorvastatin (LIPITOR) 40 MG tablet Take 1 tablet (40 mg) by mouth daily 90 tablet 3    cetirizine (ZYRTEC) 10 MG tablet TAKE 1 TABLET BY MOUTH EVERY DAY 90 tablet 2    DULoxetine (CYMBALTA) 60 MG capsule Take 60 mg by mouth daily Alternates between 1 tablet and 2 tablets every other day.      fluticasone (FLONASE) 50 MCG/ACT nasal spray Spray 1 spray into both nostrils daily 1 g 11    gabapentin (NEURONTIN) 100 MG capsule Take 2 capsules (200 mg) by mouth 2 times daily as needed for neuropathic pain 120 capsule 11    gabapentin (NEURONTIN) 300 MG capsule Take 2 capsules (600 mg) by mouth 3 times daily as needed for neuropathic pain 90 capsule 0    HEMP OIL OR EXTRACT OR OTHER CBD CANNABINOID, NOT MEDICAL CANNABIS,       hydroxychloroquine (PLAQUENIL) 200 MG tablet Take 2 tablets (400 mg)  by mouth daily 180 tablet 1    hydrOXYzine (ATARAX) 25 MG tablet TAKE 1 TABLET BY MOUTH THREE TIMES A DAY 90 tablet 3    multivitamin w/minerals (THERA-VIT-M) tablet Take 1 tablet by mouth daily      naproxen sodium 220 MG capsule Take 220 mg by mouth 2 times daily as needed       omega 3 1200 MG CAPS       order for DME Cane, 1 Device 0    tiZANidine (ZANAFLEX) 4 MG tablet TAKE 1-2 TABLETS BY MOUTH AS NEEDED THREE TIMES A DAY 90 tablet 1    valACYclovir (VALTREX) 500 MG tablet Take 1 tablet (500 mg) by mouth 2 times daily 30 tablet 1    cevimeline (EVOXAC) 30 MG capsule Take 1 capsule (30 mg) by mouth 3 times daily 90 capsule 1       Allergies  Allergies   Allergen Reactions    Morphine Sulfate Itching    Sulfa Drugs Hives       Family hx Social hx   Family History   Problem Relation Age of Onset    Lupus Mother     Asthma Mother     Bone Cancer Mother     Diabetes Father     Brain Tumor Father         begnign on pituitary    Depression Sister     Anemia Sister     Colon Cancer Maternal Aunt 65    Glaucoma Maternal Grandfather     Alcoholism Maternal Grandfather     Macular Degeneration No family hx of     Liver Disease No family hx of    - Daughter: Lupus   - .    - Lives alone  - 5 children (1 RIP from SIDS), all healthy.    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  - Former tobacco use, quit > 20 years ago  - Alcohol: Denies     Review of systems  A 10-point review of systems was negative.    Examination  /73   Pulse 90   Wt 78.3 kg (172 lb 9.6 oz)   LMP 08/12/2013   SpO2 96%   BMI 28.00 kg/m    Body mass index is 28 kg/m .    Gen- well, NAD  Eyes - conjunctiva without icterics   CVS- RRR  RS- CTA  Abd-overweight, soft abdomen, nontender throughout, no ascites.  Extr- pulses good, no JULIAN  MS- no clubbing  Neuro- A+Ox3, no asterixis  Skin- no jaundice  Psych- normal mood    Laboratory  BMP  Recent Labs   Lab Test 10/06/22  1112 03/25/22  0952 09/30/21  8206  08/10/21  1740 03/24/21  0926     --  140 140 139   POTASSIUM 4.3  --  3.9 3.9 4.2   CHLORIDE 108  --  108 108 106   AKILA 9.9  --  9.5 9.2 9.7   CO2 28  --  26 28 28   BUN 7  --  9 12 12   CR 0.68 0.72 0.70 0.71 0.66   GLC 84  --  93 98 99     CBC  Recent Labs   Lab Test 10/06/22  1112 03/25/22  0952 09/30/21  1656 08/10/21  1740   WBC 4.5 4.0 5.5 6.1   RBC 4.58 4.70 4.26 4.47   HGB 12.9 13.3 12.0 12.6   HCT 36.2 36.5 33.1* 34.3*   MCV 79 78 78 77*   MCH 28.2 28.3 28.2 28.2   MCHC 35.6 36.4 36.3 36.7*   RDW 13.7 14.1 14.4 14.2   * 133* 94* 152     Liver Enzymes   Recent Labs   Lab Test 10/06/22  1112   PROTTOTAL 7.7   ALBUMIN 3.7   BILITOTAL 0.6   ALKPHOS 68   AST 35   ALT 39      INR   INR   Date Value Ref Range Status   10/06/2022 0.90 0.85 - 1.15 Final     Comment:     Some International Normalized Ratio (INR) results performed at the [facility] Acute Care Lab for patients 6 months and older reported between 07/11/2021 and 5/17/2022 were evaluated against an outdated reference interval of 0.86-1.14 rather than the intended reference interval of 0.85-1.15. The INR value itself was accurate, but may not have been flagged correctly due to the outdated reference interval.   03/01/2021 0.95 0.86 - 1.14 Final        F-actin 39 (H) 9/2021  ARIEL 1: 1,280 (H) 2021  AMA 4.5 (H) 9/2021    Hep B s Ag non-reactive 2021  Hep B c Ab non-reactive 2021  Hep B s Ab 4.82  Hep C Ab non-reactive    Radiology  Abdominal US 4/2022: Hepatic Steatosis. 2.2cm hypoechoic lesion in the inferior hepatic lobe, suggesitve of hemangioma.     Assessment  61-year-old female with past medical history of lupus and metabolic syndrome (obesity, obstructive sleep apnea, hyperlipidemia).  She presents for follow-up of her abnormal liver studies.  In the setting of a 10 pound weight loss her AST and ALT of improved significantly which is suggestive of nonalcoholic fatty liver disease.  She has an abdominal ultrasound in the spring 2022  which shows hepatic steatosis.  She has no splenomegaly to suggest portal hypertension.  She has thrombocytopenia chronically but in the setting of a normal spleen size suspect that this may be related to her lupus.  She has previously undergone a peripheral smear with normal platelet morphology.    Plan  -- Counseled on the importance of weight loss through lifestyle changes, dietary modifications and exercise  -- Continue statin    RTC: PRN    Discussed with Ayaan transplant hepatologist.     Marina Haile MD (Lizzie)  Gastroenterology/Hepatology Fellow  [phone]

## 2022-10-13 ENCOUNTER — TELEPHONE (OUTPATIENT)
Dept: GASTROENTEROLOGY | Facility: CLINIC | Age: 62
End: 2022-10-13

## 2022-10-13 NOTE — TELEPHONE ENCOUNTER
Referral was not placed from Dr. Kuo.      [facility] Call Center    Phone Message    May a detailed message be left on voicemail: yes     Reason for Call: Other: Torie from the OBGYN department at [facility] was reaching out because they received a referral from  for a  who no longer works in that department. The referral also stated it was for abnormal liver functions and they are an OBGYN department, so Torie stated they would not be the one to receive the referral. Therefore they just wanted the team to know that the referral they received from  was sent to the wrong department and for a provider who no longer works there. Thank you!     Action Taken: Message routed to:  [facility] HEP    Travel Screening: Not Applicable

## 2022-10-20 ENCOUNTER — PATIENT OUTREACH (OUTPATIENT)
Dept: CARE COORDINATION | Facility: CLINIC | Age: 62
End: 2022-10-20

## 2022-10-20 NOTE — PROGRESS NOTES
Clinic Care Coordination Contact    Community Health Worker Follow Up    Care Gaps: Did not discuss    Health Maintenance Due   Topic Date Due     HEPATITIS B IMMUNIZATION (1 of 3 - 3-dose series) Never done     COVID-19 Vaccine (1) Never done     Pneumococcal Vaccine: Pediatrics (0 to 5 Years) and At-Risk Patients (6 to 64 Years) (1 - PCV) Never done     ZOSTER IMMUNIZATION (1 of 2) Never done     DTAP/TDAP/TD IMMUNIZATION (1 - Tdap) Never done     URINE DRUG SCREEN  02/04/2011     LUNG CANCER SCREENING  12/26/2020     INFLUENZA VACCINE (1) Never done       Care Plan:   Care Plan: Physical Activity     Problem: Patient is inactive     Goal: Exercise at 2-3 times per week     Start Date: 11/15/2021 Expected End Date: 1/20/2023    Recent Progress: 50%    Priority: High    Note:     Barriers: difficult to change wellness habits  Strengths: Motivated, enrolled in CC    Action steps to achieve this goal:  1. I will look into the [employer] programs in my area  2. I will practice good self-care - reaching out to people, increasing prayer times, eating well, and staying busy  3. I will begin by walking 5 min at a time, then increase this time to reach 30 min, 3x/wk  4. I will contact HANNY Khan, if I need more resources for exercise in my area    Updated 10/20/22                    Care Plan: Mental Health     Problem: Mood/Psychosocial Concerns     Goal: Begin EMDR therapy  Completed 8/23/2022    Start Date: 3/30/2022 Expected End Date: 9/30/2022    This Visit's Progress: 100%    Note:     Barriers: none noted  Strengths: motivated, enrolled in CC    Action steps to achieve this goal:  1. I will look through EMDR therapy/agencies sent to me by HANNY (completed)  2. I will call out to EMDR therapy/agencies to put myself on a waiting list for therapy (completed)  3. I will let Erin GAMINO, [phone] know if I need further EMDR therapy/agency resources            Goal: Establish Mental Health care  Completed 10/20/2022     Start Date: 3/20/2022 Expected End Date: 9/30/2022    Note:     Goal Statement: I will begin EMDR therapy in the next 6 months    Barriers: none noted  Strengths: motivated, enrolled in     Patient expressed understanding of goal: yes  Action steps to achieve this goal:  1. I will look through EMDR therapy/agencies sent to me by CHW (completed)  2. I will call out to EMDR therapy/agencies to put myself on a waiting list for therapy (completed)  3. I will let Erin GAMINO, [phone] know if I need further EMDR therapy/agency resources                    Care Plan: Identfy appropriate care for father     Problem: Father needs additional support     Goal: Complete Health Care Directive  Completed 8/23/2022    Start Date: 6/7/2022 Expected End Date: 9/7/2022    This Visit's Progress: 100%    Note:     Barriers: None noted  Strengths: Pt and pt's family proactively thinking about respite care for pt's father, motivated, enrolled in     Action steps to achieve this goal:  1. I will look through the list of respite resources sent to me by CHW, and think about the questions to ask respite agencies. (Completed)  2. I will contact [facility], [facility], and [facility] initially, to see if these agencies meet my families' needs for respite care (completed)  3. I will elicit respite care for my father as needed  4. I will contact HANNY Khan, if I have further questions about respite care.               Goal: Identify In-home support services  Completed 10/20/2022    Start Date: 6/7/2022 Expected End Date: 9/30/2022    Note:     Goal Statement: In the next 3 months, I will look for respite care for my father    Barriers: None noted  Strengths: Pt and pt's family proactively thinking about respite care for pt's father, motivated, enrolled in     Patient expressed understanding of goal: yes  Action steps to achieve this goal:  1. I will look through the list of respite resources sent to me by CHW, and  "think about the questions to ask respite agencies. (Completed)  2. I will contact [facility], [facility], and [facility] initially, to see if these agencies meet my families' needs for respite care  3. I will elicit respite care for my father as needed (completed)  4. I will contact HANNY Khan, if I have further questions about respite care.                           Intervention and Education during outreach:    Pt's mother  on 20. \"Even though she was actively dying for 4 1/2 months, it was harder then I thought it would be.\" Pt expresses gratitude that she made amends with her mother before her death. \"I don't have my personal cheerleader anymore\" pt states while her voice tears up. Pt does acknowledge that she is feeling better and doesn't cry every day. Pt continues to see her 2 therapists, one EMDR and the other a trauma therapist which is helpful.    Pt acknowledges that she is grieving \"not only the loss of my mother but other things in my life as well.\" Pt states she had a 4 day period where she was very angry with her mother's partner and took it out on her. She has since apologized, but realizes this is a part of grief. CHW encourages her to participate in good self-care during her grief process, talk about her feelings with her therapists, and know that grief is a journey and she will have good and bad days. Pt verbalizes understanding though she states, \"I don't like it [the grief process].\"    Pt states she went to listen to music with her cousin recently which was emotionally uplifting. Pt had a massage last night which she hasn't done since her mother's death which was great.    Pt states she is going to look for [employer] programs in her area knowing that physical activity will help her with her grief process as well. She is trying to encourage another family member to join her in this exercise endeavor.    CHW asks if pt has any further concerns or need for " resources as this time. Pt states she does not. CHW made sure pt has CHW contact information. Pt will contact CHW with questions or updates before next outreach.     CHW Plan: CHW will follow up with pt in one month.    Erin Strong  Community Health Worker  [facility]  [phone]

## 2022-11-17 DIAGNOSIS — Z86.73 HISTORY OF STROKE: Primary | ICD-10-CM

## 2022-11-17 RX ORDER — ASPIRIN 325 MG
TABLET ORAL
Qty: 60 TABLET | Refills: 0 | Status: SHIPPED | OUTPATIENT
Start: 2022-11-17 | End: 2022-12-21

## 2022-11-17 NOTE — TELEPHONE ENCOUNTER
"Requested Prescriptions   Pending Prescriptions Disp Refills     aspirin (ASA) 325 MG tablet [Pharmacy Med Name: Aspirin Oral Tablet 325 MG] 60 tablet 0     Sig: TAKE ONE TABLET BY MOUTH ONE TIME DAILY       Analgesics (Non-Narcotic Tylenol and ASA Only) Passed - 11/17/2022 11:39 AM        Passed - Recent (12 mo) or future (30 days) visit within the authorizing provider's specialty     Patient has had an office visit with the authorizing provider or a provider within the authorizing providers department within the previous 12 mos or has a future within next 30 days. See \"Patient Info\" tab in inbasket, or \"Choose Columns\" in Meds & Orders section of the refill encounter.              Passed - Patient is age 20 years or older     If ASA is flagged for ages under 20 years old. Forward to provider for confirmation Ryes Syndrome is not a concern.              Passed - Medication is active on med list             Prescription approved per [facility] Refill Protocol.    Ashly Shah RN  [facility]    "

## 2022-11-18 ENCOUNTER — PATIENT OUTREACH (OUTPATIENT)
Dept: CARE COORDINATION | Facility: CLINIC | Age: 62
End: 2022-11-18

## 2022-11-18 NOTE — PROGRESS NOTES
Care Coordination Clinician Chart Review     Situation: Patient chart reviewed by care coordinator.?     Background: Initial assessment and enrollment to Care Coordination was .?? Care plan(s) with patient-centered goal(s) were developed with participation from patient.? Lead CC handed patient off to CHW for continued outreach every 30 days.??     Assessment: Per chart review, patient outreach completed by CC CHW on 10/20/2022.? Patient is actively working to accomplish goal(s).? Patient's goal(s) remain(s) appropriate at this time.? Patient is not due for updated Plan of Care.? Annual assessment will be due 1/3/23.     Care Plan: Physical Activity     Problem: Patient is inactive     Goal: Exercise at 2-3 times per week     Start Date: 11/15/2021 Expected End Date: 1/20/2023    Recent Progress: 50%    Priority: High    Note:     Barriers: difficult to change wellness habits  Strengths: Motivated, enrolled in     Action steps to achieve this goal:  1. I will look into the [employer] programs in my area  2. I will practice good self-care - reaching out to people, increasing prayer times, eating well, and staying busy  3. I will begin by walking 5 min at a time, then increase this time to reach 30 min, 3x/wk  4. I will contact HANNY Khan, if I need more resources for exercise in my area    Updated 10/20/22                    Care Plan: Mental Health     Problem: Mood/Psychosocial Concerns     Goal: Begin EMDR therapy  Completed 8/23/2022    Start Date: 3/30/2022 Expected End Date: 9/30/2022    This Visit's Progress: 100%    Note:     Barriers: none noted  Strengths: motivated, enrolled in     Action steps to achieve this goal:  1. I will look through EMDR therapy/agencies sent to me by CYNTHIAW (completed)  2. I will call out to EMDR therapy/agencies to put myself on a waiting list for therapy (completed)  3. I will let Erin GAMINO, [phone] know if I need further EMDR therapy/agency resources            Goal:  Establish Mental Health care  Completed 10/20/2022    Start Date: 3/20/2022 Expected End Date: 9/30/2022    Note:     Goal Statement: I will begin EMDR therapy in the next 6 months    Barriers: none noted  Strengths: motivated, enrolled in CC    Patient expressed understanding of goal: yes  Action steps to achieve this goal:  1. I will look through EMDR therapy/agencies sent to me by CHW (completed)  2. I will call out to EMDR therapy/agencies to put myself on a waiting list for therapy (completed)  3. I will let Erin GAMINO, [phone] know if I need further EMDR therapy/agency resources                    Care Plan: Identfy appropriate care for father     Problem: Father needs additional support     Goal: Complete Health Care Directive  Completed 8/23/2022    Start Date: 6/7/2022 Expected End Date: 9/7/2022    This Visit's Progress: 100%    Note:     Barriers: None noted  Strengths: Pt and pt's family proactively thinking about respite care for pt's father, motivated, enrolled in     Action steps to achieve this goal:  1. I will look through the list of respite resources sent to me by CHW, and think about the questions to ask respite agencies. (Completed)  2. I will contact [facility], [facility], and [facility] initially, to see if these agencies meet my families' needs for respite care (completed)  3. I will elicit respite care for my father as needed  4. I will contact HANNY Khan, if I have further questions about respite care.               Goal: Identify In-home support services  Completed 10/20/2022    Start Date: 6/7/2022 Expected End Date: 9/30/2022    Note:     Goal Statement: In the next 3 months, I will look for respite care for my father    Barriers: None noted  Strengths: Pt and pt's family proactively thinking about respite care for pt's father, motivated, enrolled in     Patient expressed understanding of goal: yes  Action steps to achieve this goal:  1. I will look through  the list of respite resources sent to me by CHW, and think about the questions to ask respite agencies. (Completed)  2. I will contact [facility], [facility], and [facility] initially, to see if these agencies meet my families' needs for respite care  3. I will elicit respite care for my father as needed (completed)  4. I will contact CYNTHIA KhanW, if I have further questions about respite care.                           Plan/Recommendations: The patient will continue working with Care Coordination to achieve above goal(s).? CHW will involve Lead CC as needed or if patient is ready to move to maintenance.? Lead CC will continue to monitor CHW s monthly outreaches and progress to goal(s) every 6 weeks.    Plan of Care updated and sent to patient: Asha Mclaughlin,  [facility]  Clinic Care Coordinator  [facility]  [phone]  [email or web address]

## 2022-11-21 ENCOUNTER — PATIENT OUTREACH (OUTPATIENT)
Dept: CARE COORDINATION | Facility: CLINIC | Age: 62
End: 2022-11-21

## 2022-11-21 NOTE — PROGRESS NOTES
Clinic Care Coordination Contact    Community Health Worker Follow Up    Care Gaps: Did not discuss this date.     Health Maintenance Due   Topic Date Due     COVID-19 Vaccine (1) Never done     Pneumococcal Vaccine: Pediatrics (0 to 5 Years) and At-Risk Patients (6 to 64 Years) (1 - PCV) Never done     ZOSTER IMMUNIZATION (1 of 2) Never done     DTAP/TDAP/TD IMMUNIZATION (1 - Tdap) Never done     URINE DRUG SCREEN  02/04/2011     LUNG CANCER SCREENING  12/26/2020     INFLUENZA VACCINE (1) Never done       Postponed to next CHW visit     Care Plan:   Care Plan: Physical Activity     Problem: Patient is inactive     Goal: Exercise at 2-3 times per week     Start Date: 11/15/2021 Expected End Date: 1/20/2023    This Visit's Progress: 70% Recent Progress: 50%    Priority: High    Note:     Barriers: difficult to change wellness habits  Strengths: Motivated, enrolled in CC    Action steps to achieve this goal:  1. I will look into the [employer] programs in Nov/Dec 2022.   2. I will practice good self-care - reaching out to people, increasing prayer times, eating well, and staying busy (completed)  3. I will begin by walking 5 min at a time, then increase this time to reach 30 min, 3x/wk  4. I will contact HANNY Khan, if I need more resources for exercise in my area    Updated 11/21/22    Updated 10/20/22                    Care Plan: Mental Health     Problem: Mood/Psychosocial Concerns     Goal: Begin EMDR therapy  Completed 8/23/2022    Start Date: 3/30/2022 Expected End Date: 9/30/2022    This Visit's Progress: 100%    Note:     Barriers: none noted  Strengths: motivated, enrolled in CC    Action steps to achieve this goal:  1. I will look through EMDR therapy/agencies sent to me by HANNY (completed)  2. I will call out to EMDR therapy/agencies to put myself on a waiting list for therapy (completed)  3. I will let Erin GAMINO, [phone] know if I need further EMDR therapy/agency resources             Goal: Establish Mental Health care  Completed 10/20/2022    Start Date: 3/20/2022 Expected End Date: 9/30/2022    Note:     Goal Statement: I will begin EMDR therapy in the next 6 months    Barriers: none noted  Strengths: motivated, enrolled in CC    Patient expressed understanding of goal: yes  Action steps to achieve this goal:  1. I will look through EMDR therapy/agencies sent to me by CHW (completed)  2. I will call out to EMDR therapy/agencies to put myself on a waiting list for therapy (completed)  3. I will let Erin GAMINO, [phone] know if I need further EMDR therapy/agency resources                    Care Plan: Identfy appropriate care for father     Problem: Father needs additional support     Goal: Complete Health Care Directive  Completed 8/23/2022    Start Date: 6/7/2022 Expected End Date: 9/7/2022    This Visit's Progress: 100%    Note:     Barriers: None noted  Strengths: Pt and pt's family proactively thinking about respite care for pt's father, motivated, enrolled in     Action steps to achieve this goal:  1. I will look through the list of respite resources sent to me by CHW, and think about the questions to ask respite agencies. (Completed)  2. I will contact [facility], [facility], and [facility] initially, to see if these agencies meet my families' needs for respite care (completed)  3. I will elicit respite care for my father as needed  4. I will contact HANNY Khan, if I have further questions about respite care.               Goal: Identify In-home support services  Completed 10/20/2022    Start Date: 6/7/2022 Expected End Date: 9/30/2022    Note:     Goal Statement: In the next 3 months, I will look for respite care for my father    Barriers: None noted  Strengths: Pt and pt's family proactively thinking about respite care for pt's father, motivated, enrolled in     Patient expressed understanding of goal: yes  Action steps to achieve this goal:  1. I will look  "through the list of respite resources sent to me by CHW, and think about the questions to ask respite agencies. (Completed)  2. I will contact [facility], [facility] [place], and [facility] initially, to see if these agencies meet my families' needs for respite care  3. I will elicit respite care for my father as needed (completed)  4. I will contact HANNY Khan, if I have further questions about respite care.                           Intervention and Education during outreach:     Pt continues her journey through grief following the death of her mother 2 months ago. Pt continues to see 2 therapists, one EMDR and one a trauma therapist, which has been so helpful to her.    Pt states she has always been a people pleaser, so at this time she is really learning to say no to things because they would not be helpful to her during this time. Pt told a cousin that she was not interested in going on a 7 day cruise with her. This was hard, but pt acknowledges that she really had no interest to be gone so long at sea.    Last week was \"a hell of a week\". In addition to helping M-F with her father who has advanced Alzheimer's, she also needed to take a brother-in-law to some appointments after a difficult medical situation with him.     Pt has been practicing good self-care - going to acupuncture appointments, spending time with her mother's partner, Tj, watching some TV, attending therapies, writing thank you cards following her mother's death, etc. She continues to learn how to be still, admitting that she is hurting but not allowing herself to become angry. Pt is trying to find the balance between being still and knowing she has to go shopping, do the laundry, etc. \"I just want to be a whole person again.\"    Pt did call her insurance to find out that membership to a [employer] program is covered. Her insurance recommended she call some [employer] and fitness clubs in her area to see if they have Silver " [employer] programs. \"I just need to do that.\" CHW and pt had a conversation about how she has mental health supports in place and now it would be beneficial to have physical health supports in place, especially moving into the winter months. Pt states she has a [facility] in [place] which has a Silver Sneaker's program which is not far from her, it is on a busy street so the roads will be plowed in winter, and it is a large facility so would likely have many activities for her. Pt states she is going to call after our phone conversation to talk with staff about joining their Silver Sneaker's program.    CHW asks if pt has any further concerns or need for resources as this time. Pt states she does not. CHW made sure pt has CHW contact information. Pt will contact CHW with questions or updates before next outreach.       CHW Plan: CHW will follow up with pt in one month.    Erin Strong  Community Health Worker  [facility]  [phone]            "

## 2022-12-19 ENCOUNTER — PATIENT OUTREACH (OUTPATIENT)
Dept: CARE COORDINATION | Facility: CLINIC | Age: 62
End: 2022-12-19

## 2022-12-19 NOTE — PROGRESS NOTES
Clinic Care Coordination Contact    Community Health Worker Follow Up    Care Gaps: Discussed today. Pt does not believe in getting immunizations so has not plans to complete these care gaps. Discussed Urine Drug Screen and lung cancer screening listed on care gaps. CHW recommends pt discuss these care gaps with Dr. Orosco at her next PCP visit. Pt plans to make a visit with Dr. Orosco in the new year.    Health Maintenance Due   Topic Date Due     COVID-19 Vaccine (1) Never done     Pneumococcal Vaccine: Pediatrics (0 to 5 Years) and At-Risk Patients (6 to 64 Years) (1 - PCV) Never done     ZOSTER IMMUNIZATION (1 of 2) Never done     DTAP/TDAP/TD IMMUNIZATION (1 - Tdap) Never done     URINE DRUG SCREEN  02/04/2011     LUNG CANCER SCREENING  12/26/2020     INFLUENZA VACCINE (1) Never done       Care Gap Goal set: No. Discussed care gaps today (except immunizations).    Care Plan: Physical Activity     Problem: Patient is inactive     Goal: Exercise at 2-3 times per week  Completed 12/19/2022    Start Date: 11/15/2021 Expected End Date: 1/20/2023    This Visit's Progress: 100% Recent Progress: 70%    Priority: High    Note:     Barriers: difficult to change wellness habits  Strengths: Motivated, enrolled in CC    Action steps to achieve this goal:  1. I will look into the [employer] Silver [employer]ne[employer] programs in Nov/Dec 2022. (Contemplating joining [facility] [employer] vs [place] [employer] on 12/19/22 - plans to join in Jan 2023)  2. I will practice good self-care - reaching out to people, increasing prayer times, eating well, and staying busy (completed)  3. I will begin by walking 5 min at a time, then increase this time to reach 30 min, 3x/wk  4. I will contact HANNY Khan, if I need more resources for exercise in my area    Updated 11/21/22    Updated 10/20/22                    Care Plan: Mental Health     Problem: Mood/Psychosocial Concerns     Goal: Begin EMDR therapy  Completed 8/23/2022    Start Date: 3/30/2022  Expected End Date: 9/30/2022    This Visit's Progress: 100%    Note:     Barriers: none noted  Strengths: motivated, enrolled in     Action steps to achieve this goal:  1. I will look through EMDR therapy/agencies sent to me by CYNTHIAW (completed)  2. I will call out to EMDR therapy/agencies to put myself on a waiting list for therapy (completed)  3. I will let Erin GAMINO, [phone] know if I need further EMDR therapy/agency resources            Goal: Establish Mental Health care  Completed 10/20/2022    Start Date: 3/20/2022 Expected End Date: 9/30/2022    Note:     Goal Statement: I will begin EMDR therapy in the next 6 months    Barriers: none noted  Strengths: motivated, enrolled in     Patient expressed understanding of goal: yes  Action steps to achieve this goal:  1. I will look through EMDR therapy/agencies sent to me by CHW (completed)  2. I will call out to EMDR therapy/agencies to put myself on a waiting list for therapy (completed)  3. I will let Erin GAMINO, [phone] know if I need further EMDR therapy/agency resources                    Care Plan: Identfy appropriate care for father     Problem: Father needs additional support     Goal: Complete Health Care Directive  Completed 8/23/2022    Start Date: 6/7/2022 Expected End Date: 9/7/2022    This Visit's Progress: 100%    Note:     Barriers: None noted  Strengths: Pt and pt's family proactively thinking about respite care for pt's father, motivated, enrolled in     Action steps to achieve this goal:  1. I will look through the list of respite resources sent to me by CHW, and think about the questions to ask respite agencies. (Completed)  2. I will contact [facility], [facility], and [facility] initially, to see if these agencies meet my families' needs for respite care (completed)  3. I will elicit respite care for my father as needed  4. I will contact HANNY Khan, if I have further questions about respite care.                "Goal: Identify In-home support services  Completed 10/20/2022    Start Date: 6/7/2022 Expected End Date: 9/30/2022    Note:     Goal Statement: In the next 3 months, I will look for respite care for my father    Barriers: None noted  Strengths: Pt and pt's family proactively thinking about respite care for pt's father, motivated, enrolled in CC    Patient expressed understanding of goal: yes  Action steps to achieve this goal:  1. I will look through the list of respite resources sent to me by CHW, and think about the questions to ask respite agencies. (Completed)  2. I will contact [facility], [facility], and [facility] initially, to see if these agencies meet my families' needs for respite care  3. I will elicit respite care for my father as needed (completed)  4. I will contact HANNY Khan, if I have further questions about respite care.                           Intervention and Education during outreach:     Pt plans to make an appointment with Dr. Orosco in the new year to discuss care gaps and other things.     Pt states she is really doing pretty well. She plans to go to [facility] from 12/22/22 - 12/26/22 with her cousin for some much needed R&R.     Pt continues to assist her sister in the care of their father who has dementia, something she has done for 3.5 years. Pt's father lives with her sister whose  recently had a stroke and is in neuro rehab right. Sister is taking a 3 month CARL from her work.    Pt continues to do EMDR and trauma work every other week which is helpful. She is continuing to learn to set boundaries with her time and say no to family, friends, events which could deplete her of energy. \"I am giving myself permission to heal.\"  Pt acknowledges she cannot heal if she is always going, going, going.    Pt is deciding between enrolling at the [facility] or [employer] program, both of which has Silver Sneaker's programs. Pt's cousin attends the " [facility] and encourages to join there, because it is on her way home from caring for her father in [place]. Pt plans to tour both facilities in the new year and enroll in one of their programs.     CHW has completed all goals with patient and has reviewed no new needs from patient. Maintenance has been discussed with patient, and she is in favor of moving to maintenance.    CHW Plan: CHW will route to [facility], to review for maintenance and/or further direction per standard work. No outreaches scheduled at this time. CHW will plan to call pt in 2 months, pending Chitra's approval to progress to maintenance.    Erin Strong  Community Health Worker  [facility]  [phone]

## 2022-12-21 DIAGNOSIS — Z86.73 HISTORY OF STROKE: ICD-10-CM

## 2022-12-21 RX ORDER — ASPIRIN 325 MG
TABLET ORAL
Qty: 60 TABLET | Refills: 0 | Status: SHIPPED | OUTPATIENT
Start: 2022-12-21 | End: 2023-05-30

## 2022-12-21 NOTE — TELEPHONE ENCOUNTER
"Requested Prescriptions   Pending Prescriptions Disp Refills     aspirin (ASA) 325 MG tablet [Pharmacy Med Name: Aspirin Oral Tablet 325 MG] 60 tablet 0     Sig: TAKE ONE TABLET BY MOUTH ONE TIME DAILY       Analgesics (Non-Narcotic Tylenol and ASA Only) Passed - 12/21/2022  2:00 AM        Passed - Recent (12 mo) or future (30 days) visit within the authorizing provider's specialty     Patient has had an office visit with the authorizing provider or a provider within the authorizing providers department within the previous 12 mos or has a future within next 30 days. See \"Patient Info\" tab in inbasket, or \"Choose Columns\" in Meds & Orders section of the refill encounter.              Passed - Patient is age 20 years or older     If ASA is flagged for ages under 20 years old. Forward to provider for confirmation Ryes Syndrome is not a concern.              Passed - Medication is active on med list           Prescription approved per [facility] Refill Protocol.  Nydia Jerez RN on 12/21/2022 at 7:12 AM    "

## 2022-12-26 ENCOUNTER — PATIENT OUTREACH (OUTPATIENT)
Dept: CARE COORDINATION | Facility: CLINIC | Age: 62
End: 2022-12-26

## 2022-12-26 NOTE — PROGRESS NOTES
Care Coordination Clinician Chart Review     Situation: Patient chart reviewed by care coordinator.?     Background: Initial assessment and enrollment to Care Coordination was 1/3/20.?? Care plan(s) with patient-centered goal(s) were developed with participation from patient.? Lead CC handed patient off to CHW for continued outreach every 30 days.??     Assessment: Per chart review, patient outreach completed by CC CHW on 12/19/22.? Patient is actively working to accomplish goal(s).? Patient's goal(s) remain(s) appropriate at this time.? Patient is not due for updated Plan of Care.? Annual assessment will be due 1/3/23.  Pt is now MAINTENANCE.    Care Plan: Physical Activity     Problem: Patient is inactive     Goal: Exercise at 2-3 times per week  Completed 12/19/2022    Start Date: 11/15/2021 Expected End Date: 1/20/2023    This Visit's Progress: 100% Recent Progress: 70%    Priority: High    Note:     Barriers: difficult to change wellness habits  Strengths: Motivated, enrolled in     Action steps to achieve this goal:  1. I will look into the [employer] programs in Nov/Dec 2022. (Contemplating joining [facility] [employer] [facility] [employer] on 12/19/22 - plans to join in Jan 2023)  2. I will practice good self-care - reaching out to people, increasing prayer times, eating well, and staying busy (completed)  3. I will begin by walking 5 min at a time, then increase this time to reach 30 min, 3x/wk  4. I will contact HANNY Khan, if I need more resources for exercise in my area    Updated 11/21/22    Updated 10/20/22                    Care Plan: Mental Health     Problem: Mood/Psychosocial Concerns     Goal: Begin EMDR therapy  Completed 8/23/2022    Start Date: 3/30/2022 Expected End Date: 9/30/2022    This Visit's Progress: 100%    Note:     Barriers: none noted  Strengths: motivated, enrolled in     Action steps to achieve this goal:  1. I will look through EMDR therapy/agencies sent to me by  HANNY (completed)  2. I will call out to EMDR therapy/agencies to put myself on a waiting list for therapy (completed)  3. I will let Erin GAMINO, [phone] know if I need further EMDR therapy/agency resources            Goal: Establish Mental Health care  Completed 10/20/2022    Start Date: 3/20/2022 Expected End Date: 9/30/2022    Note:     Goal Statement: I will begin EMDR therapy in the next 6 months    Barriers: none noted  Strengths: motivated, enrolled in CC    Patient expressed understanding of goal: yes  Action steps to achieve this goal:  1. I will look through EMDR therapy/agencies sent to me by CHW (completed)  2. I will call out to EMDR therapy/agencies to put myself on a waiting list for therapy (completed)  3. I will let Erin GAMINO, [phone] know if I need further EMDR therapy/agency resources                    Care Plan: Identfy appropriate care for father     Problem: Father needs additional support     Goal: Complete Health Care Directive  Completed 8/23/2022    Start Date: 6/7/2022 Expected End Date: 9/7/2022    This Visit's Progress: 100%    Note:     Barriers: None noted  Strengths: Pt and pt's family proactively thinking about respite care for pt's father, motivated, enrolled in     Action steps to achieve this goal:  1. I will look through the list of respite resources sent to me by CHW, and think about the questions to ask respite agencies. (Completed)  2. I will contact [facility], [facility], and [facility] initially, to see if these agencies meet my families' needs for respite care (completed)  3. I will elicit respite care for my father as needed  4. I will contact HANNY Khan, if I have further questions about respite care.               Goal: Identify In-home support services  Completed 10/20/2022    Start Date: 6/7/2022 Expected End Date: 9/30/2022    Note:     Goal Statement: In the next 3 months, I will look for respite care for my father    Barriers: None  noted  Strengths: Pt and pt's family proactively thinking about respite care for pt's father, motivated, enrolled in CC    Patient expressed understanding of goal: yes  Action steps to achieve this goal:  1. I will look through the list of respite resources sent to me by CHW, and think about the questions to ask respite agencies. (Completed)  2. I will contact [facility], [facility], and [facility] initially, to see if these agencies meet my families' needs for respite care  3. I will elicit respite care for my father as needed (completed)  4. I will contact HANNY Khan, if I have further questions about respite care.                           Plan/Recommendations: The patient will continue working with Care Coordination to achieve above goal(s).? CHW will involve Lead CC as needed or if patient is ready to move to maintenance.? Lead CC will continue to monitor CHW s monthly outreaches and progress to goal(s) every 6 weeks.    Plan of Care updated and sent to patient: Yes

## 2023-01-06 ENCOUNTER — TELEPHONE (OUTPATIENT)
Dept: FAMILY MEDICINE | Facility: CLINIC | Age: 63
End: 2023-01-06

## 2023-01-06 DIAGNOSIS — H10.13 ALLERGIC CONJUNCTIVITIS, BILATERAL: ICD-10-CM

## 2023-01-06 DIAGNOSIS — F32.1 MODERATE MAJOR DEPRESSION (H): Primary | ICD-10-CM

## 2023-01-06 RX ORDER — CETIRIZINE HYDROCHLORIDE 10 MG/1
10 TABLET ORAL DAILY
Qty: 90 TABLET | Refills: 3 | Status: SHIPPED | OUTPATIENT
Start: 2023-01-06 | End: 2024-07-10

## 2023-01-06 RX ORDER — DULOXETIN HYDROCHLORIDE 60 MG/1
CAPSULE, DELAYED RELEASE ORAL
Qty: 135 CAPSULE | Refills: 3 | Status: SHIPPED | OUTPATIENT
Start: 2023-01-06 | End: 2023-05-30 | Stop reason: DRUGHIGH

## 2023-01-06 NOTE — TELEPHONE ENCOUNTER
LVM informing rx sent and to schedule f/u in 6 months with Dr. Ana Luisa PRINCE, RN  [facility]

## 2023-01-06 NOTE — TELEPHONE ENCOUNTER
OK for refills, followup appointment with me in 6 months. Orders signed, please notify, thanks Rafael    [Patient] : patient [Mother] : mother

## 2023-01-06 NOTE — TELEPHONE ENCOUNTER
Pt called because her therapist is no longer working and she needs refills on these meds, she has does not have any more left.      DULoxetine (CYMBALTA) 60 MG capsule   cetirizine (ZYRTEC) 10 MG tablet      Please follow up with pt if this can be refilled or not until she is seen with someone else.

## 2023-01-24 ENCOUNTER — PATIENT OUTREACH (OUTPATIENT)
Dept: CARE COORDINATION | Facility: CLINIC | Age: 63
End: 2023-01-24
Payer: COMMERCIAL

## 2023-01-24 NOTE — PROGRESS NOTES
Clinic Care Coordination Contact  [facility]/Voicemail      Pt left VM on 1/20/23 at 10:26 am requesting a return phone call.     Clinical Data: Care Coordinator Outreach  Outreach attempted x 1.  Left message on patient's voicemail with call back information and requested return call.  Plan:  Care Coordinator will try to reach patient again in 10 business days.    Erin Strong  Community Health Worker  [facility]  [phone]

## 2023-02-06 ENCOUNTER — PATIENT OUTREACH (OUTPATIENT)
Dept: CARE COORDINATION | Facility: CLINIC | Age: 63
End: 2023-02-06
Payer: COMMERCIAL

## 2023-02-06 NOTE — PROGRESS NOTES
Clinic Care Coordination Contact  Care Coordination Clinician Chart Review    Situation: Patient chart reviewed by Care Coordinator.       Background: Care Coordination Program started: 1/3/2020. Initial assessment completed and patient-centered care plan(s) were developed with participation from patient. Lead CC handed patient off to CHW for continued outreaches.       Assessment: Per chart review, patient outreach completed by CC CHW on 1/24/23.  Patient is actively working to accomplish goal(s). Patient's goal(s) appropriate and relevant at this time. Patient is not due for updated Plan of Care.  Assessments will be completed annually or as needed/with change of patient status.      Care Plan: Physical Activity     Problem: Patient is inactive             Care Plan: Mental Health     Problem: Mood/Psychosocial Concerns             Care Plan: Identfy appropriate care for father     Problem: Father needs additional support                    Plan/Recommendations: The patient will continue working with Care Coordination to achieve goal(s) as above. CHW will continue outreaches at minimum every 30 days and will involve Lead CC as needed or if patient is ready to move to Maintenance. Lead CC will continue to monitor CHW outreaches and patient's progress to goal(s) every 6 weeks.     Plan of Care updated and sent to patient: Yes, via Adali Mclaughlin  [facility]  Clinic Care Coordinator  [facility]  [phone]  [email or web address]

## 2023-02-07 ENCOUNTER — TELEPHONE (OUTPATIENT)
Dept: FAMILY MEDICINE | Facility: CLINIC | Age: 63
End: 2023-02-07
Payer: COMMERCIAL

## 2023-02-07 ENCOUNTER — PATIENT OUTREACH (OUTPATIENT)
Dept: CARE COORDINATION | Facility: CLINIC | Age: 63
End: 2023-02-07
Payer: COMMERCIAL

## 2023-02-07 NOTE — PROGRESS NOTES
"Clinic Care Coordination Contact    Community Health Worker Follow Up    Care Gaps: Care gaps reviewed 22 with pt.    Health Maintenance Due   Topic Date Due     COVID-19 Vaccine (1) Never done     Pneumococcal Vaccine: Pediatrics (0 to 5 Years) and At-Risk Patients (6 to 64 Years) (1 - PCV) Never done     ZOSTER IMMUNIZATION (1 of 2) Never done     DTAP/TDAP/TD IMMUNIZATION (1 - Tdap) Never done     URINE DRUG SCREEN  2011     LUNG CANCER SCREENING  2020     INFLUENZA VACCINE (1) Never done     PHQ-9  2023       Care Gap Goal set: No    Care Plan:   Care Plan: Physical Activity     Problem: Patient is inactive             Care Plan: Mental Health     Problem: Mood/Psychosocial Concerns             Care Plan: Identfy appropriate care for father     Problem: Father needs additional support                 Intervention and Education during outreach:     Pt signed up for the [employer] program at her local [facility] 2 weeks ago. Later this week pt will get a tour of the [facility] and get an overview of the [employer] program. Pt is very excited to join, setting a goal of attending once weekly. Pt would like to try water aerobics. Pt has set a goal of losing 10 lbs. Pt states she didn't need to pay for the program - she presented her insurance card and the cost was covered.    Pt was confused about something she received on [employer] from a person she didn't recognize and which required updating (her mother is now  so doesn't want her as an emergency contact). After some discussion, CHW determined pt had received a [facility] Patient Centered Plan of Care from SANTY Young, which needs to be sent to patients every 3 months. CHW states she can probably change her emergency contact information by looking at the patient profile tab in [employer].    Pt states she doesn't really know how to use [employer]. \"I need a tutorial.\" CHW gave her the number of [employer], " [phone], which she wrote down and will contact in the future.    Reviewed that pt has a rheumatology visit on 4/7/23.    Pt has reports she has pain for one week on the back of her leg. It is different from sciatic pain. She has been pursuing acupuncture treatment and it is helping.     Pt continues with EMDR therapy and trauma therapy.     Patient has completed Maintenance phase and has no other ongoing needs from CC. Graduation has been discussed and pt is in agreement with moving to graduation. Pt is aware that she can contact her PCP at any time in the future for a referral to work with CC again.     CHW Plan: CHW will route to [facility], to review for graduation and/or further direction per standard work. No outreaches scheduled at this time.      Erin Strong  Community Health Worker  [facility]  [phone]

## 2023-02-07 NOTE — TELEPHONE ENCOUNTER
"Pt calling stating that she is trying to get her health back on track. Pt would like to get her A1C done. Pt is aware that she has labs pended to get done from 07/29/22. Pt plans to come in and get those completed. Pt stated \"I just had so much going on.\"  Pt states that she just doesn't feel right. Worried it could be diabetes.     Did you want to have a virtual or just order labs. Pt was seen on 07/29/22    Pt is also reaching out to her provider that treats her Lupus.    Order for A1C has been pended. Thanks    Ashly Shah RN  [facility]   "

## 2023-02-08 ENCOUNTER — PATIENT OUTREACH (OUTPATIENT)
Dept: CARE COORDINATION | Facility: CLINIC | Age: 63
End: 2023-02-08
Payer: COMMERCIAL

## 2023-02-08 NOTE — PROGRESS NOTES
Clinic Care Coordination Contact    Assessment: Care Coordinator contacted patient for 2 month follow up.  Patient has continued to follow the plan of care and assessment is negative for any new needs or concerns.    Enrollment status: Graduated.      Plan: No further outreaches at this time.  Patient will continue to follow the plan of care.  If new needs arise a new Care Coordination referral may be placed.  FYI to PCP    Chitra Mclaughlin  [facility]  Clinic Care Coordinator  [facility]  [phone]  [email or web address]

## 2023-02-22 ENCOUNTER — OFFICE VISIT (OUTPATIENT)
Dept: FAMILY MEDICINE | Facility: CLINIC | Age: 63
End: 2023-02-22
Payer: COMMERCIAL

## 2023-02-22 VITALS
BODY MASS INDEX: 27.8 KG/M2 | DIASTOLIC BLOOD PRESSURE: 73 MMHG | SYSTOLIC BLOOD PRESSURE: 114 MMHG | HEART RATE: 93 BPM | HEIGHT: 66 IN | TEMPERATURE: 97.6 F | RESPIRATION RATE: 14 BRPM | OXYGEN SATURATION: 96 % | WEIGHT: 173 LBS

## 2023-02-22 DIAGNOSIS — Z83.3 FAMILY HISTORY OF DIABETES MELLITUS: ICD-10-CM

## 2023-02-22 DIAGNOSIS — Z13.1 SCREENING FOR DIABETES MELLITUS: Primary | ICD-10-CM

## 2023-02-22 LAB — HBA1C MFR BLD: 5.5 % (ref 0–5.6)

## 2023-02-22 PROCEDURE — 99212 OFFICE O/P EST SF 10 MIN: CPT | Performed by: FAMILY MEDICINE

## 2023-02-22 PROCEDURE — 83036 HEMOGLOBIN GLYCOSYLATED A1C: CPT | Performed by: FAMILY MEDICINE

## 2023-02-22 PROCEDURE — 36415 COLL VENOUS BLD VENIPUNCTURE: CPT | Performed by: FAMILY MEDICINE

## 2023-02-22 ASSESSMENT — PATIENT HEALTH QUESTIONNAIRE - PHQ9
SUM OF ALL RESPONSES TO PHQ QUESTIONS 1-9: 12
SUM OF ALL RESPONSES TO PHQ QUESTIONS 1-9: 12
10. IF YOU CHECKED OFF ANY PROBLEMS, HOW DIFFICULT HAVE THESE PROBLEMS MADE IT FOR YOU TO DO YOUR WORK, TAKE CARE OF THINGS AT HOME, OR GET ALONG WITH OTHER PEOPLE: SOMEWHAT DIFFICULT

## 2023-02-22 ASSESSMENT — PAIN SCALES - GENERAL: PAINLEVEL: MODERATE PAIN (5)

## 2023-02-22 NOTE — PATIENT INSTRUCTIONS
At [facility], we strive to deliver an exceptional experience to you, every time we see you. If you receive a survey, please complete it as we do value your feedback.  If you have MyChart, you can expect to receive results automatically within 24 hours of their completion.  Your provider will send a note interpreting your results as well.   If you do not have MyChart, you should receive your results in about a week by mail.    Your care team:                            Family Medicine Internal Medicine   MD Angel Sparks MD Shantel Branch-Fleming, MD Srinivasa Vaka, MD Katya Belousova, PATHOMAS Gandhi CNP, MD ([facility]   Naveed Dover, MD Karissa Archer MD Amelia Massimini APRN BENEDICT Moore APRN MD Betty Silva MD          Clinic hours: Monday - Thursday 7 am-6 pm; Fridays 7 am-5 pm.   Urgent care: Monday - Friday 10 am- 8 pm; Saturday and Sunday 9 am-5 pm.    Clinic: [phone]       [facility]: Monday - Thursday 8 am - 7 pm; Friday 8 am - 6 pm  [facility]: [phone]

## 2023-02-22 NOTE — PROGRESS NOTES
"  Tabitha Sutton is a 62 year old, presenting for the following health issues:  Consult For (Diabetes)      History of Present Illness       Reason for visit:  Rule out diabetes or infection  Symptom onset:  1-2 weeks ago  Symptoms include:  Chronic feet pain    She eats 2-3 servings of fruits and vegetables daily.She consumes 1 sweetened beverage(s) daily.She exercises with enough effort to increase her heart rate 9 or less minutes per day.  She exercises with enough effort to increase her heart rate 3 or less days per week.   She is taking medications regularly.    Today's PHQ-9         PHQ-9 Total Score: 12    PHQ-9 Q9 Thoughts of better off dead/self-harm past 2 weeks :   Several days  Thoughts of suicide or self harm: (P) No  Self-harm Plan:     Self-harm Action:       Safety concerns for self or others: (P) No    How difficult have these problems made it for you to do your work, take care of things at home, or get along with other people: Somewhat difficult    Patient stated father has diabetes and wants to make sure she does not have diabetes.       Review of Systems   Constitutional, HEENT, cardiovascular, pulmonary, GI, , musculoskeletal, neuro, skin, endocrine and psych systems are negative, except as otherwise noted.      Objective    /73 (BP Location: Left arm, Patient Position: Chair, Cuff Size: Adult Large)   Pulse 93   Temp 97.6  F (36.4  C) (Tympanic)   Resp 14   Ht 1.672 m (5' 5.83\")   Wt 78.5 kg (173 lb)   LMP 08/12/2013   SpO2 96%   BMI 28.07 kg/m    Body mass index is 28.07 kg/m .  Physical Exam   GENERAL: healthy, alert and no distress  NECK: no adenopathy, no asymmetry, masses, or scars and thyroid normal to palpation  RESP: lungs clear to auscultation - no rales, rhonchi or wheezes  CV: regular rate and rhythm, normal S1 S2, no S3 or S4, no murmur, click or rub, no peripheral edema and peripheral pulses strong  ABDOMEN: soft, nontender, no hepatosplenomegaly, no masses and " bowel sounds normal  MS: no gross musculoskeletal defects noted, no edema    A/P:  (Z13.1) Screening for diabetes mellitus  (primary encounter diagnosis)  Comment:   Plan: check a1c. Reassured patient that her glucose labs were normal in the past.    (Z83.3) Family history of diabetes mellitus  Comment:   Plan: Hemoglobin A1c            Lennox Marr MD

## 2023-03-19 DIAGNOSIS — G89.29 CHRONIC BILATERAL LOW BACK PAIN WITHOUT SCIATICA: ICD-10-CM

## 2023-03-19 DIAGNOSIS — M54.50 CHRONIC BILATERAL LOW BACK PAIN WITHOUT SCIATICA: ICD-10-CM

## 2023-03-20 NOTE — TELEPHONE ENCOUNTER
Routing refill request to provider for review/approval because:  Drug not on the [facility] refill protocol refill request for zanaflex    Swati Ghotra RN   [facility]

## 2023-03-21 ENCOUNTER — NURSE TRIAGE (OUTPATIENT)
Dept: FAMILY MEDICINE | Facility: CLINIC | Age: 63
End: 2023-03-21

## 2023-03-21 NOTE — TELEPHONE ENCOUNTER
"Patient called crying and stating she has been very depressed and down lately, try to figure out purpose in life. No thoughts of self harm or harm of others just feeling very depressed lately with care giving duties for father, grief from loss of mother. Not taking care of herself  Causing difficulty sleeping, tries natural tea and calm music to help fall asleep but doesn't work.     Thinks needs additional medication for depression, duloxetine is not working as much anymore.   Patient has appt with therapist Friday at 9 am; appt with psychiatry in May but feels needs more help until then.     Patient very appreciative to just talk to someone tonight that wasn't \"family telling her to just trust in God\".   Provided mental health crisis resources ([facility], 988, [facility]) over phone and patient states she will call [facility] if needs to talk again tonight, will go to [facility] now to be around people. Will call back if needs anything else.     Patient stopped crying during call and again stated felt better just getting concerns and worries off chest and someone was there to listen. Will call back to schedule if feels needs an appt with clinic    Sent resources via [employer] as well    routed to PCP to review    Reason for Disposition    Depression is worsening (e.g.,sleeping poorly, less able to do activities of daily living)    Answer Assessment - Initial Assessment Questions  1. CONCERN: \"What happened that made you call today?\"      Feeling very down and depressed lately. Still grieving mom, how passed away   2. DEPRESSION SYMPTOM SCREENING: \"How are you feeling overall?\" (e.g., decreased energy, increased sleeping or difficulty sleeping, difficulty concentrating, feelings of sadness, guilt, hopelessness, or worthlessness)      Feeling much more down and depressed, decreased energy,  difficulty sleeping, feelings of sadness and grief after loss of mother 6 months ago, feeling worthlessness or trying to figure out purpose in " "life and in the world  3. RISK OF HARM - SUICIDAL IDEATION:  \"Do you ever have thoughts of hurting or killing yourself?\"  (e.g., yes, no, no but preoccupation with thoughts about death)    - INTENT:  \"Do you have thoughts of hurting or killing yourself right NOW?\" (e.g., yes, no, N/A)    - PLAN: \"Do you have a specific plan for how you would do this?\" (e.g., gun, knife, overdose, no plan, N/A)   No plan to harm self or others, no access to a gun or knife. Just thoughts of trying to figure out purpose in life and have manuel in God.   4. RISK OF HARM - HOMICIDAL IDEATION:  \"Do you ever have thoughts of hurting or killing someone else?\"  (e.g., yes, no, no but preoccupation with thoughts about death)    - INTENT:  \"Do you have thoughts of hurting or killing someone right NOW?\" (e.g., yes, no, N/A)    - PLAN: \"Do you have a specific plan for how you would do this?\" (e.g., gun, knife, no plan, N/A)      no  5. FUNCTIONAL IMPAIRMENT: \"How have things been going for you overall? Have you had more difficulty than usual doing your normal daily activities?\"  (e.g., better, same, worse; self-care, school, work, interactions)      Called tonight because became frozen in kitchen and fearful she couldn't move and very depressed.   6. SUPPORT: \"Who is with you now?\" \"Who do you live with?\" \"Do you have family or friends who you can talk to?\"       Family and kids \"but they don't understand how low I am; they keep telling me to trust in God but I just don't know. Im just having a hard time right now and need more help than God.\" I think I need to change duloxetine dose or get on another medication to help with depression   7. THERAPIST: \"Do you have a counselor or therapist? Name?\"      Yes have an appt with psychology in May      Has appt Friday at 9am with therapist   8. STRESSORS: \"Has there been any new stress or recent changes in your life?\"      Mother  about 6 months ago and still really grieving, is primary care giver " "for father who has declining health. The pandemic made her scared to go places - like the grocery store, not because she is going to catch something or fear for her safety, cant quite explain the feeling. It doesn't prevent her from going but takes a lot to just keep going some days  9. ALCOHOL USE OR SUBSTANCE USE (DRUG USE): \"Do you drink alcohol or use any illegal drugs?\"     No   10. OTHER: \"Do you have any other physical symptoms right now?\" (e.g., fever)  No- feel a little more tired than normal and not sleeping well. Will try melatonin tonight and some calming music and lavender tea to help fall asleep tonight. Will also go down to [facility] [employer] to be around people tonight.    11. PREGNANCY: \"Is there any chance you are pregnant?\" \"When was your last menstrual period?\"        No    Protocols used: DEPRESSION-A-[place]    Chanda PRINCE RN  [facility]    "

## 2023-03-22 ENCOUNTER — VIRTUAL VISIT (OUTPATIENT)
Dept: FAMILY MEDICINE | Facility: CLINIC | Age: 63
End: 2023-03-22
Payer: COMMERCIAL

## 2023-03-22 DIAGNOSIS — F32.1 MODERATE MAJOR DEPRESSION (H): Primary | ICD-10-CM

## 2023-03-22 DIAGNOSIS — G89.29 CHRONIC BILATERAL LOW BACK PAIN WITHOUT SCIATICA: ICD-10-CM

## 2023-03-22 DIAGNOSIS — M54.50 CHRONIC BILATERAL LOW BACK PAIN WITHOUT SCIATICA: ICD-10-CM

## 2023-03-22 PROCEDURE — 99214 OFFICE O/P EST MOD 30 MIN: CPT | Mod: 95 | Performed by: FAMILY MEDICINE

## 2023-03-22 RX ORDER — DULOXETIN HYDROCHLORIDE 60 MG/1
120 CAPSULE, DELAYED RELEASE ORAL DAILY
Qty: 60 CAPSULE | Refills: 1 | Status: SHIPPED | OUTPATIENT
Start: 2023-03-22 | End: 2023-08-08

## 2023-03-22 NOTE — TELEPHONE ENCOUNTER
LMOM to callback to check availability for 3:40 telephone visit today 3/22.    Darwin Griffith RN   [facility]

## 2023-03-22 NOTE — PROGRESS NOTES
"Lesley is a 62 year old who is being evaluated via a billable telephone visit.      What phone number would you like to be contacted at? [phone]  How would you like to obtain your AVS? Qlibri  Distant Location (provider location):  On-site    Assessment & Plan     (F32.1) Moderate major depression (H)  (primary encounter diagnosis)  Comment: Increased depressive symptoms. Suicidal ideation but no plan. Plan is to try increasing/decreasing duloxetine dose as described in patient instructions below. Pt to provide updates on how she is feeling via Qlibri.   Plan: DULoxetine (CYMBALTA) 60 MG capsule      (M54.50,  G89.29) Chronic bilateral low back pain without sciatica  Comment: Stable. Pain is currently managed.   Plan: DULoxetine (CYMBALTA) 60 MG capsule        Patient Instructions   2 weeks of duloxetine (cymbalta) 2 capsules (120 mg) daily, one in the morning and one at night.     2 weeks of duloxetine (cymbalta) 1 capsule (60 mg) every other day, 2 capsules (120 mg) every other day.     2 weeks of duloxetine (cymbalta) 1 capsule (60 mg) daily.     Keep a record of how you are feeling and keep me updated via Qlibri. If one of the doses is not working well, send me a Qlibri message and we can adjust as needed.    You can take melatonin 0.5-1 mg as needed for sleep.              BMI:   Estimated body mass index is 28.07 kg/m  as calculated from the following:    Height as of 2/22/23: 1.672 m (5' 5.83\").    Weight as of 2/22/23: 78.5 kg (173 lb).   Weight management plan: Discussed healthy diet and exercise guidelines    Sierra Renner, [employer] student    The information in this document, created by the nurse practioner student for me, accurately reflects the services I personally performed and the decisions made by me. I have reviewed and approved this document for accuracy prior to leaving the patient care area.    Rafael Orosco MD  [facility]    Subjective   Lesley is a 62 year " old, presenting for the following health issues:  Depression  No flowsheet data found.  HPI     Patient has been feeling increasingly depressed, hopeless. She feels duloxetine isn't working well.     Multiple stressors. Mother passed away 6 months ago. Helping with dad's illness and brother-in-law's illness.     Patient is currently alternating duloxetine doses 60 mg with 120 mg every other day. Unsure if mood is linked to higher/lower dose days.     Patient endorses thoughts of suicidal ideation. No plan. Thinks these thoughts may be related to not sleeping well. She hasn't been sleeping well and has been having bad dreams. She is considering taking melatonin to help sleep.     Currently going to EMDR therapy, acupuncture. Pain is under control.     Patient has a psych appointment scheduled 5/23 but would like assistance with mental health prior to this appointment. Patient plans to start going to gym after Ramadan.         Review of Systems   Not formally done      Objective           Vitals:  No vitals were obtained today due to virtual visit.    Physical Exam   alert and no distress  PSYCH: Alert and oriented times 3; coherent speech, normal   rate and volume, able to articulate logical thoughts, able   to abstract reason, no tangential thoughts, no hallucinations   or delusions  Her affect is pleasant and sad  RESP: No cough, no audible wheezing, able to talk in full sentences  Remainder of exam unable to be completed due to telephone visits                Phone call duration: 23 minutes       Statement Selected

## 2023-04-07 ENCOUNTER — OFFICE VISIT (OUTPATIENT)
Dept: RHEUMATOLOGY | Facility: CLINIC | Age: 63
End: 2023-04-07
Payer: COMMERCIAL

## 2023-04-07 VITALS
WEIGHT: 174.5 LBS | SYSTOLIC BLOOD PRESSURE: 116 MMHG | OXYGEN SATURATION: 100 % | DIASTOLIC BLOOD PRESSURE: 75 MMHG | BODY MASS INDEX: 28.31 KG/M2 | HEART RATE: 80 BPM

## 2023-04-07 DIAGNOSIS — M32.9 SYSTEMIC LUPUS ERYTHEMATOSUS, UNSPECIFIED SLE TYPE, UNSPECIFIED ORGAN INVOLVEMENT STATUS (H): ICD-10-CM

## 2023-04-07 PROCEDURE — 99214 OFFICE O/P EST MOD 30 MIN: CPT | Performed by: STUDENT IN AN ORGANIZED HEALTH CARE EDUCATION/TRAINING PROGRAM

## 2023-04-07 RX ORDER — HYDROXYCHLOROQUINE SULFATE 200 MG/1
400 TABLET, FILM COATED ORAL DAILY
Qty: 180 TABLET | Refills: 3 | Status: SHIPPED | OUTPATIENT
Start: 2023-04-07 | End: 2024-05-03

## 2023-04-07 NOTE — PATIENT INSTRUCTIONS
Continue Plaquenil 400 mg daily     Blood tests orders placed     Recommend weight loss, Physical therapy     Recommend getting Shingrix and Prevnar     Follow up in a year

## 2023-04-07 NOTE — PROGRESS NOTES
Rheumatology Clinic Visit     Lesley Stafford MRN# [id number]   YOB: 1960 Age: 60 year old     Date of Visit: Apr 7, 2023   Primary care provider: Rafael Orosco          Assessment and Plan:     Assessment     Systemic lupus( + ARIEL, +dsDNA,+ RNP, Thrombocytopenia, arthralgias, sicca, raynaud's, oral sores )  Fibromyalgia  Hx of stroke  Depression, anxiety  Obesity, sleep apnea  Posterior vitreous detachment of both eyes  Long term Plaquenil use    Ms. Link Stafford is 62 year old female seen in clinic for evaluation of systemic lupus    Systemic lupus : She was diagnosed with systemic lupus in 2014. She presented with arthralgias, fatigue, sicca symptoms, oral sores and had + ARIEL, +dsDNA,+ RNP, Thrombocytopenia and was started on hydroxychloroquine 200 mg twice daily. She continued on hydroxychloroquine until 2020 when she discontinued on her own.  She had no renal, pulmonary, CNS involvement due to systemic lupus.  Her symptoms of fatigue, fibromyalgia are controlled with Cymbalta.    Her main symptoms include intermittent episodes of joint pains, generalized muscle aches, fatigue, dry mouth, oral sores, pleurisy. She restarted Plaquenil in 7/2021 but was only taking 200 mg daily as compared to 400 mg daily which was prescribed. She has noticed improvements in chest pains, oral sores. Dry mouth improved with Cevimeline. Since last visit in 2022 she has been taking 2 tab, 400 mg daily dose. She has noticed improvement in her symptoms.     Will get labs - CBC, AST, ALT, Cr, ESR, CRP, dsDNA, normal C3/C4. Will continue  mg daily. No need to intensify immune suppression.     Lumbar degenerative disc disease: She has chronic low back, hip pain due to lumbar degenerative disc disease.  She is not interested in getting epidural spine injection.  Recommended her to do physical therapy, pool therapy.    Elevated LFT : Follows with hepatology. She had elevated AST, ALT, positive F-actin and  mildly elevated antimitochondrial antibody.  She had low platelet count and mildly elevated CK level-424. NAFLD is considered to be the cause of elevated transaminases.  Last liver function tests have normalized.  She is working on weight loss, exercise and diet.    Sicca symptoms : She has severe dry mouth and dry eyes.  She is using Biotene products. Cholinergic agonists cevimeline has helped. Continue using lubricating eyedrops.  Follow-up with dentist every 6 months.    Fibromyalgia: She is on Cymbalta which helps with fibromyalgia. For fibromyalgia she was instructed to try relaxing techniques like Yoga, Haroon chi. She can try Aerobic conditioning. Low-impact aerobic activities such as fast walking, biking, swimming, or water aerobics are most successful among the interventions that have been studied.      Vaccinations: Vaccinations reviewed with Ms. Link Stafford. Risks and benefits of vaccinations were discussed.  - Influenza: encouraged yearly vaccination  - Prevnar 20 : recommend getting it  - Zostavax: recommend to get it   - COVID- 19 : recommend to get it       Plan    Continue Plaquenil 400 mg daily     Blood tests orders placed     Recommend weight loss, Physical therapy     Recommend getting Shingrix and Prevnar     Follow up in a year     -- Orders placed this encounter  Orders Placed This Encounter   Procedures     CBC with platelets     AST     ALT     Albumin level     Creatinine     DNA ANTIBODY, NATV/2 STRAND     Complement C3     Complement C4     Routine UA with Micro Reflex to Culture     Protein  random urine                     Active Problem List:     Patient Active Problem List    Diagnosis Date Noted     History of alcoholism (H) 07/29/2022     Priority: Medium     Localization-related (focal) (partial) symptomatic epilepsy and epileptic syndromes with complex partial seizures, not intractable, without status epilepticus (H) 07/29/2022     Priority: Medium     Vitreous opacities of right eye  04/13/2021     Priority: Medium     Hyperlipidemia LDL goal <70 03/30/2021     Priority: Medium     Thrombocytopenia (H) 03/24/2021     Priority: Medium     Long-term use of Plaquenil 03/09/2021     Priority: Medium     Myopia of both eyes 03/09/2021     Priority: Medium     Presbyopia 03/09/2021     Priority: Medium     Dry eyes 03/09/2021     Priority: Medium     Anal fissure 01/06/2021     Priority: Medium     Encounter for medication monitoring 07/09/2020     Priority: Medium     History of stroke 02/05/2020     Priority: Medium     Financial difficulties 02/05/2020     Priority: Medium     Moderate major depression (H)      Priority: Medium     Liver hemangioma 12/14/2016     Priority: Medium     4 on MRI '09       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     Priority: Medium     Insomnia, unspecified type 08/19/2016     Priority: Medium     Tired 04/25/2016     Priority: Medium     Anxiety 02/19/2016     Priority: Medium     Allergic state 02/19/2016     Priority: Medium     History of claustrophobia 08/03/2015     Priority: Medium     Systemic lupus erythematosus, unspecified SLE type, unspecified organ involvement status (H) 09/24/2012     Priority: Medium            History of Present Illness:   Lesley Stafford is a 60 year old female with PMH of systemic lupus, sicca, fibromyalgia, depression, anxiety, obesity, fibromyalgia, stroke seen in the clinic in consultation at request of Dr Orosco for management of systemic lupus.    She was diagnosed with systemic lupus in 2014 by Dr. Ritesh Alcantara.  She had arthralgias, sicca symptoms, generalized muscle aches, fatigue her presentation.  Her blood work showed positive ARIEL, positive dsDNA, RNP antibody, thrombocytopenia.  She also had recurrent oral sores, mild Raynaud's symptoms.  She started on hydroxychloroquine 200 mg daily 2014 and was maintained on it up until 2020 when she discontinued it on her own.  In August 2017 she [facility]  In 2019 she developed stroke and was managed at [facility].  Stroke was not considered related to CNS lupus.  She is on full dose aspirin, statins.  Denies any history of high blood pressure, diabetes.  She has posterior vitreous detachment bilateral eyes and follows with Dr. Rice.  No evidence of Plaquenil toxicity noted on her recent eye exam.    She is off of Plaquenil since 2020.  Today she reports that last week she had a flare where all her joints were hurting.  Her right fourth finger was very swollen and painful.  She had pain in her wrists, elbows, shoulders, lower back, neck, ankles.  During that week she rested, did acupuncture which helped.  She started CBD oil 3 months ago and feels like it is helping.    She also gets recurrent oral sores, has severe dry mouth and dry eyes.  Follows with dentist every 6 months.  She started using Biotene products and uses lubricating eyedrops.  Her other main issue is chronic constipation.  She is MiraLAX couple times a week.  She always has bloating.    Last week her joints were hurting, wrist, elbows were huting and she thinks that it was related to stress. Ankles, middle toe was hurting. Back, shoulders, muscles hurt. She went to Acupunture and was able to rest. Her whole body was on fire. If she stay stress free, then she do better to manage her pain. CBD oil has helped her. Feet felt swollen. She is on CBD oil for 3 months and it has helped.  Occasionally she has chest pain and try to take shallow breaths.  She denies photosensitivity.  She gets Raynaud's in her fingers.  She has trouble swallowing food due to severe dry mouth.    She had recent sleep study which showed mild sleep apnea.  She also had night terrors.  She follows with her therapist in regards to anxiety and depression.    Her fatigue, fibromyalgia is managed with Xarelto.  She has been history of cutaneous lupus in her daughter.  Also reports extreme photosensitivity in  her mom and sister.    September 30, 2021 - She has pain in her wrists, shoulder. Pain in her left hip wakes her up. She has lower back pain. She gets pain in her knuckles. Twice a month she gets pain in her joints which last for few days. She see chiropracter, get massage therapy, and use CBD. She has dry mouth and cevimeline helps with her dry mouth. She gets chest pain off and on but not has been that bad. She has raynaud's in her fingers. She has not has oral sores for a while.     January 11, 2022 - She is in a lot of stress due to her father's health.  She is going to put her father in hospice today. She has not been concentrating on her own health for the last couple months.  She is taking hydroxychloroquine 400 mg daily and has noticed improvement.  Denies significant joint pains.  Her blood work done on 9/30/2021 showed elevated AST-47, ALT-74, platelet count-94, CK-424, positive F-actin-39, mitochondrial antibody-4.5, negative dsDNA, normal C3/C4, normal ESR, CRP.  Her TPMT level was low at 23.    March 11, 2022 - She is taking Plaquenil 1 tab twice a day and has noticed improvement in her lupus symptoms. She has cut down on Gabapentin to 1 tab three times a day. She had herpes outbreak on Saturday in buttock area. She has pain in her joints mainly in her knees and hips. She is in lot of stress due to her ex  health. Her toes feel cold. She is using  which is causing oral sores.      April 7, 2023 - She gets pain in her lower back and hip. She takes Tylenol and Naproxen as needed. She has done Pool therapy which has helped her before. Hand pain is better. Her palms and feet get red and hot at night. She has been doing EMDR for trauma therapy for 6 month.          Review of Systems:     Review Of Systems  Constitutional: denies fever, chills, night sweats and weight loss.  Skin: No skin rash.  Eyes:+ dryness or irritation in eyes. No episode of eye inflammation or redness.    Ears/Nose/Throat: no recurrent sinus infections.  Respiratory: No shortness of breath, dyspnea on exertion, cough, or hemoptysis  Cardiovascular: no chest pain or palpitations.  Gastrointestinal: no nausea, vomiting, abdominal pain.  Normal bowel movements.  Genitourinary: no dysuria, frequency  or hematuria.  Musculoskeletal: as in HPI  Neurologic: no numbness, tingling.  Psychiatric: + mood disorders.  Hematologic/Lymphatic/Immunologic: no history of easy bruising, petechia or purpura.  No abnormal bleeding.   Endocrine: no h/o thyroid disease or Diabetes.                  Past Medical History:     Past Medical History:   Diagnosis Date     Anxiety      Cerebral infarction (H)      Depressive disorder      Fibromyalgia      GERD (gastroesophageal reflux disease)      Hemoglobin C trait (H) 2016     Hyperlipidemia LDL goal <70 2021     Lupus (H)      RACHNA (obstructive sleep apnea)      Substance abuse (H)     Has not used for 19 years.     Past Surgical History:   Procedure Laterality Date      SECTION       COLONOSCOPY  2010    repeat 10 yrs     COLONOSCOPY N/A 2020    Procedure: COLONOSCOPY, WITH POLYPECTOMY;  Surgeon: Rebecca Rojas MD;  Location: [facility]     DILATION AND CURETTAGE, OPERATIVE HYSTEROSCOPY WITH MORCELLATOR, COMBINED N/A 02/15/2017    Procedure: COMBINED DILATION AND CURETTAGE, OPERATIVE HYSTEROSCOPY WITH MORCELLATOR;  Surgeon: Erin Gutierrez MD;  Location: [facility]     ESOPHAGOSCOPY, GASTROSCOPY, DUODENOSCOPY (EGD), COMBINED Left 2016    Procedure: COMBINED ESOPHAGOSCOPY, GASTROSCOPY, DUODENOSCOPY (EGD), BIOPSY SINGLE OR MULTIPLE;  Surgeon: Pierre Fernandez MD;  Location:  [facility]            Social History:     Social History     Occupational History     Not on file   Tobacco Use     Smoking status: Former     Smokeless tobacco: Never     Tobacco comments:     quit 27 years ago   Vaping Use     Vaping status: Not on file   Substance and Sexual Activity      Alcohol use: No     Drug use: No     Sexual activity: Not Currently     Partners: Male     Birth control/protection: None            Family History:     Family History   Problem Relation Age of Onset     Lupus Mother      Asthma Mother      Bone Cancer Mother      Diabetes Father      Brain Tumor Father         begnign on pituitary     Depression Sister      Anemia Sister      Colon Cancer Maternal Aunt 65     Glaucoma Maternal Grandfather      Alcoholism Maternal Grandfather      Macular Degeneration No family hx of      Liver Disease No family hx of             Allergies:     Allergies   Allergen Reactions     Morphine Sulfate Itching     Sulfa Drugs Hives            Medications:     Current Outpatient Medications   Medication Sig Dispense Refill     albuterol (PROAIR HFA/PROVENTIL HFA/VENTOLIN HFA) 108 (90 Base) MCG/ACT inhaler Inhale 2 puffs into the lungs every 4 hours as needed for shortness of breath / dyspnea, wheezing or other (coughing) 18 g 11     aspirin (ASA) 325 MG EC tablet Take 1 tablet (325 mg) by mouth daily 90 tablet 1     atorvastatin (LIPITOR) 40 MG tablet Take 1 tablet (40 mg) by mouth daily 90 tablet 3     cetirizine (ZYRTEC) 10 MG tablet Take 1 tablet (10 mg) by mouth daily 90 tablet 3     DULoxetine (CYMBALTA) 60 MG capsule 60 mg tabs, alternates between 1 tablet and 2 tablets every other day. 135 capsule 3     fluticasone (FLONASE) 50 MCG/ACT nasal spray Spray 1 spray into both nostrils daily 1 g 11     gabapentin (NEURONTIN) 100 MG capsule Take 2 capsules (200 mg) by mouth 2 times daily as needed for neuropathic pain 120 capsule 11     gabapentin (NEURONTIN) 300 MG capsule Take 2 capsules (600 mg) by mouth 3 times daily as needed for neuropathic pain 90 capsule 0     HEMP OIL OR EXTRACT OR OTHER CBD CANNABINOID, NOT MEDICAL CANNABIS,        hydroxychloroquine (PLAQUENIL) 200 MG tablet Take 2 tablets (400 mg) by mouth daily 180 tablet 3     hydrOXYzine (ATARAX) 25 MG tablet TAKE 1  TABLET BY MOUTH THREE TIMES A DAY 90 tablet 3     naproxen sodium 220 MG capsule Take 220 mg by mouth 2 times daily as needed        omega 3 1200 MG CAPS        tiZANidine (ZANAFLEX) 4 MG tablet TAKE 1-2 TABLETS BY MOUTH AS NEEDED THREE TIMES A DAY 90 tablet 0     ascorbic acid 1000 MG TABS tablet Take 1,000 mg by mouth as needed (Patient not taking: Reported on 4/7/2023)       aspirin (ASA) 325 MG tablet TAKE ONE TABLET BY MOUTH ONE TIME DAILY 60 tablet 0     DULoxetine (CYMBALTA) 60 MG capsule Take 2 capsules (120 mg) by mouth daily 60 capsule 1     multivitamin w/minerals (THERA-VIT-M) tablet Take 1 tablet by mouth daily (Patient not taking: Reported on 4/7/2023)       order for DME Cane, 1 Device 0     valACYclovir (VALTREX) 500 MG tablet Take 1 tablet (500 mg) by mouth 2 times daily 30 tablet 1            Physical Exam:   Blood pressure 116/75, pulse 80, weight 79.2 kg (174 lb 8 oz), last menstrual period 08/12/2013, SpO2 100 %, not currently breastfeeding.  Wt Readings from Last 4 Encounters:   04/07/23 79.2 kg (174 lb 8 oz)   02/22/23 78.5 kg (173 lb)   10/10/22 78.3 kg (172 lb 9.6 oz)   07/29/22 79.4 kg (175 lb)       Constitutional: Obese, appearing stated age; cooperative    Musculoskeletal: No Tenderness present over MCP, PIP joints, wrists or ankles. She also has myofascial tenderness due to Fibromyalgia.  No dactylitis,  tenosynovitis, enthesopathy.    Skin: no nail pitting, alopecia, rash, nodules or lesions  Neuro: nl cranial nerves, strength, sensation, DTRs.   Psych: nl judgement, orientation, memory, affect.         Data:     No results found for any visits on 04/07/23.    Recent Labs   Lab Test 03/24/21  0926 03/01/21  1059 12/24/19  0829 12/24/19  0648 12/23/19  1519 05/21/19  0929 07/28/17  1632 05/08/17  0351 01/29/16  1804 01/27/16  1217   WBC 4.5 4.5 4.5 Canceled, Test credited  --  4.0  --   --   --  4.9   RBC 4.52 4.43 4.83 Canceled, Test credited  --  4.42  --   --   --  4.02   HGB 12.7  12.3 13.5 Canceled, Test credited  --  12.6  --   --   --  11.7   HCT 35.6 34.8* 38.2 Canceled, Test credited  --  34.7*  --   --   --  32.2*   MCV 79 79 79 Canceled, Test credited  --  79  --   --   --  80   RDW 14.2 14.1 14.3 Canceled, Test credited  --  14.0  --   --   --  13.6   * 112* 145* Canceled, Test credited  --  124*  --   --   --  155   ALBUMIN 3.9  --   --  3.4  --  3.9   < >  --    < > 3.7   CRP  --   --  <2.9  --   --   --   --  <2.9  --  <2.9   BUN 12 13  --  7  --  10   < >  --   --  16   CREAT  --   --   --   --  0.7  --   --   --   --   --     < > = values in this interval not displayed.      Recent Labs   Lab Test 05/21/19  0929 10/08/18  1000 11/02/16  1151   TSH 1.20 0.97 0.85     Hemoglobin   Date Value Ref Range Status   10/06/2022 12.9 11.7 - 15.7 g/dL Final   03/25/2022 13.3 11.7 - 15.7 g/dL Final   09/30/2021 12.0 11.7 - 15.7 g/dL Final   03/24/2021 12.7 11.7 - 15.7 g/dL Final   03/01/2021 12.3 11.7 - 15.7 g/dL Final   12/24/2019 13.5 11.7 - 15.7 g/dL Final     Urea Nitrogen   Date Value Ref Range Status   10/06/2022 7 7 - 30 mg/dL Final   09/30/2021 9 7 - 30 mg/dL Final   08/10/2021 12 7 - 30 mg/dL Final   03/24/2021 12 7 - 30 mg/dL Final   03/01/2021 13 7 - 30 mg/dL Final   12/24/2019 7 7 - 30 mg/dL Final     Creatinine   Date Value Ref Range Status   12/23/2019 0.7 0.52 - 1.04 mg/dL Final     Sed Rate   Date Value Ref Range Status   12/24/2019 19 0 - 30 mm/h Final   05/08/2017 15 0 - 30 mm/h Final   05/02/2016 18 0 - 30 mm/h Final     Erythrocyte Sedimentation Rate   Date Value Ref Range Status   03/25/2022 14 0 - 30 mm/hr Final   09/30/2021 18 0 - 30 mm/hr Final   07/29/2021 26 0 - 30 mm/hr Final     CRP Inflammation   Date Value Ref Range Status   03/25/2022 <2.9 0.0 - 8.0 mg/L Final   09/30/2021 <2.9 0.0 - 8.0 mg/L Final   07/29/2021 <2.9 0.0 - 8.0 mg/L Final   12/24/2019 <2.9 0.0 - 8.0 mg/L Final   05/08/2017 <2.9 0.0 - 8.0 mg/L Final   01/27/2016 <2.9 0.0 - 8.0 mg/L Final      AST   Date Value Ref Range Status   10/06/2022 35 0 - 45 U/L Final   03/25/2022 36 0 - 45 U/L Final   09/30/2021 47 (H) 0 - 45 U/L Final   04/30/2021 77 (H) 0 - 45 U/L Final   03/24/2021 63 (H) 0 - 45 U/L Final   12/24/2019 26 0 - 45 U/L Final     Albumin   Date Value Ref Range Status   10/06/2022 3.7 3.4 - 5.0 g/dL Final   03/25/2022 3.5 3.4 - 5.0 g/dL Final   09/30/2021 3.9 3.4 - 5.0 g/dL Final   03/24/2021 3.9 3.4 - 5.0 g/dL Final   12/24/2019 3.4 3.4 - 5.0 g/dL Final   05/21/2019 3.9 3.4 - 5.0 g/dL Final     Alkaline Phosphatase   Date Value Ref Range Status   10/06/2022 68 40 - 150 U/L Final   09/30/2021 69 40 - 150 U/L Final   08/10/2021 73 40 - 150 U/L Final   03/24/2021 72 40 - 150 U/L Final   12/24/2019 67 40 - 150 U/L Final   05/21/2019 67 40 - 150 U/L Final     ALT   Date Value Ref Range Status   10/06/2022 39 0 - 50 U/L Final   03/25/2022 45 0 - 50 U/L Final   09/30/2021 74 (H) 0 - 50 U/L Final   04/30/2021 125 (H) 0 - 50 U/L Final   03/24/2021 104 (H) 0 - 50 U/L Final   12/24/2019 30 0 - 50 U/L Final     Rheumatoid Factor   Date Value Ref Range Status   07/29/2021 <7 <20 IU/mL Final     Recent Labs   Lab Test 10/06/22  1112 03/25/22  0952 12/03/21  0912 09/30/21  1656 09/30/21  1655 08/10/21  1740 12/23/19  1517 05/21/19  0929 10/08/18  1000   WBC 4.5 4.0  --  5.5  --  6.1   < > 4.0 4.3   HGB 12.9 13.3  --  12.0  --  12.6   < > 12.6 12.5   HCT 36.2 36.5  --  33.1*  --  34.3*   < > 34.7* 34.1*   MCV 79 78  --  78  --  77*   < > 79 78   * 133*  --  94*  --  152   < > 124* 115*   BUN 7  --   --   --  9 12   < > 10 10   TSH  --   --  1.75  --   --   --   --  1.20 0.97   AST 35 36  --   --  47* 47*   < > 32 40   ALT 39 45  --   --  74* 82*   < > 36 44   ALKPHOS 68  --   --   --  69 73   < > 67 59    < > = values in this interval not displayed.     Outside studies reviewed: [facility] from [facility] reviewed.    7/2019 : + RNP - 985, + dsDNA - 73, + ssDNA - 288, + Chromatin  - 3374  Cbc -     Reviewed Rheumatology lab flowsheet    Elvia Gonzalez MD  [facility]  Department of Rheumatology & Autoimmune Disorders  [facility]: [phone]   Pager - [phone]

## 2023-04-07 NOTE — NURSING NOTE
Chief Complaint   Patient presents with     RECHECK     Systemic lupus erythematosus, unspecified SLE type, unspecified organ involvement status        Vitals:    04/07/23 1144   BP: 116/75   BP Location: Left arm   Patient Position: Sitting   Cuff Size: Adult Regular   Pulse: 80   SpO2: 100%   Weight: 79.2 kg (174 lb 8 oz)       Body mass index is 28.31 kg/m .

## 2023-04-11 ENCOUNTER — TELEPHONE (OUTPATIENT)
Dept: FAMILY MEDICINE | Facility: CLINIC | Age: 63
End: 2023-04-11
Payer: COMMERCIAL

## 2023-04-11 NOTE — TELEPHONE ENCOUNTER
Patient requesting lab only appt for rheumatology labs - routed to scheduling    Chanda PRINCE RN  [facility]

## 2023-04-14 ENCOUNTER — LAB (OUTPATIENT)
Dept: LAB | Facility: CLINIC | Age: 63
End: 2023-04-14
Payer: COMMERCIAL

## 2023-04-14 DIAGNOSIS — M32.9 SYSTEMIC LUPUS ERYTHEMATOSUS, UNSPECIFIED SLE TYPE, UNSPECIFIED ORGAN INVOLVEMENT STATUS (H): ICD-10-CM

## 2023-04-14 LAB
ALBUMIN MFR UR ELPH: 6.3 MG/DL (ref 1–14)
ALBUMIN SERPL BCG-MCNC: 4.3 G/DL (ref 3.5–5.2)
ALBUMIN UR-MCNC: NEGATIVE MG/DL
ALT SERPL W P-5'-P-CCNC: 169 U/L (ref 10–35)
APPEARANCE UR: CLEAR
AST SERPL W P-5'-P-CCNC: 133 U/L (ref 10–35)
BACTERIA #/AREA URNS HPF: ABNORMAL /HPF
BILIRUB UR QL STRIP: NEGATIVE
COLOR UR AUTO: YELLOW
CREAT SERPL-MCNC: 0.69 MG/DL (ref 0.51–0.95)
CREAT UR-MCNC: 53.8 MG/DL
ERYTHROCYTE [DISTWIDTH] IN BLOOD BY AUTOMATED COUNT: 14 % (ref 10–15)
GFR SERPL CREATININE-BSD FRML MDRD: >90 ML/MIN/1.73M2
GLUCOSE UR STRIP-MCNC: NEGATIVE MG/DL
HCT VFR BLD AUTO: 33.5 % (ref 35–47)
HGB BLD-MCNC: 11.9 G/DL (ref 11.7–15.7)
HGB UR QL STRIP: ABNORMAL
KETONES UR STRIP-MCNC: NEGATIVE MG/DL
LEUKOCYTE ESTERASE UR QL STRIP: NEGATIVE
MCH RBC QN AUTO: 27.6 PG (ref 26.5–33)
MCHC RBC AUTO-ENTMCNC: 35.5 G/DL (ref 31.5–36.5)
MCV RBC AUTO: 78 FL (ref 78–100)
NITRATE UR QL: NEGATIVE
PH UR STRIP: 6 [PH] (ref 5–7)
PLATELET # BLD AUTO: 126 10E3/UL (ref 150–450)
PROT/CREAT 24H UR: 0.12 MG/MG CR (ref 0–0.2)
RBC # BLD AUTO: 4.31 10E6/UL (ref 3.8–5.2)
RBC #/AREA URNS AUTO: ABNORMAL /HPF
SP GR UR STRIP: 1.01 (ref 1–1.03)
SQUAMOUS #/AREA URNS AUTO: ABNORMAL /LPF
UROBILINOGEN UR STRIP-ACNC: 0.2 E.U./DL
WBC # BLD AUTO: 5.2 10E3/UL (ref 4–11)
WBC #/AREA URNS AUTO: ABNORMAL /HPF

## 2023-04-14 PROCEDURE — 86225 DNA ANTIBODY NATIVE: CPT

## 2023-04-14 PROCEDURE — 82565 ASSAY OF CREATININE: CPT

## 2023-04-14 PROCEDURE — 81001 URINALYSIS AUTO W/SCOPE: CPT

## 2023-04-14 PROCEDURE — 84450 TRANSFERASE (AST) (SGOT): CPT

## 2023-04-14 PROCEDURE — 84460 ALANINE AMINO (ALT) (SGPT): CPT

## 2023-04-14 PROCEDURE — 82040 ASSAY OF SERUM ALBUMIN: CPT

## 2023-04-14 PROCEDURE — 86160 COMPLEMENT ANTIGEN: CPT

## 2023-04-14 PROCEDURE — 85027 COMPLETE CBC AUTOMATED: CPT

## 2023-04-14 PROCEDURE — 84156 ASSAY OF PROTEIN URINE: CPT

## 2023-04-14 PROCEDURE — 86160 COMPLEMENT ANTIGEN: CPT | Mod: 59

## 2023-04-14 PROCEDURE — 36415 COLL VENOUS BLD VENIPUNCTURE: CPT

## 2023-04-17 LAB
C3 SERPL-MCNC: 105 MG/DL (ref 81–157)
C4 SERPL-MCNC: 24 MG/DL (ref 13–39)
DSDNA AB SER-ACNC: 1.8 IU/ML

## 2023-04-28 NOTE — RESULT ENCOUNTER NOTE
Socorro Sutton,     Your blood tests have shown high liver enzymes AST, ALT. Rest other tests including lupus markers, inflammation marker, kidney tests and CBC is normal. I recommend to repeat liver tests now. Have you started taking any new medication ? Any abdominal pain, nausea, vomiting ?     Elvia Gonzalez MD

## 2023-05-22 DIAGNOSIS — M54.50 CHRONIC BILATERAL LOW BACK PAIN WITHOUT SCIATICA: ICD-10-CM

## 2023-05-22 DIAGNOSIS — G89.29 CHRONIC BILATERAL LOW BACK PAIN WITHOUT SCIATICA: ICD-10-CM

## 2023-05-22 DIAGNOSIS — G62.9 PERIPHERAL POLYNEUROPATHY: ICD-10-CM

## 2023-05-22 RX ORDER — GABAPENTIN 100 MG/1
CAPSULE ORAL
Qty: 120 CAPSULE | Refills: 0 | Status: SHIPPED | OUTPATIENT
Start: 2023-05-22 | End: 2023-05-30 | Stop reason: DRUGHIGH

## 2023-05-24 ENCOUNTER — DOCUMENTATION ONLY (OUTPATIENT)
Dept: LAB | Facility: CLINIC | Age: 63
End: 2023-05-24
Payer: COMMERCIAL

## 2023-05-24 DIAGNOSIS — R79.89 ELEVATED LFTS: Primary | ICD-10-CM

## 2023-05-24 DIAGNOSIS — R94.5 ABNORMAL RESULTS OF LIVER FUNCTION STUDIES: ICD-10-CM

## 2023-05-24 NOTE — PROGRESS NOTES
Hello,   In order to offer our patients the best possible experience, the lab schedule is reviewed to ensure patients have lab orders in Epic prior to arriving at the lab.    Please have one of your team members place a lab order in Epic for this patient prior to the lab appointment date.     Thank you for your attention,   [facility] [phone]

## 2023-05-30 ENCOUNTER — LAB (OUTPATIENT)
Dept: LAB | Facility: CLINIC | Age: 63
End: 2023-05-30
Payer: COMMERCIAL

## 2023-05-30 ENCOUNTER — OFFICE VISIT (OUTPATIENT)
Dept: GASTROENTEROLOGY | Facility: CLINIC | Age: 63
End: 2023-05-30
Attending: INTERNAL MEDICINE
Payer: COMMERCIAL

## 2023-05-30 VITALS
BODY MASS INDEX: 28.43 KG/M2 | WEIGHT: 175.2 LBS | DIASTOLIC BLOOD PRESSURE: 66 MMHG | SYSTOLIC BLOOD PRESSURE: 106 MMHG | TEMPERATURE: 98.1 F | OXYGEN SATURATION: 98 % | HEART RATE: 77 BPM

## 2023-05-30 DIAGNOSIS — R79.89 ELEVATED LFTS: ICD-10-CM

## 2023-05-30 DIAGNOSIS — E78.5 HYPERLIPIDEMIA LDL GOAL <70: ICD-10-CM

## 2023-05-30 DIAGNOSIS — R94.5 ABNORMAL RESULTS OF LIVER FUNCTION STUDIES: ICD-10-CM

## 2023-05-30 DIAGNOSIS — Z00.01 ENCOUNTER FOR PREVENTATIVE ADULT HEALTH CARE EXAM WITH ABNORMAL FINDINGS: ICD-10-CM

## 2023-05-30 DIAGNOSIS — M32.9 SYSTEMIC LUPUS ERYTHEMATOSUS, UNSPECIFIED SLE TYPE, UNSPECIFIED ORGAN INVOLVEMENT STATUS (H): ICD-10-CM

## 2023-05-30 DIAGNOSIS — R94.5 ABNORMAL RESULTS OF LIVER FUNCTION STUDIES: Primary | ICD-10-CM

## 2023-05-30 LAB
ALBUMIN SERPL BCG-MCNC: 4 G/DL (ref 3.5–5.2)
ALP SERPL-CCNC: 70 U/L (ref 35–104)
ALT SERPL W P-5'-P-CCNC: 46 U/L (ref 10–35)
ANION GAP SERPL CALCULATED.3IONS-SCNC: 8 MMOL/L (ref 7–15)
AST SERPL W P-5'-P-CCNC: 47 U/L (ref 10–35)
BILIRUB DIRECT SERPL-MCNC: <0.2 MG/DL (ref 0–0.3)
BILIRUB SERPL-MCNC: 0.5 MG/DL
BUN SERPL-MCNC: 9.8 MG/DL (ref 8–23)
CALCIUM SERPL-MCNC: 9.6 MG/DL (ref 8.8–10.2)
CHLORIDE SERPL-SCNC: 105 MMOL/L (ref 98–107)
CHOLEST SERPL-MCNC: 155 MG/DL
CK SERPL-CCNC: 488 U/L (ref 26–192)
CREAT SERPL-MCNC: 0.63 MG/DL (ref 0.51–0.95)
DEPRECATED HCO3 PLAS-SCNC: 26 MMOL/L (ref 22–29)
ERYTHROCYTE [DISTWIDTH] IN BLOOD BY AUTOMATED COUNT: 14.2 % (ref 10–15)
GFR SERPL CREATININE-BSD FRML MDRD: >90 ML/MIN/1.73M2
GLUCOSE SERPL-MCNC: 107 MG/DL (ref 70–99)
HCT VFR BLD AUTO: 33.9 % (ref 35–47)
HDLC SERPL-MCNC: 82 MG/DL
HGB BLD-MCNC: 12.3 G/DL (ref 11.7–15.7)
INR PPP: 0.97 (ref 0.85–1.15)
LDLC SERPL CALC-MCNC: 61 MG/DL
MCH RBC QN AUTO: 27.5 PG (ref 26.5–33)
MCHC RBC AUTO-ENTMCNC: 36.3 G/DL (ref 31.5–36.5)
MCV RBC AUTO: 76 FL (ref 78–100)
NONHDLC SERPL-MCNC: 73 MG/DL
PLATELET # BLD AUTO: 139 10E3/UL (ref 150–450)
POTASSIUM SERPL-SCNC: 3.9 MMOL/L (ref 3.4–5.3)
PROT SERPL-MCNC: 7.4 G/DL (ref 6.4–8.3)
RBC # BLD AUTO: 4.48 10E6/UL (ref 3.8–5.2)
SODIUM SERPL-SCNC: 139 MMOL/L (ref 136–145)
TRIGL SERPL-MCNC: 58 MG/DL
WBC # BLD AUTO: 4.9 10E3/UL (ref 4–11)

## 2023-05-30 PROCEDURE — 85027 COMPLETE CBC AUTOMATED: CPT | Performed by: PATHOLOGY

## 2023-05-30 PROCEDURE — 82085 ASSAY OF ALDOLASE: CPT | Mod: 90 | Performed by: PATHOLOGY

## 2023-05-30 PROCEDURE — 80061 LIPID PANEL: CPT | Performed by: PATHOLOGY

## 2023-05-30 PROCEDURE — 85610 PROTHROMBIN TIME: CPT | Performed by: PATHOLOGY

## 2023-05-30 PROCEDURE — 80053 COMPREHEN METABOLIC PANEL: CPT | Performed by: PATHOLOGY

## 2023-05-30 PROCEDURE — G0463 HOSPITAL OUTPT CLINIC VISIT: HCPCS | Performed by: INTERNAL MEDICINE

## 2023-05-30 PROCEDURE — 36415 COLL VENOUS BLD VENIPUNCTURE: CPT | Performed by: PATHOLOGY

## 2023-05-30 PROCEDURE — 82550 ASSAY OF CK (CPK): CPT | Performed by: PATHOLOGY

## 2023-05-30 PROCEDURE — 99000 SPECIMEN HANDLING OFFICE-LAB: CPT | Performed by: PATHOLOGY

## 2023-05-30 PROCEDURE — 99214 OFFICE O/P EST MOD 30 MIN: CPT | Performed by: INTERNAL MEDICINE

## 2023-05-30 PROCEDURE — 82248 BILIRUBIN DIRECT: CPT | Performed by: PATHOLOGY

## 2023-05-30 ASSESSMENT — PAIN SCALES - GENERAL: PAINLEVEL: MODERATE PAIN (4)

## 2023-05-30 NOTE — PROGRESS NOTES
[facility] Hepatology    Follow-up Visit    Follow-up visit for abnormal LFTs    Subjective:  62 year old female    Last seen Oct 2022.  Patient was discharged from clinic at last visit.  Patient was noted to have elevated LFTs last month and was told she should be seen again in hepatology clinic.  No new medications, ER visits or hospital admissions since last visit.    Patient is well today.  She denies any symptoms related to liver disease.    Patient denies jaundice, lower extremity edema, abdominal distension, lethargy or confusion.    Patient denies melena, hematemesis or hematochezia.    Patient denies fevers, sweats or chills.      Weight has increased ~5lbs since last visit.  Appetite is normal.    Patient denies any recent new medications.  She does report that she was taking her medications irregularly last month due to Ramadan.  Patient does not use herbal medications or remedies.    Patient does not drink alcohol.      Medical hx Surgical hx   Past Medical History:   Diagnosis Date     Anxiety      Cerebral infarction (H)      Depressive disorder      Fibromyalgia      GERD (gastroesophageal reflux disease)      Hemoglobin C trait (H) 2016     Hyperlipidemia LDL goal <70 2021     Lupus (H)      RACHNA (obstructive sleep apnea)      Substance abuse (H)     Has not used for 19 years.      Past Surgical History:   Procedure Laterality Date      SECTION       COLONOSCOPY  2010    repeat 10 yrs     COLONOSCOPY N/A 2020    Procedure: COLONOSCOPY, WITH POLYPECTOMY;  Surgeon: Rebecca Rojas MD;  [facility]     DILATION AND CURETTAGE, OPERATIVE HYSTEROSCOPY WITH MORCELLATOR, COMBINED N/A 02/15/2017    Procedure: COMBINED DILATION AND CURETTAGE, OPERATIVE HYSTEROSCOPY WITH MORCELLATOR;  Surgeon: Erin Gutierrez MD;  [facility]     ESOPHAGOSCOPY, GASTROSCOPY, DUODENOSCOPY (EGD), COMBINED Left 2016    Procedure: COMBINED ESOPHAGOSCOPY, GASTROSCOPY, DUODENOSCOPY  (EGD), BIOPSY SINGLE OR MULTIPLE;  Surgeon: Pierre Fernandez MD;  Location: [facility]          Medications  Prior to Admission medications    Medication Sig Start Date End Date Taking? Authorizing Provider   albuterol (PROAIR HFA/PROVENTIL HFA/VENTOLIN HFA) 108 (90 Base) MCG/ACT inhaler Inhale 2 puffs into the lungs every 4 hours as needed for shortness of breath / dyspnea, wheezing or other (coughing) 9/14/21  Yes Rafael Orosco MD   aspirin (ASA) 325 MG EC tablet Take 1 tablet (325 mg) by mouth daily 11/29/21  Yes Rafael Orosco MD   atorvastatin (LIPITOR) 40 MG tablet Take 1 tablet (40 mg) by mouth daily 5/6/22  Yes Rafael Orosco MD   cetirizine (ZYRTEC) 10 MG tablet Take 1 tablet (10 mg) by mouth daily 1/6/23  Yes Rafael Orosco MD   DULoxetine (CYMBALTA) 60 MG capsule Take 2 capsules (120 mg) by mouth daily  Patient taking differently: Take 120 mg by mouth 2 times daily 3/22/23  Yes Rafael Orosco MD   fluticasone (FLONASE) 50 MCG/ACT nasal spray Spray 1 spray into both nostrils daily 4/10/21  Yes Ludin De La Torre MD   gabapentin (NEURONTIN) 300 MG capsule Take 2 capsules (600 mg) by mouth 3 times daily as needed for neuropathic pain 7/15/22  Yes Rafael Orosco MD   HEMP OIL OR EXTRACT OR OTHER CBD CANNABINOID, NOT MEDICAL CANNABIS,    Yes Reported, Patient   hydroxychloroquine (PLAQUENIL) 200 MG tablet Take 2 tablets (400 mg) by mouth daily 4/7/23  Yes Elvia Gonzalez MD   hydrOXYzine (ATARAX) 25 MG tablet TAKE 1 TABLET BY MOUTH THREE TIMES A DAY  Patient taking differently: 25 mg TAKE 1 TABLET BY MOUTH THREE TIMES A DAY 9/14/20  Yes Rafael Orosco MD   naproxen sodium 220 MG capsule Take 220 mg by mouth 2 times daily as needed    Yes Reported, Patient   omega 3 1200 MG CAPS    Yes Reported, Patient   order for DME Cane, 9/20/17  Yes Rafael Orosco MD   tiZANidine (ZANAFLEX) 4 MG tablet TAKE 1-2 TABLETS BY MOUTH  THREE TIMES A DAY as needed  5/22/23  Yes Rafael Orosco MD   valACYclovir (VALTREX) 500 MG tablet Take 1 tablet (500 mg) by mouth 2 times daily 4/28/21  Yes Rafael Orosco MD       Allergies  Allergies   Allergen Reactions     Morphine Sulfate Itching     Sulfa Antibiotics Hives       Review of systems  A 10-point review of systems was negative    Examination  /66 (BP Location: Left arm, Patient Position: Sitting, Cuff Size: Adult Regular)   Pulse 77   Temp 98.1  F (36.7  C) (Oral)   Wt 79.5 kg (175 lb 3.2 oz)   LMP 08/12/2013   SpO2 98%   BMI 28.43 kg/m    Body mass index is 28.43 kg/m .    Gen- well, NAD, A+Ox3, normal color  CVS- RRR  RS- CTA  Abd- striae, SNT, no ascites or organomegaly on palpation or percussion, BS+  Extr- hands normal, no JULIAN  Skin- no rash or jaundice  Neuro- no asterixis  Psych- normal mood    Laboratory  Lab Results   Component Value Date     05/30/2023     03/24/2021    POTASSIUM 3.9 05/30/2023    POTASSIUM 4.3 10/06/2022    POTASSIUM 4.2 03/24/2021    CHLORIDE 105 05/30/2023    CHLORIDE 108 10/06/2022    CHLORIDE 106 03/24/2021    CO2 26 05/30/2023    CO2 28 10/06/2022    CO2 28 03/24/2021    BUN 9.8 05/30/2023    BUN 7 10/06/2022    BUN 12 03/24/2021    CR 0.63 05/30/2023    CR 0.66 03/24/2021       Lab Results   Component Value Date    BILITOTAL 0.5 05/30/2023    BILITOTAL 0.8 03/24/2021    ALT 46 05/30/2023     04/30/2021    AST 47 05/30/2023    AST 77 04/30/2021    ALKPHOS 70 05/30/2023    ALKPHOS 72 03/24/2021       Lab Results   Component Value Date    ALBUMIN 4.0 05/30/2023    ALBUMIN 3.7 10/06/2022    ALBUMIN 3.9 03/24/2021    PROTTOTAL 7.4 05/30/2023    PROTTOTAL 8.1 03/24/2021        Lab Results   Component Value Date    WBC 4.9 05/30/2023    WBC 4.5 03/24/2021    HGB 12.3 05/30/2023    HGB 12.7 03/24/2021    MCV 76 05/30/2023    MCV 79 03/24/2021     05/30/2023     03/24/2021       Lab Results   Component Value Date    INR 0.97 05/30/2023    INR  0.95 03/01/2021 4/14/2023        Radiology  Nil recent    Assessment  62 year old female with metabolic syndrome and NAFLD who presents for elevated LFTs.  Transaminases were recently elevated (?due to medications vs non-hepatic) but are almost back to normal.  No additional investigations required.  We reviewed the management of NAFLD, which is primarily with weight loss and management of metabolic syndrome.    Plan  1.  Weight loss with diet and exercise  2.  Low Na diet  3.  Continue atorvastatin  4.  Add on CK to labs if ALT/AST elevated again  5.  Follow-up with PCP    Quique Kuo MD  Hepatology  [facility]    I spent 30 minutes on the date of the encounter doing chart review, history and exam, documentation and further activities as noted above.

## 2023-05-30 NOTE — LETTER
2023         RE: Lesley Stafford  [address]  [place] [address]        Dear Colleague,    Thank you for referring your patient, Lesley Stafford, to the [facility] [place]. Please see a copy of my visit note below.    [facility] Hepatology    Follow-up Visit    Follow-up visit for abnormal LFTs    Subjective:  62 year old female    Last seen Oct 2022.  Patient was discharged from clinic at last visit.  Patient was noted to have elevated LFTs last month and was told she should be seen again in hepatology clinic.  No new medications, ER visits or hospital admissions since last visit.    Patient is well today.  She denies any symptoms related to liver disease.    Patient denies jaundice, lower extremity edema, abdominal distension, lethargy or confusion.    Patient denies melena, hematemesis or hematochezia.    Patient denies fevers, sweats or chills.      Weight has increased ~5lbs since last visit.  Appetite is normal.    Patient denies any recent new medications.  She does report that she was taking her medications irregularly last month due to Ramadan.  Patient does not use herbal medications or remedies.    Patient does not drink alcohol.      Medical hx Surgical hx   Past Medical History:   Diagnosis Date    Anxiety     Cerebral infarction (H)     Depressive disorder     Fibromyalgia     GERD (gastroesophageal reflux disease)     Hemoglobin C trait (H) 2016    Hyperlipidemia LDL goal <70 2021    Lupus (H)     RACHNA (obstructive sleep apnea)     Substance abuse (H)     Has not used for 19 years.      Past Surgical History:   Procedure Laterality Date     SECTION      COLONOSCOPY  2010    repeat 10 yrs    COLONOSCOPY N/A 2020    Procedure: COLONOSCOPY, WITH POLYPECTOMY;  Surgeon: Rebecca Rojas MD;  [facility]    DILATION AND CURETTAGE, OPERATIVE HYSTEROSCOPY WITH MORCELLATOR, COMBINED N/A 02/15/2017     Procedure: COMBINED DILATION AND CURETTAGE, OPERATIVE HYSTEROSCOPY WITH MORCELLATOR;  Surgeon: Erin Gutierrez MD;  [facility]    ESOPHAGOSCOPY, GASTROSCOPY, DUODENOSCOPY (EGD), COMBINED Left 02/05/2016    Procedure: COMBINED ESOPHAGOSCOPY, GASTROSCOPY, DUODENOSCOPY (EGD), BIOPSY SINGLE OR MULTIPLE;  Surgeon: Pierre Fernandez MD;  [facility]          Medications  Prior to Admission medications    Medication Sig Start Date End Date Taking? Authorizing Provider   albuterol (PROAIR HFA/PROVENTIL HFA/VENTOLIN HFA) 108 (90 Base) MCG/ACT inhaler Inhale 2 puffs into the lungs every 4 hours as needed for shortness of breath / dyspnea, wheezing or other (coughing) 9/14/21  Yes Rafael Orosco MD   aspirin (ASA) 325 MG EC tablet Take 1 tablet (325 mg) by mouth daily 11/29/21  Yes Rafael Orosco MD   atorvastatin (LIPITOR) 40 MG tablet Take 1 tablet (40 mg) by mouth daily 5/6/22  Yes Rafael Orosco MD   cetirizine (ZYRTEC) 10 MG tablet Take 1 tablet (10 mg) by mouth daily 1/6/23  Yes Rafael Orosco MD   DULoxetine (CYMBALTA) 60 MG capsule Take 2 capsules (120 mg) by mouth daily  Patient taking differently: Take 120 mg by mouth 2 times daily 3/22/23  Yes Rafael Orosco MD   fluticasone (FLONASE) 50 MCG/ACT nasal spray Spray 1 spray into both nostrils daily 4/10/21  Yes Lduin De La Torre MD   gabapentin (NEURONTIN) 300 MG capsule Take 2 capsules (600 mg) by mouth 3 times daily as needed for neuropathic pain 7/15/22  Yes Rafael Orosco MD   HEMP OIL OR EXTRACT OR OTHER CBD CANNABINOID, NOT MEDICAL CANNABIS,    Yes Reported, Patient   hydroxychloroquine (PLAQUENIL) 200 MG tablet Take 2 tablets (400 mg) by mouth daily 4/7/23  Yes Elvia Gonzalez MD   hydrOXYzine (ATARAX) 25 MG tablet TAKE 1 TABLET BY MOUTH THREE TIMES A DAY  Patient taking differently: 25 mg TAKE 1 TABLET BY MOUTH THREE TIMES A DAY 9/14/20  Yes Rafael Orosco MD   naproxen sodium 220 MG capsule  Take 220 mg by mouth 2 times daily as needed    Yes Reported, Patient   omega 3 1200 MG CAPS    Yes Reported, Patient   order for DIANDRA Olivier, 9/20/17  Yes Rafael Orosco MD   tiZANidine (ZANAFLEX) 4 MG tablet TAKE 1-2 TABLETS BY MOUTH  THREE TIMES A DAY as needed 5/22/23  Yes Rafael Orosco MD   valACYclovir (VALTREX) 500 MG tablet Take 1 tablet (500 mg) by mouth 2 times daily 4/28/21  Yes Rafael Orosco MD       Allergies  Allergies   Allergen Reactions    Morphine Sulfate Itching    Sulfa Antibiotics Hives       Review of systems  A 10-point review of systems was negative    Examination  /66 (BP Location: Left arm, Patient Position: Sitting, Cuff Size: Adult Regular)   Pulse 77   Temp 98.1  F (36.7  C) (Oral)   Wt 79.5 kg (175 lb 3.2 oz)   LMP 08/12/2013   SpO2 98%   BMI 28.43 kg/m    Body mass index is 28.43 kg/m .    Gen- well, NAD, A+Ox3, normal color  CVS- RRR  RS- CTA  Abd- striae, SNT, no ascites or organomegaly on palpation or percussion, BS+  Extr- hands normal, no JULIAN  Skin- no rash or jaundice  Neuro- no asterixis  Psych- normal mood    Laboratory  Lab Results   Component Value Date     05/30/2023     03/24/2021    POTASSIUM 3.9 05/30/2023    POTASSIUM 4.3 10/06/2022    POTASSIUM 4.2 03/24/2021    CHLORIDE 105 05/30/2023    CHLORIDE 108 10/06/2022    CHLORIDE 106 03/24/2021    CO2 26 05/30/2023    CO2 28 10/06/2022    CO2 28 03/24/2021    BUN 9.8 05/30/2023    BUN 7 10/06/2022    BUN 12 03/24/2021    CR 0.63 05/30/2023    CR 0.66 03/24/2021       Lab Results   Component Value Date    BILITOTAL 0.5 05/30/2023    BILITOTAL 0.8 03/24/2021    ALT 46 05/30/2023     04/30/2021    AST 47 05/30/2023    AST 77 04/30/2021    ALKPHOS 70 05/30/2023    ALKPHOS 72 03/24/2021       Lab Results   Component Value Date    ALBUMIN 4.0 05/30/2023    ALBUMIN 3.7 10/06/2022    ALBUMIN 3.9 03/24/2021    PROTTOTAL 7.4 05/30/2023    PROTTOTAL 8.1 03/24/2021        Lab Results    Component Value Date    WBC 4.9 05/30/2023    WBC 4.5 03/24/2021    HGB 12.3 05/30/2023    HGB 12.7 03/24/2021    MCV 76 05/30/2023    MCV 79 03/24/2021     05/30/2023     03/24/2021       Lab Results   Component Value Date    INR 0.97 05/30/2023    INR 0.95 03/01/2021 4/14/2023        Radiology  Nil recent    Assessment  62 year old female with metabolic syndrome and NAFLD who presents for elevated LFTs.  Transaminases were recently elevated (?due to medications vs non-hepatic) but are almost back to normal.  No additional investigations required.  We reviewed the management of NAFLD, which is primarily with weight loss and management of metabolic syndrome.    Plan  1.  Weight loss with diet and exercise  2.  Low Na diet  3.  Continue atorvastatin  4.  Add on CK to labs if ALT/AST elevated again  5.  Follow-up with PCP    Quique Kuo MD  Hepatology  [facility]    I spent 30 minutes on the date of the encounter doing chart review, history and exam, documentation and further activities as noted above.        Again, thank you for allowing me to participate in the care of your patient.        Sincerely,        Quique Kuo MD

## 2023-05-30 NOTE — NURSING NOTE
Chief Complaint   Patient presents with     RECHECK     follow up // per patient // needs lab orders     /66 (BP Location: Left arm, Patient Position: Sitting, Cuff Size: Adult Regular)   Pulse 77   Temp 98.1  F (36.7  C) (Oral)   Wt 79.5 kg (175 lb 3.2 oz)   LMP 08/12/2013   SpO2 98%   BMI 28.43 kg/m    Marina Lopez [facility]

## 2023-05-31 LAB — ALDOLASE SERPL-CCNC: 4.2 U/L

## 2023-06-19 NOTE — RESULT ENCOUNTER NOTE
Ion Sutton: Your recent result show glucose elevated- not diabetes, but pre-diabetes.  Cut carbs, walk, deal with stress...  Recheck in 1 year.     Rafael BROWN

## 2023-08-08 DIAGNOSIS — G89.29 CHRONIC BILATERAL LOW BACK PAIN WITHOUT SCIATICA: ICD-10-CM

## 2023-08-08 DIAGNOSIS — M54.50 CHRONIC BILATERAL LOW BACK PAIN WITHOUT SCIATICA: ICD-10-CM

## 2023-08-08 DIAGNOSIS — F32.1 MODERATE MAJOR DEPRESSION (H): ICD-10-CM

## 2023-08-08 RX ORDER — DULOXETIN HYDROCHLORIDE 60 MG/1
120 CAPSULE, DELAYED RELEASE ORAL DAILY
Qty: 60 CAPSULE | Refills: 0 | Status: SHIPPED | OUTPATIENT
Start: 2023-08-08 | End: 2023-09-01

## 2023-08-08 NOTE — TELEPHONE ENCOUNTER
"Requested Prescriptions   Pending Prescriptions Disp Refills    DULoxetine (CYMBALTA) 60 MG capsule [Pharmacy Med Name: DULoxetine HCl Oral Capsule Delayed Release Particles 60 MG] 60 capsule 0     Sig: Take 2 capsules (120 mg) by mouth daily       Serotonin-Norepinephrine Reuptake Inhibitors  Failed - 8/8/2023 12:40 AM        Failed - PHQ-9 score of less than 5 in past 6 months     Please review last PHQ-9 score.           Passed - Blood pressure under 140/90 in past 12 months     BP Readings from Last 3 Encounters:   05/30/23 106/66   04/07/23 116/75   02/22/23 114/73                 Passed - Medication is active on med list        Passed - Patient is age 18 or older        Passed - No active pregnancy on record        Passed - No positive pregnancy test in past 12 months        Passed - Recent (6 mo) or future (30 days) visit within the authorizing provider's specialty     Patient had office visit in the last 6 months or has a visit in the next 30 days with authorizing provider or within the authorizing provider's specialty.  See \"Patient Info\" tab in inbasket, or \"Choose Columns\" in Meds & Orders section of the refill encounter.                Routing refill request to provider for review/approval because medication did not pass protocol.    Pt was last seen on 02/22/23    Ashly Shah RN  [facility]   "

## 2023-09-01 DIAGNOSIS — G62.9 PERIPHERAL POLYNEUROPATHY: ICD-10-CM

## 2023-09-01 DIAGNOSIS — F32.1 MODERATE MAJOR DEPRESSION (H): ICD-10-CM

## 2023-09-01 DIAGNOSIS — G89.29 CHRONIC BILATERAL LOW BACK PAIN WITHOUT SCIATICA: ICD-10-CM

## 2023-09-01 DIAGNOSIS — M54.50 CHRONIC BILATERAL LOW BACK PAIN WITHOUT SCIATICA: ICD-10-CM

## 2023-09-01 RX ORDER — GABAPENTIN 300 MG/1
600 CAPSULE ORAL 3 TIMES DAILY PRN
Qty: 90 CAPSULE | Refills: 3 | Status: SHIPPED | OUTPATIENT
Start: 2023-09-01 | End: 2024-03-13

## 2023-09-01 RX ORDER — DULOXETIN HYDROCHLORIDE 60 MG/1
120 CAPSULE, DELAYED RELEASE ORAL DAILY
Qty: 60 CAPSULE | Refills: 11 | Status: SHIPPED | OUTPATIENT
Start: 2023-09-01 | End: 2024-07-31

## 2023-09-01 NOTE — TELEPHONE ENCOUNTER
Pending Prescriptions:                       Disp   Refills    gabapentin (NEURONTIN) 300 MG capsule     90 cap*0            Sig: Take 2 capsules (600 mg) by mouth 3 times daily           as needed for neuropathic pain

## 2023-09-01 NOTE — TELEPHONE ENCOUNTER
"Requested Prescriptions   Pending Prescriptions Disp Refills    DULoxetine (CYMBALTA) 60 MG capsule [Pharmacy Med Name: DULoxetine HCl Oral Capsule Delayed Release Particles 60 MG] 60 capsule 0     Sig: Take 2 capsules by mouth daily       Serotonin-Norepinephrine Reuptake Inhibitors  Failed - 9/1/2023  1:26 PM        Failed - PHQ-9 score of less than 5 in past 6 months     Please review last PHQ-9 score.           Passed - Blood pressure under 140/90 in past 12 months     BP Readings from Last 3 Encounters:   05/30/23 106/66   04/07/23 116/75   02/22/23 114/73                 Passed - Medication is active on med list        Passed - Patient is age 18 or older        Passed - No active pregnancy on record        Passed - No positive pregnancy test in past 12 months        Passed - Recent (6 mo) or future (30 days) visit within the authorizing provider's specialty     Patient had office visit in the last 6 months or has a visit in the next 30 days with authorizing provider or within the authorizing provider's specialty.  See \"Patient Info\" tab in inbasket, or \"Choose Columns\" in Meds & Orders section of the refill encounter.               Last annual on 7/29/22. [email or web address] sent to make appointment and to fill out PHQ9.    Darwin Griffith RN   [facility]    "

## 2023-09-01 NOTE — TELEPHONE ENCOUNTER
Requested Prescriptions   Pending Prescriptions Disp Refills    gabapentin (NEURONTIN) 300 MG capsule 90 capsule 0     Sig: Take 2 capsules (600 mg) by mouth 3 times daily as needed for neuropathic pain       There is no refill protocol information for this order         Routing refill request to provider for review/approval because:  Drug not on the [facility] refill protocol     Ashly Shah RN  [facility]

## 2023-10-28 ENCOUNTER — HEALTH MAINTENANCE LETTER (OUTPATIENT)
Age: 63
End: 2023-10-28

## 2023-11-30 ENCOUNTER — TELEPHONE (OUTPATIENT)
Dept: FAMILY MEDICINE | Facility: CLINIC | Age: 63
End: 2023-11-30
Payer: COMMERCIAL

## 2023-11-30 DIAGNOSIS — Z13.1 SCREENING FOR DIABETES MELLITUS: Primary | ICD-10-CM

## 2023-11-30 NOTE — LETTER
December 28, 2023      Lesley Stafford  [address]  [place] [address]        Dear Ms.Omar Stafford,    We are writing to inform you of your test results.    Good news! No diabetes; contact if questions.       Recent Results (from the past 1008 hour(s))   Hemoglobin A1c    Collection Time: 12/06/23 11:48 AM   Result Value Ref Range    Hemoglobin A1C 5.3 0.0 - 5.6 %   CBC with platelets    Collection Time: 12/06/23 11:48 AM   Result Value Ref Range    WBC Count 4.5 4.0 - 11.0 10e3/uL    RBC Count 4.35 3.80 - 5.20 10e6/uL    Hemoglobin 12.0 11.7 - 15.7 g/dL    Hematocrit 33.7 (L) 35.0 - 47.0 %    MCV 78 78 - 100 fL    MCH 27.6 26.5 - 33.0 pg    MCHC 35.6 31.5 - 36.5 g/dL    RDW 14.3 10.0 - 15.0 %    Platelet Count 152 150 - 450 10e3/uL   AST    Collection Time: 12/06/23 11:48 AM   Result Value Ref Range    AST 46 (H) 0 - 45 U/L   ALT    Collection Time: 12/06/23 11:48 AM   Result Value Ref Range    ALT 37 0 - 50 U/L   Albumin level    Collection Time: 12/06/23 11:48 AM   Result Value Ref Range    Albumin 4.2 3.5 - 5.2 g/dL   Creatinine    Collection Time: 12/06/23 11:48 AM   Result Value Ref Range    Creatinine 0.69 0.51 - 0.95 mg/dL    GFR Estimate >90 >60 mL/min/1.73m2   Complement C3    Collection Time: 12/06/23 11:48 AM   Result Value Ref Range    C3 Complement 115 81 - 157 mg/dL   Complement C4    Collection Time: 12/06/23 11:48 AM   Result Value Ref Range    C4 Complement 20 13 - 39 mg/dL   DNA ANTIBODY, NATV/2 STRAND    Collection Time: 12/06/23 11:48 AM   Result Value Ref Range    DNA (ds) Antibody 2.1 <10.0 IU/mL   RACHEL antibody panel    Collection Time: 12/06/23 11:48 AM   Result Value Ref Range    RNP [place] IgG Instrument Value 64.0 (H) <5.0 U/mL    RNP Antibody IgG Positive (A) Negative    Franco RACHEL [place] IgG Instrument Value 1.6 <7.0 U/mL    Smith RACHEL Antibody IgG Negative Negative    SSA [place] IgG Instrument Value 0.8 <7.0 U/mL    SSA (Ro) Antibody IgG Negative Negative    SSB [place] IgG  Instrument Value 0.7 <7.0 U/mL    SSB (La) Antibody IgG Negative Negative   Routine UA with Micro Reflex to Culture    Collection Time: 12/06/23 11:48 AM    Specimen: Urine, NOS   Result Value Ref Range    Color Urine Light Yellow Colorless, Straw, Light Yellow, Yellow    Appearance Urine Clear Clear    Glucose Urine Negative Negative mg/dL    Bilirubin Urine Negative Negative    Ketones Urine Negative Negative mg/dL    Specific Gravity Urine 1.014 0.999 - 1.035    Blood Urine Negative Negative    pH Urine 7.0 5.0 - 7.0    Protein Albumin Urine 10 (A) Negative mg/dL    Nitrite Urine Negative Negative    Leukocyte Esterase Urine Negative Negative    Urobilinogen Urine Normal Normal, 2.0 mg/dL   Protein  random urine    Collection Time: 12/06/23 11:48 AM   Result Value Ref Range    Total Protein Urine mg/dL 8.2   mg/dL    Total Protein Urine mg/mg Creat 0.09 0.00 - 0.20 mg/mg Cr    Creatinine Urine mg/dL 95.4 mg/dL   Erythrocyte sedimentation rate auto    Collection Time: 12/06/23 11:48 AM   Result Value Ref Range    Erythrocyte Sedimentation Rate 15 0 - 30 mm/hr   CRP inflammation    Collection Time: 12/06/23 11:48 AM   Result Value Ref Range    CRP Inflammation <3.00 <5.00 mg/L   Rheumatoid factor    Collection Time: 12/06/23 11:48 AM   Result Value Ref Range    Rheumatoid Factor <10 <14 IU/mL   UA Microscopic with Reflex to Culture    Collection Time: 12/06/23 11:48 AM   Result Value Ref Range    RBC Urine None Seen 0-2 /HPF /HPF    WBC Urine 0-5 0-5 /HPF /HPF    Amorphous Crystals Urine Few (A) None Seen /HPF        If you have any questions or concerns, please call the clinic at the number listed above.       Sincerely,    Dr. Rafael Orosco

## 2023-11-30 NOTE — TELEPHONE ENCOUNTER
Patient calling requesting to have A1C and lupus testing done as she isn't sure the last time she had them done and has been having more pain with walking and is unsure if its worsening diabetes or lupus.    Pt was also unsure if she needed to contact rheumatology for lupus labs, advised that she would need to.    Pended A1C order,    Thanks!  Darwin Griffith RN   [facility]

## 2023-12-06 ENCOUNTER — OFFICE VISIT (OUTPATIENT)
Dept: RHEUMATOLOGY | Facility: CLINIC | Age: 63
End: 2023-12-06
Payer: COMMERCIAL

## 2023-12-06 VITALS
HEART RATE: 85 BPM | OXYGEN SATURATION: 98 % | DIASTOLIC BLOOD PRESSURE: 76 MMHG | SYSTOLIC BLOOD PRESSURE: 118 MMHG | BODY MASS INDEX: 28.59 KG/M2 | WEIGHT: 176.2 LBS

## 2023-12-06 DIAGNOSIS — M32.9 SYSTEMIC LUPUS ERYTHEMATOSUS, UNSPECIFIED SLE TYPE, UNSPECIFIED ORGAN INVOLVEMENT STATUS (H): Primary | ICD-10-CM

## 2023-12-06 DIAGNOSIS — Z13.1 SCREENING FOR DIABETES MELLITUS: ICD-10-CM

## 2023-12-06 DIAGNOSIS — K76.0 FATTY LIVER: ICD-10-CM

## 2023-12-06 LAB
ALBUMIN MFR UR ELPH: 8.2 MG/DL
ALBUMIN SERPL BCG-MCNC: 4.2 G/DL (ref 3.5–5.2)
ALBUMIN UR-MCNC: 10 MG/DL
ALT SERPL W P-5'-P-CCNC: 37 U/L (ref 0–50)
AMORPH CRY #/AREA URNS HPF: ABNORMAL /HPF
APPEARANCE UR: CLEAR
AST SERPL W P-5'-P-CCNC: 46 U/L (ref 0–45)
BILIRUB UR QL STRIP: NEGATIVE
COLOR UR AUTO: ABNORMAL
CREAT SERPL-MCNC: 0.69 MG/DL (ref 0.51–0.95)
CREAT UR-MCNC: 95.4 MG/DL
CRP SERPL-MCNC: <3 MG/L
EGFRCR SERPLBLD CKD-EPI 2021: >90 ML/MIN/1.73M2
ERYTHROCYTE [DISTWIDTH] IN BLOOD BY AUTOMATED COUNT: 14.3 % (ref 10–15)
ERYTHROCYTE [SEDIMENTATION RATE] IN BLOOD BY WESTERGREN METHOD: 15 MM/HR (ref 0–30)
GLUCOSE UR STRIP-MCNC: NEGATIVE MG/DL
HBA1C MFR BLD: 5.3 % (ref 0–5.6)
HCT VFR BLD AUTO: 33.7 % (ref 35–47)
HGB BLD-MCNC: 12 G/DL (ref 11.7–15.7)
HGB UR QL STRIP: NEGATIVE
KETONES UR STRIP-MCNC: NEGATIVE MG/DL
LEUKOCYTE ESTERASE UR QL STRIP: NEGATIVE
MCH RBC QN AUTO: 27.6 PG (ref 26.5–33)
MCHC RBC AUTO-ENTMCNC: 35.6 G/DL (ref 31.5–36.5)
MCV RBC AUTO: 78 FL (ref 78–100)
NITRATE UR QL: NEGATIVE
PH UR STRIP: 7 [PH] (ref 5–7)
PLATELET # BLD AUTO: 152 10E3/UL (ref 150–450)
PROT/CREAT 24H UR: 0.09 MG/MG CR (ref 0–0.2)
RBC # BLD AUTO: 4.35 10E6/UL (ref 3.8–5.2)
RBC #/AREA URNS AUTO: ABNORMAL /HPF
RHEUMATOID FACT SERPL-ACNC: <10 IU/ML
SP GR UR STRIP: 1.01 (ref 1–1.03)
UROBILINOGEN UR STRIP-MCNC: NORMAL MG/DL
WBC # BLD AUTO: 4.5 10E3/UL (ref 4–11)
WBC #/AREA URNS AUTO: ABNORMAL /HPF

## 2023-12-06 PROCEDURE — 86160 COMPLEMENT ANTIGEN: CPT | Performed by: STUDENT IN AN ORGANIZED HEALTH CARE EDUCATION/TRAINING PROGRAM

## 2023-12-06 PROCEDURE — 84450 TRANSFERASE (AST) (SGOT): CPT | Performed by: STUDENT IN AN ORGANIZED HEALTH CARE EDUCATION/TRAINING PROGRAM

## 2023-12-06 PROCEDURE — 36415 COLL VENOUS BLD VENIPUNCTURE: CPT | Performed by: STUDENT IN AN ORGANIZED HEALTH CARE EDUCATION/TRAINING PROGRAM

## 2023-12-06 PROCEDURE — 83036 HEMOGLOBIN GLYCOSYLATED A1C: CPT | Performed by: STUDENT IN AN ORGANIZED HEALTH CARE EDUCATION/TRAINING PROGRAM

## 2023-12-06 PROCEDURE — 86431 RHEUMATOID FACTOR QUANT: CPT | Performed by: STUDENT IN AN ORGANIZED HEALTH CARE EDUCATION/TRAINING PROGRAM

## 2023-12-06 PROCEDURE — 84156 ASSAY OF PROTEIN URINE: CPT | Performed by: STUDENT IN AN ORGANIZED HEALTH CARE EDUCATION/TRAINING PROGRAM

## 2023-12-06 PROCEDURE — 85027 COMPLETE CBC AUTOMATED: CPT | Performed by: STUDENT IN AN ORGANIZED HEALTH CARE EDUCATION/TRAINING PROGRAM

## 2023-12-06 PROCEDURE — 99214 OFFICE O/P EST MOD 30 MIN: CPT | Performed by: STUDENT IN AN ORGANIZED HEALTH CARE EDUCATION/TRAINING PROGRAM

## 2023-12-06 PROCEDURE — 86140 C-REACTIVE PROTEIN: CPT | Performed by: STUDENT IN AN ORGANIZED HEALTH CARE EDUCATION/TRAINING PROGRAM

## 2023-12-06 PROCEDURE — 84460 ALANINE AMINO (ALT) (SGPT): CPT | Performed by: STUDENT IN AN ORGANIZED HEALTH CARE EDUCATION/TRAINING PROGRAM

## 2023-12-06 PROCEDURE — 86225 DNA ANTIBODY NATIVE: CPT | Performed by: STUDENT IN AN ORGANIZED HEALTH CARE EDUCATION/TRAINING PROGRAM

## 2023-12-06 PROCEDURE — 85652 RBC SED RATE AUTOMATED: CPT | Performed by: STUDENT IN AN ORGANIZED HEALTH CARE EDUCATION/TRAINING PROGRAM

## 2023-12-06 PROCEDURE — 82040 ASSAY OF SERUM ALBUMIN: CPT | Performed by: STUDENT IN AN ORGANIZED HEALTH CARE EDUCATION/TRAINING PROGRAM

## 2023-12-06 PROCEDURE — 81001 URINALYSIS AUTO W/SCOPE: CPT | Performed by: STUDENT IN AN ORGANIZED HEALTH CARE EDUCATION/TRAINING PROGRAM

## 2023-12-06 PROCEDURE — 86235 NUCLEAR ANTIGEN ANTIBODY: CPT | Performed by: STUDENT IN AN ORGANIZED HEALTH CARE EDUCATION/TRAINING PROGRAM

## 2023-12-06 PROCEDURE — 82565 ASSAY OF CREATININE: CPT | Performed by: STUDENT IN AN ORGANIZED HEALTH CARE EDUCATION/TRAINING PROGRAM

## 2023-12-06 RX ORDER — ARIPIPRAZOLE 2 MG/1
TABLET ORAL
COMMUNITY
Start: 2023-11-14 | End: 2024-07-31

## 2023-12-06 NOTE — PATIENT INSTRUCTIONS
Blood tests orders placed     Continue Plaquenil 400 mg daily     Continue PT and stretching exercises     Follow up in a year

## 2023-12-06 NOTE — PROGRESS NOTES
Rheumatology Clinic Visit     Lesley Stafford MRN# [id number]   YOB: 1960 Age: 60 year old     Date of Visit: Dec 6, 2023   Primary care provider: Rafael Orosco          Assessment and Plan:     Assessment     Systemic lupus( + ARIEL, +dsDNA,+ RNP, Thrombocytopenia, arthralgias, sicca, raynaud's, oral sores )  Fibromyalgia  Hx of stroke  Depression, anxiety  Obesity, sleep apnea  Posterior vitreous detachment of both eyes  Long term Plaquenil use    Ms. Link Stafford is 62 year old female seen in clinic for evaluation of systemic lupus    Systemic lupus : She was diagnosed with systemic lupus in 2014. She presented with arthralgias, fatigue, sicca symptoms, oral sores and had + ARIEL, +dsDNA,+ RNP, Thrombocytopenia and was started on hydroxychloroquine 200 mg twice daily. She continued on hydroxychloroquine until 2020 when she discontinued on her own.  She had no renal, pulmonary, CNS involvement due to systemic lupus.  Her symptoms of fatigue, fibromyalgia are controlled with Cymbalta.    Her main symptoms include intermittent episodes of joint pains, generalized muscle aches, fatigue, dry mouth, oral sores, pleurisy. She restarted Plaquenil in 7/2021 but was only taking 200 mg daily as compared to 400 mg daily which was prescribed. She has noticed improvements in chest pains, oral sores. Dry mouth improved with Cevimeline. Since in 3/2022 she has been taking 2 tab, 400 mg daily dose. She has noticed improvement in her symptoms.     Will get labs - CBC, AST, ALT, Cr, ESR, CRP, dsDNA, normal C3/C4. Will continue  mg daily. No need to intensify immune suppression.     Lumbar degenerative disc disease: She has chronic low back, hip pain due to lumbar degenerative disc disease.  She is not interested in getting epidural spine injection.  Recommended her to do physical therapy, pool therapy.    Elevated LFT : She has seen Dr. Ayaan Galvin in hepatology. She had elevated AST, ALT, positive  F-actin and mildly elevated antimitochondrial antibody.  She had low platelet count and mildly elevated CK level-424. She is diagnosed with NAFLD and was recommended to loose weight and eat healthy. She is working on weight loss, exercise and diet.    Sicca symptoms : She has severe dry mouth and dry eyes.  She is using Biotene products. Cholinergic agonists cevimeline has helped but expensive for her, so she discontinued. Continue using lubricating eyedrops.  Follow-up with dentist every 6 months.    Fibromyalgia: She is on Cymbalta which helps with fibromyalgia. For fibromyalgia she was instructed to try relaxing techniques like Yoga, Haroon chi. She can try Aerobic conditioning. Low-impact aerobic activities such as fast walking, biking, swimming, or water aerobics are most successful among the interventions that have been studied.      Vaccinations: Vaccinations reviewed with Ms. Link Stafford. Risks and benefits of vaccinations were discussed.  - Influenza: encouraged yearly vaccination  - Prevnar 20 : recommend getting it  - Zostavax: recommend to get it   - COVID- 19 : recommend to get it       Plan    Continue Plaquenil 400 mg daily     Blood tests orders placed     Recommend weight loss, Physical therapy     Recommend getting Shingrix and Prevnar     Follow up in a year     -- Orders placed this encounter  Orders Placed This Encounter   Procedures    CBC with platelets    AST    ALT    Albumin level    Creatinine    Complement C3    Complement C4    DNA ANTIBODY, NATV/2 STRAND    RACHEL antibody panel    Routine UA with Micro Reflex to Culture    Protein  random urine    Erythrocyte sedimentation rate auto    CRP inflammation    Rheumatoid factor                     Active Problem List:     Patient Active Problem List    Diagnosis Date Noted    History of alcoholism (H) 07/29/2022     Priority: Medium    Localization-related (focal) (partial) symptomatic epilepsy and epileptic syndromes with complex partial  seizures, not intractable, without status epilepticus (H) 07/29/2022     Priority: Medium    Vitreous opacities of right eye 04/13/2021     Priority: Medium    Hyperlipidemia LDL goal <70 03/30/2021     Priority: Medium    Thrombocytopenia (H24) 03/24/2021     Priority: Medium    Long-term use of Plaquenil 03/09/2021     Priority: Medium    Myopia of both eyes 03/09/2021     Priority: Medium    Presbyopia 03/09/2021     Priority: Medium    Dry eyes 03/09/2021     Priority: Medium    Anal fissure 01/06/2021     Priority: Medium    Encounter for medication monitoring 07/09/2020     Priority: Medium    History of stroke 02/05/2020     Priority: Medium    Financial difficulties 02/05/2020     Priority: Medium    Moderate major depression (H)      Priority: Medium    Liver hemangioma 12/14/2016     Priority: Medium     4 on MRI '09      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     Priority: Medium    Insomnia, unspecified type 08/19/2016     Priority: Medium    Tired 04/25/2016     Priority: Medium    Anxiety 02/19/2016     Priority: Medium    Allergic state 02/19/2016     Priority: Medium    History of claustrophobia 08/03/2015     Priority: Medium    Systemic lupus erythematosus, unspecified SLE type, unspecified organ involvement status (H) 09/24/2012     Priority: Medium            History of Present Illness:   Lesley Stafford is a 60 year old female with PMH of systemic lupus, sicca, fibromyalgia, depression, anxiety, obesity, fibromyalgia, stroke seen in the clinic in consultation at request of Dr Orosco for management of systemic lupus.    History from initial visit  : She was diagnosed with systemic lupus in 2014 by Dr. Ritesh Alcantara.  She had arthralgias, sicca symptoms, generalized muscle aches, fatigue her presentation.  Her blood work showed positive ARIEL, positive dsDNA, RNP antibody, thrombocytopenia.  She also had recurrent oral sores, mild Raynaud's symptoms.  She started on hydroxychloroquine 200 mg daily 2014  and was maintained on it up until 2020 when she discontinued it on her own.  In August 2017 she was [facility]  In 2019 she developed stroke and was managed at [facility].  Stroke was not considered related to CNS lupus.  She is on full dose aspirin, statins.  Denies any history of high blood pressure, diabetes.  She has posterior vitreous detachment bilateral eyes and follows with Dr. Rice.  No evidence of Plaquenil toxicity noted on her recent eye exam.    She is off of Plaquenil since 2020.  Today she reports that last week she had a flare where all her joints were hurting.  Her right fourth finger was very swollen and painful.  She had pain in her wrists, elbows, shoulders, lower back, neck, ankles.  During that week she rested, did acupuncture which helped.  She started CBD oil 3 months ago and feels like it is helping.    She also gets recurrent oral sores, has severe dry mouth and dry eyes.  Follows with dentist every 6 months.  She started using Biotene products and uses lubricating eyedrops.  Her other main issue is chronic constipation.  She is MiraLAX couple times a week.  She always has bloating.    Last week her joints were hurting, wrist, elbows were huting and she thinks that it was related to stress. Ankles, middle toe was hurting. Back, shoulders, muscles hurt. She went to Acupunture and was able to rest. Her whole body was on fire. If she stay stress free, then she do better to manage her pain. CBD oil has helped her. Feet felt swollen. She is on CBD oil for 3 months and it has helped.  Occasionally she has chest pain and try to take shallow breaths.  She denies photosensitivity.  She gets Raynaud's in her fingers.  She has trouble swallowing food due to severe dry mouth.    She had recent sleep study which showed mild sleep apnea.  She also had night terrors.  She follows with her therapist in regards to anxiety and depression.    Her  fatigue, fibromyalgia is managed with Xarelto.  She has been history of cutaneous lupus in her daughter.  Also reports extreme photosensitivity in her mom and sister.    September 30, 2021 - She has pain in her wrists, shoulder. Pain in her left hip wakes her up. She has lower back pain. She gets pain in her knuckles. Twice a month she gets pain in her joints which last for few days. She see chiropracter, get massage therapy, and use CBD. She has dry mouth and cevimeline helps with her dry mouth. She gets chest pain off and on but not has been that bad. She has raynaud's in her fingers. She has not has oral sores for a while.     January 11, 2022 - She is in a lot of stress due to her father's health.  She is going to put her father in hospice today. She has not been concentrating on her own health for the last couple months.  She is taking hydroxychloroquine 400 mg daily and has noticed improvement.  Denies significant joint pains.  Her blood work done on 9/30/2021 showed elevated AST-47, ALT-74, platelet count-94, CK-424, positive F-actin-39, mitochondrial antibody-4.5, negative dsDNA, normal C3/C4, normal ESR, CRP.  Her TPMT level was low at 23.    March 11, 2022 - She is taking Plaquenil 1 tab twice a day and has noticed improvement in her lupus symptoms. She has cut down on Gabapentin to 1 tab three times a day. She had herpes outbreak on Saturday in buttock area. She has pain in her joints mainly in her knees and hips. She is in lot of stress due to her ex  health. Her toes feel cold. She is using  which is causing oral sores.      April 7, 2023 - She gets pain in her lower back and hip. She takes Tylenol and Naproxen as needed. She has done Pool therapy which has helped her before. Hand pain is better. Her palms and feet get red and hot at night. She has been doing EMDR for trauma therapy for 6 month.     December 6, 2023 - She has chronic low back pain and hip pain due to Lumbar DDD. She is  on Tizanidine, Ibuprofen, Tylenol and Cymbalta. She has chronic pain in her hands. Palms of her hands turn red, denies raynaud's. She is taking  mg daily. She has sicca symptoms. She started taking Abilify for borderline personality disorder and has noticed some jerky movements, she will be discussing it with mental health provider. She is due for eye exam. Denies oral sores, skin rash, photosensitivity, chest pain, SOB, muscle weakness.          Review of Systems:     Review Of Systems  Constitutional: denies fever, chills, night sweats and weight loss.  Skin: No skin rash.  Eyes:+ dryness or irritation in eyes. No episode of eye inflammation or redness.   Ears/Nose/Throat: no recurrent sinus infections.  Respiratory: No shortness of breath, dyspnea on exertion, cough, or hemoptysis  Cardiovascular: no chest pain or palpitations.  Gastrointestinal: no nausea, vomiting, abdominal pain.  Normal bowel movements.  Genitourinary: no dysuria, frequency  or hematuria.  Musculoskeletal: as in HPI  Neurologic: no numbness, tingling.  Psychiatric: + mood disorders.  Hematologic/Lymphatic/Immunologic: no history of easy bruising, petechia or purpura.  No abnormal bleeding.   Endocrine: no h/o thyroid disease or Diabetes.                  Past Medical History:     Past Medical History:   Diagnosis Date    Anxiety     Cerebral infarction (H)     Depressive disorder     Fibromyalgia     GERD (gastroesophageal reflux disease)     Hemoglobin C trait (H24) 2016    Hyperlipidemia LDL goal <70 2021    Lupus (H)     RACHNA (obstructive sleep apnea)     Substance abuse (H)     Has not used for 19 years.     Past Surgical History:   Procedure Laterality Date     SECTION      COLONOSCOPY  2010    repeat 10 yrs    COLONOSCOPY N/A 2020    Procedure: COLONOSCOPY, WITH POLYPECTOMY;  Surgeon: Rebecca Rojas MD;  Location: [facility] OR    DILATION AND CURETTAGE, OPERATIVE HYSTEROSCOPY WITH MORCELLATOR, COMBINED  N/A 02/15/2017    Procedure: COMBINED DILATION AND CURETTAGE, OPERATIVE HYSTEROSCOPY WITH MORCELLATOR;  Surgeon: Erin Gutierrez MD;  Location: [facility] OR    ESOPHAGOSCOPY, GASTROSCOPY, DUODENOSCOPY (EGD), COMBINED Left 02/05/2016    Procedure: COMBINED ESOPHAGOSCOPY, GASTROSCOPY, DUODENOSCOPY (EGD), BIOPSY SINGLE OR MULTIPLE;  Surgeon: Pierre Fernandez MD;  Location: [facility]            Social History:     Social History     Occupational History    Not on file   Tobacco Use    Smoking status: Former    Smokeless tobacco: Never    Tobacco comments:     quit 27 years ago   Substance and Sexual Activity    Alcohol use: No    Drug use: No    Sexual activity: Not Currently     Partners: Male     Birth control/protection: None            Family History:     Family History   Problem Relation Age of Onset    Lupus Mother     Asthma Mother     Bone Cancer Mother     Diabetes Father     Brain Tumor Father         begnign on pituitary    Depression Sister     Anemia Sister     Colon Cancer Maternal Aunt 65    Glaucoma Maternal Grandfather     Alcoholism Maternal Grandfather     Macular Degeneration No family hx of     Liver Disease No family hx of             Allergies:     Allergies   Allergen Reactions    Morphine Sulfate Itching    Sulfa Antibiotics Hives            Medications:     Current Outpatient Medications   Medication Sig Dispense Refill    albuterol (PROAIR HFA/PROVENTIL HFA/VENTOLIN HFA) 108 (90 Base) MCG/ACT inhaler Inhale 2 puffs into the lungs every 4 hours as needed for shortness of breath / dyspnea, wheezing or other (coughing) 18 g 11    ARIPiprazole (ABILIFY) 2 MG tablet TAKE ONE TABLET BY MOUTH ONE TIME DAILY*      aspirin (ASA) 325 MG EC tablet Take 1 tablet (325 mg) by mouth daily 90 tablet 1    atorvastatin (LIPITOR) 40 MG tablet TAKE ONE TABLET BY MOUTH ONE TIME DAILY 90 tablet 3    cetirizine (ZYRTEC) 10 MG tablet Take 1 tablet (10 mg) by mouth daily 90 tablet 3    DULoxetine (CYMBALTA) 60 MG  capsule Take 2 capsules by mouth daily 60 capsule 11    fluticasone (FLONASE) 50 MCG/ACT nasal spray Spray 1 spray into both nostrils daily 1 g 11    gabapentin (NEURONTIN) 100 MG capsule TAKE TWO CAPSULES BY MOUTH TWO TIMES DAILY AS NEEDED FOR NEUROPATHIC PAIN 120 capsule 5    gabapentin (NEURONTIN) 300 MG capsule Take 2 capsules (600 mg) by mouth 3 times daily as needed for neuropathic pain 90 capsule 3    HEMP OIL OR EXTRACT OR OTHER CBD CANNABINOID, NOT MEDICAL CANNABIS,       hydroxychloroquine (PLAQUENIL) 200 MG tablet Take 2 tablets (400 mg) by mouth daily 180 tablet 3    hydrOXYzine (ATARAX) 25 MG tablet TAKE 1 TABLET BY MOUTH THREE TIMES A DAY (Patient taking differently: 25 mg TAKE 1 TABLET BY MOUTH THREE TIMES A DAY) 90 tablet 3    naproxen sodium 220 MG capsule Take 220 mg by mouth 2 times daily as needed       omega 3 1200 MG CAPS       order for DME Cane, 1 Device 0    tiZANidine (ZANAFLEX) 4 MG tablet TAKE 1-2 TABLETS BY MOUTH  THREE TIMES A DAY as needed 90 tablet 5    valACYclovir (VALTREX) 500 MG tablet Take 1 tablet (500 mg) by mouth 2 times daily 30 tablet 1            Physical Exam:   Blood pressure 118/76, pulse 85, weight 79.9 kg (176 lb 3.2 oz), last menstrual period 08/12/2013, SpO2 98%, not currently breastfeeding.  Wt Readings from Last 4 Encounters:   12/06/23 79.9 kg (176 lb 3.2 oz)   05/30/23 79.5 kg (175 lb 3.2 oz)   04/07/23 79.2 kg (174 lb 8 oz)   02/22/23 78.5 kg (173 lb)       Constitutional: Obese, appearing stated age; cooperative    Musculoskeletal: No Tenderness present over MCP, PIP joints, wrists or ankles. She also has myofascial tenderness due to Fibromyalgia.  No dactylitis,  tenosynovitis, enthesopathy.    Skin: no nail pitting, alopecia, rash, nodules or lesions  Neuro: nl cranial nerves, strength, sensation, DTRs.   Psych: nl judgement, orientation, memory, affect.         Data:     No results found for any visits on 12/06/23.    Recent Labs   Lab Test 03/24/21  9597  03/01/21  1059 12/24/19  0829 12/24/19  0648 12/23/19  1519 05/21/19  0929 07/28/17  1632 05/08/17  0351 01/29/16  1804 01/27/16  1217   WBC 4.5 4.5 4.5 Canceled, Test credited  --  4.0  --   --   --  4.9   RBC 4.52 4.43 4.83 Canceled, Test credited  --  4.42  --   --   --  4.02   HGB 12.7 12.3 13.5 Canceled, Test credited  --  12.6  --   --   --  11.7   HCT 35.6 34.8* 38.2 Canceled, Test credited  --  34.7*  --   --   --  32.2*   MCV 79 79 79 Canceled, Test credited  --  79  --   --   --  80   RDW 14.2 14.1 14.3 Canceled, Test credited  --  14.0  --   --   --  13.6   * 112* 145* Canceled, Test credited  --  124*  --   --   --  155   ALBUMIN 3.9  --   --  3.4  --  3.9   < >  --    < > 3.7   CRP  --   --  <2.9  --   --   --   --  <2.9  --  <2.9   BUN 12 13  --  7  --  10   < >  --   --  16   CREAT  --   --   --   --  0.7  --   --   --   --   --     < > = values in this interval not displayed.      Recent Labs   Lab Test 05/21/19  0929 10/08/18  1000 11/02/16  1151   TSH 1.20 0.97 0.85     Hemoglobin   Date Value Ref Range Status   05/30/2023 12.3 11.7 - 15.7 g/dL Final   04/14/2023 11.9 11.7 - 15.7 g/dL Final   10/06/2022 12.9 11.7 - 15.7 g/dL Final   03/24/2021 12.7 11.7 - 15.7 g/dL Final   03/01/2021 12.3 11.7 - 15.7 g/dL Final   12/24/2019 13.5 11.7 - 15.7 g/dL Final     Urea Nitrogen   Date Value Ref Range Status   05/30/2023 9.8 8.0 - 23.0 mg/dL Final   10/06/2022 7 7 - 30 mg/dL Final   09/30/2021 9 7 - 30 mg/dL Final   08/10/2021 12 7 - 30 mg/dL Final   03/24/2021 12 7 - 30 mg/dL Final   03/01/2021 13 7 - 30 mg/dL Final   12/24/2019 7 7 - 30 mg/dL Final     Creatinine   Date Value Ref Range Status   12/23/2019 0.7 0.52 - 1.04 mg/dL Final     Sed Rate   Date Value Ref Range Status   12/24/2019 19 0 - 30 mm/h Final   05/08/2017 15 0 - 30 mm/h Final   05/02/2016 18 0 - 30 mm/h Final     Erythrocyte Sedimentation Rate   Date Value Ref Range Status   03/25/2022 14 0 - 30 mm/hr Final   09/30/2021 18 0 - 30  mm/hr Final   07/29/2021 26 0 - 30 mm/hr Final     CRP Inflammation   Date Value Ref Range Status   03/25/2022 <2.9 0.0 - 8.0 mg/L Final   09/30/2021 <2.9 0.0 - 8.0 mg/L Final   07/29/2021 <2.9 0.0 - 8.0 mg/L Final   12/24/2019 <2.9 0.0 - 8.0 mg/L Final   05/08/2017 <2.9 0.0 - 8.0 mg/L Final   01/27/2016 <2.9 0.0 - 8.0 mg/L Final     AST   Date Value Ref Range Status   05/30/2023 47 (H) 10 - 35 U/L Final   04/14/2023 133 (H) 10 - 35 U/L Final   10/06/2022 35 0 - 45 U/L Final   04/30/2021 77 (H) 0 - 45 U/L Final   03/24/2021 63 (H) 0 - 45 U/L Final   12/24/2019 26 0 - 45 U/L Final     Albumin   Date Value Ref Range Status   05/30/2023 4.0 3.5 - 5.2 g/dL Final   04/14/2023 4.3 3.5 - 5.2 g/dL Final   10/06/2022 3.7 3.4 - 5.0 g/dL Final   03/25/2022 3.5 3.4 - 5.0 g/dL Final   09/30/2021 3.9 3.4 - 5.0 g/dL Final   03/24/2021 3.9 3.4 - 5.0 g/dL Final   12/24/2019 3.4 3.4 - 5.0 g/dL Final   05/21/2019 3.9 3.4 - 5.0 g/dL Final     Alkaline Phosphatase   Date Value Ref Range Status   05/30/2023 70 35 - 104 U/L Final   10/06/2022 68 40 - 150 U/L Final   09/30/2021 69 40 - 150 U/L Final   03/24/2021 72 40 - 150 U/L Final   12/24/2019 67 40 - 150 U/L Final   05/21/2019 67 40 - 150 U/L Final     ALT   Date Value Ref Range Status   05/30/2023 46 (H) 10 - 35 U/L Final   04/14/2023 169 (H) 10 - 35 U/L Final   10/06/2022 39 0 - 50 U/L Final   04/30/2021 125 (H) 0 - 50 U/L Final   03/24/2021 104 (H) 0 - 50 U/L Final   12/24/2019 30 0 - 50 U/L Final     Rheumatoid Factor   Date Value Ref Range Status   07/29/2021 <7 <20 IU/mL Final     Recent Labs   Lab Test 05/30/23  1041 04/14/23  1031 04/14/23  1022 10/06/22  1112 03/25/22  0952 12/03/21  0912 09/30/21  1656 09/30/21  1655 12/23/19  1517 05/21/19  0929 10/08/18  1000   WBC 4.9  --  5.2 4.5   < >  --    < >  --    < > 4.0 4.3   HGB 12.3  --  11.9 12.9   < >  --    < >  --    < > 12.6 12.5   HCT 33.9*  --  33.5* 36.2   < >  --    < >  --    < > 34.7* 34.1*   MCV 76*  --  78 79   < >   --    < >  --    < > 79 78   *  --  126* 110*   < >  --    < >  --    < > 124* 115*   BUN 9.8  --   --  7  --   --   --  9   < > 10 10   TSH  --   --   --   --   --  1.75  --   --   --  1.20 0.97   AST 47* 133*  --  35   < >  --   --  47*   < > 32 40   ALT 46* 169*  --  39   < >  --   --  74*   < > 36 44   ALKPHOS 70  --   --  68  --   --   --  69   < > 67 59    < > = values in this interval not displayed.     Outside studies reviewed: [facility] reviewed.    7/2019 : + RNP - 985, + dsDNA - 73, + ssDNA - 288, + Chromatin - 1081  Cbc -     Reviewed Rheumatology lab flowsheet    Elvia Gonzalez MD  [facility]  Department of Rheumatology & Autoimmune Disorders  [facility]: [phone]   Pager - [phone]

## 2023-12-06 NOTE — NURSING NOTE
Lesley Stafford's goals for this visit include:   Chief Complaint   Patient presents with    RECHECK     Has been having some aches and pain, swelling in wrist        She requests these members of her care team be copied on today's visit information: yes     PCP: Rafael Orosco    Referring Provider:  No referring provider defined for this encounter.    /76 (BP Location: Left arm, Patient Position: Chair, Cuff Size: Adult Regular)   Pulse 85   Wt 79.9 kg (176 lb 3.2 oz)   LMP 08/12/2013   SpO2 98%   BMI 28.59 kg/m      Do you need any medication refills at today's visit? No         ROSSANA Garcia   Neph/Pulm [facility]

## 2023-12-07 LAB
C3 SERPL-MCNC: 115 MG/DL (ref 81–157)
C4 SERPL-MCNC: 20 MG/DL (ref 13–39)
DSDNA AB SER-ACNC: 2.1 IU/ML
ENA SM IGG SER IA-ACNC: 1.6 U/ML
ENA SM IGG SER IA-ACNC: NEGATIVE
ENA SS-A AB SER IA-ACNC: 0.8 U/ML
ENA SS-A AB SER IA-ACNC: NEGATIVE
ENA SS-B IGG SER IA-ACNC: 0.7 U/ML
ENA SS-B IGG SER IA-ACNC: NEGATIVE
U1 SNRNP IGG SER IA-ACNC: 64 U/ML
U1 SNRNP IGG SER IA-ACNC: POSITIVE

## 2024-01-15 DIAGNOSIS — Z79.899 ENCOUNTER FOR EYE EXAM DUE TO HIGH RISK MEDICATION: ICD-10-CM

## 2024-01-15 DIAGNOSIS — Z79.899 LONG-TERM USE OF PLAQUENIL: Primary | ICD-10-CM

## 2024-01-15 NOTE — PROGRESS NOTES
HPI:  Patient presents for a plaquenil eye exam.     Social history: Son is 32 (in 2024) and is a nurse practitioner.       Pertinent Medical History:  Epilepsy  Liver hemangioma  Hyperlipidemia  Systemic lupus erythematous  Hemoglobin C trait  Thrombocytopenia  Stroke 2019  Alcoholism  Fibromyalgia    Ocular History:  High risk medication - plaquenil  Myopia, both eyes.   Dry eye syndrome, both eyes.     Eye Medications:  Allergic to sulfa    Assessment and Plan:    #   High Risk Medication - Plaquenil  Visual field 10-2 01/23/2024: Right eye: central defect (test taking challenges vs dry eyes); Left eye: normal  Macular OCT 01/23/2024: Both eyes: normal  Macular FAF 01/23/2024: Both eyes: normal  Kidney disease status: No  Tamoxifen use status: No   heritage/ancestry (Testing look beyond central macula in  patients): No  Patient takes plaquenil 400 mg daily for lupus since 2003.   The American Academy of Ophthalmology recommends a maximum daily plaquenil use of 5.0 mg/kg real weight. Based on this recommendation, the maximum daily dose for this patient is 399.5mg.   No plaquenil toxicity seen on exam today. Recommend annual dilated eye exam with testing to include visual field 10-2, and macular OCT/FAF. High risk characteristics include greater than 5 years of plaquenil use.  Return for repeat visual field 10-2 right eye - artificial tears upon testing. Also repeat refraction    #   Myopia, both eyes.   Due to fluctuation vision, re-check refraction next visit.    #   Meibomian Gland Dysfunction, both eyes.   Contributing to blurry vision.   Symptoms of dry eyes include blurry vision, eye pain, grittiness, burning, redness, eye irritation, and tearing. The goal is not to eliminate, but to improve symptoms.   Preservative free artificial tears 4 times daily both eyes. Refresh or Systane. Kj 3/flaxseed. Can use up to every 2 hours both eyes as needed. +  Warm compress (with dry eye mask), 10 minutes, at  bedtime, both eyes.      #   Cataract, both eyes.   Mildly visually significant.   Okay to monitor for now.     #   History of Stroke/CVA in 2019  No hemianopsia on visual field.     #   Posterior Vitreous Detachment, both eyes. Retinas attached.   Educated on signs and symptoms of a retinal detachment (ie. Hundreds of floaters, flashes of light, and shadow/curtain over the vision) to be seen immediately.          Patient consented to a dilated eye exam:    Yes. Side effects discussed.    Medical History:  Past Medical History:   Diagnosis Date    Anxiety     Cerebral infarction (H)     Depressive disorder     Fibromyalgia     GERD (gastroesophageal reflux disease)     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    Hyperlipidemia LDL goal <70 03/30/2021    Lupus (H)     RACHNA (obstructive sleep apnea)     Substance abuse (H)     Has not used for 19 years.       Medications:  Current Outpatient Medications   Medication Sig Dispense Refill    albuterol (PROAIR HFA/PROVENTIL HFA/VENTOLIN HFA) 108 (90 Base) MCG/ACT inhaler Inhale 2 puffs into the lungs every 4 hours as needed for shortness of breath / dyspnea, wheezing or other (coughing) 18 g 11    ARIPiprazole (ABILIFY) 2 MG tablet TAKE ONE TABLET BY MOUTH ONE TIME DAILY*      aspirin (ASA) 325 MG EC tablet Take 1 tablet (325 mg) by mouth daily 90 tablet 1    atorvastatin (LIPITOR) 40 MG tablet TAKE ONE TABLET BY MOUTH ONE TIME DAILY 90 tablet 3    cetirizine (ZYRTEC) 10 MG tablet Take 1 tablet (10 mg) by mouth daily 90 tablet 3    DULoxetine (CYMBALTA) 60 MG capsule Take 2 capsules by mouth daily 60 capsule 11    fluticasone (FLONASE) 50 MCG/ACT nasal spray Spray 1 spray into both nostrils daily 1 g 11    gabapentin (NEURONTIN) 100 MG capsule TAKE TWO CAPSULES BY MOUTH TWO TIMES DAILY AS NEEDED FOR NEUROPATHIC PAIN 120 capsule 5    gabapentin (NEURONTIN) 300 MG capsule Take 2 capsules (600 mg) by mouth 3 times daily as needed for neuropathic pain 90 capsule 3    HEMP OIL OR  EXTRACT OR OTHER CBD CANNABINOID, NOT MEDICAL CANNABIS,       hydroxychloroquine (PLAQUENIL) 200 MG tablet Take 2 tablets (400 mg) by mouth daily 180 tablet 3    hydrOXYzine (ATARAX) 25 MG tablet TAKE 1 TABLET BY MOUTH THREE TIMES A DAY (Patient taking differently: 25 mg TAKE 1 TABLET BY MOUTH THREE TIMES A DAY) 90 tablet 3    naproxen sodium 220 MG capsule Take 220 mg by mouth 2 times daily as needed       omega 3 1200 MG CAPS       order for DME Cane, 1 Device 0    tiZANidine (ZANAFLEX) 4 MG tablet TAKE 1-2 TABLETS BY MOUTH  THREE TIMES A DAY as needed 90 tablet 5    valACYclovir (VALTREX) 500 MG tablet Take 1 tablet (500 mg) by mouth 2 times daily 30 tablet 1   Complete documentation of historical and exam elements from today's encounter can be found in the full encounter summary report (not reduplicated in this progress note). I personally obtained the chief complaint(s) and history of present illness.  I confirmed and edited as necessary the review of systems, past medical/surgical history, family history, social history, and examination findings as documented by others; and I examined the patient myself. I personally reviewed the relevant tests, images, and reports as documented above. I formulated and edited as necessary the assessment and plan and discussed the findings and management plan with the patient and family. - Manuel Hall OD

## 2024-01-23 ENCOUNTER — OFFICE VISIT (OUTPATIENT)
Dept: OPHTHALMOLOGY | Facility: CLINIC | Age: 64
End: 2024-01-23
Attending: OPTOMETRIST
Payer: COMMERCIAL

## 2024-01-23 DIAGNOSIS — H25.813 COMBINED FORM OF SENILE CATARACT OF BOTH EYES: ICD-10-CM

## 2024-01-23 DIAGNOSIS — H52.13 MYOPIA OF BOTH EYES: Primary | ICD-10-CM

## 2024-01-23 DIAGNOSIS — Z79.899 LONG-TERM USE OF PLAQUENIL: ICD-10-CM

## 2024-01-23 DIAGNOSIS — H04.123 DRY EYE SYNDROME OF BOTH EYES: ICD-10-CM

## 2024-01-23 DIAGNOSIS — Z79.899 ENCOUNTER FOR EYE EXAM DUE TO HIGH RISK MEDICATION: ICD-10-CM

## 2024-01-23 DIAGNOSIS — H43.813 VITREOUS DETACHMENT OF BOTH EYES: ICD-10-CM

## 2024-01-23 PROCEDURE — 92250 FUNDUS PHOTOGRAPHY W/I&R: CPT | Performed by: OPTOMETRIST

## 2024-01-23 PROCEDURE — 92082 INTERMEDIATE VISUAL FIELD XM: CPT | Performed by: OPTOMETRIST

## 2024-01-23 PROCEDURE — 92004 COMPRE OPH EXAM NEW PT 1/>: CPT | Performed by: OPTOMETRIST

## 2024-01-23 PROCEDURE — 92015 DETERMINE REFRACTIVE STATE: CPT

## 2024-01-23 PROCEDURE — 99207 FUNDUS AUTOFLUORESCENCE IMAGE (FAF) OU (BOTH EYES): CPT | Mod: 26 | Performed by: OPTOMETRIST

## 2024-01-23 PROCEDURE — 92134 CPTRZ OPH DX IMG PST SGM RTA: CPT | Performed by: OPTOMETRIST

## 2024-01-23 PROCEDURE — G0463 HOSPITAL OUTPT CLINIC VISIT: HCPCS | Performed by: OPTOMETRIST

## 2024-01-23 RX ORDER — RISPERIDONE 0.5 MG/1
0.5 TABLET ORAL AT BEDTIME
COMMUNITY
Start: 2024-01-10

## 2024-01-23 RX ORDER — CARBOXYMETHYLCELLULOSE SODIUM 5 MG/ML
1 SOLUTION/ DROPS OPHTHALMIC 4 TIMES DAILY
Qty: 400 EACH | Refills: 11 | Status: SHIPPED | OUTPATIENT
Start: 2024-01-23

## 2024-01-23 ASSESSMENT — CUP TO DISC RATIO
OD_RATIO: 0.3
OS_RATIO: 0.3

## 2024-01-23 ASSESSMENT — CONF VISUAL FIELD
OS_NORMAL: 1
OS_INFERIOR_TEMPORAL_RESTRICTION: 0
OD_SUPERIOR_NASAL_RESTRICTION: 0
OD_INFERIOR_NASAL_RESTRICTION: 0
OD_INFERIOR_TEMPORAL_RESTRICTION: 0
OD_NORMAL: 1
OS_SUPERIOR_TEMPORAL_RESTRICTION: 0
OD_SUPERIOR_TEMPORAL_RESTRICTION: 0
OS_INFERIOR_NASAL_RESTRICTION: 0
OS_SUPERIOR_NASAL_RESTRICTION: 0

## 2024-01-23 ASSESSMENT — REFRACTION_WEARINGRX
OS_SPHERE: -0.75
OS_CYLINDER: +0.75
OD_ADD: +2.00
OD_CYLINDER: +0.75
OS_AXIS: 180
OD_AXIS: 175
OS_ADD: +2.00
OD_SPHERE: -1.00

## 2024-01-23 ASSESSMENT — VISUAL ACUITY
OS_CC+: -1
CORRECTION_TYPE: GLASSES
OD_CC+: -1
OS_CC: 20/30
OD_CC: 20/30
METHOD: SNELLEN - LINEAR

## 2024-01-23 ASSESSMENT — TONOMETRY
OS_IOP_MMHG: 12
OD_IOP_MMHG: 14
IOP_METHOD: TONOPEN

## 2024-01-23 ASSESSMENT — REFRACTION_MANIFEST
OD_CYLINDER: +1.00
OD_AXIS: 167
OS_SPHERE: -0.75
OD_SPHERE: -1.00
OD_ADD: +2.50
OS_AXIS: 010
OS_ADD: +2.50
OS_CYLINDER: +0.75

## 2024-01-23 ASSESSMENT — EXTERNAL EXAM - RIGHT EYE: OD_EXAM: NORMAL

## 2024-01-23 ASSESSMENT — SLIT LAMP EXAM - LIDS
COMMENTS: MEIBOMIAN GLAND DYSFUNCTION
COMMENTS: MEIBOMIAN GLAND DYSFUNCTION

## 2024-01-23 ASSESSMENT — EXTERNAL EXAM - LEFT EYE: OS_EXAM: NORMAL

## 2024-01-23 NOTE — NURSING NOTE
Chief Complaints and History of Present Illnesses   Patient presents with    Plaquenil     Chief Complaint(s) and History of Present Illness(es)       Plaquenil               Comments    Pt. States that VA seems to have worsened BE. No pain BE. No new flashes or floaters BE.   Perri ALMONTE 10:14 AM January 23, 2024

## 2024-02-07 DIAGNOSIS — Z79.899 LONG-TERM USE OF PLAQUENIL: ICD-10-CM

## 2024-02-07 DIAGNOSIS — Z79.899 ENCOUNTER FOR EYE EXAM DUE TO HIGH RISK MEDICATION: Primary | ICD-10-CM

## 2024-02-10 ENCOUNTER — HOSPITAL ENCOUNTER (EMERGENCY)
Facility: CLINIC | Age: 64
Discharge: HOME | End: 2024-02-10
Attending: EMERGENCY MEDICINE | Admitting: EMERGENCY MEDICINE
Payer: COMMERCIAL

## 2024-02-10 ENCOUNTER — NURSE TRIAGE (OUTPATIENT)
Dept: NURSING | Facility: CLINIC | Age: 64
End: 2024-02-10
Payer: COMMERCIAL

## 2024-02-10 VITALS
BODY MASS INDEX: 26.98 KG/M2 | TEMPERATURE: 97.7 F | SYSTOLIC BLOOD PRESSURE: 127 MMHG | HEART RATE: 83 BPM | DIASTOLIC BLOOD PRESSURE: 75 MMHG | HEIGHT: 68 IN | OXYGEN SATURATION: 98 % | RESPIRATION RATE: 22 BRPM | WEIGHT: 178 LBS

## 2024-02-10 DIAGNOSIS — M54.50 ACUTE LOW BACK PAIN WITHOUT SCIATICA, UNSPECIFIED BACK PAIN LATERALITY: ICD-10-CM

## 2024-02-10 LAB
ALBUMIN UR-MCNC: NEGATIVE MG/DL
APPEARANCE UR: CLEAR
BILIRUB UR QL STRIP: NEGATIVE
COLOR UR AUTO: ABNORMAL
GLUCOSE UR STRIP-MCNC: NEGATIVE MG/DL
HGB UR QL STRIP: ABNORMAL
KETONES UR STRIP-MCNC: NEGATIVE MG/DL
LEUKOCYTE ESTERASE UR QL STRIP: NEGATIVE
NITRATE UR QL: NEGATIVE
PH UR STRIP: 6 [PH] (ref 5–7)
RBC URINE: 2 /HPF
SP GR UR STRIP: 1 (ref 1–1.03)
TRANSITIONAL EPI: <1 /HPF
UROBILINOGEN UR STRIP-MCNC: NORMAL MG/DL
WBC URINE: <1 /HPF

## 2024-02-10 PROCEDURE — 250N000013 HC RX MED GY IP 250 OP 250 PS 637: Performed by: EMERGENCY MEDICINE

## 2024-02-10 PROCEDURE — 96375 TX/PRO/DX INJ NEW DRUG ADDON: CPT | Performed by: EMERGENCY MEDICINE

## 2024-02-10 PROCEDURE — 96361 HYDRATE IV INFUSION ADD-ON: CPT | Performed by: EMERGENCY MEDICINE

## 2024-02-10 PROCEDURE — 250N000011 HC RX IP 250 OP 636: Mod: JZ | Performed by: EMERGENCY MEDICINE

## 2024-02-10 PROCEDURE — 258N000003 HC RX IP 258 OP 636: Performed by: EMERGENCY MEDICINE

## 2024-02-10 PROCEDURE — 99283 EMERGENCY DEPT VISIT LOW MDM: CPT | Performed by: EMERGENCY MEDICINE

## 2024-02-10 PROCEDURE — 99284 EMERGENCY DEPT VISIT MOD MDM: CPT | Mod: 25 | Performed by: EMERGENCY MEDICINE

## 2024-02-10 PROCEDURE — 96374 THER/PROPH/DIAG INJ IV PUSH: CPT | Performed by: EMERGENCY MEDICINE

## 2024-02-10 PROCEDURE — 81001 URINALYSIS AUTO W/SCOPE: CPT | Performed by: EMERGENCY MEDICINE

## 2024-02-10 RX ORDER — METHOCARBAMOL 100 MG/ML
500 INJECTION, SOLUTION INTRAMUSCULAR; INTRAVENOUS ONCE
Qty: 10 ML | Refills: 0 | Status: COMPLETED | OUTPATIENT
Start: 2024-02-10 | End: 2024-02-10

## 2024-02-10 RX ORDER — METHOCARBAMOL 750 MG/1
750 TABLET, FILM COATED ORAL 4 TIMES DAILY PRN
Qty: 20 TABLET | Refills: 0 | Status: SHIPPED | OUTPATIENT
Start: 2024-02-10 | End: 2024-07-31 | Stop reason: ALTCHOICE

## 2024-02-10 RX ORDER — ACETAMINOPHEN 325 MG/1
975 TABLET ORAL ONCE
Status: COMPLETED | OUTPATIENT
Start: 2024-02-10 | End: 2024-02-10

## 2024-02-10 RX ORDER — SODIUM CHLORIDE, SODIUM LACTATE, POTASSIUM CHLORIDE, CALCIUM CHLORIDE 600; 310; 30; 20 MG/100ML; MG/100ML; MG/100ML; MG/100ML
INJECTION, SOLUTION INTRAVENOUS CONTINUOUS
Status: DISCONTINUED | OUTPATIENT
Start: 2024-02-10 | End: 2024-02-10 | Stop reason: HOSPADM

## 2024-02-10 RX ORDER — KETOROLAC TROMETHAMINE 15 MG/ML
15 INJECTION, SOLUTION INTRAMUSCULAR; INTRAVENOUS ONCE
Status: COMPLETED | OUTPATIENT
Start: 2024-02-10 | End: 2024-02-10

## 2024-02-10 RX ORDER — IBUPROFEN 600 MG/1
600 TABLET, FILM COATED ORAL EVERY 8 HOURS PRN
Qty: 20 TABLET | Refills: 0 | Status: SHIPPED | OUTPATIENT
Start: 2024-02-10

## 2024-02-10 RX ADMIN — METHOCARBAMOL 500 MG: 100 INJECTION, SOLUTION INTRAMUSCULAR; INTRAVENOUS at 19:48

## 2024-02-10 RX ADMIN — ACETAMINOPHEN 975 MG: 325 TABLET, FILM COATED ORAL at 17:49

## 2024-02-10 RX ADMIN — KETOROLAC TROMETHAMINE 15 MG: 15 INJECTION, SOLUTION INTRAMUSCULAR; INTRAVENOUS at 17:51

## 2024-02-10 RX ADMIN — SODIUM CHLORIDE, POTASSIUM CHLORIDE, SODIUM LACTATE AND CALCIUM CHLORIDE: 600; 310; 30; 20 INJECTION, SOLUTION INTRAVENOUS at 17:53

## 2024-02-10 ASSESSMENT — ACTIVITIES OF DAILY LIVING (ADL): ADLS_ACUITY_SCORE: 35

## 2024-02-10 NOTE — ED TRIAGE NOTES
Pt presents with a week long history of worsening back pain.  Pt reports she slept wrong one night and exacerbated it when she was cleaning the house.  Pt reports she has a history of back pain.  Pt reports recent urinary incontinence at night.       Triage Assessment (Adult)       Row Name 02/10/24 8581          Triage Assessment    Airway WDL WDL        Respiratory WDL    Respiratory WDL WDL        Skin Circulation/Temperature WDL    Skin Circulation/Temperature WDL WDL        Cardiac WDL    Cardiac WDL WDL        Peripheral/Neurovascular WDL    Peripheral Neurovascular WDL WDL        Cognitive/Neuro/Behavioral WDL    Cognitive/Neuro/Behavioral WDL WDL

## 2024-02-10 NOTE — TELEPHONE ENCOUNTER
Nurse Triage SBAR    Is this a 2nd Level Triage? NO    Situation: Pt is having extreme back pain.  She slept on the sofa last week and has had pain ever since.  It was getting better and now is having issues with loss of bladder control due to pain.  She was cleaning her daughters house yesterday.  Today the pain was so bad she bent over and then fell into a wall and hit her head.  Had to crawl.  Took Tylenol and used heat it did not help.  Pain going into R leg. Pain at 7/10-10-10    Protocol Recommended Disposition:   No disposition on file.    Recommendation: GIFTY Greenfield RN on 2/10/2024 at 4:46 PM       Reason for Disposition   [1] SEVERE back pain (e.g., excruciating) AND [2] sudden onset AND [3] age > 60 years    Additional Information   Negative: Passed out (i.e., lost consciousness, collapsed and was not responding)   Negative: Shock suspected (e.g., cold/pale/clammy skin, too weak to stand, low BP, rapid pulse)   Negative: Sounds like a life-threatening emergency to the triager   Negative: Major injury to the back (e.g., MVA, fall > 10 feet or 3 meters, penetrating injury, etc.)   Negative: Followed a tailbone injury   Negative: [1] Pain in the upper back over the ribs (rib cage) AND [2] radiates (travels, goes) into chest   Negative: [1] Pain in the upper back over the ribs (rib cage) AND [2] worsened by coughing (or clearly increases with breathing)   Negative: Back pain during pregnancy   Negative: Pain mainly in flank (i.e., in the side, over the lower ribs or just below the ribs)    Protocols used: Back Pain-A-      Virgen Greenfield RN on 2/10/2024 at 4:53 PM

## 2024-02-10 NOTE — ED PROVIDER NOTES
ED Provider Note  [facility]      History     Chief Complaint   Patient presents with    Back Pain     HPI  Lesley Stafford is a 63 year old female PMH of depression, lupus, alcoholism, cerebral infarction, HLD who presents to the ER for evaluation of back pain.  Started about 5 days ago she was bending and lifting.  No significant trauma and no radicular symptoms.  The pain is off the midline of the lumbar area to the left.  She has no motor weakness.  No fevers no history of cancer no IV drug use    Past Medical History  Past Medical History:   Diagnosis Date    Anxiety     Cerebral infarction (H)     Depressive disorder     Fibromyalgia     GERD (gastroesophageal reflux disease)     Hemoglobin C trait (H24) 2016    Hyperlipidemia LDL goal <70 2021    Lupus (H)     RACHNA (obstructive sleep apnea)     Substance abuse (H)     Has not used for 19 years.     Past Surgical History:   Procedure Laterality Date     SECTION      COLONOSCOPY  2010    repeat 10 yrs    COLONOSCOPY N/A 2020    Procedure: COLONOSCOPY, WITH POLYPECTOMY;  Surgeon: Rebecca Rojas MD;  Location: [facility] OR    DILATION AND CURETTAGE, OPERATIVE HYSTEROSCOPY WITH MORCELLATOR, COMBINED N/A 02/15/2017    Procedure: COMBINED DILATION AND CURETTAGE, OPERATIVE HYSTEROSCOPY WITH MORCELLATOR;  Surgeon: Erin Gutierrez MD;  Location: [facility] OR    ESOPHAGOSCOPY, GASTROSCOPY, DUODENOSCOPY (EGD), COMBINED Left 2016    Procedure: COMBINED ESOPHAGOSCOPY, GASTROSCOPY, DUODENOSCOPY (EGD), BIOPSY SINGLE OR MULTIPLE;  Surgeon: Pierre Fernandez MD;  Location: [facility] GI     ibuprofen (ADVIL/MOTRIN) 600 MG tablet  methocarbamol (ROBAXIN) 750 MG tablet  albuterol (PROAIR HFA/PROVENTIL HFA/VENTOLIN HFA) 108 (90 Base) MCG/ACT inhaler  ARIPiprazole (ABILIFY) 2 MG tablet  aspirin (ASA) 325 MG EC tablet  atorvastatin (LIPITOR) 40 MG tablet  carboxymethylcellulose PF (CARBOXYMETHYLCELLULOSE SODIUM) 0.5 %  "ophthalmic solution  cetirizine (ZYRTEC) 10 MG tablet  DULoxetine (CYMBALTA) 60 MG capsule  fluticasone (FLONASE) 50 MCG/ACT nasal spray  gabapentin (NEURONTIN) 100 MG capsule  gabapentin (NEURONTIN) 300 MG capsule  HEMP OIL OR EXTRACT OR OTHER CBD CANNABINOID, NOT MEDICAL CANNABIS,  hydroxychloroquine (PLAQUENIL) 200 MG tablet  hydrOXYzine (ATARAX) 25 MG tablet  naproxen sodium 220 MG capsule  omega 3 1200 MG CAPS  order for DME  risperiDONE (RISPERDAL) 0.5 MG tablet  tiZANidine (ZANAFLEX) 4 MG tablet  valACYclovir (VALTREX) 500 MG tablet      Allergies   Allergen Reactions    Morphine Sulfate Itching    Sulfa Antibiotics Hives     Family History  Family History   Problem Relation Age of Onset    Lupus Mother     Asthma Mother     Bone Cancer Mother     Diabetes Father     Brain Tumor Father         begnign on pituitary    Depression Sister     Anemia Sister     Colon Cancer Maternal Aunt 65    Glaucoma Maternal Grandfather     Alcoholism Maternal Grandfather     Macular Degeneration No family hx of     Liver Disease No family hx of      Social History   Social History     Tobacco Use    Smoking status: Former    Smokeless tobacco: Never    Tobacco comments:     quit 27 years ago   Substance Use Topics    Alcohol use: No    Drug use: No         A medically appropriate review of systems was performed with pertinent positives and negatives noted in the HPI, and all other systems negative.    Physical Exam   BP: 127/75  Pulse: 83  Temp: 97.7  F (36.5  C)  Resp: 22  Height: 172.7 cm (5' 8\")  Weight: 80.7 kg (178 lb)  SpO2: 98 %  Physical Exam  Vitals and nursing note reviewed.   Constitutional:       General: She is not in acute distress.     Appearance: She is well-developed.   HENT:      Head: Normocephalic and atraumatic.      Mouth/Throat:      Mouth: Mucous membranes are moist.   Eyes:      General: No scleral icterus.     Conjunctiva/sclera: Conjunctivae normal.      Pupils: Pupils are equal, round, and reactive " to light.   Cardiovascular:      Rate and Rhythm: Normal rate and regular rhythm.      Heart sounds: Normal heart sounds.   Pulmonary:      Effort: Pulmonary effort is normal. No respiratory distress.      Breath sounds: Normal breath sounds. No wheezing.   Abdominal:      General: Abdomen is flat.      Palpations: Abdomen is soft.   Musculoskeletal:      Cervical back: Neck supple.        Back:    Skin:     General: Skin is warm and dry.   Neurological:      General: No focal deficit present.      Mental Status: She is alert and oriented to person, place, and time.      Cranial Nerves: No cranial nerve deficit.      Sensory: Sensation is intact.      Motor: Motor function is intact. No weakness.      Gait: Gait is intact.   Psychiatric:         Mood and Affect: Mood normal.         Behavior: Behavior normal.           ED Course, Procedures, & Data      Procedures         Labs Ordered and Resulted from Time of ED Arrival to Time of ED Departure   ROUTINE UA WITH MICROSCOPIC REFLEX TO CULTURE - Abnormal       Result Value    Color Urine Straw      Appearance Urine Clear      Glucose Urine Negative      Bilirubin Urine Negative      Ketones Urine Negative      Specific Gravity Urine 1.004      Blood Urine Trace (*)     pH Urine 6.0      Protein Albumin Urine Negative      Urobilinogen Urine Normal      Nitrite Urine Negative      Leukocyte Esterase Urine Negative      RBC Urine 2      WBC Urine <1      Transitional Epithelials Urine <1         Medications   lactated ringers infusion ( Intravenous $New Bag 2/10/24 1753)   methocarbamol (ROBAXIN) injection 500 mg (has no administration in time range)   ketorolac (TORADOL) injection 15 mg (15 mg Intravenous $Given 2/10/24 1751)   acetaminophen (TYLENOL) tablet 975 mg (975 mg Oral $Given 2/10/24 1749)     Urinalysis normal     Results for orders placed or performed during the hospital encounter of 02/10/24   UA with Microscopic reflex to Culture     Status: Abnormal     Specimen: Urine, Clean Catch   Result Value Ref Range    Color Urine Straw Colorless, Straw, Light Yellow, Yellow    Appearance Urine Clear Clear    Glucose Urine Negative Negative mg/dL    Bilirubin Urine Negative Negative    Ketones Urine Negative Negative mg/dL    Specific Gravity Urine 1.004 1.003 - 1.035    Blood Urine Trace (A) Negative    pH Urine 6.0 5.0 - 7.0    Protein Albumin Urine Negative Negative mg/dL    Urobilinogen Urine Normal Normal, 2.0 mg/dL    Nitrite Urine Negative Negative    Leukocyte Esterase Urine Negative Negative    RBC Urine 2 <=2 /HPF    WBC Urine <1 <=5 /HPF    Transitional Epithelials Urine <1 <=1 /HPF    Narrative    Urine Culture not indicated     Medications   lactated ringers infusion ( Intravenous $New Bag 2/10/24 1753)   methocarbamol (ROBAXIN) injection 500 mg (has no administration in time range)   ketorolac (TORADOL) injection 15 mg (15 mg Intravenous $Given 2/10/24 1751)   acetaminophen (TYLENOL) tablet 975 mg (975 mg Oral $Given 2/10/24 1745)     Labs Ordered and Resulted from Time of ED Arrival to Time of ED Departure   ROUTINE UA WITH MICROSCOPIC REFLEX TO CULTURE - Abnormal       Result Value    Color Urine Straw      Appearance Urine Clear      Glucose Urine Negative      Bilirubin Urine Negative      Ketones Urine Negative      Specific Gravity Urine 1.004      Blood Urine Trace (*)     pH Urine 6.0      Protein Albumin Urine Negative      Urobilinogen Urine Normal      Nitrite Urine Negative      Leukocyte Esterase Urine Negative      RBC Urine 2      WBC Urine <1      Transitional Epithelials Urine <1       No orders to display          Critical care was not performed.     Medical Decision Making  The patient's presentation was of low complexity (an acute and uncomplicated illness or injury).    The patient's evaluation involved:  ordering and/or review of 1 test(s) in this encounter (urinalysis)    The patient's management necessitated moderate risk (prescription  drug management including medications given in the ED).    Assessment & Plan    5 days of low back pain off the midline without red flags no motor weakness no radiculopathy.  Focus was on symptom management.  She was given IV fluids Toradol and Robaxin as well as Tylenol.  Do not feel she needs imaging at this point.  Patient feels better with the above intervention.  Urinalysis does not show any hematuria I think kidney stone is less likely I did offer her MRI of her lumbar spine but she would prefer to pursue this as an outpatient and she can talk to her primary care provider about this.    I have reviewed the nursing notes. I have reviewed the findings, diagnosis, plan and need for follow up with the patient.    New Prescriptions    IBUPROFEN (ADVIL/MOTRIN) 600 MG TABLET    Take 1 tablet (600 mg) by mouth every 8 hours as needed for moderate pain    METHOCARBAMOL (ROBAXIN) 750 MG TABLET    Take 1 tablet (750 mg) by mouth 4 times daily as needed for muscle spasms       Final diagnoses:   Acute low back pain without sciatica, unspecified back pain laterality       Rei Sotelo MD  [facility] EMERGENCY DEPARTMENT  2/10/2024     Rei Sotelo MD  02/10/24 1916

## 2024-02-11 NOTE — DISCHARGE INSTRUCTIONS
Talk to Dr. Orosco about outpatient MRI of your lumbar spine  Try medication as directed  If the pain gets worse or radiates down your leg or legs return to the ER

## 2024-02-12 ENCOUNTER — TELEPHONE (OUTPATIENT)
Dept: FAMILY MEDICINE | Facility: CLINIC | Age: 64
End: 2024-02-12
Payer: COMMERCIAL

## 2024-02-12 DIAGNOSIS — M25.552 HIP PAIN, LEFT: ICD-10-CM

## 2024-02-12 DIAGNOSIS — M54.50 CHRONIC BILATERAL LOW BACK PAIN WITHOUT SCIATICA: Primary | ICD-10-CM

## 2024-02-12 DIAGNOSIS — G89.29 CHRONIC BILATERAL LOW BACK PAIN WITHOUT SCIATICA: Primary | ICD-10-CM

## 2024-02-12 DIAGNOSIS — M54.42 BILATERAL LOW BACK PAIN WITH LEFT-SIDED SCIATICA, UNSPECIFIED CHRONICITY: ICD-10-CM

## 2024-02-12 NOTE — TELEPHONE ENCOUNTER
Patient calling for referral to Orthopedics for back and hip pain.   Pt was advised by [facility] chiropractor to be seen by ortho for additional evaluation and imaging       FYI - patient seen at ED Saturday for acute low back pain   Order pended     Chanda PRINCE RN  [facility]

## 2024-02-13 NOTE — TELEPHONE ENCOUNTER
Sent [employer] with contact info to schedule with ortho.     Chanda PRINCE RN  [facility]

## 2024-02-20 DIAGNOSIS — Z79.899 ENCOUNTER FOR EYE EXAM DUE TO HIGH RISK MEDICATION: ICD-10-CM

## 2024-02-20 DIAGNOSIS — Z79.899 LONG-TERM USE OF PLAQUENIL: Primary | ICD-10-CM

## 2024-03-01 ENCOUNTER — ANCILLARY PROCEDURE (OUTPATIENT)
Dept: GENERAL RADIOLOGY | Facility: CLINIC | Age: 64
End: 2024-03-01
Attending: ANESTHESIOLOGY
Payer: COMMERCIAL

## 2024-03-01 ENCOUNTER — OFFICE VISIT (OUTPATIENT)
Dept: ANESTHESIOLOGY | Facility: CLINIC | Age: 64
End: 2024-03-01
Payer: COMMERCIAL

## 2024-03-01 VITALS
HEART RATE: 88 BPM | HEIGHT: 65 IN | SYSTOLIC BLOOD PRESSURE: 131 MMHG | BODY MASS INDEX: 29.66 KG/M2 | DIASTOLIC BLOOD PRESSURE: 82 MMHG | OXYGEN SATURATION: 97 % | WEIGHT: 178 LBS

## 2024-03-01 DIAGNOSIS — M25.552 BILATERAL HIP PAIN: ICD-10-CM

## 2024-03-01 DIAGNOSIS — M79.18 MYOFASCIAL PAIN: Primary | ICD-10-CM

## 2024-03-01 DIAGNOSIS — M25.551 BILATERAL HIP PAIN: ICD-10-CM

## 2024-03-01 DIAGNOSIS — M54.42 BILATERAL LOW BACK PAIN WITH LEFT-SIDED SCIATICA, UNSPECIFIED CHRONICITY: ICD-10-CM

## 2024-03-01 DIAGNOSIS — M25.552 HIP PAIN, LEFT: ICD-10-CM

## 2024-03-01 PROCEDURE — 99203 OFFICE O/P NEW LOW 30 MIN: CPT | Performed by: ANESTHESIOLOGY

## 2024-03-01 PROCEDURE — 73522 X-RAY EXAM HIPS BI 3-4 VIEWS: CPT | Performed by: RADIOLOGY

## 2024-03-01 RX ORDER — PRAZOSIN HYDROCHLORIDE 1 MG/1
1 CAPSULE ORAL AT BEDTIME
COMMUNITY
End: 2024-07-31

## 2024-03-01 RX ORDER — IBUPROFEN 600 MG/1
600 TABLET, FILM COATED ORAL EVERY 6 HOURS PRN
COMMUNITY
End: 2024-07-31

## 2024-03-01 ASSESSMENT — PAIN SCALES - GENERAL: PAINLEVEL: SEVERE PAIN (7)

## 2024-03-01 NOTE — NURSING NOTE
RN reviewed AVS with patient. Patient to contact clinic if any questions/concerns. Patient verbalized understanding.    Anay Swift RN

## 2024-03-01 NOTE — PATIENT INSTRUCTIONS
Referrals:      Pain Psychology Referral placed-  Please contact their scheduling office at [phone] to schedule, if you have not heard from them within 2 business days.     Pain psychology Therapies Requested-  Mindfulness training, CBT, Coping and relaxation therapy.         Imaging:    Bilateral Hip Xray ordered.    IMAGING SERVICES HOURS:    All imaging modalities are available from 7 a.m. - 9 p.m. Monday through Friday  X-ray, CT, MRI, and General Ultrasound appointments are available from 7 a.m. -3:30 p.m. on Saturdays  X-ray, CT and MRI appointments are available from 8 a.m. - 4:30 p.m. on Sundays  Please call [phone] to schedule imaging exams       Treatment planning:    TENS Unit ordered.      Recommended Follow up:      Follow up as needed.           To speak with a nurse, schedule/reschedule/cancel a clinic appointment, or request a medication refill call: [phone]    You can also reach us by [email or web address]: [email or web address]

## 2024-03-01 NOTE — PROGRESS NOTES
[facility] Consultation    Date of visit: 3/1/2024    Reason for consultation:    Lesley Stafford is a 63 year old female who is seen in consultation today at the request of her provider, Rafael Orosco MD.    Primary Care Provider is Rafael Orosco.  Pain medications are being prescribed by PCP.    Please see the [facility] health questionnaire which the patient completed and reviewed with me in detail.    Chief Complaint:    Chief Complaint   Patient presents with    Consult     Consult right Side Lower Back-Hip Pain       Pain history:  Lesley Stafford is a 63 year old female who first started having problems with pain in multiple foci throughout her body. Has hx of lupus and fibromyalgia. Feels like most of her pain comes from her spine and her hips. She went into the ED recently because of acute right side low back pain. She was given methocarbamol and this helped but the pain is still there.     Morning stiffness  Associates a lot of pain with trauma, is in trauma therapy    Pain rating: intensity ranges from 2/10 to 9/10, and Averages 5/10 on a 0-10 scale.  Aggravating factors include: unsure, stress  Relieving factors include: yoga, trauma therapy, walking  Any bowel or bladder incontinence: n/a    Current treatments include:  Methocarbamol - as needed  Tizanidine   Ibuprofen 600 mg at night time   Duloxetine 60 mg BID - for fibromyalgia, has been on this for a while    Previous medication treatments included:      Other treatments have included:  Lesley Stafford has not been seen at a pain clinic in the past.    PT: yes, helpful but the pain does not go away  Massage  Acupuncture: yes, helpful  TENs Unit: no  Injections: no, doesn't like needles    Past Medical History:  Past Medical History:   Diagnosis Date    Anxiety     Cerebral infarction (H)     Depressive disorder     Fibromyalgia     GERD (gastroesophageal reflux  disease)     Hemoglobin C trait (H24) 2016    Hyperlipidemia LDL goal <70 2021    Lupus (H)     RACHNA (obstructive sleep apnea)     Substance abuse (H)     Has not used for 19 years.     Patient Active Problem List    Diagnosis Date Noted    History of alcoholism (H) 2022     Priority: Medium    Localization-related (focal) (partial) symptomatic epilepsy and epileptic syndromes with complex partial seizures, not intractable, without status epilepticus (H) 2022     Priority: Medium    Vitreous opacities of right eye 2021     Priority: Medium    Hyperlipidemia LDL goal <70 2021     Priority: Medium    Thrombocytopenia (H24) 2021     Priority: Medium    Long-term use of Plaquenil 2021     Priority: Medium    Myopia of both eyes 2021     Priority: Medium    Presbyopia 2021     Priority: Medium    Dry eyes 2021     Priority: Medium    Anal fissure 2021     Priority: Medium    Encounter for medication monitoring 2020     Priority: Medium    History of stroke 2020     Priority: Medium    Financial difficulties 2020     Priority: Medium    Moderate major depression (H)      Priority: Medium    Liver hemangioma 2016     Priority: Medium     4 on MRI '09      Hemoglobin C trait (H24) 2016     Priority: Medium    Insomnia, unspecified type 2016     Priority: Medium    Tired 2016     Priority: Medium    Anxiety 2016     Priority: Medium    Allergic state 2016     Priority: Medium    History of claustrophobia 2015     Priority: Medium    Systemic lupus erythematosus, unspecified SLE type, unspecified organ involvement status (H) 2012     Priority: Medium       Past Surgical History:  Past Surgical History:   Procedure Laterality Date     SECTION      COLONOSCOPY  2010    repeat 10 yrs    COLONOSCOPY N/A 2020    Procedure: COLONOSCOPY, WITH POLYPECTOMY;  Surgeon: Crystal  MD Rebecca;  Location: [facility] OR    DILATION AND CURETTAGE, OPERATIVE HYSTEROSCOPY WITH MORCELLATOR, COMBINED N/A 02/15/2017    Procedure: COMBINED DILATION AND CURETTAGE, OPERATIVE HYSTEROSCOPY WITH MORCELLATOR;  Surgeon: Erin Gutierrez MD;  Location: [facility] OR    ESOPHAGOSCOPY, GASTROSCOPY, DUODENOSCOPY (EGD), COMBINED Left 02/05/2016    Procedure: COMBINED ESOPHAGOSCOPY, GASTROSCOPY, DUODENOSCOPY (EGD), BIOPSY SINGLE OR MULTIPLE;  Surgeon: Pierre Fernandez MD;  Location: [facility] GI     Medications:  Current Outpatient Medications   Medication Sig Dispense Refill    albuterol (PROAIR HFA/PROVENTIL HFA/VENTOLIN HFA) 108 (90 Base) MCG/ACT inhaler Inhale 2 puffs into the lungs every 4 hours as needed for shortness of breath / dyspnea, wheezing or other (coughing) 18 g 11    ARIPiprazole (ABILIFY) 2 MG tablet TAKE ONE TABLET BY MOUTH ONE TIME DAILY*      aspirin (ASA) 325 MG EC tablet Take 1 tablet (325 mg) by mouth daily 90 tablet 1    atorvastatin (LIPITOR) 40 MG tablet TAKE ONE TABLET BY MOUTH ONE TIME DAILY 90 tablet 3    carboxymethylcellulose PF (CARBOXYMETHYLCELLULOSE SODIUM) 0.5 % ophthalmic solution Place 1 drop into both eyes 4 times daily Preservative free artificial tears, single use vials. 400 each 11    cetirizine (ZYRTEC) 10 MG tablet Take 1 tablet (10 mg) by mouth daily 90 tablet 3    DULoxetine (CYMBALTA) 60 MG capsule Take 2 capsules by mouth daily 60 capsule 11    fluticasone (FLONASE) 50 MCG/ACT nasal spray Spray 1 spray into both nostrils daily 1 g 11    gabapentin (NEURONTIN) 100 MG capsule TAKE TWO CAPSULES BY MOUTH TWO TIMES DAILY AS NEEDED FOR NEUROPATHIC PAIN 120 capsule 5    gabapentin (NEURONTIN) 300 MG capsule Take 2 capsules (600 mg) by mouth 3 times daily as needed for neuropathic pain 90 capsule 3    HEMP OIL OR EXTRACT OR OTHER CBD CANNABINOID, NOT MEDICAL CANNABIS,       hydroxychloroquine (PLAQUENIL) 200 MG tablet Take 2 tablets (400 mg) by mouth daily 180 tablet 3    hydrOXYzine  (ATARAX) 25 MG tablet TAKE 1 TABLET BY MOUTH THREE TIMES A DAY (Patient taking differently: 25 mg TAKE 1 TABLET BY MOUTH THREE TIMES A DAY) 90 tablet 3    ibuprofen (ADVIL/MOTRIN) 600 MG tablet Take 600 mg by mouth every 6 hours as needed for moderate pain      ibuprofen (ADVIL/MOTRIN) 600 MG tablet Take 1 tablet (600 mg) by mouth every 8 hours as needed for moderate pain 20 tablet 0    methocarbamol (ROBAXIN) 750 MG tablet Take 1 tablet (750 mg) by mouth 4 times daily as needed for muscle spasms 20 tablet 0    naproxen sodium 220 MG capsule Take 220 mg by mouth 2 times daily as needed       omega 3 1200 MG CAPS       order for DME Cane, 1 Device 0    prazosin (MINIPRESS) 1 MG capsule Take 1 mg by mouth at bedtime      risperiDONE (RISPERDAL) 0.5 MG tablet Take 0.5 mg by mouth at bedtime      tiZANidine (ZANAFLEX) 4 MG tablet TAKE 1-2 TABLETS BY MOUTH  THREE TIMES A DAY as needed 90 tablet 5    valACYclovir (VALTREX) 500 MG tablet Take 1 tablet (500 mg) by mouth 2 times daily 30 tablet 1     Allergies:     Allergies   Allergen Reactions    Morphine Sulfate Itching    Sulfa Antibiotics Hives     Social History:  Home situation: lives alone, has children, great-grandchildren  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  HEAD/EYES:  headache, dizziness, or vision changes.    EARS/NOSE/THROAT:   "Nosebleeds, hearing loss, sinus infection, earache, tinnitus.  IMMUNE:  Allergies, cancer, immune deficiency, or infections.  SKIN:  Urticaria, rash, hives  HEME/Lymphatic:   anemia, easy bruising, easy bleeding.  RESPIRATORY:  cough, wheezing, or shortness of breath  CARDIOVASCULAR/Circulation:  Extremity edema, syncope, hypertension, tachycardia, or angina.  GASTROINTESTINAL:  abdominal pain, nausea/emesis, diarrhea, constipation,  hematochezia, or melena.  ENDOCRINE:  Diabetes, steroid use,  thyroid disease or osteoporosis.  MUSCULOSKELETAL: neck pain, back pain, arthralgia, arthritis, or gout.  GENITOURINARY:  frequency, urgency, dysuria, difficulty voiding, hematuria or incontinence.  NEUROLOGIC:  weakness, numbness, paresthesias, seizure, tremor, stroke or memory loss.  PSYCHIATRIC:  depression, anxiety, stress, suicidal thoughts or mood swings.     Physical Exam:  Vitals:    03/01/24 0940   BP: 131/82   BP Location: Right arm   Patient Position: Chair   Cuff Size: Adult Large   Pulse: 88   SpO2: 97%   Weight: 80.7 kg (178 lb)   Height: 1.651 m (5' 5\")     Exam:  Constitutional: healthy, alert, and no distress  Head: normocephalic. Atraumatic.   Eyes: no redness or jaundice noted   ENT: oropharnx normal.  MMM.  Neck supple.    Cardiovascular: RRR no m/g/r   Respiratory: clear   Gastrointestinal: soft, non-tender, normoactive bowel sounds   : deferred  Skin: no suspicious lesions or rashes  Psychiatric: mentation appears normal and affect normal/bright    Musculoskeletal exam:  Gait/Station/Posture: normal    Lumbar spine: MIld decrease ROM with f/e/bl rotation   Rotation/ext to right: pain free   Rotation/ext to left: pain free    Myofascial tenderness:  bilateral paraspinal, bilateral trapezius  Straight leg exam: n/a  Jaycob's maneuver: n/a    Neurologic exam:  CN:  Cranial nerves 2-12 are normal  Motor:  5/5 UE and LE strength    Sensory:  (upper and lower extremities):   Light touch: normal " "   Allodynia: absent    Dysethesia: absent    Hyperalgesia: absent     Diagnostic tests:  MRI of Lumbar Spine was completed on 4/4/2017 showing:  \"IMPRESSION: Multilevel degenerative disc disease most significant  findings at L3-L4 and L4-L5. Impingement of the right L5 nerve root  within the right lateral recess due to degenerative changes. Moderate  to severe right L4-L5 nerve neural foraminal stenosis and moderate  spinal canal stenosis at L4-L5. Mild to moderate left L3-L4 neural  foraminal stenosis due to left foraminal disc protrusion with  displacement of the left L3 nerve root.\"    Personally reviewed imaging on day of visit    Other testing (labs, diagnostics) reviewed:  Labs  Lab Results   Component Value Date    A1C 5.3 12/06/2023    A1C 5.5 02/22/2023    A1C 5.5 12/23/2019    A1C 5.7 11/02/2016    A1C 5.5 10/05/2015     Last Comprehensive Metabolic Panel:  Lab Results   Component Value Date     05/30/2023    POTASSIUM 3.9 05/30/2023    CHLORIDE 105 05/30/2023    CO2 26 05/30/2023    ANIONGAP 8 05/30/2023     (H) 05/30/2023    BUN 9.8 05/30/2023    CR 0.69 12/06/2023    GFRESTIMATED >90 12/06/2023    AKILA 9.6 05/30/2023         [place] Prescription Monitoring Program reviewed    Outside records reviewed        Assessment:  Myofascial Pain  Bilateral Hip Pain  Lumbar Spondylosis    Lesley Stafford is a 63 year old female who presents with the complaints of chronic low back pain as well as hip pain. Based on her history and physical exam, her symptoms are multifactorial including lumbar spondylosis, degenerative changes, myofascial pain of the low back, as well as likely hip osteoarthritis.     Plan:  Diagnosis reviewed, treatment option addressed, and risk/benefits discussed.  Self-care instructions given.  I am recommending a multidisciplinary treatment plan to help this patient better manage her pain.      Physical Therapy: Not at this time, continue HEPs  Pain Psychologist to address issues " of relaxation, behavioral change, coping style, and other factors important to improvement: Referral placed  Diagnostic Studies: XR Bilateral hips  Medication Management: none  Further procedures recommended: none  Other treatments: TENS unit     Follow up: as needed    Total time spent was 50 minutes, and more than 50% of face to face time was spent in counseling and/or coordination of care regarding principles of multidisciplinary care, medication management, and therapeutic options.     Libia Hinojosa MD    Pain Medicine  Department of Anesthesiology  [facility]

## 2024-03-01 NOTE — NURSING NOTE
Patient presents with:  Consult: Consult right Side Lower Back-Hip Pain      Severe Pain (7)     Pain Medications       Salicylates Refills Start End     aspirin (ASA) 325 MG EC tablet 1 11/29/2021 --    Sig - Route: Take 1 tablet (325 mg) by mouth daily - Oral    Class: E-Prescribe            What medications are you using for pain? Gabapentin, Duloxetine, Ibuprofen, Methocarbamol, CBC Gummies    (New patients only) Have you been seen by another pain clinic/ provider? no    (Return Patients only) What refills are you needing today? No    Expectation Not sure

## 2024-03-01 NOTE — LETTER
3/1/2024       RE: Lesley Stafford  [address]  [place] [address]     Dear Colleague,    Thank you for referring your patient, Lesley Stafford, to the [facility] [place] at [facility]. Please see a copy of my visit note below.                          [facility] Consultation    Date of visit: 3/1/2024    Reason for consultation:    Lesley Stafford is a 63 year old female who is seen in consultation today at the request of her provider, Rafael Orosco MD.    Primary Care Provider is Rafael Orosco.  Pain medications are being prescribed by PCP.    Please see the [facility] health questionnaire which the patient completed and reviewed with me in detail.    Chief Complaint:    Chief Complaint   Patient presents with    Consult     Consult right Side Lower Back-Hip Pain       Pain history:  Lesley Stafford is a 63 year old female who first started having problems with pain in multiple foci throughout her body. Has hx of lupus and fibromyalgia. Feels like most of her pain comes from her spine and her hips. She went into the ED recently because of acute right side low back pain. She was given methocarbamol and this helped but the pain is still there.     Morning stiffness  Associates a lot of pain with trauma, is in trauma therapy    Pain rating: intensity ranges from 2/10 to 9/10, and Averages 5/10 on a 0-10 scale.  Aggravating factors include: unsure, stress  Relieving factors include: yoga, trauma therapy, walking  Any bowel or bladder incontinence: n/a    Current treatments include:  Methocarbamol - as needed  Tizanidine   Ibuprofen 600 mg at night time   Duloxetine 60 mg BID - for fibromyalgia, has been on this for a while    Previous medication treatments included:      Other treatments have included:  Lesley Stafford has not  been seen at a pain clinic in the past.    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     Patient Active Problem List    Diagnosis Date Noted    History of alcoholism (H) 07/29/2022     Priority: Medium    Localization-related (focal) (partial) symptomatic epilepsy and epileptic syndromes with complex partial seizures, not intractable, without status epilepticus (H) 07/29/2022     Priority: Medium    Vitreous opacities of right eye 04/13/2021     Priority: Medium    Hyperlipidemia LDL goal <70 03/30/2021     Priority: Medium    Thrombocytopenia (H24) 03/24/2021     Priority: Medium    Long-term use of Plaquenil 03/09/2021     Priority: Medium    Myopia of both eyes 03/09/2021     Priority: Medium    Presbyopia 03/09/2021     Priority: Medium    Dry eyes 03/09/2021     Priority: Medium    Anal fissure 01/06/2021     Priority: Medium    Encounter for medication monitoring 07/09/2020     Priority: Medium    History of stroke 02/05/2020     Priority: Medium    Financial difficulties 02/05/2020     Priority: Medium    Moderate major depression (H)      Priority: Medium    Liver hemangioma 12/14/2016     Priority: Medium     4 on MRI '09      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     Priority: Medium    Insomnia, unspecified type 08/19/2016     Priority: Medium    Tired 04/25/2016     Priority: Medium    Anxiety 02/19/2016     Priority: Medium    Allergic state 02/19/2016     Priority: Medium    History of claustrophobia 08/03/2015     Priority: Medium    Systemic  lupus erythematosus, unspecified SLE type, unspecified organ involvement status (H) 2012     Priority: Medium       Past Surgical History:  Past Surgical History:   Procedure Laterality Date     SECTION      COLONOSCOPY  2010    repeat 10 yrs    COLONOSCOPY N/A 2020    Procedure: COLONOSCOPY, WITH POLYPECTOMY;  Surgeon: Rebecca Rojas MD;  Location: [facility] OR    DILATION AND CURETTAGE, OPERATIVE HYSTEROSCOPY WITH MORCELLATOR, COMBINED N/A 02/15/2017    Procedure: COMBINED DILATION AND CURETTAGE, OPERATIVE HYSTEROSCOPY WITH MORCELLATOR;  Surgeon: Erin Gutierrez MD;  Location: [facility] OR    ESOPHAGOSCOPY, GASTROSCOPY, DUODENOSCOPY (EGD), COMBINED Left 2016    Procedure: COMBINED ESOPHAGOSCOPY, GASTROSCOPY, DUODENOSCOPY (EGD), BIOPSY SINGLE OR MULTIPLE;  Surgeon: Pierre Fernandez MD;  Location: [facility] GI     Medications:  Current Outpatient Medications   Medication Sig Dispense Refill    albuterol (PROAIR HFA/PROVENTIL HFA/VENTOLIN HFA) 108 (90 Base) MCG/ACT inhaler Inhale 2 puffs into the lungs every 4 hours as needed for shortness of breath / dyspnea, wheezing or other (coughing) 18 g 11    ARIPiprazole (ABILIFY) 2 MG tablet TAKE ONE TABLET BY MOUTH ONE TIME DAILY*      aspirin (ASA) 325 MG EC tablet Take 1 tablet (325 mg) by mouth daily 90 tablet 1    atorvastatin (LIPITOR) 40 MG tablet TAKE ONE TABLET BY MOUTH ONE TIME DAILY 90 tablet 3    carboxymethylcellulose PF (CARBOXYMETHYLCELLULOSE SODIUM) 0.5 % ophthalmic solution Place 1 drop into both eyes 4 times daily Preservative free artificial tears, single use vials. 400 each 11    cetirizine (ZYRTEC) 10 MG tablet Take 1 tablet (10 mg) by mouth daily 90 tablet 3    DULoxetine (CYMBALTA) 60 MG capsule Take 2 capsules by mouth daily 60 capsule 11    fluticasone (FLONASE) 50 MCG/ACT nasal spray Spray 1 spray into both nostrils daily 1 g 11    gabapentin (NEURONTIN) 100 MG capsule TAKE TWO CAPSULES BY MOUTH TWO TIMES DAILY AS NEEDED FOR  NEUROPATHIC PAIN 120 capsule 5    gabapentin (NEURONTIN) 300 MG capsule Take 2 capsules (600 mg) by mouth 3 times daily as needed for neuropathic pain 90 capsule 3    HEMP OIL OR EXTRACT OR OTHER CBD CANNABINOID, NOT MEDICAL CANNABIS,       hydroxychloroquine (PLAQUENIL) 200 MG tablet Take 2 tablets (400 mg) by mouth daily 180 tablet 3    hydrOXYzine (ATARAX) 25 MG tablet TAKE 1 TABLET BY MOUTH THREE TIMES A DAY (Patient taking differently: 25 mg TAKE 1 TABLET BY MOUTH THREE TIMES A DAY) 90 tablet 3    ibuprofen (ADVIL/MOTRIN) 600 MG tablet Take 600 mg by mouth every 6 hours as needed for moderate pain      ibuprofen (ADVIL/MOTRIN) 600 MG tablet Take 1 tablet (600 mg) by mouth every 8 hours as needed for moderate pain 20 tablet 0    methocarbamol (ROBAXIN) 750 MG tablet Take 1 tablet (750 mg) by mouth 4 times daily as needed for muscle spasms 20 tablet 0    naproxen sodium 220 MG capsule Take 220 mg by mouth 2 times daily as needed       omega 3 1200 MG CAPS       order for DME Cane, 1 Device 0    prazosin (MINIPRESS) 1 MG capsule Take 1 mg by mouth at bedtime      risperiDONE (RISPERDAL) 0.5 MG tablet Take 0.5 mg by mouth at bedtime      tiZANidine (ZANAFLEX) 4 MG tablet TAKE 1-2 TABLETS BY MOUTH  THREE TIMES A DAY as needed 90 tablet 5    valACYclovir (VALTREX) 500 MG tablet Take 1 tablet (500 mg) by mouth 2 times daily 30 tablet 1     Allergies:     Allergies   Allergen Reactions    Morphine Sulfate Itching    Sulfa Antibiotics Hives     Social History:  Home situation: lives alone, has children, great-grandchildren  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  HEAD/EYES:  headache, dizziness, or vision changes.    EARS/NOSE/THROAT:  Nosebleeds, hearing loss, sinus infection, earache, tinnitus.  IMMUNE:  Allergies, cancer, immune deficiency, or infections.  SKIN:  Urticaria, rash, hives  HEME/Lymphatic:   anemia, easy bruising, easy bleeding.  RESPIRATORY:  cough, wheezing, or shortness of breath  CARDIOVASCULAR/Circulation:  Extremity edema, syncope, hypertension, tachycardia, or angina.  GASTROINTESTINAL:  abdominal pain, nausea/emesis, diarrhea, constipation,  hematochezia, or melena.  ENDOCRINE:  Diabetes, steroid use,  thyroid disease or osteoporosis.  MUSCULOSKELETAL: neck pain, back pain, arthralgia, arthritis, or gout.  GENITOURINARY:  frequency, urgency, dysuria, difficulty voiding, hematuria or incontinence.  NEUROLOGIC:  weakness, numbness, paresthesias, seizure, tremor, stroke or memory loss.  PSYCHIATRIC:  depression, anxiety, stress, suicidal thoughts or mood swings.     Physical Exam:  Vitals:    03/01/24 0940   BP: 131/82   BP Location: Right arm   Patient Position: Chair   Cuff Size: Adult Large   Pulse: 88   SpO2: 97%   Weight: 80.7 kg (178 lb)   Height: 1.651 m (5' 5\")     Exam:  Constitutional: healthy, alert, and no distress  Head: normocephalic. Atraumatic.   Eyes: no redness or jaundice noted   ENT: oropharnx normal.  MMM.  Neck supple.    Cardiovascular: RRR no m/g/r   Respiratory: clear   Gastrointestinal: soft, non-tender, normoactive bowel sounds   : deferred  Skin: no suspicious lesions or rashes  Psychiatric: mentation appears normal and affect normal/bright    Musculoskeletal exam:  Gait/Station/Posture: normal    Lumbar spine: MIld decrease ROM with f/e/bl " "rotation   Rotation/ext to right: pain free   Rotation/ext to left: pain free    Myofascial tenderness:  bilateral paraspinal, bilateral trapezius  Straight leg exam: n/a  Jaycob's maneuver: n/a    Neurologic exam:  CN:  Cranial nerves 2-12 are normal  Motor:  5/5 UE and LE strength    Sensory:  (upper and lower extremities):   Light touch: normal    Allodynia: absent    Dysethesia: absent    Hyperalgesia: absent     Diagnostic tests:  MRI of Lumbar Spine was completed on 4/4/2017 showing:  \"IMPRESSION: Multilevel degenerative disc disease most significant  findings at L3-L4 and L4-L5. Impingement of the right L5 nerve root  within the right lateral recess due to degenerative changes. Moderate  to severe right L4-L5 nerve neural foraminal stenosis and moderate  spinal canal stenosis at L4-L5. Mild to moderate left L3-L4 neural  foraminal stenosis due to left foraminal disc protrusion with  displacement of the left L3 nerve root.\"    Personally reviewed imaging on day of visit    Other testing (labs, diagnostics) reviewed:  Labs  Lab Results   Component Value Date    A1C 5.3 12/06/2023    A1C 5.5 02/22/2023    A1C 5.5 12/23/2019    A1C 5.7 11/02/2016    A1C 5.5 10/05/2015     Last Comprehensive Metabolic Panel:  Lab Results   Component Value Date     05/30/2023    POTASSIUM 3.9 05/30/2023    CHLORIDE 105 05/30/2023    CO2 26 05/30/2023    ANIONGAP 8 05/30/2023     (H) 05/30/2023    BUN 9.8 05/30/2023    CR 0.69 12/06/2023    GFRESTIMATED >90 12/06/2023    AKILA 9.6 05/30/2023         [place] Prescription Monitoring Program reviewed    Outside records reviewed        Assessment:  Myofascial Pain  Bilateral Hip Pain  Lumbar Spondylosis    Lesley Stafford is a 63 year old female who presents with the complaints of chronic low back pain as well as hip pain. Based on her history and physical exam, her symptoms are multifactorial including lumbar spondylosis, degenerative changes, myofascial pain of the low " back, as well as likely hip osteoarthritis.     Plan:  Diagnosis reviewed, treatment option addressed, and risk/benefits discussed.  Self-care instructions given.  I am recommending a multidisciplinary treatment plan to help this patient better manage her pain.      Physical Therapy: Not at this time, continue [facility]  Pain Psychologist to address issues of relaxation, behavioral change, coping style, and other factors important to improvement: Referral placed  Diagnostic Studies: XR Bilateral hips  Medication Management: none  Further procedures recommended: none  Other treatments: TENS unit     Follow up: as needed    Total time spent was 50 minutes, and more than 50% of face to face time was spent in counseling and/or coordination of care regarding principles of multidisciplinary care, medication management, and therapeutic options.         Again, thank you for allowing me to participate in the care of your patient.      Sincerely,    Libia Hinojosa MD

## 2024-03-04 DIAGNOSIS — Z79.899 ENCOUNTER FOR EYE EXAM DUE TO HIGH RISK MEDICATION: ICD-10-CM

## 2024-03-04 DIAGNOSIS — Z79.899 LONG-TERM USE OF PLAQUENIL: Primary | ICD-10-CM

## 2024-03-12 DIAGNOSIS — G62.9 PERIPHERAL POLYNEUROPATHY: ICD-10-CM

## 2024-03-13 ENCOUNTER — TELEPHONE (OUTPATIENT)
Dept: FAMILY MEDICINE | Facility: CLINIC | Age: 64
End: 2024-03-13
Payer: COMMERCIAL

## 2024-03-13 DIAGNOSIS — B00.9 HSV (HERPES SIMPLEX VIRUS) INFECTION: ICD-10-CM

## 2024-03-13 RX ORDER — GABAPENTIN 300 MG/1
CAPSULE ORAL
Qty: 90 CAPSULE | Refills: 3 | Status: SHIPPED | OUTPATIENT
Start: 2024-03-13 | End: 2024-05-06

## 2024-03-13 NOTE — TELEPHONE ENCOUNTER
Pt calling stating she got imaging done by ortho and they said she can follow-up with them or with PCP and she is requesting interpretation and follow-up for it with you for her xrays for her hip and back as she is in pain.    Please advise,    Thanks!  Darwin Griffith RN   [facility]

## 2024-03-15 DIAGNOSIS — M16.0 OSTEOARTHRITIS OF BOTH HIPS, UNSPECIFIED OSTEOARTHRITIS TYPE: Primary | ICD-10-CM

## 2024-03-15 RX ORDER — ACYCLOVIR 200 MG/1
200 CAPSULE ORAL
Qty: 22 CAPSULE | Refills: 5 | Status: SHIPPED | OUTPATIENT
Start: 2024-03-15 | End: 2024-06-24

## 2024-03-16 ENCOUNTER — HEALTH MAINTENANCE LETTER (OUTPATIENT)
Age: 64
End: 2024-03-16

## 2024-03-25 NOTE — PROGRESS NOTES
ASSESSMENT & PLAN    Lesley was seen today for pain and pain.    Diagnoses and all orders for this visit:    Primary osteoarthritis of both hips    Bilateral hip pain  -     Orthopedic  Referral      Issues more from the hip area, with some hip OA and lateral hip pain.  Not interested in injections at all.  Has muscle relaxant medication already.  Discussed PT; prefers to remain active for now, can contact clinic if PT order desired.  See below.  Questions answered. Discussed signs and symptoms that may indicate more serious issues; the patient was instructed to seek appropriate care if noted. Lesley indicates understanding of these issues and agrees with the plan.      See Patient Instructions  Patient Instructions   Reviewed nature of the bilateral hip pain.  Tenderness and pain over the lateral hip appears more related to soft tissue; there may be some component gluteal tendinosis, trochanteric bursitis as causing at least some of this pain.  On exam, with hip motion there also appears to be some pain stemming from the hip joints.  X-rays demonstrate some underlying degenerative change, or arthritis.  Additionally, we discussed the low back.  Previous imaging there demonstrated some underlying degenerative change, and this likely is contributing to some persistent symptoms, though currently, hip area issues do not appear to be stemming from the back.  We discussed considerations around additional imaging of the affected area(s), rehab approach, chiropractic care/acupuncture/massage, use of medications for symptoms, also injection options.  Following discussion, defer any additional imaging for now.  Could consider a standing hip x-ray for better evaluation of hip joint space.  Could also consider advanced imaging of the hip and/or lumbar spine if needed.  Discussed rehab approach; keep working on home exercises from previous PT OT as able, and try to remain active as you have been.  Exercise is okay.  Keep  up with the chiropractic care, massage if beneficial.  No changes to medications at this time.  We discussed very briefly some injection options, but without interested in this currently, defer for now.  Future consideration could include lateral hip steroid injection, versus imaging guided hip joint steroid injection.  Can monitor with the above and we will leave follow-up here open-ended.  Contact clinic if any other questions or concerns, or if desiring to change course.    If you have any further questions for your physician or physician s care team you can contact them thru MyChart or by calling [phone].      [facility]      CC: Rafael Orosco      -----  Chief Complaint   Patient presents with    Right Hip - Pain    Left Hip - Pain       SUBJECTIVE  Lesley Stafford is a/an 63 year old female who is seen in consultation at the request of  Rafael Orosco M.D. for evaluation of bilateral hip.     The patient is seen by themselves.    Onset: 1+ years(s) ago (~7 years overall based on chart review). Reports insidious onset without acute precipitating event.  Location of Pain: bilateral hip, lateral into groin. Notes that right hip is worse than left.  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  Additional history:  Pain primarily lateral hip, to anterior hip and groin area. Also has constant soreness in low back.  About 1 month ago had more intense right low back pain. Was seen in ED, discussed potential for MRI lumbar spine, has not had yet.  At  "rest has mild lateral hip pain, but with getting up and moving then feels like mobility is more limited. Can sit maybe 1 hour then has to move, or pain will be more intense with getting moving.  Notes challenge with going up and down stairs. Almost feels a \"grinding\" sensation , catching sensation at hips. Even with just walking.  Notes hip adductor stretch is beneficial for pain to groin and anterior hip.  Feels like has to hold on with stretching, balance not quite as good as it's been.  Does not really get any radiating pain to LE.    Also with fibromyalgia and lupus.  Has done some massage for low back, some benefit for back issues.      REVIEW OF SYSTEMS:  Review of Systems    OBJECTIVE:  Ht 1.651 m (5' 5\")   Wt 80.7 kg (178 lb)   LMP 08/12/2013   BMI 29.62 kg/m           Low back exam:    Inspection:     no visible deformity in the low back       normal skin       normal vascular       normal lymphatic    ROM:     flexion hands to ankles, with some tightness in low back  Extension some low back tightness, also tight anterolateral hip area bilaterally (like beneficial stretch hip area)    Tender:     paraspinal muscles mild  Low lumbar, near LS junction midline    Special tests:     straight leg raise left neg        straight leg raise right with some right low back pain        slump test neg             Bilateral hip exam    ROM:     grossly symmetric active and passive ROM   Some lateral, posterior pain with ER  Anterior, groin pain with IR (more with passive)    Left hip flexion with some stiffness left hip area laterally    Tender:      greater trochanter--location of typical lateral pain  Mild SI joint bilat    Special Tests:      positive (+) FADIR       Log roll mild pos bilat      RADIOLOGY:  Final results and radiologist's interpretation, available in the [facility] health record.  Images were reviewed with the patient in the office today.  My personal interpretation of the performed imaging: XR supine by " report, not WB. Mild bilateral hip arthrosis, slightly greater on right.          EXAM: XR HIP BILATERAL 2 VIEWS EACH  3/1/2024 10:42 AM       HISTORY: Bilateral hip pain; Bilateral hip pain     COMPARISON: 3/16/2017     FINDINGS: AP pelvis and frog-leg lateral views of the hips.     No acute osseous abnormality. Mild/moderate degenerative changes of  the hips, mildly progressed. Lumbar spondylosis.                                                                       IMPRESSION: Mild to moderate bilateral hip osteoarthrosis.     BI KIM MD (Joe)       ===========================  Remote lumbar MRI:    MRI of the lumbar spine, without contrast     Provided history: Lumbar degenerative disc disease     TECHNIQUE: MRI of the lumbar spine was performed without sagittal T1,  T2, STIR, DWI and axial T2, axial T2 fat-suppressed Star sequences.     COMPARISON: There is no lumbar spine MRI for comparison. Correlation  is made with 3/16/2017 dated lumbar spine radiographs.     FINDINGS:     Normal lumbar lordosis is preserved. Alignment is normal. There is  disc height narrowing at L4-L5. There is disc desiccation at L4-L5 and  S1 L5 S1. There is a Schmorl's node-like indentations along superior  L3 endplate. Otherwise the vertebral heights are preserved. The  remainder disc heights are preserved. Bone marrow signal is normal.      L1-L2: There is no disc bulge or protrusion. The spinal canal and  neural foramina are widely patent.     L2-L3: Minimal bilateral ligamentum flavum hypertrophy is noted. The  spinal canal and neural foramen are patent. No disc protrusion is  noted.     L3-L4: There is left foraminal disc protrusion causing narrowing of  the left neural foramen and slight displacement of the left L3 nerve  root. There is mild to moderate left foraminal stenosis. There is mild  right neural foraminal stenosis due to disc bulge. There is bilateral  ligamentum flavum hypertrophy. Mild canal narrowing is  noted.     L4-L5: There is disc bulge and right foraminal disc protrusion.  Bilateral facet arthropathy and ligamentum flavum hypertrophy changes  are noted. There is narrowing of the right lateral recess with likely  impingement of the right L5 nerve root. Disc protrusion in the right  neural foramen and abuts the right L4 nerve root. There is moderate to  severe right neural foraminal stenosis and mild left neural foraminal  stenosis. There is moderate spinal canal stenosis.     L5-S1: There is disc bulge eccentric to the right with superimposed  posterior central disc extrusion with migration of the disc material  inferiorly. This abuts the ventral thecal sac. The spinal canal is  otherwise patent. There is mild left, moderate right neural foraminal  stenosis.     Paravertebral and paraspinous structures are normal.                                                                      IMPRESSION: Multilevel degenerative disc disease most significant  findings at L3-L4 and L4-L5. Impingement of the right L5 nerve root  within the right lateral recess due to degenerative changes. Moderate  to severe right L4-L5 nerve neural foraminal stenosis and moderate  spinal canal stenosis at L4-L5. Mild to moderate left L3-L4 neural  foraminal stenosis due to left foraminal disc protrusion with  displacement of the left L3 nerve root.     LAMONTE WILKERSON MD        Review of prior external note(s) from - pain mgmt ([facility])  Review of the result(s) of each unique test - imaging  Independent interpretation of a test performed by another physician/other qualified health care professional (not separately reported) - imaging  40 minutes spent by me on the date of the encounter doing chart review, history and exam, documentation and further activities per the note

## 2024-03-26 ENCOUNTER — OFFICE VISIT (OUTPATIENT)
Dept: ORTHOPEDICS | Facility: CLINIC | Age: 64
End: 2024-03-26
Attending: FAMILY MEDICINE
Payer: COMMERCIAL

## 2024-03-26 VITALS — BODY MASS INDEX: 29.66 KG/M2 | HEIGHT: 65 IN | WEIGHT: 178 LBS

## 2024-03-26 DIAGNOSIS — M25.552 BILATERAL HIP PAIN: ICD-10-CM

## 2024-03-26 DIAGNOSIS — M25.551 BILATERAL HIP PAIN: ICD-10-CM

## 2024-03-26 DIAGNOSIS — M16.0 PRIMARY OSTEOARTHRITIS OF BOTH HIPS: Primary | ICD-10-CM

## 2024-03-26 PROCEDURE — 99203 OFFICE O/P NEW LOW 30 MIN: CPT | Performed by: PEDIATRICS

## 2024-03-26 NOTE — PATIENT INSTRUCTIONS
Reviewed nature of the bilateral hip pain.  Tenderness and pain over the lateral hip appears more related to soft tissue; there may be some component gluteal tendinosis, trochanteric bursitis as causing at least some of this pain.  On exam, with hip motion there also appears to be some pain stemming from the hip joints.  X-rays demonstrate some underlying degenerative change, or arthritis.  Additionally, we discussed the low back.  Previous imaging there demonstrated some underlying degenerative change, and this likely is contributing to some persistent symptoms, though currently, hip area issues do not appear to be stemming from the back.  We discussed considerations around additional imaging of the affected area(s), rehab approach, chiropractic care/acupuncture/massage, use of medications for symptoms, also injection options.  Following discussion, defer any additional imaging for now.  Could consider a standing hip x-ray for better evaluation of hip joint space.  Could also consider advanced imaging of the hip and/or lumbar spine if needed.  Discussed rehab approach; keep working on home exercises from previous PT OT as able, and try to remain active as you have been.  Exercise is okay.  Keep up with the chiropractic care, massage if beneficial.  No changes to medications at this time.  We discussed very briefly some injection options, but without interested in this currently, defer for now.  Future consideration could include lateral hip steroid injection, versus imaging guided hip joint steroid injection.  Can monitor with the above and we will leave follow-up here open-ended.  Contact clinic if any other questions or concerns, or if desiring to change course.    If you have any further questions for your physician or physician s care team you can contact them thru [employer] or by calling [phone].

## 2024-03-26 NOTE — LETTER
3/26/2024         RE: Lesley Stafford  [address]  [place] [address]        Dear Colleague,    Thank you for referring your patient, Lesley Stafford, to the [facility]. Please see a copy of my visit note below.    ASSESSMENT & PLAN    Lesley was seen today for pain and pain.    Diagnoses and all orders for this visit:    Primary osteoarthritis of both hips    Bilateral hip pain  -     Orthopedic  Referral      Issues more from the hip area, with some hip OA and lateral hip pain.  Not interested in injections at all.  Has muscle relaxant medication already.  Discussed PT; prefers to remain active for now, can contact clinic if PT order desired.  See below.  Questions answered. Discussed signs and symptoms that may indicate more serious issues; the patient was instructed to seek appropriate care if noted. Lesley indicates understanding of these issues and agrees with the plan.      See Patient Instructions  Patient Instructions   Reviewed nature of the bilateral hip pain.  Tenderness and pain over the lateral hip appears more related to soft tissue; there may be some component gluteal tendinosis, trochanteric bursitis as causing at least some of this pain.  On exam, with hip motion there also appears to be some pain stemming from the hip joints.  X-rays demonstrate some underlying degenerative change, or arthritis.  Additionally, we discussed the low back.  Previous imaging there demonstrated some underlying degenerative change, and this likely is contributing to some persistent symptoms, though currently, hip area issues do not appear to be stemming from the back.  We discussed considerations around additional imaging of the affected area(s), rehab approach, chiropractic care/acupuncture/massage, use of medications for symptoms, also injection options.  Following discussion, defer any additional imaging for now.  Could consider a standing hip  x-ray for better evaluation of hip joint space.  Could also consider advanced imaging of the hip and/or lumbar spine if needed.  Discussed rehab approach; keep working on home exercises from previous PT OT as able, and try to remain active as you have been.  Exercise is okay.  Keep up with the chiropractic care, massage if beneficial.  No changes to medications at this time.  We discussed very briefly some injection options, but without interested in this currently, defer for now.  Future consideration could include lateral hip steroid injection, versus imaging guided hip joint steroid injection.  Can monitor with the above and we will leave follow-up here open-ended.  Contact clinic if any other questions or concerns, or if desiring to change course.    If you have any further questions for your physician or physician s care team you can contact them thru [employer] or by calling [phone].      Selvin London [facility]      CC: Rafael Orosco      -----  Chief Complaint   Patient presents with     Right Hip - Pain     Left Hip - Pain       SUBJECTIVE  Rayni L Link Stafford is a/an 63 year old female who is seen in consultation at the request of  Rafael Orosco M.D. for evaluation of bilateral hip.     The patient is seen by themselves.    Onset: 1+ years(s) ago (~7 years overall based on chart review). Reports insidious onset without acute precipitating event.  Location of Pain: bilateral hip, lateral into groin. Notes that right hip is worse than left.  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  Additional history:  Pain primarily lateral hip, to anterior hip and groin area. Also has constant soreness in low back.  About 1 month ago had more intense right low back pain. Was seen in ED, discussed potential for MRI lumbar spine, has not had yet.  At rest has mild lateral hip pain, but with getting up and moving then feels like mobility is more limited. Can sit maybe 1 hour then has to move, or pain will be more intense with getting moving.  Notes challenge with going up and down stairs. Almost feels a \"grinding\" sensation , catching sensation at hips. Even with just walking.  Notes hip adductor stretch is beneficial for pain to groin and anterior hip.  Feels like has to hold on with stretching, balance not quite as good as it's been.  Does not really get any radiating pain to LE.    Also with fibromyalgia and lupus.  Has done some massage for low back, some benefit for back issues.      REVIEW OF SYSTEMS:  Review of Systems    OBJECTIVE:  Ht 1.651 m (5' 5\")   Wt 80.7 kg (178 lb)   LMP 08/12/2013   BMI 29.62 kg/m           Low back exam:    Inspection:     no visible deformity in the low back       normal skin       normal vascular       normal lymphatic    ROM:     flexion hands to ankles, with some tightness in low back  Extension some low back tightness, also tight anterolateral hip area bilaterally (like beneficial stretch hip area)    Tender:     paraspinal muscles mild  Low lumbar, near LS junction midline    Special tests:     straight leg raise left neg        straight leg raise right with some right low back pain        slump test neg             Bilateral hip exam    ROM:     grossly symmetric active and passive ROM   Some lateral, posterior pain with ER  Anterior, groin pain with IR (more with passive)    Left hip flexion with some stiffness left hip area laterally    Tender:      greater trochanter--location of typical lateral pain  Mild SI joint bilat    Special " Tests:      positive (+) FADIR       Log roll mild pos bilat      RADIOLOGY:  Final results and radiologist's interpretation, available in the [facility] health record.  Images were reviewed with the patient in the office today.  My personal interpretation of the performed imaging: XR supine by report, not WB. Mild bilateral hip arthrosis, slightly greater on right.          EXAM: XR HIP BILATERAL 2 VIEWS EACH  3/1/2024 10:42 AM       HISTORY: Bilateral hip pain; Bilateral hip pain     COMPARISON: 3/16/2017     FINDINGS: AP pelvis and frog-leg lateral views of the hips.     No acute osseous abnormality. Mild/moderate degenerative changes of  the hips, mildly progressed. Lumbar spondylosis.                                                                       IMPRESSION: Mild to moderate bilateral hip osteoarthrosis.     BI (Israel KIM MD       ===========================  Remote lumbar MRI:    MRI of the lumbar spine, without contrast     Provided history: Lumbar degenerative disc disease     TECHNIQUE: MRI of the lumbar spine was performed without sagittal T1,  T2, STIR, DWI and axial T2, axial T2 fat-suppressed Star sequences.     COMPARISON: There is no lumbar spine MRI for comparison. Correlation  is made with 3/16/2017 dated lumbar spine radiographs.     FINDINGS:     Normal lumbar lordosis is preserved. Alignment is normal. There is  disc height narrowing at L4-L5. There is disc desiccation at L4-L5 and  S1 L5 S1. There is a Schmorl's node-like indentations along superior  L3 endplate. Otherwise the vertebral heights are preserved. The  remainder disc heights are preserved. Bone marrow signal is normal.      L1-L2: There is no disc bulge or protrusion. The spinal canal and  neural foramina are widely patent.     L2-L3: Minimal bilateral ligamentum flavum hypertrophy is noted. The  spinal canal and neural foramen are patent. No disc protrusion is  noted.     L3-L4: There is left foraminal disc protrusion  causing narrowing of  the left neural foramen and slight displacement of the left L3 nerve  root. There is mild to moderate left foraminal stenosis. There is mild  right neural foraminal stenosis due to disc bulge. There is bilateral  ligamentum flavum hypertrophy. Mild canal narrowing is noted.     L4-L5: There is disc bulge and right foraminal disc protrusion.  Bilateral facet arthropathy and ligamentum flavum hypertrophy changes  are noted. There is narrowing of the right lateral recess with likely  impingement of the right L5 nerve root. Disc protrusion in the right  neural foramen and abuts the right L4 nerve root. There is moderate to  severe right neural foraminal stenosis and mild left neural foraminal  stenosis. There is moderate spinal canal stenosis.     L5-S1: There is disc bulge eccentric to the right with superimposed  posterior central disc extrusion with migration of the disc material  inferiorly. This abuts the ventral thecal sac. The spinal canal is  otherwise patent. There is mild left, moderate right neural foraminal  stenosis.     Paravertebral and paraspinous structures are normal.                                                                      IMPRESSION: Multilevel degenerative disc disease most significant  findings at L3-L4 and L4-L5. Impingement of the right L5 nerve root  within the right lateral recess due to degenerative changes. Moderate  to severe right L4-L5 nerve neural foraminal stenosis and moderate  spinal canal stenosis at L4-L5. Mild to moderate left L3-L4 neural  foraminal stenosis due to left foraminal disc protrusion with  displacement of the left L3 nerve root.     LAMONTE WILKERSON MD        Review of prior external note(s) from - pain mgmt ([facility])  Review of the result(s) of each unique test - imaging  Independent interpretation of a test performed by another physician/other qualified health care professional (not separately reported) - imaging  40 minutes spent by me on  the date of the encounter doing chart review, history and exam, documentation and further activities per the note           Again, thank you for allowing me to participate in the care of your patient.        Sincerely,        Selvin London, DO
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
## 2024-04-01 ENCOUNTER — VIRTUAL VISIT (OUTPATIENT)
Dept: PALLIATIVE MEDICINE | Facility: CLINIC | Age: 64
End: 2024-04-01
Attending: ANESTHESIOLOGY
Payer: COMMERCIAL

## 2024-04-01 DIAGNOSIS — M25.552 BILATERAL HIP PAIN: ICD-10-CM

## 2024-04-01 DIAGNOSIS — M25.552 HIP PAIN, LEFT: ICD-10-CM

## 2024-04-01 DIAGNOSIS — M79.18 MYOFASCIAL PAIN: ICD-10-CM

## 2024-04-01 DIAGNOSIS — M54.42 BILATERAL LOW BACK PAIN WITH LEFT-SIDED SCIATICA, UNSPECIFIED CHRONICITY: Primary | ICD-10-CM

## 2024-04-01 DIAGNOSIS — M25.551 BILATERAL HIP PAIN: ICD-10-CM

## 2024-04-01 PROCEDURE — 96156 HLTH BHV ASSMT/REASSESSMENT: CPT | Mod: 95 | Performed by: PSYCHOLOGIST

## 2024-04-03 DIAGNOSIS — E78.5 HYPERLIPIDEMIA LDL GOAL <70: ICD-10-CM

## 2024-04-03 RX ORDER — ATORVASTATIN CALCIUM 40 MG/1
40 TABLET, FILM COATED ORAL DAILY
Qty: 90 TABLET | Refills: 3 | Status: SHIPPED | OUTPATIENT
Start: 2024-04-03

## 2024-04-04 NOTE — PROGRESS NOTES
HPI:  Patient presents for repeat visual field and dry eye follow up. Uses artificial tears as needed.      Social history: Son is 32 (in 2024) and is a nurse practitioner.       Pertinent Medical History:  Epilepsy  Liver hemangioma  Hyperlipidemia  Systemic lupus erythematous  Hemoglobin C trait  Thrombocytopenia  Stroke 2019  Alcoholism  Fibromyalgia    Ocular History:  High risk medication - plaquenil  Myopia, both eyes.   Dry eye syndrome, both eyes.   Cataract, both eyes.   PVD, both eyes.     Eye Medications:  Allergic to sulfa    Assessment and Plan:    #   High Risk Medication - Plaquenil  Visual field 10-2 01/23/2024: Both eyes: normal  Macular OCT 01/23/2024: Both eyes: normal  Macular FAF 01/23/2024: Both eyes: normal  Kidney disease status: No  Tamoxifen use status: No   heritage/ancestry (Testing look beyond central macula in  patients): No  Patient takes plaquenil 400 mg daily for lupus since 2003.   The American Academy of Ophthalmology recommends a maximum daily plaquenil use of 5.0 mg/kg real weight. Based on this recommendation, the maximum daily dose for this patient is 399.5mg.   No plaquenil toxicity seen on exam. Recommend annual dilated eye exam with testing to include visual field 10-2, and macular OCT/FAF. High risk characteristics include greater than 5 years of plaquenil use.    #   Myopia, both eyes.   Glasses prescription given.     #   Meibomian Gland Dysfunction, both eyes.   Sees 20/20 both eyes at near, fluctuating vision in the distance.   Symptoms of dry eyes include blurry vision, eye pain, grittiness, burning, redness, eye irritation, and tearing. The goal is not to eliminate, but to improve symptoms.   Preservative free artificial tears 4 times daily both eyes. Refresh or Systane. Kj 3/flaxseed. Can use up to every 2 hours both eyes as needed.   Warm compress (with dry eye mask), 10 minutes, at bedtime, both eyes.      #   Cataract, both eyes.   Mildly visually  significant.   Okay to monitor for now.     #   History of Stroke/CVA in 2019  No hemianopsia on visual field.     #   Posterior Vitreous Detachment, both eyes. Retinas attached.   Educated on signs and symptoms of a retinal detachment (ie. Hundreds of floaters, flashes of light, and shadow/curtain over the vision) to be seen immediately.          Patient consented to a dilated eye exam:    Yes. Side effects discussed.  Mood/affect: very pleasant.     Medical History:  Past Medical History:   Diagnosis Date    Anxiety     Cerebral infarction (H)     Depressive disorder     Fibromyalgia     GERD (gastroesophageal reflux disease)     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    Hyperlipidemia LDL goal <70 03/30/2021    Lupus (H)     RACHNA (obstructive sleep apnea)     Substance abuse (H)     Has not used for 19 years.       Medications:  Current Outpatient Medications   Medication Sig Dispense Refill    acyclovir (ZOVIRAX) 200 MG capsule Take 1 capsule (200 mg) by mouth 5 times daily 22 capsule 5    albuterol (PROAIR HFA/PROVENTIL HFA/VENTOLIN HFA) 108 (90 Base) MCG/ACT inhaler Inhale 2 puffs into the lungs every 4 hours as needed for shortness of breath / dyspnea, wheezing or other (coughing) 18 g 11    ARIPiprazole (ABILIFY) 2 MG tablet TAKE ONE TABLET BY MOUTH ONE TIME DAILY*      aspirin (ASA) 325 MG EC tablet Take 1 tablet (325 mg) by mouth daily 90 tablet 1    atorvastatin (LIPITOR) 40 MG tablet TAKE ONE TABLET BY MOUTH ONCE DAILY 90 tablet 3    carboxymethylcellulose PF (CARBOXYMETHYLCELLULOSE SODIUM) 0.5 % ophthalmic solution Place 1 drop into both eyes 4 times daily Preservative free artificial tears, single use vials. 400 each 11    cetirizine (ZYRTEC) 10 MG tablet Take 1 tablet (10 mg) by mouth daily 90 tablet 3    DULoxetine (CYMBALTA) 60 MG capsule Take 2 capsules by mouth daily 60 capsule 11    fluticasone (FLONASE) 50 MCG/ACT nasal spray Spray 1 spray into both nostrils daily 1 g 11    gabapentin (NEURONTIN)  100 MG capsule TAKE TWO CAPSULES BY MOUTH TWO TIMES DAILY AS NEEDED FOR NEUROPATHIC PAIN 120 capsule 5    gabapentin (NEURONTIN) 300 MG capsule TAKE 2 CAPSULES BY MOUTH THREE TIMES DAILY AS NEEDED FOR NEUROPATHIC PAIN 90 capsule 3    HEMP OIL OR EXTRACT OR OTHER CBD CANNABINOID, NOT MEDICAL CANNABIS,       hydroxychloroquine (PLAQUENIL) 200 MG tablet Take 2 tablets (400 mg) by mouth daily 180 tablet 3    hydrOXYzine (ATARAX) 25 MG tablet TAKE 1 TABLET BY MOUTH THREE TIMES A DAY (Patient taking differently: 25 mg TAKE 1 TABLET BY MOUTH THREE TIMES A DAY) 90 tablet 3    ibuprofen (ADVIL/MOTRIN) 600 MG tablet Take 600 mg by mouth every 6 hours as needed for moderate pain      ibuprofen (ADVIL/MOTRIN) 600 MG tablet Take 1 tablet (600 mg) by mouth every 8 hours as needed for moderate pain 20 tablet 0    methocarbamol (ROBAXIN) 750 MG tablet Take 1 tablet (750 mg) by mouth 4 times daily as needed for muscle spasms 20 tablet 0    naproxen sodium 220 MG capsule Take 220 mg by mouth 2 times daily as needed       omega 3 1200 MG CAPS       order for DME Cane, 1 Device 0    prazosin (MINIPRESS) 1 MG capsule Take 1 mg by mouth at bedtime      risperiDONE (RISPERDAL) 0.5 MG tablet Take 0.5 mg by mouth at bedtime      tiZANidine (ZANAFLEX) 4 MG tablet TAKE 1-2 TABLETS BY MOUTH  THREE TIMES A DAY as needed 90 tablet 5    valACYclovir (VALTREX) 500 MG tablet Take 1 tablet (500 mg) by mouth 2 times daily 30 tablet 1   Complete documentation of historical and exam elements from today's encounter can be found in the full encounter summary report (not reduplicated in this progress note). I personally obtained the chief complaint(s) and history of present illness.  I confirmed and edited as necessary the review of systems, past medical/surgical history, family history, social history, and examination findings as documented by others; and I examined the patient myself. I personally reviewed the relevant tests, images, and reports as  documented above. I formulated and edited as necessary the assessment and plan and discussed the findings and management plan with the patient and family. - Manuel Hall OD

## 2024-04-23 ENCOUNTER — OFFICE VISIT (OUTPATIENT)
Dept: OPHTHALMOLOGY | Facility: CLINIC | Age: 64
End: 2024-04-23
Attending: OPTOMETRIST
Payer: COMMERCIAL

## 2024-04-23 DIAGNOSIS — Z79.899 LONG-TERM USE OF PLAQUENIL: ICD-10-CM

## 2024-04-23 DIAGNOSIS — H04.123 DRY EYE SYNDROME OF BOTH EYES: Primary | ICD-10-CM

## 2024-04-23 DIAGNOSIS — G62.9 PERIPHERAL POLYNEUROPATHY: ICD-10-CM

## 2024-04-23 DIAGNOSIS — H52.13 MYOPIA OF BOTH EYES: ICD-10-CM

## 2024-04-23 DIAGNOSIS — Z79.899 ENCOUNTER FOR EYE EXAM DUE TO HIGH RISK MEDICATION: ICD-10-CM

## 2024-04-23 PROCEDURE — 92082 INTERMEDIATE VISUAL FIELD XM: CPT | Performed by: OPTOMETRIST

## 2024-04-23 PROCEDURE — 92012 INTRM OPH EXAM EST PATIENT: CPT | Performed by: OPTOMETRIST

## 2024-04-23 PROCEDURE — G0463 HOSPITAL OUTPT CLINIC VISIT: HCPCS | Performed by: OPTOMETRIST

## 2024-04-23 ASSESSMENT — REFRACTION_WEARINGRX
OS_SPHERE: -0.75
OD_CYLINDER: +0.75
OD_AXIS: 175
OD_ADD: +2.00
OD_SPHERE: -1.00
OS_ADD: +2.00
OS_CYLINDER: +0.75
OS_AXIS: 180

## 2024-04-23 ASSESSMENT — REFRACTION_MANIFEST
OD_SPHERE: -0.75
OS_AXIS: 180
OS_ADD: +2.50
OD_ADD: +2.50
OS_SPHERE: -1.00
OD_AXIS: 164
OS_CYLINDER: +1.25
OD_CYLINDER: +1.25

## 2024-04-23 ASSESSMENT — CUP TO DISC RATIO
OD_RATIO: 0.3
OS_RATIO: 0.3

## 2024-04-23 ASSESSMENT — TONOMETRY
OD_IOP_MMHG: 13
IOP_METHOD: ICARE
OS_IOP_MMHG: 12

## 2024-04-23 ASSESSMENT — VISUAL ACUITY
OD_PH_CC+: -2
OS_CC+: -2
CORRECTION_TYPE: GLASSES
OD_CC+: +2
OD_PH_CC: 20/30
OD_CC: 20/40
METHOD: SNELLEN - LINEAR
OS_CC: 20/30

## 2024-04-23 ASSESSMENT — EXTERNAL EXAM - LEFT EYE: OS_EXAM: NORMAL

## 2024-04-23 ASSESSMENT — EXTERNAL EXAM - RIGHT EYE: OD_EXAM: NORMAL

## 2024-04-23 ASSESSMENT — SLIT LAMP EXAM - LIDS
COMMENTS: MEIBOMIAN GLAND DYSFUNCTION
COMMENTS: MEIBOMIAN GLAND DYSFUNCTION

## 2024-04-24 RX ORDER — GABAPENTIN 100 MG/1
200 CAPSULE ORAL 2 TIMES DAILY PRN
Qty: 120 CAPSULE | Refills: 0 | Status: SHIPPED | OUTPATIENT
Start: 2024-04-24 | End: 2024-05-06

## 2024-04-26 DIAGNOSIS — M32.9 SYSTEMIC LUPUS ERYTHEMATOSUS, UNSPECIFIED SLE TYPE, UNSPECIFIED ORGAN INVOLVEMENT STATUS (H): ICD-10-CM

## 2024-04-30 ENCOUNTER — NURSE TRIAGE (OUTPATIENT)
Dept: FAMILY MEDICINE | Facility: CLINIC | Age: 64
End: 2024-04-30
Payer: COMMERCIAL

## 2024-04-30 NOTE — TELEPHONE ENCOUNTER
Nurse Triage SBAR    Is this a 2nd Level Triage? NO    Situation:   Patient calling about the pain in their hip and R lower back increasing over the past couple days.    Background:Patient has a history of pain in both hips.  Seen by sports medicine and pain management. Diagnosed with osteoarthritis.  Has numbness and tingling from previous sciatica diagnosis    Assessment:   Pain to R lower back and hips  Intermittent and stabbing dependent on position  Difficult to sit at table  No new onset numbness or tingling.      Protocol Recommended Disposition:   See in Office Today or Tomorrow    Recommendation: See in office today or tomorrow.  Patient informed that call will be routed to clinic for available appointment.  Advised patient to seek urgent care if pain is unbearable and needs to be seen today.  Advised patient to call back with any new or worsening symptoms.  Patient verbalized understanding and agrees with plan of care.      Jaden LONG RN      Reason for Disposition   Patient wants to be seen    Additional Information   Negative: Looks like a broken bone or dislocated joint (e.g., crooked or deformed)   Negative: Sounds like a life-threatening emergency to the triager   Negative: Followed a hip injury   Negative: Leg pain is main symptom   Negative: Back pain radiating (shooting) into hip   Negative: SEVERE pain (e.g., excruciating, unable to do any normal activities) and fever   Negative: Can't stand (bear weight) or walk   Negative: Fever and red area (or area very tender to touch)   Negative: Patient sounds very sick or weak to the triager   Negative: SEVERE pain (e.g., excruciating, unable to do any normal activities)   Negative: Red area or streak > 2 inches (or 5 cm)   Negative: Painful rash with multiple small blisters grouped together (i.e., dermatomal distribution or 'band' or 'stripe')   Negative: Looks like a boil, infected sore, deep ulcer, or other infected rash (spreading redness, pus)    Negative: Localized rash is very painful (no fever)   Negative: Numbness in a leg or foot (i.e., loss of sensation)    Protocols used: Hip Pain-A-[place]

## 2024-05-03 RX ORDER — HYDROXYCHLOROQUINE SULFATE 200 MG/1
400 TABLET, FILM COATED ORAL DAILY
Qty: 180 TABLET | Refills: 3 | Status: SHIPPED | OUTPATIENT
Start: 2024-05-03

## 2024-05-03 NOTE — TELEPHONE ENCOUNTER
Plaquenil      Last Written Prescription Date:  4/7/23  Last Fill Quantity: 180,   # refills: 3  Last Office Visit: 12/6/23  Future Office visit: 6/10/24    Last Eye Exam:    Refill Team has reviewed Health Maint., [facility] and Media.    Found - date: 4/23/24  Refill for 90+ 3 (from date of eye exam)

## 2024-05-06 ENCOUNTER — OFFICE VISIT (OUTPATIENT)
Dept: FAMILY MEDICINE | Facility: CLINIC | Age: 64
End: 2024-05-06
Payer: COMMERCIAL

## 2024-05-06 VITALS
OXYGEN SATURATION: 98 % | RESPIRATION RATE: 28 BRPM | BODY MASS INDEX: 29.66 KG/M2 | HEIGHT: 65 IN | HEART RATE: 80 BPM | TEMPERATURE: 97 F | WEIGHT: 178 LBS

## 2024-05-06 DIAGNOSIS — M32.9 SYSTEMIC LUPUS ERYTHEMATOSUS, UNSPECIFIED SLE TYPE, UNSPECIFIED ORGAN INVOLVEMENT STATUS (H): ICD-10-CM

## 2024-05-06 DIAGNOSIS — M54.41 CHRONIC RIGHT-SIDED LOW BACK PAIN WITH RIGHT-SIDED SCIATICA: Primary | ICD-10-CM

## 2024-05-06 DIAGNOSIS — D69.6 THROMBOCYTOPENIA (H): ICD-10-CM

## 2024-05-06 DIAGNOSIS — G40.209 LOCALIZATION-RELATED (FOCAL) (PARTIAL) SYMPTOMATIC EPILEPSY AND EPILEPTIC SYNDROMES WITH COMPLEX PARTIAL SEIZURES, NOT INTRACTABLE, WITHOUT STATUS EPILEPTICUS (H): ICD-10-CM

## 2024-05-06 DIAGNOSIS — F32.1 MODERATE MAJOR DEPRESSION (H): ICD-10-CM

## 2024-05-06 DIAGNOSIS — F10.21 HISTORY OF ALCOHOLISM (H): ICD-10-CM

## 2024-05-06 DIAGNOSIS — G62.9 PERIPHERAL POLYNEUROPATHY: ICD-10-CM

## 2024-05-06 DIAGNOSIS — Z78.0 POSTMENOPAUSAL STATUS: ICD-10-CM

## 2024-05-06 DIAGNOSIS — G89.29 CHRONIC RIGHT-SIDED LOW BACK PAIN WITH RIGHT-SIDED SCIATICA: Primary | ICD-10-CM

## 2024-05-06 PROCEDURE — 99214 OFFICE O/P EST MOD 30 MIN: CPT | Performed by: NURSE PRACTITIONER

## 2024-05-06 PROCEDURE — 96127 BRIEF EMOTIONAL/BEHAV ASSMT: CPT | Performed by: NURSE PRACTITIONER

## 2024-05-06 PROCEDURE — G2211 COMPLEX E/M VISIT ADD ON: HCPCS | Performed by: NURSE PRACTITIONER

## 2024-05-06 RX ORDER — PREDNISONE 20 MG/1
40 TABLET ORAL DAILY
Qty: 10 TABLET | Refills: 0 | Status: SHIPPED | OUTPATIENT
Start: 2024-05-06 | End: 2024-05-11

## 2024-05-06 RX ORDER — GABAPENTIN 300 MG/1
300 CAPSULE ORAL 3 TIMES DAILY
COMMUNITY
Start: 2024-05-06 | End: 2024-07-31

## 2024-05-06 ASSESSMENT — ENCOUNTER SYMPTOMS: BACK PAIN: 1

## 2024-05-06 ASSESSMENT — PATIENT HEALTH QUESTIONNAIRE - PHQ9
SUM OF ALL RESPONSES TO PHQ QUESTIONS 1-9: 12
10. IF YOU CHECKED OFF ANY PROBLEMS, HOW DIFFICULT HAVE THESE PROBLEMS MADE IT FOR YOU TO DO YOUR WORK, TAKE CARE OF THINGS AT HOME, OR GET ALONG WITH OTHER PEOPLE: VERY DIFFICULT
SUM OF ALL RESPONSES TO PHQ QUESTIONS 1-9: 12

## 2024-05-06 ASSESSMENT — PAIN SCALES - GENERAL: PAINLEVEL: MODERATE PAIN (5)

## 2024-05-06 NOTE — PROGRESS NOTES
"  Assessment & Plan   Problem List Items Addressed This Visit          Nervous and Auditory    Localization-related (focal) (partial) symptomatic epilepsy and epileptic syndromes with complex partial seizures, not intractable, without status epilepticus (H)    Relevant Medications    gabapentin (NEURONTIN) 300 MG capsule    Chronic right-sided low back pain with right-sided sciatica - Primary    Relevant Medications    gabapentin (NEURONTIN) 300 MG capsule    predniSONE (DELTASONE) 20 MG tablet    Other Relevant Orders    MR Lumbar Spine w/o Contrast       Immune    Systemic lupus erythematosus, unspecified SLE type, unspecified organ involvement status (H)       Hematologic    Thrombocytopenia (H24)       Behavioral    Moderate major depression (H)       Other    History of alcoholism (H)     Other Visit Diagnoses       Peripheral polyneuropathy        Relevant Medications    gabapentin (NEURONTIN) 300 MG capsule    Postmenopausal status        Relevant Orders    DX Bone Density            Mood concerns well controlled  No current substance abuse  Discussed follow up with Orthopedics  SLE managed by rheumatology  BMI  Estimated body mass index is 29.62 kg/m  as calculated from the following:    Height as of this encounter: 1.651 m (5' 5\").    Weight as of this encounter: 80.7 kg (178 lb).       Depression Screening Follow Up          Tabitha Sutton is a 63 year old, presenting for the following health issues:  Back Pain      5/6/2024    11:45 AM   Additional Questions   Roomed by Carlene Vazquez     Back Pain     History of Present Illness       Back Pain:  She presents for follow up of back pain. Patient's back pain is a chronic problem.  Location of back pain:  Right lower back, left lower back, right buttock, left buttock, right hip and left hip  Description of back pain: dull ache, sharp and stabbing  Back pain spreads: right buttocks and left buttocks    Since patient first noticed back pain, pain is: " "gradually worsening  Does back pain interfere with her job:  Yes       The issue has been present for years but has been worse in the past 6 weeks, since \"sleeping with grandchild on a sofa.\" She has noticed more weakness in right leg; numbness and tingling into hip and leg. She has noticed some occasional  urinary issues with worsening pain as well. Pain is very sharp. She experiences severe muscle tightness in right side of her low back. She does physical therapy, and is seeing a chiropracter twice weekly. She is seeing a masseuse weekly.     Mood has been generally stable; she is seeing a therapist regularly; feels very low and hopeless when she has pain exacerbations. She has been taking a much lower dose of gabapentin then what was prescribed- typically 100 mg BID and 300 mg at bedtime. She has been under a lot of stress with upcoming move to a senior-living 55+ complex.      Review of Systems  Constitutional, HEENT, cardiovascular, pulmonary, gi and gu systems are negative, except as otherwise noted.      Objective    Pulse 80   Temp 97  F (36.1  C) (Temporal)   Resp 28   Ht 1.651 m (5' 5\")   Wt 80.7 kg (178 lb)   LMP 08/12/2013   SpO2 98%   BMI 29.62 kg/m    Body mass index is 29.62 kg/m .  Physical Exam   GENERAL: alert and no distress  EYES: Eyes grossly normal to inspection, PERRL and conjunctivae and sclerae normal  NECK: no adenopathy, no asymmetry, masses, or scars  RESP: lungs clear to auscultation - no rales, rhonchi or wheezes  CV: regular rate and rhythm, normal S1 S2, no S3 or S4, no murmur, click or rub, no peripheral edema  ABDOMEN: soft, nontender, no hepatosplenomegaly, no masses and bowel sounds normal  SKIN: no suspicious lesions or rashes  Comprehensive back pain exam:  Tenderness of paralumbar bilaterally, Pain limits the following motions: forward bend, unable to toe walk on right side, indicating possible L4 nerve involvement, and Straight leg positive on  right, indicating " possible ipsilateral radiculopathy  PSYCH: mentation appears normal, affect normal/bright    Admission on 02/10/2024, Discharged on 02/10/2024   Component Date Value Ref Range Status    Color Urine 02/10/2024 Straw  Colorless, Straw, Light Yellow, Yellow Final    Appearance Urine 02/10/2024 Clear  Clear Final    Glucose Urine 02/10/2024 Negative  Negative mg/dL Final    Bilirubin Urine 02/10/2024 Negative  Negative Final    Ketones Urine 02/10/2024 Negative  Negative mg/dL Final    Specific Gravity Urine 02/10/2024 1.004  1.003 - 1.035 Final    Blood Urine 02/10/2024 Trace (A)  Negative Final    pH Urine 02/10/2024 6.0  5.0 - 7.0 Final    Protein Albumin Urine 02/10/2024 Negative  Negative mg/dL Final    Urobilinogen Urine 02/10/2024 Normal  Normal, 2.0 mg/dL Final    Nitrite Urine 02/10/2024 Negative  Negative Final    Leukocyte Esterase Urine 02/10/2024 Negative  Negative Final    RBC Urine 02/10/2024 2  <=2 /HPF Final    WBC Urine 02/10/2024 <1  <=5 /HPF Final    Transitional Epithelials Urine 02/10/2024 <1  <=1 /HPF Final           Signed Electronically by: THOMAS Preston CNP

## 2024-05-07 DIAGNOSIS — M54.50 CHRONIC BILATERAL LOW BACK PAIN WITHOUT SCIATICA: ICD-10-CM

## 2024-05-07 DIAGNOSIS — G89.29 CHRONIC BILATERAL LOW BACK PAIN WITHOUT SCIATICA: ICD-10-CM

## 2024-06-05 ENCOUNTER — TELEPHONE (OUTPATIENT)
Dept: FAMILY MEDICINE | Facility: CLINIC | Age: 64
End: 2024-06-05
Payer: COMMERCIAL

## 2024-06-05 DIAGNOSIS — F32.1 MODERATE MAJOR DEPRESSION (H): ICD-10-CM

## 2024-06-05 DIAGNOSIS — G89.29 CHRONIC BILATERAL LOW BACK PAIN WITHOUT SCIATICA: ICD-10-CM

## 2024-06-05 DIAGNOSIS — M54.50 CHRONIC BILATERAL LOW BACK PAIN WITHOUT SCIATICA: ICD-10-CM

## 2024-06-05 NOTE — TELEPHONE ENCOUNTER
Pending Prescriptions:                       Disp   Refills    DULoxetine (CYMBALTA) 60 MG capsule       60 cap*11           Sig: Take 2 capsules (120 mg) by mouth daily

## 2024-06-10 ENCOUNTER — OFFICE VISIT (OUTPATIENT)
Dept: RHEUMATOLOGY | Facility: CLINIC | Age: 64
End: 2024-06-10
Attending: INTERNAL MEDICINE
Payer: COMMERCIAL

## 2024-06-10 ENCOUNTER — LAB (OUTPATIENT)
Dept: LAB | Facility: CLINIC | Age: 64
End: 2024-06-10
Payer: COMMERCIAL

## 2024-06-10 VITALS
BODY MASS INDEX: 31.12 KG/M2 | WEIGHT: 187 LBS | DIASTOLIC BLOOD PRESSURE: 70 MMHG | SYSTOLIC BLOOD PRESSURE: 124 MMHG | OXYGEN SATURATION: 100 % | HEART RATE: 89 BPM

## 2024-06-10 DIAGNOSIS — F40.240 CLAUSTROPHOBIA: ICD-10-CM

## 2024-06-10 DIAGNOSIS — M32.9 SYSTEMIC LUPUS ERYTHEMATOSUS, UNSPECIFIED SLE TYPE, UNSPECIFIED ORGAN INVOLVEMENT STATUS (H): ICD-10-CM

## 2024-06-10 DIAGNOSIS — M32.9 SYSTEMIC LUPUS ERYTHEMATOSUS, UNSPECIFIED SLE TYPE, UNSPECIFIED ORGAN INVOLVEMENT STATUS (H): Primary | ICD-10-CM

## 2024-06-10 LAB
ALBUMIN MFR UR ELPH: 8.3 MG/DL
ALBUMIN SERPL BCG-MCNC: 4 G/DL (ref 3.5–5.2)
ALBUMIN UR-MCNC: NEGATIVE MG/DL
ALP SERPL-CCNC: 67 U/L (ref 40–150)
ALT SERPL W P-5'-P-CCNC: 24 U/L (ref 0–50)
ANION GAP SERPL CALCULATED.3IONS-SCNC: 7 MMOL/L (ref 7–15)
APPEARANCE UR: CLEAR
AST SERPL W P-5'-P-CCNC: 32 U/L (ref 0–45)
BACTERIA #/AREA URNS HPF: NORMAL /HPF
BASOPHILS # BLD AUTO: 0 10E3/UL (ref 0–0.2)
BASOPHILS NFR BLD AUTO: 1 %
BILIRUB SERPL-MCNC: 0.6 MG/DL
BILIRUB UR QL STRIP: NEGATIVE
BUN SERPL-MCNC: 8.8 MG/DL (ref 8–23)
CALCIUM SERPL-MCNC: 9.6 MG/DL (ref 8.8–10.2)
CHLORIDE SERPL-SCNC: 106 MMOL/L (ref 98–107)
COLOR UR AUTO: YELLOW
CREAT SERPL-MCNC: 0.68 MG/DL (ref 0.51–0.95)
CREAT UR-MCNC: 78.1 MG/DL
DEPRECATED HCO3 PLAS-SCNC: 25 MMOL/L (ref 22–29)
EGFRCR SERPLBLD CKD-EPI 2021: >90 ML/MIN/1.73M2
EOSINOPHIL # BLD AUTO: 0.1 10E3/UL (ref 0–0.7)
EOSINOPHIL NFR BLD AUTO: 1 %
ERYTHROCYTE [DISTWIDTH] IN BLOOD BY AUTOMATED COUNT: 17 % (ref 10–15)
GLUCOSE SERPL-MCNC: 102 MG/DL (ref 70–99)
GLUCOSE UR STRIP-MCNC: NEGATIVE MG/DL
HCT VFR BLD AUTO: 30 % (ref 35–47)
HGB BLD-MCNC: 10.3 G/DL (ref 11.7–15.7)
HGB UR QL STRIP: ABNORMAL
IMM GRANULOCYTES # BLD: 0 10E3/UL
IMM GRANULOCYTES NFR BLD: 0 %
KETONES UR STRIP-MCNC: NEGATIVE MG/DL
LEUKOCYTE ESTERASE UR QL STRIP: NEGATIVE
LYMPHOCYTES # BLD AUTO: 1.7 10E3/UL (ref 0.8–5.3)
LYMPHOCYTES NFR BLD AUTO: 42 %
MCH RBC QN AUTO: 24 PG (ref 26.5–33)
MCHC RBC AUTO-ENTMCNC: 34.3 G/DL (ref 31.5–36.5)
MCV RBC AUTO: 70 FL (ref 78–100)
MONOCYTES # BLD AUTO: 0.4 10E3/UL (ref 0–1.3)
MONOCYTES NFR BLD AUTO: 10 %
NEUTROPHILS # BLD AUTO: 1.9 10E3/UL (ref 1.6–8.3)
NEUTROPHILS NFR BLD AUTO: 47 %
NITRATE UR QL: NEGATIVE
PH UR STRIP: 5.5 [PH] (ref 5–7)
PLATELET # BLD AUTO: 174 10E3/UL (ref 150–450)
POTASSIUM SERPL-SCNC: 4.3 MMOL/L (ref 3.4–5.3)
PROT SERPL-MCNC: 7.3 G/DL (ref 6.4–8.3)
PROT/CREAT 24H UR: 0.11 MG/MG CR (ref 0–0.2)
RBC # BLD AUTO: 4.3 10E6/UL (ref 3.8–5.2)
RBC #/AREA URNS AUTO: NORMAL /HPF
SODIUM SERPL-SCNC: 138 MMOL/L (ref 135–145)
SP GR UR STRIP: 1.02 (ref 1–1.03)
UROBILINOGEN UR STRIP-ACNC: 0.2 E.U./DL
WBC # BLD AUTO: 4.1 10E3/UL (ref 4–11)
WBC #/AREA URNS AUTO: NORMAL /HPF

## 2024-06-10 PROCEDURE — 85025 COMPLETE CBC W/AUTO DIFF WBC: CPT

## 2024-06-10 PROCEDURE — G0463 HOSPITAL OUTPT CLINIC VISIT: HCPCS | Performed by: INTERNAL MEDICINE

## 2024-06-10 PROCEDURE — 86160 COMPLEMENT ANTIGEN: CPT

## 2024-06-10 PROCEDURE — 36415 COLL VENOUS BLD VENIPUNCTURE: CPT

## 2024-06-10 PROCEDURE — 84156 ASSAY OF PROTEIN URINE: CPT

## 2024-06-10 PROCEDURE — 99215 OFFICE O/P EST HI 40 MIN: CPT | Performed by: INTERNAL MEDICINE

## 2024-06-10 PROCEDURE — 80053 COMPREHEN METABOLIC PANEL: CPT

## 2024-06-10 PROCEDURE — 81001 URINALYSIS AUTO W/SCOPE: CPT

## 2024-06-10 RX ORDER — LORAZEPAM 0.5 MG/1
0.25-0.5 TABLET ORAL ONCE
Qty: 1 TABLET | Refills: 0 | Status: SHIPPED | OUTPATIENT
Start: 2024-06-10 | End: 2024-06-10

## 2024-06-10 ASSESSMENT — PAIN SCALES - GENERAL: PAINLEVEL: MODERATE PAIN (5)

## 2024-06-10 NOTE — NURSING NOTE
Chief Complaint   Patient presents with    Consult     /70 (BP Location: Right arm, Patient Position: Sitting, Cuff Size: Adult Regular)   Pulse 89   Wt 84.8 kg (187 lb)   LMP 08/12/2013   SpO2 100%   BMI 31.12 kg/m

## 2024-06-10 NOTE — LETTER
6/10/2024       RE: Lesley Stafford  [address]  [place] [address]     Dear Colleague,    Thank you for referring your patient, Lesley Stafford, to the [facility] at [facility]. Please see a copy of my visit note below.    Follow-up visit for SLE she was previously a patient of .    Lesley is 63 years old.  She has a diagnosis of lupus and has been on hydroxychloroquine since 2015.  She feels that hydroxychloroquine helps with her joint symptoms.    She has never been on any other medications other than hydroxychloroquine for her lupus.    From Dr. Gonzalez's note:  Systemic lupus : She was diagnosed with systemic lupus in 2014. She presented with arthralgias, fatigue, sicca symptoms, oral sores and had + ARIEL, +dsDNA,+ RNP, Thrombocytopenia and was started on hydroxychloroquine 200 mg twice daily. She continued on hydroxychloroquine until 2020 when she discontinued on her own.  She had no renal, pulmonary, CNS involvement due to systemic lupus.  Her symptoms of fatigue, fibromyalgia are controlled with Cymbalta.  She is concerned that she may need a stronger medication because she does not feel so well.  In fact she does not feel great but also not bad.    Past medical history  Systemic lupus  Fibromyalgia  Degenerative disease of the lumbar spine    Review of her blood test shows that her last tests were from December 2023 and there were essentially normal.    Physical examination  Vital signs were reviewed and they were normal and her BMI was 31  Joint examination shows no active synovitis in the hands wrist elbows and shoulders, full range of motion, normal strength.  Lower extremity exam shows no active synovitis in the knees ankles and feet with normal strength.    Impression/plan  1.  Continue with Plaquenil as this seems to provide benefit for her making the lupus quiet without too many  flares.  2.  Repeat blood tests today including urine test.  If these numbers turn out to be normal no change in plan.  Consider another agent such as mycophenolate if she has active disease based on her lab results.  3.  We did not address her symptoms of fibromyalgia.  She is active and tries to do exercises every day.  4.  Follow-up in 6 months with repeat blood test prior to the visit    40 minutes spent on the date of the encounter doing chart review, history and exam, documentation and further activities per the note.       Sincerely,    Huang Pedraza MD

## 2024-06-11 DIAGNOSIS — M54.50 CHRONIC BILATERAL LOW BACK PAIN WITHOUT SCIATICA: ICD-10-CM

## 2024-06-11 DIAGNOSIS — G89.29 CHRONIC BILATERAL LOW BACK PAIN WITHOUT SCIATICA: ICD-10-CM

## 2024-06-11 LAB
C3 SERPL-MCNC: 110 MG/DL (ref 81–157)
C4 SERPL-MCNC: 19 MG/DL (ref 13–39)

## 2024-06-11 NOTE — PROGRESS NOTES
Follow-up visit for SLE she was previously a patient of .    Lesley is 63 years old.  She has a diagnosis of lupus and has been on hydroxychloroquine since 2015.  She feels that hydroxychloroquine helps with her joint symptoms.    She has never been on any other medications other than hydroxychloroquine for her lupus.    From Dr. Gonzalez's note:  Systemic lupus : She was diagnosed with systemic lupus in 2014. She presented with arthralgias, fatigue, sicca symptoms, oral sores and had + ARIEL, +dsDNA,+ RNP, Thrombocytopenia and was started on hydroxychloroquine 200 mg twice daily. She continued on hydroxychloroquine until 2020 when she discontinued on her own.  She had no renal, pulmonary, CNS involvement due to systemic lupus.  Her symptoms of fatigue, fibromyalgia are controlled with Cymbalta.  She is concerned that she may need a stronger medication because she does not feel so well.  In fact she does not feel great but also not bad.    Past medical history  Systemic lupus  Fibromyalgia  Degenerative disease of the lumbar spine    Review of her blood test shows that her last tests were from December 2023 and there were essentially normal.    Physical examination  Vital signs were reviewed and they were normal and her BMI was 31  Joint examination shows no active synovitis in the hands wrist elbows and shoulders, full range of motion, normal strength.  Lower extremity exam shows no active synovitis in the knees ankles and feet with normal strength.    Impression/plan  1.  Continue with Plaquenil as this seems to provide benefit for her making the lupus quiet without too many flares.  2.  Repeat blood tests today including urine test.  If these numbers turn out to be normal no change in plan.  Consider another agent such as mycophenolate if she has active disease based on her lab results.  3.  We did not address her symptoms of fibromyalgia.  She is active and tries to do exercises every day.  4.   Follow-up in 6 months with repeat blood test prior to the visit    40 minutes spent on the date of the encounter doing chart review, history and exam, documentation and further activities per the note.

## 2024-06-19 ENCOUNTER — PATIENT OUTREACH (OUTPATIENT)
Dept: CARE COORDINATION | Facility: CLINIC | Age: 64
End: 2024-06-19
Payer: COMMERCIAL

## 2024-06-20 ENCOUNTER — TELEPHONE (OUTPATIENT)
Dept: FAMILY MEDICINE | Facility: CLINIC | Age: 64
End: 2024-06-20
Payer: COMMERCIAL

## 2024-06-20 ENCOUNTER — PATIENT OUTREACH (OUTPATIENT)
Dept: CARE COORDINATION | Facility: CLINIC | Age: 64
End: 2024-06-20
Payer: COMMERCIAL

## 2024-06-20 NOTE — TELEPHONE ENCOUNTER
Pt calling asking why she was switched from Valtrex to acyclovir? Pt wanted to also clarify how long she would take the acyclovir during an outbreak? She is not currently having an outbreak. Thanks    Ashly Shah RN  [facility]

## 2024-06-20 NOTE — TELEPHONE ENCOUNTER
Acyclovir is taken 5 times daily for 5 days for outbreaks.    Do not know how or why it was prescribed rather than Valtrex.  Would she like it switched back to Valtrex?    Please contact patient, thanks Rafael

## 2024-06-24 DIAGNOSIS — B00.89 RECURRENT HERPES SIMPLEX VIRUS (HSV) INFECTION OF BUTTOCK: ICD-10-CM

## 2024-06-24 RX ORDER — VALACYCLOVIR HYDROCHLORIDE 500 MG/1
500 TABLET, FILM COATED ORAL 2 TIMES DAILY
Qty: 30 TABLET | Refills: 1 | Status: SHIPPED | OUTPATIENT
Start: 2024-06-24

## 2024-06-27 ENCOUNTER — ANCILLARY PROCEDURE (OUTPATIENT)
Dept: MAMMOGRAPHY | Facility: CLINIC | Age: 64
End: 2024-06-27
Attending: FAMILY MEDICINE
Payer: COMMERCIAL

## 2024-06-27 DIAGNOSIS — Z12.31 VISIT FOR SCREENING MAMMOGRAM: ICD-10-CM

## 2024-06-27 PROCEDURE — 77063 BREAST TOMOSYNTHESIS BI: CPT | Mod: GC | Performed by: STUDENT IN AN ORGANIZED HEALTH CARE EDUCATION/TRAINING PROGRAM

## 2024-06-27 PROCEDURE — 77067 SCR MAMMO BI INCL CAD: CPT | Mod: GC | Performed by: STUDENT IN AN ORGANIZED HEALTH CARE EDUCATION/TRAINING PROGRAM

## 2024-07-03 ENCOUNTER — TELEPHONE (OUTPATIENT)
Dept: OPHTHALMOLOGY | Facility: CLINIC | Age: 64
End: 2024-07-03
Payer: COMMERCIAL

## 2024-07-03 NOTE — TELEPHONE ENCOUNTER
[facility] Call Center    Phone Message    May a detailed message be left on voicemail: yes     Reason for Call: Other: Pt is requesting an updated signed glasses rx. States the prescription on her mychart doesn't have a providers signature on it.       Once prescription has been signed please reach out to patient.    Thank You!    Action Taken: Message routed to:  [facility] eye    Travel Screening: Not Applicable     Date of Service:

## 2024-07-10 DIAGNOSIS — H10.13 ALLERGIC CONJUNCTIVITIS, BILATERAL: ICD-10-CM

## 2024-07-10 NOTE — TELEPHONE ENCOUNTER
Medication Question or Refill    Contacts       Contact Date/Time Type Contact Phone/Fax    07/10/2024 02:45 PM CDT Phone (Incoming) Lesley Pfeiffer (Self) [phone] (M)     med refill requested            What medication are you calling about (include dose and sig)?: cetirizine (ZYRTEC) 10 MG tablet  atorvastatin (LIPITOR) 40 MG tablet    [facility]:   [id number], [place] - [address] AT [address] [place] [address]  [address] [address]  Phone: [phone] Fax: [phone]      Controlled Substance Agreement on file:   CSA -- Patient Level:    CSA: None found at the patient level.       Who prescribed the medication?: Rafael Orosco MD    Do you need a refill? Yes    When did you use the medication last? 7/7/24    Patient offered an appointment? Yes: 7/26/24    Do you have any questions or concerns?  No      Could we send this information to you in [facility] or would you prefer to receive a phone call?:   Patient would prefer a phone call   Okay to leave a detailed message?: Yes at Cell number on file:    Telephone Information:   Mobile [phone]

## 2024-07-11 ENCOUNTER — ANCILLARY PROCEDURE (OUTPATIENT)
Dept: MRI IMAGING | Facility: CLINIC | Age: 64
End: 2024-07-11
Attending: NURSE PRACTITIONER
Payer: COMMERCIAL

## 2024-07-11 ENCOUNTER — ANCILLARY PROCEDURE (OUTPATIENT)
Dept: BONE DENSITY | Facility: CLINIC | Age: 64
End: 2024-07-11
Attending: NURSE PRACTITIONER
Payer: COMMERCIAL

## 2024-07-11 DIAGNOSIS — M54.41 CHRONIC RIGHT-SIDED LOW BACK PAIN WITH RIGHT-SIDED SCIATICA: Primary | ICD-10-CM

## 2024-07-11 DIAGNOSIS — M54.41 CHRONIC RIGHT-SIDED LOW BACK PAIN WITH RIGHT-SIDED SCIATICA: ICD-10-CM

## 2024-07-11 DIAGNOSIS — G89.29 CHRONIC RIGHT-SIDED LOW BACK PAIN WITH RIGHT-SIDED SCIATICA: ICD-10-CM

## 2024-07-11 DIAGNOSIS — G89.29 CHRONIC RIGHT-SIDED LOW BACK PAIN WITH RIGHT-SIDED SCIATICA: Primary | ICD-10-CM

## 2024-07-11 DIAGNOSIS — Z78.0 POSTMENOPAUSAL STATUS: ICD-10-CM

## 2024-07-11 PROCEDURE — 72148 MRI LUMBAR SPINE W/O DYE: CPT | Mod: GC | Performed by: RADIOLOGY

## 2024-07-11 PROCEDURE — 77080 DXA BONE DENSITY AXIAL: CPT | Performed by: RADIOLOGY

## 2024-07-11 RX ORDER — CETIRIZINE HYDROCHLORIDE 10 MG/1
10 TABLET ORAL DAILY
Qty: 90 TABLET | Refills: 3 | Status: SHIPPED | OUTPATIENT
Start: 2024-07-11

## 2024-07-12 ENCOUNTER — TELEPHONE (OUTPATIENT)
Dept: NEUROSURGERY | Facility: CLINIC | Age: 64
End: 2024-07-12
Payer: COMMERCIAL

## 2024-07-12 NOTE — TELEPHONE ENCOUNTER
Other: Hello, Patient is scheduled 8/1 with , but she does have one of the red flag symptoms numbness and tingling. She will be out of town until the 21st are we able to get her in that week.     Could we send this information to you in [employer] or would you prefer to receive a phone call?:   Patient would prefer a phone call   Okay to leave a detailed message?: Yes at Cell number on file:    Telephone Information:   Mobile [phone]

## 2024-07-12 NOTE — TELEPHONE ENCOUNTER
Patient with MRI from yesterday, 7/12/24. As per note from referring Provider, patient has had symptoms for years, but lately gradually increasing.    Called patient to discuss. No answer. LVM to call back.

## 2024-07-24 ENCOUNTER — TELEPHONE (OUTPATIENT)
Dept: NEUROSURGERY | Facility: CLINIC | Age: 64
End: 2024-07-24
Payer: COMMERCIAL

## 2024-07-24 NOTE — TELEPHONE ENCOUNTER
Patient needs appointment with Dr. Melchor cancelled and rescheduled to Zakiya Borden at the [facility] for work up. aZkiya has openings on 7/30/24. Writer NAVID for patient to call back to reschedule at [phone].

## 2024-07-24 NOTE — CONFIDENTIAL NOTE
NEUROSURGERY - NEW PREVISIT PLANNING    Referring Provider: Rosa Weinstein, THOMAS CNP    OVN N/a   Reason For Visit: M54.41, G89.29 (ICD-10-CM) - Chronic right-sided low back pain with right-sided sciatica        IMAGING STATUS/LOCATION DATE/TYPE   MRI PACS 07/11/2024  Lumbar  [facility]   CT N/A      XRAY PACS 03/16/2017  Lumbar  [facility]   NOTES STATUS/LOCATION DATE/TYPE   Other specialist OVN: Encounters / ED note 02/10/2024   EMG N/A    INJECTION N/A    PHYSICAL THERAPY Encounters 2017, 07/2020   SURGERY N/A

## 2024-07-30 ENCOUNTER — OFFICE VISIT (OUTPATIENT)
Dept: NEUROSURGERY | Facility: CLINIC | Age: 64
End: 2024-07-30
Payer: COMMERCIAL

## 2024-07-30 VITALS
SYSTOLIC BLOOD PRESSURE: 116 MMHG | DIASTOLIC BLOOD PRESSURE: 75 MMHG | HEIGHT: 65 IN | HEART RATE: 86 BPM | OXYGEN SATURATION: 97 % | WEIGHT: 186 LBS | BODY MASS INDEX: 30.99 KG/M2

## 2024-07-30 DIAGNOSIS — M54.41 CHRONIC BILATERAL LOW BACK PAIN WITH BILATERAL SCIATICA: Primary | ICD-10-CM

## 2024-07-30 DIAGNOSIS — G89.29 CHRONIC BILATERAL LOW BACK PAIN WITH BILATERAL SCIATICA: Primary | ICD-10-CM

## 2024-07-30 DIAGNOSIS — M54.42 CHRONIC BILATERAL LOW BACK PAIN WITH BILATERAL SCIATICA: Primary | ICD-10-CM

## 2024-07-30 DIAGNOSIS — G89.29 CHRONIC RIGHT-SIDED LOW BACK PAIN WITH RIGHT-SIDED SCIATICA: ICD-10-CM

## 2024-07-30 DIAGNOSIS — M54.41 CHRONIC RIGHT-SIDED LOW BACK PAIN WITH RIGHT-SIDED SCIATICA: ICD-10-CM

## 2024-07-30 PROCEDURE — 99203 OFFICE O/P NEW LOW 30 MIN: CPT | Performed by: NURSE PRACTITIONER

## 2024-07-30 ASSESSMENT — PAIN SCALES - GENERAL: PAINLEVEL: MODERATE PAIN (5)

## 2024-07-30 NOTE — LETTER
2024      Lesley Stafford  [facility]  [facility] [address]      Dear Colleague,    Thank you for referring your patient, Lesley Stafford, to the [facility]UR[facility]. Please see a copy of my visit note below.    [facility] Neurosurgery  Neurosurgery Clinic Visit      CC: back and leg pain    Primary care Provider: Rafael Orosco    Reason For Visit:   I was asked by Rosa Weinstein CNP to consult on the patient for chronic right-sided low back pain with right-sided sciatica.    HPI: Lesley Stafford is a 63 year old female with a history of chronic back pain who presents for evaluation. She describes many years of back pain with worsened symptoms recently with one visit to the ED for symptoms. She describes right>left low back pain which extends to the right>left hip and intermittently to the anterolateral thigh to the knee. No current paresthesias. She reports her right leg feels weaker than the left, however no overt weakness. No bowel/bladder complaints or foot drop. Has undergone PT, acupuncture, and massage therapy with some benefit. She has not been to formal therapy in ~5 years. She is not interested in injections and/or surgery at this time.     Past Medical History:   Diagnosis Date     Anxiety      Cerebral infarction (H)      Depressive disorder      Fibromyalgia      GERD (gastroesophageal reflux disease)      Hemoglobin C trait (H24) 2016     Hyperlipidemia LDL goal <70 2021     Lupus (H)      RACHNA (obstructive sleep apnea)      Osteopenia      Substance abuse (H)     Has not used for 19 years.       Past Medical History reviewed with patient during visit.    Past Surgical History:   Procedure Laterality Date      SECTION       COLONOSCOPY  2010    repeat 10 yrs     COLONOSCOPY N/A 2020    Procedure: COLONOSCOPY, WITH POLYPECTOMY;  Surgeon: Rebecca Rojas MD;  [facility]     DILATION AND  CURETTAGE, OPERATIVE HYSTEROSCOPY WITH MORCELLATOR, COMBINED N/A 02/15/2017    Procedure: COMBINED DILATION AND CURETTAGE, OPERATIVE HYSTEROSCOPY WITH MORCELLATOR;  Surgeon: Erin Gutierrez MD;  [facility] OR     ESOPHAGOSCOPY, GASTROSCOPY, DUODENOSCOPY (EGD), COMBINED Left 02/05/2016    Procedure: COMBINED ESOPHAGOSCOPY, GASTROSCOPY, DUODENOSCOPY (EGD), BIOPSY SINGLE OR MULTIPLE;  Surgeon: Pierre Fernandez MD;  [facility]     Past Surgical History reviewed with patient during visit.    Current Outpatient Medications   Medication Sig Dispense Refill     albuterol (PROAIR HFA/PROVENTIL HFA/VENTOLIN HFA) 108 (90 Base) MCG/ACT inhaler Inhale 2 puffs into the lungs every 4 hours as needed for shortness of breath / dyspnea, wheezing or other (coughing) 18 g 11     ARIPiprazole (ABILIFY) 2 MG tablet TAKE ONE TABLET BY MOUTH ONE TIME DAILY*       aspirin (ASA) 325 MG EC tablet Take 1 tablet (325 mg) by mouth daily 90 tablet 1     atorvastatin (LIPITOR) 40 MG tablet TAKE ONE TABLET BY MOUTH ONCE DAILY 90 tablet 3     carboxymethylcellulose PF (CARBOXYMETHYLCELLULOSE SODIUM) 0.5 % ophthalmic solution Place 1 drop into both eyes 4 times daily Preservative free artificial tears, single use vials. 400 each 11     cetirizine (ZYRTEC) 10 MG tablet Take 1 tablet (10 mg) by mouth daily 90 tablet 3     DULoxetine (CYMBALTA) 60 MG capsule Take 2 capsules by mouth daily 60 capsule 11     fluticasone (FLONASE) 50 MCG/ACT nasal spray Spray 1 spray into both nostrils daily 1 g 11     gabapentin (NEURONTIN) 300 MG capsule Take 1 capsule (300 mg) by mouth 3 times daily       HEMP OIL OR EXTRACT OR OTHER CBD CANNABINOID, NOT MEDICAL CANNABIS,        hydroxychloroquine (PLAQUENIL) 200 MG tablet Take 2 tablets (400 mg) by mouth daily 180 tablet 3     hydrOXYzine (ATARAX) 25 MG tablet TAKE 1 TABLET BY MOUTH THREE TIMES A DAY (Patient taking differently: 25 mg TAKE 1 TABLET BY MOUTH THREE TIMES A DAY) 90 tablet 3     ibuprofen  (ADVIL/MOTRIN) 600 MG tablet Take 600 mg by mouth every 6 hours as needed for moderate pain       ibuprofen (ADVIL/MOTRIN) 600 MG tablet Take 1 tablet (600 mg) by mouth every 8 hours as needed for moderate pain 20 tablet 0     methocarbamol (ROBAXIN) 750 MG tablet Take 1 tablet (750 mg) by mouth 4 times daily as needed for muscle spasms 20 tablet 0     naproxen sodium 220 MG capsule Take 220 mg by mouth 2 times daily as needed        omega 3 1200 MG CAPS        order for DME Cane, 1 Device 0     prazosin (MINIPRESS) 1 MG capsule Take 1 mg by mouth at bedtime       risperiDONE (RISPERDAL) 0.5 MG tablet Take 0.5 mg by mouth at bedtime       tiZANidine (ZANAFLEX) 4 MG tablet TAKE ONE TO TWO TABLETS BY MOUTH THREE TIMES DAILY AS NEEDED Reduced refill amt: Shar Sutton please call [phone] to schedule appointment/fasting labs 30 tablet 0     valACYclovir (VALTREX) 500 MG tablet Take 1 tablet (500 mg) by mouth 2 times daily 30 tablet 1     No current facility-administered medications for this visit.       Allergies   Allergen Reactions     Morphine Sulfate Itching     Sulfa Antibiotics Hives       Social History     Socioeconomic History     Marital status:      Spouse name: None     Number of children: None     Years of education: None     Highest education level: None   Tobacco Use     Smoking status: Former     Smokeless tobacco: Never     Tobacco comments:     quit 27 years ago   Vaping Use     Vaping status: Never Used   Substance and Sexual Activity     Alcohol use: No     Drug use: No     Sexual activity: Not Currently     Partners: Male     Birth control/protection: None   Other Topics Concern     Parent/sibling w/ CABG, MI or angioplasty before 65F 55M? No     Social Determinants of Health     Interpersonal Safety: Low Risk  (5/6/2024)    Interpersonal Safety      Do you feel physically and emotionally safe where you currently live?: Yes      Within the past 12 months, have you been hit, slapped, kicked or  "otherwise physically hurt by someone?: No      Within the past 12 months, have you been humiliated or emotionally abused in other ways by your partner or ex-partner?: No       Family History   Problem Relation Age of Onset     Lupus Mother      Asthma Mother      Bone Cancer Mother      Diabetes Father      Brain Tumor Father         begnign on pituitary     Depression Sister      Anemia Sister      Colon Cancer Maternal Aunt 65     Glaucoma Maternal Grandfather      Alcoholism Maternal Grandfather      Macular Degeneration No family hx of      Liver Disease No family hx of          ROS: 10 point ROS neg other than the symptoms noted above in the HPI.    Vital Signs:   /75   Pulse 86   Ht 5' 5\" (1.651 m)   Wt 186 lb (84.4 kg)   LMP 08/12/2013   SpO2 97%   BMI 30.95 kg/m        Examination:  Constitutional:  Alert, well nourished, NAD.  Memory: recent and remote memory   HEENT: Normocephalic, atraumatic.   Pulm:  Without shortness of breath   CV:  No pitting edema of BLE.      Neurological:  Awake  Alert  Oriented x 3  Speech clear    Motor exam:   Hip Flexion:                 Right: 5/5  Left:  5/5  Hip Abduction:             Right:  5/5  Left:  5/5  Hip Adduction:             Right:  5/5  Left:  5/5  Plantar Flexion:           Right:  5/5  Left:  5/5  Dorsal Flexion:            Right:  5/5  Left:  5/5  EHL:                            Right:  5/5  Left:  5/5     Sensation normal to bilateral upper and lower extremities  Muscle tone to bilateral upper and lower extremities   Gait: Able to stand from a seated position. Normal non-antalgic, non-myelopathic gait.  Able to heel/toe walk without loss of balance    Lumbar examination reveals no tenderness of the spine or paraspinous muscles.  Hip height is symmetrical. Negative SI joint, sciatic notch or greater trochanteric tenderness to palpation bilaterally.  Straight leg raise is negative bilaterally.      Imaging:   EXAM: MR LUMBAR SPINE W/O CONTRAST  " 7/11/2024 9:57 AM      HISTORY: Low back pain; Low back pain, no complicating feature;  Chronic LBP duration >= 3 months; Worsening or not improving;  PT/chiropractic  in the last 60 days; No known/automatically detected  potential contraindications to imaging; Chronic right-sided low back  pain with right-sided sciatica; Chronic right-sided low back pain with  right-sided sciatica        COMPARISON: MR lumbar spine without contrast 4/4/2017     TECHNIQUE: Axial and sagittal T1 and T2-weighted, and STIR multiplanar  imaging without intravenous contrast.     FINDINGS:  There are 5 lumbar type vertebrae, used for the purposes of this  dictation. Conus tip at approximately L1. Grade 1 anterolisthesis of  L4 on L5 without spondylolysis. Grade 1 retrolisthesis of L5 on S1.  Normal lumbar lordosis preserved. Mild disc space narrowing at L4-L5  and L5-S1. Schmorl's nodule in the superior endplate of L3 has  increased in size, but does not demonstrate surrounding T2  hyperintensity on STIR to suggest an acute finding. Bone marrow signal  is otherwise normal. Normal cauda equina. No high-grade spinal canal  or neural foraminal stenosis at any level.     On a level by level basis, the findings are as follows:     T12-L1: Disc bulge with mild spinal canal stenosis. There is no neural  foraminal stenosis.     L1-2: Minimal disc bulge without spinal canal stenosis or neural  foraminal narrowing.     L2-3: Mild disc bulge with facet arthropathy but no spinal canal  stenosis. There is mild bilateral neural foraminal narrowing.     L3-4: Disc bulge with posterior annular fissure, facet arthropathy and  ligamentum flavum thickening causing mild spinal canal stenosis. There  is a left neural foraminal disc extrusion which abuts the exiting left  L3 nerve root without definite impingement. This appears similar to  prior study. There is mild to moderate narrowing of the bilateral  neural foramen.     L4-5: There is a disc bulge with  superimposed right foraminal disc  protrusion, unchanged from prior, which abuts the exiting right L4  nerve root. Persistent moderate narrowing of the right lateral recess  with likely impingement of the descending right L5 nerve root. The  spinal canal is mildly to moderately narrowed secondary to disc bulge,  facet arthropathy, and thickening of the ligamentum flavum. Moderate  to severe right foraminal stenosis and mild-moderate left foraminal  stenosis. This is unchanged.     L5-S1: Mild disc bulge with facet arthropathy, with superimposed left  subarticular disc extrusion which moderately narrows the left lateral  recess and likely impinges on the descending left S1 nerve root. The  spinal canal is otherwise patent. Mild-to-moderate bilateral neural  foraminal narrowing.     The visualized paraspinous soft tissues are within normal limits.                                                                       IMPRESSION:  Multilevel lumbar spondylosis greatest at L4-5 and L5-S1 with likely  nerve root impingements at these levels. Multilevel neural foraminal  narrowings as detailed above. Please see above discussion. Findings  are relatively similar to the prior study.    Assessment/Plan:   Chronic bilateral low back pain with bilateral sciatica    Reviewed lumbar MRI in clinic. She is not interested in invasive treatment at this time. Has not had any recent therapies. She is open to returning to PT at this time. She will contact my office if symptoms persist or worsen and we could discuss additional options at this time. She verbalized understanding and agreement.     Patient Instructions   -Physical therapy ordered. They will contact you to schedule.  -Please contact our clinic with questions or concerns at [phone].    Clarice Borden, [facility] Neurosurgery  [address] [place] [address]  Tel [phone]  Fax [phone]      Again, thank you for allowing me  to participate in the care of your patient.        Sincerely,        Clarice Borden NP

## 2024-07-30 NOTE — PROGRESS NOTES
[facility] Neurosurgery  Neurosurgery Clinic Visit      CC: back and leg pain    Primary care Provider: Rafael Orosco    Reason For Visit:   I was asked by Rosa Weinstein CNP to consult on the patient for chronic right-sided low back pain with right-sided sciatica.    HPI: Lesley Stafford is a 63 year old female with a history of chronic back pain who presents for evaluation. She describes many years of back pain with worsened symptoms recently with one visit to the ED for symptoms. She describes right>left low back pain which extends to the right>left hip and intermittently to the anterolateral thigh to the knee. No current paresthesias. She reports her right leg feels weaker than the left, however no overt weakness. No bowel/bladder complaints or foot drop. Has undergone PT, acupuncture, and massage therapy with some benefit. She has not been to formal therapy in ~5 years. She is not interested in injections and/or surgery at this time.     Past Medical History:   Diagnosis Date    Anxiety     Cerebral infarction (H)     Depressive disorder     Fibromyalgia     GERD (gastroesophageal reflux disease)     Hemoglobin C trait (H24) 2016    Hyperlipidemia LDL goal <70 2021    Lupus (H)     ARCHNA (obstructive sleep apnea)     Osteopenia     Substance abuse (H)     Has not used for 19 years.       Past Medical History reviewed with patient during visit.    Past Surgical History:   Procedure Laterality Date     SECTION      COLONOSCOPY  2010    repeat 10 yrs    COLONOSCOPY N/A 2020    Procedure: COLONOSCOPY, WITH POLYPECTOMY;  Surgeon: Rebecca Rojas MD;  [facility]    DILATION AND CURETTAGE, OPERATIVE HYSTEROSCOPY WITH MORCELLATOR, COMBINED N/A 02/15/2017    Procedure: COMBINED DILATION AND CURETTAGE, OPERATIVE HYSTEROSCOPY WITH MORCELLATOR;  Surgeon: Erin Gutierrez MD;  [facility]    ESOPHAGOSCOPY, GASTROSCOPY, DUODENOSCOPY (EGD), COMBINED Left 2016     Procedure: COMBINED ESOPHAGOSCOPY, GASTROSCOPY, DUODENOSCOPY (EGD), BIOPSY SINGLE OR MULTIPLE;  Surgeon: Pierre Fernandez MD;  Location:  GI     Past Surgical History reviewed with patient during visit.    Current Outpatient Medications   Medication Sig Dispense Refill    albuterol (PROAIR HFA/PROVENTIL HFA/VENTOLIN HFA) 108 (90 Base) MCG/ACT inhaler Inhale 2 puffs into the lungs every 4 hours as needed for shortness of breath / dyspnea, wheezing or other (coughing) 18 g 11    ARIPiprazole (ABILIFY) 2 MG tablet TAKE ONE TABLET BY MOUTH ONE TIME DAILY*      aspirin (ASA) 325 MG EC tablet Take 1 tablet (325 mg) by mouth daily 90 tablet 1    atorvastatin (LIPITOR) 40 MG tablet TAKE ONE TABLET BY MOUTH ONCE DAILY 90 tablet 3    carboxymethylcellulose PF (CARBOXYMETHYLCELLULOSE SODIUM) 0.5 % ophthalmic solution Place 1 drop into both eyes 4 times daily Preservative free artificial tears, single use vials. 400 each 11    cetirizine (ZYRTEC) 10 MG tablet Take 1 tablet (10 mg) by mouth daily 90 tablet 3    DULoxetine (CYMBALTA) 60 MG capsule Take 2 capsules by mouth daily 60 capsule 11    fluticasone (FLONASE) 50 MCG/ACT nasal spray Spray 1 spray into both nostrils daily 1 g 11    gabapentin (NEURONTIN) 300 MG capsule Take 1 capsule (300 mg) by mouth 3 times daily      HEMP OIL OR EXTRACT OR OTHER CBD CANNABINOID, NOT MEDICAL CANNABIS,       hydroxychloroquine (PLAQUENIL) 200 MG tablet Take 2 tablets (400 mg) by mouth daily 180 tablet 3    hydrOXYzine (ATARAX) 25 MG tablet TAKE 1 TABLET BY MOUTH THREE TIMES A DAY (Patient taking differently: 25 mg TAKE 1 TABLET BY MOUTH THREE TIMES A DAY) 90 tablet 3    ibuprofen (ADVIL/MOTRIN) 600 MG tablet Take 600 mg by mouth every 6 hours as needed for moderate pain      ibuprofen (ADVIL/MOTRIN) 600 MG tablet Take 1 tablet (600 mg) by mouth every 8 hours as needed for moderate pain 20 tablet 0    methocarbamol (ROBAXIN) 750 MG tablet Take 1 tablet (750 mg) by mouth 4  times daily as needed for muscle spasms 20 tablet 0    naproxen sodium 220 MG capsule Take 220 mg by mouth 2 times daily as needed       omega 3 1200 MG CAPS       order for DME Cane, 1 Device 0    prazosin (MINIPRESS) 1 MG capsule Take 1 mg by mouth at bedtime      risperiDONE (RISPERDAL) 0.5 MG tablet Take 0.5 mg by mouth at bedtime      tiZANidine (ZANAFLEX) 4 MG tablet TAKE ONE TO TWO TABLETS BY MOUTH THREE TIMES DAILY AS NEEDED Reduced refill amt: Shar Sutton please call [phone] to schedule appointment/fasting labs 30 tablet 0    valACYclovir (VALTREX) 500 MG tablet Take 1 tablet (500 mg) by mouth 2 times daily 30 tablet 1     No current facility-administered medications for this visit.       Allergies   Allergen Reactions    Morphine Sulfate Itching    Sulfa Antibiotics Hives       Social History     Socioeconomic History    Marital status:      Spouse name: None    Number of children: None    Years of education: None    Highest education level: None   Tobacco Use    Smoking status: Former    Smokeless tobacco: Never    Tobacco comments:     quit 27 years ago   Vaping Use    Vaping status: Never Used   Substance and Sexual Activity    Alcohol use: No    Drug use: No    Sexual activity: Not Currently     Partners: Male     Birth control/protection: None   Other Topics Concern    Parent/sibling w/ CABG, MI or angioplasty before 65F 55M? No     Social Determinants of Health     Interpersonal Safety: Low Risk  (5/6/2024)    Interpersonal Safety     Do you feel physically and emotionally safe where you currently live?: Yes     Within the past 12 months, have you been hit, slapped, kicked or otherwise physically hurt by someone?: No     Within the past 12 months, have you been humiliated or emotionally abused in other ways by your partner or ex-partner?: No       Family History   Problem Relation Age of Onset    Lupus Mother     Asthma Mother     Bone Cancer Mother     Diabetes Father     Brain Tumor Father  "        begnign on pituitary    Depression Sister     Anemia Sister     Colon Cancer Maternal Aunt 65    Glaucoma Maternal Grandfather     Alcoholism Maternal Grandfather     Macular Degeneration No family hx of     Liver Disease No family hx of          ROS: 10 point ROS neg other than the symptoms noted above in the HPI.    Vital Signs:   /75   Pulse 86   Ht 5' 5\" (1.651 m)   Wt 186 lb (84.4 kg)   LMP 08/12/2013   SpO2 97%   BMI 30.95 kg/m        Examination:  Constitutional:  Alert, well nourished, NAD.  Memory: recent and remote memory   HEENT: Normocephalic, atraumatic.   Pulm:  Without shortness of breath   CV:  No pitting edema of BLE.      Neurological:  Awake  Alert  Oriented x 3  Speech clear    Motor exam:   Hip Flexion:                 Right: 5/5  Left:  5/5  Hip Abduction:             Right:  5/5  Left:  5/5  Hip Adduction:             Right:  5/5  Left:  5/5  Plantar Flexion:           Right:  5/5  Left:  5/5  Dorsal Flexion:            Right:  5/5  Left:  5/5  EHL:                            Right:  5/5  Left:  5/5     Sensation normal to bilateral upper and lower extremities  Muscle tone to bilateral upper and lower extremities   Gait: Able to stand from a seated position. Normal non-antalgic, non-myelopathic gait.  Able to heel/toe walk without loss of balance    Lumbar examination reveals no tenderness of the spine or paraspinous muscles.  Hip height is symmetrical. Negative SI joint, sciatic notch or greater trochanteric tenderness to palpation bilaterally.  Straight leg raise is negative bilaterally.      Imaging:   EXAM: MR LUMBAR SPINE W/O CONTRAST  7/11/2024 9:57 AM      HISTORY: Low back pain; Low back pain, no complicating feature;  Chronic LBP duration >= 3 months; Worsening or not improving;  PT/chiropractic  in the last 60 days; No known/automatically detected  potential contraindications to imaging; Chronic right-sided low back  pain with right-sided sciatica; Chronic " right-sided low back pain with  right-sided sciatica        COMPARISON: MR lumbar spine without contrast 4/4/2017     TECHNIQUE: Axial and sagittal T1 and T2-weighted, and STIR multiplanar  imaging without intravenous contrast.     FINDINGS:  There are 5 lumbar type vertebrae, used for the purposes of this  dictation. Conus tip at approximately L1. Grade 1 anterolisthesis of  L4 on L5 without spondylolysis. Grade 1 retrolisthesis of L5 on S1.  Normal lumbar lordosis preserved. Mild disc space narrowing at L4-L5  and L5-S1. Schmorl's nodule in the superior endplate of L3 has  increased in size, but does not demonstrate surrounding T2  hyperintensity on STIR to suggest an acute finding. Bone marrow signal  is otherwise normal. Normal cauda equina. No high-grade spinal canal  or neural foraminal stenosis at any level.     On a level by level basis, the findings are as follows:     T12-L1: Disc bulge with mild spinal canal stenosis. There is no neural  foraminal stenosis.     L1-2: Minimal disc bulge without spinal canal stenosis or neural  foraminal narrowing.     L2-3: Mild disc bulge with facet arthropathy but no spinal canal  stenosis. There is mild bilateral neural foraminal narrowing.     L3-4: Disc bulge with posterior annular fissure, facet arthropathy and  ligamentum flavum thickening causing mild spinal canal stenosis. There  is a left neural foraminal disc extrusion which abuts the exiting left  L3 nerve root without definite impingement. This appears similar to  prior study. There is mild to moderate narrowing of the bilateral  neural foramen.     L4-5: There is a disc bulge with superimposed right foraminal disc  protrusion, unchanged from prior, which abuts the exiting right L4  nerve root. Persistent moderate narrowing of the right lateral recess  with likely impingement of the descending right L5 nerve root. The  spinal canal is mildly to moderately narrowed secondary to disc bulge,  facet arthropathy,  and thickening of the ligamentum flavum. Moderate  to severe right foraminal stenosis and mild-moderate left foraminal  stenosis. This is unchanged.     L5-S1: Mild disc bulge with facet arthropathy, with superimposed left  subarticular disc extrusion which moderately narrows the left lateral  recess and likely impinges on the descending left S1 nerve root. The  spinal canal is otherwise patent. Mild-to-moderate bilateral neural  foraminal narrowing.     The visualized paraspinous soft tissues are within normal limits.                                                                       IMPRESSION:  Multilevel lumbar spondylosis greatest at L4-5 and L5-S1 with likely  nerve root impingements at these levels. Multilevel neural foraminal  narrowings as detailed above. Please see above discussion. Findings  are relatively similar to the prior study.    Assessment/Plan:   Chronic bilateral low back pain with bilateral sciatica    Reviewed lumbar MRI in clinic. She is not interested in invasive treatment at this time. Has not had any recent therapies. She is open to returning to PT at this time. She will contact my office if symptoms persist or worsen and we could discuss additional options at this time. She verbalized understanding and agreement.     Patient Instructions   -Physical therapy ordered. They will contact you to schedule.  -Please contact our clinic with questions or concerns at [phone].    Clarice Borden, [facility] Neurosurgery  [address] [place] [address]  [address] [place] [address]  Tel [phone]  Fax [phone]

## 2024-07-30 NOTE — PATIENT INSTRUCTIONS
-Physical therapy ordered. They will contact you to schedule.  -Please contact our clinic with questions or concerns at [phone].

## 2024-07-30 NOTE — NURSING NOTE
"Lesley Stafford is a 63 year old female who presents for:  Chief Complaint   Patient presents with    Consult     Chronic right-sided low back pain with right-sided sciatica        Initial Vitals:  /75   Pulse 86   Ht 5' 5\" (1.651 m)   Wt 186 lb (84.4 kg)   LMP 08/12/2013   SpO2 97%   BMI 30.95 kg/m   Estimated body mass index is 30.95 kg/m  as calculated from the following:    Height as of this encounter: 5' 5\" (1.651 m).    Weight as of this encounter: 186 lb (84.4 kg).. Body surface area is 1.97 meters squared. BP completed using cuff size: large  Moderate Pain (5)    Nursing Comments:       Sarah Phan    "

## 2024-07-31 ENCOUNTER — VIRTUAL VISIT (OUTPATIENT)
Dept: FAMILY MEDICINE | Facility: CLINIC | Age: 64
End: 2024-07-31
Payer: COMMERCIAL

## 2024-07-31 DIAGNOSIS — G89.29 CHRONIC BILATERAL LOW BACK PAIN WITHOUT SCIATICA: Primary | ICD-10-CM

## 2024-07-31 DIAGNOSIS — F41.9 ANXIETY: ICD-10-CM

## 2024-07-31 DIAGNOSIS — F43.10 PTSD (POST-TRAUMATIC STRESS DISORDER): ICD-10-CM

## 2024-07-31 DIAGNOSIS — F51.5 NIGHTMARES: ICD-10-CM

## 2024-07-31 DIAGNOSIS — G62.9 PERIPHERAL POLYNEUROPATHY: ICD-10-CM

## 2024-07-31 DIAGNOSIS — J30.2 SEASONAL ALLERGIC RHINITIS, UNSPECIFIED TRIGGER: ICD-10-CM

## 2024-07-31 DIAGNOSIS — Z86.73 HISTORY OF STROKE: ICD-10-CM

## 2024-07-31 DIAGNOSIS — F32.1 MODERATE MAJOR DEPRESSION (H): ICD-10-CM

## 2024-07-31 DIAGNOSIS — M54.50 CHRONIC BILATERAL LOW BACK PAIN WITHOUT SCIATICA: Primary | ICD-10-CM

## 2024-07-31 PROCEDURE — 99443 PR PHYSICIAN TELEPHONE EVALUATION 21-30 MIN: CPT | Mod: 93 | Performed by: FAMILY MEDICINE

## 2024-07-31 RX ORDER — HYDROXYZINE HYDROCHLORIDE 25 MG/1
25 TABLET, FILM COATED ORAL EVERY 8 HOURS PRN
Qty: 270 TABLET | Refills: 3 | Status: SHIPPED | OUTPATIENT
Start: 2024-07-31

## 2024-07-31 RX ORDER — HYDROXYZINE HYDROCHLORIDE 25 MG/1
25 TABLET, FILM COATED ORAL EVERY 8 HOURS PRN
Qty: 270 TABLET | Refills: 3 | Status: SHIPPED | OUTPATIENT
Start: 2024-07-31 | End: 2024-07-31

## 2024-07-31 RX ORDER — ASPIRIN 81 MG/1
81 TABLET ORAL DAILY
COMMUNITY
Start: 2024-07-31

## 2024-07-31 RX ORDER — DULOXETIN HYDROCHLORIDE 60 MG/1
60 CAPSULE, DELAYED RELEASE ORAL 2 TIMES DAILY
Qty: 180 CAPSULE | Refills: 3 | Status: SHIPPED | OUTPATIENT
Start: 2024-07-31 | End: 2024-08-08

## 2024-07-31 RX ORDER — LORAZEPAM 0.5 MG/1
0.5 TABLET ORAL DAILY PRN
COMMUNITY
Start: 2024-06-13

## 2024-07-31 RX ORDER — GABAPENTIN 300 MG/1
300 CAPSULE ORAL 3 TIMES DAILY
Qty: 90 CAPSULE | Refills: 3 | Status: SHIPPED | OUTPATIENT
Start: 2024-07-31

## 2024-07-31 RX ORDER — FLUTICASONE PROPIONATE 50 MCG
1 SPRAY, SUSPENSION (ML) NASAL DAILY
Qty: 1 G | Refills: 11 | Status: SHIPPED | OUTPATIENT
Start: 2024-07-31

## 2024-07-31 RX ORDER — PRAZOSIN HYDROCHLORIDE 1 MG/1
1 CAPSULE ORAL
COMMUNITY
Start: 2024-07-31

## 2024-07-31 NOTE — PROGRESS NOTES
"Lesley is a 63 year old who is being evaluated via a billable telephone visit.    What phone number would you like to be contacted at? cell  How would you like to obtain your AVS? [facility]  Originating Location (pt. Location): Home    Distant Location (provider location):  On-site    Assessment & Plan     Chronic bilateral low back pain without sciatica  At goal   - tiZANidine (ZANAFLEX) 4 MG tablet; TAKE ONE TO TWO TABLETS BY MOUTH THREE TIMES DAILY AS NEEDED  - DULoxetine (CYMBALTA) 60 MG capsule; Take 1 capsule (60 mg) by mouth 2 times daily    Peripheral polyneuropathy  At goal   - gabapentin (NEURONTIN) 300 MG capsule; Take 1 capsule (300 mg) by mouth 3 times daily    Anxiety  For breakthrough  - hydrOXYzine HCl (ATARAX) 25 MG tablet; Take 1 tablet (25 mg) by mouth every 8 hours as needed for anxiety    Moderate major depression (H)  At goal   - DULoxetine (CYMBALTA) 60 MG capsule; Take 1 capsule (60 mg) by mouth 2 times daily    Seasonal allergic rhinitis, unspecified trigger  At goal   - fluticasone (FLONASE) 50 MCG/ACT nasal spray; Spray 1 spray into both nostrils daily    History of stroke  At goal   - aspirin 81 MG EC tablet; Take 1 tablet (81 mg) by mouth daily    PTSD (post-traumatic stress disorder)  Interested in medicinal cannabis program    Nightmares  At goal   - prazosin (MINIPRESS) 1 MG capsule; Take 1 capsule (1 mg) by mouth nightly as needed          BMI  Estimated body mass index is 30.95 kg/m  as calculated from the following:    Height as of 7/30/24: 1.651 m (5' 5\").    Weight as of 7/30/24: 84.4 kg (186 lb).         There are no Patient Instructions on file for this visit.    Subjective   Lesley is a 63 year old, presenting for the following health issues:  No chief complaint on file.    HPI     Cerebrovascular Follow-up    Patient history: ischemic cerebrovascular incident  Residual symptoms: Difficult walking  Worsened or new symptoms since last visit: No  Daily aspirin use: Yes  Hypertension " controlled: Yes    Depression and Anxiety   How are you doing with your depression since your last visit? No change  How are you doing with your anxiety since your last visit?  No change  Are you having other symptoms that might be associated with depression or anxiety? No  Have you had a significant life event? No   Do you have any concerns with your use of alcohol or other drugs? No    Social History     Tobacco Use    Smoking status: Former    Smokeless tobacco: Never    Tobacco comments:     quit 27 years ago   Vaping Use    Vaping status: Never Used   Substance Use Topics    Alcohol use: No    Drug use: No         7/29/2022     1:10 PM 2/22/2023    11:08 AM 5/6/2024    11:45 AM   PHQ   PHQ-9 Total Score 13 12 12   Q9: Thoughts of better off dead/self-harm past 2 weeks Several days Several days Several days   F/U: Thoughts of suicide or self-harm Yes No No   F/U: Self harm-plan Yes     F/U: Self-harm action No     F/U: Safety concerns No No No         9/20/2017     3:50 PM 6/29/2018     3:23 PM 5/21/2019    10:57 AM   ASHLEY-7 SCORE   Total Score 13 17 11       Chronic/Recurring Back Pain Follow Up    Where is your back pain located? (Select all that apply) low back bilateral  How would you describe your back pain?  dull ache and shooting  Where does your back pain spread? nowhere  Since your last clinic visit for back pain, how has your pain changed? unchanged  Does your back pain interfere with your job? Not applicable  Since your last visit, have you tried any new treatment? No  How many days per week do you miss taking your medication? 0    Medication Followup of hydroxyzine, fluticasone, tizanidine   Taking Medication as prescribed: yes  Side Effects:  None  Medication Helping Symptoms:  yes      Review of Systems  Constitutional, neuro, ENT, endocrine, pulmonary, cardiac, gastrointestinal, genitourinary, musculoskeletal, integument and psychiatric systems are negative, except as otherwise noted.       Objective       PEG: A Three-Item Scale Assessing Pain Intensity and Interference    What number best describes your PAIN ON AVERAGE in the past week?  5    What number best describes how, during the past week, pain has interfered with your ENJOYMENT OF LIFE?  5    What number best describes how, during the past week, pain has interfered with your GENERAL ACTIVITY?  5    PEG Total Score:  5    Franca EE, Rudi KA, Daljit MJ, Abbie TA, Milton J, Catina JM, Nohemi SM, Juana K. Development and initial validation of the PEG, a 3-item scale assessing pain intensity and interference. Journal of General Internal Medicine. 2009 Jun;24:733-738.     Vitals:  No vitals were obtained today due to virtual visit.    Physical Exam   General: Alert and no distress //Respiratory: No audible wheeze, cough, or shortness of breath // Psychiatric:  Appropriate affect, tone, and pace of words        Phone call duration: 20 minutes  Signed Electronically by: Rafael Orosco MD

## 2024-08-01 ENCOUNTER — PRE VISIT (OUTPATIENT)
Dept: NEUROSURGERY | Facility: CLINIC | Age: 64
End: 2024-08-01

## 2024-08-01 RX ORDER — FLUTICASONE PROPIONATE 50 MCG
1 SPRAY, SUSPENSION (ML) NASAL DAILY
Qty: 48 G | OUTPATIENT
Start: 2024-08-01

## 2024-08-08 DIAGNOSIS — M54.50 CHRONIC BILATERAL LOW BACK PAIN WITHOUT SCIATICA: ICD-10-CM

## 2024-08-08 DIAGNOSIS — G89.29 CHRONIC BILATERAL LOW BACK PAIN WITHOUT SCIATICA: ICD-10-CM

## 2024-08-08 DIAGNOSIS — F32.1 MODERATE MAJOR DEPRESSION (H): ICD-10-CM

## 2024-08-08 RX ORDER — DULOXETIN HYDROCHLORIDE 60 MG/1
60 CAPSULE, DELAYED RELEASE ORAL 2 TIMES DAILY
Qty: 180 CAPSULE | Refills: 3 | Status: SHIPPED | OUTPATIENT
Start: 2024-08-08

## 2024-08-08 NOTE — TELEPHONE ENCOUNTER
*Rx sent to wrong pharmacy - needs to be resent  Pending Prescriptions:                       Disp   Refills    DULoxetine (CYMBALTA) 60 MG capsule       180 ca*3            Sig: Take 1 capsule (60 mg) by mouth 2 times daily

## 2024-08-14 ENCOUNTER — THERAPY VISIT (OUTPATIENT)
Dept: PHYSICAL THERAPY | Facility: CLINIC | Age: 64
End: 2024-08-14
Attending: NURSE PRACTITIONER
Payer: COMMERCIAL

## 2024-08-14 DIAGNOSIS — G89.29 CHRONIC BILATERAL LOW BACK PAIN WITH BILATERAL SCIATICA: ICD-10-CM

## 2024-08-14 DIAGNOSIS — M25.552 BILATERAL HIP PAIN: ICD-10-CM

## 2024-08-14 DIAGNOSIS — M54.41 CHRONIC BILATERAL LOW BACK PAIN WITH BILATERAL SCIATICA: ICD-10-CM

## 2024-08-14 DIAGNOSIS — M54.42 CHRONIC BILATERAL LOW BACK PAIN WITH BILATERAL SCIATICA: ICD-10-CM

## 2024-08-14 DIAGNOSIS — M25.551 BILATERAL HIP PAIN: ICD-10-CM

## 2024-08-14 DIAGNOSIS — M54.50 LUMBAR SPINE PAIN: Primary | ICD-10-CM

## 2024-08-14 PROCEDURE — 97110 THERAPEUTIC EXERCISES: CPT | Mod: GP | Performed by: PHYSICAL THERAPIST

## 2024-08-14 PROCEDURE — 97161 PT EVAL LOW COMPLEX 20 MIN: CPT | Mod: GP | Performed by: PHYSICAL THERAPIST

## 2024-08-14 NOTE — PROGRESS NOTES
PHYSICAL THERAPY EVALUATION  Type of Visit: Evaluation       Fall Risk Screen:  Fall screen completed by: PT  Have you fallen 2 or more times in the past year?: No  Have you fallen and had an injury in the past year?: No  Is patient a fall risk?: No    Subjective No injury. Has had back, pelvis, right hip pain since 2017. Gets pain in both hips. Does yoga, massage, chiropractic helps with the pain. Going up and down the stairs will increase pain. Walking can be painful. Has lupus and fibromyalgia. Bending forward can be painful as well. Gets pain on the front and side of the thighs but does not go past the knees.       Presenting condition or subjective complaint: hip,lower back , pelvis  Date of onset: 07/30/24 (Date of order)    Relevant medical history:     Dates & types of surgery: none fir this problem    Prior diagnostic imaging/testing results: MRI; CT scan; X-ray     Prior therapy history for the same diagnosis, illness or injury:        Prior Level of Function  Transfers: Independent  Ambulation: Independent  ADL: Independent    Living Environment  Social support: Alone   Type of home: Apartment/condo   Stairs to enter the home:         Ramp: Yes   Stairs inside the home: No       Help at home: None  Equipment owned: Straight Cane     Employment:      Hobbies/Interests:      Patient goals for therapy: move, walk,active     Objective   LUMBAR SPINE EVALUATION  PAIN: Pain Level at Rest: 5/10  Pain Level with Use: 7/10  POSTURE: Sitting Posture: Rounded shoulders, Forward head  GAIT:   Weightbearing Status: WBAT  Assistive Device(s): None  Gait Deviations: Antalgic, Reduced heel strike and stride length.   NON-WEIGHTBEARING ALIGNMENT: ASIS equal heights B / Medial malleolus superior on right  ROM:   (Degrees) Left AROM Left PROM  Right AROM Right PROM   Hip Flexion       Hip Extension       Hip Abduction       Hip Adduction       Hip Internal Rotation       Hip External Rotation       Knee Flexion       Knee  Extension       Lumbar Side glide Not restricted - Pain in right hip Not restricted   Lumbar Flexion Not restricted / Repeated motions - Increased pain in left posterolateral hip   Lumbar Extension Not restricted / Repeated motions - Pain in lumbar spine     STRENGTH: Hip abduction - 3/5 B, Hip extension - 3/5 B  MYOTOMES:    Left Right   T12-L3 (Hip Flexion) 4- 4-   L2-4 (Quads)  4- 4-   L4 (Ankle DF) 5 5   L5 (Great Toe Ext) 5 5   S1 (Toe Raise) 5 5     DTR S:    Left Right   C5 (Biceps)     C6 (Brachioradialis)     C7 (Triceps)     L4 (Quad) 2 2   S1 (Achilles)       DERMATOMES:    Left Right   T12  Normal (light touch) Normal (light touch)   L1 Normal (light touch) Normal (light touch)   L2 Normal (light touch) Normal (light touch)   L3 Normal (light touch) Normal (light touch)   L4 Hyper (light touch) Normal (light touch)   L5 Hyper (light touch) Normal (light touch)   S1 Normal (light touch) Normal (light touch)     NEURAL TENSION:    Left Right   SLR Negative  Negative    SLR with DF Negative  Negative    Femoral Nerve     Slump     Corey (Lumbar)     Corey (Thoracic)     Corey (Cervical)     Median     Ulnar     Radial        FLEXIBILITY: Decreased piriformis L, Decreased hamstrings L, Decreased piriformis R, Decreased hamstrings R  LUMBAR/HIP Special Tests:    Left Right   ROSALBA Positive Negative    FADIR/Labrum/ELLA Positive Positive   Femoral Nerve     Debra's     Piriformis     Quadrant Testing     SLR     Slump     Stork with Extension     Devaughn     Hip Scour Positive Positive      PELVIS/SI SPECIAL TESTS:    Left Right Additional Notes   ASLR      Gaenslen's Test      Pelvic Compression Negative Negative    Pelvic Gapping Negative Negative    Sacral Thrust Positive Positive    Thigh Thrust Positive Positive      PALPATION:   + Tenderness At Location Left Right   Quadratus Lumborum     Erector Spinae + +   Piriformis  + +   PSIS     ASIS     Iliac Crest - -   Glut Medius     Greater Trochanter  +   Ischial  Tuberosity     Hamstrings     Hip Flexors     Vertebral  + +     SPINAL SEGMENTAL CONCLUSIONS: Hypomobility of L3-L5    Assessment & Plan   CLINICAL IMPRESSIONS  Medical Diagnosis: Chronic bilateral low back pain with bilateral sciatica    Treatment Diagnosis: Lumbar spine pain / Chronic bilateral hip pain   Impression/Assessment: Patient is a 63 year old female with lumbar spine, bilateral hip complaints.  The following significant findings have been identified: Pain, Decreased ROM/flexibility, Decreased joint mobility, Decreased strength, and Decreased activity tolerance. These impairments interfere with their ability to perform self care tasks, recreational activities, household chores, household mobility, and community mobility as compared to previous level of function.     Clinical Decision Making (Complexity):  Clinical Presentation: Stable/Uncomplicated  Clinical Presentation Rationale: based on medical and personal factors listed in PT evaluation  Clinical Decision Making (Complexity): Low complexity    PLAN OF CARE  Treatment Interventions:  Interventions: Gait Training, Manual Therapy, Neuromuscular Re-education, Therapeutic Activity, Therapeutic Exercise, Self-Care/Home Management    Long Term Goals     PT Goal 1  Goal Identifier: Ambulation  Goal Description: Pt will be able to ambulate for 30 minutes in order to run errands without having to sit due to back pain  Target Date: 10/09/24  PT Goal 2  Goal Identifier: Bending  Goal Description: Pt will be able to bend forward and  items from the floor with a minimal increase in pain 1-2/10.  Rationale: to maximize safety and independence with performance of ADLs and functional tasks;to maximize safety and independence within the home;to maximize safety and independence within the community  Target Date: 10/09/24      Frequency of Treatment: 1 time per week  Duration of Treatment: 8 weeks    Recommended Referrals to Other Professionals:   Education  Assessment:   Learner/Method: Patient;No Barriers to Learning    Risks and benefits of evaluation/treatment have been explained.   Patient/Family/caregiver agrees with Plan of Care.     Evaluation Time:     PT Eval, Low Complexity Minutes (16233): 20       Signing Clinician: [facility]                                                                                   OUTPATIENT PHYSICAL THERAPY      PLAN OF TREATMENT FOR OUTPATIENT REHABILITATION   Patient's Last Name, First Name, M.Lesley Porter YOB: 1960   Provider's Name   Psychiatric   Medical Record No.  [id number]     Onset Date: 07/30/24 (Date of order)  Start of Care Date: 08/14/24     Medical Diagnosis:  Chronic bilateral low back pain with bilateral sciatica      PT Treatment Diagnosis:  Lumbar spine pain / Chronic bilateral hip pain Plan of Treatment  Frequency/Duration: 1 time per week/ 8 weeks    Certification date from 08/14/24 to 10/09/24         See note for plan of treatment details and functional goals     Bj Greer, PT                         I CERTIFY THE NEED FOR THESE SERVICES FURNISHED UNDER        THIS PLAN OF TREATMENT AND WHILE UNDER MY CARE     (Physician attestation of this document indicates review and certification of the therapy plan).              Referring Provider:  Clarice Borden    Initial Assessment  See Epic Evaluation- Start of Care Date: 08/14/24

## 2024-08-29 ENCOUNTER — THERAPY VISIT (OUTPATIENT)
Dept: PHYSICAL THERAPY | Facility: CLINIC | Age: 64
End: 2024-08-29
Payer: COMMERCIAL

## 2024-08-29 DIAGNOSIS — M25.552 BILATERAL HIP PAIN: ICD-10-CM

## 2024-08-29 DIAGNOSIS — M54.50 LUMBAR SPINE PAIN: Primary | ICD-10-CM

## 2024-08-29 DIAGNOSIS — M25.551 BILATERAL HIP PAIN: ICD-10-CM

## 2024-08-29 PROCEDURE — 97110 THERAPEUTIC EXERCISES: CPT | Mod: GP | Performed by: PHYSICAL THERAPIST

## 2024-08-29 PROCEDURE — 97140 MANUAL THERAPY 1/> REGIONS: CPT | Mod: GP | Performed by: PHYSICAL THERAPIST

## 2024-10-09 ENCOUNTER — THERAPY VISIT (OUTPATIENT)
Dept: PHYSICAL THERAPY | Facility: CLINIC | Age: 64
End: 2024-10-09
Payer: COMMERCIAL

## 2024-10-09 DIAGNOSIS — M25.551 BILATERAL HIP PAIN: ICD-10-CM

## 2024-10-09 DIAGNOSIS — M25.552 BILATERAL HIP PAIN: ICD-10-CM

## 2024-10-09 DIAGNOSIS — M54.50 LUMBAR SPINE PAIN: Primary | ICD-10-CM

## 2024-10-09 PROCEDURE — 97140 MANUAL THERAPY 1/> REGIONS: CPT | Mod: GP | Performed by: PHYSICAL THERAPIST

## 2024-10-09 PROCEDURE — 97110 THERAPEUTIC EXERCISES: CPT | Mod: GP | Performed by: PHYSICAL THERAPIST

## 2024-10-09 NOTE — PROGRESS NOTES
[facility]                                                                                   OUTPATIENT PHYSICAL THERAPY    PLAN OF TREATMENT FOR OUTPATIENT REHABILITATION   Patient's Last Name, First Name, M.Lesley Porter YOB: 1960   Provider's Name   [facility]   Medical Record No.  [id number]     Onset Date: 07/30/24 (Date of order)  Start of Care Date: 08/14/24     Medical Diagnosis:  Chronic bilateral low back pain with bilateral sciatica      PT Treatment Diagnosis:  Lumbar spine pain / Chronic bilateral hip pain Plan of Treatment  Frequency/Duration: 1 time per week/ 8 weeks    Certification date from 10/10/24 to 12/04/24         See note for plan of treatment details and functional goals     Bj Greer PT                         I CERTIFY THE NEED FOR THESE SERVICES FURNISHED UNDER        THIS PLAN OF TREATMENT AND WHILE UNDER MY CARE     (Physician attestation of this document indicates review and certification of the therapy plan).              Referring Provider:  Clarice Borden    Initial Assessment  See Epic Evaluation- Start of Care Date: 08/14/24            PLAN  1 time per week for 8 weeks    Beginning/End Dates of Progress Note Reporting Period:  08/14/24 to 10/09/2024    Referring Provider:  Clarice Borden       10/09/24 0500   Appointment Info   Signing clinician's name / credentials Lucas Greer PT, DPT   Total/Authorized Visits 8 (POC) (E&T)   Visits Used 3   Medical Diagnosis Chronic bilateral low back pain with bilateral sciatica   PT Tx Diagnosis Lumbar spine pain / Chronic bilateral hip pain   Other pertinent information NEXT: Lateral hip mobs / Check hip abduction with extension / Joint mobs hip and lumbar /Core and hip strengthening   Progress Note/Certification   Start of Care Date 08/14/24   Onset of illness/injury or Date of Surgery 07/30/24  (Date of order)   Therapy  Frequency 1 time per week   Predicted Duration 8 weeks   Certification date from 10/10/24   Certification date to 12/04/24   Progress Note Due Date 12/04/24   Progress Note Completed Date 08/14/24   GOALS   PT Goals 2   PT Goal 1   Goal Identifier Ambulation   Goal Description Pt will be able to ambulate for 30 minutes in order to run errands without having to sit due to back pain   Target Date 10/09/24   Date Met 10/09/24   PT Goal 2   Goal Identifier Bending   Goal Description Pt will be able to bend forward and  items from the floor with a minimal increase in pain 1-2/10.   Rationale to maximize safety and independence with performance of ADLs and functional tasks;to maximize safety and independence within the home;to maximize safety and independence within the community   Goal Progress Mostly met   Target Date 10/09/24   Subjective Report   Subjective Report Exercises are helping. Can move better with less pain.60% improvement since beginning physical therapy.   Objective Measures   Objective Measures Objective Measure 1;Objective Measure 2   Objective Measure 1   Objective Measure Palpation   Details Hypomobility of hip joint   Objective Measure 2   Objective Measure Myotomes   Details 5/5 B all   Treatment Interventions (PT)   Interventions Therapeutic Procedure/Exercise;Manual Therapy   Therapeutic Procedure/Exercise   Therapeutic Procedures: strength, endurance, ROM, flexibility minutes (25605) 30   Therapeutic Procedures Ther Proc 2;Ther Proc 3;Ther Proc 4;Ther Proc 5;Ther Proc 7;Ther Proc 6   Ther Proc 1 Marching at railing x8 B   Ther Proc 1 - Details Stopped due to right hip pain   Ther Proc 2 Standing hip abduction with extension x15 B and x15 with YTB   Ther Proc 4 Seated hamstring stretch x30 seconds B   Ther Proc 5 Cat cow x15   Ther Proc 6 Monster walks x5 lengths with YTB   Ther Proc 7 Clamshells x13 B   Skilled Intervention Stretching and strengthening exercise education to improve function    Patient Response/Progress No increase in discomfort noted   Ther Proc 7 - Details Cuing to avoid rocking back   Manual Therapy   Manual Therapy: Mobilization, MFR, MLD, friction massage minutes (24449) 9   Manual Therapy 1 Long axis hip distraction B to improve motion and reduce pain   Patient Response/Progress Mild ankle discomfort. Immediately relieved upon stopping treatment   Education   Learner/Method Patient;No Barriers to Learning   Plan   Home program PTRx (handout)   Comments   Comments Pt is doing well in physical therapy and states that she has seen a 60% improvement since beginning physical therapy. Pt states her movement is better and she has met 1 of 2 functional goals. Pt would benefit from continued skilled physical therapy in order to build on these gains and meet her remaining goal.   Total Session Time   Timed Code Treatment Minutes 39   Total Treatment Time (sum of timed and untimed services) 39

## 2024-10-23 ENCOUNTER — THERAPY VISIT (OUTPATIENT)
Dept: PHYSICAL THERAPY | Facility: CLINIC | Age: 64
End: 2024-10-23
Payer: COMMERCIAL

## 2024-10-23 DIAGNOSIS — M25.551 BILATERAL HIP PAIN: ICD-10-CM

## 2024-10-23 DIAGNOSIS — M54.50 LUMBAR SPINE PAIN: Primary | ICD-10-CM

## 2024-10-23 DIAGNOSIS — M25.552 BILATERAL HIP PAIN: ICD-10-CM

## 2024-10-23 PROCEDURE — 97110 THERAPEUTIC EXERCISES: CPT | Mod: GP | Performed by: PHYSICAL THERAPIST

## 2024-10-23 PROCEDURE — 97140 MANUAL THERAPY 1/> REGIONS: CPT | Mod: GP | Performed by: PHYSICAL THERAPIST

## 2024-11-15 DIAGNOSIS — G62.9 PERIPHERAL POLYNEUROPATHY: ICD-10-CM

## 2024-11-15 RX ORDER — GABAPENTIN 300 MG/1
300 CAPSULE ORAL 3 TIMES DAILY
Qty: 90 CAPSULE | Refills: 3 | Status: SHIPPED | OUTPATIENT
Start: 2024-11-15

## 2024-12-23 ENCOUNTER — TELEPHONE (OUTPATIENT)
Dept: FAMILY MEDICINE | Facility: CLINIC | Age: 64
End: 2024-12-23
Payer: COMMERCIAL

## 2024-12-23 NOTE — TELEPHONE ENCOUNTER
Dear [facility] team,     Pt called to inform us that her insurance [employer] contacted her stating that a PA is needed for Hydroxyzine 25 mg. Pt is unsure why it is needed now as she's been on this medication for a while now. Please start a PA for the above medication.     Thank you so very much,  Kizzy MACHUCA RN

## 2024-12-26 NOTE — TELEPHONE ENCOUNTER
[facility]ail Pharm[facility] Prior Authorization Team   Phone: [phone]    PA Initiation    Medication: HYDROXYZINE HCL 25 MG PO TABS  Insurance Company: Other (see comments)Comment:  [employer] [phone]  Pharmacy Filling the Rx: [id number] - [facility], [place] - [address]  [address]  Filling Pharmacy Phone: [phone]  Filling Pharmacy Fax:    Start Date: 12/26/2024    Started PA on CMM and a response of Prior Authorization not required for patient/medication. Called pharmacy, the tech was able to process the RX and receive a paid claim.  **Instructed pharmacy to notify patient when script is ready to /ship.**

## 2024-12-29 SDOH — HEALTH STABILITY: PHYSICAL HEALTH: ON AVERAGE, HOW MANY DAYS PER WEEK DO YOU ENGAGE IN MODERATE TO STRENUOUS EXERCISE (LIKE A BRISK WALK)?: 2 DAYS

## 2024-12-29 SDOH — HEALTH STABILITY: PHYSICAL HEALTH: ON AVERAGE, HOW MANY MINUTES DO YOU ENGAGE IN EXERCISE AT THIS LEVEL?: 20 MIN

## 2024-12-29 ASSESSMENT — SOCIAL DETERMINANTS OF HEALTH (SDOH): HOW OFTEN DO YOU GET TOGETHER WITH FRIENDS OR RELATIVES?: ONCE A WEEK

## 2024-12-29 ASSESSMENT — PATIENT HEALTH QUESTIONNAIRE - PHQ9
10. IF YOU CHECKED OFF ANY PROBLEMS, HOW DIFFICULT HAVE THESE PROBLEMS MADE IT FOR YOU TO DO YOUR WORK, TAKE CARE OF THINGS AT HOME, OR GET ALONG WITH OTHER PEOPLE: SOMEWHAT DIFFICULT
SUM OF ALL RESPONSES TO PHQ QUESTIONS 1-9: 14
SUM OF ALL RESPONSES TO PHQ QUESTIONS 1-9: 14

## 2024-12-30 ENCOUNTER — OFFICE VISIT (OUTPATIENT)
Dept: FAMILY MEDICINE | Facility: CLINIC | Age: 64
End: 2024-12-30
Payer: COMMERCIAL

## 2024-12-30 VITALS
OXYGEN SATURATION: 100 % | WEIGHT: 184 LBS | SYSTOLIC BLOOD PRESSURE: 125 MMHG | TEMPERATURE: 96.8 F | HEIGHT: 64 IN | RESPIRATION RATE: 18 BRPM | DIASTOLIC BLOOD PRESSURE: 79 MMHG | HEART RATE: 86 BPM | BODY MASS INDEX: 31.41 KG/M2

## 2024-12-30 DIAGNOSIS — Z00.00 ROUTINE HISTORY AND PHYSICAL EXAMINATION OF ADULT: Primary | ICD-10-CM

## 2024-12-30 DIAGNOSIS — H61.22 IMPACTED CERUMEN OF LEFT EAR: ICD-10-CM

## 2024-12-30 DIAGNOSIS — R30.0 DYSURIA: ICD-10-CM

## 2024-12-30 DIAGNOSIS — R94.120 FAILED HEARING SCREENING: ICD-10-CM

## 2024-12-30 DIAGNOSIS — Z13.220 SCREENING CHOLESTEROL LEVEL: ICD-10-CM

## 2024-12-30 DIAGNOSIS — Z12.4 SCREENING FOR CERVICAL CANCER: ICD-10-CM

## 2024-12-30 DIAGNOSIS — Z28.21 IMMUNIZATION DECLINED: ICD-10-CM

## 2024-12-30 LAB
ALBUMIN SERPL BCG-MCNC: 4.2 G/DL (ref 3.5–5.2)
ALBUMIN UR-MCNC: NEGATIVE MG/DL
ALP SERPL-CCNC: 72 U/L (ref 40–150)
ALT SERPL W P-5'-P-CCNC: 38 U/L (ref 0–50)
ANION GAP SERPL CALCULATED.3IONS-SCNC: 11 MMOL/L (ref 7–15)
APPEARANCE UR: CLEAR
AST SERPL W P-5'-P-CCNC: 42 U/L (ref 0–45)
BACTERIA #/AREA URNS HPF: ABNORMAL /HPF
BILIRUB SERPL-MCNC: 0.5 MG/DL
BILIRUB UR QL STRIP: NEGATIVE
BUN SERPL-MCNC: 6.8 MG/DL (ref 8–23)
CALCIUM SERPL-MCNC: 9.6 MG/DL (ref 8.8–10.4)
CHLORIDE SERPL-SCNC: 105 MMOL/L (ref 98–107)
CHOLEST SERPL-MCNC: 166 MG/DL
CLUE CELLS: PRESENT
COLOR UR AUTO: YELLOW
CREAT SERPL-MCNC: 0.69 MG/DL (ref 0.51–0.95)
EGFRCR SERPLBLD CKD-EPI 2021: >90 ML/MIN/1.73M2
FASTING STATUS PATIENT QL REPORTED: YES
FASTING STATUS PATIENT QL REPORTED: YES
GLUCOSE SERPL-MCNC: 86 MG/DL (ref 70–99)
GLUCOSE UR STRIP-MCNC: NEGATIVE MG/DL
HCO3 SERPL-SCNC: 24 MMOL/L (ref 22–29)
HDLC SERPL-MCNC: 90 MG/DL
HGB UR QL STRIP: ABNORMAL
KETONES UR STRIP-MCNC: NEGATIVE MG/DL
LDLC SERPL CALC-MCNC: 67 MG/DL
LEUKOCYTE ESTERASE UR QL STRIP: NEGATIVE
NITRATE UR QL: NEGATIVE
NONHDLC SERPL-MCNC: 76 MG/DL
PH UR STRIP: 5.5 [PH] (ref 5–7)
POTASSIUM SERPL-SCNC: 3.9 MMOL/L (ref 3.4–5.3)
PROT SERPL-MCNC: 7.6 G/DL (ref 6.4–8.3)
RBC #/AREA URNS AUTO: ABNORMAL /HPF
SODIUM SERPL-SCNC: 140 MMOL/L (ref 135–145)
SP GR UR STRIP: >=1.03 (ref 1–1.03)
SQUAMOUS #/AREA URNS AUTO: ABNORMAL /LPF
TRICHOMONAS, WET PREP: ABNORMAL
TRIGL SERPL-MCNC: 43 MG/DL
UROBILINOGEN UR STRIP-ACNC: 0.2 E.U./DL
WBC #/AREA URNS AUTO: ABNORMAL /HPF
WBC'S/HIGH POWER FIELD, WET PREP: ABNORMAL
YEAST, WET PREP: ABNORMAL

## 2024-12-30 PROCEDURE — 87624 HPV HI-RISK TYP POOLED RSLT: CPT | Mod: GZ | Performed by: NURSE PRACTITIONER

## 2024-12-30 PROCEDURE — 80061 LIPID PANEL: CPT | Performed by: NURSE PRACTITIONER

## 2024-12-30 PROCEDURE — 80053 COMPREHEN METABOLIC PANEL: CPT | Performed by: NURSE PRACTITIONER

## 2024-12-30 PROCEDURE — 81001 URINALYSIS AUTO W/SCOPE: CPT | Performed by: NURSE PRACTITIONER

## 2024-12-30 PROCEDURE — G0438 PPPS, INITIAL VISIT: HCPCS | Performed by: NURSE PRACTITIONER

## 2024-12-30 PROCEDURE — 36415 COLL VENOUS BLD VENIPUNCTURE: CPT | Performed by: NURSE PRACTITIONER

## 2024-12-30 PROCEDURE — 87210 SMEAR WET MOUNT SALINE/INK: CPT | Performed by: NURSE PRACTITIONER

## 2024-12-30 PROCEDURE — 99214 OFFICE O/P EST MOD 30 MIN: CPT | Mod: 25 | Performed by: NURSE PRACTITIONER

## 2024-12-30 ASSESSMENT — PAIN SCALES - GENERAL: PAINLEVEL_OUTOF10: MODERATE PAIN (5)

## 2024-12-30 NOTE — PROGRESS NOTES
Preventive Care Visit  [facility]  THOMAS Preston CNP, Family Medicine  Dec 30, 2024        ICD-10-CM    1. Routine history and physical examination of adult  Z00.00       2. Screening cholesterol level  Z13.220 Lipid panel reflex to direct LDL Fasting     Comprehensive metabolic panel (BMP + Alb, Alk Phos, ALT, AST, Total. Bili, TP)     Lipid panel reflex to direct LDL Fasting     Comprehensive metabolic panel (BMP + Alb, Alk Phos, ALT, AST, Total. Bili, TP)      3. Dysuria  R30.0 UA with Microscopic reflex to Culture - lab collect     Wet prep - Clinic Collect     UA with Microscopic reflex to Culture - lab collect     UA Microscopic with Reflex to Culture      4. Screening for cervical cancer  Z12.4 HPV and Gynecologic Cytology Panel - Recommended Age 30-65 Years      5. Immunization declined  Z28.21       6. Failed hearing screening  R94.120 Adult Audiology  Referral      7. Impacted cerumen of left ear  H61.22 carbamide peroxide (DEBROX) 6.5 % otic solution        Rule out UTI/Yeast  -continue psychiatric care with Psychiatrist/counseling  Referral for hearing aid evaluation    Tabitha Sutton is a 64 year old, presenting for the following:  Annual Visit        12/30/2024     9:06 AM   Additional Questions   Roomed by loula   Accompanied by self           HPI    Genitourinary - Female  Onset/Duration: several days  Description:   Painful urination (Dysuria): YES           Frequency: YES  Blood in urine (Hematuria): No  Delay in urine (Hesitency): No  Intensity: mild  Progression of Symptoms:  intermittent  Accompanying Signs & Symptoms:  Fever/chills: No  Flank pain: No  Nausea and vomiting: No  Vaginal symptoms: discharge and itching  Abdominal/Pelvic Pain: No  History:   History of frequent UTI s: YES  History of kidney stones: No  Sexually Active: No  Possibility of pregnancy: No  Precipitating or alleviating factors: None  Therapies tried and  outcome: Increase fluid intake  Health Care Directive  Patient has a Health Care Directive on file        12/29/2024   General Health   How would you rate your overall physical health? (!) FAIR   Feel stress (tense, anxious, or unable to sleep) To some extent   (!) STRESS CONCERN      12/29/2024   Nutrition   Diet: Vegetarian/vegan         12/29/2024   Exercise   Days per week of moderate/strenous exercise 2 days   Average minutes spent exercising at this level 20 min   (!) EXERCISE CONCERN      12/29/2024   Social Factors   Frequency of gathering with friends or relatives Once a week   Worry food won't last until get money to buy more No   Food not last or not have enough money for food? No   Do you have housing? (Housing is defined as stable permanent housing and does not include staying ouside in a car, in a tent, in an abandoned building, in an overnight shelter, or couch-surfing.) Yes   Are you worried about losing your housing? No   Lack of transportation? No   Unable to get utilities (heat,electricity)? No         12/30/2024   Fall Risk   Gait Speed Test (Document in seconds) 3.69   Gait Speed Test Interpretation Less than or equal to 5.00 seconds - PASS          12/29/2024   Activities of Daily Living- Home Safety   Needs help with the following daily activites Telephone use    Money management   Safety concerns in the home None of the above       Multiple values from one day are sorted in reverse-chronological order         12/29/2024   Dental   Dentist two times every year? (!) NO         12/29/2024   Hearing Screening   Hearing concerns? (!) I FEEL THAT PEOPLE ARE MUMBLING OR NOT SPEAKING CLEARLY.    (!) I NEED TO ASK PEOPLE TO SPEAK UP OR REPEAT THEMSELVES.    (!) IT'S HARD TO FOLLOW A CONVERSATION IN A NOISY RESTAURANT OR CROWDED ROOM.    (!) TROUBLE UNDESTANDING A SPEAKER IN A PUBLIC MEETING OR [employer] SERVICE.    (!) TROUBLE UNDERSTANDING SOFT OR WHISPERED SPEECH.    (!) TROUBLE UNDERSTANDING SPEECH  ON THE TELEPHONE       Multiple values from one day are sorted in reverse-chronological order         12/29/2024   Driving Risk Screening   Patient/family members have concerns about driving (!) YES          12/29/2024   General Alertness/Fatigue Screening   Have you been more tired than usual lately? (!) YES         12/29/2024   Urinary Incontinence Screening   Bothered by leaking urine in past 6 months Yes          Today's PHQ-9 Score:       12/29/2024     3:16 PM   PHQ-9 SCORE   PHQ-9 Total Score MyChart 14 (Moderate depression)   PHQ-9 Total Score 14        Patient-reported         12/29/2024   Substance Use   Alcohol more than 3/day or more than 7/wk Not Applicable   Do you have a current opioid prescription? No   How severe/bad is pain from 1 to 10? 4/10   Do you use any other substances recreationally? (!) CANNABIS PRODUCTS           Multiple values from one day are sorted in reverse-chronological order     Social History     Tobacco Use    Smoking status: Former    Smokeless tobacco: Never    Tobacco comments:     quit 27 years ago   Vaping Use    Vaping status: Never Used   Substance Use Topics    Alcohol use: No    Drug use: No           6/27/2024   LAST FHS-7 RESULTS   1st degree relative breast or ovarian cancer No   Any relative bilateral breast cancer Unknown   Any male have breast cancer No   Any ONE woman have BOTH breast AND ovarian cancer No   Any woman with breast cancer before 50yrs No   2 or more relatives with breast AND/OR ovarian cancer Yes   2 or more relatives with breast AND/OR bowel cancer Yes     Mammogram Screening - Mammogram every 1-2 years updated in Health Maintenance based on mutual decision making      History of abnormal Pap smear: No - age 30- 64 PAP with HPV every 5 years recommended        Latest Ref Rng & Units 1/29/2021    12:01 PM 1/29/2021    11:51 AM 12/8/2014    12:00 AM   PAP / HPV   PAP (Historical)   NIL  NIL    HPV 16 DNA NEG^Negative Negative      HPV 18 DNA  NEG^Negative Negative      Other HR HPV NEG^Negative Negative        ASCVD Risk   The ASCVD Risk score (Scott ROMERO, et al., 2019) failed to calculate for the following reasons:    Risk score cannot be calculated because patient has a medical history suggesting prior/existing ASCVD    Fracture Risk Assessment Tool  Link to Frax Calculator  Use the information below to complete the Frax calculator  : 1960  Sex: female  Weight (kg): 83.5 kg (actual weight)  Height (cm): 163.5 cm  Previous Fragility Fracture:  No  History of parent with fractured hip:  No  Current Smoking:  No  Patient has been on glucocorticoids for more than 3 months (5mg/day or more): No  Rheumatoid Arthritis on Problem List:  No  Secondary Osteoporosis on Problem List:  No  Consumes 3 or more units of alcohol per day: No  Femoral Neck BMD (g/cm2)            Reviewed and updated as needed this visit by Provider                    Lab work is in process  Labs reviewed in EPIC  Current providers sharing in care for this patient include:  Patient Care Team:  Rafael Orosco MD as PCP - General (Family Practice)  Rafael Orosco MD as Assigned PCP  Latoya Rice MD as MD (Ophthalmology)  Rosa Weinstein APRN CNP as Nurse Practitioner (Family Medicine)  Theresa Harper AuD as Audiologist (Audiology)  Audrey Arboleda NP as Nurse Practitioner (ENT-Otolaryngology)  Quique Snatos MD as MD (Gastroenterology)  Manuel Garcia Chi, OD as MD (Optometry)  Manuel Garcia Chi, OD as Assigned Surgical Provider  Genia Obando, PhD (PSYCHOLOGIST COUNSELING)  Selvin London DO as Assigned Musculoskeletal Provider  Genia Obando, PhD as Assigned Behavioral Health Provider  Clarice Borden NP as Assigned Neuroscience Provider  Huang Pedraza MD as Assigned Rheumatology Provider    The following health maintenance items are reviewed in Epic and correct as of today:  Health Maintenance   Topic Date Due     "DTAP/TDAP/TD IMMUNIZATION (1 - Tdap) Never done    Pneumococcal Vaccine: 50+ Years (1 of 1 - PCV) Never done    ZOSTER IMMUNIZATION (1 of 2) Never done    URINE DRUG SCREEN  02/04/2011    LUNG CANCER SCREENING  12/26/2020    MEDICARE ANNUAL WELLNESS VISIT  12/22/2023    PAP FOLLOW-UP  01/29/2024    HPV FOLLOW-UP  01/29/2024    ANNUAL REVIEW OF HM ORDERS  03/22/2024    LIPID  05/30/2024    INFLUENZA VACCINE (1) Never done    COVID-19 Vaccine (1 - 2024-25 season) Never done    DEPRESSION 6 MO INDEX REPEAT PHQ-9  01/13/2025    ADVANCE CARE PLANNING  05/20/2025    PHQ-9  06/30/2025    FALL RISK ASSESSMENT  12/30/2025    MAMMO SCREENING  06/27/2026    GLUCOSE  06/10/2027    DEXA  07/11/2029    COLORECTAL CANCER SCREENING  06/30/2030    RSV VACCINE (1 - 1-dose 75+ series) 12/22/2033    HEPATITIS C SCREENING  Completed    DEPRESSION ACTION PLAN  Completed    HPV IMMUNIZATION  Aged Out    MENINGITIS IMMUNIZATION  Aged Out    RSV MONOCLONAL ANTIBODY  Aged Out    PAP  Discontinued         Review of Systems  Constitutional, neuro, ENT, endocrine, pulmonary, cardiac, gastrointestinal, genitourinary, musculoskeletal, integument and psychiatric systems are negative, except as otherwise noted.     Objective    Exam  /79   Pulse 86   Temp 96.8  F (36  C) (Temporal)   Resp 18   Ht 1.635 m (5' 4.37\")   Wt 83.5 kg (184 lb)   LMP 08/12/2013   SpO2 100%   BMI 31.22 kg/m     Estimated body mass index is 31.22 kg/m  as calculated from the following:    Height as of this encounter: 1.635 m (5' 4.37\").    Weight as of this encounter: 83.5 kg (184 lb).    Physical Exam  GENERAL: alert and no distress  EYES: Eyes grossly normal to inspection, PERRL and conjunctivae and sclerae normal  HENT: normal cephalic/atraumatic, left ear: occluded with wax, nose and mouth without ulcers or lesions, oropharynx clear, and oral mucous membranes moist  NECK: no adenopathy, no asymmetry, masses, or scars  RESP: lungs clear to auscultation - no " rales, rhonchi or wheezes  CV: regular rate and rhythm, normal S1 S2, no S3 or S4, no murmur, click or rub, no peripheral edema  ABDOMEN: soft, nontender, no hepatosplenomegaly, no masses and bowel sounds normal   (female): normal female external genitalia, normal urethral meatus, normal vaginal mucosa  MS: no gross musculoskeletal defects noted, no edema  PSYCH: mentation appears normal, affect normal/bright        12/30/2024   Mini Cog   Clock Draw Score 2 Normal    2 Normal   3 Item Recall 3 objects recalled   Mini Cog Total Score 5       Multiple values from one day are sorted in reverse-chronological order              Signed Electronically by: THOMAS Preston CNP    Answers submitted by the patient for this visit:  Patient Health Questionnaire (Submitted on 12/29/2024)  If you checked off any problems, how difficult have these problems made it for you to do your work, take care of things at home, or get along with other people?: Somewhat difficult  PHQ9 TOTAL SCORE: 14

## 2024-12-31 DIAGNOSIS — B96.89 BACTERIAL VAGINOSIS: Primary | ICD-10-CM

## 2024-12-31 DIAGNOSIS — N76.0 BACTERIAL VAGINOSIS: Primary | ICD-10-CM

## 2024-12-31 DIAGNOSIS — R31.29 MICROSCOPIC HEMATURIA: ICD-10-CM

## 2024-12-31 LAB
HPV HR 12 DNA CVX QL NAA+PROBE: NEGATIVE
HPV16 DNA CVX QL NAA+PROBE: NEGATIVE
HPV18 DNA CVX QL NAA+PROBE: NEGATIVE
HUMAN PAPILLOMA VIRUS FINAL DIAGNOSIS: NORMAL

## 2024-12-31 RX ORDER — METRONIDAZOLE 500 MG/1
500 TABLET ORAL 2 TIMES DAILY
Qty: 14 TABLET | Refills: 0 | Status: SHIPPED | OUTPATIENT
Start: 2024-12-31 | End: 2025-01-07

## 2025-01-08 PROBLEM — Z12.4 CERVICAL CANCER SCREENING: Status: ACTIVE | Noted: 2025-01-08

## 2025-01-13 ENCOUNTER — OFFICE VISIT (OUTPATIENT)
Dept: RHEUMATOLOGY | Facility: CLINIC | Age: 65
End: 2025-01-13
Attending: INTERNAL MEDICINE
Payer: COMMERCIAL

## 2025-01-13 VITALS
DIASTOLIC BLOOD PRESSURE: 78 MMHG | WEIGHT: 180 LBS | SYSTOLIC BLOOD PRESSURE: 123 MMHG | OXYGEN SATURATION: 98 % | BODY MASS INDEX: 30.54 KG/M2 | HEART RATE: 90 BPM

## 2025-01-13 DIAGNOSIS — M32.9 SYSTEMIC LUPUS ERYTHEMATOSUS, UNSPECIFIED SLE TYPE, UNSPECIFIED ORGAN INVOLVEMENT STATUS (H): ICD-10-CM

## 2025-01-13 DIAGNOSIS — R31.29 MICROSCOPIC HEMATURIA: ICD-10-CM

## 2025-01-13 LAB
ALBUMIN MFR UR ELPH: <6 MG/DL
ALBUMIN SERPL BCG-MCNC: 4.5 G/DL (ref 3.5–5.2)
ALBUMIN UR-MCNC: NEGATIVE MG/DL
ALP SERPL-CCNC: 77 U/L (ref 40–150)
ALT SERPL W P-5'-P-CCNC: 29 U/L (ref 0–50)
ANION GAP SERPL CALCULATED.3IONS-SCNC: 12 MMOL/L (ref 7–15)
APPEARANCE UR: CLEAR
AST SERPL W P-5'-P-CCNC: 31 U/L (ref 0–45)
BACTERIA #/AREA URNS HPF: ABNORMAL /HPF
BASOPHILS # BLD AUTO: 0 10E3/UL (ref 0–0.2)
BASOPHILS NFR BLD AUTO: 1 %
BILIRUB SERPL-MCNC: 0.7 MG/DL
BILIRUB UR QL STRIP: NEGATIVE
BUN SERPL-MCNC: 7.5 MG/DL (ref 8–23)
CALCIUM SERPL-MCNC: 9.9 MG/DL (ref 8.8–10.4)
CHLORIDE SERPL-SCNC: 102 MMOL/L (ref 98–107)
COLOR UR AUTO: YELLOW
CREAT SERPL-MCNC: 0.71 MG/DL (ref 0.51–0.95)
CREAT UR-MCNC: 28.6 MG/DL
EGFRCR SERPLBLD CKD-EPI 2021: >90 ML/MIN/1.73M2
EOSINOPHIL # BLD AUTO: 0.1 10E3/UL (ref 0–0.7)
EOSINOPHIL NFR BLD AUTO: 2 %
ERYTHROCYTE [DISTWIDTH] IN BLOOD BY AUTOMATED COUNT: 16.1 % (ref 10–15)
GLUCOSE SERPL-MCNC: 76 MG/DL (ref 70–99)
GLUCOSE UR STRIP-MCNC: NEGATIVE MG/DL
HCO3 SERPL-SCNC: 25 MMOL/L (ref 22–29)
HCT VFR BLD AUTO: 33.5 % (ref 35–47)
HGB BLD-MCNC: 11.8 G/DL (ref 11.7–15.7)
HGB UR QL STRIP: ABNORMAL
IMM GRANULOCYTES # BLD: 0 10E3/UL
IMM GRANULOCYTES NFR BLD: 0 %
KETONES UR STRIP-MCNC: NEGATIVE MG/DL
LEUKOCYTE ESTERASE UR QL STRIP: NEGATIVE
LYMPHOCYTES # BLD AUTO: 1.7 10E3/UL (ref 0.8–5.3)
LYMPHOCYTES NFR BLD AUTO: 38 %
MCH RBC QN AUTO: 25.1 PG (ref 26.5–33)
MCHC RBC AUTO-ENTMCNC: 35.2 G/DL (ref 31.5–36.5)
MCV RBC AUTO: 71 FL (ref 78–100)
MONOCYTES # BLD AUTO: 0.3 10E3/UL (ref 0–1.3)
MONOCYTES NFR BLD AUTO: 7 %
NEUTROPHILS # BLD AUTO: 2.4 10E3/UL (ref 1.6–8.3)
NEUTROPHILS NFR BLD AUTO: 53 %
NITRATE UR QL: NEGATIVE
PH UR STRIP: 6.5 [PH] (ref 5–7)
PLATELET # BLD AUTO: 159 10E3/UL (ref 150–450)
POTASSIUM SERPL-SCNC: 4 MMOL/L (ref 3.4–5.3)
PROT SERPL-MCNC: 8.1 G/DL (ref 6.4–8.3)
PROT/CREAT 24H UR: NORMAL MG/G{CREAT}
RBC # BLD AUTO: 4.71 10E6/UL (ref 3.8–5.2)
RBC #/AREA URNS AUTO: ABNORMAL /HPF
SODIUM SERPL-SCNC: 139 MMOL/L (ref 135–145)
SP GR UR STRIP: <=1.005 (ref 1–1.03)
UROBILINOGEN UR STRIP-ACNC: 0.2 E.U./DL
WBC # BLD AUTO: 4.6 10E3/UL (ref 4–11)
WBC #/AREA URNS AUTO: ABNORMAL /HPF

## 2025-01-13 PROCEDURE — G0463 HOSPITAL OUTPT CLINIC VISIT: HCPCS | Performed by: INTERNAL MEDICINE

## 2025-01-13 PROCEDURE — 86160 COMPLEMENT ANTIGEN: CPT | Performed by: INTERNAL MEDICINE

## 2025-01-13 PROCEDURE — 36415 COLL VENOUS BLD VENIPUNCTURE: CPT | Performed by: INTERNAL MEDICINE

## 2025-01-13 PROCEDURE — 85025 COMPLETE CBC W/AUTO DIFF WBC: CPT | Performed by: INTERNAL MEDICINE

## 2025-01-13 PROCEDURE — 99214 OFFICE O/P EST MOD 30 MIN: CPT | Performed by: INTERNAL MEDICINE

## 2025-01-13 PROCEDURE — 80053 COMPREHEN METABOLIC PANEL: CPT | Performed by: INTERNAL MEDICINE

## 2025-01-13 PROCEDURE — 84156 ASSAY OF PROTEIN URINE: CPT | Performed by: INTERNAL MEDICINE

## 2025-01-13 PROCEDURE — 81001 URINALYSIS AUTO W/SCOPE: CPT | Performed by: INTERNAL MEDICINE

## 2025-01-13 ASSESSMENT — PAIN SCALES - GENERAL: PAINLEVEL_OUTOF10: MODERATE PAIN (4)

## 2025-01-13 NOTE — LETTER
1/13/2025       RE: Lesley Stafford  [facility] [facility] [address]  [facility] [place] [place] [facility] [place] [id number]     Dear Colleague,    Thank you for referring your patient, Lesley Stafford, to the [facility] RHE[facility] at [facility]. Please see a copy of my visit note below.    Follow-up visit for SLE.    Since her last visit 6 months ago, she has been doing well and she has had not had any major problems.  She feels that the hydroxychloroquine is still working for her joints.    She has noticed some new leg swelling in the left leg around the ankle.  No rash, no varicose veins.  Sometimes she has spasms and charley horses at night.    Past medical history  Systemic lupus, diagnosed in 2014 presenting with arthralgias, fatigue, sicca symptoms, oral sores, thrombocytopenia and a positive ARIEL, positive double-stranded DNA and positive RNP and was started on hydroxychloroquine 200 mg twice daily. She continued on hydroxychloroquine until 2020 when she discontinued on her own. She had no renal, pulmonary, CNS involvement due to systemic lupus.   2. Fibromyalgia, treated with Cymbalta  3. Degenerative disease of the lumbar spine    Recently she had some blood work done but unfortunately not all labs for lupus were done.    Physical examination  Vital signs were reviewed and they were normal with a BMI of 30.5.  Joint examination showed no active synovitis in the hands wrist elbows or shoulders with full range of motion, normal strength lower extremity exam showed also no active synovitis.  There was no evidence for calf tenderness, ankle edema, no synovitis in the feet.    Impression/plan  1.  Continue with hydroxychloroquine  2.  Repeat blood test today.  3.  I suspected her left leg problems are due to a nerve impingement in the low back resulting in intermittent radicular irritation resulting in spasms/charley horses at night.  4.  Follow-up in 6 months with a repeat  blood test prior to the visit.    30 minutes spent on the date of the encounter doing chart review, history and exam, documentation and further activities per the note.       Again, thank you for allowing me to participate in the care of your patient.      Sincerely,    Huang Pedraza MD

## 2025-01-13 NOTE — NURSING NOTE
Chief Complaint   Patient presents with    RECHECK     /78 (BP Location: Right arm, Patient Position: Sitting, Cuff Size: Adult Large)   Pulse 90   Wt 81.6 kg (180 lb)   LMP 08/12/2013   SpO2 98%   BMI 30.54 kg/m

## 2025-01-14 LAB
C3 SERPL-MCNC: 98 MG/DL (ref 81–157)
C4 SERPL-MCNC: 17 MG/DL (ref 13–39)

## 2025-01-14 NOTE — PROGRESS NOTES
Follow-up visit for SLE.    Since her last visit 6 months ago, she has been doing well and she has had not had any major problems.  She feels that the hydroxychloroquine is still working for her joints.    She has noticed some new leg swelling in the left leg around the ankle.  No rash, no varicose veins.  Sometimes she has spasms and charley horses at night.    Past medical history  Systemic lupus, diagnosed in 2014 presenting with arthralgias, fatigue, sicca symptoms, oral sores, thrombocytopenia and a positive ARIEL, positive double-stranded DNA and positive RNP and was started on hydroxychloroquine 200 mg twice daily. She continued on hydroxychloroquine until 2020 when she discontinued on her own. She had no renal, pulmonary, CNS involvement due to systemic lupus.   2. Fibromyalgia, treated with Cymbalta  3. Degenerative disease of the lumbar spine    Recently she had some blood work done but unfortunately not all labs for lupus were done.    Physical examination  Vital signs were reviewed and they were normal with a BMI of 30.5.  Joint examination showed no active synovitis in the hands wrist elbows or shoulders with full range of motion, normal strength lower extremity exam showed also no active synovitis.  There was no evidence for calf tenderness, ankle edema, no synovitis in the feet.    Impression/plan  1.  Continue with hydroxychloroquine  2.  Repeat blood test today.  3.  I suspected her left leg problems are due to a nerve impingement in the low back resulting in intermittent radicular irritation resulting in spasms/charley horses at night.  4.  Follow-up in 6 months with a repeat blood test prior to the visit.    30 minutes spent on the date of the encounter doing chart review, history and exam, documentation and further activities per the note.

## 2025-01-28 ENCOUNTER — TELEPHONE (OUTPATIENT)
Dept: FAMILY MEDICINE | Facility: CLINIC | Age: 65
End: 2025-01-28
Payer: COMMERCIAL

## 2025-01-28 NOTE — TELEPHONE ENCOUNTER
"Patient reporting URI sxs since Saturday and wondering how long it last and what is \"going around right now\"  Sx: cough and fatigue   Cough is dry most of the time, sometimes gets some mucus up - clear sputum     Denies any other sxs - chest pain or tightness, fever, chills, n/v, diarrhea, dyspnea, h/a. Pt speaking clearly without SOB     Provided home care advice for URI (rest, hydration, nutrition, humidification) and s/s of worsening condition - call back or schedule to be seen for further eval.     Pt agreed to plan.    Chanda PRINCE RN  [facility]    "

## 2025-01-30 PROBLEM — M25.552 BILATERAL HIP PAIN: Status: RESOLVED | Noted: 2024-08-14 | Resolved: 2025-01-30

## 2025-01-30 PROBLEM — M54.50 LUMBAR SPINE PAIN: Status: RESOLVED | Noted: 2024-08-14 | Resolved: 2025-01-30

## 2025-01-30 PROBLEM — M25.551 BILATERAL HIP PAIN: Status: RESOLVED | Noted: 2024-08-14 | Resolved: 2025-01-30

## 2025-02-24 ENCOUNTER — MYC MEDICAL ADVICE (OUTPATIENT)
Dept: FAMILY MEDICINE | Facility: CLINIC | Age: 65
End: 2025-02-24

## 2025-02-24 ENCOUNTER — OFFICE VISIT (OUTPATIENT)
Dept: AUDIOLOGY | Facility: CLINIC | Age: 65
End: 2025-02-24
Attending: NURSE PRACTITIONER
Payer: COMMERCIAL

## 2025-02-24 DIAGNOSIS — R94.120 FAILED HEARING SCREENING: ICD-10-CM

## 2025-02-24 DIAGNOSIS — H90.3 SENSORY HEARING LOSS, BILATERAL: Primary | ICD-10-CM

## 2025-02-24 DIAGNOSIS — R26.89 BALANCE PROBLEMS: Primary | ICD-10-CM

## 2025-02-24 NOTE — PROGRESS NOTES
AUDIOLOGY REPORT    SUBJECTIVE:  Lesley Stafford is a 64 year old female who was seen in the Audiology Clinic at the [facility] for audiologic evaluation, referred by Rosa Weinstein C.N.P. The patient has been seen previously in this clinic on 1/14/22 for assessment and results indicated borderline normal to mild sensorineural hearing loss bilaterally.  The patient reports mild changes in hearing bilaterally. She notes difficulty hearing in her daily life, has to ask for repetition frequently, sometimes finds herself giving up or nodding even when she does not understand what was said. She reports itchiness and plugged/muffled sensation in both ears. She has bilateral humming tinnitus intermittently. She reports ongoing issues with episodic dizziness/imbalance (underwent VNG here in 2021 with mild central findings). Denies pain, drainage, history of ear surgeries or noise exposure.       OBJECTIVE:  Abuse Screening:  Do you feel unsafe at home or work/school? No  Do you feel threatened by someone? No  Does anyone try to keep you from having contact with others, or doing things outside of your home? No  Physical signs of abuse present? No     Fall Risk Screen:  1. Have you fallen two or more times in the past year? No  2. Have you fallen and had an injury in the past year? No      Otoscopic exam indicates partial obstruction with cerumen bilaterally. Cerumen removal was performed without incident with a lighted curette. After cerumen removal, patient reports improvement in plugged/muffle sensation.    Pure Tone Thresholds assessed using conventional audiometry with good  reliability from 250-8000 Hz bilaterally using insert earphones and circumaural headphones     RIGHT:  mild sloping to moderate sensorineural hearing loss    LEFT:    borderline-normal sloping to moderate sensorineural hearing loss    Tympanogram:    RIGHT: normal eardrum mobility    LEFT:    normal eardrum mobility    Reflexes (reported by stimulus ear):  RIGHT: Ipsilateral is present at normal levels  RIGHT: Contralateral is present at normal levels  LEFT:   Ipsilateral is present at normal levels  LEFT:   Contralateral is present at normal levels      Speech Reception Threshold:    RIGHT: 40 dB HL    LEFT:   35 dB HL  Word Recognition Score:     RIGHT: 96% at 75 dB HL using NU-6 recorded word list.    LEFT:   96% at 75 dB HL using NU-6 recorded word list.      ASSESSMENT: Borderline normal/mild to moderate sensorineural hearing loss bilaterally. Compared to patient's previous audiogram dated 1/14/2022, hearing has worsened by 10 dB from 4-8 kHz in the right ear, and at 1, 3, & 8 kHz in the left ear. Word recognition is stable. Today s results were discussed with the patient in detail.     PLAN:  Patient was counseled regarding tinnitus, hearing loss and impact on communication.  Patient is a good candidate for amplification at this time-discussed over the counter versus prescription hearing aids. Schedule a hearing aid consultation if desired. Handout on good communication strategies was given to patient. It is recommended that the patient follow up with referring provider in primary care regarding dizziness- may benefit from vestibular PT for symptom reduction/fall prevention. Return to monitor hearing if changes are noted or new ear symptoms arise.  Please call this clinic with questions regarding these results or recommendations.      Holli Magdaleno.  Licensed Audiologist  [place] # [id number]

## 2025-02-24 NOTE — Clinical Note
Ion Lee-    Today's hearing test showed bilateral sensorineural hearing loss- mild changes from 2022 audio. I am recommending she come back for hearing aid consultation or try over the counter hearing aid option as she is a candidate for that as well. Regarding her dizziness/imbalance, she may benefit from a referral to vestibular PT to work on balance/fall prevention. She had previous VNG testing here in 2021 which did not show any peripheral/ear-related findings. Please see my note for details.  Thanks, Theresa

## 2025-03-26 ENCOUNTER — OFFICE VISIT (OUTPATIENT)
Dept: URGENT CARE | Facility: URGENT CARE | Age: 65
End: 2025-03-26
Payer: COMMERCIAL

## 2025-03-26 VITALS
TEMPERATURE: 97.7 F | BODY MASS INDEX: 29.42 KG/M2 | RESPIRATION RATE: 16 BRPM | OXYGEN SATURATION: 100 % | WEIGHT: 173.4 LBS | HEART RATE: 88 BPM | SYSTOLIC BLOOD PRESSURE: 145 MMHG | DIASTOLIC BLOOD PRESSURE: 86 MMHG

## 2025-03-26 DIAGNOSIS — M62.838 TRAPEZIUS MUSCLE SPASM: Primary | ICD-10-CM

## 2025-03-26 PROCEDURE — 3079F DIAST BP 80-89 MM HG: CPT | Performed by: STUDENT IN AN ORGANIZED HEALTH CARE EDUCATION/TRAINING PROGRAM

## 2025-03-26 PROCEDURE — 99213 OFFICE O/P EST LOW 20 MIN: CPT | Performed by: STUDENT IN AN ORGANIZED HEALTH CARE EDUCATION/TRAINING PROGRAM

## 2025-03-26 PROCEDURE — 3077F SYST BP >= 140 MM HG: CPT | Performed by: STUDENT IN AN ORGANIZED HEALTH CARE EDUCATION/TRAINING PROGRAM

## 2025-03-26 RX ORDER — PREDNISONE 20 MG/1
40 TABLET ORAL DAILY
Qty: 10 TABLET | Refills: 0 | Status: SHIPPED | OUTPATIENT
Start: 2025-03-26 | End: 2025-03-31

## 2025-03-26 RX ORDER — CYCLOBENZAPRINE HCL 10 MG
10 TABLET ORAL 3 TIMES DAILY PRN
Qty: 30 TABLET | Refills: 0 | Status: SHIPPED | OUTPATIENT
Start: 2025-03-26

## 2025-03-26 RX ORDER — LURASIDONE HYDROCHLORIDE 20 MG/1
TABLET, FILM COATED ORAL
COMMUNITY
Start: 2025-03-19

## 2025-03-26 NOTE — PATIENT INSTRUCTIONS
Your back pain is likely due to a muscle strain/spasm.    Take prednisone as directed.  Flexeril as needed for spasm/tightness.   Tylenol as needed for pain.     Recommend heat/ice to the area and light stretching as pain allows.  You should follow-up with your PCP if you still have pain in 1-2 weeks.

## 2025-03-26 NOTE — PROGRESS NOTES
ASSESSMENT & PLAN:   Diagnoses and all orders for this visit:  Trapezius muscle spasm  -     predniSONE (DELTASONE) 20 MG tablet; Take 2 tablets (40 mg) by mouth daily for 5 days.  -     cyclobenzaprine (FLEXERIL) 10 MG tablet; Take 1 tablet (10 mg) by mouth 3 times daily as needed for muscle spasms.    Atraumatic left posterior neck pain x 4 days. Consistent with MSK etiology - reproducible with palpation and ROM. Prednisone burst and Flexeril as needed. Discussed stretching, heat, massage.    At the end of the encounter, I discussed results, diagnosis, medications. Discussed red flags for immediate return to clinic/ER, as well as indications for follow up if no improvement. Patient and/or caregiver understood and agreed to plan. Patient was stable for discharge.    Patient Instructions   Your back pain is likely due to a muscle strain/spasm.    Take prednisone as directed.  Flexeril as needed for spasm/tightness.   Tylenol as needed for pain.     Recommend heat/ice to the area and light stretching as pain allows.  You should follow-up with your PCP if you still have pain in 1-2 weeks.     No follow-ups on file.    ------------------------------------------------------------------------  SUBJECTIVE  History was obtained from patient.    Patient presents with:  Urgent Care  Neck Pain: Think might be pinched nerve,  stared on right side then left side, the left side is more worse than the right, been 4 days, rubbing with essential oil helps, it doesn't seem to be better, it's going down arm (left), Hx stroke     HPI  Lesley MILLER Link Stafford is a(n) 64 year old female presenting to urgent care for posterior neck pain x 4 days. No injury. Started when she woke up. Initially had pain on right side, now has pain on left. Pain is located in left posterior neck and upper back with radiation into left arm. Intermittent left arm numbness and tingling. Pain worse when trying to turn head, look over her left shoulder. Pain  improved with heat pad and sleeping propped up last night.    Current Outpatient Medications   Medication Sig Dispense Refill    aspirin 81 MG EC tablet Take 1 tablet (81 mg) by mouth daily      atorvastatin (LIPITOR) 40 MG tablet TAKE ONE TABLET BY MOUTH ONCE DAILY 90 tablet 3    carbamide peroxide (DEBROX) 6.5 % otic solution Place 5 drops Into the left ear 2 times daily. 15 mL 0    carboxymethylcellulose PF (CARBOXYMETHYLCELLULOSE SODIUM) 0.5 % ophthalmic solution Place 1 drop into both eyes 4 times daily Preservative free artificial tears, single use vials. 400 each 11    cetirizine (ZYRTEC) 10 MG tablet Take 1 tablet (10 mg) by mouth daily 90 tablet 3    cyclobenzaprine (FLEXERIL) 10 MG tablet Take 1 tablet (10 mg) by mouth 3 times daily as needed for muscle spasms. 30 tablet 0    DULoxetine (CYMBALTA) 60 MG capsule Take 1 capsule (60 mg) by mouth 2 times daily 180 capsule 3    fluticasone (FLONASE) 50 MCG/ACT nasal spray Spray 1 spray into both nostrils daily 1 g 11    gabapentin (NEURONTIN) 300 MG capsule TAKE 1 CAPSULE(300 MG) BY MOUTH THREE TIMES DAILY 90 capsule 3    HEMP OIL OR EXTRACT OR OTHER CBD CANNABINOID, NOT MEDICAL CANNABIS,       hydroxychloroquine (PLAQUENIL) 200 MG tablet Take 2 tablets (400 mg) by mouth daily 180 tablet 3    hydrOXYzine HCl (ATARAX) 25 MG tablet Take 1 tablet (25 mg) by mouth every 8 hours as needed for anxiety 270 tablet 3    LORazepam (ATIVAN) 0.5 MG tablet Take 0.5 mg by mouth daily as needed for anxiety (before MRI scn)      lurasidone (LATUDA) 20 MG TABS tablet       omega 3 1200 MG CAPS       order for DME Cane, 1 Device 0    prazosin (MINIPRESS) 1 MG capsule Take 1 capsule (1 mg) by mouth nightly as needed      predniSONE (DELTASONE) 20 MG tablet Take 2 tablets (40 mg) by mouth daily for 5 days. 10 tablet 0    tiZANidine (ZANAFLEX) 4 MG tablet TAKE ONE TO TWO TABLETS BY MOUTH THREE TIMES DAILY AS NEEDED 360 tablet 3    valACYclovir (VALTREX) 500 MG tablet Take 1 tablet  (500 mg) by mouth 2 times daily 30 tablet 1    risperiDONE (RISPERDAL) 0.5 MG tablet Take 0.5 mg by mouth at bedtime (Patient not taking: Reported on 3/26/2025)       Problem List:  2025-01: Cervical cancer screening  2024-12: Immunization declined  2024-08: Lumbar spine pain  2024-08: Bilateral hip pain  2022-07: History of alcoholism (H)  2022-07: Localization-related (focal) (partial) symptomatic epilepsy   and epileptic syndromes with complex partial seizures, not   intractable, without status epilepticus (H)  2021-04: Vitreous opacities of right eye  2021-03: Transaminitis  2021-03: Hyperlipidemia LDL goal <70  2021-03: Thrombocytopenia  2021-03: Posterior vitreous detachment of both eyes  2021-03: Long-term use of Plaquenil  2021-03: Myopia of both eyes  2021-03: Presbyopia  2021-03: Dry eyes  2021-01: Anal fissure  2021-01: Labial lesion  2020-07: Encounter for medication monitoring  2020-07: Chronic right-sided low back pain with right-sided sciatica  2020-07: Bilateral hip pain  2020-07: Left foot pain  2020-02: History of stroke  2020-02: Financial difficulties  2019-12: Stroke (H)  2017-09: Acute allergic conjunctivitis of both eyes  2017-04: Chronic bilateral low back pain without sciatica  2016-12: Liver hemangioma  2016-12: Bilateral carpal tunnel syndrome  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  2016-08: Insomnia, unspecified type  2016-04: Tired  2016-03: ACP (advance care planning)  2016-02: Substance abuse (H)  2016-02: Anxiety  2016-02: Allergic state  2016-02: Anemia due to blood loss, acute  2015-08: Cervicalgia  2015-08: Pain in joint of left shoulder  2015-08: History of claustrophobia  2015-04: Other symptoms involving nervous and musculoskeletal   systems(781.99)  2014-10: Obesity, Class I, BMI 30-34.9  2013-08: Abnormal perimenopausal bleeding  2012-09: CARDIOVASCULAR SCREENING; LDL GOAL LESS THAN 130  2012-09: Systemic lupus erythematosus, unspecified SLE type,   unspecified organ involvement status  (H)  Moderate major depression (H)    Allergies   Allergen Reactions    Morphine Sulfate Itching    Sulfa Antibiotics Hives         OBJECTIVE  Vitals:    03/26/25 1630   BP: (!) 145/86   Pulse: 88   Resp: 16   Temp: 97.7  F (36.5  C)   TempSrc: Tympanic   SpO2: 100%   Weight: 78.7 kg (173 lb 6.4 oz)     Physical Exam   GENERAL: healthy, alert, no acute distress.   PSYCH: mentation appears normal. Normal affect  LUNGS: no increased work of breathing.   MSK: no tenderness of cervical or thoracic spinous process. Tenderness and palpable tightness of left upper trapezius. Pain reproduced with lateral neck rotation to left.  NEURO: No dysarthria. PERRL. EOMs intact. Cranial nerves intact and symmetric. Upper extremities sensation intact and symmetric. 5/5 elbow flexion and extension. Lower extremities sensation intact and symmetric. Negative pronator drift. Normal gait.       No results found for any visits on 03/26/25.

## 2025-04-25 ENCOUNTER — TELEPHONE (OUTPATIENT)
Dept: FAMILY MEDICINE | Facility: CLINIC | Age: 65
End: 2025-04-25
Payer: COMMERCIAL

## 2025-04-25 NOTE — TELEPHONE ENCOUNTER
Called pt per notification stating pt is suicidal. Pt denies any thoughts of suicide/self harm. Does have access to weapons as has a conceal and carry and does carry.     Also relayed resources ie. EMPATH unit, that patient can utilize for these times but she also states she has a safety plan in place where she calls daughter and family to help anytime she is feeling overwhelmed. She also is looking for a new therapist as previous got diagnosed with cancer.     Again relayed at end of call to make sure to let us know if any changes and that utilizing us, the EMPATH unit, BEC unit at [facility], and her personal safety plan for when she feels overwhelmed are all great resources.    Routing as FYI.    Thanks!  Darwin ELLIOTT RN   [facility]

## 2025-04-27 ENCOUNTER — MYC MEDICAL ADVICE (OUTPATIENT)
Dept: FAMILY MEDICINE | Facility: CLINIC | Age: 65
End: 2025-04-27
Payer: COMMERCIAL

## 2025-04-28 NOTE — CONFIDENTIAL NOTE
Called patient; not suicidal. Staying sober, in touch with friends. She and sister still caring for father. She's trying to take breaks. Facial rash from Latuda, reaching out to psych. Recommend try benadryl cream  -Rafael Orosco MD

## 2025-05-26 ENCOUNTER — TRANSFERRED RECORDS (OUTPATIENT)
Dept: HEALTH INFORMATION MANAGEMENT | Facility: CLINIC | Age: 65
End: 2025-05-26

## 2025-05-28 ENCOUNTER — OFFICE VISIT (OUTPATIENT)
Dept: INTERNAL MEDICINE | Facility: CLINIC | Age: 65
End: 2025-05-28
Payer: COMMERCIAL

## 2025-05-28 ENCOUNTER — ANCILLARY PROCEDURE (OUTPATIENT)
Dept: GENERAL RADIOLOGY | Facility: CLINIC | Age: 65
End: 2025-05-28
Payer: COMMERCIAL

## 2025-05-28 VITALS
HEART RATE: 74 BPM | OXYGEN SATURATION: 99 % | DIASTOLIC BLOOD PRESSURE: 80 MMHG | SYSTOLIC BLOOD PRESSURE: 125 MMHG | HEIGHT: 65 IN | TEMPERATURE: 97.3 F | WEIGHT: 175 LBS | RESPIRATION RATE: 16 BRPM | BODY MASS INDEX: 29.16 KG/M2
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
## 2025-05-28 DIAGNOSIS — M54.6 ACUTE BILATERAL THORACIC BACK PAIN: ICD-10-CM

## 2025-05-28 DIAGNOSIS — M54.2 NECK PAIN: ICD-10-CM

## 2025-05-28 DIAGNOSIS — M54.42 ACUTE BILATERAL LOW BACK PAIN WITH LEFT-SIDED SCIATICA: ICD-10-CM

## 2025-05-28 DIAGNOSIS — E78.5 HYPERLIPIDEMIA LDL GOAL <70: ICD-10-CM

## 2025-05-28 DIAGNOSIS — G44.209 TENSION HEADACHE: ICD-10-CM

## 2025-05-28 PROCEDURE — 99214 OFFICE O/P EST MOD 30 MIN: CPT

## 2025-05-28 PROCEDURE — 72040 X-RAY EXAM NECK SPINE 2-3 VW: CPT | Mod: TC | Performed by: RADIOLOGY

## 2025-05-28 PROCEDURE — G2211 COMPLEX E/M VISIT ADD ON: HCPCS

## 2025-05-28 PROCEDURE — 72072 X-RAY EXAM THORAC SPINE 3VWS: CPT | Mod: TC | Performed by: RADIOLOGY

## 2025-05-28 RX ORDER — SUMATRIPTAN 50 MG/1
50 TABLET, FILM COATED ORAL
Qty: 12 TABLET | Refills: 1 | Status: SHIPPED | OUTPATIENT
Start: 2025-05-28

## 2025-05-28 RX ORDER — ATORVASTATIN CALCIUM 40 MG/1
40 TABLET, FILM COATED ORAL DAILY
Qty: 90 TABLET | Refills: 3 | Status: SHIPPED | OUTPATIENT
Start: 2025-05-28

## 2025-05-28 ASSESSMENT — PATIENT HEALTH QUESTIONNAIRE - PHQ9
10. IF YOU CHECKED OFF ANY PROBLEMS, HOW DIFFICULT HAVE THESE PROBLEMS MADE IT FOR YOU TO DO YOUR WORK, TAKE CARE OF THINGS AT HOME, OR GET ALONG WITH OTHER PEOPLE: SOMEWHAT DIFFICULT
SUM OF ALL RESPONSES TO PHQ QUESTIONS 1-9: 11
SUM OF ALL RESPONSES TO PHQ QUESTIONS 1-9: 11
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
## 2025-06-04 ENCOUNTER — LAB (OUTPATIENT)
Dept: LAB | Facility: CLINIC | Age: 65
End: 2025-06-04
Payer: COMMERCIAL

## 2025-06-04 DIAGNOSIS — M32.9 SYSTEMIC LUPUS ERYTHEMATOSUS, UNSPECIFIED SLE TYPE, UNSPECIFIED ORGAN INVOLVEMENT STATUS (H): ICD-10-CM

## 2025-06-04 LAB
ALBUMIN MFR UR ELPH: 15 MG/DL
ALBUMIN SERPL BCG-MCNC: 4 G/DL (ref 3.5–5.2)
ALBUMIN UR-MCNC: ABNORMAL MG/DL
ALP SERPL-CCNC: 72 U/L (ref 40–150)
ALT SERPL W P-5'-P-CCNC: 34 U/L (ref 0–50)
ANION GAP SERPL CALCULATED.3IONS-SCNC: 10 MMOL/L (ref 7–15)
APPEARANCE UR: CLEAR
AST SERPL W P-5'-P-CCNC: 41 U/L (ref 0–45)
BACTERIA #/AREA URNS HPF: ABNORMAL /HPF
BASOPHILS # BLD AUTO: 0 10E3/UL (ref 0–0.2)
BASOPHILS NFR BLD AUTO: 1 %
BILIRUB SERPL-MCNC: 0.6 MG/DL
BILIRUB UR QL STRIP: NEGATIVE
BUN SERPL-MCNC: 10.5 MG/DL (ref 8–23)
CALCIUM SERPL-MCNC: 9.6 MG/DL (ref 8.8–10.4)
CHLORIDE SERPL-SCNC: 107 MMOL/L (ref 98–107)
COLOR UR AUTO: YELLOW
CREAT SERPL-MCNC: 0.69 MG/DL (ref 0.51–0.95)
CREAT UR-MCNC: 136 MG/DL
EGFRCR SERPLBLD CKD-EPI 2021: >90 ML/MIN/1.73M2
EOSINOPHIL # BLD AUTO: 0.1 10E3/UL (ref 0–0.7)
EOSINOPHIL NFR BLD AUTO: 2 %
ERYTHROCYTE [DISTWIDTH] IN BLOOD BY AUTOMATED COUNT: 15.9 % (ref 10–15)
GLUCOSE SERPL-MCNC: 93 MG/DL (ref 70–99)
GLUCOSE UR STRIP-MCNC: NEGATIVE MG/DL
HCO3 SERPL-SCNC: 24 MMOL/L (ref 22–29)
HCT VFR BLD AUTO: 33.3 % (ref 35–47)
HGB BLD-MCNC: 12 G/DL (ref 11.7–15.7)
HGB UR QL STRIP: NEGATIVE
IMM GRANULOCYTES # BLD: 0 10E3/UL
IMM GRANULOCYTES NFR BLD: 0 %
KETONES UR STRIP-MCNC: NEGATIVE MG/DL
LEUKOCYTE ESTERASE UR QL STRIP: ABNORMAL
LYMPHOCYTES # BLD AUTO: 1.7 10E3/UL (ref 0.8–5.3)
LYMPHOCYTES NFR BLD AUTO: 43 %
MCH RBC QN AUTO: 26.3 PG (ref 26.5–33)
MCHC RBC AUTO-ENTMCNC: 36 G/DL (ref 31.5–36.5)
MCV RBC AUTO: 73 FL (ref 78–100)
MONOCYTES # BLD AUTO: 0.3 10E3/UL (ref 0–1.3)
MONOCYTES NFR BLD AUTO: 7 %
MUCOUS THREADS #/AREA URNS LPF: PRESENT /LPF
NEUTROPHILS # BLD AUTO: 1.9 10E3/UL (ref 1.6–8.3)
NEUTROPHILS NFR BLD AUTO: 47 %
NITRATE UR QL: NEGATIVE
NRBC # BLD AUTO: 0 10E3/UL
NRBC BLD AUTO-RTO: 0 /100
PH UR STRIP: 7 [PH] (ref 5–7)
PLATELET # BLD AUTO: 153 10E3/UL (ref 150–450)
POTASSIUM SERPL-SCNC: 4.2 MMOL/L (ref 3.4–5.3)
PROT SERPL-MCNC: 7.4 G/DL (ref 6.4–8.3)
PROT/CREAT 24H UR: 0.11 MG/MG CR (ref 0–0.2)
RBC # BLD AUTO: 4.57 10E6/UL (ref 3.8–5.2)
RBC #/AREA URNS AUTO: ABNORMAL /HPF
SODIUM SERPL-SCNC: 141 MMOL/L (ref 135–145)
SP GR UR STRIP: 1.02 (ref 1–1.03)
SQUAMOUS #/AREA URNS AUTO: ABNORMAL /LPF
UROBILINOGEN UR STRIP-ACNC: 1 E.U./DL
WBC # BLD AUTO: 4 10E3/UL (ref 4–11)
WBC #/AREA URNS AUTO: ABNORMAL /HPF

## 2025-06-04 PROCEDURE — 36415 COLL VENOUS BLD VENIPUNCTURE: CPT

## 2025-06-04 PROCEDURE — 80053 COMPREHEN METABOLIC PANEL: CPT

## 2025-06-04 PROCEDURE — 85025 COMPLETE CBC W/AUTO DIFF WBC: CPT

## 2025-06-04 PROCEDURE — 84156 ASSAY OF PROTEIN URINE: CPT

## 2025-06-04 PROCEDURE — 86160 COMPLEMENT ANTIGEN: CPT

## 2025-06-04 PROCEDURE — 81001 URINALYSIS AUTO W/SCOPE: CPT

## 2025-06-05 LAB
C3 SERPL-MCNC: 113 MG/DL (ref 81–157)
C4 SERPL-MCNC: 19 MG/DL (ref 13–39)

## 2025-06-07 DIAGNOSIS — G62.9 PERIPHERAL POLYNEUROPATHY: ICD-10-CM

## 2025-06-08 RX ORDER — GABAPENTIN 300 MG/1
300 CAPSULE ORAL 3 TIMES DAILY
Qty: 90 CAPSULE | Refills: 3 | Status: SHIPPED | OUTPATIENT
Start: 2025-06-08

## 2025-06-09 DIAGNOSIS — M32.9 SYSTEMIC LUPUS ERYTHEMATOSUS, UNSPECIFIED SLE TYPE, UNSPECIFIED ORGAN INVOLVEMENT STATUS (H): ICD-10-CM

## 2025-06-09 RX ORDER — HYDROXYCHLOROQUINE SULFATE 200 MG/1
400 TABLET, FILM COATED ORAL DAILY
Qty: 180 TABLET | Refills: 3 | Status: SHIPPED | OUTPATIENT
Start: 2025-06-09

## 2025-06-09 NOTE — TELEPHONE ENCOUNTER
Medication Question or Refill    Contacts       Contact Date/Time Type Contact Phone/Fax    06/09/2025 10:28 AM CDT Phone (Incoming) Lesley Pfeiffer (Self) [phone] (M)            What medication are you calling about (include dose and sig)?: hydroxychloroquine (PLAQUENIL) 200 MG tablet     Preferred Pharmacy:       [id number] [address] SEC [address] [facility] [address] [place] [address] [address]  [facility] [address]  Phone: [phone] Fax: [phone]      Controlled Substance Agreement on file:   CSA -- Patient Level:    CSA: None found at the patient level.       Who prescribed the medication?: Ana Luisa    Do you need a refill? Yes    When did you use the medication last? current    Patient offered an appointment? No    Do you have any questions or concerns?  Yes: Pt worried she will run out very soon pharmacy told her a prior auth for medication       Could we send this information to you in [facility] or would you prefer to receive a phone call?:   Patient would prefer a phone call   Okay to leave a detailed message?: Yes at Cell number on file:    Telephone Information:   Mobile [phone]

## 2025-06-13 ENCOUNTER — ANCILLARY PROCEDURE (OUTPATIENT)
Dept: CT IMAGING | Facility: CLINIC | Age: 65
End: 2025-06-13
Payer: COMMERCIAL
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
## 2025-06-13 DIAGNOSIS — M54.2 NECK PAIN: ICD-10-CM

## 2025-06-13 PROCEDURE — 72125 CT NECK SPINE W/O DYE: CPT | Performed by: RADIOLOGY

## 2025-06-18 ENCOUNTER — RESULTS FOLLOW-UP (OUTPATIENT)
Dept: FAMILY MEDICINE | Facility: CLINIC | Age: 65
End: 2025-06-18
Payer: COMMERCIAL

## 2025-06-25 ENCOUNTER — TELEPHONE (OUTPATIENT)
Dept: FAMILY MEDICINE | Facility: CLINIC | Age: 65
End: 2025-06-25
Payer: COMMERCIAL

## 2025-06-25 DIAGNOSIS — G44.209 TENSION HEADACHE: ICD-10-CM
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
## 2025-06-25 RX ORDER — SUMATRIPTAN 50 MG/1
50 TABLET, FILM COATED ORAL
Qty: 12 TABLET | Refills: 1 | Status: SHIPPED | OUTPATIENT
Start: 2025-06-25

## 2025-06-25 NOTE — TELEPHONE ENCOUNTER
Plan requires specific directions to process the prescription.Please update frequency of how the patient will be using the medication and days supply limitations. Need max tablets per month.     Jolene HOOPER CMA ([facility])  9:02 AM 6/25/2025

## 2025-07-17 ENCOUNTER — OFFICE VISIT (OUTPATIENT)
Dept: AUDIOLOGY | Facility: CLINIC | Age: 65
End: 2025-07-17
Payer: COMMERCIAL

## 2025-07-17 DIAGNOSIS — H90.3 SENSORINEURAL HEARING LOSS, BILATERAL: Primary | ICD-10-CM

## 2025-07-17 NOTE — PROGRESS NOTES
AUDIOLOGY REPORT    SUBJECTIVE: Lesley Stafford is a 64 year old female who was seen in the Audiology Clinic at [facility] for a fitting of bilateral Phonak Audeo I50 - R hearing aids. Previous results have revealed mild sloping to moderate sensorineural hearing loss for the right ear and normal hearing sloping to moderate sensorineural hearing loss for the left ear.    OBJECTIVE: The hearing aid conformity evaluation was completed.The hearing aids were placed and provided a good fit. Simulated real-ear measurements were completed on the [employer] system and were a good match to NAL-NL1 targets with soft sounds audible, moderate sounds comfortable, and loud sounds below discomfort. UCLs are verified through maximum power output measures and demonstrate appropriate limiting of loud inputs. Lesley was oriented to proper hearing aid use, care, cleaning (no water, dry brush), batteries (size: rechargeable, toxicity, low-battery signal), aid insertion/removal, user booklet, warranty information, storage cases, and other hearing aid details. The patient confirmed understanding of hearing aid use and care and showed proper insertion of the hearing aids while in the office today. Lesley reported good volume and sound quality.   Hearing aids were programmed as follows:  Program 1: AutoSense   Program button and volume control were deactivated today. Lesley would like to wait to pair with her phone and the nora.    EARS FIT: Bilateral  HEARING AID MODEL NAME: Phonak Audeo I50 - R  HEARING AID STYLE: -in-the-ear behind-the-ear  EARMOLDS/TIP: Cap domes  SERIAL NUMBERS: Right: [id number] Left: [id number]  WARRANTY END DATE: 6/29/2028    ASSESSMENT: Bilateral Phonak Audeo I50 - R hearing aids were fit today. Verification measures were performed. Lesley signed the Hearing Aid Purchase Agreement and was given a copy, as well as details on her hearing aids. The patient was counseled  that exact out of pocket amounts cannot be determined for hearing aid claims being sent to insurance. Any insurance coverage information presented to the patient is an estimate only, and is not a guarantee of payment. The patient has been advised to check with their own insurance.    PLAN: Lesley will return for follow-up in 2-3 weeks for a hearing aid review appointment. Will pair with her phone and the nora at that time. Please call this clinic with questions regarding today s appointment.    Shavon Wiggins, [facility]  Licensed Audiologist  [place] #[id number]

## 2025-07-22 ENCOUNTER — TRANSFERRED RECORDS (OUTPATIENT)
Dept: HEALTH INFORMATION MANAGEMENT | Facility: CLINIC | Age: 65
End: 2025-07-22
Payer: COMMERCIAL

## 2025-08-07 ENCOUNTER — OFFICE VISIT (OUTPATIENT)
Dept: AUDIOLOGY | Facility: CLINIC | Age: 65
End: 2025-08-07
Payer: COMMERCIAL

## 2025-08-07 DIAGNOSIS — H90.3 SENSORINEURAL HEARING LOSS, BILATERAL: Primary | ICD-10-CM

## 2025-08-12 DIAGNOSIS — Z79.899 LONG-TERM USE OF PLAQUENIL: Primary | ICD-10-CM

## 2025-08-12 DIAGNOSIS — Z79.899 ENCOUNTER FOR EYE EXAM DUE TO HIGH RISK MEDICATION: ICD-10-CM

## 2025-08-18 ENCOUNTER — TELEPHONE (OUTPATIENT)
Dept: OPHTHALMOLOGY | Facility: CLINIC | Age: 65
End: 2025-08-18
Payer: COMMERCIAL

## 2025-08-19 ENCOUNTER — ANCILLARY PROCEDURE (OUTPATIENT)
Dept: MAMMOGRAPHY | Facility: CLINIC | Age: 65
End: 2025-08-19
Attending: NURSE PRACTITIONER
Payer: COMMERCIAL

## 2025-08-19 DIAGNOSIS — Z12.31 VISIT FOR SCREENING MAMMOGRAM: ICD-10-CM

## 2025-08-19 PROCEDURE — 77067 SCR MAMMO BI INCL CAD: CPT | Mod: 26 | Performed by: RADIOLOGY

## 2025-08-19 PROCEDURE — 77063 BREAST TOMOSYNTHESIS BI: CPT | Mod: 26 | Performed by: RADIOLOGY

## 2025-08-19 PROCEDURE — 77067 SCR MAMMO BI INCL CAD: CPT

## 2025-08-26 ENCOUNTER — OFFICE VISIT (OUTPATIENT)
Dept: AUDIOLOGY | Facility: CLINIC | Age: 65
End: 2025-08-26
Payer: COMMERCIAL

## 2025-08-26 DIAGNOSIS — H90.3 SENSORINEURAL HEARING LOSS, BILATERAL: Primary | ICD-10-CM

## (undated) DEVICE — SOL WATER IRRIG 1000ML BOTTLE 2F7114

## (undated) DEVICE — SUCTION MANIFOLD NEPTUNE 2 SYS 1 PORT 702-025-000

## (undated) DEVICE — SYR 10ML FINGER CONTROL W/O NDL 309695

## (undated) DEVICE — SEAL SET MYOSURE ROD LENS SCOPE SINGLE USE 40-902

## (undated) DEVICE — LINEN TOWEL PACK X5 5464

## (undated) DEVICE — DEVICE TISSUE REMOVAL HYSTEROSCOPIC MYOSURE LITE 30-401LITE

## (undated) DEVICE — SPECIMEN CONTAINER 3OZ W/FORMALIN 59901

## (undated) DEVICE — STRAP KNEE/BODY 31143004

## (undated) DEVICE — GLOVE PROTEXIS W/NEU-THERA 7.5  2D73TE75

## (undated) DEVICE — SOL NACL 0.9% IRRIG 1000ML BOTTLE 2F7124

## (undated) DEVICE — JELLY LUBRICATING SURGILUBE 2OZ TUBE

## (undated) DEVICE — TUBING SUCTION 12"X1/4" N612

## (undated) DEVICE — KIT ENDO TURNOVER/PROCEDURE CARRY-ON 101822

## (undated) DEVICE — Device

## (undated) DEVICE — SOL NACL 0.9% IRRIG 3000ML BAG 2B7477

## (undated) DEVICE — TUBING SYS AQUILEX BLUE INFLOW AQL-110 YLW OUTFLOW AQL-111

## (undated) DEVICE — NDL SPINAL 22GA 3.5" QUINCKE 405181

## (undated) DEVICE — SUCTION CANISTER BEMIS HI FLOW 006772-901

## (undated) DEVICE — LINEN GOWN X4 5410

## (undated) DEVICE — SPECIMEN BAG BEMIS HI FLOW SUCTION WHITE SOCK 533810

## (undated) DEVICE — PAD CHUX UNDERPAD 30X30"

## (undated) DEVICE — GOWN IMPERVIOUS 2XL BLUE

## (undated) RX ORDER — FENTANYL CITRATE 50 UG/ML
INJECTION, SOLUTION INTRAMUSCULAR; INTRAVENOUS
Status: DISPENSED
Start: 2020-06-30

## (undated) RX ORDER — SIMETHICONE 40MG/0.6ML
SUSPENSION, DROPS(FINAL DOSAGE FORM)(ML) ORAL
Status: DISPENSED
Start: 2020-06-30

## (undated) RX ORDER — ONDANSETRON 2 MG/ML
INJECTION INTRAMUSCULAR; INTRAVENOUS
Status: DISPENSED
Start: 2020-06-30

## (undated) RX ORDER — METOPROLOL TARTRATE 1 MG/ML
INJECTION, SOLUTION INTRAVENOUS
Status: DISPENSED
Start: 2017-05-08

## (undated) RX ORDER — DIPHENHYDRAMINE HYDROCHLORIDE 50 MG/ML
INJECTION INTRAMUSCULAR; INTRAVENOUS
Status: DISPENSED
Start: 2020-06-30

## (undated) RX ORDER — DOBUTAMINE HYDROCHLORIDE 200 MG/100ML
INJECTION INTRAVENOUS
Status: DISPENSED
Start: 2017-05-08

## (undated) RX ORDER — LIDOCAINE HYDROCHLORIDE 10 MG/ML
INJECTION, SOLUTION EPIDURAL; INFILTRATION; INTRACAUDAL; PERINEURAL
Status: DISPENSED
Start: 2017-02-15